# Patient Record
Sex: MALE | Race: WHITE | NOT HISPANIC OR LATINO
[De-identification: names, ages, dates, MRNs, and addresses within clinical notes are randomized per-mention and may not be internally consistent; named-entity substitution may affect disease eponyms.]

---

## 2019-06-17 ENCOUNTER — APPOINTMENT (OUTPATIENT)
Dept: UROLOGY | Facility: CLINIC | Age: 39
End: 2019-06-17
Payer: COMMERCIAL

## 2019-06-17 VITALS — SYSTOLIC BLOOD PRESSURE: 140 MMHG | OXYGEN SATURATION: 97 % | HEART RATE: 108 BPM | DIASTOLIC BLOOD PRESSURE: 100 MMHG

## 2019-06-17 DIAGNOSIS — N32.89 OTHER SPECIFIED DISORDERS OF BLADDER: ICD-10-CM

## 2019-06-17 PROBLEM — Z00.00 ENCOUNTER FOR PREVENTIVE HEALTH EXAMINATION: Status: ACTIVE | Noted: 2019-06-17

## 2019-06-17 PROCEDURE — 99203 OFFICE O/P NEW LOW 30 MIN: CPT

## 2019-06-17 NOTE — HISTORY OF PRESENT ILLNESS
[FreeTextEntry1] : 39 y/o male \par hx of recent trauma in [facility] last week\par reports fell on stairs \par ruptured bladder\par s/p exlap and repair of bladder (as per patient, records requested)\par \par currently ashton in place\par ~ 7 days post op\par feels ok \par no fevers, pain or infection\par tolerating diet \par good bowel movements\par \par Works as medical device rep\par no smoking \par social etoh\par lives alone\par single\par

## 2019-06-17 NOTE — ASSESSMENT
[FreeTextEntry1] : Patient seen and examined\par underwent Ex-lap and bladder repair by patient report \par Staples removed today, steri-strips placed\par d/w patient will obtain medical records from OR report and previous Ct scans\par CT cystogram scheduled for later this week to assess if bladder repaired\par RTC 1 week for likely ashton removal pending \par cbc, bmp ordered\par understands given I did not perform surgery, we need to obtain full history prior to removing ashton catheter\par patient understands and agrees with plan

## 2019-06-17 NOTE — PHYSICAL EXAM
[General Appearance - Well Developed] : well developed [General Appearance - Well Nourished] : well nourished [Well Groomed] : well groomed [Normal Appearance] : normal appearance [Edema] : no peripheral edema [General Appearance - In No Acute Distress] : no acute distress [Respiration, Rhythm And Depth] : normal respiratory rhythm and effort [Exaggerated Use Of Accessory Muscles For Inspiration] : no accessory muscle use [Abdomen Soft] : soft [Abdomen Tenderness] : non-tender [Normal Station and Gait] : the gait and station were normal for the patient's age [Costovertebral Angle Tenderness] : no ~M costovertebral angle tenderness [] : no rash [No Focal Deficits] : no focal deficits [Not Anxious] : not anxious [Oriented To Time, Place, And Person] : oriented to person, place, and time [Mood] : the mood was normal [Affect] : the affect was normal [No Palpable Adenopathy] : no palpable adenopathy [FreeTextEntry1] : midline incision healed, staples in place, circumcised, ashton in place, testes normal

## 2019-06-24 ENCOUNTER — RESULT REVIEW (OUTPATIENT)
Age: 39
End: 2019-06-24

## 2020-01-01 ENCOUNTER — TRANSCRIPTION ENCOUNTER (OUTPATIENT)
Age: 40
End: 2020-01-01

## 2020-01-01 ENCOUNTER — INPATIENT (INPATIENT)
Facility: HOSPITAL | Age: 40
LOS: 11 days | DRG: 871 | End: 2020-11-12
Attending: STUDENT IN AN ORGANIZED HEALTH CARE EDUCATION/TRAINING PROGRAM | Admitting: STUDENT IN AN ORGANIZED HEALTH CARE EDUCATION/TRAINING PROGRAM
Payer: MEDICAID

## 2020-01-01 ENCOUNTER — INPATIENT (INPATIENT)
Facility: HOSPITAL | Age: 40
LOS: 3 days | Discharge: HOME | DRG: 393 | End: 2020-09-04
Attending: HOSPITALIST | Admitting: STUDENT IN AN ORGANIZED HEALTH CARE EDUCATION/TRAINING PROGRAM
Payer: MEDICAID

## 2020-01-01 ENCOUNTER — INPATIENT (INPATIENT)
Facility: HOSPITAL | Age: 40
LOS: 19 days | Discharge: LEFT BEFORE DISCHARGE | DRG: 673 | End: 2020-08-15
Attending: HOSPITALIST | Admitting: STUDENT IN AN ORGANIZED HEALTH CARE EDUCATION/TRAINING PROGRAM
Payer: MEDICAID

## 2020-01-01 ENCOUNTER — RESULT REVIEW (OUTPATIENT)
Age: 40
End: 2020-01-01

## 2020-01-01 ENCOUNTER — INPATIENT (INPATIENT)
Facility: HOSPITAL | Age: 40
LOS: 3 days | Discharge: HOME | DRG: 432 | End: 2020-09-23
Attending: HOSPITALIST | Admitting: HOSPITALIST
Payer: MEDICAID

## 2020-01-01 ENCOUNTER — INPATIENT (INPATIENT)
Facility: HOSPITAL | Age: 40
LOS: 5 days | Discharge: HOME | DRG: 432 | End: 2020-07-17
Attending: HOSPITALIST | Admitting: INTERNAL MEDICINE
Payer: MEDICAID

## 2020-01-01 VITALS
HEART RATE: 95 BPM | OXYGEN SATURATION: 94 % | TEMPERATURE: 99 F | RESPIRATION RATE: 18 BRPM | DIASTOLIC BLOOD PRESSURE: 73 MMHG | SYSTOLIC BLOOD PRESSURE: 110 MMHG

## 2020-01-01 VITALS
RESPIRATION RATE: 22 BRPM | DIASTOLIC BLOOD PRESSURE: 76 MMHG | TEMPERATURE: 98 F | SYSTOLIC BLOOD PRESSURE: 114 MMHG | OXYGEN SATURATION: 98 % | HEART RATE: 78 BPM | WEIGHT: 184.97 LBS | HEIGHT: 73 IN

## 2020-01-01 VITALS
TEMPERATURE: 98 F | DIASTOLIC BLOOD PRESSURE: 57 MMHG | RESPIRATION RATE: 17 BRPM | SYSTOLIC BLOOD PRESSURE: 93 MMHG | HEART RATE: 110 BPM | OXYGEN SATURATION: 94 %

## 2020-01-01 VITALS
HEART RATE: 108 BPM | SYSTOLIC BLOOD PRESSURE: 113 MMHG | TEMPERATURE: 98 F | DIASTOLIC BLOOD PRESSURE: 67 MMHG | RESPIRATION RATE: 20 BRPM | OXYGEN SATURATION: 99 %

## 2020-01-01 VITALS
SYSTOLIC BLOOD PRESSURE: 116 MMHG | RESPIRATION RATE: 16 BRPM | TEMPERATURE: 99 F | OXYGEN SATURATION: 92 % | HEART RATE: 112 BPM | DIASTOLIC BLOOD PRESSURE: 75 MMHG

## 2020-01-01 VITALS
TEMPERATURE: 98 F | HEIGHT: 71 IN | SYSTOLIC BLOOD PRESSURE: 91 MMHG | RESPIRATION RATE: 19 BRPM | DIASTOLIC BLOOD PRESSURE: 60 MMHG | HEART RATE: 136 BPM | WEIGHT: 180.78 LBS | OXYGEN SATURATION: 94 %

## 2020-01-01 VITALS
HEART RATE: 123 BPM | WEIGHT: 184.53 LBS | RESPIRATION RATE: 16 BRPM | OXYGEN SATURATION: 96 % | DIASTOLIC BLOOD PRESSURE: 77 MMHG | SYSTOLIC BLOOD PRESSURE: 112 MMHG | TEMPERATURE: 98 F | HEIGHT: 73 IN

## 2020-01-01 VITALS
SYSTOLIC BLOOD PRESSURE: 131 MMHG | DIASTOLIC BLOOD PRESSURE: 73 MMHG | RESPIRATION RATE: 22 BRPM | HEIGHT: 73 IN | TEMPERATURE: 102 F | WEIGHT: 192.24 LBS | OXYGEN SATURATION: 97 % | HEART RATE: 113 BPM

## 2020-01-01 VITALS
WEIGHT: 180.12 LBS | RESPIRATION RATE: 18 BRPM | SYSTOLIC BLOOD PRESSURE: 110 MMHG | HEART RATE: 102 BPM | HEIGHT: 73 IN | OXYGEN SATURATION: 95 % | DIASTOLIC BLOOD PRESSURE: 73 MMHG

## 2020-01-01 DIAGNOSIS — D75.89 OTHER SPECIFIED DISEASES OF BLOOD AND BLOOD-FORMING ORGANS: ICD-10-CM

## 2020-01-01 DIAGNOSIS — R19.7 DIARRHEA, UNSPECIFIED: ICD-10-CM

## 2020-01-01 DIAGNOSIS — A41.9 SEPSIS, UNSPECIFIED ORGANISM: ICD-10-CM

## 2020-01-01 DIAGNOSIS — D69.6 THROMBOCYTOPENIA, UNSPECIFIED: ICD-10-CM

## 2020-01-01 DIAGNOSIS — R07.9 CHEST PAIN, UNSPECIFIED: ICD-10-CM

## 2020-01-01 DIAGNOSIS — F10.10 ALCOHOL ABUSE, UNCOMPLICATED: ICD-10-CM

## 2020-01-01 DIAGNOSIS — E83.39 OTHER DISORDERS OF PHOSPHORUS METABOLISM: ICD-10-CM

## 2020-01-01 DIAGNOSIS — K70.31 ALCOHOLIC CIRRHOSIS OF LIVER WITH ASCITES: ICD-10-CM

## 2020-01-01 DIAGNOSIS — N17.9 ACUTE KIDNEY FAILURE, UNSPECIFIED: ICD-10-CM

## 2020-01-01 DIAGNOSIS — Z02.9 ENCOUNTER FOR ADMINISTRATIVE EXAMINATIONS, UNSPECIFIED: ICD-10-CM

## 2020-01-01 DIAGNOSIS — R56.9 UNSPECIFIED CONVULSIONS: ICD-10-CM

## 2020-01-01 DIAGNOSIS — D62 ACUTE POSTHEMORRHAGIC ANEMIA: ICD-10-CM

## 2020-01-01 DIAGNOSIS — J90 PLEURAL EFFUSION, NOT ELSEWHERE CLASSIFIED: ICD-10-CM

## 2020-01-01 DIAGNOSIS — R18.8 OTHER ASCITES: ICD-10-CM

## 2020-01-01 DIAGNOSIS — D64.9 ANEMIA, UNSPECIFIED: ICD-10-CM

## 2020-01-01 DIAGNOSIS — N18.9 CHRONIC KIDNEY DISEASE, UNSPECIFIED: ICD-10-CM

## 2020-01-01 DIAGNOSIS — E27.49 OTHER ADRENOCORTICAL INSUFFICIENCY: ICD-10-CM

## 2020-01-01 DIAGNOSIS — K92.0 HEMATEMESIS: ICD-10-CM

## 2020-01-01 DIAGNOSIS — Z29.9 ENCOUNTER FOR PROPHYLACTIC MEASURES, UNSPECIFIED: ICD-10-CM

## 2020-01-01 DIAGNOSIS — Z98.890 OTHER SPECIFIED POSTPROCEDURAL STATES: Chronic | ICD-10-CM

## 2020-01-01 DIAGNOSIS — N18.6 END STAGE RENAL DISEASE: ICD-10-CM

## 2020-01-01 DIAGNOSIS — F10.231 ALCOHOL DEPENDENCE WITH WITHDRAWAL DELIRIUM: ICD-10-CM

## 2020-01-01 DIAGNOSIS — K70.30 ALCOHOLIC CIRRHOSIS OF LIVER WITHOUT ASCITES: ICD-10-CM

## 2020-01-01 DIAGNOSIS — K72.00 ACUTE AND SUBACUTE HEPATIC FAILURE WITHOUT COMA: ICD-10-CM

## 2020-01-01 DIAGNOSIS — K72.90 HEPATIC FAILURE, UNSPECIFIED WITHOUT COMA: ICD-10-CM

## 2020-01-01 DIAGNOSIS — D72.829 ELEVATED WHITE BLOOD CELL COUNT, UNSPECIFIED: ICD-10-CM

## 2020-01-01 DIAGNOSIS — R10.9 UNSPECIFIED ABDOMINAL PAIN: ICD-10-CM

## 2020-01-01 DIAGNOSIS — E87.6 HYPOKALEMIA: ICD-10-CM

## 2020-01-01 DIAGNOSIS — K92.2 GASTROINTESTINAL HEMORRHAGE, UNSPECIFIED: ICD-10-CM

## 2020-01-01 DIAGNOSIS — R00.0 TACHYCARDIA, UNSPECIFIED: ICD-10-CM

## 2020-01-01 DIAGNOSIS — E87.1 HYPO-OSMOLALITY AND HYPONATREMIA: ICD-10-CM

## 2020-01-01 DIAGNOSIS — R23.4 CHANGES IN SKIN TEXTURE: ICD-10-CM

## 2020-01-01 DIAGNOSIS — E83.51 HYPOCALCEMIA: ICD-10-CM

## 2020-01-01 DIAGNOSIS — R65.10 SYSTEMIC INFLAMMATORY RESPONSE SYNDROME (SIRS) OF NON-INFECTIOUS ORIGIN WITHOUT ACUTE ORGAN DYSFUNCTION: ICD-10-CM

## 2020-01-01 DIAGNOSIS — R06.00 DYSPNEA, UNSPECIFIED: ICD-10-CM

## 2020-01-01 DIAGNOSIS — K76.7 HEPATORENAL SYNDROME: ICD-10-CM

## 2020-01-01 DIAGNOSIS — G93.41 METABOLIC ENCEPHALOPATHY: ICD-10-CM

## 2020-01-01 DIAGNOSIS — I95.9 HYPOTENSION, UNSPECIFIED: ICD-10-CM

## 2020-01-01 DIAGNOSIS — R74.0 NONSPECIFIC ELEVATION OF LEVELS OF TRANSAMINASE AND LACTIC ACID DEHYDROGENASE [LDH]: ICD-10-CM

## 2020-01-01 DIAGNOSIS — B17.9 ACUTE VIRAL HEPATITIS, UNSPECIFIED: ICD-10-CM

## 2020-01-01 DIAGNOSIS — K74.60 UNSPECIFIED CIRRHOSIS OF LIVER: ICD-10-CM

## 2020-01-01 LAB
A PHAGOCYTOPH DNA BLD QL NAA+PROBE: NEGATIVE — SIGNIFICANT CHANGE UP
A PHAGOCYTOPH IGG TITR SER IF: SIGNIFICANT CHANGE UP
A PHAGOCYTOPH IGM SER QL: SIGNIFICANT CHANGE UP
A PHAGOCYTOPH IGM SER QL: SIGNIFICANT CHANGE UP
A PHAGOCYTOPH IGM TITR SER IF: SIGNIFICANT CHANGE UP
A1AT PHENOTYP SERPL-IMP: SIGNIFICANT CHANGE UP BANDS
A1AT SERPL-MCNC: 100 MG/DL — SIGNIFICANT CHANGE UP (ref 100–190)
A1AT SERPL-MCNC: 107 MG/DL — SIGNIFICANT CHANGE UP (ref 90–200)
A1C WITH ESTIMATED AVERAGE GLUCOSE RESULT: 4.6 % — SIGNIFICANT CHANGE UP (ref 4–5.6)
AFP-TM SERPL-MCNC: 3.4 NG/ML — SIGNIFICANT CHANGE UP
ALBUMIN FLD-MCNC: 0.4 G/DL — SIGNIFICANT CHANGE UP
ALBUMIN FLD-MCNC: 0.6 G/DL — SIGNIFICANT CHANGE UP
ALBUMIN FLD-MCNC: 0.8 G/DL — SIGNIFICANT CHANGE UP
ALBUMIN FLD-MCNC: 1 G/DL — SIGNIFICANT CHANGE UP
ALBUMIN FLD-MCNC: 1.2 G/DL — SIGNIFICANT CHANGE UP
ALBUMIN FLD-MCNC: 1.6 G/DL — SIGNIFICANT CHANGE UP
ALBUMIN SERPL ELPH-MCNC: 2 G/DL — LOW (ref 3.3–5)
ALBUMIN SERPL ELPH-MCNC: 2.1 G/DL — LOW (ref 3.3–5)
ALBUMIN SERPL ELPH-MCNC: 2.2 G/DL — LOW (ref 3.3–5)
ALBUMIN SERPL ELPH-MCNC: 2.3 G/DL — LOW (ref 3.3–5)
ALBUMIN SERPL ELPH-MCNC: 2.4 G/DL — LOW (ref 3.3–5)
ALBUMIN SERPL ELPH-MCNC: 2.4 G/DL — LOW (ref 3.3–5)
ALBUMIN SERPL ELPH-MCNC: 2.5 G/DL — LOW (ref 3.3–5)
ALBUMIN SERPL ELPH-MCNC: 2.7 G/DL — LOW (ref 3.3–5)
ALBUMIN SERPL ELPH-MCNC: 2.7 G/DL — LOW (ref 3.3–5)
ALBUMIN SERPL ELPH-MCNC: 2.8 G/DL — LOW (ref 3.3–5)
ALBUMIN SERPL ELPH-MCNC: 2.9 G/DL — LOW (ref 3.3–5)
ALBUMIN SERPL ELPH-MCNC: 3 G/DL — LOW (ref 3.3–5)
ALBUMIN SERPL ELPH-MCNC: 3.1 G/DL — LOW (ref 3.3–5)
ALBUMIN SERPL ELPH-MCNC: 3.2 G/DL — LOW (ref 3.3–5)
ALBUMIN SERPL ELPH-MCNC: 3.3 G/DL — SIGNIFICANT CHANGE UP (ref 3.3–5)
ALBUMIN SERPL ELPH-MCNC: 3.4 G/DL — SIGNIFICANT CHANGE UP (ref 3.3–5)
ALBUMIN SERPL ELPH-MCNC: 3.5 G/DL — SIGNIFICANT CHANGE UP (ref 3.3–5)
ALBUMIN SERPL ELPH-MCNC: 3.6 G/DL — SIGNIFICANT CHANGE UP (ref 3.3–5)
ALBUMIN SERPL ELPH-MCNC: 3.7 G/DL — SIGNIFICANT CHANGE UP (ref 3.3–5)
ALBUMIN SERPL ELPH-MCNC: 3.8 G/DL — SIGNIFICANT CHANGE UP (ref 3.3–5)
ALBUMIN SERPL ELPH-MCNC: 3.9 G/DL — SIGNIFICANT CHANGE UP (ref 3.3–5)
ALBUMIN SERPL ELPH-MCNC: 4 G/DL — SIGNIFICANT CHANGE UP (ref 3.3–5)
ALBUMIN SERPL ELPH-MCNC: 4 G/DL — SIGNIFICANT CHANGE UP (ref 3.3–5)
ALBUMIN SERPL ELPH-MCNC: 4.1 G/DL — SIGNIFICANT CHANGE UP (ref 3.3–5)
ALBUMIN SERPL ELPH-MCNC: 4.2 G/DL — SIGNIFICANT CHANGE UP (ref 3.3–5)
ALBUMIN SERPL ELPH-MCNC: 4.3 G/DL — SIGNIFICANT CHANGE UP (ref 3.3–5)
ALBUMIN SERPL ELPH-MCNC: 4.3 G/DL — SIGNIFICANT CHANGE UP (ref 3.3–5)
ALBUMIN SERPL ELPH-MCNC: 4.4 G/DL — SIGNIFICANT CHANGE UP (ref 3.3–5)
ALBUMIN SERPL ELPH-MCNC: 4.4 G/DL — SIGNIFICANT CHANGE UP (ref 3.3–5)
ALBUMIN SERPL ELPH-MCNC: 4.6 G/DL — SIGNIFICANT CHANGE UP (ref 3.3–5)
ALP SERPL-CCNC: 102 U/L — SIGNIFICANT CHANGE UP (ref 40–120)
ALP SERPL-CCNC: 104 U/L — SIGNIFICANT CHANGE UP (ref 40–120)
ALP SERPL-CCNC: 105 U/L — SIGNIFICANT CHANGE UP (ref 40–120)
ALP SERPL-CCNC: 107 U/L — SIGNIFICANT CHANGE UP (ref 40–120)
ALP SERPL-CCNC: 114 U/L — SIGNIFICANT CHANGE UP (ref 40–120)
ALP SERPL-CCNC: 115 U/L — SIGNIFICANT CHANGE UP (ref 40–120)
ALP SERPL-CCNC: 119 U/L — SIGNIFICANT CHANGE UP (ref 40–120)
ALP SERPL-CCNC: 124 U/L — HIGH (ref 40–120)
ALP SERPL-CCNC: 125 U/L — HIGH (ref 40–120)
ALP SERPL-CCNC: 127 U/L — HIGH (ref 40–120)
ALP SERPL-CCNC: 128 U/L — HIGH (ref 40–120)
ALP SERPL-CCNC: 131 U/L — HIGH (ref 40–120)
ALP SERPL-CCNC: 131 U/L — HIGH (ref 40–120)
ALP SERPL-CCNC: 132 U/L — HIGH (ref 40–120)
ALP SERPL-CCNC: 135 U/L — HIGH (ref 40–120)
ALP SERPL-CCNC: 138 U/L — HIGH (ref 40–120)
ALP SERPL-CCNC: 139 U/L — HIGH (ref 40–120)
ALP SERPL-CCNC: 140 U/L — HIGH (ref 40–120)
ALP SERPL-CCNC: 141 U/L — HIGH (ref 40–120)
ALP SERPL-CCNC: 148 U/L — HIGH (ref 40–120)
ALP SERPL-CCNC: 157 U/L — HIGH (ref 40–120)
ALP SERPL-CCNC: 160 U/L — HIGH (ref 40–120)
ALP SERPL-CCNC: 162 U/L — HIGH (ref 40–120)
ALP SERPL-CCNC: 163 U/L — HIGH (ref 40–120)
ALP SERPL-CCNC: 167 U/L — HIGH (ref 40–120)
ALP SERPL-CCNC: 172 U/L — HIGH (ref 40–120)
ALP SERPL-CCNC: 177 U/L — HIGH (ref 40–120)
ALP SERPL-CCNC: 182 U/L — HIGH (ref 40–120)
ALP SERPL-CCNC: 190 U/L — HIGH (ref 40–120)
ALP SERPL-CCNC: 211 U/L — HIGH (ref 40–120)
ALP SERPL-CCNC: 216 U/L — HIGH (ref 40–120)
ALP SERPL-CCNC: 228 U/L — HIGH (ref 40–120)
ALP SERPL-CCNC: 245 U/L — HIGH (ref 40–120)
ALP SERPL-CCNC: 245 U/L — HIGH (ref 40–120)
ALP SERPL-CCNC: 47 U/L — SIGNIFICANT CHANGE UP (ref 40–120)
ALP SERPL-CCNC: 49 U/L — SIGNIFICANT CHANGE UP (ref 40–120)
ALP SERPL-CCNC: 51 U/L — SIGNIFICANT CHANGE UP (ref 40–120)
ALP SERPL-CCNC: 51 U/L — SIGNIFICANT CHANGE UP (ref 40–120)
ALP SERPL-CCNC: 53 U/L — SIGNIFICANT CHANGE UP (ref 40–120)
ALP SERPL-CCNC: 54 U/L — SIGNIFICANT CHANGE UP (ref 40–120)
ALP SERPL-CCNC: 56 U/L — SIGNIFICANT CHANGE UP (ref 40–120)
ALP SERPL-CCNC: 57 U/L — SIGNIFICANT CHANGE UP (ref 40–120)
ALP SERPL-CCNC: 59 U/L — SIGNIFICANT CHANGE UP (ref 40–120)
ALP SERPL-CCNC: 59 U/L — SIGNIFICANT CHANGE UP (ref 40–120)
ALP SERPL-CCNC: 61 U/L — SIGNIFICANT CHANGE UP (ref 40–120)
ALP SERPL-CCNC: 63 U/L — SIGNIFICANT CHANGE UP (ref 40–120)
ALP SERPL-CCNC: 63 U/L — SIGNIFICANT CHANGE UP (ref 40–120)
ALP SERPL-CCNC: 64 U/L — SIGNIFICANT CHANGE UP (ref 40–120)
ALP SERPL-CCNC: 67 U/L — SIGNIFICANT CHANGE UP (ref 40–120)
ALP SERPL-CCNC: 68 U/L — SIGNIFICANT CHANGE UP (ref 40–120)
ALP SERPL-CCNC: 70 U/L — SIGNIFICANT CHANGE UP (ref 40–120)
ALP SERPL-CCNC: 74 U/L — SIGNIFICANT CHANGE UP (ref 40–120)
ALP SERPL-CCNC: 78 U/L — SIGNIFICANT CHANGE UP (ref 40–120)
ALP SERPL-CCNC: 82 U/L — SIGNIFICANT CHANGE UP (ref 40–120)
ALP SERPL-CCNC: 85 U/L — SIGNIFICANT CHANGE UP (ref 40–120)
ALP SERPL-CCNC: 85 U/L — SIGNIFICANT CHANGE UP (ref 40–120)
ALP SERPL-CCNC: 87 U/L — SIGNIFICANT CHANGE UP (ref 40–120)
ALP SERPL-CCNC: 87 U/L — SIGNIFICANT CHANGE UP (ref 40–120)
ALP SERPL-CCNC: 89 U/L — SIGNIFICANT CHANGE UP (ref 40–120)
ALP SERPL-CCNC: 89 U/L — SIGNIFICANT CHANGE UP (ref 40–120)
ALP SERPL-CCNC: 90 U/L — SIGNIFICANT CHANGE UP (ref 40–120)
ALP SERPL-CCNC: 91 U/L — SIGNIFICANT CHANGE UP (ref 40–120)
ALP SERPL-CCNC: 91 U/L — SIGNIFICANT CHANGE UP (ref 40–120)
ALP SERPL-CCNC: 92 U/L — SIGNIFICANT CHANGE UP (ref 40–120)
ALP SERPL-CCNC: 94 U/L — SIGNIFICANT CHANGE UP (ref 40–120)
ALP SERPL-CCNC: 96 U/L — SIGNIFICANT CHANGE UP (ref 40–120)
ALP SERPL-CCNC: 97 U/L — SIGNIFICANT CHANGE UP (ref 40–120)
ALP SERPL-CCNC: 97 U/L — SIGNIFICANT CHANGE UP (ref 40–120)
ALP SERPL-CCNC: 98 U/L — SIGNIFICANT CHANGE UP (ref 40–120)
ALP SERPL-CCNC: 99 U/L — SIGNIFICANT CHANGE UP (ref 40–120)
ALT FLD-CCNC: 12 U/L — SIGNIFICANT CHANGE UP (ref 10–45)
ALT FLD-CCNC: 13 U/L — SIGNIFICANT CHANGE UP (ref 10–45)
ALT FLD-CCNC: 14 U/L — SIGNIFICANT CHANGE UP (ref 10–45)
ALT FLD-CCNC: 14 U/L — SIGNIFICANT CHANGE UP (ref 10–45)
ALT FLD-CCNC: 15 U/L — SIGNIFICANT CHANGE UP (ref 10–45)
ALT FLD-CCNC: 16 U/L — SIGNIFICANT CHANGE UP (ref 10–45)
ALT FLD-CCNC: 16 U/L — SIGNIFICANT CHANGE UP (ref 10–45)
ALT FLD-CCNC: 17 U/L — SIGNIFICANT CHANGE UP (ref 10–45)
ALT FLD-CCNC: 18 U/L — SIGNIFICANT CHANGE UP (ref 10–45)
ALT FLD-CCNC: 19 U/L — SIGNIFICANT CHANGE UP (ref 10–45)
ALT FLD-CCNC: 20 U/L — SIGNIFICANT CHANGE UP (ref 10–45)
ALT FLD-CCNC: 20 U/L — SIGNIFICANT CHANGE UP (ref 10–45)
ALT FLD-CCNC: 21 U/L — SIGNIFICANT CHANGE UP (ref 10–45)
ALT FLD-CCNC: 22 U/L — SIGNIFICANT CHANGE UP (ref 10–45)
ALT FLD-CCNC: 23 U/L — SIGNIFICANT CHANGE UP (ref 10–45)
ALT FLD-CCNC: 24 U/L — SIGNIFICANT CHANGE UP (ref 10–45)
ALT FLD-CCNC: 25 U/L — SIGNIFICANT CHANGE UP (ref 10–45)
ALT FLD-CCNC: 26 U/L — SIGNIFICANT CHANGE UP (ref 10–45)
ALT FLD-CCNC: 27 U/L — SIGNIFICANT CHANGE UP (ref 10–45)
ALT FLD-CCNC: 27 U/L — SIGNIFICANT CHANGE UP (ref 10–45)
ALT FLD-CCNC: 28 U/L — SIGNIFICANT CHANGE UP (ref 10–45)
ALT FLD-CCNC: 29 U/L — SIGNIFICANT CHANGE UP (ref 10–45)
ALT FLD-CCNC: 31 U/L — SIGNIFICANT CHANGE UP (ref 10–45)
ALT FLD-CCNC: 31 U/L — SIGNIFICANT CHANGE UP (ref 10–45)
ALT FLD-CCNC: 33 U/L — SIGNIFICANT CHANGE UP (ref 10–45)
ALT FLD-CCNC: 34 U/L — SIGNIFICANT CHANGE UP (ref 10–45)
ALT FLD-CCNC: 34 U/L — SIGNIFICANT CHANGE UP (ref 10–45)
ALT FLD-CCNC: 35 U/L — SIGNIFICANT CHANGE UP (ref 10–45)
ALT FLD-CCNC: 36 U/L — SIGNIFICANT CHANGE UP (ref 10–45)
ALT FLD-CCNC: 36 U/L — SIGNIFICANT CHANGE UP (ref 10–45)
ALT FLD-CCNC: 37 U/L — SIGNIFICANT CHANGE UP (ref 10–45)
ALT FLD-CCNC: 37 U/L — SIGNIFICANT CHANGE UP (ref 10–45)
ALT FLD-CCNC: 38 U/L — SIGNIFICANT CHANGE UP (ref 10–45)
ALT FLD-CCNC: 39 U/L — SIGNIFICANT CHANGE UP (ref 10–45)
ALT FLD-CCNC: 40 U/L — SIGNIFICANT CHANGE UP (ref 10–45)
ALT FLD-CCNC: 41 U/L — SIGNIFICANT CHANGE UP (ref 10–45)
ALT FLD-CCNC: 43 U/L — SIGNIFICANT CHANGE UP (ref 10–45)
ALT FLD-CCNC: 47 U/L — HIGH (ref 10–45)
ALT FLD-CCNC: 47 U/L — HIGH (ref 10–45)
ALT FLD-CCNC: 48 U/L — HIGH (ref 10–45)
ALT FLD-CCNC: 51 U/L — HIGH (ref 10–45)
ALT FLD-CCNC: 55 U/L — HIGH (ref 10–45)
ALT FLD-CCNC: 57 U/L — HIGH (ref 10–45)
ALT FLD-CCNC: 60 U/L — HIGH (ref 10–45)
ALT FLD-CCNC: 65 U/L — HIGH (ref 10–45)
ALT FLD-CCNC: 68 U/L — HIGH (ref 10–45)
ALT FLD-CCNC: 70 U/L — HIGH (ref 10–45)
ALT FLD-CCNC: 75 U/L — HIGH (ref 10–45)
ALT FLD-CCNC: 77 U/L — HIGH (ref 10–45)
ALT FLD-CCNC: 81 U/L — HIGH (ref 10–45)
ALT FLD-CCNC: 86 U/L — HIGH (ref 10–45)
ALT FLD-CCNC: 89 U/L — HIGH (ref 10–45)
AMMONIA BLD-MCNC: 130 UMOL/L — HIGH (ref 11–55)
AMMONIA BLD-MCNC: 33 UMOL/L — SIGNIFICANT CHANGE UP (ref 11–55)
AMMONIA BLD-MCNC: 34 UMOL/L — SIGNIFICANT CHANGE UP (ref 11–55)
AMMONIA BLD-MCNC: 85 UMOL/L — HIGH (ref 11–55)
AMMONIA BLD-MCNC: 94 UMOL/L — HIGH (ref 11–55)
AMYLASE P1 CFR SERPL: 24 U/L — LOW (ref 25–125)
AMYLASE P1 CFR SERPL: 66 U/L — SIGNIFICANT CHANGE UP (ref 25–125)
AMYLASE P1 CFR SERPL: 68 U/L — SIGNIFICANT CHANGE UP (ref 25–125)
ANA TITR SER: NEGATIVE — SIGNIFICANT CHANGE UP
ANAPLASMA PHAGOCYTO IGM COMENT: SIGNIFICANT CHANGE UP
ANAPLASMA PHAGOCYTO IGM INTERP: SIGNIFICANT CHANGE UP
ANION GAP SERPL CALC-SCNC: 10 MMOL/L — SIGNIFICANT CHANGE UP (ref 5–17)
ANION GAP SERPL CALC-SCNC: 11 MMOL/L — SIGNIFICANT CHANGE UP (ref 5–17)
ANION GAP SERPL CALC-SCNC: 12 MMOL/L — SIGNIFICANT CHANGE UP (ref 5–17)
ANION GAP SERPL CALC-SCNC: 13 MMOL/L — SIGNIFICANT CHANGE UP (ref 5–17)
ANION GAP SERPL CALC-SCNC: 14 MMOL/L — SIGNIFICANT CHANGE UP (ref 5–17)
ANION GAP SERPL CALC-SCNC: 15 MMOL/L — SIGNIFICANT CHANGE UP (ref 5–17)
ANION GAP SERPL CALC-SCNC: 16 MMOL/L — SIGNIFICANT CHANGE UP (ref 5–17)
ANION GAP SERPL CALC-SCNC: 17 MMOL/L — SIGNIFICANT CHANGE UP (ref 5–17)
ANION GAP SERPL CALC-SCNC: 18 MMOL/L — HIGH (ref 5–17)
ANION GAP SERPL CALC-SCNC: 19 MMOL/L — HIGH (ref 5–17)
ANION GAP SERPL CALC-SCNC: 19 MMOL/L — HIGH (ref 5–17)
ANION GAP SERPL CALC-SCNC: 20 MMOL/L — HIGH (ref 5–17)
ANION GAP SERPL CALC-SCNC: 21 MMOL/L — HIGH (ref 5–17)
ANION GAP SERPL CALC-SCNC: 22 MMOL/L — HIGH (ref 5–17)
ANION GAP SERPL CALC-SCNC: 22 MMOL/L — HIGH (ref 5–17)
ANION GAP SERPL CALC-SCNC: 23 MMOL/L — HIGH (ref 5–17)
ANION GAP SERPL CALC-SCNC: 24 MMOL/L — HIGH (ref 5–17)
ANION GAP SERPL CALC-SCNC: 25 MMOL/L — HIGH (ref 5–17)
ANION GAP SERPL CALC-SCNC: 26 MMOL/L — HIGH (ref 5–17)
ANION GAP SERPL CALC-SCNC: 28 MMOL/L — HIGH (ref 5–17)
ANION GAP SERPL CALC-SCNC: 9 MMOL/L — SIGNIFICANT CHANGE UP (ref 5–17)
ANION GAP SERPL CALC-SCNC: 9 MMOL/L — SIGNIFICANT CHANGE UP (ref 5–17)
ANISOCYTOSIS BLD QL: SIGNIFICANT CHANGE UP
ANISOCYTOSIS BLD QL: SIGNIFICANT CHANGE UP
ANISOCYTOSIS BLD QL: SLIGHT — SIGNIFICANT CHANGE UP
APPEARANCE UR: ABNORMAL
APPEARANCE UR: CLEAR — SIGNIFICANT CHANGE UP
APTT BLD: 143.7 SEC — CRITICAL HIGH (ref 27.5–35.5)
APTT BLD: 31.5 SEC — SIGNIFICANT CHANGE UP (ref 27.5–35.5)
APTT BLD: 32.3 SEC — SIGNIFICANT CHANGE UP (ref 27.5–35.5)
APTT BLD: 32.5 SEC — SIGNIFICANT CHANGE UP (ref 27.5–35.5)
APTT BLD: 32.6 SEC — SIGNIFICANT CHANGE UP (ref 27.5–35.5)
APTT BLD: 32.8 SEC — SIGNIFICANT CHANGE UP (ref 27.5–35.5)
APTT BLD: 32.9 SEC — SIGNIFICANT CHANGE UP (ref 27.5–35.5)
APTT BLD: 33.6 SEC — SIGNIFICANT CHANGE UP (ref 27.5–35.5)
APTT BLD: 33.6 SEC — SIGNIFICANT CHANGE UP (ref 27.5–35.5)
APTT BLD: 34.4 SEC — SIGNIFICANT CHANGE UP (ref 27.5–35.5)
APTT BLD: 34.7 SEC — SIGNIFICANT CHANGE UP (ref 27.5–35.5)
APTT BLD: 34.7 SEC — SIGNIFICANT CHANGE UP (ref 27.5–35.5)
APTT BLD: 36.2 SEC — HIGH (ref 27.5–35.5)
APTT BLD: 36.2 SEC — HIGH (ref 27.5–35.5)
APTT BLD: 36.8 SEC — HIGH (ref 27.5–35.5)
APTT BLD: 37.2 SEC — HIGH (ref 27.5–35.5)
APTT BLD: 37.3 SEC — HIGH (ref 27.5–35.5)
APTT BLD: 37.7 SEC — HIGH (ref 27.5–35.5)
APTT BLD: 37.7 SEC — HIGH (ref 27.5–35.5)
APTT BLD: 37.9 SEC — HIGH (ref 27.5–35.5)
APTT BLD: 37.9 SEC — HIGH (ref 27.5–35.5)
APTT BLD: 38.8 SEC — HIGH (ref 27.5–35.5)
APTT BLD: 38.9 SEC — HIGH (ref 27.5–35.5)
APTT BLD: 38.9 SEC — HIGH (ref 27.5–35.5)
APTT BLD: 39.6 SEC — HIGH (ref 27.5–35.5)
APTT BLD: 39.7 SEC — HIGH (ref 27.5–35.5)
APTT BLD: 40 SEC — HIGH (ref 27.5–35.5)
APTT BLD: 40.3 SEC — HIGH (ref 27.5–35.5)
APTT BLD: 40.3 SEC — HIGH (ref 27.5–35.5)
APTT BLD: 40.4 SEC — HIGH (ref 27.5–35.5)
APTT BLD: 40.6 SEC — HIGH (ref 27.5–35.5)
APTT BLD: 40.8 SEC — HIGH (ref 27.5–35.5)
APTT BLD: 40.8 SEC — HIGH (ref 27.5–35.5)
APTT BLD: 40.9 SEC — HIGH (ref 27.5–35.5)
APTT BLD: 41.4 SEC — HIGH (ref 27.5–35.5)
APTT BLD: 41.6 SEC — HIGH (ref 27.5–35.5)
APTT BLD: 41.7 SEC — HIGH (ref 27.5–35.5)
APTT BLD: 41.8 SEC — HIGH (ref 27.5–35.5)
APTT BLD: 41.9 SEC — HIGH (ref 27.5–35.5)
APTT BLD: 41.9 SEC — HIGH (ref 27.5–35.5)
APTT BLD: 42.5 SEC — HIGH (ref 27.5–35.5)
APTT BLD: 42.7 SEC — HIGH (ref 27.5–35.5)
APTT BLD: 42.7 SEC — HIGH (ref 27.5–35.5)
APTT BLD: 43 SEC — HIGH (ref 27.5–35.5)
APTT BLD: 44.3 SEC — HIGH (ref 27.5–35.5)
APTT BLD: 44.6 SEC — HIGH (ref 27.5–35.5)
APTT BLD: 46 SEC — HIGH (ref 27.5–35.5)
APTT BLD: 46.2 SEC — HIGH (ref 27.5–35.5)
APTT BLD: 46.3 SEC — HIGH (ref 27.5–35.5)
APTT BLD: 46.4 SEC — HIGH (ref 27.5–35.5)
APTT BLD: 46.7 SEC — HIGH (ref 27.5–35.5)
APTT BLD: 47.1 SEC — HIGH (ref 27.5–35.5)
APTT BLD: 47.7 SEC — HIGH (ref 27.5–35.5)
APTT BLD: 47.8 SEC — HIGH (ref 27.5–35.5)
APTT BLD: 48.3 SEC — HIGH (ref 27.5–35.5)
APTT BLD: 52.3 SEC — HIGH (ref 27.5–35.5)
APTT BLD: 55.2 SEC — HIGH (ref 27.5–35.5)
APTT BLD: 59 SEC — HIGH (ref 27.5–35.5)
APTT BLD: 67.9 SEC — HIGH (ref 27.5–35.5)
APTT BLD: 69.1 SEC — HIGH (ref 27.5–35.5)
APTT BLD: 73.4 SEC — HIGH (ref 27.5–35.5)
APTT BLD: 74.2 SEC — HIGH (ref 27.5–35.5)
APTT BLD: 81.2 SEC — HIGH (ref 27.5–35.5)
APTT BLD: 96 SEC — HIGH (ref 27.5–35.5)
APTT BLD: 99 SEC — HIGH (ref 27.5–35.5)
APTT BLD: >200 SEC — CRITICAL HIGH (ref 27.5–35.5)
AST SERPL-CCNC: 102 U/L — HIGH (ref 10–40)
AST SERPL-CCNC: 106 U/L — HIGH (ref 10–40)
AST SERPL-CCNC: 108 U/L — HIGH (ref 10–40)
AST SERPL-CCNC: 109 U/L — HIGH (ref 10–40)
AST SERPL-CCNC: 110 U/L — HIGH (ref 10–40)
AST SERPL-CCNC: 114 U/L — HIGH (ref 10–40)
AST SERPL-CCNC: 120 U/L — HIGH (ref 10–40)
AST SERPL-CCNC: 121 U/L — HIGH (ref 10–40)
AST SERPL-CCNC: 127 U/L — HIGH (ref 10–40)
AST SERPL-CCNC: 127 U/L — HIGH (ref 10–40)
AST SERPL-CCNC: 133 U/L — HIGH (ref 10–40)
AST SERPL-CCNC: 136 U/L — HIGH (ref 10–40)
AST SERPL-CCNC: 144 U/L — HIGH (ref 10–40)
AST SERPL-CCNC: 145 U/L — HIGH (ref 10–40)
AST SERPL-CCNC: 151 U/L — HIGH (ref 10–40)
AST SERPL-CCNC: 153 U/L — HIGH (ref 10–40)
AST SERPL-CCNC: 153 U/L — HIGH (ref 10–40)
AST SERPL-CCNC: 156 U/L — HIGH (ref 10–40)
AST SERPL-CCNC: 161 U/L — HIGH (ref 10–40)
AST SERPL-CCNC: 172 U/L — HIGH (ref 10–40)
AST SERPL-CCNC: 181 U/L — HIGH (ref 10–40)
AST SERPL-CCNC: 189 U/L — HIGH (ref 10–40)
AST SERPL-CCNC: 191 U/L — HIGH (ref 10–40)
AST SERPL-CCNC: 200 U/L — HIGH (ref 10–40)
AST SERPL-CCNC: 206 U/L — HIGH (ref 10–40)
AST SERPL-CCNC: 209 U/L — HIGH (ref 10–40)
AST SERPL-CCNC: 209 U/L — HIGH (ref 10–40)
AST SERPL-CCNC: 210 U/L — HIGH (ref 10–40)
AST SERPL-CCNC: 210 U/L — HIGH (ref 10–40)
AST SERPL-CCNC: 212 U/L — HIGH (ref 10–40)
AST SERPL-CCNC: 213 U/L — HIGH (ref 10–40)
AST SERPL-CCNC: 213 U/L — HIGH (ref 10–40)
AST SERPL-CCNC: 219 U/L — HIGH (ref 10–40)
AST SERPL-CCNC: 220 U/L — HIGH (ref 10–40)
AST SERPL-CCNC: 222 U/L — HIGH (ref 10–40)
AST SERPL-CCNC: 222 U/L — HIGH (ref 10–40)
AST SERPL-CCNC: 223 U/L — HIGH (ref 10–40)
AST SERPL-CCNC: 226 U/L — HIGH (ref 10–40)
AST SERPL-CCNC: 227 U/L — HIGH (ref 10–40)
AST SERPL-CCNC: 231 U/L — HIGH (ref 10–40)
AST SERPL-CCNC: 236 U/L — HIGH (ref 10–40)
AST SERPL-CCNC: 237 U/L — HIGH (ref 10–40)
AST SERPL-CCNC: 244 U/L — HIGH (ref 10–40)
AST SERPL-CCNC: 249 U/L — HIGH (ref 10–40)
AST SERPL-CCNC: 253 U/L — HIGH (ref 10–40)
AST SERPL-CCNC: 304 U/L — HIGH (ref 10–40)
AST SERPL-CCNC: 314 U/L — HIGH (ref 10–40)
AST SERPL-CCNC: 340 U/L — HIGH (ref 10–40)
AST SERPL-CCNC: 405 U/L — HIGH (ref 10–40)
AST SERPL-CCNC: 41 U/L — HIGH (ref 10–40)
AST SERPL-CCNC: 43 U/L — HIGH (ref 10–40)
AST SERPL-CCNC: 48 U/L — HIGH (ref 10–40)
AST SERPL-CCNC: 482 U/L — HIGH (ref 10–40)
AST SERPL-CCNC: 50 U/L — HIGH (ref 10–40)
AST SERPL-CCNC: 521 U/L — HIGH (ref 10–40)
AST SERPL-CCNC: 53 U/L — HIGH (ref 10–40)
AST SERPL-CCNC: 53 U/L — HIGH (ref 10–40)
AST SERPL-CCNC: 55 U/L — HIGH (ref 10–40)
AST SERPL-CCNC: 554 U/L — HIGH (ref 10–40)
AST SERPL-CCNC: 561 U/L — HIGH (ref 10–40)
AST SERPL-CCNC: 592 U/L — HIGH (ref 10–40)
AST SERPL-CCNC: 60 U/L — HIGH (ref 10–40)
AST SERPL-CCNC: 60 U/L — HIGH (ref 10–40)
AST SERPL-CCNC: 643 U/L — HIGH (ref 10–40)
AST SERPL-CCNC: 68 U/L — HIGH (ref 10–40)
AST SERPL-CCNC: 71 U/L — HIGH (ref 10–40)
AST SERPL-CCNC: 72 U/L — HIGH (ref 10–40)
AST SERPL-CCNC: 80 U/L — HIGH (ref 10–40)
AST SERPL-CCNC: 82 U/L — HIGH (ref 10–40)
AST SERPL-CCNC: 82 U/L — HIGH (ref 10–40)
AST SERPL-CCNC: 83 U/L — HIGH (ref 10–40)
AST SERPL-CCNC: 87 U/L — HIGH (ref 10–40)
AST SERPL-CCNC: 88 U/L — HIGH (ref 10–40)
AST SERPL-CCNC: 88 U/L — HIGH (ref 10–40)
AST SERPL-CCNC: 91 U/L — HIGH (ref 10–40)
AST SERPL-CCNC: 92 U/L — HIGH (ref 10–40)
AST SERPL-CCNC: 94 U/L — HIGH (ref 10–40)
AST SERPL-CCNC: 95 U/L — HIGH (ref 10–40)
AST SERPL-CCNC: 95 U/L — HIGH (ref 10–40)
AST SERPL-CCNC: 98 U/L — HIGH (ref 10–40)
B MICROTI IGG TITR SER: SIGNIFICANT CHANGE UP
B MICROTI IGM TITR SER: SIGNIFICANT CHANGE UP
B PERT IGG+IGM PNL SER: ABNORMAL
B PERT IGG+IGM PNL SER: CLEAR — SIGNIFICANT CHANGE UP
BABESIA AB SER-ACNC: SIGNIFICANT CHANGE UP
BACTERIA # UR AUTO: NEGATIVE — SIGNIFICANT CHANGE UP
BASE EXCESS BLDA CALC-SCNC: -18.5 MMOL/L — LOW (ref -2–2)
BASE EXCESS BLDV CALC-SCNC: -0.6 MMOL/L — SIGNIFICANT CHANGE UP (ref -2–2)
BASE EXCESS BLDV CALC-SCNC: -3.2 MMOL/L — LOW (ref -2–2)
BASE EXCESS BLDV CALC-SCNC: -5 MMOL/L — LOW (ref -2–2)
BASE EXCESS BLDV CALC-SCNC: -6.8 MMOL/L — LOW (ref -2–2)
BASE EXCESS BLDV CALC-SCNC: -6.8 MMOL/L — LOW (ref -2–2)
BASE EXCESS BLDV CALC-SCNC: -8.3 MMOL/L — LOW (ref -2–2)
BASE EXCESS BLDV CALC-SCNC: 5.9 MMOL/L — HIGH (ref -2–2)
BASE EXCESS BLDV CALC-SCNC: 6.7 MMOL/L — HIGH (ref -2–2)
BASOPHILS # BLD AUTO: 0 K/UL — SIGNIFICANT CHANGE UP (ref 0–0.2)
BASOPHILS # BLD AUTO: 0.01 K/UL — SIGNIFICANT CHANGE UP (ref 0–0.2)
BASOPHILS # BLD AUTO: 0.03 K/UL — SIGNIFICANT CHANGE UP (ref 0–0.2)
BASOPHILS # BLD AUTO: 0.03 K/UL — SIGNIFICANT CHANGE UP (ref 0–0.2)
BASOPHILS # BLD AUTO: 0.04 K/UL — SIGNIFICANT CHANGE UP (ref 0–0.2)
BASOPHILS # BLD AUTO: 0.05 K/UL — SIGNIFICANT CHANGE UP (ref 0–0.2)
BASOPHILS # BLD AUTO: 0.07 K/UL — SIGNIFICANT CHANGE UP (ref 0–0.2)
BASOPHILS # BLD AUTO: 0.09 K/UL — SIGNIFICANT CHANGE UP (ref 0–0.2)
BASOPHILS # BLD AUTO: 0.12 K/UL — SIGNIFICANT CHANGE UP (ref 0–0.2)
BASOPHILS # BLD AUTO: 0.13 K/UL — SIGNIFICANT CHANGE UP (ref 0–0.2)
BASOPHILS # BLD AUTO: 0.13 K/UL — SIGNIFICANT CHANGE UP (ref 0–0.2)
BASOPHILS # BLD AUTO: 0.14 K/UL — SIGNIFICANT CHANGE UP (ref 0–0.2)
BASOPHILS # BLD AUTO: 0.16 K/UL — SIGNIFICANT CHANGE UP (ref 0–0.2)
BASOPHILS # BLD AUTO: 0.17 K/UL — SIGNIFICANT CHANGE UP (ref 0–0.2)
BASOPHILS # BLD AUTO: 0.18 K/UL — SIGNIFICANT CHANGE UP (ref 0–0.2)
BASOPHILS # BLD AUTO: 0.19 K/UL — SIGNIFICANT CHANGE UP (ref 0–0.2)
BASOPHILS # BLD AUTO: 0.2 K/UL — SIGNIFICANT CHANGE UP (ref 0–0.2)
BASOPHILS # BLD AUTO: 0.21 K/UL — HIGH (ref 0–0.2)
BASOPHILS # BLD AUTO: 0.22 K/UL — HIGH (ref 0–0.2)
BASOPHILS # BLD AUTO: 0.23 K/UL — HIGH (ref 0–0.2)
BASOPHILS # BLD AUTO: 0.24 K/UL — HIGH (ref 0–0.2)
BASOPHILS # BLD AUTO: 0.25 K/UL — HIGH (ref 0–0.2)
BASOPHILS NFR BLD AUTO: 0 % — SIGNIFICANT CHANGE UP (ref 0–2)
BASOPHILS NFR BLD AUTO: 0.1 % — SIGNIFICANT CHANGE UP (ref 0–2)
BASOPHILS NFR BLD AUTO: 0.2 % — SIGNIFICANT CHANGE UP (ref 0–2)
BASOPHILS NFR BLD AUTO: 0.2 % — SIGNIFICANT CHANGE UP (ref 0–2)
BASOPHILS NFR BLD AUTO: 0.3 % — SIGNIFICANT CHANGE UP (ref 0–2)
BASOPHILS NFR BLD AUTO: 0.4 % — SIGNIFICANT CHANGE UP (ref 0–2)
BASOPHILS NFR BLD AUTO: 0.5 % — SIGNIFICANT CHANGE UP (ref 0–2)
BASOPHILS NFR BLD AUTO: 0.6 % — SIGNIFICANT CHANGE UP (ref 0–2)
BASOPHILS NFR BLD AUTO: 0.7 % — SIGNIFICANT CHANGE UP (ref 0–2)
BASOPHILS NFR BLD AUTO: 0.8 % — SIGNIFICANT CHANGE UP (ref 0–2)
BASOPHILS NFR BLD AUTO: 0.9 % — SIGNIFICANT CHANGE UP (ref 0–2)
BASOPHILS NFR BLD AUTO: 1 % — SIGNIFICANT CHANGE UP (ref 0–2)
BASOPHILS NFR BLD AUTO: 1.1 % — SIGNIFICANT CHANGE UP (ref 0–2)
BASOPHILS NFR BLD AUTO: 1.2 % — SIGNIFICANT CHANGE UP (ref 0–2)
BASOPHILS NFR BLD AUTO: 1.2 % — SIGNIFICANT CHANGE UP (ref 0–2)
BASOPHILS NFR BLD AUTO: 1.5 % — SIGNIFICANT CHANGE UP (ref 0–2)
BASOPHILS NFR BLD AUTO: 1.6 % — SIGNIFICANT CHANGE UP (ref 0–2)
BASOPHILS NFR BLD AUTO: 1.6 % — SIGNIFICANT CHANGE UP (ref 0–2)
BASOPHILS NFR BLD AUTO: 1.8 % — SIGNIFICANT CHANGE UP (ref 0–2)
BASOPHILS NFR BLD AUTO: 2 % — SIGNIFICANT CHANGE UP (ref 0–2)
BILIRUB DIRECT SERPL-MCNC: 5.5 MG/DL — HIGH (ref 0–0.2)
BILIRUB DIRECT SERPL-MCNC: 7.5 MG/DL — HIGH (ref 0–0.2)
BILIRUB DIRECT SERPL-MCNC: 7.5 MG/DL — HIGH (ref 0–0.2)
BILIRUB DIRECT SERPL-MCNC: >10 MG/DL — HIGH (ref 0–0.2)
BILIRUB FLD-MCNC: <0.1 MG/DL — SIGNIFICANT CHANGE UP
BILIRUB FLD-MCNC: <0.2 MG/DL — SIGNIFICANT CHANGE UP
BILIRUB INDIRECT FLD-MCNC: 4 MG/DL — HIGH (ref 0.2–1)
BILIRUB INDIRECT FLD-MCNC: 4 MG/DL — HIGH (ref 0.2–1)
BILIRUB INDIRECT FLD-MCNC: <5.4 MG/DL — HIGH (ref 0.2–1)
BILIRUB INDIRECT FLD-MCNC: <5.4 MG/DL — HIGH (ref 0.2–1)
BILIRUB SERPL-MCNC: 10.3 MG/DL — HIGH (ref 0.2–1.2)
BILIRUB SERPL-MCNC: 10.3 MG/DL — HIGH (ref 0.2–1.2)
BILIRUB SERPL-MCNC: 10.4 MG/DL — HIGH (ref 0.2–1.2)
BILIRUB SERPL-MCNC: 10.5 MG/DL — HIGH (ref 0.2–1.2)
BILIRUB SERPL-MCNC: 10.9 MG/DL — HIGH (ref 0.2–1.2)
BILIRUB SERPL-MCNC: 11.3 MG/DL — HIGH (ref 0.2–1.2)
BILIRUB SERPL-MCNC: 11.3 MG/DL — HIGH (ref 0.2–1.2)
BILIRUB SERPL-MCNC: 11.4 MG/DL — HIGH (ref 0.2–1.2)
BILIRUB SERPL-MCNC: 11.5 MG/DL — HIGH (ref 0.2–1.2)
BILIRUB SERPL-MCNC: 11.6 MG/DL — HIGH (ref 0.2–1.2)
BILIRUB SERPL-MCNC: 11.8 MG/DL — HIGH (ref 0.2–1.2)
BILIRUB SERPL-MCNC: 11.9 MG/DL — HIGH (ref 0.2–1.2)
BILIRUB SERPL-MCNC: 12 MG/DL — HIGH (ref 0.2–1.2)
BILIRUB SERPL-MCNC: 12.1 MG/DL — HIGH (ref 0.2–1.2)
BILIRUB SERPL-MCNC: 12.1 MG/DL — HIGH (ref 0.2–1.2)
BILIRUB SERPL-MCNC: 12.2 MG/DL — HIGH (ref 0.2–1.2)
BILIRUB SERPL-MCNC: 12.3 MG/DL — HIGH (ref 0.2–1.2)
BILIRUB SERPL-MCNC: 12.3 MG/DL — HIGH (ref 0.2–1.2)
BILIRUB SERPL-MCNC: 12.4 MG/DL — HIGH (ref 0.2–1.2)
BILIRUB SERPL-MCNC: 12.4 MG/DL — HIGH (ref 0.2–1.2)
BILIRUB SERPL-MCNC: 12.8 MG/DL — HIGH (ref 0.2–1.2)
BILIRUB SERPL-MCNC: 12.9 MG/DL — HIGH (ref 0.2–1.2)
BILIRUB SERPL-MCNC: 12.9 MG/DL — HIGH (ref 0.2–1.2)
BILIRUB SERPL-MCNC: 13 MG/DL — HIGH (ref 0.2–1.2)
BILIRUB SERPL-MCNC: 13.1 MG/DL — HIGH (ref 0.2–1.2)
BILIRUB SERPL-MCNC: 14 MG/DL — HIGH (ref 0.2–1.2)
BILIRUB SERPL-MCNC: 14.5 MG/DL — HIGH (ref 0.2–1.2)
BILIRUB SERPL-MCNC: 15.2 MG/DL — HIGH (ref 0.2–1.2)
BILIRUB SERPL-MCNC: 15.4 MG/DL — HIGH (ref 0.2–1.2)
BILIRUB SERPL-MCNC: 15.4 MG/DL — HIGH (ref 0.2–1.2)
BILIRUB SERPL-MCNC: 15.6 MG/DL — HIGH (ref 0.2–1.2)
BILIRUB SERPL-MCNC: 15.7 MG/DL — HIGH (ref 0.2–1.2)
BILIRUB SERPL-MCNC: 15.7 MG/DL — HIGH (ref 0.2–1.2)
BILIRUB SERPL-MCNC: 15.8 MG/DL — HIGH (ref 0.2–1.2)
BILIRUB SERPL-MCNC: 15.9 MG/DL — HIGH (ref 0.2–1.2)
BILIRUB SERPL-MCNC: 16 MG/DL — HIGH (ref 0.2–1.2)
BILIRUB SERPL-MCNC: 16 MG/DL — HIGH (ref 0.2–1.2)
BILIRUB SERPL-MCNC: 16.2 MG/DL — HIGH (ref 0.2–1.2)
BILIRUB SERPL-MCNC: 16.3 MG/DL — HIGH (ref 0.2–1.2)
BILIRUB SERPL-MCNC: 16.5 MG/DL — HIGH (ref 0.2–1.2)
BILIRUB SERPL-MCNC: 16.6 MG/DL — HIGH (ref 0.2–1.2)
BILIRUB SERPL-MCNC: 16.6 MG/DL — HIGH (ref 0.2–1.2)
BILIRUB SERPL-MCNC: 16.7 MG/DL — HIGH (ref 0.2–1.2)
BILIRUB SERPL-MCNC: 16.8 MG/DL — HIGH (ref 0.2–1.2)
BILIRUB SERPL-MCNC: 16.9 MG/DL — HIGH (ref 0.2–1.2)
BILIRUB SERPL-MCNC: 17.3 MG/DL — HIGH (ref 0.2–1.2)
BILIRUB SERPL-MCNC: 17.8 MG/DL — HIGH (ref 0.2–1.2)
BILIRUB SERPL-MCNC: 18.3 MG/DL — HIGH (ref 0.2–1.2)
BILIRUB SERPL-MCNC: 18.3 MG/DL — HIGH (ref 0.2–1.2)
BILIRUB SERPL-MCNC: 19 MG/DL — HIGH (ref 0.2–1.2)
BILIRUB SERPL-MCNC: 19.3 MG/DL — HIGH (ref 0.2–1.2)
BILIRUB SERPL-MCNC: 19.4 MG/DL — HIGH (ref 0.2–1.2)
BILIRUB SERPL-MCNC: 19.6 MG/DL — HIGH (ref 0.2–1.2)
BILIRUB SERPL-MCNC: 19.6 MG/DL — HIGH (ref 0.2–1.2)
BILIRUB SERPL-MCNC: 19.7 MG/DL — HIGH (ref 0.2–1.2)
BILIRUB SERPL-MCNC: 20.2 MG/DL — HIGH (ref 0.2–1.2)
BILIRUB SERPL-MCNC: 20.5 MG/DL — HIGH (ref 0.2–1.2)
BILIRUB SERPL-MCNC: 21 MG/DL — HIGH (ref 0.2–1.2)
BILIRUB SERPL-MCNC: 21.3 MG/DL — HIGH (ref 0.2–1.2)
BILIRUB SERPL-MCNC: 21.7 MG/DL — HIGH (ref 0.2–1.2)
BILIRUB SERPL-MCNC: 26.1 MG/DL — HIGH (ref 0.2–1.2)
BILIRUB SERPL-MCNC: 28.1 MG/DL — HIGH (ref 0.2–1.2)
BILIRUB SERPL-MCNC: 33.4 MG/DL — HIGH (ref 0.2–1.2)
BILIRUB SERPL-MCNC: 36.8 MG/DL — HIGH (ref 0.2–1.2)
BILIRUB SERPL-MCNC: 6.5 MG/DL — HIGH (ref 0.2–1.2)
BILIRUB SERPL-MCNC: 6.7 MG/DL — HIGH (ref 0.2–1.2)
BILIRUB SERPL-MCNC: 6.8 MG/DL — HIGH (ref 0.2–1.2)
BILIRUB SERPL-MCNC: 6.9 MG/DL — HIGH (ref 0.2–1.2)
BILIRUB SERPL-MCNC: 8 MG/DL — HIGH (ref 0.2–1.2)
BILIRUB SERPL-MCNC: 8.3 MG/DL — HIGH (ref 0.2–1.2)
BILIRUB SERPL-MCNC: 8.5 MG/DL — HIGH (ref 0.2–1.2)
BILIRUB SERPL-MCNC: 8.6 MG/DL — HIGH (ref 0.2–1.2)
BILIRUB SERPL-MCNC: 9.2 MG/DL — HIGH (ref 0.2–1.2)
BILIRUB SERPL-MCNC: 9.4 MG/DL — HIGH (ref 0.2–1.2)
BILIRUB SERPL-MCNC: 9.7 MG/DL — HIGH (ref 0.2–1.2)
BILIRUB SERPL-MCNC: 9.9 MG/DL — HIGH (ref 0.2–1.2)
BILIRUB UR-MCNC: ABNORMAL
BLD GP AB SCN SERPL QL: NEGATIVE — SIGNIFICANT CHANGE UP
BUN SERPL-MCNC: 10 MG/DL — SIGNIFICANT CHANGE UP (ref 7–23)
BUN SERPL-MCNC: 10 MG/DL — SIGNIFICANT CHANGE UP (ref 7–23)
BUN SERPL-MCNC: 11 MG/DL — SIGNIFICANT CHANGE UP (ref 7–23)
BUN SERPL-MCNC: 12 MG/DL — SIGNIFICANT CHANGE UP (ref 7–23)
BUN SERPL-MCNC: 13 MG/DL — SIGNIFICANT CHANGE UP (ref 7–23)
BUN SERPL-MCNC: 14 MG/DL — SIGNIFICANT CHANGE UP (ref 7–23)
BUN SERPL-MCNC: 15 MG/DL — SIGNIFICANT CHANGE UP (ref 7–23)
BUN SERPL-MCNC: 15 MG/DL — SIGNIFICANT CHANGE UP (ref 7–23)
BUN SERPL-MCNC: 16 MG/DL — SIGNIFICANT CHANGE UP (ref 7–23)
BUN SERPL-MCNC: 17 MG/DL — SIGNIFICANT CHANGE UP (ref 7–23)
BUN SERPL-MCNC: 18 MG/DL — SIGNIFICANT CHANGE UP (ref 7–23)
BUN SERPL-MCNC: 18 MG/DL — SIGNIFICANT CHANGE UP (ref 7–23)
BUN SERPL-MCNC: 20 MG/DL — SIGNIFICANT CHANGE UP (ref 7–23)
BUN SERPL-MCNC: 21 MG/DL — SIGNIFICANT CHANGE UP (ref 7–23)
BUN SERPL-MCNC: 22 MG/DL — SIGNIFICANT CHANGE UP (ref 7–23)
BUN SERPL-MCNC: 22 MG/DL — SIGNIFICANT CHANGE UP (ref 7–23)
BUN SERPL-MCNC: 23 MG/DL — SIGNIFICANT CHANGE UP (ref 7–23)
BUN SERPL-MCNC: 24 MG/DL — HIGH (ref 7–23)
BUN SERPL-MCNC: 25 MG/DL — HIGH (ref 7–23)
BUN SERPL-MCNC: 26 MG/DL — HIGH (ref 7–23)
BUN SERPL-MCNC: 26 MG/DL — HIGH (ref 7–23)
BUN SERPL-MCNC: 27 MG/DL — HIGH (ref 7–23)
BUN SERPL-MCNC: 27 MG/DL — HIGH (ref 7–23)
BUN SERPL-MCNC: 28 MG/DL — HIGH (ref 7–23)
BUN SERPL-MCNC: 29 MG/DL — HIGH (ref 7–23)
BUN SERPL-MCNC: 30 MG/DL — HIGH (ref 7–23)
BUN SERPL-MCNC: 30 MG/DL — HIGH (ref 7–23)
BUN SERPL-MCNC: 31 MG/DL — HIGH (ref 7–23)
BUN SERPL-MCNC: 32 MG/DL — HIGH (ref 7–23)
BUN SERPL-MCNC: 33 MG/DL — HIGH (ref 7–23)
BUN SERPL-MCNC: 33 MG/DL — HIGH (ref 7–23)
BUN SERPL-MCNC: 34 MG/DL — HIGH (ref 7–23)
BUN SERPL-MCNC: 35 MG/DL — HIGH (ref 7–23)
BUN SERPL-MCNC: 36 MG/DL — HIGH (ref 7–23)
BUN SERPL-MCNC: 36 MG/DL — HIGH (ref 7–23)
BUN SERPL-MCNC: 37 MG/DL — HIGH (ref 7–23)
BUN SERPL-MCNC: 39 MG/DL — HIGH (ref 7–23)
BUN SERPL-MCNC: 4 MG/DL — LOW (ref 7–23)
BUN SERPL-MCNC: 40 MG/DL — HIGH (ref 7–23)
BUN SERPL-MCNC: 41 MG/DL — HIGH (ref 7–23)
BUN SERPL-MCNC: 43 MG/DL — HIGH (ref 7–23)
BUN SERPL-MCNC: 44 MG/DL — HIGH (ref 7–23)
BUN SERPL-MCNC: 44 MG/DL — HIGH (ref 7–23)
BUN SERPL-MCNC: 47 MG/DL — HIGH (ref 7–23)
BUN SERPL-MCNC: 5 MG/DL — LOW (ref 7–23)
BUN SERPL-MCNC: 50 MG/DL — HIGH (ref 7–23)
BUN SERPL-MCNC: 54 MG/DL — HIGH (ref 7–23)
BUN SERPL-MCNC: 57 MG/DL — HIGH (ref 7–23)
BUN SERPL-MCNC: 58 MG/DL — HIGH (ref 7–23)
BUN SERPL-MCNC: 6 MG/DL — LOW (ref 7–23)
BUN SERPL-MCNC: 62 MG/DL — HIGH (ref 7–23)
BUN SERPL-MCNC: 66 MG/DL — HIGH (ref 7–23)
BUN SERPL-MCNC: 7 MG/DL — SIGNIFICANT CHANGE UP (ref 7–23)
BUN SERPL-MCNC: 7 MG/DL — SIGNIFICANT CHANGE UP (ref 7–23)
BUN SERPL-MCNC: 9 MG/DL — SIGNIFICANT CHANGE UP (ref 7–23)
BUN SERPL-MCNC: <4 MG/DL — LOW (ref 7–23)
BURR CELLS BLD QL SMEAR: PRESENT — SIGNIFICANT CHANGE UP
BURR CELLS BLD QL SMEAR: PRESENT — SIGNIFICANT CHANGE UP
C DIFF GDH STL QL: NEGATIVE — SIGNIFICANT CHANGE UP
C DIFF GDH STL QL: SIGNIFICANT CHANGE UP
CA-I BLD-SCNC: 0.84 MMOL/L — LOW (ref 1.12–1.3)
CA-I SERPL-SCNC: 0.91 MMOL/L — LOW (ref 1.12–1.3)
CA-I SERPL-SCNC: 1.09 MMOL/L — LOW (ref 1.12–1.3)
CA-I SERPL-SCNC: 1.11 MMOL/L — LOW (ref 1.12–1.3)
CA-I SERPL-SCNC: 1.13 MMOL/L — SIGNIFICANT CHANGE UP (ref 1.12–1.3)
CA-I SERPL-SCNC: 1.14 MMOL/L — SIGNIFICANT CHANGE UP (ref 1.12–1.3)
CA-I SERPL-SCNC: 1.14 MMOL/L — SIGNIFICANT CHANGE UP (ref 1.12–1.3)
CA-I SERPL-SCNC: 1.16 MMOL/L — SIGNIFICANT CHANGE UP (ref 1.12–1.3)
CA-I SERPL-SCNC: 1.16 MMOL/L — SIGNIFICANT CHANGE UP (ref 1.12–1.3)
CALCIUM SERPL-MCNC: 10.1 MG/DL — SIGNIFICANT CHANGE UP (ref 8.4–10.5)
CALCIUM SERPL-MCNC: 6.2 MG/DL — CRITICAL LOW (ref 8.4–10.5)
CALCIUM SERPL-MCNC: 6.3 MG/DL — CRITICAL LOW (ref 8.4–10.5)
CALCIUM SERPL-MCNC: 6.6 MG/DL — LOW (ref 8.4–10.5)
CALCIUM SERPL-MCNC: 6.8 MG/DL — LOW (ref 8.4–10.5)
CALCIUM SERPL-MCNC: 7.1 MG/DL — LOW (ref 8.4–10.5)
CALCIUM SERPL-MCNC: 7.3 MG/DL — LOW (ref 8.4–10.5)
CALCIUM SERPL-MCNC: 7.6 MG/DL — LOW (ref 8.4–10.5)
CALCIUM SERPL-MCNC: 7.7 MG/DL — LOW (ref 8.4–10.5)
CALCIUM SERPL-MCNC: 7.7 MG/DL — LOW (ref 8.4–10.5)
CALCIUM SERPL-MCNC: 7.8 MG/DL — LOW (ref 8.4–10.5)
CALCIUM SERPL-MCNC: 7.8 MG/DL — LOW (ref 8.4–10.5)
CALCIUM SERPL-MCNC: 7.9 MG/DL — LOW (ref 8.4–10.5)
CALCIUM SERPL-MCNC: 8 MG/DL — LOW (ref 8.4–10.5)
CALCIUM SERPL-MCNC: 8.1 MG/DL — LOW (ref 8.4–10.5)
CALCIUM SERPL-MCNC: 8.2 MG/DL — LOW (ref 8.4–10.5)
CALCIUM SERPL-MCNC: 8.3 MG/DL — LOW (ref 8.4–10.5)
CALCIUM SERPL-MCNC: 8.4 MG/DL — SIGNIFICANT CHANGE UP (ref 8.4–10.5)
CALCIUM SERPL-MCNC: 8.5 MG/DL — SIGNIFICANT CHANGE UP (ref 8.4–10.5)
CALCIUM SERPL-MCNC: 8.6 MG/DL — SIGNIFICANT CHANGE UP (ref 8.4–10.5)
CALCIUM SERPL-MCNC: 8.7 MG/DL — SIGNIFICANT CHANGE UP (ref 8.4–10.5)
CALCIUM SERPL-MCNC: 8.8 MG/DL — SIGNIFICANT CHANGE UP (ref 8.4–10.5)
CALCIUM SERPL-MCNC: 8.9 MG/DL — SIGNIFICANT CHANGE UP (ref 8.4–10.5)
CALCIUM SERPL-MCNC: 9 MG/DL — SIGNIFICANT CHANGE UP (ref 8.4–10.5)
CALCIUM SERPL-MCNC: 9.1 MG/DL — SIGNIFICANT CHANGE UP (ref 8.4–10.5)
CALCIUM SERPL-MCNC: 9.1 MG/DL — SIGNIFICANT CHANGE UP (ref 8.4–10.5)
CALCIUM SERPL-MCNC: 9.2 MG/DL — SIGNIFICANT CHANGE UP (ref 8.4–10.5)
CALCIUM SERPL-MCNC: 9.4 MG/DL — SIGNIFICANT CHANGE UP (ref 8.4–10.5)
CALCIUM SERPL-MCNC: 9.6 MG/DL — SIGNIFICANT CHANGE UP (ref 8.4–10.5)
CALCIUM UR-MCNC: 1 MG/DL — SIGNIFICANT CHANGE UP
CALCOFLUOR WHITE SPEC: SIGNIFICANT CHANGE UP
CHLORIDE BLDV-SCNC: 101 MMOL/L — SIGNIFICANT CHANGE UP (ref 96–108)
CHLORIDE BLDV-SCNC: 111 MMOL/L — HIGH (ref 96–108)
CHLORIDE BLDV-SCNC: 96 MMOL/L — SIGNIFICANT CHANGE UP (ref 96–108)
CHLORIDE BLDV-SCNC: 98 MMOL/L — SIGNIFICANT CHANGE UP (ref 96–108)
CHLORIDE BLDV-SCNC: 99 MMOL/L — SIGNIFICANT CHANGE UP (ref 96–108)
CHLORIDE SERPL-SCNC: 100 MMOL/L — SIGNIFICANT CHANGE UP (ref 96–108)
CHLORIDE SERPL-SCNC: 101 MMOL/L — SIGNIFICANT CHANGE UP (ref 96–108)
CHLORIDE SERPL-SCNC: 102 MMOL/L — SIGNIFICANT CHANGE UP (ref 96–108)
CHLORIDE SERPL-SCNC: 103 MMOL/L — SIGNIFICANT CHANGE UP (ref 96–108)
CHLORIDE SERPL-SCNC: 105 MMOL/L — SIGNIFICANT CHANGE UP (ref 96–108)
CHLORIDE SERPL-SCNC: 106 MMOL/L — SIGNIFICANT CHANGE UP (ref 96–108)
CHLORIDE SERPL-SCNC: 90 MMOL/L — LOW (ref 96–108)
CHLORIDE SERPL-SCNC: 90 MMOL/L — LOW (ref 96–108)
CHLORIDE SERPL-SCNC: 91 MMOL/L — LOW (ref 96–108)
CHLORIDE SERPL-SCNC: 92 MMOL/L — LOW (ref 96–108)
CHLORIDE SERPL-SCNC: 93 MMOL/L — LOW (ref 96–108)
CHLORIDE SERPL-SCNC: 93 MMOL/L — LOW (ref 96–108)
CHLORIDE SERPL-SCNC: 94 MMOL/L — LOW (ref 96–108)
CHLORIDE SERPL-SCNC: 95 MMOL/L — LOW (ref 96–108)
CHLORIDE SERPL-SCNC: 96 MMOL/L — SIGNIFICANT CHANGE UP (ref 96–108)
CHLORIDE SERPL-SCNC: 97 MMOL/L — SIGNIFICANT CHANGE UP (ref 96–108)
CHLORIDE SERPL-SCNC: 98 MMOL/L — SIGNIFICANT CHANGE UP (ref 96–108)
CHLORIDE SERPL-SCNC: 99 MMOL/L — SIGNIFICANT CHANGE UP (ref 96–108)
CK MB CFR SERPL CALC: 11.1 NG/ML — HIGH (ref 0–6.7)
CK MB CFR SERPL CALC: 4.2 NG/ML — SIGNIFICANT CHANGE UP (ref 0–6.7)
CK SERPL-CCNC: 138 U/L — SIGNIFICANT CHANGE UP (ref 30–200)
CK SERPL-CCNC: 35 U/L — SIGNIFICANT CHANGE UP (ref 30–200)
CK SERPL-CCNC: 62 U/L — SIGNIFICANT CHANGE UP (ref 30–200)
CK SERPL-CCNC: 907 U/L — HIGH (ref 30–200)
CMV DNA CSF QL NAA+PROBE: SIGNIFICANT CHANGE UP
CMV DNA SPEC NAA+PROBE-LOG#: SIGNIFICANT CHANGE UP LOG10IU/ML
CMV IGG FLD QL: 0.51 U/ML — SIGNIFICANT CHANGE UP
CMV IGG SERPL-IMP: NEGATIVE — SIGNIFICANT CHANGE UP
CO2 BLDA-SCNC: 12 MMOL/L — LOW (ref 22–30)
CO2 BLDV-SCNC: 19 MMOL/L — LOW (ref 22–30)
CO2 BLDV-SCNC: 19 MMOL/L — LOW (ref 22–30)
CO2 BLDV-SCNC: 21 MMOL/L — LOW (ref 22–30)
CO2 BLDV-SCNC: 22 MMOL/L — SIGNIFICANT CHANGE UP (ref 22–30)
CO2 BLDV-SCNC: 23 MMOL/L — SIGNIFICANT CHANGE UP (ref 22–30)
CO2 BLDV-SCNC: 24 MMOL/L — SIGNIFICANT CHANGE UP (ref 22–30)
CO2 BLDV-SCNC: 32 MMOL/L — HIGH (ref 22–30)
CO2 BLDV-SCNC: 32 MMOL/L — HIGH (ref 22–30)
CO2 SERPL-SCNC: 10 MMOL/L — CRITICAL LOW (ref 22–31)
CO2 SERPL-SCNC: 11 MMOL/L — LOW (ref 22–31)
CO2 SERPL-SCNC: 12 MMOL/L — LOW (ref 22–31)
CO2 SERPL-SCNC: 12 MMOL/L — LOW (ref 22–31)
CO2 SERPL-SCNC: 13 MMOL/L — LOW (ref 22–31)
CO2 SERPL-SCNC: 14 MMOL/L — LOW (ref 22–31)
CO2 SERPL-SCNC: 15 MMOL/L — LOW (ref 22–31)
CO2 SERPL-SCNC: 15 MMOL/L — LOW (ref 22–31)
CO2 SERPL-SCNC: 16 MMOL/L — LOW (ref 22–31)
CO2 SERPL-SCNC: 17 MMOL/L — LOW (ref 22–31)
CO2 SERPL-SCNC: 18 MMOL/L — LOW (ref 22–31)
CO2 SERPL-SCNC: 19 MMOL/L — LOW (ref 22–31)
CO2 SERPL-SCNC: 20 MMOL/L — LOW (ref 22–31)
CO2 SERPL-SCNC: 21 MMOL/L — LOW (ref 22–31)
CO2 SERPL-SCNC: 22 MMOL/L — SIGNIFICANT CHANGE UP (ref 22–31)
CO2 SERPL-SCNC: 23 MMOL/L — SIGNIFICANT CHANGE UP (ref 22–31)
CO2 SERPL-SCNC: 24 MMOL/L — SIGNIFICANT CHANGE UP (ref 22–31)
CO2 SERPL-SCNC: 25 MMOL/L — SIGNIFICANT CHANGE UP (ref 22–31)
CO2 SERPL-SCNC: 26 MMOL/L — SIGNIFICANT CHANGE UP (ref 22–31)
CO2 SERPL-SCNC: 27 MMOL/L — SIGNIFICANT CHANGE UP (ref 22–31)
CO2 SERPL-SCNC: 29 MMOL/L — SIGNIFICANT CHANGE UP (ref 22–31)
CO2 SERPL-SCNC: <10 MMOL/L — CRITICAL LOW (ref 22–31)
COLOR FLD: SIGNIFICANT CHANGE UP
COLOR FLD: SIGNIFICANT CHANGE UP
COLOR FLD: YELLOW — SIGNIFICANT CHANGE UP
COLOR SPEC: ABNORMAL
COLOR SPEC: SIGNIFICANT CHANGE UP
COMMENT - FLUIDS: SIGNIFICANT CHANGE UP
COMMENT - URINE: SIGNIFICANT CHANGE UP
CORTIS AM PEAK SERPL-MCNC: 11.6 UG/DL — SIGNIFICANT CHANGE UP (ref 6–18.4)
CORTIS AM PEAK SERPL-MCNC: 12.6 UG/DL — SIGNIFICANT CHANGE UP (ref 6–18.4)
CREAT ?TM UR-MCNC: 118 MG/DL — SIGNIFICANT CHANGE UP
CREAT ?TM UR-MCNC: 196 MG/DL — SIGNIFICANT CHANGE UP
CREAT ?TM UR-MCNC: 244 MG/DL — SIGNIFICANT CHANGE UP
CREAT SERPL-MCNC: 0.51 MG/DL — SIGNIFICANT CHANGE UP (ref 0.5–1.3)
CREAT SERPL-MCNC: 0.54 MG/DL — SIGNIFICANT CHANGE UP (ref 0.5–1.3)
CREAT SERPL-MCNC: 0.58 MG/DL — SIGNIFICANT CHANGE UP (ref 0.5–1.3)
CREAT SERPL-MCNC: 0.6 MG/DL — SIGNIFICANT CHANGE UP (ref 0.5–1.3)
CREAT SERPL-MCNC: 0.61 MG/DL — SIGNIFICANT CHANGE UP (ref 0.5–1.3)
CREAT SERPL-MCNC: 0.62 MG/DL — SIGNIFICANT CHANGE UP (ref 0.5–1.3)
CREAT SERPL-MCNC: 0.63 MG/DL — SIGNIFICANT CHANGE UP (ref 0.5–1.3)
CREAT SERPL-MCNC: 0.85 MG/DL — SIGNIFICANT CHANGE UP (ref 0.5–1.3)
CREAT SERPL-MCNC: 1.25 MG/DL — SIGNIFICANT CHANGE UP (ref 0.5–1.3)
CREAT SERPL-MCNC: 1.48 MG/DL — HIGH (ref 0.5–1.3)
CREAT SERPL-MCNC: 1.54 MG/DL — HIGH (ref 0.5–1.3)
CREAT SERPL-MCNC: 1.56 MG/DL — HIGH (ref 0.5–1.3)
CREAT SERPL-MCNC: 1.75 MG/DL — HIGH (ref 0.5–1.3)
CREAT SERPL-MCNC: 1.92 MG/DL — HIGH (ref 0.5–1.3)
CREAT SERPL-MCNC: 1.96 MG/DL — HIGH (ref 0.5–1.3)
CREAT SERPL-MCNC: 1.99 MG/DL — HIGH (ref 0.5–1.3)
CREAT SERPL-MCNC: 10.69 MG/DL — HIGH (ref 0.5–1.3)
CREAT SERPL-MCNC: 11.04 MG/DL — HIGH (ref 0.5–1.3)
CREAT SERPL-MCNC: 11.92 MG/DL — HIGH (ref 0.5–1.3)
CREAT SERPL-MCNC: 2.02 MG/DL — HIGH (ref 0.5–1.3)
CREAT SERPL-MCNC: 2.03 MG/DL — HIGH (ref 0.5–1.3)
CREAT SERPL-MCNC: 2.08 MG/DL — HIGH (ref 0.5–1.3)
CREAT SERPL-MCNC: 2.09 MG/DL — HIGH (ref 0.5–1.3)
CREAT SERPL-MCNC: 2.13 MG/DL — HIGH (ref 0.5–1.3)
CREAT SERPL-MCNC: 2.14 MG/DL — HIGH (ref 0.5–1.3)
CREAT SERPL-MCNC: 2.2 MG/DL — HIGH (ref 0.5–1.3)
CREAT SERPL-MCNC: 2.29 MG/DL — HIGH (ref 0.5–1.3)
CREAT SERPL-MCNC: 2.3 MG/DL — HIGH (ref 0.5–1.3)
CREAT SERPL-MCNC: 2.31 MG/DL — HIGH (ref 0.5–1.3)
CREAT SERPL-MCNC: 2.4 MG/DL — HIGH (ref 0.5–1.3)
CREAT SERPL-MCNC: 2.58 MG/DL — HIGH (ref 0.5–1.3)
CREAT SERPL-MCNC: 2.79 MG/DL — HIGH (ref 0.5–1.3)
CREAT SERPL-MCNC: 2.84 MG/DL — HIGH (ref 0.5–1.3)
CREAT SERPL-MCNC: 2.93 MG/DL — HIGH (ref 0.5–1.3)
CREAT SERPL-MCNC: 2.96 MG/DL — HIGH (ref 0.5–1.3)
CREAT SERPL-MCNC: 2.97 MG/DL — HIGH (ref 0.5–1.3)
CREAT SERPL-MCNC: 3.09 MG/DL — HIGH (ref 0.5–1.3)
CREAT SERPL-MCNC: 3.13 MG/DL — HIGH (ref 0.5–1.3)
CREAT SERPL-MCNC: 3.32 MG/DL — HIGH (ref 0.5–1.3)
CREAT SERPL-MCNC: 3.42 MG/DL — HIGH (ref 0.5–1.3)
CREAT SERPL-MCNC: 3.7 MG/DL — HIGH (ref 0.5–1.3)
CREAT SERPL-MCNC: 3.95 MG/DL — HIGH (ref 0.5–1.3)
CREAT SERPL-MCNC: 3.98 MG/DL — HIGH (ref 0.5–1.3)
CREAT SERPL-MCNC: 3.98 MG/DL — HIGH (ref 0.5–1.3)
CREAT SERPL-MCNC: 4 MG/DL — HIGH (ref 0.5–1.3)
CREAT SERPL-MCNC: 4.14 MG/DL — HIGH (ref 0.5–1.3)
CREAT SERPL-MCNC: 4.19 MG/DL — HIGH (ref 0.5–1.3)
CREAT SERPL-MCNC: 4.26 MG/DL — HIGH (ref 0.5–1.3)
CREAT SERPL-MCNC: 4.38 MG/DL — HIGH (ref 0.5–1.3)
CREAT SERPL-MCNC: 4.73 MG/DL — HIGH (ref 0.5–1.3)
CREAT SERPL-MCNC: 4.74 MG/DL — HIGH (ref 0.5–1.3)
CREAT SERPL-MCNC: 4.92 MG/DL — HIGH (ref 0.5–1.3)
CREAT SERPL-MCNC: 4.93 MG/DL — HIGH (ref 0.5–1.3)
CREAT SERPL-MCNC: 5.02 MG/DL — HIGH (ref 0.5–1.3)
CREAT SERPL-MCNC: 5.14 MG/DL — HIGH (ref 0.5–1.3)
CREAT SERPL-MCNC: 5.17 MG/DL — HIGH (ref 0.5–1.3)
CREAT SERPL-MCNC: 5.29 MG/DL — HIGH (ref 0.5–1.3)
CREAT SERPL-MCNC: 5.31 MG/DL — HIGH (ref 0.5–1.3)
CREAT SERPL-MCNC: 5.32 MG/DL — HIGH (ref 0.5–1.3)
CREAT SERPL-MCNC: 5.37 MG/DL — HIGH (ref 0.5–1.3)
CREAT SERPL-MCNC: 5.4 MG/DL — HIGH (ref 0.5–1.3)
CREAT SERPL-MCNC: 5.44 MG/DL — HIGH (ref 0.5–1.3)
CREAT SERPL-MCNC: 5.46 MG/DL — HIGH (ref 0.5–1.3)
CREAT SERPL-MCNC: 5.56 MG/DL — HIGH (ref 0.5–1.3)
CREAT SERPL-MCNC: 5.7 MG/DL — HIGH (ref 0.5–1.3)
CREAT SERPL-MCNC: 5.72 MG/DL — HIGH (ref 0.5–1.3)
CREAT SERPL-MCNC: 5.82 MG/DL — HIGH (ref 0.5–1.3)
CREAT SERPL-MCNC: 5.87 MG/DL — HIGH (ref 0.5–1.3)
CREAT SERPL-MCNC: 5.87 MG/DL — HIGH (ref 0.5–1.3)
CREAT SERPL-MCNC: 5.97 MG/DL — HIGH (ref 0.5–1.3)
CREAT SERPL-MCNC: 5.98 MG/DL — HIGH (ref 0.5–1.3)
CREAT SERPL-MCNC: 6.05 MG/DL — HIGH (ref 0.5–1.3)
CREAT SERPL-MCNC: 6.1 MG/DL — HIGH (ref 0.5–1.3)
CREAT SERPL-MCNC: 6.12 MG/DL — HIGH (ref 0.5–1.3)
CREAT SERPL-MCNC: 6.18 MG/DL — HIGH (ref 0.5–1.3)
CREAT SERPL-MCNC: 6.31 MG/DL — HIGH (ref 0.5–1.3)
CREAT SERPL-MCNC: 6.67 MG/DL — HIGH (ref 0.5–1.3)
CREAT SERPL-MCNC: 6.89 MG/DL — HIGH (ref 0.5–1.3)
CREAT SERPL-MCNC: 6.93 MG/DL — HIGH (ref 0.5–1.3)
CREAT SERPL-MCNC: 7.04 MG/DL — HIGH (ref 0.5–1.3)
CREAT SERPL-MCNC: 7.11 MG/DL — HIGH (ref 0.5–1.3)
CREAT SERPL-MCNC: 7.25 MG/DL — HIGH (ref 0.5–1.3)
CREAT SERPL-MCNC: 7.32 MG/DL — HIGH (ref 0.5–1.3)
CREAT SERPL-MCNC: 7.97 MG/DL — HIGH (ref 0.5–1.3)
CREAT SERPL-MCNC: 8.13 MG/DL — HIGH (ref 0.5–1.3)
CREAT SERPL-MCNC: 8.3 MG/DL — HIGH (ref 0.5–1.3)
CREAT SERPL-MCNC: 8.38 MG/DL — HIGH (ref 0.5–1.3)
CREAT SERPL-MCNC: 9.64 MG/DL — HIGH (ref 0.5–1.3)
CREAT SERPL-MCNC: 9.85 MG/DL — HIGH (ref 0.5–1.3)
CRP SERPL-MCNC: 4.02 MG/DL — HIGH (ref 0–0.4)
CULTURE RESULTS: NO GROWTH — SIGNIFICANT CHANGE UP
CULTURE RESULTS: NO GROWTH — SIGNIFICANT CHANGE UP
CULTURE RESULTS: SIGNIFICANT CHANGE UP
D DIMER BLD IA.RAPID-MCNC: HIGH NG/ML DDU
DACRYOCYTES BLD QL SMEAR: SLIGHT — SIGNIFICANT CHANGE UP
DIFF PNL FLD: ABNORMAL
DIFF PNL FLD: NEGATIVE — SIGNIFICANT CHANGE UP
DIFF PNL FLD: NEGATIVE — SIGNIFICANT CHANGE UP
E CHAFFEENSIS DNA BLD QL NAA+PROBE: NEGATIVE — SIGNIFICANT CHANGE UP
E CHAFFEENSIS IGG TITR SER IF: SIGNIFICANT CHANGE UP
E CHAFFEENSIS IGG TITR SER IF: SIGNIFICANT CHANGE UP
E CHAFFEENSIS IGG TITR SER: SIGNIFICANT CHANGE UP
E CHAFFEENSIS IGM TITR SER IF: SIGNIFICANT CHANGE UP
E EWINGII DNA SPEC QL NAA+PROBE: NEGATIVE — SIGNIFICANT CHANGE UP
EBV EA AB SER IA-ACNC: <5 U/ML — SIGNIFICANT CHANGE UP
EBV EA AB TITR SER IF: POSITIVE
EBV EA IGG SER-ACNC: NEGATIVE — SIGNIFICANT CHANGE UP
EBV NA IGG SER IA-ACNC: 149 U/ML — HIGH
EBV PATRN SPEC IB-IMP: SIGNIFICANT CHANGE UP
EBV VCA IGG AVIDITY SER QL IA: POSITIVE
EBV VCA IGM SER IA-ACNC: 182 U/ML — HIGH
EBV VCA IGM SER IA-ACNC: <10 U/ML — SIGNIFICANT CHANGE UP
EBV VCA IGM TITR FLD: NEGATIVE — SIGNIFICANT CHANGE UP
EHRLICHIA CHAFFEENSIS IGG COMENT: SIGNIFICANT CHANGE UP
EHRLICHIA CHAFFEENSIS IGG INTERP: SIGNIFICANT CHANGE UP
EHRLICHIA DNA SPEC QL NAA+PROBE: NEGATIVE — SIGNIFICANT CHANGE UP
ELLIPTOCYTES BLD QL SMEAR: SLIGHT — SIGNIFICANT CHANGE UP
ELLIPTOCYTES BLD QL SMEAR: SLIGHT — SIGNIFICANT CHANGE UP
EOSINOPHIL # BLD AUTO: 0 K/UL — SIGNIFICANT CHANGE UP (ref 0–0.5)
EOSINOPHIL # BLD AUTO: 0.01 K/UL — SIGNIFICANT CHANGE UP (ref 0–0.5)
EOSINOPHIL # BLD AUTO: 0.04 K/UL — SIGNIFICANT CHANGE UP (ref 0–0.5)
EOSINOPHIL # BLD AUTO: 0.06 K/UL — SIGNIFICANT CHANGE UP (ref 0–0.5)
EOSINOPHIL # BLD AUTO: 0.09 K/UL — SIGNIFICANT CHANGE UP (ref 0–0.5)
EOSINOPHIL # BLD AUTO: 0.11 K/UL — SIGNIFICANT CHANGE UP (ref 0–0.5)
EOSINOPHIL # BLD AUTO: 0.12 K/UL — SIGNIFICANT CHANGE UP (ref 0–0.5)
EOSINOPHIL # BLD AUTO: 0.12 K/UL — SIGNIFICANT CHANGE UP (ref 0–0.5)
EOSINOPHIL # BLD AUTO: 0.19 K/UL — SIGNIFICANT CHANGE UP (ref 0–0.5)
EOSINOPHIL # BLD AUTO: 0.2 K/UL — SIGNIFICANT CHANGE UP (ref 0–0.5)
EOSINOPHIL # BLD AUTO: 0.21 K/UL — SIGNIFICANT CHANGE UP (ref 0–0.5)
EOSINOPHIL # BLD AUTO: 0.23 K/UL — SIGNIFICANT CHANGE UP (ref 0–0.5)
EOSINOPHIL # BLD AUTO: 0.32 K/UL — SIGNIFICANT CHANGE UP (ref 0–0.5)
EOSINOPHIL # BLD AUTO: 0.35 K/UL — SIGNIFICANT CHANGE UP (ref 0–0.5)
EOSINOPHIL # BLD AUTO: 0.35 K/UL — SIGNIFICANT CHANGE UP (ref 0–0.5)
EOSINOPHIL # BLD AUTO: 0.36 K/UL — SIGNIFICANT CHANGE UP (ref 0–0.5)
EOSINOPHIL # BLD AUTO: 0.38 K/UL — SIGNIFICANT CHANGE UP (ref 0–0.5)
EOSINOPHIL # BLD AUTO: 0.39 K/UL — SIGNIFICANT CHANGE UP (ref 0–0.5)
EOSINOPHIL # BLD AUTO: 0.39 K/UL — SIGNIFICANT CHANGE UP (ref 0–0.5)
EOSINOPHIL # BLD AUTO: 0.4 K/UL — SIGNIFICANT CHANGE UP (ref 0–0.5)
EOSINOPHIL # BLD AUTO: 0.4 K/UL — SIGNIFICANT CHANGE UP (ref 0–0.5)
EOSINOPHIL # BLD AUTO: 0.41 K/UL — SIGNIFICANT CHANGE UP (ref 0–0.5)
EOSINOPHIL # BLD AUTO: 0.43 K/UL — SIGNIFICANT CHANGE UP (ref 0–0.5)
EOSINOPHIL # BLD AUTO: 0.43 K/UL — SIGNIFICANT CHANGE UP (ref 0–0.5)
EOSINOPHIL # BLD AUTO: 0.45 K/UL — SIGNIFICANT CHANGE UP (ref 0–0.5)
EOSINOPHIL # BLD AUTO: 0.45 K/UL — SIGNIFICANT CHANGE UP (ref 0–0.5)
EOSINOPHIL # BLD AUTO: 0.47 K/UL — SIGNIFICANT CHANGE UP (ref 0–0.5)
EOSINOPHIL # BLD AUTO: 0.48 K/UL — SIGNIFICANT CHANGE UP (ref 0–0.5)
EOSINOPHIL # BLD AUTO: 0.49 K/UL — SIGNIFICANT CHANGE UP (ref 0–0.5)
EOSINOPHIL # BLD AUTO: 0.5 K/UL — SIGNIFICANT CHANGE UP (ref 0–0.5)
EOSINOPHIL # BLD AUTO: 0.51 K/UL — HIGH (ref 0–0.5)
EOSINOPHIL # BLD AUTO: 0.65 K/UL — HIGH (ref 0–0.5)
EOSINOPHIL # BLD AUTO: 0.68 K/UL — HIGH (ref 0–0.5)
EOSINOPHIL # BLD AUTO: 0.68 K/UL — HIGH (ref 0–0.5)
EOSINOPHIL # BLD AUTO: 0.76 K/UL — HIGH (ref 0–0.5)
EOSINOPHIL # BLD AUTO: 0.84 K/UL — HIGH (ref 0–0.5)
EOSINOPHIL # BLD AUTO: 1.01 K/UL — HIGH (ref 0–0.5)
EOSINOPHIL # FLD: 1 % — SIGNIFICANT CHANGE UP
EOSINOPHIL # FLD: 2 % — SIGNIFICANT CHANGE UP
EOSINOPHIL NFR BLD AUTO: 0 % — SIGNIFICANT CHANGE UP (ref 0–6)
EOSINOPHIL NFR BLD AUTO: 0.1 % — SIGNIFICANT CHANGE UP (ref 0–6)
EOSINOPHIL NFR BLD AUTO: 0.2 % — SIGNIFICANT CHANGE UP (ref 0–6)
EOSINOPHIL NFR BLD AUTO: 0.8 % — SIGNIFICANT CHANGE UP (ref 0–6)
EOSINOPHIL NFR BLD AUTO: 0.9 % — SIGNIFICANT CHANGE UP (ref 0–6)
EOSINOPHIL NFR BLD AUTO: 0.9 % — SIGNIFICANT CHANGE UP (ref 0–6)
EOSINOPHIL NFR BLD AUTO: 1 % — SIGNIFICANT CHANGE UP (ref 0–6)
EOSINOPHIL NFR BLD AUTO: 1.2 % — SIGNIFICANT CHANGE UP (ref 0–6)
EOSINOPHIL NFR BLD AUTO: 1.3 % — SIGNIFICANT CHANGE UP (ref 0–6)
EOSINOPHIL NFR BLD AUTO: 1.3 % — SIGNIFICANT CHANGE UP (ref 0–6)
EOSINOPHIL NFR BLD AUTO: 1.5 % — SIGNIFICANT CHANGE UP (ref 0–6)
EOSINOPHIL NFR BLD AUTO: 1.6 % — SIGNIFICANT CHANGE UP (ref 0–6)
EOSINOPHIL NFR BLD AUTO: 1.6 % — SIGNIFICANT CHANGE UP (ref 0–6)
EOSINOPHIL NFR BLD AUTO: 1.7 % — SIGNIFICANT CHANGE UP (ref 0–6)
EOSINOPHIL NFR BLD AUTO: 1.7 % — SIGNIFICANT CHANGE UP (ref 0–6)
EOSINOPHIL NFR BLD AUTO: 1.8 % — SIGNIFICANT CHANGE UP (ref 0–6)
EOSINOPHIL NFR BLD AUTO: 1.9 % — SIGNIFICANT CHANGE UP (ref 0–6)
EOSINOPHIL NFR BLD AUTO: 1.9 % — SIGNIFICANT CHANGE UP (ref 0–6)
EOSINOPHIL NFR BLD AUTO: 2 % — SIGNIFICANT CHANGE UP (ref 0–6)
EOSINOPHIL NFR BLD AUTO: 2.1 % — SIGNIFICANT CHANGE UP (ref 0–6)
EOSINOPHIL NFR BLD AUTO: 2.2 % — SIGNIFICANT CHANGE UP (ref 0–6)
EOSINOPHIL NFR BLD AUTO: 2.2 % — SIGNIFICANT CHANGE UP (ref 0–6)
EOSINOPHIL NFR BLD AUTO: 2.3 % — SIGNIFICANT CHANGE UP (ref 0–6)
EOSINOPHIL NFR BLD AUTO: 2.6 % — SIGNIFICANT CHANGE UP (ref 0–6)
EOSINOPHIL NFR BLD AUTO: 3.4 % — SIGNIFICANT CHANGE UP (ref 0–6)
EOSINOPHIL NFR BLD AUTO: 3.5 % — SIGNIFICANT CHANGE UP (ref 0–6)
EOSINOPHIL NFR BLD AUTO: 4 % — SIGNIFICANT CHANGE UP (ref 0–6)
EOSINOPHIL NFR BLD AUTO: 4.2 % — SIGNIFICANT CHANGE UP (ref 0–6)
EOSINOPHIL NFR BLD AUTO: 5 % — SIGNIFICANT CHANGE UP (ref 0–6)
EOSINOPHIL NFR BLD AUTO: 5.3 % — SIGNIFICANT CHANGE UP (ref 0–6)
EOSINOPHIL NFR BLD AUTO: 6 % — SIGNIFICANT CHANGE UP (ref 0–6)
EPI CELLS # UR: 0 /HPF — SIGNIFICANT CHANGE UP (ref 0–5)
EPI CELLS # UR: 1 /HPF — SIGNIFICANT CHANGE UP
EPI CELLS # UR: 2 /HPF — SIGNIFICANT CHANGE UP
EPI CELLS # UR: 7 — SIGNIFICANT CHANGE UP
ESTIMATED AVERAGE GLUCOSE: 85 MG/DL — SIGNIFICANT CHANGE UP (ref 68–114)
ETHANOL SERPL-MCNC: SIGNIFICANT CHANGE UP MG/DL (ref 0–10)
FERRITIN SERPL-MCNC: 515 NG/ML — HIGH (ref 30–400)
FERRITIN SERPL-MCNC: 676 NG/ML — HIGH (ref 30–400)
FERRITIN SERPL-MCNC: 812 NG/ML — HIGH (ref 30–400)
FIBRINOGEN PPP-MCNC: 142 MG/DL — LOW (ref 350–510)
FIBRINOGEN PPP-MCNC: 261 MG/DL — LOW (ref 350–510)
FIBRINOGEN PPP-MCNC: <50 MG/DL (ref 242–442)
FIBRINOGEN PPP-MCNC: <60 (ref 290–520)
FLUID INTAKE SUBSTANCE CLASS: SIGNIFICANT CHANGE UP
FLUID SEGMENTED GRANULOCYTES: 1 % — SIGNIFICANT CHANGE UP
FLUID SEGMENTED GRANULOCYTES: 13 % — SIGNIFICANT CHANGE UP
FLUID SEGMENTED GRANULOCYTES: 18 % — SIGNIFICANT CHANGE UP
FLUID SEGMENTED GRANULOCYTES: 33 % — SIGNIFICANT CHANGE UP
FLUID SEGMENTED GRANULOCYTES: 39 % — SIGNIFICANT CHANGE UP
FLUID SEGMENTED GRANULOCYTES: 59 % — SIGNIFICANT CHANGE UP
FLUID SEGMENTED GRANULOCYTES: 6 % — SIGNIFICANT CHANGE UP
FOLATE SERPL-MCNC: 8.5 NG/ML — SIGNIFICANT CHANGE UP
FUNGITELL: 56 PG/ML — SIGNIFICANT CHANGE UP
GAMMA INTERFERON BACKGROUND BLD IA-ACNC: 0.02 IU/ML — SIGNIFICANT CHANGE UP
GAS PNL BLDA: SIGNIFICANT CHANGE UP
GAS PNL BLDV: 128 MMOL/L — LOW (ref 135–145)
GAS PNL BLDV: 129 MMOL/L — LOW (ref 135–145)
GAS PNL BLDV: 130 MMOL/L — LOW (ref 135–145)
GAS PNL BLDV: 134 MMOL/L — LOW (ref 135–145)
GAS PNL BLDV: 134 MMOL/L — LOW (ref 135–145)
GAS PNL BLDV: 135 MMOL/L — SIGNIFICANT CHANGE UP (ref 135–145)
GAS PNL BLDV: 137 MMOL/L — SIGNIFICANT CHANGE UP (ref 135–145)
GAS PNL BLDV: 142 MMOL/L — SIGNIFICANT CHANGE UP (ref 135–145)
GAS PNL BLDV: SIGNIFICANT CHANGE UP
GLUCOSE BLDC GLUCOMTR-MCNC: 100 MG/DL — HIGH (ref 70–99)
GLUCOSE BLDC GLUCOMTR-MCNC: 101 MG/DL — HIGH (ref 70–99)
GLUCOSE BLDC GLUCOMTR-MCNC: 102 MG/DL — HIGH (ref 70–99)
GLUCOSE BLDC GLUCOMTR-MCNC: 105 MG/DL — HIGH (ref 70–99)
GLUCOSE BLDC GLUCOMTR-MCNC: 105 MG/DL — HIGH (ref 70–99)
GLUCOSE BLDC GLUCOMTR-MCNC: 106 MG/DL — HIGH (ref 70–99)
GLUCOSE BLDC GLUCOMTR-MCNC: 107 MG/DL — HIGH (ref 70–99)
GLUCOSE BLDC GLUCOMTR-MCNC: 108 MG/DL — HIGH (ref 70–99)
GLUCOSE BLDC GLUCOMTR-MCNC: 110 MG/DL — HIGH (ref 70–99)
GLUCOSE BLDC GLUCOMTR-MCNC: 110 MG/DL — HIGH (ref 70–99)
GLUCOSE BLDC GLUCOMTR-MCNC: 114 MG/DL — HIGH (ref 70–99)
GLUCOSE BLDC GLUCOMTR-MCNC: 116 MG/DL — HIGH (ref 70–99)
GLUCOSE BLDC GLUCOMTR-MCNC: 117 MG/DL — HIGH (ref 70–99)
GLUCOSE BLDC GLUCOMTR-MCNC: 119 MG/DL — HIGH (ref 70–99)
GLUCOSE BLDC GLUCOMTR-MCNC: 120 MG/DL — HIGH (ref 70–99)
GLUCOSE BLDC GLUCOMTR-MCNC: 120 MG/DL — HIGH (ref 70–99)
GLUCOSE BLDC GLUCOMTR-MCNC: 121 MG/DL — HIGH (ref 70–99)
GLUCOSE BLDC GLUCOMTR-MCNC: 122 MG/DL — HIGH (ref 70–99)
GLUCOSE BLDC GLUCOMTR-MCNC: 125 MG/DL — HIGH (ref 70–99)
GLUCOSE BLDC GLUCOMTR-MCNC: 127 MG/DL — HIGH (ref 70–99)
GLUCOSE BLDC GLUCOMTR-MCNC: 134 MG/DL — HIGH (ref 70–99)
GLUCOSE BLDC GLUCOMTR-MCNC: 147 MG/DL — HIGH (ref 70–99)
GLUCOSE BLDC GLUCOMTR-MCNC: 155 MG/DL — HIGH (ref 70–99)
GLUCOSE BLDC GLUCOMTR-MCNC: 168 MG/DL — HIGH (ref 70–99)
GLUCOSE BLDC GLUCOMTR-MCNC: 39 MG/DL — CRITICAL LOW (ref 70–99)
GLUCOSE BLDC GLUCOMTR-MCNC: 62 MG/DL — LOW (ref 70–99)
GLUCOSE BLDC GLUCOMTR-MCNC: 82 MG/DL — SIGNIFICANT CHANGE UP (ref 70–99)
GLUCOSE BLDC GLUCOMTR-MCNC: 83 MG/DL — SIGNIFICANT CHANGE UP (ref 70–99)
GLUCOSE BLDC GLUCOMTR-MCNC: 88 MG/DL — SIGNIFICANT CHANGE UP (ref 70–99)
GLUCOSE BLDC GLUCOMTR-MCNC: 88 MG/DL — SIGNIFICANT CHANGE UP (ref 70–99)
GLUCOSE BLDC GLUCOMTR-MCNC: 89 MG/DL — SIGNIFICANT CHANGE UP (ref 70–99)
GLUCOSE BLDC GLUCOMTR-MCNC: 97 MG/DL — SIGNIFICANT CHANGE UP (ref 70–99)
GLUCOSE BLDV-MCNC: 100 MG/DL — HIGH (ref 70–99)
GLUCOSE BLDV-MCNC: 102 MG/DL — HIGH (ref 70–99)
GLUCOSE BLDV-MCNC: 115 MG/DL — HIGH (ref 70–99)
GLUCOSE BLDV-MCNC: 124 MG/DL — HIGH (ref 70–99)
GLUCOSE BLDV-MCNC: 75 MG/DL — SIGNIFICANT CHANGE UP (ref 70–99)
GLUCOSE BLDV-MCNC: 88 MG/DL — SIGNIFICANT CHANGE UP (ref 70–99)
GLUCOSE BLDV-MCNC: 91 MG/DL — SIGNIFICANT CHANGE UP (ref 70–99)
GLUCOSE BLDV-MCNC: 91 MG/DL — SIGNIFICANT CHANGE UP (ref 70–99)
GLUCOSE FLD-MCNC: 116 MG/DL — SIGNIFICANT CHANGE UP
GLUCOSE FLD-MCNC: 123 MG/DL — SIGNIFICANT CHANGE UP
GLUCOSE FLD-MCNC: 74 MG/DL — SIGNIFICANT CHANGE UP
GLUCOSE FLD-MCNC: 83 MG/DL — SIGNIFICANT CHANGE UP
GLUCOSE FLD-MCNC: 94 MG/DL — SIGNIFICANT CHANGE UP
GLUCOSE FLD-MCNC: 96 MG/DL — SIGNIFICANT CHANGE UP
GLUCOSE SERPL-MCNC: 101 MG/DL — HIGH (ref 70–99)
GLUCOSE SERPL-MCNC: 101 MG/DL — HIGH (ref 70–99)
GLUCOSE SERPL-MCNC: 102 MG/DL — HIGH (ref 70–99)
GLUCOSE SERPL-MCNC: 103 MG/DL — HIGH (ref 70–99)
GLUCOSE SERPL-MCNC: 103 MG/DL — HIGH (ref 70–99)
GLUCOSE SERPL-MCNC: 104 MG/DL — HIGH (ref 70–99)
GLUCOSE SERPL-MCNC: 104 MG/DL — HIGH (ref 70–99)
GLUCOSE SERPL-MCNC: 106 MG/DL — HIGH (ref 70–99)
GLUCOSE SERPL-MCNC: 107 MG/DL — HIGH (ref 70–99)
GLUCOSE SERPL-MCNC: 108 MG/DL — HIGH (ref 70–99)
GLUCOSE SERPL-MCNC: 109 MG/DL — HIGH (ref 70–99)
GLUCOSE SERPL-MCNC: 109 MG/DL — HIGH (ref 70–99)
GLUCOSE SERPL-MCNC: 110 MG/DL — HIGH (ref 70–99)
GLUCOSE SERPL-MCNC: 111 MG/DL — HIGH (ref 70–99)
GLUCOSE SERPL-MCNC: 114 MG/DL — HIGH (ref 70–99)
GLUCOSE SERPL-MCNC: 114 MG/DL — HIGH (ref 70–99)
GLUCOSE SERPL-MCNC: 115 MG/DL — HIGH (ref 70–99)
GLUCOSE SERPL-MCNC: 115 MG/DL — HIGH (ref 70–99)
GLUCOSE SERPL-MCNC: 116 MG/DL — HIGH (ref 70–99)
GLUCOSE SERPL-MCNC: 117 MG/DL — HIGH (ref 70–99)
GLUCOSE SERPL-MCNC: 118 MG/DL — HIGH (ref 70–99)
GLUCOSE SERPL-MCNC: 119 MG/DL — HIGH (ref 70–99)
GLUCOSE SERPL-MCNC: 120 MG/DL — HIGH (ref 70–99)
GLUCOSE SERPL-MCNC: 120 MG/DL — HIGH (ref 70–99)
GLUCOSE SERPL-MCNC: 121 MG/DL — HIGH (ref 70–99)
GLUCOSE SERPL-MCNC: 126 MG/DL — HIGH (ref 70–99)
GLUCOSE SERPL-MCNC: 127 MG/DL — HIGH (ref 70–99)
GLUCOSE SERPL-MCNC: 128 MG/DL — HIGH (ref 70–99)
GLUCOSE SERPL-MCNC: 129 MG/DL — HIGH (ref 70–99)
GLUCOSE SERPL-MCNC: 132 MG/DL — HIGH (ref 70–99)
GLUCOSE SERPL-MCNC: 132 MG/DL — HIGH (ref 70–99)
GLUCOSE SERPL-MCNC: 134 MG/DL — HIGH (ref 70–99)
GLUCOSE SERPL-MCNC: 137 MG/DL — HIGH (ref 70–99)
GLUCOSE SERPL-MCNC: 142 MG/DL — HIGH (ref 70–99)
GLUCOSE SERPL-MCNC: 149 MG/DL — HIGH (ref 70–99)
GLUCOSE SERPL-MCNC: 154 MG/DL — HIGH (ref 70–99)
GLUCOSE SERPL-MCNC: 163 MG/DL — HIGH (ref 70–99)
GLUCOSE SERPL-MCNC: 164 MG/DL — HIGH (ref 70–99)
GLUCOSE SERPL-MCNC: 178 MG/DL — HIGH (ref 70–99)
GLUCOSE SERPL-MCNC: 180 MG/DL — HIGH (ref 70–99)
GLUCOSE SERPL-MCNC: 182 MG/DL — HIGH (ref 70–99)
GLUCOSE SERPL-MCNC: 194 MG/DL — HIGH (ref 70–99)
GLUCOSE SERPL-MCNC: 196 MG/DL — HIGH (ref 70–99)
GLUCOSE SERPL-MCNC: 261 MG/DL — HIGH (ref 70–99)
GLUCOSE SERPL-MCNC: 283 MG/DL — HIGH (ref 70–99)
GLUCOSE SERPL-MCNC: 35 MG/DL — CRITICAL LOW (ref 70–99)
GLUCOSE SERPL-MCNC: 57 MG/DL — LOW (ref 70–99)
GLUCOSE SERPL-MCNC: 61 MG/DL — LOW (ref 70–99)
GLUCOSE SERPL-MCNC: 70 MG/DL — SIGNIFICANT CHANGE UP (ref 70–99)
GLUCOSE SERPL-MCNC: 71 MG/DL — SIGNIFICANT CHANGE UP (ref 70–99)
GLUCOSE SERPL-MCNC: 73 MG/DL — SIGNIFICANT CHANGE UP (ref 70–99)
GLUCOSE SERPL-MCNC: 73 MG/DL — SIGNIFICANT CHANGE UP (ref 70–99)
GLUCOSE SERPL-MCNC: 74 MG/DL — SIGNIFICANT CHANGE UP (ref 70–99)
GLUCOSE SERPL-MCNC: 76 MG/DL — SIGNIFICANT CHANGE UP (ref 70–99)
GLUCOSE SERPL-MCNC: 77 MG/DL — SIGNIFICANT CHANGE UP (ref 70–99)
GLUCOSE SERPL-MCNC: 78 MG/DL — SIGNIFICANT CHANGE UP (ref 70–99)
GLUCOSE SERPL-MCNC: 78 MG/DL — SIGNIFICANT CHANGE UP (ref 70–99)
GLUCOSE SERPL-MCNC: 79 MG/DL — SIGNIFICANT CHANGE UP (ref 70–99)
GLUCOSE SERPL-MCNC: 80 MG/DL — SIGNIFICANT CHANGE UP (ref 70–99)
GLUCOSE SERPL-MCNC: 81 MG/DL — SIGNIFICANT CHANGE UP (ref 70–99)
GLUCOSE SERPL-MCNC: 82 MG/DL — SIGNIFICANT CHANGE UP (ref 70–99)
GLUCOSE SERPL-MCNC: 83 MG/DL — SIGNIFICANT CHANGE UP (ref 70–99)
GLUCOSE SERPL-MCNC: 83 MG/DL — SIGNIFICANT CHANGE UP (ref 70–99)
GLUCOSE SERPL-MCNC: 84 MG/DL — SIGNIFICANT CHANGE UP (ref 70–99)
GLUCOSE SERPL-MCNC: 84 MG/DL — SIGNIFICANT CHANGE UP (ref 70–99)
GLUCOSE SERPL-MCNC: 85 MG/DL — SIGNIFICANT CHANGE UP (ref 70–99)
GLUCOSE SERPL-MCNC: 86 MG/DL — SIGNIFICANT CHANGE UP (ref 70–99)
GLUCOSE SERPL-MCNC: 89 MG/DL — SIGNIFICANT CHANGE UP (ref 70–99)
GLUCOSE SERPL-MCNC: 90 MG/DL — SIGNIFICANT CHANGE UP (ref 70–99)
GLUCOSE SERPL-MCNC: 90 MG/DL — SIGNIFICANT CHANGE UP (ref 70–99)
GLUCOSE SERPL-MCNC: 91 MG/DL — SIGNIFICANT CHANGE UP (ref 70–99)
GLUCOSE SERPL-MCNC: 92 MG/DL — SIGNIFICANT CHANGE UP (ref 70–99)
GLUCOSE SERPL-MCNC: 93 MG/DL — SIGNIFICANT CHANGE UP (ref 70–99)
GLUCOSE SERPL-MCNC: 93 MG/DL — SIGNIFICANT CHANGE UP (ref 70–99)
GLUCOSE SERPL-MCNC: 94 MG/DL — SIGNIFICANT CHANGE UP (ref 70–99)
GLUCOSE SERPL-MCNC: 95 MG/DL — SIGNIFICANT CHANGE UP (ref 70–99)
GLUCOSE SERPL-MCNC: 96 MG/DL — SIGNIFICANT CHANGE UP (ref 70–99)
GLUCOSE SERPL-MCNC: 97 MG/DL — SIGNIFICANT CHANGE UP (ref 70–99)
GLUCOSE SERPL-MCNC: 98 MG/DL — SIGNIFICANT CHANGE UP (ref 70–99)
GLUCOSE SERPL-MCNC: 99 MG/DL — SIGNIFICANT CHANGE UP (ref 70–99)
GLUCOSE UR QL: NEGATIVE — SIGNIFICANT CHANGE UP
GRAM STN FLD: SIGNIFICANT CHANGE UP
GRAN CASTS # UR COMP ASSIST: 1 /LPF — SIGNIFICANT CHANGE UP
HAV IGG SER QL IA: SIGNIFICANT CHANGE UP
HAV IGM SER-ACNC: SIGNIFICANT CHANGE UP
HBV CORE AB SER-ACNC: SIGNIFICANT CHANGE UP
HBV CORE IGM SER-ACNC: SIGNIFICANT CHANGE UP
HBV SURFACE AB SER-ACNC: 276.9 MIU/ML — SIGNIFICANT CHANGE UP
HBV SURFACE AB SER-ACNC: REACTIVE
HBV SURFACE AB SER-ACNC: REACTIVE
HBV SURFACE AG SER-ACNC: SIGNIFICANT CHANGE UP
HBV SURFACE AG SER-ACNC: SIGNIFICANT CHANGE UP
HCO3 BLDA-SCNC: 10 MMOL/L — LOW (ref 21–29)
HCO3 BLDV-SCNC: 18 MMOL/L — LOW (ref 21–29)
HCO3 BLDV-SCNC: 18 MMOL/L — LOW (ref 21–29)
HCO3 BLDV-SCNC: 20 MMOL/L — LOW (ref 21–29)
HCO3 BLDV-SCNC: 21 MMOL/L — SIGNIFICANT CHANGE UP (ref 21–29)
HCO3 BLDV-SCNC: 21 MMOL/L — SIGNIFICANT CHANGE UP (ref 21–29)
HCO3 BLDV-SCNC: 23 MMOL/L — SIGNIFICANT CHANGE UP (ref 21–29)
HCO3 BLDV-SCNC: 30 MMOL/L — HIGH (ref 21–29)
HCO3 BLDV-SCNC: 31 MMOL/L — HIGH (ref 21–29)
HCT VFR BLD CALC: 19.3 % — CRITICAL LOW (ref 39–50)
HCT VFR BLD CALC: 19.4 % — CRITICAL LOW (ref 39–50)
HCT VFR BLD CALC: 20.6 % — CRITICAL LOW (ref 39–50)
HCT VFR BLD CALC: 20.9 % — CRITICAL LOW (ref 39–50)
HCT VFR BLD CALC: 21.1 % — LOW (ref 39–50)
HCT VFR BLD CALC: 21.4 % — LOW (ref 39–50)
HCT VFR BLD CALC: 21.6 % — LOW (ref 39–50)
HCT VFR BLD CALC: 22 % — LOW (ref 39–50)
HCT VFR BLD CALC: 22.1 % — LOW (ref 39–50)
HCT VFR BLD CALC: 22.1 % — LOW (ref 39–50)
HCT VFR BLD CALC: 22.3 % — LOW (ref 39–50)
HCT VFR BLD CALC: 22.4 % — LOW (ref 39–50)
HCT VFR BLD CALC: 22.4 % — LOW (ref 39–50)
HCT VFR BLD CALC: 22.5 % — LOW (ref 39–50)
HCT VFR BLD CALC: 22.5 % — LOW (ref 39–50)
HCT VFR BLD CALC: 22.6 % — LOW (ref 39–50)
HCT VFR BLD CALC: 22.7 % — LOW (ref 39–50)
HCT VFR BLD CALC: 22.8 % — LOW (ref 39–50)
HCT VFR BLD CALC: 23.3 % — LOW (ref 39–50)
HCT VFR BLD CALC: 23.3 % — LOW (ref 39–50)
HCT VFR BLD CALC: 23.5 % — LOW (ref 39–50)
HCT VFR BLD CALC: 23.6 % — LOW (ref 39–50)
HCT VFR BLD CALC: 23.9 % — LOW (ref 39–50)
HCT VFR BLD CALC: 23.9 % — LOW (ref 39–50)
HCT VFR BLD CALC: 24.1 % — LOW (ref 39–50)
HCT VFR BLD CALC: 24.1 % — LOW (ref 39–50)
HCT VFR BLD CALC: 24.3 % — LOW (ref 39–50)
HCT VFR BLD CALC: 24.4 % — LOW (ref 39–50)
HCT VFR BLD CALC: 24.5 % — LOW (ref 39–50)
HCT VFR BLD CALC: 24.7 % — LOW (ref 39–50)
HCT VFR BLD CALC: 24.8 % — LOW (ref 39–50)
HCT VFR BLD CALC: 24.8 % — LOW (ref 39–50)
HCT VFR BLD CALC: 24.9 % — LOW (ref 39–50)
HCT VFR BLD CALC: 24.9 % — LOW (ref 39–50)
HCT VFR BLD CALC: 25 % — LOW (ref 39–50)
HCT VFR BLD CALC: 25.1 % — LOW (ref 39–50)
HCT VFR BLD CALC: 25.6 % — LOW (ref 39–50)
HCT VFR BLD CALC: 25.6 % — LOW (ref 39–50)
HCT VFR BLD CALC: 26 % — LOW (ref 39–50)
HCT VFR BLD CALC: 26.1 % — LOW (ref 39–50)
HCT VFR BLD CALC: 26.6 % — LOW (ref 39–50)
HCT VFR BLD CALC: 26.6 % — LOW (ref 39–50)
HCT VFR BLD CALC: 26.7 % — LOW (ref 39–50)
HCT VFR BLD CALC: 27 % — LOW (ref 39–50)
HCT VFR BLD CALC: 27.1 % — LOW (ref 39–50)
HCT VFR BLD CALC: 27.2 % — LOW (ref 39–50)
HCT VFR BLD CALC: 27.5 % — LOW (ref 39–50)
HCT VFR BLD CALC: 27.6 % — LOW (ref 39–50)
HCT VFR BLD CALC: 27.7 % — LOW (ref 39–50)
HCT VFR BLD CALC: 27.7 % — LOW (ref 39–50)
HCT VFR BLD CALC: 27.8 % — LOW (ref 39–50)
HCT VFR BLD CALC: 27.9 % — LOW (ref 39–50)
HCT VFR BLD CALC: 28 % — LOW (ref 39–50)
HCT VFR BLD CALC: 28 % — LOW (ref 39–50)
HCT VFR BLD CALC: 28.1 % — LOW (ref 39–50)
HCT VFR BLD CALC: 28.3 % — LOW (ref 39–50)
HCT VFR BLD CALC: 28.3 % — LOW (ref 39–50)
HCT VFR BLD CALC: 28.4 % — LOW (ref 39–50)
HCT VFR BLD CALC: 28.5 % — LOW (ref 39–50)
HCT VFR BLD CALC: 28.6 % — LOW (ref 39–50)
HCT VFR BLD CALC: 28.7 % — LOW (ref 39–50)
HCT VFR BLD CALC: 28.7 % — LOW (ref 39–50)
HCT VFR BLD CALC: 28.8 % — LOW (ref 39–50)
HCT VFR BLD CALC: 28.8 % — LOW (ref 39–50)
HCT VFR BLD CALC: 29 % — LOW (ref 39–50)
HCT VFR BLD CALC: 29.1 % — LOW (ref 39–50)
HCT VFR BLD CALC: 29.3 % — LOW (ref 39–50)
HCT VFR BLD CALC: 29.4 % — LOW (ref 39–50)
HCT VFR BLD CALC: 29.6 % — LOW (ref 39–50)
HCT VFR BLD CALC: 29.7 % — LOW (ref 39–50)
HCT VFR BLD CALC: 29.9 % — LOW (ref 39–50)
HCT VFR BLD CALC: 30.6 % — LOW (ref 39–50)
HCT VFR BLD CALC: 32.2 % — LOW (ref 39–50)
HCT VFR BLD CALC: 32.3 % — LOW (ref 39–50)
HCT VFR BLD CALC: 32.6 % — LOW (ref 39–50)
HCT VFR BLD CALC: 32.8 % — LOW (ref 39–50)
HCT VFR BLD CALC: 32.9 % — LOW (ref 39–50)
HCT VFR BLD CALC: 33.5 % — LOW (ref 39–50)
HCT VFR BLD CALC: 33.6 % — LOW (ref 39–50)
HCT VFR BLD CALC: 33.7 % — LOW (ref 39–50)
HCT VFR BLD CALC: 34.6 % — LOW (ref 39–50)
HCT VFR BLD CALC: 34.8 % — LOW (ref 39–50)
HCT VFR BLD CALC: 35.3 % — LOW (ref 39–50)
HCT VFR BLD CALC: 36.4 % — LOW (ref 39–50)
HCT VFR BLDA CALC: 22 % — CRITICAL LOW (ref 39–50)
HCT VFR BLDA CALC: 24 % — LOW (ref 39–50)
HCT VFR BLDA CALC: 25 % — LOW (ref 39–50)
HCT VFR BLDA CALC: 27 % — LOW (ref 39–50)
HCT VFR BLDA CALC: 30 % — LOW (ref 39–50)
HCT VFR BLDA CALC: 35 % — LOW (ref 39–50)
HCV AB S/CO SERPL IA: 0.14 S/CO — SIGNIFICANT CHANGE UP (ref 0–0.99)
HCV AB S/CO SERPL IA: 0.27 S/CO — SIGNIFICANT CHANGE UP (ref 0–0.99)
HCV AB SERPL-IMP: SIGNIFICANT CHANGE UP
HCV AB SERPL-IMP: SIGNIFICANT CHANGE UP
HEPARINASE TEG R TIME: 4.5 MIN — SIGNIFICANT CHANGE UP (ref 4.3–8.3)
HEPARINASE TEG R TIME: 7.7 MIN — SIGNIFICANT CHANGE UP (ref 4.3–8.3)
HEPARINASE TEG R TIME: 8.2 MIN — SIGNIFICANT CHANGE UP (ref 4.3–8.3)
HERPES-6 (HSV-6) PCR: SIGNIFICANT CHANGE UP COPIES/ML
HEV AB FLD QL: NEGATIVE — SIGNIFICANT CHANGE UP
HEV IGM SER QL: SIGNIFICANT CHANGE UP
HGB BLD CALC-MCNC: 11.4 G/DL — LOW (ref 13–17)
HGB BLD CALC-MCNC: 6.9 G/DL — CRITICAL LOW (ref 13–17)
HGB BLD CALC-MCNC: 7.7 G/DL — LOW (ref 13–17)
HGB BLD CALC-MCNC: 8 G/DL — LOW (ref 13–17)
HGB BLD CALC-MCNC: 8.6 G/DL — LOW (ref 13–17)
HGB BLD CALC-MCNC: 8.6 G/DL — LOW (ref 13–17)
HGB BLD CALC-MCNC: 8.8 G/DL — LOW (ref 13–17)
HGB BLD CALC-MCNC: 9.8 G/DL — LOW (ref 13–17)
HGB BLD-MCNC: 10 G/DL — LOW (ref 13–17)
HGB BLD-MCNC: 10 G/DL — LOW (ref 13–17)
HGB BLD-MCNC: 10.5 G/DL — LOW (ref 13–17)
HGB BLD-MCNC: 10.5 G/DL — LOW (ref 13–17)
HGB BLD-MCNC: 10.7 G/DL — LOW (ref 13–17)
HGB BLD-MCNC: 10.8 G/DL — LOW (ref 13–17)
HGB BLD-MCNC: 10.8 G/DL — LOW (ref 13–17)
HGB BLD-MCNC: 11.2 G/DL — LOW (ref 13–17)
HGB BLD-MCNC: 11.5 G/DL — LOW (ref 13–17)
HGB BLD-MCNC: 11.6 G/DL — LOW (ref 13–17)
HGB BLD-MCNC: 11.6 G/DL — LOW (ref 13–17)
HGB BLD-MCNC: 11.8 G/DL — LOW (ref 13–17)
HGB BLD-MCNC: 11.8 G/DL — LOW (ref 13–17)
HGB BLD-MCNC: 11.9 G/DL — LOW (ref 13–17)
HGB BLD-MCNC: 12.2 G/DL — LOW (ref 13–17)
HGB BLD-MCNC: 6.2 G/DL — CRITICAL LOW (ref 13–17)
HGB BLD-MCNC: 6.6 G/DL — CRITICAL LOW (ref 13–17)
HGB BLD-MCNC: 6.6 G/DL — CRITICAL LOW (ref 13–17)
HGB BLD-MCNC: 6.7 G/DL — CRITICAL LOW (ref 13–17)
HGB BLD-MCNC: 6.7 G/DL — CRITICAL LOW (ref 13–17)
HGB BLD-MCNC: 6.8 G/DL — CRITICAL LOW (ref 13–17)
HGB BLD-MCNC: 6.9 G/DL — CRITICAL LOW (ref 13–17)
HGB BLD-MCNC: 7 G/DL — CRITICAL LOW (ref 13–17)
HGB BLD-MCNC: 7.1 G/DL — LOW (ref 13–17)
HGB BLD-MCNC: 7.3 G/DL — LOW (ref 13–17)
HGB BLD-MCNC: 7.4 G/DL — LOW (ref 13–17)
HGB BLD-MCNC: 7.4 G/DL — LOW (ref 13–17)
HGB BLD-MCNC: 7.5 G/DL — LOW (ref 13–17)
HGB BLD-MCNC: 7.5 G/DL — LOW (ref 13–17)
HGB BLD-MCNC: 7.6 G/DL — LOW (ref 13–17)
HGB BLD-MCNC: 7.7 G/DL — LOW (ref 13–17)
HGB BLD-MCNC: 7.8 G/DL — LOW (ref 13–17)
HGB BLD-MCNC: 7.9 G/DL — LOW (ref 13–17)
HGB BLD-MCNC: 8 G/DL — LOW (ref 13–17)
HGB BLD-MCNC: 8.1 G/DL — LOW (ref 13–17)
HGB BLD-MCNC: 8.2 G/DL — LOW (ref 13–17)
HGB BLD-MCNC: 8.3 G/DL — LOW (ref 13–17)
HGB BLD-MCNC: 8.3 G/DL — LOW (ref 13–17)
HGB BLD-MCNC: 8.4 G/DL — LOW (ref 13–17)
HGB BLD-MCNC: 8.5 G/DL — LOW (ref 13–17)
HGB BLD-MCNC: 8.6 G/DL — LOW (ref 13–17)
HGB BLD-MCNC: 8.7 G/DL — LOW (ref 13–17)
HGB BLD-MCNC: 8.8 G/DL — LOW (ref 13–17)
HGB BLD-MCNC: 8.9 G/DL — LOW (ref 13–17)
HGB BLD-MCNC: 9 G/DL — LOW (ref 13–17)
HGB BLD-MCNC: 9.1 G/DL — LOW (ref 13–17)
HGB BLD-MCNC: 9.1 G/DL — LOW (ref 13–17)
HGB BLD-MCNC: 9.2 G/DL — LOW (ref 13–17)
HGB BLD-MCNC: 9.3 G/DL — LOW (ref 13–17)
HGB BLD-MCNC: 9.3 G/DL — LOW (ref 13–17)
HGB BLD-MCNC: 9.4 G/DL — LOW (ref 13–17)
HGB BLD-MCNC: 9.5 G/DL — LOW (ref 13–17)
HGB BLD-MCNC: 9.6 G/DL — LOW (ref 13–17)
HGB BLD-MCNC: 9.7 G/DL — LOW (ref 13–17)
HGB BLD-MCNC: 9.8 G/DL — LOW (ref 13–17)
HGB BLD-MCNC: 9.9 G/DL — LOW (ref 13–17)
HIV 1+2 AB+HIV1 P24 AG SERPL QL IA: SIGNIFICANT CHANGE UP
HOROWITZ INDEX BLDA+IHG-RTO: 100 — SIGNIFICANT CHANGE UP
HSV1 IGG SER-ACNC: 5.7 INDEX — HIGH
HSV1 IGG SERPL QL IA: POSITIVE
HSV2 IGG FLD-ACNC: 4.08 INDEX — HIGH
HSV2 IGG SERPL QL IA: POSITIVE
HYALINE CASTS # UR AUTO: 0 /LPF — SIGNIFICANT CHANGE UP (ref 0–7)
HYALINE CASTS # UR AUTO: 10 /LPF — HIGH (ref 0–7)
HYALINE CASTS # UR AUTO: 28 /LPF — HIGH (ref 0–2)
HYALINE CASTS # UR AUTO: 7 /LPF — HIGH (ref 0–2)
HYPOCHROMIA BLD QL: SLIGHT — SIGNIFICANT CHANGE UP
IGA FLD-MCNC: 989 MG/DL — HIGH (ref 84–499)
IGG FLD-MCNC: 1269 MG/DL — SIGNIFICANT CHANGE UP (ref 610–1660)
IGG4 SER-MCNC: 51.6 MG/DL — SIGNIFICANT CHANGE UP (ref 2.4–121)
IGM SERPL-MCNC: 115 MG/DL — SIGNIFICANT CHANGE UP (ref 35–242)
IMM GRANULOCYTES NFR BLD AUTO: 0.3 % — SIGNIFICANT CHANGE UP (ref 0–1.5)
IMM GRANULOCYTES NFR BLD AUTO: 0.5 % — SIGNIFICANT CHANGE UP (ref 0–1.5)
IMM GRANULOCYTES NFR BLD AUTO: 0.5 % — SIGNIFICANT CHANGE UP (ref 0–1.5)
IMM GRANULOCYTES NFR BLD AUTO: 0.6 % — SIGNIFICANT CHANGE UP (ref 0–1.5)
IMM GRANULOCYTES NFR BLD AUTO: 0.6 % — SIGNIFICANT CHANGE UP (ref 0–1.5)
IMM GRANULOCYTES NFR BLD AUTO: 0.7 % — SIGNIFICANT CHANGE UP (ref 0–1.5)
IMM GRANULOCYTES NFR BLD AUTO: 0.7 % — SIGNIFICANT CHANGE UP (ref 0–1.5)
IMM GRANULOCYTES NFR BLD AUTO: 0.8 % — SIGNIFICANT CHANGE UP (ref 0–1.5)
IMM GRANULOCYTES NFR BLD AUTO: 0.9 % — SIGNIFICANT CHANGE UP (ref 0–1.5)
IMM GRANULOCYTES NFR BLD AUTO: 1 % — SIGNIFICANT CHANGE UP (ref 0–1.5)
IMM GRANULOCYTES NFR BLD AUTO: 1.1 % — SIGNIFICANT CHANGE UP (ref 0–1.5)
IMM GRANULOCYTES NFR BLD AUTO: 1.2 % — SIGNIFICANT CHANGE UP (ref 0–1.5)
IMM GRANULOCYTES NFR BLD AUTO: 1.3 % — SIGNIFICANT CHANGE UP (ref 0–1.5)
IMM GRANULOCYTES NFR BLD AUTO: 1.4 % — SIGNIFICANT CHANGE UP (ref 0–1.5)
IMM GRANULOCYTES NFR BLD AUTO: 1.4 % — SIGNIFICANT CHANGE UP (ref 0–1.5)
IMM GRANULOCYTES NFR BLD AUTO: 1.5 % — SIGNIFICANT CHANGE UP (ref 0–1.5)
IMM GRANULOCYTES NFR BLD AUTO: 1.5 % — SIGNIFICANT CHANGE UP (ref 0–1.5)
IMM GRANULOCYTES NFR BLD AUTO: 1.6 % — HIGH (ref 0–1.5)
IMM GRANULOCYTES NFR BLD AUTO: 1.8 % — HIGH (ref 0–1.5)
INR BLD: 1.41 RATIO — HIGH (ref 0.88–1.16)
INR BLD: 1.54 RATIO — HIGH (ref 0.88–1.16)
INR BLD: 1.54 RATIO — HIGH (ref 0.88–1.16)
INR BLD: 1.55 RATIO — HIGH (ref 0.88–1.16)
INR BLD: 1.55 RATIO — HIGH (ref 0.88–1.16)
INR BLD: 1.56 RATIO — HIGH (ref 0.88–1.16)
INR BLD: 1.57 RATIO — HIGH (ref 0.88–1.16)
INR BLD: 1.58 RATIO — HIGH (ref 0.88–1.16)
INR BLD: 1.59 RATIO — HIGH (ref 0.88–1.16)
INR BLD: 1.6 RATIO — HIGH (ref 0.88–1.16)
INR BLD: 1.6 RATIO — HIGH (ref 0.88–1.16)
INR BLD: 1.61 RATIO — HIGH (ref 0.88–1.16)
INR BLD: 1.63 RATIO — HIGH (ref 0.88–1.16)
INR BLD: 1.64 RATIO — HIGH (ref 0.88–1.16)
INR BLD: 1.65 RATIO — HIGH (ref 0.88–1.16)
INR BLD: 1.65 RATIO — HIGH (ref 0.88–1.16)
INR BLD: 1.67 RATIO — HIGH (ref 0.88–1.16)
INR BLD: 1.67 RATIO — HIGH (ref 0.88–1.16)
INR BLD: 1.69 RATIO — HIGH (ref 0.88–1.16)
INR BLD: 1.71 RATIO — HIGH (ref 0.88–1.16)
INR BLD: 1.72 RATIO — HIGH (ref 0.88–1.16)
INR BLD: 1.72 RATIO — HIGH (ref 0.88–1.16)
INR BLD: 1.73 RATIO — HIGH (ref 0.88–1.16)
INR BLD: 1.74 RATIO — HIGH (ref 0.88–1.16)
INR BLD: 1.76 RATIO — HIGH (ref 0.88–1.16)
INR BLD: 1.77 RATIO — HIGH (ref 0.88–1.16)
INR BLD: 1.8 RATIO — HIGH (ref 0.88–1.16)
INR BLD: 1.82 RATIO — HIGH (ref 0.88–1.16)
INR BLD: 1.83 RATIO — HIGH (ref 0.88–1.16)
INR BLD: 1.85 RATIO — HIGH (ref 0.88–1.16)
INR BLD: 1.85 RATIO — HIGH (ref 0.88–1.16)
INR BLD: 1.86 RATIO — HIGH (ref 0.88–1.16)
INR BLD: 1.87 RATIO — HIGH (ref 0.88–1.16)
INR BLD: 1.88 RATIO — HIGH (ref 0.88–1.16)
INR BLD: 1.89 RATIO — HIGH (ref 0.88–1.16)
INR BLD: 1.92 RATIO — HIGH (ref 0.88–1.16)
INR BLD: 1.92 RATIO — HIGH (ref 0.88–1.16)
INR BLD: 1.93 RATIO — HIGH (ref 0.88–1.16)
INR BLD: 1.95 RATIO — HIGH (ref 0.88–1.16)
INR BLD: 1.98 RATIO — HIGH (ref 0.88–1.16)
INR BLD: 2 RATIO — HIGH (ref 0.88–1.16)
INR BLD: 2 RATIO — HIGH (ref 0.88–1.16)
INR BLD: 2.09 RATIO — HIGH (ref 0.88–1.16)
INR BLD: 2.12 RATIO — HIGH (ref 0.88–1.16)
INR BLD: 2.13 RATIO — HIGH (ref 0.88–1.16)
INR BLD: 2.24 RATIO — HIGH (ref 0.88–1.16)
INR BLD: 2.25 RATIO — HIGH (ref 0.88–1.16)
INR BLD: 2.29 RATIO — HIGH (ref 0.88–1.16)
INR BLD: 2.6 RATIO — HIGH (ref 0.88–1.16)
INR BLD: 2.62 RATIO — HIGH (ref 0.88–1.16)
INR BLD: 2.64 RATIO — HIGH (ref 0.88–1.16)
INR BLD: 2.78 RATIO — HIGH (ref 0.88–1.16)
INR BLD: 2.78 RATIO — HIGH (ref 0.88–1.16)
INR BLD: 2.93 RATIO — HIGH (ref 0.88–1.16)
INR BLD: 2.96 RATIO — HIGH (ref 0.88–1.16)
INR BLD: 3.03 RATIO — HIGH (ref 0.88–1.16)
INR BLD: 3.04 RATIO — HIGH (ref 0.88–1.16)
INR BLD: 3.15 RATIO — HIGH (ref 0.88–1.16)
INR BLD: 3.19 RATIO — HIGH (ref 0.88–1.16)
INR BLD: 3.2 RATIO — HIGH (ref 0.88–1.16)
INR BLD: 3.41 RATIO — HIGH (ref 0.88–1.16)
INR BLD: 3.65 RATIO — HIGH (ref 0.88–1.16)
INR BLD: 3.79 RATIO — HIGH (ref 0.88–1.16)
INR BLD: 3.92 RATIO — HIGH (ref 0.88–1.16)
INR BLD: 4.61 RATIO — HIGH (ref 0.88–1.16)
INR BLD: 4.83 RATIO — HIGH (ref 0.88–1.16)
IRON SATN MFR SERPL: 104 UG/DL — SIGNIFICANT CHANGE UP (ref 45–165)
IRON SATN MFR SERPL: 27 % — SIGNIFICANT CHANGE UP (ref 16–55)
IRON SATN MFR SERPL: 31 UG/DL — LOW (ref 45–165)
IRON SATN MFR SERPL: 34 UG/DL — LOW (ref 45–165)
IRON SATN MFR SERPL: 62 UG/DL — SIGNIFICANT CHANGE UP (ref 45–165)
IRON SATN MFR SERPL: 78 % — HIGH (ref 16–55)
IRON SATN MFR SERPL: SIGNIFICANT CHANGE UP % (ref 16–55)
IRON SATN MFR SERPL: SIGNIFICANT CHANGE UP % (ref 16–55)
KAPPA LC SER QL IFE: 2.35 MG/DL — HIGH (ref 0.33–1.94)
KAPPA/LAMBDA FREE LIGHT CHAIN RATIO, SERUM: 1.14 RATIO — SIGNIFICANT CHANGE UP (ref 0.26–1.65)
KETONES UR-MCNC: NEGATIVE — SIGNIFICANT CHANGE UP
KETONES UR-MCNC: SIGNIFICANT CHANGE UP
KETONES UR-MCNC: SIGNIFICANT CHANGE UP
LACTATE BLDV-MCNC: 1.5 MMOL/L — SIGNIFICANT CHANGE UP (ref 0.7–2)
LACTATE BLDV-MCNC: 10.8 MMOL/L — CRITICAL HIGH (ref 0.7–2)
LACTATE BLDV-MCNC: 2.4 MMOL/L — HIGH (ref 0.7–2)
LACTATE BLDV-MCNC: 2.7 MMOL/L — HIGH (ref 0.7–2)
LACTATE BLDV-MCNC: 2.7 MMOL/L — HIGH (ref 0.7–2)
LACTATE BLDV-MCNC: 3.1 MMOL/L — HIGH (ref 0.7–2)
LACTATE BLDV-MCNC: 3.4 MMOL/L — HIGH (ref 0.7–2)
LACTATE BLDV-MCNC: 7.9 MMOL/L — CRITICAL HIGH (ref 0.7–2)
LACTATE BLDV-MCNC: 9.7 MMOL/L — CRITICAL HIGH (ref 0.7–2)
LACTATE SERPL-SCNC: 1.4 MMOL/L — SIGNIFICANT CHANGE UP (ref 0.7–2)
LACTATE SERPL-SCNC: 1.6 MMOL/L — SIGNIFICANT CHANGE UP (ref 0.7–2)
LACTATE SERPL-SCNC: 3 MMOL/L — HIGH (ref 0.7–2)
LACTATE SERPL-SCNC: 3.3 MMOL/L — HIGH (ref 0.7–2)
LACTATE SERPL-SCNC: 3.5 MMOL/L — HIGH (ref 0.7–2)
LACTATE SERPL-SCNC: 3.6 MMOL/L — HIGH (ref 0.7–2)
LACTATE SERPL-SCNC: 4 MMOL/L — CRITICAL HIGH (ref 0.7–2)
LACTATE SERPL-SCNC: 4.5 MMOL/L — CRITICAL HIGH (ref 0.7–2)
LACTATE SERPL-SCNC: 4.6 MMOL/L — CRITICAL HIGH (ref 0.7–2)
LACTATE SERPL-SCNC: 8.4 MMOL/L — CRITICAL HIGH (ref 0.7–2)
LACTATE SERPL-SCNC: 9.3 MMOL/L — CRITICAL HIGH (ref 0.7–2)
LAMBDA LC SER QL IFE: 2.06 MG/DL — SIGNIFICANT CHANGE UP (ref 0.57–2.63)
LDH SERPL L TO P-CCNC: 141 U/L — SIGNIFICANT CHANGE UP
LDH SERPL L TO P-CCNC: 254 U/L — SIGNIFICANT CHANGE UP
LDH SERPL L TO P-CCNC: 285 U/L — SIGNIFICANT CHANGE UP
LDH SERPL L TO P-CCNC: 306 U/L — SIGNIFICANT CHANGE UP
LDH SERPL L TO P-CCNC: 34 U/L — SIGNIFICANT CHANGE UP
LDH SERPL L TO P-CCNC: 569 U/L — HIGH (ref 50–242)
LDH SERPL L TO P-CCNC: 70 U/L — SIGNIFICANT CHANGE UP
LEUKOCYTE ESTERASE UR-ACNC: NEGATIVE — SIGNIFICANT CHANGE UP
LIDOCAIN IGE QN: 152 U/L — HIGH (ref 7–60)
LIDOCAIN IGE QN: 156 U/L — HIGH (ref 7–60)
LIDOCAIN IGE QN: 33 U/L — SIGNIFICANT CHANGE UP (ref 7–60)
LIDOCAIN IGE QN: 504 U/L — HIGH (ref 7–60)
LIDOCAIN IGE QN: 83 U/L — HIGH (ref 7–60)
LKM AB SER-ACNC: <20.1 UNITS — SIGNIFICANT CHANGE UP (ref 0–20)
LYMPHOCYTES # BLD AUTO: 0.26 K/UL — LOW (ref 1–3.3)
LYMPHOCYTES # BLD AUTO: 0.32 K/UL — LOW (ref 1–3.3)
LYMPHOCYTES # BLD AUTO: 0.33 K/UL — LOW (ref 1–3.3)
LYMPHOCYTES # BLD AUTO: 0.52 K/UL — LOW (ref 1–3.3)
LYMPHOCYTES # BLD AUTO: 0.54 K/UL — LOW (ref 1–3.3)
LYMPHOCYTES # BLD AUTO: 0.67 K/UL — LOW (ref 1–3.3)
LYMPHOCYTES # BLD AUTO: 0.88 K/UL — LOW (ref 1–3.3)
LYMPHOCYTES # BLD AUTO: 0.98 K/UL — LOW (ref 1–3.3)
LYMPHOCYTES # BLD AUTO: 1 K/UL — SIGNIFICANT CHANGE UP (ref 1–3.3)
LYMPHOCYTES # BLD AUTO: 1.02 K/UL — SIGNIFICANT CHANGE UP (ref 1–3.3)
LYMPHOCYTES # BLD AUTO: 1.03 K/UL — SIGNIFICANT CHANGE UP (ref 1–3.3)
LYMPHOCYTES # BLD AUTO: 1.07 K/UL — SIGNIFICANT CHANGE UP (ref 1–3.3)
LYMPHOCYTES # BLD AUTO: 1.08 K/UL — SIGNIFICANT CHANGE UP (ref 1–3.3)
LYMPHOCYTES # BLD AUTO: 1.11 K/UL — SIGNIFICANT CHANGE UP (ref 1–3.3)
LYMPHOCYTES # BLD AUTO: 1.15 K/UL — SIGNIFICANT CHANGE UP (ref 1–3.3)
LYMPHOCYTES # BLD AUTO: 1.16 K/UL — SIGNIFICANT CHANGE UP (ref 1–3.3)
LYMPHOCYTES # BLD AUTO: 1.17 K/UL — SIGNIFICANT CHANGE UP (ref 1–3.3)
LYMPHOCYTES # BLD AUTO: 1.22 K/UL — SIGNIFICANT CHANGE UP (ref 1–3.3)
LYMPHOCYTES # BLD AUTO: 1.24 K/UL — SIGNIFICANT CHANGE UP (ref 1–3.3)
LYMPHOCYTES # BLD AUTO: 1.25 K/UL — SIGNIFICANT CHANGE UP (ref 1–3.3)
LYMPHOCYTES # BLD AUTO: 1.25 K/UL — SIGNIFICANT CHANGE UP (ref 1–3.3)
LYMPHOCYTES # BLD AUTO: 1.28 K/UL — SIGNIFICANT CHANGE UP (ref 1–3.3)
LYMPHOCYTES # BLD AUTO: 1.28 K/UL — SIGNIFICANT CHANGE UP (ref 1–3.3)
LYMPHOCYTES # BLD AUTO: 1.29 K/UL — SIGNIFICANT CHANGE UP (ref 1–3.3)
LYMPHOCYTES # BLD AUTO: 1.3 K/UL — SIGNIFICANT CHANGE UP (ref 1–3.3)
LYMPHOCYTES # BLD AUTO: 1.35 K/UL — SIGNIFICANT CHANGE UP (ref 1–3.3)
LYMPHOCYTES # BLD AUTO: 1.38 K/UL — SIGNIFICANT CHANGE UP (ref 1–3.3)
LYMPHOCYTES # BLD AUTO: 1.44 K/UL — SIGNIFICANT CHANGE UP (ref 1–3.3)
LYMPHOCYTES # BLD AUTO: 1.46 K/UL — SIGNIFICANT CHANGE UP (ref 1–3.3)
LYMPHOCYTES # BLD AUTO: 1.49 K/UL — SIGNIFICANT CHANGE UP (ref 1–3.3)
LYMPHOCYTES # BLD AUTO: 1.58 K/UL — SIGNIFICANT CHANGE UP (ref 1–3.3)
LYMPHOCYTES # BLD AUTO: 1.6 K/UL — SIGNIFICANT CHANGE UP (ref 1–3.3)
LYMPHOCYTES # BLD AUTO: 1.61 K/UL — SIGNIFICANT CHANGE UP (ref 1–3.3)
LYMPHOCYTES # BLD AUTO: 1.66 K/UL — SIGNIFICANT CHANGE UP (ref 1–3.3)
LYMPHOCYTES # BLD AUTO: 1.7 % — LOW (ref 13–44)
LYMPHOCYTES # BLD AUTO: 1.7 K/UL — SIGNIFICANT CHANGE UP (ref 1–3.3)
LYMPHOCYTES # BLD AUTO: 1.74 K/UL — SIGNIFICANT CHANGE UP (ref 1–3.3)
LYMPHOCYTES # BLD AUTO: 1.76 K/UL — SIGNIFICANT CHANGE UP (ref 1–3.3)
LYMPHOCYTES # BLD AUTO: 1.8 % — LOW (ref 13–44)
LYMPHOCYTES # BLD AUTO: 1.84 K/UL — SIGNIFICANT CHANGE UP (ref 1–3.3)
LYMPHOCYTES # BLD AUTO: 1.98 K/UL — SIGNIFICANT CHANGE UP (ref 1–3.3)
LYMPHOCYTES # BLD AUTO: 10.3 % — LOW (ref 13–44)
LYMPHOCYTES # BLD AUTO: 10.9 % — LOW (ref 13–44)
LYMPHOCYTES # BLD AUTO: 10.9 % — LOW (ref 13–44)
LYMPHOCYTES # BLD AUTO: 11.2 % — LOW (ref 13–44)
LYMPHOCYTES # BLD AUTO: 11.5 % — LOW (ref 13–44)
LYMPHOCYTES # BLD AUTO: 12 % — LOW (ref 13–44)
LYMPHOCYTES # BLD AUTO: 12.4 % — LOW (ref 13–44)
LYMPHOCYTES # BLD AUTO: 12.5 % — LOW (ref 13–44)
LYMPHOCYTES # BLD AUTO: 12.8 % — LOW (ref 13–44)
LYMPHOCYTES # BLD AUTO: 13 % — SIGNIFICANT CHANGE UP (ref 13–44)
LYMPHOCYTES # BLD AUTO: 13.1 % — SIGNIFICANT CHANGE UP (ref 13–44)
LYMPHOCYTES # BLD AUTO: 13.2 % — SIGNIFICANT CHANGE UP (ref 13–44)
LYMPHOCYTES # BLD AUTO: 14.5 % — SIGNIFICANT CHANGE UP (ref 13–44)
LYMPHOCYTES # BLD AUTO: 14.6 % — SIGNIFICANT CHANGE UP (ref 13–44)
LYMPHOCYTES # BLD AUTO: 2.25 K/UL — SIGNIFICANT CHANGE UP (ref 1–3.3)
LYMPHOCYTES # BLD AUTO: 2.27 K/UL — SIGNIFICANT CHANGE UP (ref 1–3.3)
LYMPHOCYTES # BLD AUTO: 2.41 K/UL — SIGNIFICANT CHANGE UP (ref 1–3.3)
LYMPHOCYTES # BLD AUTO: 2.6 % — LOW (ref 13–44)
LYMPHOCYTES # BLD AUTO: 2.6 % — LOW (ref 13–44)
LYMPHOCYTES # BLD AUTO: 20 % — SIGNIFICANT CHANGE UP (ref 13–44)
LYMPHOCYTES # BLD AUTO: 3.7 % — LOW (ref 13–44)
LYMPHOCYTES # BLD AUTO: 4 % — LOW (ref 13–44)
LYMPHOCYTES # BLD AUTO: 4.2 % — LOW (ref 13–44)
LYMPHOCYTES # BLD AUTO: 4.3 % — LOW (ref 13–44)
LYMPHOCYTES # BLD AUTO: 4.8 % — LOW (ref 13–44)
LYMPHOCYTES # BLD AUTO: 4.8 % — LOW (ref 13–44)
LYMPHOCYTES # BLD AUTO: 4.9 % — LOW (ref 13–44)
LYMPHOCYTES # BLD AUTO: 5 % — LOW (ref 13–44)
LYMPHOCYTES # BLD AUTO: 5.2 % — LOW (ref 13–44)
LYMPHOCYTES # BLD AUTO: 5.2 % — LOW (ref 13–44)
LYMPHOCYTES # BLD AUTO: 5.4 % — LOW (ref 13–44)
LYMPHOCYTES # BLD AUTO: 5.5 % — LOW (ref 13–44)
LYMPHOCYTES # BLD AUTO: 5.9 % — LOW (ref 13–44)
LYMPHOCYTES # BLD AUTO: 6.1 % — LOW (ref 13–44)
LYMPHOCYTES # BLD AUTO: 6.7 % — LOW (ref 13–44)
LYMPHOCYTES # BLD AUTO: 6.7 % — LOW (ref 13–44)
LYMPHOCYTES # BLD AUTO: 7.5 % — LOW (ref 13–44)
LYMPHOCYTES # BLD AUTO: 7.8 % — LOW (ref 13–44)
LYMPHOCYTES # BLD AUTO: 7.8 % — LOW (ref 13–44)
LYMPHOCYTES # BLD AUTO: 7.9 % — LOW (ref 13–44)
LYMPHOCYTES # BLD AUTO: 9.7 % — LOW (ref 13–44)
LYMPHOCYTES # FLD: 1 % — SIGNIFICANT CHANGE UP
LYMPHOCYTES # FLD: 11 % — SIGNIFICANT CHANGE UP
LYMPHOCYTES # FLD: 25 % — SIGNIFICANT CHANGE UP
LYMPHOCYTES # FLD: 40 % — SIGNIFICANT CHANGE UP
LYMPHOCYTES # FLD: 41 % — SIGNIFICANT CHANGE UP
LYMPHOCYTES # FLD: 49 % — SIGNIFICANT CHANGE UP
LYMPHOCYTES # FLD: 7 % — SIGNIFICANT CHANGE UP
M TB IFN-G BLD-IMP: ABNORMAL
M TB IFN-G CD4+ BCKGRND COR BLD-ACNC: 0 IU/ML — SIGNIFICANT CHANGE UP
M TB IFN-G CD4+CD8+ BCKGRND COR BLD-ACNC: 0 IU/ML — SIGNIFICANT CHANGE UP
MACROCYTES BLD QL: SIGNIFICANT CHANGE UP
MAGNESIUM SERPL-MCNC: 1.6 MG/DL — SIGNIFICANT CHANGE UP (ref 1.6–2.6)
MAGNESIUM SERPL-MCNC: 1.7 MG/DL — SIGNIFICANT CHANGE UP (ref 1.6–2.6)
MAGNESIUM SERPL-MCNC: 1.8 MG/DL — SIGNIFICANT CHANGE UP (ref 1.6–2.6)
MAGNESIUM SERPL-MCNC: 1.9 MG/DL — SIGNIFICANT CHANGE UP (ref 1.6–2.6)
MAGNESIUM SERPL-MCNC: 2 MG/DL — SIGNIFICANT CHANGE UP (ref 1.6–2.6)
MAGNESIUM SERPL-MCNC: 2.1 MG/DL — SIGNIFICANT CHANGE UP (ref 1.6–2.6)
MAGNESIUM SERPL-MCNC: 2.2 MG/DL — SIGNIFICANT CHANGE UP (ref 1.6–2.6)
MAGNESIUM SERPL-MCNC: 2.3 MG/DL — SIGNIFICANT CHANGE UP (ref 1.6–2.6)
MAGNESIUM SERPL-MCNC: 2.4 MG/DL — SIGNIFICANT CHANGE UP (ref 1.6–2.6)
MAGNESIUM SERPL-MCNC: 2.5 MG/DL — SIGNIFICANT CHANGE UP (ref 1.6–2.6)
MAGNESIUM SERPL-MCNC: 2.6 MG/DL — SIGNIFICANT CHANGE UP (ref 1.6–2.6)
MAGNESIUM SERPL-MCNC: 2.7 MG/DL — HIGH (ref 1.6–2.6)
MAGNESIUM SERPL-MCNC: 2.7 MG/DL — HIGH (ref 1.6–2.6)
MANUAL SMEAR VERIFICATION: SIGNIFICANT CHANGE UP
MCHC RBC-ENTMCNC: 29.5 PG — SIGNIFICANT CHANGE UP (ref 27–34)
MCHC RBC-ENTMCNC: 29.6 PG — SIGNIFICANT CHANGE UP (ref 27–34)
MCHC RBC-ENTMCNC: 29.6 PG — SIGNIFICANT CHANGE UP (ref 27–34)
MCHC RBC-ENTMCNC: 29.7 GM/DL — LOW (ref 32–36)
MCHC RBC-ENTMCNC: 29.7 PG — SIGNIFICANT CHANGE UP (ref 27–34)
MCHC RBC-ENTMCNC: 29.8 PG — SIGNIFICANT CHANGE UP (ref 27–34)
MCHC RBC-ENTMCNC: 29.9 PG — SIGNIFICANT CHANGE UP (ref 27–34)
MCHC RBC-ENTMCNC: 30 GM/DL — LOW (ref 32–36)
MCHC RBC-ENTMCNC: 30 PG — SIGNIFICANT CHANGE UP (ref 27–34)
MCHC RBC-ENTMCNC: 30.1 PG — SIGNIFICANT CHANGE UP (ref 27–34)
MCHC RBC-ENTMCNC: 30.2 PG — SIGNIFICANT CHANGE UP (ref 27–34)
MCHC RBC-ENTMCNC: 30.2 PG — SIGNIFICANT CHANGE UP (ref 27–34)
MCHC RBC-ENTMCNC: 30.3 PG — SIGNIFICANT CHANGE UP (ref 27–34)
MCHC RBC-ENTMCNC: 30.5 PG — SIGNIFICANT CHANGE UP (ref 27–34)
MCHC RBC-ENTMCNC: 30.6 GM/DL — LOW (ref 32–36)
MCHC RBC-ENTMCNC: 30.6 PG — SIGNIFICANT CHANGE UP (ref 27–34)
MCHC RBC-ENTMCNC: 30.6 PG — SIGNIFICANT CHANGE UP (ref 27–34)
MCHC RBC-ENTMCNC: 30.8 PG — SIGNIFICANT CHANGE UP (ref 27–34)
MCHC RBC-ENTMCNC: 30.8 PG — SIGNIFICANT CHANGE UP (ref 27–34)
MCHC RBC-ENTMCNC: 30.9 GM/DL — LOW (ref 32–36)
MCHC RBC-ENTMCNC: 31 GM/DL — LOW (ref 32–36)
MCHC RBC-ENTMCNC: 31 PG — SIGNIFICANT CHANGE UP (ref 27–34)
MCHC RBC-ENTMCNC: 31.1 GM/DL — LOW (ref 32–36)
MCHC RBC-ENTMCNC: 31.1 GM/DL — LOW (ref 32–36)
MCHC RBC-ENTMCNC: 31.1 PG — SIGNIFICANT CHANGE UP (ref 27–34)
MCHC RBC-ENTMCNC: 31.2 PG — SIGNIFICANT CHANGE UP (ref 27–34)
MCHC RBC-ENTMCNC: 31.3 GM/DL — LOW (ref 32–36)
MCHC RBC-ENTMCNC: 31.3 GM/DL — LOW (ref 32–36)
MCHC RBC-ENTMCNC: 31.3 PG — SIGNIFICANT CHANGE UP (ref 27–34)
MCHC RBC-ENTMCNC: 31.3 PG — SIGNIFICANT CHANGE UP (ref 27–34)
MCHC RBC-ENTMCNC: 31.4 GM/DL — LOW (ref 32–36)
MCHC RBC-ENTMCNC: 31.4 GM/DL — LOW (ref 32–36)
MCHC RBC-ENTMCNC: 31.5 GM/DL — LOW (ref 32–36)
MCHC RBC-ENTMCNC: 31.5 GM/DL — LOW (ref 32–36)
MCHC RBC-ENTMCNC: 31.6 GM/DL — LOW (ref 32–36)
MCHC RBC-ENTMCNC: 31.6 PG — SIGNIFICANT CHANGE UP (ref 27–34)
MCHC RBC-ENTMCNC: 31.7 GM/DL — LOW (ref 32–36)
MCHC RBC-ENTMCNC: 31.7 GM/DL — LOW (ref 32–36)
MCHC RBC-ENTMCNC: 31.8 GM/DL — LOW (ref 32–36)
MCHC RBC-ENTMCNC: 31.8 GM/DL — LOW (ref 32–36)
MCHC RBC-ENTMCNC: 31.8 PG — SIGNIFICANT CHANGE UP (ref 27–34)
MCHC RBC-ENTMCNC: 31.9 PG — SIGNIFICANT CHANGE UP (ref 27–34)
MCHC RBC-ENTMCNC: 31.9 PG — SIGNIFICANT CHANGE UP (ref 27–34)
MCHC RBC-ENTMCNC: 32 PG — SIGNIFICANT CHANGE UP (ref 27–34)
MCHC RBC-ENTMCNC: 32 PG — SIGNIFICANT CHANGE UP (ref 27–34)
MCHC RBC-ENTMCNC: 32.1 GM/DL — SIGNIFICANT CHANGE UP (ref 32–36)
MCHC RBC-ENTMCNC: 32.1 PG — SIGNIFICANT CHANGE UP (ref 27–34)
MCHC RBC-ENTMCNC: 32.3 GM/DL — SIGNIFICANT CHANGE UP (ref 32–36)
MCHC RBC-ENTMCNC: 32.4 GM/DL — SIGNIFICANT CHANGE UP (ref 32–36)
MCHC RBC-ENTMCNC: 32.4 PG — SIGNIFICANT CHANGE UP (ref 27–34)
MCHC RBC-ENTMCNC: 32.5 PG — SIGNIFICANT CHANGE UP (ref 27–34)
MCHC RBC-ENTMCNC: 32.6 GM/DL — SIGNIFICANT CHANGE UP (ref 32–36)
MCHC RBC-ENTMCNC: 32.6 GM/DL — SIGNIFICANT CHANGE UP (ref 32–36)
MCHC RBC-ENTMCNC: 32.6 PG — SIGNIFICANT CHANGE UP (ref 27–34)
MCHC RBC-ENTMCNC: 32.6 PG — SIGNIFICANT CHANGE UP (ref 27–34)
MCHC RBC-ENTMCNC: 32.7 GM/DL — SIGNIFICANT CHANGE UP (ref 32–36)
MCHC RBC-ENTMCNC: 32.7 GM/DL — SIGNIFICANT CHANGE UP (ref 32–36)
MCHC RBC-ENTMCNC: 32.7 PG — SIGNIFICANT CHANGE UP (ref 27–34)
MCHC RBC-ENTMCNC: 32.8 GM/DL — SIGNIFICANT CHANGE UP (ref 32–36)
MCHC RBC-ENTMCNC: 32.8 GM/DL — SIGNIFICANT CHANGE UP (ref 32–36)
MCHC RBC-ENTMCNC: 32.8 PG — SIGNIFICANT CHANGE UP (ref 27–34)
MCHC RBC-ENTMCNC: 32.9 GM/DL — SIGNIFICANT CHANGE UP (ref 32–36)
MCHC RBC-ENTMCNC: 32.9 GM/DL — SIGNIFICANT CHANGE UP (ref 32–36)
MCHC RBC-ENTMCNC: 33 GM/DL — SIGNIFICANT CHANGE UP (ref 32–36)
MCHC RBC-ENTMCNC: 33.1 GM/DL — SIGNIFICANT CHANGE UP (ref 32–36)
MCHC RBC-ENTMCNC: 33.1 PG — SIGNIFICANT CHANGE UP (ref 27–34)
MCHC RBC-ENTMCNC: 33.2 GM/DL — SIGNIFICANT CHANGE UP (ref 32–36)
MCHC RBC-ENTMCNC: 33.2 PG — SIGNIFICANT CHANGE UP (ref 27–34)
MCHC RBC-ENTMCNC: 33.2 PG — SIGNIFICANT CHANGE UP (ref 27–34)
MCHC RBC-ENTMCNC: 33.3 GM/DL — SIGNIFICANT CHANGE UP (ref 32–36)
MCHC RBC-ENTMCNC: 33.3 PG — SIGNIFICANT CHANGE UP (ref 27–34)
MCHC RBC-ENTMCNC: 33.4 GM/DL — SIGNIFICANT CHANGE UP (ref 32–36)
MCHC RBC-ENTMCNC: 33.5 GM/DL — SIGNIFICANT CHANGE UP (ref 32–36)
MCHC RBC-ENTMCNC: 33.5 PG — SIGNIFICANT CHANGE UP (ref 27–34)
MCHC RBC-ENTMCNC: 33.5 PG — SIGNIFICANT CHANGE UP (ref 27–34)
MCHC RBC-ENTMCNC: 33.6 GM/DL — SIGNIFICANT CHANGE UP (ref 32–36)
MCHC RBC-ENTMCNC: 33.6 PG — SIGNIFICANT CHANGE UP (ref 27–34)
MCHC RBC-ENTMCNC: 33.7 GM/DL — SIGNIFICANT CHANGE UP (ref 32–36)
MCHC RBC-ENTMCNC: 33.7 PG — SIGNIFICANT CHANGE UP (ref 27–34)
MCHC RBC-ENTMCNC: 33.8 GM/DL — SIGNIFICANT CHANGE UP (ref 32–36)
MCHC RBC-ENTMCNC: 33.8 PG — SIGNIFICANT CHANGE UP (ref 27–34)
MCHC RBC-ENTMCNC: 33.8 PG — SIGNIFICANT CHANGE UP (ref 27–34)
MCHC RBC-ENTMCNC: 33.9 GM/DL — SIGNIFICANT CHANGE UP (ref 32–36)
MCHC RBC-ENTMCNC: 33.9 PG — SIGNIFICANT CHANGE UP (ref 27–34)
MCHC RBC-ENTMCNC: 34 GM/DL — SIGNIFICANT CHANGE UP (ref 32–36)
MCHC RBC-ENTMCNC: 34 PG — SIGNIFICANT CHANGE UP (ref 27–34)
MCHC RBC-ENTMCNC: 34.1 GM/DL — SIGNIFICANT CHANGE UP (ref 32–36)
MCHC RBC-ENTMCNC: 34.2 GM/DL — SIGNIFICANT CHANGE UP (ref 32–36)
MCHC RBC-ENTMCNC: 34.2 PG — HIGH (ref 27–34)
MCHC RBC-ENTMCNC: 34.2 PG — HIGH (ref 27–34)
MCHC RBC-ENTMCNC: 34.3 GM/DL — SIGNIFICANT CHANGE UP (ref 32–36)
MCHC RBC-ENTMCNC: 34.3 GM/DL — SIGNIFICANT CHANGE UP (ref 32–36)
MCHC RBC-ENTMCNC: 34.3 PG — HIGH (ref 27–34)
MCHC RBC-ENTMCNC: 34.4 GM/DL — SIGNIFICANT CHANGE UP (ref 32–36)
MCHC RBC-ENTMCNC: 34.4 PG — HIGH (ref 27–34)
MCHC RBC-ENTMCNC: 34.5 GM/DL — SIGNIFICANT CHANGE UP (ref 32–36)
MCHC RBC-ENTMCNC: 34.5 GM/DL — SIGNIFICANT CHANGE UP (ref 32–36)
MCHC RBC-ENTMCNC: 34.5 PG — HIGH (ref 27–34)
MCHC RBC-ENTMCNC: 34.6 GM/DL — SIGNIFICANT CHANGE UP (ref 32–36)
MCHC RBC-ENTMCNC: 34.6 PG — HIGH (ref 27–34)
MCHC RBC-ENTMCNC: 34.7 GM/DL — SIGNIFICANT CHANGE UP (ref 32–36)
MCHC RBC-ENTMCNC: 34.7 PG — HIGH (ref 27–34)
MCHC RBC-ENTMCNC: 34.8 GM/DL — SIGNIFICANT CHANGE UP (ref 32–36)
MCHC RBC-ENTMCNC: 34.8 PG — HIGH (ref 27–34)
MCHC RBC-ENTMCNC: 35 GM/DL — SIGNIFICANT CHANGE UP (ref 32–36)
MCHC RBC-ENTMCNC: 35 GM/DL — SIGNIFICANT CHANGE UP (ref 32–36)
MCHC RBC-ENTMCNC: 35 PG — HIGH (ref 27–34)
MCHC RBC-ENTMCNC: 35.1 GM/DL — SIGNIFICANT CHANGE UP (ref 32–36)
MCHC RBC-ENTMCNC: 35.1 PG — HIGH (ref 27–34)
MCHC RBC-ENTMCNC: 35.1 PG — HIGH (ref 27–34)
MCHC RBC-ENTMCNC: 35.2 GM/DL — SIGNIFICANT CHANGE UP (ref 32–36)
MCHC RBC-ENTMCNC: 35.2 PG — HIGH (ref 27–34)
MCHC RBC-ENTMCNC: 35.3 GM/DL — SIGNIFICANT CHANGE UP (ref 32–36)
MCHC RBC-ENTMCNC: 35.3 PG — HIGH (ref 27–34)
MCHC RBC-ENTMCNC: 35.4 GM/DL — SIGNIFICANT CHANGE UP (ref 32–36)
MCHC RBC-ENTMCNC: 35.5 GM/DL — SIGNIFICANT CHANGE UP (ref 32–36)
MCHC RBC-ENTMCNC: 35.6 PG — HIGH (ref 27–34)
MCHC RBC-ENTMCNC: 35.7 PG — HIGH (ref 27–34)
MCHC RBC-ENTMCNC: 35.7 PG — HIGH (ref 27–34)
MCHC RBC-ENTMCNC: 35.8 PG — HIGH (ref 27–34)
MCHC RBC-ENTMCNC: 36 PG — HIGH (ref 27–34)
MCHC RBC-ENTMCNC: 36.1 PG — HIGH (ref 27–34)
MCHC RBC-ENTMCNC: 36.2 PG — HIGH (ref 27–34)
MCHC RBC-ENTMCNC: 36.5 PG — HIGH (ref 27–34)
MCHC RBC-ENTMCNC: 36.6 PG — HIGH (ref 27–34)
MCHC RBC-ENTMCNC: 36.7 PG — HIGH (ref 27–34)
MCHC RBC-ENTMCNC: 36.7 PG — HIGH (ref 27–34)
MCV RBC AUTO: 100 FL — SIGNIFICANT CHANGE UP (ref 80–100)
MCV RBC AUTO: 100.3 FL — HIGH (ref 80–100)
MCV RBC AUTO: 100.6 FL — HIGH (ref 80–100)
MCV RBC AUTO: 100.6 FL — HIGH (ref 80–100)
MCV RBC AUTO: 100.9 FL — HIGH (ref 80–100)
MCV RBC AUTO: 101.7 FL — HIGH (ref 80–100)
MCV RBC AUTO: 102.4 FL — HIGH (ref 80–100)
MCV RBC AUTO: 103.4 FL — HIGH (ref 80–100)
MCV RBC AUTO: 104.2 FL — HIGH (ref 80–100)
MCV RBC AUTO: 104.9 FL — HIGH (ref 80–100)
MCV RBC AUTO: 104.9 FL — HIGH (ref 80–100)
MCV RBC AUTO: 105.2 FL — HIGH (ref 80–100)
MCV RBC AUTO: 105.6 FL — HIGH (ref 80–100)
MCV RBC AUTO: 105.9 FL — HIGH (ref 80–100)
MCV RBC AUTO: 106 FL — HIGH (ref 80–100)
MCV RBC AUTO: 106.5 FL — HIGH (ref 80–100)
MCV RBC AUTO: 106.6 FL — HIGH (ref 80–100)
MCV RBC AUTO: 106.7 FL — HIGH (ref 80–100)
MCV RBC AUTO: 106.9 FL — HIGH (ref 80–100)
MCV RBC AUTO: 107.3 FL — HIGH (ref 80–100)
MCV RBC AUTO: 108.1 FL — HIGH (ref 80–100)
MCV RBC AUTO: 108.3 FL — HIGH (ref 80–100)
MCV RBC AUTO: 109 FL — HIGH (ref 80–100)
MCV RBC AUTO: 109.1 FL — HIGH (ref 80–100)
MCV RBC AUTO: 109.4 FL — HIGH (ref 80–100)
MCV RBC AUTO: 109.6 FL — HIGH (ref 80–100)
MCV RBC AUTO: 109.9 FL — HIGH (ref 80–100)
MCV RBC AUTO: 110.1 FL — HIGH (ref 80–100)
MCV RBC AUTO: 110.2 FL — HIGH (ref 80–100)
MCV RBC AUTO: 110.6 FL — HIGH (ref 80–100)
MCV RBC AUTO: 110.9 FL — HIGH (ref 80–100)
MCV RBC AUTO: 111 FL — HIGH (ref 80–100)
MCV RBC AUTO: 111.3 FL — HIGH (ref 80–100)
MCV RBC AUTO: 112.3 FL — HIGH (ref 80–100)
MCV RBC AUTO: 112.8 FL — HIGH (ref 80–100)
MCV RBC AUTO: 113 FL — HIGH (ref 80–100)
MCV RBC AUTO: 113.4 FL — HIGH (ref 80–100)
MCV RBC AUTO: 113.5 FL — HIGH (ref 80–100)
MCV RBC AUTO: 113.8 FL — HIGH (ref 80–100)
MCV RBC AUTO: 114.6 FL — HIGH (ref 80–100)
MCV RBC AUTO: 115.7 FL — HIGH (ref 80–100)
MCV RBC AUTO: 116.6 FL — HIGH (ref 80–100)
MCV RBC AUTO: 116.8 FL — HIGH (ref 80–100)
MCV RBC AUTO: 84.2 FL — SIGNIFICANT CHANGE UP (ref 80–100)
MCV RBC AUTO: 84.8 FL — SIGNIFICANT CHANGE UP (ref 80–100)
MCV RBC AUTO: 85.5 FL — SIGNIFICANT CHANGE UP (ref 80–100)
MCV RBC AUTO: 85.6 FL — SIGNIFICANT CHANGE UP (ref 80–100)
MCV RBC AUTO: 86 FL — SIGNIFICANT CHANGE UP (ref 80–100)
MCV RBC AUTO: 86.7 FL — SIGNIFICANT CHANGE UP (ref 80–100)
MCV RBC AUTO: 86.7 FL — SIGNIFICANT CHANGE UP (ref 80–100)
MCV RBC AUTO: 87.4 FL — SIGNIFICANT CHANGE UP (ref 80–100)
MCV RBC AUTO: 87.7 FL — SIGNIFICANT CHANGE UP (ref 80–100)
MCV RBC AUTO: 87.9 FL — SIGNIFICANT CHANGE UP (ref 80–100)
MCV RBC AUTO: 88.1 FL — SIGNIFICANT CHANGE UP (ref 80–100)
MCV RBC AUTO: 89.3 FL — SIGNIFICANT CHANGE UP (ref 80–100)
MCV RBC AUTO: 89.5 FL — SIGNIFICANT CHANGE UP (ref 80–100)
MCV RBC AUTO: 89.7 FL — SIGNIFICANT CHANGE UP (ref 80–100)
MCV RBC AUTO: 90 FL — SIGNIFICANT CHANGE UP (ref 80–100)
MCV RBC AUTO: 90.4 FL — SIGNIFICANT CHANGE UP (ref 80–100)
MCV RBC AUTO: 90.8 FL — SIGNIFICANT CHANGE UP (ref 80–100)
MCV RBC AUTO: 91.1 FL — SIGNIFICANT CHANGE UP (ref 80–100)
MCV RBC AUTO: 91.3 FL — SIGNIFICANT CHANGE UP (ref 80–100)
MCV RBC AUTO: 91.6 FL — SIGNIFICANT CHANGE UP (ref 80–100)
MCV RBC AUTO: 91.6 FL — SIGNIFICANT CHANGE UP (ref 80–100)
MCV RBC AUTO: 91.8 FL — SIGNIFICANT CHANGE UP (ref 80–100)
MCV RBC AUTO: 91.8 FL — SIGNIFICANT CHANGE UP (ref 80–100)
MCV RBC AUTO: 92.1 FL — SIGNIFICANT CHANGE UP (ref 80–100)
MCV RBC AUTO: 92.2 FL — SIGNIFICANT CHANGE UP (ref 80–100)
MCV RBC AUTO: 92.3 FL — SIGNIFICANT CHANGE UP (ref 80–100)
MCV RBC AUTO: 92.4 FL — SIGNIFICANT CHANGE UP (ref 80–100)
MCV RBC AUTO: 92.4 FL — SIGNIFICANT CHANGE UP (ref 80–100)
MCV RBC AUTO: 92.5 FL — SIGNIFICANT CHANGE UP (ref 80–100)
MCV RBC AUTO: 92.6 FL — SIGNIFICANT CHANGE UP (ref 80–100)
MCV RBC AUTO: 92.6 FL — SIGNIFICANT CHANGE UP (ref 80–100)
MCV RBC AUTO: 93 FL — SIGNIFICANT CHANGE UP (ref 80–100)
MCV RBC AUTO: 93.1 FL — SIGNIFICANT CHANGE UP (ref 80–100)
MCV RBC AUTO: 93.2 FL — SIGNIFICANT CHANGE UP (ref 80–100)
MCV RBC AUTO: 93.6 FL — SIGNIFICANT CHANGE UP (ref 80–100)
MCV RBC AUTO: 93.7 FL — SIGNIFICANT CHANGE UP (ref 80–100)
MCV RBC AUTO: 93.9 FL — SIGNIFICANT CHANGE UP (ref 80–100)
MCV RBC AUTO: 94 FL — SIGNIFICANT CHANGE UP (ref 80–100)
MCV RBC AUTO: 94.2 FL — SIGNIFICANT CHANGE UP (ref 80–100)
MCV RBC AUTO: 94.2 FL — SIGNIFICANT CHANGE UP (ref 80–100)
MCV RBC AUTO: 97.6 FL — SIGNIFICANT CHANGE UP (ref 80–100)
MCV RBC AUTO: 97.8 FL — SIGNIFICANT CHANGE UP (ref 80–100)
MCV RBC AUTO: 98 FL — SIGNIFICANT CHANGE UP (ref 80–100)
MCV RBC AUTO: 98.2 FL — SIGNIFICANT CHANGE UP (ref 80–100)
MCV RBC AUTO: 98.2 FL — SIGNIFICANT CHANGE UP (ref 80–100)
MCV RBC AUTO: 98.3 FL — SIGNIFICANT CHANGE UP (ref 80–100)
MCV RBC AUTO: 98.7 FL — SIGNIFICANT CHANGE UP (ref 80–100)
MCV RBC AUTO: 99.1 FL — SIGNIFICANT CHANGE UP (ref 80–100)
MCV RBC AUTO: 99.6 FL — SIGNIFICANT CHANGE UP (ref 80–100)
MCV RBC AUTO: 99.7 FL — SIGNIFICANT CHANGE UP (ref 80–100)
MESOTHL CELL # FLD: 1 % — SIGNIFICANT CHANGE UP
MESOTHL CELL # FLD: 2 % — SIGNIFICANT CHANGE UP
MESOTHL CELL # FLD: 23 % — SIGNIFICANT CHANGE UP
MESOTHL CELL # FLD: 25 % — SIGNIFICANT CHANGE UP
METAMYELOCYTES # FLD: 0.9 % — HIGH (ref 0–0)
MEV IGG SER-ACNC: 267 AU/ML — SIGNIFICANT CHANGE UP
MEV IGG+IGM SER-IMP: POSITIVE — SIGNIFICANT CHANGE UP
MICROCYTES BLD QL: SLIGHT — SIGNIFICANT CHANGE UP
MITOCHONDRIA AB SER-ACNC: SIGNIFICANT CHANGE UP
MONOCYTES # BLD AUTO: 0.07 K/UL — SIGNIFICANT CHANGE UP (ref 0–0.9)
MONOCYTES # BLD AUTO: 0.17 K/UL — SIGNIFICANT CHANGE UP (ref 0–0.9)
MONOCYTES # BLD AUTO: 0.33 K/UL — SIGNIFICANT CHANGE UP (ref 0–0.9)
MONOCYTES # BLD AUTO: 0.36 K/UL — SIGNIFICANT CHANGE UP (ref 0–0.9)
MONOCYTES # BLD AUTO: 0.54 K/UL — SIGNIFICANT CHANGE UP (ref 0–0.9)
MONOCYTES # BLD AUTO: 0.76 K/UL — SIGNIFICANT CHANGE UP (ref 0–0.9)
MONOCYTES # BLD AUTO: 0.81 K/UL — SIGNIFICANT CHANGE UP (ref 0–0.9)
MONOCYTES # BLD AUTO: 0.99 K/UL — HIGH (ref 0–0.9)
MONOCYTES # BLD AUTO: 1.01 K/UL — HIGH (ref 0–0.9)
MONOCYTES # BLD AUTO: 1.03 K/UL — HIGH (ref 0–0.9)
MONOCYTES # BLD AUTO: 1.06 K/UL — HIGH (ref 0–0.9)
MONOCYTES # BLD AUTO: 1.13 K/UL — HIGH (ref 0–0.9)
MONOCYTES # BLD AUTO: 1.14 K/UL — HIGH (ref 0–0.9)
MONOCYTES # BLD AUTO: 1.16 K/UL — HIGH (ref 0–0.9)
MONOCYTES # BLD AUTO: 1.17 K/UL — HIGH (ref 0–0.9)
MONOCYTES # BLD AUTO: 1.2 K/UL — HIGH (ref 0–0.9)
MONOCYTES # BLD AUTO: 1.21 K/UL — HIGH (ref 0–0.9)
MONOCYTES # BLD AUTO: 1.25 K/UL — HIGH (ref 0–0.9)
MONOCYTES # BLD AUTO: 1.26 K/UL — HIGH (ref 0–0.9)
MONOCYTES # BLD AUTO: 1.37 K/UL — HIGH (ref 0–0.9)
MONOCYTES # BLD AUTO: 1.4 K/UL — HIGH (ref 0–0.9)
MONOCYTES # BLD AUTO: 1.62 K/UL — HIGH (ref 0–0.9)
MONOCYTES # BLD AUTO: 1.64 K/UL — HIGH (ref 0–0.9)
MONOCYTES # BLD AUTO: 1.68 K/UL — HIGH (ref 0–0.9)
MONOCYTES # BLD AUTO: 1.69 K/UL — HIGH (ref 0–0.9)
MONOCYTES # BLD AUTO: 1.76 K/UL — HIGH (ref 0–0.9)
MONOCYTES # BLD AUTO: 1.81 K/UL — HIGH (ref 0–0.9)
MONOCYTES # BLD AUTO: 1.9 K/UL — HIGH (ref 0–0.9)
MONOCYTES # BLD AUTO: 2.03 K/UL — HIGH (ref 0–0.9)
MONOCYTES # BLD AUTO: 2.06 K/UL — HIGH (ref 0–0.9)
MONOCYTES # BLD AUTO: 2.13 K/UL — HIGH (ref 0–0.9)
MONOCYTES # BLD AUTO: 2.22 K/UL — HIGH (ref 0–0.9)
MONOCYTES # BLD AUTO: 2.22 K/UL — HIGH (ref 0–0.9)
MONOCYTES # BLD AUTO: 2.29 K/UL — HIGH (ref 0–0.9)
MONOCYTES # BLD AUTO: 2.38 K/UL — HIGH (ref 0–0.9)
MONOCYTES # BLD AUTO: 2.51 K/UL — HIGH (ref 0–0.9)
MONOCYTES # BLD AUTO: 2.52 K/UL — HIGH (ref 0–0.9)
MONOCYTES # BLD AUTO: 2.58 K/UL — HIGH (ref 0–0.9)
MONOCYTES # BLD AUTO: 2.59 K/UL — HIGH (ref 0–0.9)
MONOCYTES # BLD AUTO: 2.6 K/UL — HIGH (ref 0–0.9)
MONOCYTES # BLD AUTO: 2.63 K/UL — HIGH (ref 0–0.9)
MONOCYTES # BLD AUTO: 2.83 K/UL — HIGH (ref 0–0.9)
MONOCYTES NFR BLD AUTO: 0.9 % — LOW (ref 2–14)
MONOCYTES NFR BLD AUTO: 0.9 % — LOW (ref 2–14)
MONOCYTES NFR BLD AUTO: 10.3 % — SIGNIFICANT CHANGE UP (ref 2–14)
MONOCYTES NFR BLD AUTO: 10.5 % — SIGNIFICANT CHANGE UP (ref 2–14)
MONOCYTES NFR BLD AUTO: 10.6 % — SIGNIFICANT CHANGE UP (ref 2–14)
MONOCYTES NFR BLD AUTO: 10.8 % — SIGNIFICANT CHANGE UP (ref 2–14)
MONOCYTES NFR BLD AUTO: 11.2 % — SIGNIFICANT CHANGE UP (ref 2–14)
MONOCYTES NFR BLD AUTO: 11.5 % — SIGNIFICANT CHANGE UP (ref 2–14)
MONOCYTES NFR BLD AUTO: 11.9 % — SIGNIFICANT CHANGE UP (ref 2–14)
MONOCYTES NFR BLD AUTO: 12 % — SIGNIFICANT CHANGE UP (ref 2–14)
MONOCYTES NFR BLD AUTO: 13 % — SIGNIFICANT CHANGE UP (ref 2–14)
MONOCYTES NFR BLD AUTO: 16.6 % — HIGH (ref 2–14)
MONOCYTES NFR BLD AUTO: 2.6 % — SIGNIFICANT CHANGE UP (ref 2–14)
MONOCYTES NFR BLD AUTO: 2.6 % — SIGNIFICANT CHANGE UP (ref 2–14)
MONOCYTES NFR BLD AUTO: 3 % — SIGNIFICANT CHANGE UP (ref 2–14)
MONOCYTES NFR BLD AUTO: 5.2 % — SIGNIFICANT CHANGE UP (ref 2–14)
MONOCYTES NFR BLD AUTO: 5.3 % — SIGNIFICANT CHANGE UP (ref 2–14)
MONOCYTES NFR BLD AUTO: 6 % — SIGNIFICANT CHANGE UP (ref 2–14)
MONOCYTES NFR BLD AUTO: 6.1 % — SIGNIFICANT CHANGE UP (ref 2–14)
MONOCYTES NFR BLD AUTO: 7.2 % — SIGNIFICANT CHANGE UP (ref 2–14)
MONOCYTES NFR BLD AUTO: 7.6 % — SIGNIFICANT CHANGE UP (ref 2–14)
MONOCYTES NFR BLD AUTO: 7.9 % — SIGNIFICANT CHANGE UP (ref 2–14)
MONOCYTES NFR BLD AUTO: 8 % — SIGNIFICANT CHANGE UP (ref 2–14)
MONOCYTES NFR BLD AUTO: 8.2 % — SIGNIFICANT CHANGE UP (ref 2–14)
MONOCYTES NFR BLD AUTO: 8.6 % — SIGNIFICANT CHANGE UP (ref 2–14)
MONOCYTES NFR BLD AUTO: 8.6 % — SIGNIFICANT CHANGE UP (ref 2–14)
MONOCYTES NFR BLD AUTO: 8.8 % — SIGNIFICANT CHANGE UP (ref 2–14)
MONOCYTES NFR BLD AUTO: 8.9 % — SIGNIFICANT CHANGE UP (ref 2–14)
MONOCYTES NFR BLD AUTO: 9.3 % — SIGNIFICANT CHANGE UP (ref 2–14)
MONOCYTES NFR BLD AUTO: 9.4 % — SIGNIFICANT CHANGE UP (ref 2–14)
MONOCYTES NFR BLD AUTO: 9.5 % — SIGNIFICANT CHANGE UP (ref 2–14)
MONOCYTES NFR BLD AUTO: 9.8 % — SIGNIFICANT CHANGE UP (ref 2–14)
MONOCYTES NFR BLD AUTO: 9.8 % — SIGNIFICANT CHANGE UP (ref 2–14)
MONOS+MACROS # FLD: 14 % — SIGNIFICANT CHANGE UP
MONOS+MACROS # FLD: 16 % — SIGNIFICANT CHANGE UP
MONOS+MACROS # FLD: 18 % — SIGNIFICANT CHANGE UP
MONOS+MACROS # FLD: 29 % — SIGNIFICANT CHANGE UP
MONOS+MACROS # FLD: 52 % — SIGNIFICANT CHANGE UP
MONOS+MACROS # FLD: 78 % — SIGNIFICANT CHANGE UP
MONOS+MACROS # FLD: 90 % — SIGNIFICANT CHANGE UP
MUV AB SER-ACNC: POSITIVE — SIGNIFICANT CHANGE UP
MUV IGG FLD-ACNC: 48.5 AU/ML — SIGNIFICANT CHANGE UP
NEUTROPHILS # BLD AUTO: 10.17 K/UL — HIGH (ref 1.8–7.4)
NEUTROPHILS # BLD AUTO: 10.54 K/UL — HIGH (ref 1.8–7.4)
NEUTROPHILS # BLD AUTO: 11.44 K/UL — HIGH (ref 1.8–7.4)
NEUTROPHILS # BLD AUTO: 11.77 K/UL — HIGH (ref 1.8–7.4)
NEUTROPHILS # BLD AUTO: 11.79 K/UL — HIGH (ref 1.8–7.4)
NEUTROPHILS # BLD AUTO: 12.22 K/UL — HIGH (ref 1.8–7.4)
NEUTROPHILS # BLD AUTO: 13.8 K/UL — HIGH (ref 1.8–7.4)
NEUTROPHILS # BLD AUTO: 13.95 K/UL — HIGH (ref 1.8–7.4)
NEUTROPHILS # BLD AUTO: 14.7 K/UL — HIGH (ref 1.8–7.4)
NEUTROPHILS # BLD AUTO: 14.87 K/UL — HIGH (ref 1.8–7.4)
NEUTROPHILS # BLD AUTO: 15.41 K/UL — HIGH (ref 1.8–7.4)
NEUTROPHILS # BLD AUTO: 15.64 K/UL — HIGH (ref 1.8–7.4)
NEUTROPHILS # BLD AUTO: 16.13 K/UL — HIGH (ref 1.8–7.4)
NEUTROPHILS # BLD AUTO: 16.48 K/UL — HIGH (ref 1.8–7.4)
NEUTROPHILS # BLD AUTO: 16.52 K/UL — HIGH (ref 1.8–7.4)
NEUTROPHILS # BLD AUTO: 16.56 K/UL — HIGH (ref 1.8–7.4)
NEUTROPHILS # BLD AUTO: 17 K/UL — HIGH (ref 1.8–7.4)
NEUTROPHILS # BLD AUTO: 18.17 K/UL — HIGH (ref 1.8–7.4)
NEUTROPHILS # BLD AUTO: 19.18 K/UL — HIGH (ref 1.8–7.4)
NEUTROPHILS # BLD AUTO: 19.34 K/UL — HIGH (ref 1.8–7.4)
NEUTROPHILS # BLD AUTO: 19.66 K/UL — HIGH (ref 1.8–7.4)
NEUTROPHILS # BLD AUTO: 19.93 K/UL — HIGH (ref 1.8–7.4)
NEUTROPHILS # BLD AUTO: 20.26 K/UL — HIGH (ref 1.8–7.4)
NEUTROPHILS # BLD AUTO: 20.43 K/UL — HIGH (ref 1.8–7.4)
NEUTROPHILS # BLD AUTO: 20.8 K/UL — HIGH (ref 1.8–7.4)
NEUTROPHILS # BLD AUTO: 22.55 K/UL — HIGH (ref 1.8–7.4)
NEUTROPHILS # BLD AUTO: 23.2 K/UL — HIGH (ref 1.8–7.4)
NEUTROPHILS # BLD AUTO: 24.15 K/UL — HIGH (ref 1.8–7.4)
NEUTROPHILS # BLD AUTO: 24.39 K/UL — HIGH (ref 1.8–7.4)
NEUTROPHILS # BLD AUTO: 25.3 K/UL — HIGH (ref 1.8–7.4)
NEUTROPHILS # BLD AUTO: 26.15 K/UL — HIGH (ref 1.8–7.4)
NEUTROPHILS # BLD AUTO: 4.8 K/UL — SIGNIFICANT CHANGE UP (ref 1.8–7.4)
NEUTROPHILS # BLD AUTO: 5.4 K/UL — SIGNIFICANT CHANGE UP (ref 1.8–7.4)
NEUTROPHILS # BLD AUTO: 6.87 K/UL — SIGNIFICANT CHANGE UP (ref 1.8–7.4)
NEUTROPHILS # BLD AUTO: 6.97 K/UL — SIGNIFICANT CHANGE UP (ref 1.8–7.4)
NEUTROPHILS # BLD AUTO: 7.26 K/UL — SIGNIFICANT CHANGE UP (ref 1.8–7.4)
NEUTROPHILS # BLD AUTO: 7.29 K/UL — SIGNIFICANT CHANGE UP (ref 1.8–7.4)
NEUTROPHILS # BLD AUTO: 8.28 K/UL — HIGH (ref 1.8–7.4)
NEUTROPHILS # BLD AUTO: 8.56 K/UL — HIGH (ref 1.8–7.4)
NEUTROPHILS # BLD AUTO: 9.07 K/UL — HIGH (ref 1.8–7.4)
NEUTROPHILS # BLD AUTO: 9.28 K/UL — HIGH (ref 1.8–7.4)
NEUTROPHILS # BLD AUTO: 9.35 K/UL — HIGH (ref 1.8–7.4)
NEUTROPHILS NFR BLD AUTO: 68.2 % — SIGNIFICANT CHANGE UP (ref 43–77)
NEUTROPHILS NFR BLD AUTO: 69.8 % — SIGNIFICANT CHANGE UP (ref 43–77)
NEUTROPHILS NFR BLD AUTO: 70.3 % — SIGNIFICANT CHANGE UP (ref 43–77)
NEUTROPHILS NFR BLD AUTO: 70.3 % — SIGNIFICANT CHANGE UP (ref 43–77)
NEUTROPHILS NFR BLD AUTO: 71 % — SIGNIFICANT CHANGE UP (ref 43–77)
NEUTROPHILS NFR BLD AUTO: 71.4 % — SIGNIFICANT CHANGE UP (ref 43–77)
NEUTROPHILS NFR BLD AUTO: 71.6 % — SIGNIFICANT CHANGE UP (ref 43–77)
NEUTROPHILS NFR BLD AUTO: 72.6 % — SIGNIFICANT CHANGE UP (ref 43–77)
NEUTROPHILS NFR BLD AUTO: 72.8 % — SIGNIFICANT CHANGE UP (ref 43–77)
NEUTROPHILS NFR BLD AUTO: 73.4 % — SIGNIFICANT CHANGE UP (ref 43–77)
NEUTROPHILS NFR BLD AUTO: 74.7 % — SIGNIFICANT CHANGE UP (ref 43–77)
NEUTROPHILS NFR BLD AUTO: 74.9 % — SIGNIFICANT CHANGE UP (ref 43–77)
NEUTROPHILS NFR BLD AUTO: 75 % — SIGNIFICANT CHANGE UP (ref 43–77)
NEUTROPHILS NFR BLD AUTO: 75.3 % — SIGNIFICANT CHANGE UP (ref 43–77)
NEUTROPHILS NFR BLD AUTO: 75.9 % — SIGNIFICANT CHANGE UP (ref 43–77)
NEUTROPHILS NFR BLD AUTO: 77.6 % — HIGH (ref 43–77)
NEUTROPHILS NFR BLD AUTO: 79 % — HIGH (ref 43–77)
NEUTROPHILS NFR BLD AUTO: 79.2 % — HIGH (ref 43–77)
NEUTROPHILS NFR BLD AUTO: 79.9 % — HIGH (ref 43–77)
NEUTROPHILS NFR BLD AUTO: 80.5 % — HIGH (ref 43–77)
NEUTROPHILS NFR BLD AUTO: 80.6 % — HIGH (ref 43–77)
NEUTROPHILS NFR BLD AUTO: 80.8 % — HIGH (ref 43–77)
NEUTROPHILS NFR BLD AUTO: 80.8 % — HIGH (ref 43–77)
NEUTROPHILS NFR BLD AUTO: 80.9 % — HIGH (ref 43–77)
NEUTROPHILS NFR BLD AUTO: 81.1 % — HIGH (ref 43–77)
NEUTROPHILS NFR BLD AUTO: 81.9 % — HIGH (ref 43–77)
NEUTROPHILS NFR BLD AUTO: 82.2 % — HIGH (ref 43–77)
NEUTROPHILS NFR BLD AUTO: 82.3 % — HIGH (ref 43–77)
NEUTROPHILS NFR BLD AUTO: 82.6 % — HIGH (ref 43–77)
NEUTROPHILS NFR BLD AUTO: 83 % — HIGH (ref 43–77)
NEUTROPHILS NFR BLD AUTO: 83.3 % — HIGH (ref 43–77)
NEUTROPHILS NFR BLD AUTO: 83.3 % — HIGH (ref 43–77)
NEUTROPHILS NFR BLD AUTO: 83.8 % — HIGH (ref 43–77)
NEUTROPHILS NFR BLD AUTO: 84 % — HIGH (ref 43–77)
NEUTROPHILS NFR BLD AUTO: 85.8 % — HIGH (ref 43–77)
NEUTROPHILS NFR BLD AUTO: 86 % — HIGH (ref 43–77)
NEUTROPHILS NFR BLD AUTO: 86.8 % — HIGH (ref 43–77)
NEUTROPHILS NFR BLD AUTO: 86.9 % — HIGH (ref 43–77)
NEUTROPHILS NFR BLD AUTO: 88.7 % — HIGH (ref 43–77)
NEUTROPHILS NFR BLD AUTO: 89.5 % — HIGH (ref 43–77)
NEUTROPHILS NFR BLD AUTO: 91.2 % — HIGH (ref 43–77)
NEUTROPHILS NFR BLD AUTO: 91.3 % — HIGH (ref 43–77)
NEUTS BAND # BLD: 1.7 % — SIGNIFICANT CHANGE UP (ref 0–8)
NEUTS BAND # BLD: 3 % — SIGNIFICANT CHANGE UP (ref 0–8)
NEUTS BAND # BLD: 4.3 % — SIGNIFICANT CHANGE UP (ref 0–8)
NIGHT BLUE STAIN TISS: SIGNIFICANT CHANGE UP
NITRITE UR-MCNC: NEGATIVE — SIGNIFICANT CHANGE UP
NRBC # BLD: 0 /100 WBCS — SIGNIFICANT CHANGE UP (ref 0–0)
NRBC # BLD: 0 /100 — SIGNIFICANT CHANGE UP (ref 0–0)
NRBC # BLD: 0 /100 — SIGNIFICANT CHANGE UP (ref 0–0)
NRBC # BLD: 1 /100 WBCS — HIGH (ref 0–0)
NRBC # BLD: 1 /100 — HIGH (ref 0–0)
NRBC # BLD: 2 /100 WBCS — HIGH (ref 0–0)
NRBC # BLD: 2 /100 WBCS — HIGH (ref 0–0)
OB PNL STL: POSITIVE
OSMOLALITY SERPL: 288 MOSMOL/KG — SIGNIFICANT CHANGE UP (ref 275–300)
OTHER CELLS CSF MANUAL: 10 ML/DL — LOW (ref 18–22)
OTHER CELLS CSF MANUAL: 10 ML/DL — LOW (ref 18–22)
OTHER CELLS CSF MANUAL: 12 ML/DL — LOW (ref 18–22)
OTHER CELLS CSF MANUAL: 3 ML/DL — LOW (ref 18–22)
OTHER CELLS CSF MANUAL: 3 ML/DL — LOW (ref 18–23)
OTHER CELLS CSF MANUAL: 5 ML/DL — LOW (ref 18–22)
OTHER CELLS CSF MANUAL: 7 ML/DL — LOW (ref 18–22)
OTHER CELLS CSF MANUAL: 8 ML/DL — LOW (ref 18–23)
OTHER CELLS FLD MANUAL: 2 % — SIGNIFICANT CHANGE UP
OVALOCYTES BLD QL SMEAR: SLIGHT — SIGNIFICANT CHANGE UP
PCO2 BLDA: 40 MMHG — SIGNIFICANT CHANGE UP (ref 32–46)
PCO2 BLDV: 35 MMHG — SIGNIFICANT CHANGE UP (ref 35–50)
PCO2 BLDV: 36 MMHG — SIGNIFICANT CHANGE UP (ref 35–50)
PCO2 BLDV: 37 MMHG — SIGNIFICANT CHANGE UP (ref 35–50)
PCO2 BLDV: 45 MMHG — SIGNIFICANT CHANGE UP (ref 35–50)
PCO2 BLDV: 46 MMHG — SIGNIFICANT CHANGE UP (ref 35–50)
PCO2 BLDV: 66 MMHG — HIGH (ref 35–50)
PH BLDA: 7.04 — CRITICAL LOW (ref 7.35–7.45)
PH BLDV: 7.12 — CRITICAL LOW (ref 7.35–7.45)
PH BLDV: 7.31 — LOW (ref 7.35–7.45)
PH BLDV: 7.32 — LOW (ref 7.35–7.45)
PH BLDV: 7.35 — SIGNIFICANT CHANGE UP (ref 7.35–7.45)
PH BLDV: 7.37 — SIGNIFICANT CHANGE UP (ref 7.35–7.45)
PH BLDV: 7.44 — SIGNIFICANT CHANGE UP (ref 7.35–7.45)
PH BLDV: 7.44 — SIGNIFICANT CHANGE UP (ref 7.35–7.45)
PH BLDV: 7.45 — SIGNIFICANT CHANGE UP (ref 7.35–7.45)
PH FLD: 7.34 — SIGNIFICANT CHANGE UP
PH UR: 6 — SIGNIFICANT CHANGE UP (ref 5–8)
PH UR: 6.5 — SIGNIFICANT CHANGE UP (ref 5–8)
PH UR: 6.5 — SIGNIFICANT CHANGE UP (ref 5–8)
PHOSPHATE SERPL-MCNC: 0.8 MG/DL — CRITICAL LOW (ref 2.5–4.5)
PHOSPHATE SERPL-MCNC: 2.1 MG/DL — LOW (ref 2.5–4.5)
PHOSPHATE SERPL-MCNC: 2.3 MG/DL — LOW (ref 2.5–4.5)
PHOSPHATE SERPL-MCNC: 2.4 MG/DL — LOW (ref 2.5–4.5)
PHOSPHATE SERPL-MCNC: 2.5 MG/DL — SIGNIFICANT CHANGE UP (ref 2.5–4.5)
PHOSPHATE SERPL-MCNC: 2.5 MG/DL — SIGNIFICANT CHANGE UP (ref 2.5–4.5)
PHOSPHATE SERPL-MCNC: 2.6 MG/DL — SIGNIFICANT CHANGE UP (ref 2.5–4.5)
PHOSPHATE SERPL-MCNC: 2.6 MG/DL — SIGNIFICANT CHANGE UP (ref 2.5–4.5)
PHOSPHATE SERPL-MCNC: 2.8 MG/DL — SIGNIFICANT CHANGE UP (ref 2.5–4.5)
PHOSPHATE SERPL-MCNC: 3 MG/DL — SIGNIFICANT CHANGE UP (ref 2.5–4.5)
PHOSPHATE SERPL-MCNC: 3.1 MG/DL — SIGNIFICANT CHANGE UP (ref 2.5–4.5)
PHOSPHATE SERPL-MCNC: 3.1 MG/DL — SIGNIFICANT CHANGE UP (ref 2.5–4.5)
PHOSPHATE SERPL-MCNC: 3.3 MG/DL — SIGNIFICANT CHANGE UP (ref 2.5–4.5)
PHOSPHATE SERPL-MCNC: 3.4 MG/DL — SIGNIFICANT CHANGE UP (ref 2.5–4.5)
PHOSPHATE SERPL-MCNC: 3.5 MG/DL — SIGNIFICANT CHANGE UP (ref 2.5–4.5)
PHOSPHATE SERPL-MCNC: 3.5 MG/DL — SIGNIFICANT CHANGE UP (ref 2.5–4.5)
PHOSPHATE SERPL-MCNC: 3.6 MG/DL — SIGNIFICANT CHANGE UP (ref 2.5–4.5)
PHOSPHATE SERPL-MCNC: 3.7 MG/DL — SIGNIFICANT CHANGE UP (ref 2.5–4.5)
PHOSPHATE SERPL-MCNC: 3.8 MG/DL — SIGNIFICANT CHANGE UP (ref 2.5–4.5)
PHOSPHATE SERPL-MCNC: 3.9 MG/DL — SIGNIFICANT CHANGE UP (ref 2.5–4.5)
PHOSPHATE SERPL-MCNC: 4 MG/DL — SIGNIFICANT CHANGE UP (ref 2.5–4.5)
PHOSPHATE SERPL-MCNC: 4.2 MG/DL — SIGNIFICANT CHANGE UP (ref 2.5–4.5)
PHOSPHATE SERPL-MCNC: 4.3 MG/DL — SIGNIFICANT CHANGE UP (ref 2.5–4.5)
PHOSPHATE SERPL-MCNC: 4.4 MG/DL — SIGNIFICANT CHANGE UP (ref 2.5–4.5)
PHOSPHATE SERPL-MCNC: 4.5 MG/DL — SIGNIFICANT CHANGE UP (ref 2.5–4.5)
PHOSPHATE SERPL-MCNC: 4.7 MG/DL — HIGH (ref 2.5–4.5)
PHOSPHATE SERPL-MCNC: 4.7 MG/DL — HIGH (ref 2.5–4.5)
PHOSPHATE SERPL-MCNC: 4.8 MG/DL — HIGH (ref 2.5–4.5)
PHOSPHATE SERPL-MCNC: 4.8 MG/DL — HIGH (ref 2.5–4.5)
PHOSPHATE SERPL-MCNC: 5 MG/DL — HIGH (ref 2.5–4.5)
PHOSPHATE SERPL-MCNC: 5.1 MG/DL — HIGH (ref 2.5–4.5)
PHOSPHATE SERPL-MCNC: 5.1 MG/DL — HIGH (ref 2.5–4.5)
PHOSPHATE SERPL-MCNC: 5.2 MG/DL — HIGH (ref 2.5–4.5)
PHOSPHATE SERPL-MCNC: 5.2 MG/DL — HIGH (ref 2.5–4.5)
PHOSPHATE SERPL-MCNC: 5.5 MG/DL — HIGH (ref 2.5–4.5)
PHOSPHATE SERPL-MCNC: 5.5 MG/DL — HIGH (ref 2.5–4.5)
PHOSPHATE SERPL-MCNC: 5.6 MG/DL — HIGH (ref 2.5–4.5)
PHOSPHATE SERPL-MCNC: 5.6 MG/DL — HIGH (ref 2.5–4.5)
PHOSPHATE SERPL-MCNC: 5.8 MG/DL — HIGH (ref 2.5–4.5)
PHOSPHATE SERPL-MCNC: 5.8 MG/DL — HIGH (ref 2.5–4.5)
PHOSPHATE SERPL-MCNC: 6.3 MG/DL — HIGH (ref 2.5–4.5)
PHOSPHATE SERPL-MCNC: 6.3 MG/DL — HIGH (ref 2.5–4.5)
PHOSPHATE SERPL-MCNC: 6.9 MG/DL — HIGH (ref 2.5–4.5)
PHOSPHATE SERPL-MCNC: 7 MG/DL — HIGH (ref 2.5–4.5)
PHOSPHATE SERPL-MCNC: 8.7 MG/DL — HIGH (ref 2.5–4.5)
PHOSPHATE SERPL-MCNC: 9.5 MG/DL — HIGH (ref 2.5–4.5)
PHOSPHATIDYLETHANOL (PETH) - RESULT: 1214 NG/ML — HIGH
PHOSPHATIDYLETHANOL (PETH) - RESULT: NEGATIVE NG/ML — SIGNIFICANT CHANGE UP
PLAT MORPH BLD: NORMAL — SIGNIFICANT CHANGE UP
PLATELET # BLD AUTO: 101 K/UL — LOW (ref 150–400)
PLATELET # BLD AUTO: 102 K/UL — LOW (ref 150–400)
PLATELET # BLD AUTO: 112 K/UL — LOW (ref 150–400)
PLATELET # BLD AUTO: 118 K/UL — LOW (ref 150–400)
PLATELET # BLD AUTO: 122 K/UL — LOW (ref 150–400)
PLATELET # BLD AUTO: 125 K/UL — LOW (ref 150–400)
PLATELET # BLD AUTO: 129 K/UL — LOW (ref 150–400)
PLATELET # BLD AUTO: 133 K/UL — LOW (ref 150–400)
PLATELET # BLD AUTO: 134 K/UL — LOW (ref 150–400)
PLATELET # BLD AUTO: 134 K/UL — LOW (ref 150–400)
PLATELET # BLD AUTO: 136 K/UL — LOW (ref 150–400)
PLATELET # BLD AUTO: 140 K/UL — LOW (ref 150–400)
PLATELET # BLD AUTO: 146 K/UL — LOW (ref 150–400)
PLATELET # BLD AUTO: 147 K/UL — LOW (ref 150–400)
PLATELET # BLD AUTO: 153 K/UL — SIGNIFICANT CHANGE UP (ref 150–400)
PLATELET # BLD AUTO: 173 K/UL — SIGNIFICANT CHANGE UP (ref 150–400)
PLATELET # BLD AUTO: 19 K/UL — CRITICAL LOW (ref 150–400)
PLATELET # BLD AUTO: 214 K/UL — SIGNIFICANT CHANGE UP (ref 150–400)
PLATELET # BLD AUTO: 23 K/UL — LOW (ref 150–400)
PLATELET # BLD AUTO: 235 K/UL — SIGNIFICANT CHANGE UP (ref 150–400)
PLATELET # BLD AUTO: 24 K/UL — LOW (ref 150–400)
PLATELET # BLD AUTO: 26 K/UL — LOW (ref 150–400)
PLATELET # BLD AUTO: 27 K/UL — LOW (ref 150–400)
PLATELET # BLD AUTO: 29 K/UL — LOW (ref 150–400)
PLATELET # BLD AUTO: 292 K/UL — SIGNIFICANT CHANGE UP (ref 150–400)
PLATELET # BLD AUTO: 35 K/UL — LOW (ref 150–400)
PLATELET # BLD AUTO: 35 K/UL — LOW (ref 150–400)
PLATELET # BLD AUTO: 362 K/UL — SIGNIFICANT CHANGE UP (ref 150–400)
PLATELET # BLD AUTO: 37 K/UL — LOW (ref 150–400)
PLATELET # BLD AUTO: 37 K/UL — LOW (ref 150–400)
PLATELET # BLD AUTO: 40 K/UL — LOW (ref 150–400)
PLATELET # BLD AUTO: 41 K/UL — LOW (ref 150–400)
PLATELET # BLD AUTO: 42 K/UL — LOW (ref 150–400)
PLATELET # BLD AUTO: 43 K/UL — LOW (ref 150–400)
PLATELET # BLD AUTO: 44 K/UL — LOW (ref 150–400)
PLATELET # BLD AUTO: 45 K/UL — LOW (ref 150–400)
PLATELET # BLD AUTO: 46 K/UL — LOW (ref 150–400)
PLATELET # BLD AUTO: 46 K/UL — LOW (ref 150–400)
PLATELET # BLD AUTO: 47 K/UL — LOW (ref 150–400)
PLATELET # BLD AUTO: 48 K/UL — LOW (ref 150–400)
PLATELET # BLD AUTO: 48 K/UL — LOW (ref 150–400)
PLATELET # BLD AUTO: 486 K/UL — HIGH (ref 150–400)
PLATELET # BLD AUTO: 49 K/UL — LOW (ref 150–400)
PLATELET # BLD AUTO: 52 K/UL — LOW (ref 150–400)
PLATELET # BLD AUTO: 52 K/UL — LOW (ref 150–400)
PLATELET # BLD AUTO: 53 K/UL — LOW (ref 150–400)
PLATELET # BLD AUTO: 53 K/UL — LOW (ref 150–400)
PLATELET # BLD AUTO: 57 K/UL — LOW (ref 150–400)
PLATELET # BLD AUTO: 63 K/UL — LOW (ref 150–400)
PLATELET # BLD AUTO: 630 K/UL — HIGH (ref 150–400)
PLATELET # BLD AUTO: 64 K/UL — LOW (ref 150–400)
PLATELET # BLD AUTO: 64 K/UL — LOW (ref 150–400)
PLATELET # BLD AUTO: 66 K/UL — LOW (ref 150–400)
PLATELET # BLD AUTO: 69 K/UL — LOW (ref 150–400)
PLATELET # BLD AUTO: 71 K/UL — LOW (ref 150–400)
PLATELET # BLD AUTO: 71 K/UL — LOW (ref 150–400)
PLATELET # BLD AUTO: 72 K/UL — LOW (ref 150–400)
PLATELET # BLD AUTO: 73 K/UL — LOW (ref 150–400)
PLATELET # BLD AUTO: 74 K/UL — LOW (ref 150–400)
PLATELET # BLD AUTO: 76 K/UL — LOW (ref 150–400)
PLATELET # BLD AUTO: 76 K/UL — LOW (ref 150–400)
PLATELET # BLD AUTO: 81 K/UL — LOW (ref 150–400)
PLATELET # BLD AUTO: 81 K/UL — LOW (ref 150–400)
PLATELET # BLD AUTO: 82 K/UL — LOW (ref 150–400)
PLATELET # BLD AUTO: 82 K/UL — LOW (ref 150–400)
PLATELET # BLD AUTO: 83 K/UL — LOW (ref 150–400)
PLATELET # BLD AUTO: 85 K/UL — LOW (ref 150–400)
PLATELET # BLD AUTO: 86 K/UL — LOW (ref 150–400)
PLATELET # BLD AUTO: 87 K/UL — LOW (ref 150–400)
PLATELET # BLD AUTO: 88 K/UL — LOW (ref 150–400)
PLATELET # BLD AUTO: 92 K/UL — LOW (ref 150–400)
PLATELET # BLD AUTO: 94 K/UL — LOW (ref 150–400)
PLATELET # BLD AUTO: 95 K/UL — LOW (ref 150–400)
PLATELET # BLD AUTO: 96 K/UL — LOW (ref 150–400)
PLATELET # BLD AUTO: 97 K/UL — LOW (ref 150–400)
PLATELET # BLD AUTO: 99 K/UL — LOW (ref 150–400)
PLATELET # BLD AUTO: 99 K/UL — LOW (ref 150–400)
PO2 BLDA: 65 MMHG — LOW (ref 74–108)
PO2 BLDV: 100 MMHG — HIGH (ref 25–45)
PO2 BLDV: 21 MMHG — LOW (ref 25–45)
PO2 BLDV: 26 MMHG — SIGNIFICANT CHANGE UP (ref 25–45)
PO2 BLDV: 26 MMHG — SIGNIFICANT CHANGE UP (ref 25–45)
PO2 BLDV: 38 MMHG — SIGNIFICANT CHANGE UP (ref 25–45)
PO2 BLDV: 38 MMHG — SIGNIFICANT CHANGE UP (ref 25–45)
PO2 BLDV: 48 MMHG — HIGH (ref 25–45)
PO2 BLDV: 59 MMHG — HIGH (ref 25–45)
POIKILOCYTOSIS BLD QL AUTO: SIGNIFICANT CHANGE UP
POIKILOCYTOSIS BLD QL AUTO: SIGNIFICANT CHANGE UP
POIKILOCYTOSIS BLD QL AUTO: SLIGHT — SIGNIFICANT CHANGE UP
POIKILOCYTOSIS BLD QL AUTO: SLIGHT — SIGNIFICANT CHANGE UP
POLYCHROMASIA BLD QL SMEAR: SLIGHT — SIGNIFICANT CHANGE UP
POTASSIUM BLDV-SCNC: 2.6 MMOL/L — CRITICAL LOW (ref 3.5–5.3)
POTASSIUM BLDV-SCNC: 3.2 MMOL/L — LOW (ref 3.5–5.3)
POTASSIUM BLDV-SCNC: 3.6 MMOL/L — SIGNIFICANT CHANGE UP (ref 3.5–5.3)
POTASSIUM BLDV-SCNC: 4.4 MMOL/L — SIGNIFICANT CHANGE UP (ref 3.5–5.3)
POTASSIUM BLDV-SCNC: 4.5 MMOL/L — SIGNIFICANT CHANGE UP (ref 3.5–5.3)
POTASSIUM BLDV-SCNC: 4.5 MMOL/L — SIGNIFICANT CHANGE UP (ref 3.5–5.3)
POTASSIUM BLDV-SCNC: 5 MMOL/L — SIGNIFICANT CHANGE UP (ref 3.5–5.3)
POTASSIUM BLDV-SCNC: 5.7 MMOL/L — HIGH (ref 3.5–5.3)
POTASSIUM SERPL-MCNC: 2.8 MMOL/L — CRITICAL LOW (ref 3.5–5.3)
POTASSIUM SERPL-MCNC: 3 MMOL/L — LOW (ref 3.5–5.3)
POTASSIUM SERPL-MCNC: 3 MMOL/L — LOW (ref 3.5–5.3)
POTASSIUM SERPL-MCNC: 3.3 MMOL/L — LOW (ref 3.5–5.3)
POTASSIUM SERPL-MCNC: 3.4 MMOL/L — LOW (ref 3.5–5.3)
POTASSIUM SERPL-MCNC: 3.4 MMOL/L — LOW (ref 3.5–5.3)
POTASSIUM SERPL-MCNC: 3.5 MMOL/L — SIGNIFICANT CHANGE UP (ref 3.5–5.3)
POTASSIUM SERPL-MCNC: 3.6 MMOL/L — SIGNIFICANT CHANGE UP (ref 3.5–5.3)
POTASSIUM SERPL-MCNC: 3.6 MMOL/L — SIGNIFICANT CHANGE UP (ref 3.5–5.3)
POTASSIUM SERPL-MCNC: 3.7 MMOL/L — SIGNIFICANT CHANGE UP (ref 3.5–5.3)
POTASSIUM SERPL-MCNC: 3.8 MMOL/L — SIGNIFICANT CHANGE UP (ref 3.5–5.3)
POTASSIUM SERPL-MCNC: 3.9 MMOL/L — SIGNIFICANT CHANGE UP (ref 3.5–5.3)
POTASSIUM SERPL-MCNC: 4 MMOL/L — SIGNIFICANT CHANGE UP (ref 3.5–5.3)
POTASSIUM SERPL-MCNC: 4.1 MMOL/L — SIGNIFICANT CHANGE UP (ref 3.5–5.3)
POTASSIUM SERPL-MCNC: 4.2 MMOL/L — SIGNIFICANT CHANGE UP (ref 3.5–5.3)
POTASSIUM SERPL-MCNC: 4.3 MMOL/L — SIGNIFICANT CHANGE UP (ref 3.5–5.3)
POTASSIUM SERPL-MCNC: 4.4 MMOL/L — SIGNIFICANT CHANGE UP (ref 3.5–5.3)
POTASSIUM SERPL-MCNC: 4.5 MMOL/L — SIGNIFICANT CHANGE UP (ref 3.5–5.3)
POTASSIUM SERPL-MCNC: 4.6 MMOL/L — SIGNIFICANT CHANGE UP (ref 3.5–5.3)
POTASSIUM SERPL-MCNC: 4.7 MMOL/L — SIGNIFICANT CHANGE UP (ref 3.5–5.3)
POTASSIUM SERPL-MCNC: 4.8 MMOL/L — SIGNIFICANT CHANGE UP (ref 3.5–5.3)
POTASSIUM SERPL-MCNC: 4.9 MMOL/L — SIGNIFICANT CHANGE UP (ref 3.5–5.3)
POTASSIUM SERPL-MCNC: 5 MMOL/L — SIGNIFICANT CHANGE UP (ref 3.5–5.3)
POTASSIUM SERPL-MCNC: 5.2 MMOL/L — SIGNIFICANT CHANGE UP (ref 3.5–5.3)
POTASSIUM SERPL-MCNC: 5.3 MMOL/L — SIGNIFICANT CHANGE UP (ref 3.5–5.3)
POTASSIUM SERPL-MCNC: 5.7 MMOL/L — HIGH (ref 3.5–5.3)
POTASSIUM SERPL-MCNC: 5.8 MMOL/L — HIGH (ref 3.5–5.3)
POTASSIUM SERPL-MCNC: 7.8 MMOL/L — CRITICAL HIGH (ref 3.5–5.3)
POTASSIUM SERPL-SCNC: 2.8 MMOL/L — CRITICAL LOW (ref 3.5–5.3)
POTASSIUM SERPL-SCNC: 3 MMOL/L — LOW (ref 3.5–5.3)
POTASSIUM SERPL-SCNC: 3 MMOL/L — LOW (ref 3.5–5.3)
POTASSIUM SERPL-SCNC: 3.3 MMOL/L — LOW (ref 3.5–5.3)
POTASSIUM SERPL-SCNC: 3.4 MMOL/L — LOW (ref 3.5–5.3)
POTASSIUM SERPL-SCNC: 3.4 MMOL/L — LOW (ref 3.5–5.3)
POTASSIUM SERPL-SCNC: 3.5 MMOL/L — SIGNIFICANT CHANGE UP (ref 3.5–5.3)
POTASSIUM SERPL-SCNC: 3.6 MMOL/L — SIGNIFICANT CHANGE UP (ref 3.5–5.3)
POTASSIUM SERPL-SCNC: 3.6 MMOL/L — SIGNIFICANT CHANGE UP (ref 3.5–5.3)
POTASSIUM SERPL-SCNC: 3.7 MMOL/L — SIGNIFICANT CHANGE UP (ref 3.5–5.3)
POTASSIUM SERPL-SCNC: 3.8 MMOL/L — SIGNIFICANT CHANGE UP (ref 3.5–5.3)
POTASSIUM SERPL-SCNC: 3.9 MMOL/L — SIGNIFICANT CHANGE UP (ref 3.5–5.3)
POTASSIUM SERPL-SCNC: 4 MMOL/L — SIGNIFICANT CHANGE UP (ref 3.5–5.3)
POTASSIUM SERPL-SCNC: 4.1 MMOL/L — SIGNIFICANT CHANGE UP (ref 3.5–5.3)
POTASSIUM SERPL-SCNC: 4.2 MMOL/L — SIGNIFICANT CHANGE UP (ref 3.5–5.3)
POTASSIUM SERPL-SCNC: 4.3 MMOL/L — SIGNIFICANT CHANGE UP (ref 3.5–5.3)
POTASSIUM SERPL-SCNC: 4.4 MMOL/L — SIGNIFICANT CHANGE UP (ref 3.5–5.3)
POTASSIUM SERPL-SCNC: 4.5 MMOL/L — SIGNIFICANT CHANGE UP (ref 3.5–5.3)
POTASSIUM SERPL-SCNC: 4.6 MMOL/L — SIGNIFICANT CHANGE UP (ref 3.5–5.3)
POTASSIUM SERPL-SCNC: 4.7 MMOL/L — SIGNIFICANT CHANGE UP (ref 3.5–5.3)
POTASSIUM SERPL-SCNC: 4.8 MMOL/L — SIGNIFICANT CHANGE UP (ref 3.5–5.3)
POTASSIUM SERPL-SCNC: 4.9 MMOL/L — SIGNIFICANT CHANGE UP (ref 3.5–5.3)
POTASSIUM SERPL-SCNC: 5 MMOL/L — SIGNIFICANT CHANGE UP (ref 3.5–5.3)
POTASSIUM SERPL-SCNC: 5.2 MMOL/L — SIGNIFICANT CHANGE UP (ref 3.5–5.3)
POTASSIUM SERPL-SCNC: 5.3 MMOL/L — SIGNIFICANT CHANGE UP (ref 3.5–5.3)
POTASSIUM SERPL-SCNC: 5.7 MMOL/L — HIGH (ref 3.5–5.3)
POTASSIUM SERPL-SCNC: 5.8 MMOL/L — HIGH (ref 3.5–5.3)
POTASSIUM SERPL-SCNC: 7.8 MMOL/L — CRITICAL HIGH (ref 3.5–5.3)
POTASSIUM UR-SCNC: 51 MMOL/L — SIGNIFICANT CHANGE UP
PROCALCITONIN SERPL-MCNC: 0.45 NG/ML — HIGH (ref 0.02–0.1)
PROCALCITONIN SERPL-MCNC: 2.21 NG/ML — HIGH (ref 0.02–0.1)
PROT ?TM UR-MCNC: 101 MG/DL — HIGH (ref 0–12)
PROT ?TM UR-MCNC: 144 MG/DL — HIGH (ref 0–12)
PROT FLD-MCNC: 0.7 G/DL — SIGNIFICANT CHANGE UP
PROT FLD-MCNC: 1.2 G/DL — SIGNIFICANT CHANGE UP
PROT FLD-MCNC: 1.2 G/DL — SIGNIFICANT CHANGE UP
PROT FLD-MCNC: 1.7 G/DL — SIGNIFICANT CHANGE UP
PROT FLD-MCNC: 2 G/DL — SIGNIFICANT CHANGE UP
PROT FLD-MCNC: 2 G/DL — SIGNIFICANT CHANGE UP
PROT FLD-MCNC: 2.9 G/DL — SIGNIFICANT CHANGE UP
PROT SERPL-MCNC: 3.8 G/DL — LOW (ref 6–8.3)
PROT SERPL-MCNC: 4.5 G/DL — LOW (ref 6–8.3)
PROT SERPL-MCNC: 4.5 G/DL — LOW (ref 6–8.3)
PROT SERPL-MCNC: 4.6 G/DL — LOW (ref 6–8.3)
PROT SERPL-MCNC: 4.8 G/DL — LOW (ref 6–8.3)
PROT SERPL-MCNC: 4.9 G/DL — LOW (ref 6–8.3)
PROT SERPL-MCNC: 5 G/DL — LOW (ref 6–8.3)
PROT SERPL-MCNC: 5.1 G/DL — LOW (ref 6–8.3)
PROT SERPL-MCNC: 5.2 G/DL — LOW (ref 6–8.3)
PROT SERPL-MCNC: 5.2 G/DL — LOW (ref 6–8.3)
PROT SERPL-MCNC: 5.3 G/DL — LOW (ref 6–8.3)
PROT SERPL-MCNC: 5.4 G/DL — LOW (ref 6–8.3)
PROT SERPL-MCNC: 5.5 G/DL — LOW (ref 6–8.3)
PROT SERPL-MCNC: 5.6 G/DL — LOW (ref 6–8.3)
PROT SERPL-MCNC: 5.7 G/DL — LOW (ref 6–8.3)
PROT SERPL-MCNC: 5.8 G/DL — LOW (ref 6–8.3)
PROT SERPL-MCNC: 5.9 G/DL — LOW (ref 6–8.3)
PROT SERPL-MCNC: 6 G/DL — SIGNIFICANT CHANGE UP (ref 6–8.3)
PROT SERPL-MCNC: 6.1 G/DL — SIGNIFICANT CHANGE UP (ref 6–8.3)
PROT SERPL-MCNC: 6.1 G/DL — SIGNIFICANT CHANGE UP (ref 6–8.3)
PROT SERPL-MCNC: 6.3 G/DL — SIGNIFICANT CHANGE UP (ref 6–8.3)
PROT SERPL-MCNC: 6.4 G/DL — SIGNIFICANT CHANGE UP (ref 6–8.3)
PROT SERPL-MCNC: 6.5 G/DL — SIGNIFICANT CHANGE UP (ref 6–8.3)
PROT SERPL-MCNC: 6.6 G/DL — SIGNIFICANT CHANGE UP (ref 6–8.3)
PROT SERPL-MCNC: 6.6 G/DL — SIGNIFICANT CHANGE UP (ref 6–8.3)
PROT SERPL-MCNC: 6.7 G/DL — SIGNIFICANT CHANGE UP (ref 6–8.3)
PROT SERPL-MCNC: 6.8 G/DL — SIGNIFICANT CHANGE UP (ref 6–8.3)
PROT SERPL-MCNC: 7 G/DL — SIGNIFICANT CHANGE UP (ref 6–8.3)
PROT SERPL-MCNC: 7 G/DL — SIGNIFICANT CHANGE UP (ref 6–8.3)
PROT SERPL-MCNC: 7.3 G/DL — SIGNIFICANT CHANGE UP (ref 6–8.3)
PROT UR-MCNC: 100 — SIGNIFICANT CHANGE UP
PROT UR-MCNC: 100 — SIGNIFICANT CHANGE UP
PROT UR-MCNC: ABNORMAL
PROT UR-MCNC: ABNORMAL
PROT UR-MCNC: NEGATIVE — SIGNIFICANT CHANGE UP
PROT/CREAT UR-RTO: 0.5 RATIO — HIGH (ref 0–0.2)
PROTHROM AB SERPL-ACNC: 16.5 SEC — HIGH (ref 10.6–13.6)
PROTHROM AB SERPL-ACNC: 17.7 SEC — HIGH (ref 10.6–13.6)
PROTHROM AB SERPL-ACNC: 17.9 SEC — HIGH (ref 10.6–13.6)
PROTHROM AB SERPL-ACNC: 18 SEC — HIGH (ref 10.6–13.6)
PROTHROM AB SERPL-ACNC: 18 SEC — HIGH (ref 10.6–13.6)
PROTHROM AB SERPL-ACNC: 18.1 SEC — HIGH (ref 10.6–13.6)
PROTHROM AB SERPL-ACNC: 18.1 SEC — HIGH (ref 10.6–13.6)
PROTHROM AB SERPL-ACNC: 18.2 SEC — HIGH (ref 10.6–13.6)
PROTHROM AB SERPL-ACNC: 18.3 SEC — HIGH (ref 10.6–13.6)
PROTHROM AB SERPL-ACNC: 18.4 SEC — HIGH (ref 10.6–13.6)
PROTHROM AB SERPL-ACNC: 18.4 SEC — HIGH (ref 10.6–13.6)
PROTHROM AB SERPL-ACNC: 18.5 SEC — HIGH (ref 10.6–13.6)
PROTHROM AB SERPL-ACNC: 18.5 SEC — HIGH (ref 10.6–13.6)
PROTHROM AB SERPL-ACNC: 18.6 SEC — HIGH (ref 10.6–13.6)
PROTHROM AB SERPL-ACNC: 18.6 SEC — HIGH (ref 10.6–13.6)
PROTHROM AB SERPL-ACNC: 18.7 SEC — HIGH (ref 10.6–13.6)
PROTHROM AB SERPL-ACNC: 19 SEC — HIGH (ref 10.6–13.6)
PROTHROM AB SERPL-ACNC: 19.1 SEC — HIGH (ref 10.6–13.6)
PROTHROM AB SERPL-ACNC: 19.2 SEC — HIGH (ref 10.6–13.6)
PROTHROM AB SERPL-ACNC: 19.2 SEC — HIGH (ref 10.6–13.6)
PROTHROM AB SERPL-ACNC: 19.4 SEC — HIGH (ref 10.6–13.6)
PROTHROM AB SERPL-ACNC: 19.6 SEC — HIGH (ref 10.6–13.6)
PROTHROM AB SERPL-ACNC: 19.8 SEC — HIGH (ref 10.6–13.6)
PROTHROM AB SERPL-ACNC: 19.9 SEC — HIGH (ref 10.6–13.6)
PROTHROM AB SERPL-ACNC: 20 SEC — HIGH (ref 10.6–13.6)
PROTHROM AB SERPL-ACNC: 20.1 SEC — HIGH (ref 10.6–13.6)
PROTHROM AB SERPL-ACNC: 20.2 SEC — HIGH (ref 10.6–13.6)
PROTHROM AB SERPL-ACNC: 20.2 SEC — HIGH (ref 10.6–13.6)
PROTHROM AB SERPL-ACNC: 20.5 SEC — HIGH (ref 10.6–13.6)
PROTHROM AB SERPL-ACNC: 20.8 SEC — HIGH (ref 10.6–13.6)
PROTHROM AB SERPL-ACNC: 21 SEC — HIGH (ref 10.6–13.6)
PROTHROM AB SERPL-ACNC: 21.2 SEC — HIGH (ref 10.6–13.6)
PROTHROM AB SERPL-ACNC: 21.4 SEC — HIGH (ref 10.6–13.6)
PROTHROM AB SERPL-ACNC: 21.4 SEC — HIGH (ref 10.6–13.6)
PROTHROM AB SERPL-ACNC: 21.5 SEC — HIGH (ref 10.6–13.6)
PROTHROM AB SERPL-ACNC: 21.6 SEC — HIGH (ref 10.6–13.6)
PROTHROM AB SERPL-ACNC: 21.7 SEC — HIGH (ref 10.6–13.6)
PROTHROM AB SERPL-ACNC: 21.8 SEC — HIGH (ref 10.6–13.6)
PROTHROM AB SERPL-ACNC: 22.1 SEC — HIGH (ref 10.6–13.6)
PROTHROM AB SERPL-ACNC: 22.1 SEC — HIGH (ref 10.6–13.6)
PROTHROM AB SERPL-ACNC: 22.3 SEC — HIGH (ref 10.6–13.6)
PROTHROM AB SERPL-ACNC: 22.5 SEC — HIGH (ref 10.6–13.6)
PROTHROM AB SERPL-ACNC: 22.8 SEC — HIGH (ref 10.6–13.6)
PROTHROM AB SERPL-ACNC: 22.8 SEC — HIGH (ref 10.6–13.6)
PROTHROM AB SERPL-ACNC: 23 SEC — HIGH (ref 10.6–13.6)
PROTHROM AB SERPL-ACNC: 24 SEC — HIGH (ref 10.6–13.6)
PROTHROM AB SERPL-ACNC: 24.3 SEC — HIGH (ref 10.6–13.6)
PROTHROM AB SERPL-ACNC: 24.5 SEC — HIGH (ref 10.6–13.6)
PROTHROM AB SERPL-ACNC: 25.7 SEC — HIGH (ref 10.6–13.6)
PROTHROM AB SERPL-ACNC: 25.8 SEC — HIGH (ref 10.6–13.6)
PROTHROM AB SERPL-ACNC: 26.2 SEC — HIGH (ref 10.6–13.6)
PROTHROM AB SERPL-ACNC: 29.5 SEC — HIGH (ref 10.6–13.6)
PROTHROM AB SERPL-ACNC: 29.6 SEC — HIGH (ref 10.6–13.6)
PROTHROM AB SERPL-ACNC: 30.2 SEC — HIGH (ref 10.6–13.6)
PROTHROM AB SERPL-ACNC: 31.8 SEC — HIGH (ref 10.6–13.6)
PROTHROM AB SERPL-ACNC: 31.8 SEC — HIGH (ref 10.6–13.6)
PROTHROM AB SERPL-ACNC: 32.8 SEC — HIGH (ref 10.6–13.6)
PROTHROM AB SERPL-ACNC: 33.2 SEC — HIGH (ref 10.6–13.6)
PROTHROM AB SERPL-ACNC: 33.9 SEC — HIGH (ref 10.6–13.6)
PROTHROM AB SERPL-ACNC: 34.6 SEC — HIGH (ref 10.6–13.6)
PROTHROM AB SERPL-ACNC: 35.5 SEC — HIGH (ref 10.6–13.6)
PROTHROM AB SERPL-ACNC: 35.6 SEC — HIGH (ref 10.6–13.6)
PROTHROM AB SERPL-ACNC: 36.3 SEC — HIGH (ref 10.6–13.6)
PROTHROM AB SERPL-ACNC: 38 SEC — HIGH (ref 10.6–13.6)
PROTHROM AB SERPL-ACNC: 41.2 SEC — HIGH (ref 10.6–13.6)
PROTHROM AB SERPL-ACNC: 42.7 SEC — HIGH (ref 10.6–13.6)
PROTHROM AB SERPL-ACNC: 44.1 SEC — HIGH (ref 10.6–13.6)
PROTHROM AB SERPL-ACNC: 51.4 SEC — HIGH (ref 10.6–13.6)
PROTHROM AB SERPL-ACNC: 53.8 SEC — HIGH (ref 10.6–13.6)
PTH-INTACT FLD-MCNC: 223 PG/ML — HIGH (ref 15–65)
PTH-INTACT FLD-MCNC: 35 PG/ML — SIGNIFICANT CHANGE UP (ref 15–65)
PTH-INTACT FLD-MCNC: 54 PG/ML — SIGNIFICANT CHANGE UP (ref 15–65)
QUANT TB PLUS MITOGEN MINUS NIL: 0.04 IU/ML — SIGNIFICANT CHANGE UP
RAPID RVP RESULT: SIGNIFICANT CHANGE UP
RAPIDTEG MAXIMUM AMPLITUDE: 51.3 MM (ref 52–70)
RAPIDTEG MAXIMUM AMPLITUDE: <40 MM (ref 52–70)
RAPIDTEG MAXIMUM AMPLITUDE: <40 MM (ref 52–70)
RBC # BLD: 1.81 M/UL — LOW (ref 4.2–5.8)
RBC # BLD: 1.83 M/UL — LOW (ref 4.2–5.8)
RBC # BLD: 1.84 M/UL — LOW (ref 4.2–5.8)
RBC # BLD: 1.85 M/UL — LOW (ref 4.2–5.8)
RBC # BLD: 1.88 M/UL — LOW (ref 4.2–5.8)
RBC # BLD: 1.91 M/UL — LOW (ref 4.2–5.8)
RBC # BLD: 1.96 M/UL — LOW (ref 4.2–5.8)
RBC # BLD: 2.03 M/UL — LOW (ref 4.2–5.8)
RBC # BLD: 2.03 M/UL — LOW (ref 4.2–5.8)
RBC # BLD: 2.05 M/UL — LOW (ref 4.2–5.8)
RBC # BLD: 2.08 M/UL — LOW (ref 4.2–5.8)
RBC # BLD: 2.09 M/UL — LOW (ref 4.2–5.8)
RBC # BLD: 2.1 M/UL — LOW (ref 4.2–5.8)
RBC # BLD: 2.1 M/UL — LOW (ref 4.2–5.8)
RBC # BLD: 2.14 M/UL — LOW (ref 4.2–5.8)
RBC # BLD: 2.16 M/UL — LOW (ref 4.2–5.8)
RBC # BLD: 2.18 M/UL — LOW (ref 4.2–5.8)
RBC # BLD: 2.19 M/UL — LOW (ref 4.2–5.8)
RBC # BLD: 2.2 M/UL — LOW (ref 4.2–5.8)
RBC # BLD: 2.21 M/UL — LOW (ref 4.2–5.8)
RBC # BLD: 2.21 M/UL — LOW (ref 4.2–5.8)
RBC # BLD: 2.24 M/UL — LOW (ref 4.2–5.8)
RBC # BLD: 2.25 M/UL — LOW (ref 4.2–5.8)
RBC # BLD: 2.28 M/UL — LOW (ref 4.2–5.8)
RBC # BLD: 2.28 M/UL — LOW (ref 4.2–5.8)
RBC # BLD: 2.29 M/UL — LOW (ref 4.2–5.8)
RBC # BLD: 2.33 M/UL — LOW (ref 4.2–5.8)
RBC # BLD: 2.33 M/UL — LOW (ref 4.2–5.8)
RBC # BLD: 2.35 M/UL — LOW (ref 4.2–5.8)
RBC # BLD: 2.36 M/UL — LOW (ref 4.2–5.8)
RBC # BLD: 2.36 M/UL — LOW (ref 4.2–5.8)
RBC # BLD: 2.37 M/UL — LOW (ref 4.2–5.8)
RBC # BLD: 2.38 M/UL — LOW (ref 4.2–5.8)
RBC # BLD: 2.39 M/UL — LOW (ref 4.2–5.8)
RBC # BLD: 2.4 M/UL — LOW (ref 4.2–5.8)
RBC # BLD: 2.42 M/UL — LOW (ref 4.2–5.8)
RBC # BLD: 2.45 M/UL — LOW (ref 4.2–5.8)
RBC # BLD: 2.46 M/UL — LOW (ref 4.2–5.8)
RBC # BLD: 2.46 M/UL — LOW (ref 4.2–5.8)
RBC # BLD: 2.51 M/UL — LOW (ref 4.2–5.8)
RBC # BLD: 2.53 M/UL — LOW (ref 4.2–5.8)
RBC # BLD: 2.54 M/UL — LOW (ref 4.2–5.8)
RBC # BLD: 2.58 M/UL — LOW (ref 4.2–5.8)
RBC # BLD: 2.59 M/UL — LOW (ref 4.2–5.8)
RBC # BLD: 2.62 M/UL — LOW (ref 4.2–5.8)
RBC # BLD: 2.63 M/UL — LOW (ref 4.2–5.8)
RBC # BLD: 2.63 M/UL — LOW (ref 4.2–5.8)
RBC # BLD: 2.66 M/UL — LOW (ref 4.2–5.8)
RBC # BLD: 2.66 M/UL — LOW (ref 4.2–5.8)
RBC # BLD: 2.69 M/UL — LOW (ref 4.2–5.8)
RBC # BLD: 2.75 M/UL — LOW (ref 4.2–5.8)
RBC # BLD: 2.81 M/UL — LOW (ref 4.2–5.8)
RBC # BLD: 2.83 M/UL — LOW (ref 4.2–5.8)
RBC # BLD: 2.91 M/UL — LOW (ref 4.2–5.8)
RBC # BLD: 2.93 M/UL — LOW (ref 4.2–5.8)
RBC # BLD: 2.94 M/UL — LOW (ref 4.2–5.8)
RBC # BLD: 3 M/UL — LOW (ref 4.2–5.8)
RBC # BLD: 3 M/UL — LOW (ref 4.2–5.8)
RBC # BLD: 3.01 M/UL — LOW (ref 4.2–5.8)
RBC # BLD: 3.01 M/UL — LOW (ref 4.2–5.8)
RBC # BLD: 3.02 M/UL — LOW (ref 4.2–5.8)
RBC # BLD: 3.04 M/UL — LOW (ref 4.2–5.8)
RBC # BLD: 3.07 M/UL — LOW (ref 4.2–5.8)
RBC # BLD: 3.08 M/UL — LOW (ref 4.2–5.8)
RBC # BLD: 3.1 M/UL — LOW (ref 4.2–5.8)
RBC # BLD: 3.12 M/UL — LOW (ref 4.2–5.8)
RBC # BLD: 3.14 M/UL — LOW (ref 4.2–5.8)
RBC # BLD: 3.15 M/UL — LOW (ref 4.2–5.8)
RBC # BLD: 3.15 M/UL — LOW (ref 4.2–5.8)
RBC # BLD: 3.16 M/UL — LOW (ref 4.2–5.8)
RBC # BLD: 3.19 M/UL — LOW (ref 4.2–5.8)
RBC # BLD: 3.22 M/UL — LOW (ref 4.2–5.8)
RBC # BLD: 3.24 M/UL — LOW (ref 4.2–5.8)
RBC # BLD: 3.24 M/UL — LOW (ref 4.2–5.8)
RBC # BLD: 3.26 M/UL — LOW (ref 4.2–5.8)
RBC # BLD: 3.27 M/UL — LOW (ref 4.2–5.8)
RBC # BLD: 3.27 M/UL — LOW (ref 4.2–5.8)
RBC # BLD: 3.29 M/UL — LOW (ref 4.2–5.8)
RBC # BLD: 3.3 M/UL — LOW (ref 4.2–5.8)
RBC # BLD: 3.31 M/UL — LOW (ref 4.2–5.8)
RBC # BLD: 3.33 M/UL — LOW (ref 4.2–5.8)
RBC # BLD: 3.34 M/UL — LOW (ref 4.2–5.8)
RBC # BLD: 3.34 M/UL — LOW (ref 4.2–5.8)
RBC # BLD: 3.35 M/UL — LOW (ref 4.2–5.8)
RBC # BLD: 3.39 M/UL — LOW (ref 4.2–5.8)
RBC # BLD: 3.39 M/UL — LOW (ref 4.2–5.8)
RBC # BLD: 3.43 M/UL — LOW (ref 4.2–5.8)
RBC # BLD: 3.49 M/UL — LOW (ref 4.2–5.8)
RBC # BLD: 3.53 M/UL — LOW (ref 4.2–5.8)
RBC # BLD: 3.55 M/UL — LOW (ref 4.2–5.8)
RBC # BLD: 3.61 M/UL — LOW (ref 4.2–5.8)
RBC # BLD: 3.97 M/UL — LOW (ref 4.2–5.8)
RBC # FLD: 14.2 % — SIGNIFICANT CHANGE UP (ref 10.3–14.5)
RBC # FLD: 14.5 % — SIGNIFICANT CHANGE UP (ref 10.3–14.5)
RBC # FLD: 14.6 % — HIGH (ref 10.3–14.5)
RBC # FLD: 15 % — HIGH (ref 10.3–14.5)
RBC # FLD: 15 % — HIGH (ref 10.3–14.5)
RBC # FLD: 15.4 % — HIGH (ref 10.3–14.5)
RBC # FLD: 15.6 % — HIGH (ref 10.3–14.5)
RBC # FLD: 15.7 % — HIGH (ref 10.3–14.5)
RBC # FLD: 15.8 % — HIGH (ref 10.3–14.5)
RBC # FLD: 16.1 % — HIGH (ref 10.3–14.5)
RBC # FLD: 16.1 % — HIGH (ref 10.3–14.5)
RBC # FLD: 16.2 % — HIGH (ref 10.3–14.5)
RBC # FLD: 16.2 % — HIGH (ref 10.3–14.5)
RBC # FLD: 16.3 % — HIGH (ref 10.3–14.5)
RBC # FLD: 16.3 % — HIGH (ref 10.3–14.5)
RBC # FLD: 16.4 % — HIGH (ref 10.3–14.5)
RBC # FLD: 16.5 % — HIGH (ref 10.3–14.5)
RBC # FLD: 16.6 % — HIGH (ref 10.3–14.5)
RBC # FLD: 16.7 % — HIGH (ref 10.3–14.5)
RBC # FLD: 16.9 % — HIGH (ref 10.3–14.5)
RBC # FLD: 17 % — HIGH (ref 10.3–14.5)
RBC # FLD: 17.1 % — HIGH (ref 10.3–14.5)
RBC # FLD: 17.1 % — HIGH (ref 10.3–14.5)
RBC # FLD: 17.2 % — HIGH (ref 10.3–14.5)
RBC # FLD: 17.4 % — HIGH (ref 10.3–14.5)
RBC # FLD: 17.5 % — HIGH (ref 10.3–14.5)
RBC # FLD: 17.6 % — HIGH (ref 10.3–14.5)
RBC # FLD: 17.6 % — HIGH (ref 10.3–14.5)
RBC # FLD: 17.7 % — HIGH (ref 10.3–14.5)
RBC # FLD: 17.9 % — HIGH (ref 10.3–14.5)
RBC # FLD: 18.1 % — HIGH (ref 10.3–14.5)
RBC # FLD: 18.2 % — HIGH (ref 10.3–14.5)
RBC # FLD: 18.4 % — HIGH (ref 10.3–14.5)
RBC # FLD: 18.5 % — HIGH (ref 10.3–14.5)
RBC # FLD: 18.5 % — HIGH (ref 10.3–14.5)
RBC # FLD: 18.6 % — HIGH (ref 10.3–14.5)
RBC # FLD: 18.7 % — HIGH (ref 10.3–14.5)
RBC # FLD: 18.8 % — HIGH (ref 10.3–14.5)
RBC # FLD: 18.9 % — HIGH (ref 10.3–14.5)
RBC # FLD: 19.1 % — HIGH (ref 10.3–14.5)
RBC # FLD: 19.2 % — HIGH (ref 10.3–14.5)
RBC # FLD: 19.3 % — HIGH (ref 10.3–14.5)
RBC # FLD: 19.6 % — HIGH (ref 10.3–14.5)
RBC # FLD: 19.7 % — HIGH (ref 10.3–14.5)
RBC # FLD: 19.8 % — HIGH (ref 10.3–14.5)
RBC # FLD: 19.9 % — HIGH (ref 10.3–14.5)
RBC # FLD: 20 % — HIGH (ref 10.3–14.5)
RBC # FLD: 20.1 % — HIGH (ref 10.3–14.5)
RBC # FLD: 20.2 % — HIGH (ref 10.3–14.5)
RBC # FLD: 20.3 % — HIGH (ref 10.3–14.5)
RBC # FLD: 20.8 % — HIGH (ref 10.3–14.5)
RBC # FLD: 20.9 % — HIGH (ref 10.3–14.5)
RBC # FLD: 21.2 % — HIGH (ref 10.3–14.5)
RBC # FLD: 21.4 % — HIGH (ref 10.3–14.5)
RBC # FLD: 21.4 % — HIGH (ref 10.3–14.5)
RBC # FLD: 22.5 % — HIGH (ref 10.3–14.5)
RBC # FLD: 22.5 % — HIGH (ref 10.3–14.5)
RBC # FLD: 22.7 % — HIGH (ref 10.3–14.5)
RBC # FLD: 23 % — HIGH (ref 10.3–14.5)
RBC # FLD: 23.2 % — HIGH (ref 10.3–14.5)
RBC # FLD: 23.6 % — HIGH (ref 10.3–14.5)
RBC # FLD: 23.7 % — HIGH (ref 10.3–14.5)
RBC # FLD: 23.8 % — HIGH (ref 10.3–14.5)
RBC # FLD: 24 % — HIGH (ref 10.3–14.5)
RBC # FLD: 24.8 % — HIGH (ref 10.3–14.5)
RBC # FLD: 25.2 % — HIGH (ref 10.3–14.5)
RBC BLD AUTO: ABNORMAL
RBC BLD AUTO: SIGNIFICANT CHANGE UP
RBC CASTS # UR COMP ASSIST: 0 /HPF — SIGNIFICANT CHANGE UP (ref 0–4)
RBC CASTS # UR COMP ASSIST: 12 /HPF — HIGH (ref 0–4)
RBC CASTS # UR COMP ASSIST: 30 /HPF — HIGH (ref 0–4)
RBC CASTS # UR COMP ASSIST: 4 /HPF — SIGNIFICANT CHANGE UP (ref 0–4)
RCV VOL RI: 144 /UL — HIGH (ref 0–0)
RCV VOL RI: 1850 /UL — HIGH (ref 0–0)
RCV VOL RI: 3100 /UL — HIGH (ref 0–0)
RCV VOL RI: 3700 /UL — HIGH (ref 0–0)
RCV VOL RI: 4000 /UL — HIGH (ref 0–0)
RCV VOL RI: HIGH /UL (ref 0–0)
RCV VOL RI: HIGH /UL (ref 0–0)
RETICS #: 67.2 K/UL — SIGNIFICANT CHANGE UP (ref 25–125)
RETICS/RBC NFR: 2 % — SIGNIFICANT CHANGE UP (ref 0.5–2.5)
RH IG SCN BLD-IMP: POSITIVE — SIGNIFICANT CHANGE UP
RUBV IGG SER-ACNC: 2.7 INDEX — SIGNIFICANT CHANGE UP
RUBV IGG SER-IMP: POSITIVE — SIGNIFICANT CHANGE UP
SAO2 % BLDA: 85 % — LOW (ref 92–96)
SAO2 % BLDV: 22 % — LOW (ref 67–88)
SAO2 % BLDV: 31 % — LOW (ref 67–88)
SAO2 % BLDV: 33 % — LOW (ref 67–88)
SAO2 % BLDV: 63 % — LOW (ref 67–88)
SAO2 % BLDV: 78 % — SIGNIFICANT CHANGE UP (ref 67–88)
SAO2 % BLDV: 82 % — SIGNIFICANT CHANGE UP (ref 67–88)
SAO2 % BLDV: 89 % — HIGH (ref 67–88)
SAO2 % BLDV: 98 % — HIGH (ref 67–88)
SARS-COV-2 IGG SERPL QL IA: NEGATIVE — SIGNIFICANT CHANGE UP
SARS-COV-2 IGM SERPL IA-ACNC: 0.1 INDEX — SIGNIFICANT CHANGE UP
SARS-COV-2 IGM SERPL IA-ACNC: 0.15 INDEX — SIGNIFICANT CHANGE UP
SARS-COV-2 IGM SERPL IA-ACNC: 0.34 INDEX — SIGNIFICANT CHANGE UP
SARS-COV-2 IGM SERPL IA-ACNC: <3.8 AU/ML — SIGNIFICANT CHANGE UP
SARS-COV-2 RNA SPEC QL NAA+PROBE: SIGNIFICANT CHANGE UP
SCHISTOCYTES BLD QL AUTO: SLIGHT — SIGNIFICANT CHANGE UP
SMOOTH MUSCLE AB SER-ACNC: SIGNIFICANT CHANGE UP
SODIUM SERPL-SCNC: 129 MMOL/L — LOW (ref 135–145)
SODIUM SERPL-SCNC: 130 MMOL/L — LOW (ref 135–145)
SODIUM SERPL-SCNC: 131 MMOL/L — LOW (ref 135–145)
SODIUM SERPL-SCNC: 132 MMOL/L — LOW (ref 135–145)
SODIUM SERPL-SCNC: 133 MMOL/L — LOW (ref 135–145)
SODIUM SERPL-SCNC: 134 MMOL/L — LOW (ref 135–145)
SODIUM SERPL-SCNC: 135 MMOL/L — SIGNIFICANT CHANGE UP (ref 135–145)
SODIUM SERPL-SCNC: 136 MMOL/L — SIGNIFICANT CHANGE UP (ref 135–145)
SODIUM SERPL-SCNC: 137 MMOL/L — SIGNIFICANT CHANGE UP (ref 135–145)
SODIUM SERPL-SCNC: 138 MMOL/L — SIGNIFICANT CHANGE UP (ref 135–145)
SODIUM SERPL-SCNC: 139 MMOL/L — SIGNIFICANT CHANGE UP (ref 135–145)
SODIUM SERPL-SCNC: 140 MMOL/L — SIGNIFICANT CHANGE UP (ref 135–145)
SODIUM SERPL-SCNC: 141 MMOL/L — SIGNIFICANT CHANGE UP (ref 135–145)
SODIUM SERPL-SCNC: 141 MMOL/L — SIGNIFICANT CHANGE UP (ref 135–145)
SODIUM SERPL-SCNC: 142 MMOL/L — SIGNIFICANT CHANGE UP (ref 135–145)
SODIUM SERPL-SCNC: 143 MMOL/L — SIGNIFICANT CHANGE UP (ref 135–145)
SODIUM UR-SCNC: 53 MMOL/L — SIGNIFICANT CHANGE UP
SODIUM UR-SCNC: <35 MMOL/L — SIGNIFICANT CHANGE UP
SP GR SPEC: 1.01 — SIGNIFICANT CHANGE UP (ref 1.01–1.02)
SP GR SPEC: 1.02 — SIGNIFICANT CHANGE UP (ref 1.01–1.02)
SP GR SPEC: 1.03 — HIGH (ref 1.01–1.02)
SP GR SPEC: 1.04 — HIGH (ref 1.01–1.02)
SP GR SPEC: >1.05 (ref 1.01–1.02)
SPECIMEN SOURCE FLD: SIGNIFICANT CHANGE UP
SPECIMEN SOURCE: SIGNIFICANT CHANGE UP
T GONDII IGG SER QL: <3 IU/ML — SIGNIFICANT CHANGE UP
T GONDII IGG SER QL: NEGATIVE — SIGNIFICANT CHANGE UP
T PALLIDUM AB TITR SER: NEGATIVE — SIGNIFICANT CHANGE UP
TARGETS BLD QL SMEAR: SLIGHT — SIGNIFICANT CHANGE UP
TEG FUNCTIONAL FIBRINOGEN: 18.3 MM — SIGNIFICANT CHANGE UP (ref 15–32)
TEG FUNCTIONAL FIBRINOGEN: <4 MM (ref 15–32)
TEG FUNCTIONAL FIBRINOGEN: <4 MM (ref 15–32)
TEG MAXIMUM AMPLITUDE: 53.3 MM — SIGNIFICANT CHANGE UP (ref 52–69)
TEG MAXIMUM AMPLITUDE: <40 MM (ref 52–69)
TEG MAXIMUM AMPLITUDE: <40 MM (ref 52–69)
TEG REACTION TIME: 4.5 MIN (ref 4.6–9.1)
TEG REACTION TIME: 7.7 MIN — SIGNIFICANT CHANGE UP (ref 4.6–9.1)
TEG REACTION TIME: 8.8 MIN — SIGNIFICANT CHANGE UP (ref 4.6–9.1)
TIBC SERPL-MCNC: 124 UG/DL — LOW (ref 220–430)
TIBC SERPL-MCNC: 133 UG/DL — LOW (ref 220–430)
TIBC SERPL-MCNC: SIGNIFICANT CHANGE UP UG/DL (ref 220–430)
TIBC SERPL-MCNC: SIGNIFICANT CHANGE UP UG/DL (ref 220–430)
TOTAL NUCLEATED CELL COUNT, BODY FLUID: 162 /UL — SIGNIFICANT CHANGE UP
TOTAL NUCLEATED CELL COUNT, BODY FLUID: 27 /UL — SIGNIFICANT CHANGE UP
TOTAL NUCLEATED CELL COUNT, BODY FLUID: 28 /UL — SIGNIFICANT CHANGE UP
TOTAL NUCLEATED CELL COUNT, BODY FLUID: 280 /UL — SIGNIFICANT CHANGE UP
TOTAL NUCLEATED CELL COUNT, BODY FLUID: 37 /UL — SIGNIFICANT CHANGE UP
TOTAL NUCLEATED CELL COUNT, BODY FLUID: 390 /UL — SIGNIFICANT CHANGE UP
TOTAL NUCLEATED CELL COUNT, BODY FLUID: 410 /UL — SIGNIFICANT CHANGE UP
TRANSFERRIN SERPL-MCNC: 49 MG/DL — LOW (ref 200–360)
TRIGL SERPL-MCNC: 163 MG/DL — HIGH (ref 10–149)
TROPONIN T, HIGH SENSITIVITY RESULT: 198 NG/L — HIGH (ref 0–51)
TROPONIN T, HIGH SENSITIVITY RESULT: 250 NG/L — HIGH (ref 0–51)
TROPONIN T, HIGH SENSITIVITY RESULT: 38 NG/L — SIGNIFICANT CHANGE UP (ref 0–51)
TROPONIN T, HIGH SENSITIVITY RESULT: 79 NG/L — HIGH (ref 0–51)
TUBE TYPE: SIGNIFICANT CHANGE UP
UIBC SERPL-MCNC: 29 UG/DL — LOW (ref 110–370)
UIBC SERPL-MCNC: 91 UG/DL — LOW (ref 110–370)
UIBC SERPL-MCNC: <20 UG/DL — LOW (ref 110–370)
UIBC SERPL-MCNC: <20 UG/DL — LOW (ref 110–370)
UROBILINOGEN FLD QL: ABNORMAL
UROBILINOGEN FLD QL: SIGNIFICANT CHANGE UP
UUN UR-MCNC: 302 MG/DL — SIGNIFICANT CHANGE UP
VARIANT LYMPHS # BLD: 0.9 % — SIGNIFICANT CHANGE UP (ref 0–6)
VARIANT LYMPHS # BLD: 0.9 % — SIGNIFICANT CHANGE UP (ref 0–6)
VARIANT LYMPHS # BLD: 2 % — SIGNIFICANT CHANGE UP (ref 0–6)
VIT B12 SERPL-MCNC: 555 PG/ML — SIGNIFICANT CHANGE UP (ref 232–1245)
VZV IGG FLD QL IA: 1874 INDEX — SIGNIFICANT CHANGE UP
VZV IGG FLD QL IA: POSITIVE — SIGNIFICANT CHANGE UP
WBC # BLD: 10.04 K/UL — SIGNIFICANT CHANGE UP (ref 3.8–10.5)
WBC # BLD: 11.68 K/UL — HIGH (ref 3.8–10.5)
WBC # BLD: 11.88 K/UL — HIGH (ref 3.8–10.5)
WBC # BLD: 12.06 K/UL — HIGH (ref 3.8–10.5)
WBC # BLD: 12.29 K/UL — HIGH (ref 3.8–10.5)
WBC # BLD: 12.34 K/UL — HIGH (ref 3.8–10.5)
WBC # BLD: 12.37 K/UL — HIGH (ref 3.8–10.5)
WBC # BLD: 12.47 K/UL — HIGH (ref 3.8–10.5)
WBC # BLD: 12.66 K/UL — HIGH (ref 3.8–10.5)
WBC # BLD: 12.75 K/UL — HIGH (ref 3.8–10.5)
WBC # BLD: 12.89 K/UL — HIGH (ref 3.8–10.5)
WBC # BLD: 14.02 K/UL — HIGH (ref 3.8–10.5)
WBC # BLD: 14.03 K/UL — HIGH (ref 3.8–10.5)
WBC # BLD: 14.04 K/UL — HIGH (ref 3.8–10.5)
WBC # BLD: 14.05 K/UL — HIGH (ref 3.8–10.5)
WBC # BLD: 14.17 K/UL — HIGH (ref 3.8–10.5)
WBC # BLD: 14.24 K/UL — HIGH (ref 3.8–10.5)
WBC # BLD: 14.34 K/UL — HIGH (ref 3.8–10.5)
WBC # BLD: 14.46 K/UL — HIGH (ref 3.8–10.5)
WBC # BLD: 15.3 K/UL — HIGH (ref 3.8–10.5)
WBC # BLD: 15.35 K/UL — HIGH (ref 3.8–10.5)
WBC # BLD: 15.97 K/UL — HIGH (ref 3.8–10.5)
WBC # BLD: 16.02 K/UL — HIGH (ref 3.8–10.5)
WBC # BLD: 16.28 K/UL — HIGH (ref 3.8–10.5)
WBC # BLD: 16.48 K/UL — HIGH (ref 3.8–10.5)
WBC # BLD: 16.83 K/UL — HIGH (ref 3.8–10.5)
WBC # BLD: 17 K/UL — HIGH (ref 3.8–10.5)
WBC # BLD: 17.36 K/UL — HIGH (ref 3.8–10.5)
WBC # BLD: 18.04 K/UL — HIGH (ref 3.8–10.5)
WBC # BLD: 18.35 K/UL — HIGH (ref 3.8–10.5)
WBC # BLD: 18.47 K/UL — HIGH (ref 3.8–10.5)
WBC # BLD: 18.53 K/UL — HIGH (ref 3.8–10.5)
WBC # BLD: 18.54 K/UL — HIGH (ref 3.8–10.5)
WBC # BLD: 18.74 K/UL — HIGH (ref 3.8–10.5)
WBC # BLD: 18.81 K/UL — HIGH (ref 3.8–10.5)
WBC # BLD: 18.97 K/UL — HIGH (ref 3.8–10.5)
WBC # BLD: 19.05 K/UL — HIGH (ref 3.8–10.5)
WBC # BLD: 19.07 K/UL — HIGH (ref 3.8–10.5)
WBC # BLD: 19.22 K/UL — HIGH (ref 3.8–10.5)
WBC # BLD: 19.26 K/UL — HIGH (ref 3.8–10.5)
WBC # BLD: 19.26 K/UL — HIGH (ref 3.8–10.5)
WBC # BLD: 19.73 K/UL — HIGH (ref 3.8–10.5)
WBC # BLD: 19.75 K/UL — HIGH (ref 3.8–10.5)
WBC # BLD: 19.76 K/UL — HIGH (ref 3.8–10.5)
WBC # BLD: 20.11 K/UL — HIGH (ref 3.8–10.5)
WBC # BLD: 20.13 K/UL — HIGH (ref 3.8–10.5)
WBC # BLD: 20.39 K/UL — HIGH (ref 3.8–10.5)
WBC # BLD: 20.55 K/UL — HIGH (ref 3.8–10.5)
WBC # BLD: 20.6 K/UL — HIGH (ref 3.8–10.5)
WBC # BLD: 20.63 K/UL — HIGH (ref 3.8–10.5)
WBC # BLD: 20.8 K/UL — HIGH (ref 3.8–10.5)
WBC # BLD: 20.98 K/UL — HIGH (ref 3.8–10.5)
WBC # BLD: 22.09 K/UL — HIGH (ref 3.8–10.5)
WBC # BLD: 22.13 K/UL — HIGH (ref 3.8–10.5)
WBC # BLD: 22.15 K/UL — HIGH (ref 3.8–10.5)
WBC # BLD: 22.4 K/UL — HIGH (ref 3.8–10.5)
WBC # BLD: 22.52 K/UL — HIGH (ref 3.8–10.5)
WBC # BLD: 22.83 K/UL — HIGH (ref 3.8–10.5)
WBC # BLD: 23.43 K/UL — HIGH (ref 3.8–10.5)
WBC # BLD: 23.59 K/UL — HIGH (ref 3.8–10.5)
WBC # BLD: 23.74 K/UL — HIGH (ref 3.8–10.5)
WBC # BLD: 23.93 K/UL — HIGH (ref 3.8–10.5)
WBC # BLD: 24.29 K/UL — HIGH (ref 3.8–10.5)
WBC # BLD: 24.55 K/UL — HIGH (ref 3.8–10.5)
WBC # BLD: 24.64 K/UL — HIGH (ref 3.8–10.5)
WBC # BLD: 24.72 K/UL — HIGH (ref 3.8–10.5)
WBC # BLD: 24.78 K/UL — HIGH (ref 3.8–10.5)
WBC # BLD: 24.81 K/UL — HIGH (ref 3.8–10.5)
WBC # BLD: 24.95 K/UL — HIGH (ref 3.8–10.5)
WBC # BLD: 25.02 K/UL — HIGH (ref 3.8–10.5)
WBC # BLD: 25.26 K/UL — HIGH (ref 3.8–10.5)
WBC # BLD: 25.77 K/UL — HIGH (ref 3.8–10.5)
WBC # BLD: 26.61 K/UL — HIGH (ref 3.8–10.5)
WBC # BLD: 26.64 K/UL — HIGH (ref 3.8–10.5)
WBC # BLD: 26.73 K/UL — HIGH (ref 3.8–10.5)
WBC # BLD: 26.95 K/UL — HIGH (ref 3.8–10.5)
WBC # BLD: 27.85 K/UL — HIGH (ref 3.8–10.5)
WBC # BLD: 28.88 K/UL — HIGH (ref 3.8–10.5)
WBC # BLD: 28.96 K/UL — HIGH (ref 3.8–10.5)
WBC # BLD: 29.09 K/UL — HIGH (ref 3.8–10.5)
WBC # BLD: 29.33 K/UL — HIGH (ref 3.8–10.5)
WBC # BLD: 29.38 K/UL — HIGH (ref 3.8–10.5)
WBC # BLD: 29.62 K/UL — HIGH (ref 3.8–10.5)
WBC # BLD: 30.59 K/UL — HIGH (ref 3.8–10.5)
WBC # BLD: 31.5 K/UL — HIGH (ref 3.8–10.5)
WBC # BLD: 32.18 K/UL — HIGH (ref 3.8–10.5)
WBC # BLD: 6.67 K/UL — SIGNIFICANT CHANGE UP (ref 3.8–10.5)
WBC # BLD: 6.77 K/UL — SIGNIFICANT CHANGE UP (ref 3.8–10.5)
WBC # BLD: 7.04 K/UL — SIGNIFICANT CHANGE UP (ref 3.8–10.5)
WBC # BLD: 7.14 K/UL — SIGNIFICANT CHANGE UP (ref 3.8–10.5)
WBC # BLD: 7.36 K/UL — SIGNIFICANT CHANGE UP (ref 3.8–10.5)
WBC # BLD: 7.39 K/UL — SIGNIFICANT CHANGE UP (ref 3.8–10.5)
WBC # BLD: 7.85 K/UL — SIGNIFICANT CHANGE UP (ref 3.8–10.5)
WBC # BLD: 8.83 K/UL — SIGNIFICANT CHANGE UP (ref 3.8–10.5)
WBC # BLD: 9.18 K/UL — SIGNIFICANT CHANGE UP (ref 3.8–10.5)
WBC # BLD: 9.59 K/UL — SIGNIFICANT CHANGE UP (ref 3.8–10.5)
WBC # BLD: 9.65 K/UL — SIGNIFICANT CHANGE UP (ref 3.8–10.5)
WBC # FLD AUTO: 10.04 K/UL — SIGNIFICANT CHANGE UP (ref 3.8–10.5)
WBC # FLD AUTO: 11.68 K/UL — HIGH (ref 3.8–10.5)
WBC # FLD AUTO: 11.88 K/UL — HIGH (ref 3.8–10.5)
WBC # FLD AUTO: 12.06 K/UL — HIGH (ref 3.8–10.5)
WBC # FLD AUTO: 12.29 K/UL — HIGH (ref 3.8–10.5)
WBC # FLD AUTO: 12.34 K/UL — HIGH (ref 3.8–10.5)
WBC # FLD AUTO: 12.37 K/UL — HIGH (ref 3.8–10.5)
WBC # FLD AUTO: 12.47 K/UL — HIGH (ref 3.8–10.5)
WBC # FLD AUTO: 12.66 K/UL — HIGH (ref 3.8–10.5)
WBC # FLD AUTO: 12.75 K/UL — HIGH (ref 3.8–10.5)
WBC # FLD AUTO: 12.89 K/UL — HIGH (ref 3.8–10.5)
WBC # FLD AUTO: 14.02 K/UL — HIGH (ref 3.8–10.5)
WBC # FLD AUTO: 14.03 K/UL — HIGH (ref 3.8–10.5)
WBC # FLD AUTO: 14.04 K/UL — HIGH (ref 3.8–10.5)
WBC # FLD AUTO: 14.05 K/UL — HIGH (ref 3.8–10.5)
WBC # FLD AUTO: 14.17 K/UL — HIGH (ref 3.8–10.5)
WBC # FLD AUTO: 14.24 K/UL — HIGH (ref 3.8–10.5)
WBC # FLD AUTO: 14.34 K/UL — HIGH (ref 3.8–10.5)
WBC # FLD AUTO: 14.46 K/UL — HIGH (ref 3.8–10.5)
WBC # FLD AUTO: 15.3 K/UL — HIGH (ref 3.8–10.5)
WBC # FLD AUTO: 15.35 K/UL — HIGH (ref 3.8–10.5)
WBC # FLD AUTO: 15.97 K/UL — HIGH (ref 3.8–10.5)
WBC # FLD AUTO: 16.02 K/UL — HIGH (ref 3.8–10.5)
WBC # FLD AUTO: 16.28 K/UL — HIGH (ref 3.8–10.5)
WBC # FLD AUTO: 16.48 K/UL — HIGH (ref 3.8–10.5)
WBC # FLD AUTO: 16.83 K/UL — HIGH (ref 3.8–10.5)
WBC # FLD AUTO: 17 K/UL — HIGH (ref 3.8–10.5)
WBC # FLD AUTO: 17.36 K/UL — HIGH (ref 3.8–10.5)
WBC # FLD AUTO: 18.04 K/UL — HIGH (ref 3.8–10.5)
WBC # FLD AUTO: 18.35 K/UL — HIGH (ref 3.8–10.5)
WBC # FLD AUTO: 18.47 K/UL — HIGH (ref 3.8–10.5)
WBC # FLD AUTO: 18.53 K/UL — HIGH (ref 3.8–10.5)
WBC # FLD AUTO: 18.54 K/UL — HIGH (ref 3.8–10.5)
WBC # FLD AUTO: 18.74 K/UL — HIGH (ref 3.8–10.5)
WBC # FLD AUTO: 18.81 K/UL — HIGH (ref 3.8–10.5)
WBC # FLD AUTO: 18.97 K/UL — HIGH (ref 3.8–10.5)
WBC # FLD AUTO: 19.05 K/UL — HIGH (ref 3.8–10.5)
WBC # FLD AUTO: 19.07 K/UL — HIGH (ref 3.8–10.5)
WBC # FLD AUTO: 19.22 K/UL — HIGH (ref 3.8–10.5)
WBC # FLD AUTO: 19.26 K/UL — HIGH (ref 3.8–10.5)
WBC # FLD AUTO: 19.26 K/UL — HIGH (ref 3.8–10.5)
WBC # FLD AUTO: 19.73 K/UL — HIGH (ref 3.8–10.5)
WBC # FLD AUTO: 19.75 K/UL — HIGH (ref 3.8–10.5)
WBC # FLD AUTO: 19.76 K/UL — HIGH (ref 3.8–10.5)
WBC # FLD AUTO: 20.11 K/UL — HIGH (ref 3.8–10.5)
WBC # FLD AUTO: 20.13 K/UL — HIGH (ref 3.8–10.5)
WBC # FLD AUTO: 20.39 K/UL — HIGH (ref 3.8–10.5)
WBC # FLD AUTO: 20.55 K/UL — HIGH (ref 3.8–10.5)
WBC # FLD AUTO: 20.6 K/UL — HIGH (ref 3.8–10.5)
WBC # FLD AUTO: 20.63 K/UL — HIGH (ref 3.8–10.5)
WBC # FLD AUTO: 20.8 K/UL — HIGH (ref 3.8–10.5)
WBC # FLD AUTO: 20.98 K/UL — HIGH (ref 3.8–10.5)
WBC # FLD AUTO: 22.09 K/UL — HIGH (ref 3.8–10.5)
WBC # FLD AUTO: 22.13 K/UL — HIGH (ref 3.8–10.5)
WBC # FLD AUTO: 22.15 K/UL — HIGH (ref 3.8–10.5)
WBC # FLD AUTO: 22.4 K/UL — HIGH (ref 3.8–10.5)
WBC # FLD AUTO: 22.52 K/UL — HIGH (ref 3.8–10.5)
WBC # FLD AUTO: 22.83 K/UL — HIGH (ref 3.8–10.5)
WBC # FLD AUTO: 23.43 K/UL — HIGH (ref 3.8–10.5)
WBC # FLD AUTO: 23.59 K/UL — HIGH (ref 3.8–10.5)
WBC # FLD AUTO: 23.74 K/UL — HIGH (ref 3.8–10.5)
WBC # FLD AUTO: 23.93 K/UL — HIGH (ref 3.8–10.5)
WBC # FLD AUTO: 24.29 K/UL — HIGH (ref 3.8–10.5)
WBC # FLD AUTO: 24.55 K/UL — HIGH (ref 3.8–10.5)
WBC # FLD AUTO: 24.64 K/UL — HIGH (ref 3.8–10.5)
WBC # FLD AUTO: 24.72 K/UL — HIGH (ref 3.8–10.5)
WBC # FLD AUTO: 24.78 K/UL — HIGH (ref 3.8–10.5)
WBC # FLD AUTO: 24.81 K/UL — HIGH (ref 3.8–10.5)
WBC # FLD AUTO: 24.95 K/UL — HIGH (ref 3.8–10.5)
WBC # FLD AUTO: 25.02 K/UL — HIGH (ref 3.8–10.5)
WBC # FLD AUTO: 25.26 K/UL — HIGH (ref 3.8–10.5)
WBC # FLD AUTO: 25.77 K/UL — HIGH (ref 3.8–10.5)
WBC # FLD AUTO: 26.61 K/UL — HIGH (ref 3.8–10.5)
WBC # FLD AUTO: 26.64 K/UL — HIGH (ref 3.8–10.5)
WBC # FLD AUTO: 26.73 K/UL — HIGH (ref 3.8–10.5)
WBC # FLD AUTO: 26.95 K/UL — HIGH (ref 3.8–10.5)
WBC # FLD AUTO: 27.85 K/UL — HIGH (ref 3.8–10.5)
WBC # FLD AUTO: 28.88 K/UL — HIGH (ref 3.8–10.5)
WBC # FLD AUTO: 28.96 K/UL — HIGH (ref 3.8–10.5)
WBC # FLD AUTO: 29.09 K/UL — HIGH (ref 3.8–10.5)
WBC # FLD AUTO: 29.33 K/UL — HIGH (ref 3.8–10.5)
WBC # FLD AUTO: 29.38 K/UL — HIGH (ref 3.8–10.5)
WBC # FLD AUTO: 29.62 K/UL — HIGH (ref 3.8–10.5)
WBC # FLD AUTO: 30.59 K/UL — HIGH (ref 3.8–10.5)
WBC # FLD AUTO: 31.5 K/UL — HIGH (ref 3.8–10.5)
WBC # FLD AUTO: 32.18 K/UL — HIGH (ref 3.8–10.5)
WBC # FLD AUTO: 6.67 K/UL — SIGNIFICANT CHANGE UP (ref 3.8–10.5)
WBC # FLD AUTO: 6.77 K/UL — SIGNIFICANT CHANGE UP (ref 3.8–10.5)
WBC # FLD AUTO: 7.04 K/UL — SIGNIFICANT CHANGE UP (ref 3.8–10.5)
WBC # FLD AUTO: 7.14 K/UL — SIGNIFICANT CHANGE UP (ref 3.8–10.5)
WBC # FLD AUTO: 7.36 K/UL — SIGNIFICANT CHANGE UP (ref 3.8–10.5)
WBC # FLD AUTO: 7.39 K/UL — SIGNIFICANT CHANGE UP (ref 3.8–10.5)
WBC # FLD AUTO: 7.85 K/UL — SIGNIFICANT CHANGE UP (ref 3.8–10.5)
WBC # FLD AUTO: 8.83 K/UL — SIGNIFICANT CHANGE UP (ref 3.8–10.5)
WBC # FLD AUTO: 9.18 K/UL — SIGNIFICANT CHANGE UP (ref 3.8–10.5)
WBC # FLD AUTO: 9.59 K/UL — SIGNIFICANT CHANGE UP (ref 3.8–10.5)
WBC # FLD AUTO: 9.65 K/UL — SIGNIFICANT CHANGE UP (ref 3.8–10.5)
WBC UR QL: 1 /HPF — SIGNIFICANT CHANGE UP (ref 0–5)
WBC UR QL: 1 /HPF — SIGNIFICANT CHANGE UP (ref 0–5)
WBC UR QL: 17 /HPF — HIGH (ref 0–5)
WBC UR QL: 18 /HPF — HIGH (ref 0–5)

## 2020-01-01 PROCEDURE — 82150 ASSAY OF AMYLASE: CPT

## 2020-01-01 PROCEDURE — 82248 BILIRUBIN DIRECT: CPT

## 2020-01-01 PROCEDURE — 71045 X-RAY EXAM CHEST 1 VIEW: CPT | Mod: 26

## 2020-01-01 PROCEDURE — 87075 CULTR BACTERIA EXCEPT BLOOD: CPT

## 2020-01-01 PROCEDURE — 82803 BLOOD GASES ANY COMBINATION: CPT

## 2020-01-01 PROCEDURE — U0003: CPT

## 2020-01-01 PROCEDURE — 99232 SBSQ HOSP IP/OBS MODERATE 35: CPT | Mod: GC

## 2020-01-01 PROCEDURE — 99285 EMERGENCY DEPT VISIT HI MDM: CPT | Mod: 25

## 2020-01-01 PROCEDURE — 76604 US EXAM CHEST: CPT | Mod: 26,GC

## 2020-01-01 PROCEDURE — 99233 SBSQ HOSP IP/OBS HIGH 50: CPT | Mod: GC

## 2020-01-01 PROCEDURE — 82728 ASSAY OF FERRITIN: CPT

## 2020-01-01 PROCEDURE — 86901 BLOOD TYPING SEROLOGIC RH(D): CPT

## 2020-01-01 PROCEDURE — 93308 TTE F-UP OR LMTD: CPT | Mod: 26,GC

## 2020-01-01 PROCEDURE — 84478 ASSAY OF TRIGLYCERIDES: CPT

## 2020-01-01 PROCEDURE — 86790 VIRUS ANTIBODY NOS: CPT

## 2020-01-01 PROCEDURE — 90945 DIALYSIS ONE EVALUATION: CPT | Mod: GC

## 2020-01-01 PROCEDURE — 81001 URINALYSIS AUTO W/SCOPE: CPT

## 2020-01-01 PROCEDURE — 99291 CRITICAL CARE FIRST HOUR: CPT

## 2020-01-01 PROCEDURE — 99232 SBSQ HOSP IP/OBS MODERATE 35: CPT

## 2020-01-01 PROCEDURE — 99291 CRITICAL CARE FIRST HOUR: CPT | Mod: 25

## 2020-01-01 PROCEDURE — 82042 OTHER SOURCE ALBUMIN QUAN EA: CPT

## 2020-01-01 PROCEDURE — 84100 ASSAY OF PHOSPHORUS: CPT

## 2020-01-01 PROCEDURE — 84157 ASSAY OF PROTEIN OTHER: CPT

## 2020-01-01 PROCEDURE — P9047: CPT

## 2020-01-01 PROCEDURE — 83615 LACTATE (LD) (LDH) ENZYME: CPT

## 2020-01-01 PROCEDURE — 83036 HEMOGLOBIN GLYCOSYLATED A1C: CPT

## 2020-01-01 PROCEDURE — 86780 TREPONEMA PALLIDUM: CPT

## 2020-01-01 PROCEDURE — 87389 HIV-1 AG W/HIV-1&-2 AB AG IA: CPT

## 2020-01-01 PROCEDURE — 86900 BLOOD TYPING SEROLOGIC ABO: CPT

## 2020-01-01 PROCEDURE — 86923 COMPATIBILITY TEST ELECTRIC: CPT

## 2020-01-01 PROCEDURE — 86696 HERPES SIMPLEX TYPE 2 TEST: CPT

## 2020-01-01 PROCEDURE — 85027 COMPLETE CBC AUTOMATED: CPT

## 2020-01-01 PROCEDURE — 43255 EGD CONTROL BLEEDING ANY: CPT

## 2020-01-01 PROCEDURE — 82330 ASSAY OF CALCIUM: CPT

## 2020-01-01 PROCEDURE — 97116 GAIT TRAINING THERAPY: CPT

## 2020-01-01 PROCEDURE — 84132 ASSAY OF SERUM POTASSIUM: CPT

## 2020-01-01 PROCEDURE — 77001 FLUOROGUIDE FOR VEIN DEVICE: CPT | Mod: 26

## 2020-01-01 PROCEDURE — 45378 DIAGNOSTIC COLONOSCOPY: CPT | Mod: GC

## 2020-01-01 PROCEDURE — 99233 SBSQ HOSP IP/OBS HIGH 50: CPT

## 2020-01-01 PROCEDURE — 99223 1ST HOSP IP/OBS HIGH 75: CPT | Mod: AI

## 2020-01-01 PROCEDURE — 82340 ASSAY OF CALCIUM IN URINE: CPT

## 2020-01-01 PROCEDURE — 76705 ECHO EXAM OF ABDOMEN: CPT | Mod: 26,GC

## 2020-01-01 PROCEDURE — 85384 FIBRINOGEN ACTIVITY: CPT

## 2020-01-01 PROCEDURE — C1769: CPT

## 2020-01-01 PROCEDURE — 89051 BODY FLUID CELL COUNT: CPT

## 2020-01-01 PROCEDURE — 93005 ELECTROCARDIOGRAM TRACING: CPT

## 2020-01-01 PROCEDURE — 97162 PT EVAL MOD COMPLEX 30 MIN: CPT

## 2020-01-01 PROCEDURE — 84145 PROCALCITONIN (PCT): CPT

## 2020-01-01 PROCEDURE — 97161 PT EVAL LOW COMPLEX 20 MIN: CPT

## 2020-01-01 PROCEDURE — 88305 TISSUE EXAM BY PATHOLOGIST: CPT

## 2020-01-01 PROCEDURE — 80074 ACUTE HEPATITIS PANEL: CPT

## 2020-01-01 PROCEDURE — 85396 CLOTTING ASSAY WHOLE BLOOD: CPT

## 2020-01-01 PROCEDURE — 84540 ASSAY OF URINE/UREA-N: CPT

## 2020-01-01 PROCEDURE — 94640 AIRWAY INHALATION TREATMENT: CPT

## 2020-01-01 PROCEDURE — 87340 HEPATITIS B SURFACE AG IA: CPT

## 2020-01-01 PROCEDURE — 70450 CT HEAD/BRAIN W/O DYE: CPT | Mod: 26

## 2020-01-01 PROCEDURE — P9059: CPT

## 2020-01-01 PROCEDURE — 82550 ASSAY OF CK (CPK): CPT

## 2020-01-01 PROCEDURE — 76705 ECHO EXAM OF ABDOMEN: CPT

## 2020-01-01 PROCEDURE — 99223 1ST HOSP IP/OBS HIGH 75: CPT

## 2020-01-01 PROCEDURE — 80307 DRUG TEST PRSMV CHEM ANLYZR: CPT

## 2020-01-01 PROCEDURE — 82103 ALPHA-1-ANTITRYPSIN TOTAL: CPT

## 2020-01-01 PROCEDURE — 71045 X-RAY EXAM CHEST 1 VIEW: CPT

## 2020-01-01 PROCEDURE — 87040 BLOOD CULTURE FOR BACTERIA: CPT

## 2020-01-01 PROCEDURE — 99233 SBSQ HOSP IP/OBS HIGH 50: CPT | Mod: GC,25

## 2020-01-01 PROCEDURE — 93306 TTE W/DOPPLER COMPLETE: CPT | Mod: 26

## 2020-01-01 PROCEDURE — 82435 ASSAY OF BLOOD CHLORIDE: CPT

## 2020-01-01 PROCEDURE — 82565 ASSAY OF CREATININE: CPT

## 2020-01-01 PROCEDURE — C1889: CPT

## 2020-01-01 PROCEDURE — 80048 BASIC METABOLIC PNL TOTAL CA: CPT

## 2020-01-01 PROCEDURE — 86803 HEPATITIS C AB TEST: CPT

## 2020-01-01 PROCEDURE — 84466 ASSAY OF TRANSFERRIN: CPT

## 2020-01-01 PROCEDURE — 99223 1ST HOSP IP/OBS HIGH 75: CPT | Mod: GC

## 2020-01-01 PROCEDURE — 86762 RUBELLA ANTIBODY: CPT

## 2020-01-01 PROCEDURE — 74177 CT ABD & PELVIS W/CONTRAST: CPT | Mod: 26

## 2020-01-01 PROCEDURE — 86381 MITOCHONDRIAL ANTIBODY EACH: CPT

## 2020-01-01 PROCEDURE — 82105 ALPHA-FETOPROTEIN SERUM: CPT

## 2020-01-01 PROCEDURE — 94660 CPAP INITIATION&MGMT: CPT

## 2020-01-01 PROCEDURE — 87086 URINE CULTURE/COLONY COUNT: CPT

## 2020-01-01 PROCEDURE — 82104 ALPHA-1-ANTITRYPSIN PHENO: CPT

## 2020-01-01 PROCEDURE — 84295 ASSAY OF SERUM SODIUM: CPT

## 2020-01-01 PROCEDURE — 87070 CULTURE OTHR SPECIMN AEROBIC: CPT

## 2020-01-01 PROCEDURE — 86769 SARS-COV-2 COVID-19 ANTIBODY: CPT

## 2020-01-01 PROCEDURE — 80053 COMPREHEN METABOLIC PANEL: CPT

## 2020-01-01 PROCEDURE — 99261: CPT

## 2020-01-01 PROCEDURE — 82310 ASSAY OF CALCIUM: CPT

## 2020-01-01 PROCEDURE — 70450 CT HEAD/BRAIN W/O DYE: CPT

## 2020-01-01 PROCEDURE — 83540 ASSAY OF IRON: CPT

## 2020-01-01 PROCEDURE — 82945 GLUCOSE OTHER FLUID: CPT

## 2020-01-01 PROCEDURE — 36430 TRANSFUSION BLD/BLD COMPNT: CPT

## 2020-01-01 PROCEDURE — 99255 IP/OBS CONSLTJ NEW/EST HI 80: CPT

## 2020-01-01 PROCEDURE — 87205 SMEAR GRAM STAIN: CPT

## 2020-01-01 PROCEDURE — 36558 INSERT TUNNELED CV CATH: CPT

## 2020-01-01 PROCEDURE — 86777 TOXOPLASMA ANTIBODY: CPT

## 2020-01-01 PROCEDURE — A9585: CPT

## 2020-01-01 PROCEDURE — C1729: CPT

## 2020-01-01 PROCEDURE — 82947 ASSAY GLUCOSE BLOOD QUANT: CPT

## 2020-01-01 PROCEDURE — 93306 TTE W/DOPPLER COMPLETE: CPT

## 2020-01-01 PROCEDURE — 83986 ASSAY PH BODY FLUID NOS: CPT

## 2020-01-01 PROCEDURE — 86695 HERPES SIMPLEX TYPE 1 TEST: CPT

## 2020-01-01 PROCEDURE — 95714 VEEG EA 12-26 HR UNMNTR: CPT

## 2020-01-01 PROCEDURE — 96374 THER/PROPH/DIAG INJ IV PUSH: CPT | Mod: XU

## 2020-01-01 PROCEDURE — 86787 VARICELLA-ZOSTER ANTIBODY: CPT

## 2020-01-01 PROCEDURE — 86666 EHRLICHIA ANTIBODY: CPT

## 2020-01-01 PROCEDURE — 83605 ASSAY OF LACTIC ACID: CPT

## 2020-01-01 PROCEDURE — 86706 HEP B SURFACE ANTIBODY: CPT

## 2020-01-01 PROCEDURE — 86708 HEPATITIS A ANTIBODY: CPT

## 2020-01-01 PROCEDURE — 84484 ASSAY OF TROPONIN QUANT: CPT

## 2020-01-01 PROCEDURE — 87102 FUNGUS ISOLATION CULTURE: CPT

## 2020-01-01 PROCEDURE — 85018 HEMOGLOBIN: CPT

## 2020-01-01 PROCEDURE — 86965 POOLING BLOOD PLATELETS: CPT

## 2020-01-01 PROCEDURE — 88112 CYTOPATH CELL ENHANCE TECH: CPT | Mod: 26

## 2020-01-01 PROCEDURE — 82570 ASSAY OF URINE CREATININE: CPT

## 2020-01-01 PROCEDURE — 95700 EEG CONT REC W/VID EEG TECH: CPT

## 2020-01-01 PROCEDURE — 76700 US EXAM ABDOM COMPLETE: CPT

## 2020-01-01 PROCEDURE — 94003 VENT MGMT INPAT SUBQ DAY: CPT

## 2020-01-01 PROCEDURE — 85379 FIBRIN DEGRADATION QUANT: CPT

## 2020-01-01 PROCEDURE — C1887: CPT

## 2020-01-01 PROCEDURE — 93975 VASCULAR STUDY: CPT | Mod: 26

## 2020-01-01 PROCEDURE — 49083 ABD PARACENTESIS W/IMAGING: CPT

## 2020-01-01 PROCEDURE — 12345: CPT | Mod: NC,GC

## 2020-01-01 PROCEDURE — 86664 EPSTEIN-BARR NUCLEAR ANTIGEN: CPT

## 2020-01-01 PROCEDURE — 74181 MRI ABDOMEN W/O CONTRAST: CPT | Mod: 26

## 2020-01-01 PROCEDURE — 99024 POSTOP FOLLOW-UP VISIT: CPT

## 2020-01-01 PROCEDURE — 94002 VENT MGMT INPAT INIT DAY: CPT

## 2020-01-01 PROCEDURE — 85610 PROTHROMBIN TIME: CPT

## 2020-01-01 PROCEDURE — G0480: CPT

## 2020-01-01 PROCEDURE — 83550 IRON BINDING TEST: CPT

## 2020-01-01 PROCEDURE — P9011: CPT

## 2020-01-01 PROCEDURE — 93010 ELECTROCARDIOGRAM REPORT: CPT

## 2020-01-01 PROCEDURE — 99239 HOSP IP/OBS DSCHRG MGMT >30: CPT | Mod: GC

## 2020-01-01 PROCEDURE — 86140 C-REACTIVE PROTEIN: CPT

## 2020-01-01 PROCEDURE — 36556 INSERT NON-TUNNEL CV CATH: CPT | Mod: GC

## 2020-01-01 PROCEDURE — 83735 ASSAY OF MAGNESIUM: CPT

## 2020-01-01 PROCEDURE — 82533 TOTAL CORTISOL: CPT

## 2020-01-01 PROCEDURE — 82787 IGG 1 2 3 OR 4 EACH: CPT

## 2020-01-01 PROCEDURE — 88112 CYTOPATH CELL ENHANCE TECH: CPT

## 2020-01-01 PROCEDURE — 92610 EVALUATE SWALLOWING FUNCTION: CPT

## 2020-01-01 PROCEDURE — 80076 HEPATIC FUNCTION PANEL: CPT

## 2020-01-01 PROCEDURE — 86850 RBC ANTIBODY SCREEN: CPT

## 2020-01-01 PROCEDURE — 99231 SBSQ HOSP IP/OBS SF/LOW 25: CPT

## 2020-01-01 PROCEDURE — 86644 CMV ANTIBODY: CPT

## 2020-01-01 PROCEDURE — P9016: CPT

## 2020-01-01 PROCEDURE — 71250 CT THORAX DX C-: CPT | Mod: 26

## 2020-01-01 PROCEDURE — 74181 MRI ABDOMEN W/O CONTRAST: CPT

## 2020-01-01 PROCEDURE — 49083 ABD PARACENTESIS W/IMAGING: CPT | Mod: GC,59

## 2020-01-01 PROCEDURE — 74018 RADEX ABDOMEN 1 VIEW: CPT

## 2020-01-01 PROCEDURE — 82962 GLUCOSE BLOOD TEST: CPT

## 2020-01-01 PROCEDURE — 74177 CT ABD & PELVIS W/CONTRAST: CPT

## 2020-01-01 PROCEDURE — 71045 X-RAY EXAM CHEST 1 VIEW: CPT | Mod: 26,77

## 2020-01-01 PROCEDURE — C1894: CPT

## 2020-01-01 PROCEDURE — P9012: CPT

## 2020-01-01 PROCEDURE — 87798 DETECT AGENT NOS DNA AMP: CPT

## 2020-01-01 PROCEDURE — 82140 ASSAY OF AMMONIA: CPT

## 2020-01-01 PROCEDURE — 81332 SERPINA1 GENE: CPT

## 2020-01-01 PROCEDURE — 99232 SBSQ HOSP IP/OBS MODERATE 35: CPT | Mod: GC,25

## 2020-01-01 PROCEDURE — 70553 MRI BRAIN STEM W/O & W/DYE: CPT

## 2020-01-01 PROCEDURE — P9037: CPT

## 2020-01-01 PROCEDURE — 84133 ASSAY OF URINE POTASSIUM: CPT

## 2020-01-01 PROCEDURE — 85730 THROMBOPLASTIN TIME PARTIAL: CPT

## 2020-01-01 PROCEDURE — 93975 VASCULAR STUDY: CPT

## 2020-01-01 PROCEDURE — 49083 ABD PARACENTESIS W/IMAGING: CPT | Mod: GC

## 2020-01-01 PROCEDURE — 87533 HHV-6 DNA QUANT: CPT

## 2020-01-01 PROCEDURE — 93970 EXTREMITY STUDY: CPT | Mod: 26

## 2020-01-01 PROCEDURE — 82784 ASSAY IGA/IGD/IGG/IGM EACH: CPT

## 2020-01-01 PROCEDURE — 76705 ECHO EXAM OF ABDOMEN: CPT | Mod: 26,59

## 2020-01-01 PROCEDURE — 86765 RUBEOLA ANTIBODY: CPT

## 2020-01-01 PROCEDURE — 99254 IP/OBS CNSLTJ NEW/EST MOD 60: CPT | Mod: GC

## 2020-01-01 PROCEDURE — P9017: CPT

## 2020-01-01 PROCEDURE — 85025 COMPLETE CBC W/AUTO DIFF WBC: CPT

## 2020-01-01 PROCEDURE — 83880 ASSAY OF NATRIURETIC PEPTIDE: CPT

## 2020-01-01 PROCEDURE — 99222 1ST HOSP IP/OBS MODERATE 55: CPT | Mod: GC

## 2020-01-01 PROCEDURE — 70553 MRI BRAIN STEM W/O & W/DYE: CPT | Mod: 26

## 2020-01-01 PROCEDURE — 85014 HEMATOCRIT: CPT

## 2020-01-01 PROCEDURE — 82553 CREATINE MB FRACTION: CPT

## 2020-01-01 PROCEDURE — 49082 ABD PARACENTESIS: CPT

## 2020-01-01 PROCEDURE — 76700 US EXAM ABDOM COMPLETE: CPT | Mod: 26,59

## 2020-01-01 PROCEDURE — 90935 HEMODIALYSIS ONE EVALUATION: CPT | Mod: GC

## 2020-01-01 PROCEDURE — 99255 IP/OBS CONSLTJ NEW/EST HI 80: CPT | Mod: GC

## 2020-01-01 PROCEDURE — 0225U NFCT DS DNA&RNA 21 SARSCOV2: CPT

## 2020-01-01 PROCEDURE — 74019 RADEX ABDOMEN 2 VIEWS: CPT

## 2020-01-01 PROCEDURE — 86735 MUMPS ANTIBODY: CPT

## 2020-01-01 PROCEDURE — 74019 RADEX ABDOMEN 2 VIEWS: CPT | Mod: 26

## 2020-01-01 PROCEDURE — 86665 EPSTEIN-BARR CAPSID VCA: CPT

## 2020-01-01 PROCEDURE — 86256 FLUORESCENT ANTIBODY TITER: CPT

## 2020-01-01 PROCEDURE — 76937 US GUIDE VASCULAR ACCESS: CPT

## 2020-01-01 PROCEDURE — 83690 ASSAY OF LIPASE: CPT

## 2020-01-01 PROCEDURE — 87220 TISSUE EXAM FOR FUNGI: CPT

## 2020-01-01 PROCEDURE — 36556 INSERT NON-TUNNEL CV CATH: CPT

## 2020-01-01 PROCEDURE — 87449 NOS EACH ORGANISM AG IA: CPT

## 2020-01-01 PROCEDURE — 93970 EXTREMITY STUDY: CPT

## 2020-01-01 PROCEDURE — 87324 CLOSTRIDIUM AG IA: CPT

## 2020-01-01 PROCEDURE — 36012 PLACE CATHETER IN VEIN: CPT

## 2020-01-01 PROCEDURE — 86480 TB TEST CELL IMMUN MEASURE: CPT

## 2020-01-01 PROCEDURE — 87116 MYCOBACTERIA CULTURE: CPT

## 2020-01-01 PROCEDURE — 95720 EEG PHY/QHP EA INCR W/VEEG: CPT

## 2020-01-01 PROCEDURE — 92526 ORAL FUNCTION THERAPY: CPT

## 2020-01-01 PROCEDURE — 82247 BILIRUBIN TOTAL: CPT

## 2020-01-01 PROCEDURE — 71250 CT THORAX DX C-: CPT

## 2020-01-01 PROCEDURE — 84300 ASSAY OF URINE SODIUM: CPT

## 2020-01-01 PROCEDURE — 86038 ANTINUCLEAR ANTIBODIES: CPT

## 2020-01-01 PROCEDURE — 31624 DX BRONCHOSCOPE/LAVAGE: CPT | Mod: GC

## 2020-01-01 PROCEDURE — 44360 SMALL BOWEL ENDOSCOPY: CPT

## 2020-01-01 PROCEDURE — 86376 MICROSOMAL ANTIBODY EACH: CPT

## 2020-01-01 PROCEDURE — 32555 ASPIRATE PLEURA W/ IMAGING: CPT | Mod: GC

## 2020-01-01 PROCEDURE — 83970 ASSAY OF PARATHORMONE: CPT

## 2020-01-01 PROCEDURE — 93010 ELECTROCARDIOGRAM REPORT: CPT | Mod: 59

## 2020-01-01 PROCEDURE — 86255 FLUORESCENT ANTIBODY SCREEN: CPT

## 2020-01-01 PROCEDURE — 31500 INSERT EMERGENCY AIRWAY: CPT | Mod: GC

## 2020-01-01 PROCEDURE — 83930 ASSAY OF BLOOD OSMOLALITY: CPT

## 2020-01-01 PROCEDURE — 86753 PROTOZOA ANTIBODY NOS: CPT

## 2020-01-01 PROCEDURE — 97530 THERAPEUTIC ACTIVITIES: CPT

## 2020-01-01 PROCEDURE — 87206 SMEAR FLUORESCENT/ACID STAI: CPT

## 2020-01-01 PROCEDURE — 74018 RADEX ABDOMEN 1 VIEW: CPT | Mod: 26

## 2020-01-01 PROCEDURE — 82607 VITAMIN B-12: CPT

## 2020-01-01 PROCEDURE — 99239 HOSP IP/OBS DSCHRG MGMT >30: CPT

## 2020-01-01 PROCEDURE — C1750: CPT

## 2020-01-01 PROCEDURE — 37224: CPT | Mod: RT

## 2020-01-01 PROCEDURE — 82272 OCCULT BLD FECES 1-3 TESTS: CPT

## 2020-01-01 PROCEDURE — 82746 ASSAY OF FOLIC ACID SERUM: CPT

## 2020-01-01 PROCEDURE — 93010 ELECTROCARDIOGRAM REPORT: CPT | Mod: 77

## 2020-01-01 PROCEDURE — 76705 ECHO EXAM OF ABDOMEN: CPT | Mod: 26

## 2020-01-01 PROCEDURE — 85045 AUTOMATED RETICULOCYTE COUNT: CPT

## 2020-01-01 PROCEDURE — 37224: CPT

## 2020-01-01 PROCEDURE — 84156 ASSAY OF PROTEIN URINE: CPT

## 2020-01-01 PROCEDURE — 76937 US GUIDE VASCULAR ACCESS: CPT | Mod: 26

## 2020-01-01 PROCEDURE — 87015 SPECIMEN INFECT AGNT CONCNTJ: CPT

## 2020-01-01 PROCEDURE — 88305 TISSUE EXAM BY PATHOLOGIST: CPT | Mod: 26

## 2020-01-01 PROCEDURE — 99285 EMERGENCY DEPT VISIT HI MDM: CPT

## 2020-01-01 PROCEDURE — 36620 INSERTION CATHETER ARTERY: CPT | Mod: GC

## 2020-01-01 PROCEDURE — 86663 EPSTEIN-BARR ANTIBODY: CPT

## 2020-01-01 PROCEDURE — C1752: CPT

## 2020-01-01 PROCEDURE — 93976 VASCULAR STUDY: CPT | Mod: 26

## 2020-01-01 PROCEDURE — 86704 HEP B CORE ANTIBODY TOTAL: CPT

## 2020-01-01 PROCEDURE — 77001 FLUOROGUIDE FOR VEIN DEVICE: CPT

## 2020-01-01 RX ORDER — ALBUMIN HUMAN 25 %
50 VIAL (ML) INTRAVENOUS ONCE
Refills: 0 | Status: COMPLETED | OUTPATIENT
Start: 2020-01-01 | End: 2020-01-01

## 2020-01-01 RX ORDER — ACETAMINOPHEN 500 MG
650 TABLET ORAL ONCE
Refills: 0 | Status: COMPLETED | OUTPATIENT
Start: 2020-01-01 | End: 2020-01-01

## 2020-01-01 RX ORDER — ACETYLCYSTEINE 200 MG/ML
9 VIAL (ML) MISCELLANEOUS ONCE
Refills: 0 | Status: COMPLETED | OUTPATIENT
Start: 2020-01-01 | End: 2020-01-01

## 2020-01-01 RX ORDER — MEROPENEM 1 G/30ML
1000 INJECTION INTRAVENOUS EVERY 8 HOURS
Refills: 0 | Status: DISCONTINUED | OUTPATIENT
Start: 2020-01-01 | End: 2020-01-01

## 2020-01-01 RX ORDER — ONDANSETRON 8 MG/1
4 TABLET, FILM COATED ORAL ONCE
Refills: 0 | Status: COMPLETED | OUTPATIENT
Start: 2020-01-01 | End: 2020-01-01

## 2020-01-01 RX ORDER — VANCOMYCIN HCL 1 G
1000 VIAL (EA) INTRAVENOUS ONCE
Refills: 0 | Status: COMPLETED | OUTPATIENT
Start: 2020-01-01 | End: 2020-01-01

## 2020-01-01 RX ORDER — SODIUM CHLORIDE 9 MG/ML
500 INJECTION INTRAMUSCULAR; INTRAVENOUS; SUBCUTANEOUS ONCE
Refills: 0 | Status: COMPLETED | OUTPATIENT
Start: 2020-01-01 | End: 2020-01-01

## 2020-01-01 RX ORDER — SODIUM BICARBONATE 1 MEQ/ML
50 SYRINGE (ML) INTRAVENOUS ONCE
Refills: 0 | Status: COMPLETED | OUTPATIENT
Start: 2020-01-01 | End: 2020-01-01

## 2020-01-01 RX ORDER — OCTREOTIDE ACETATE 200 UG/ML
50 INJECTION, SOLUTION INTRAVENOUS; SUBCUTANEOUS
Qty: 500 | Refills: 0 | Status: DISCONTINUED | OUTPATIENT
Start: 2020-01-01 | End: 2020-01-01

## 2020-01-01 RX ORDER — PROPOFOL 10 MG/ML
5 INJECTION, EMULSION INTRAVENOUS
Qty: 1000 | Refills: 0 | Status: DISCONTINUED | OUTPATIENT
Start: 2020-01-01 | End: 2020-01-01

## 2020-01-01 RX ORDER — OXYCODONE HYDROCHLORIDE 5 MG/1
5 TABLET ORAL EVERY 4 HOURS
Refills: 0 | Status: DISCONTINUED | OUTPATIENT
Start: 2020-01-01 | End: 2020-01-01

## 2020-01-01 RX ORDER — CALCIUM GLUCONATE 100 MG/ML
1 VIAL (ML) INTRAVENOUS ONCE
Refills: 0 | Status: DISCONTINUED | OUTPATIENT
Start: 2020-01-01 | End: 2020-01-01

## 2020-01-01 RX ORDER — PANTOPRAZOLE SODIUM 20 MG/1
40 TABLET, DELAYED RELEASE ORAL
Refills: 0 | Status: DISCONTINUED | OUTPATIENT
Start: 2020-01-01 | End: 2020-01-01

## 2020-01-01 RX ORDER — FOLIC ACID 0.8 MG
1 TABLET ORAL DAILY
Refills: 0 | Status: DISCONTINUED | OUTPATIENT
Start: 2020-01-01 | End: 2020-01-01

## 2020-01-01 RX ORDER — KETAMINE HYDROCHLORIDE 100 MG/ML
80 INJECTION INTRAMUSCULAR; INTRAVENOUS ONCE
Refills: 0 | Status: DISCONTINUED | OUTPATIENT
Start: 2020-01-01 | End: 2020-01-01

## 2020-01-01 RX ORDER — FENTANYL CITRATE 50 UG/ML
100 INJECTION INTRAVENOUS ONCE
Refills: 0 | Status: DISCONTINUED | OUTPATIENT
Start: 2020-01-01 | End: 2020-01-01

## 2020-01-01 RX ORDER — PANTOPRAZOLE SODIUM 20 MG/1
80 TABLET, DELAYED RELEASE ORAL ONCE
Refills: 0 | Status: COMPLETED | OUTPATIENT
Start: 2020-01-01 | End: 2020-01-01

## 2020-01-01 RX ORDER — PIPERACILLIN AND TAZOBACTAM 4; .5 G/20ML; G/20ML
3.38 INJECTION, POWDER, LYOPHILIZED, FOR SOLUTION INTRAVENOUS EVERY 12 HOURS
Refills: 0 | Status: DISCONTINUED | OUTPATIENT
Start: 2020-01-01 | End: 2020-01-01

## 2020-01-01 RX ORDER — MIDAZOLAM HYDROCHLORIDE 1 MG/ML
4 INJECTION, SOLUTION INTRAMUSCULAR; INTRAVENOUS ONCE
Refills: 0 | Status: DISCONTINUED | OUTPATIENT
Start: 2020-01-01 | End: 2020-01-01

## 2020-01-01 RX ORDER — MIDODRINE HYDROCHLORIDE 2.5 MG/1
10 TABLET ORAL EVERY 8 HOURS
Refills: 0 | Status: DISCONTINUED | OUTPATIENT
Start: 2020-01-01 | End: 2020-01-01

## 2020-01-01 RX ORDER — DEXTROSE 50 % IN WATER 50 %
50 SYRINGE (ML) INTRAVENOUS ONCE
Refills: 0 | Status: COMPLETED | OUTPATIENT
Start: 2020-01-01 | End: 2020-01-01

## 2020-01-01 RX ORDER — HYDROMORPHONE HYDROCHLORIDE 2 MG/ML
0.2 INJECTION INTRAMUSCULAR; INTRAVENOUS; SUBCUTANEOUS ONCE
Refills: 0 | Status: DISCONTINUED | OUTPATIENT
Start: 2020-01-01 | End: 2020-01-01

## 2020-01-01 RX ORDER — MIDODRINE HYDROCHLORIDE 2.5 MG/1
30 TABLET ORAL ONCE
Refills: 0 | Status: COMPLETED | OUTPATIENT
Start: 2020-01-01 | End: 2020-01-01

## 2020-01-01 RX ORDER — ONDANSETRON 8 MG/1
4 TABLET, FILM COATED ORAL DAILY
Refills: 0 | Status: DISCONTINUED | OUTPATIENT
Start: 2020-01-01 | End: 2020-01-01

## 2020-01-01 RX ORDER — PIPERACILLIN AND TAZOBACTAM 4; .5 G/20ML; G/20ML
3.38 INJECTION, POWDER, LYOPHILIZED, FOR SOLUTION INTRAVENOUS ONCE
Refills: 0 | Status: COMPLETED | OUTPATIENT
Start: 2020-01-01 | End: 2020-01-01

## 2020-01-01 RX ORDER — ERYTHROPOIETIN 10000 [IU]/ML
4000 INJECTION, SOLUTION INTRAVENOUS; SUBCUTANEOUS
Refills: 0 | Status: DISCONTINUED | OUTPATIENT
Start: 2020-01-01 | End: 2020-01-01

## 2020-01-01 RX ORDER — ERYTHROPOIETIN 10000 [IU]/ML
10000 INJECTION, SOLUTION INTRAVENOUS; SUBCUTANEOUS
Qty: 0 | Refills: 0 | DISCHARGE
Start: 2020-01-01

## 2020-01-01 RX ORDER — ALBUMIN HUMAN 25 %
100 VIAL (ML) INTRAVENOUS EVERY 6 HOURS
Refills: 0 | Status: DISCONTINUED | OUTPATIENT
Start: 2020-01-01 | End: 2020-01-01

## 2020-01-01 RX ORDER — SEVELAMER CARBONATE 2400 MG/1
1 POWDER, FOR SUSPENSION ORAL
Qty: 0 | Refills: 0 | DISCHARGE
Start: 2020-01-01

## 2020-01-01 RX ORDER — HYDROMORPHONE HYDROCHLORIDE 2 MG/ML
0.5 INJECTION INTRAMUSCULAR; INTRAVENOUS; SUBCUTANEOUS ONCE
Refills: 0 | Status: DISCONTINUED | OUTPATIENT
Start: 2020-01-01 | End: 2020-01-01

## 2020-01-01 RX ORDER — PANTOPRAZOLE SODIUM 20 MG/1
1 TABLET, DELAYED RELEASE ORAL
Qty: 30 | Refills: 0
Start: 2020-01-01 | End: 2020-01-01

## 2020-01-01 RX ORDER — PIPERACILLIN AND TAZOBACTAM 4; .5 G/20ML; G/20ML
3.38 INJECTION, POWDER, LYOPHILIZED, FOR SOLUTION INTRAVENOUS ONCE
Refills: 0 | Status: DISCONTINUED | OUTPATIENT
Start: 2020-01-01 | End: 2020-01-01

## 2020-01-01 RX ORDER — AMIODARONE HYDROCHLORIDE 400 MG/1
300 TABLET ORAL ONCE
Refills: 0 | Status: DISCONTINUED | OUTPATIENT
Start: 2020-01-01 | End: 2020-01-01

## 2020-01-01 RX ORDER — SODIUM CHLORIDE 9 MG/ML
10 INJECTION INTRAMUSCULAR; INTRAVENOUS; SUBCUTANEOUS
Refills: 0 | Status: DISCONTINUED | OUTPATIENT
Start: 2020-01-01 | End: 2020-01-01

## 2020-01-01 RX ORDER — HEPATITIS A VIRUS VACCINE 50 UNIT/ML
1 SYRINGE (ML) INTRAMUSCULAR ONCE
Refills: 0 | Status: DISCONTINUED | OUTPATIENT
Start: 2020-01-01 | End: 2020-01-01

## 2020-01-01 RX ORDER — LEVETIRACETAM 250 MG/1
500 TABLET, FILM COATED ORAL
Refills: 0 | Status: DISCONTINUED | OUTPATIENT
Start: 2020-01-01 | End: 2020-01-01

## 2020-01-01 RX ORDER — LACTULOSE 10 G/15ML
30 SOLUTION ORAL EVERY 6 HOURS
Refills: 0 | Status: DISCONTINUED | OUTPATIENT
Start: 2020-01-01 | End: 2020-01-01

## 2020-01-01 RX ORDER — SEVELAMER CARBONATE 2400 MG/1
800 POWDER, FOR SUSPENSION ORAL THREE TIMES A DAY
Refills: 0 | Status: DISCONTINUED | OUTPATIENT
Start: 2020-01-01 | End: 2020-01-01

## 2020-01-01 RX ORDER — SOD SULF/SODIUM/NAHCO3/KCL/PEG
4000 SOLUTION, RECONSTITUTED, ORAL ORAL ONCE
Refills: 0 | Status: COMPLETED | OUTPATIENT
Start: 2020-01-01 | End: 2020-01-01

## 2020-01-01 RX ORDER — HYDROMORPHONE HYDROCHLORIDE 2 MG/ML
2 INJECTION INTRAMUSCULAR; INTRAVENOUS; SUBCUTANEOUS ONCE
Refills: 0 | Status: DISCONTINUED | OUTPATIENT
Start: 2020-01-01 | End: 2020-01-01

## 2020-01-01 RX ORDER — PIPERACILLIN AND TAZOBACTAM 4; .5 G/20ML; G/20ML
2.25 INJECTION, POWDER, LYOPHILIZED, FOR SOLUTION INTRAVENOUS EVERY 6 HOURS
Refills: 0 | Status: DISCONTINUED | OUTPATIENT
Start: 2020-01-01 | End: 2020-01-01

## 2020-01-01 RX ORDER — SODIUM BICARBONATE 1 MEQ/ML
250 SYRINGE (ML) INTRAVENOUS ONCE
Refills: 0 | Status: DISCONTINUED | OUTPATIENT
Start: 2020-01-01 | End: 2020-01-01

## 2020-01-01 RX ORDER — INSULIN HUMAN 100 [IU]/ML
5 INJECTION, SOLUTION SUBCUTANEOUS ONCE
Refills: 0 | Status: DISCONTINUED | OUTPATIENT
Start: 2020-01-01 | End: 2020-01-01

## 2020-01-01 RX ORDER — MEROPENEM 1 G/30ML
500 INJECTION INTRAVENOUS ONCE
Refills: 0 | Status: COMPLETED | OUTPATIENT
Start: 2020-01-01 | End: 2020-01-01

## 2020-01-01 RX ORDER — PHYTONADIONE (VIT K1) 5 MG
10 TABLET ORAL ONCE
Refills: 0 | Status: COMPLETED | OUTPATIENT
Start: 2020-01-01 | End: 2020-01-01

## 2020-01-01 RX ORDER — CEFTRIAXONE 500 MG/1
1000 INJECTION, POWDER, FOR SOLUTION INTRAMUSCULAR; INTRAVENOUS ONCE
Refills: 0 | Status: COMPLETED | OUTPATIENT
Start: 2020-01-01 | End: 2020-01-01

## 2020-01-01 RX ORDER — HYDROMORPHONE HYDROCHLORIDE 2 MG/ML
0.25 INJECTION INTRAMUSCULAR; INTRAVENOUS; SUBCUTANEOUS ONCE
Refills: 0 | Status: DISCONTINUED | OUTPATIENT
Start: 2020-01-01 | End: 2020-01-01

## 2020-01-01 RX ORDER — SODIUM CHLORIDE 9 MG/ML
500 INJECTION INTRAMUSCULAR; INTRAVENOUS; SUBCUTANEOUS
Refills: 0 | Status: DISCONTINUED | OUTPATIENT
Start: 2020-01-01 | End: 2020-01-01

## 2020-01-01 RX ORDER — SODIUM BICARBONATE 1 MEQ/ML
0.2 SYRINGE (ML) INTRAVENOUS
Qty: 150 | Refills: 0 | Status: DISCONTINUED | OUTPATIENT
Start: 2020-01-01 | End: 2020-01-01

## 2020-01-01 RX ORDER — DEXTROSE 50 % IN WATER 50 %
25 SYRINGE (ML) INTRAVENOUS ONCE
Refills: 0 | Status: COMPLETED | OUTPATIENT
Start: 2020-01-01 | End: 2020-01-01

## 2020-01-01 RX ORDER — MIDAZOLAM HYDROCHLORIDE 1 MG/ML
2 INJECTION, SOLUTION INTRAMUSCULAR; INTRAVENOUS ONCE
Refills: 0 | Status: DISCONTINUED | OUTPATIENT
Start: 2020-01-01 | End: 2020-01-01

## 2020-01-01 RX ORDER — FENTANYL CITRATE 50 UG/ML
50 INJECTION INTRAVENOUS ONCE
Refills: 0 | Status: DISCONTINUED | OUTPATIENT
Start: 2020-01-01 | End: 2020-01-01

## 2020-01-01 RX ORDER — DEXTROSE 50 % IN WATER 50 %
75 SYRINGE (ML) INTRAVENOUS ONCE
Refills: 0 | Status: COMPLETED | OUTPATIENT
Start: 2020-01-01 | End: 2020-01-01

## 2020-01-01 RX ORDER — FOLIC ACID 0.8 MG
1 TABLET ORAL
Qty: 30 | Refills: 0
Start: 2020-01-01 | End: 2020-01-01

## 2020-01-01 RX ORDER — VASOPRESSIN 20 [USP'U]/ML
0.04 INJECTION INTRAVENOUS
Qty: 50 | Refills: 0 | Status: DISCONTINUED | OUTPATIENT
Start: 2020-01-01 | End: 2020-01-01

## 2020-01-01 RX ORDER — OXYCODONE HYDROCHLORIDE 5 MG/1
5 TABLET ORAL ONCE
Refills: 0 | Status: DISCONTINUED | OUTPATIENT
Start: 2020-01-01 | End: 2020-01-01

## 2020-01-01 RX ORDER — POLYETHYLENE GLYCOL 3350 17 G/17G
17 POWDER, FOR SOLUTION ORAL ONCE
Refills: 0 | Status: COMPLETED | OUTPATIENT
Start: 2020-01-01 | End: 2020-01-01

## 2020-01-01 RX ORDER — PANTOPRAZOLE SODIUM 20 MG/1
40 TABLET, DELAYED RELEASE ORAL ONCE
Refills: 0 | Status: COMPLETED | OUTPATIENT
Start: 2020-01-01 | End: 2020-01-01

## 2020-01-01 RX ORDER — LIDOCAINE 4 G/100G
2 CREAM TOPICAL ONCE
Refills: 0 | Status: DISCONTINUED | OUTPATIENT
Start: 2020-01-01 | End: 2020-01-01

## 2020-01-01 RX ORDER — MEROPENEM 1 G/30ML
1000 INJECTION INTRAVENOUS EVERY 12 HOURS
Refills: 0 | Status: DISCONTINUED | OUTPATIENT
Start: 2020-01-01 | End: 2020-01-01

## 2020-01-01 RX ORDER — OXYCODONE HYDROCHLORIDE 5 MG/1
1 TABLET ORAL
Qty: 28 | Refills: 0
Start: 2020-01-01 | End: 2020-01-01

## 2020-01-01 RX ORDER — CHLORHEXIDINE GLUCONATE 213 G/1000ML
15 SOLUTION TOPICAL EVERY 12 HOURS
Refills: 0 | Status: DISCONTINUED | OUTPATIENT
Start: 2020-01-01 | End: 2020-01-01

## 2020-01-01 RX ORDER — HEPARIN SODIUM 5000 [USP'U]/ML
5000 INJECTION INTRAVENOUS; SUBCUTANEOUS EVERY 8 HOURS
Refills: 0 | Status: DISCONTINUED | OUTPATIENT
Start: 2020-01-01 | End: 2020-01-01

## 2020-01-01 RX ORDER — MIDODRINE HYDROCHLORIDE 2.5 MG/1
30 TABLET ORAL EVERY 8 HOURS
Refills: 0 | Status: DISCONTINUED | OUTPATIENT
Start: 2020-01-01 | End: 2020-01-01

## 2020-01-01 RX ORDER — IPRATROPIUM/ALBUTEROL SULFATE 18-103MCG
3 AEROSOL WITH ADAPTER (GRAM) INHALATION
Qty: 0 | Refills: 0 | DISCHARGE
Start: 2020-01-01

## 2020-01-01 RX ORDER — NADOLOL 80 MG/1
20 TABLET ORAL DAILY
Refills: 0 | Status: DISCONTINUED | OUTPATIENT
Start: 2020-01-01 | End: 2020-01-01

## 2020-01-01 RX ORDER — THIAMINE MONONITRATE (VIT B1) 100 MG
500 TABLET ORAL
Qty: 0 | Refills: 0 | DISCHARGE
Start: 2020-01-01

## 2020-01-01 RX ORDER — ACETAMINOPHEN 500 MG
1000 TABLET ORAL ONCE
Refills: 0 | Status: COMPLETED | OUTPATIENT
Start: 2020-01-01 | End: 2020-01-01

## 2020-01-01 RX ORDER — CHLORHEXIDINE GLUCONATE 213 G/1000ML
1 SOLUTION TOPICAL
Refills: 0 | Status: DISCONTINUED | OUTPATIENT
Start: 2020-01-01 | End: 2020-01-01

## 2020-01-01 RX ORDER — ERYTHROPOIETIN 10000 [IU]/ML
10000 INJECTION, SOLUTION INTRAVENOUS; SUBCUTANEOUS
Refills: 0 | Status: DISCONTINUED | OUTPATIENT
Start: 2020-01-01 | End: 2020-01-01

## 2020-01-01 RX ORDER — FENTANYL CITRATE 50 UG/ML
0.5 INJECTION INTRAVENOUS
Qty: 2500 | Refills: 0 | Status: DISCONTINUED | OUTPATIENT
Start: 2020-01-01 | End: 2020-01-01

## 2020-01-01 RX ORDER — CEFTRIAXONE 500 MG/1
1000 INJECTION, POWDER, FOR SOLUTION INTRAMUSCULAR; INTRAVENOUS EVERY 24 HOURS
Refills: 0 | Status: DISCONTINUED | OUTPATIENT
Start: 2020-01-01 | End: 2020-01-01

## 2020-01-01 RX ORDER — PHYTONADIONE (VIT K1) 5 MG
5 TABLET ORAL ONCE
Refills: 0 | Status: COMPLETED | OUTPATIENT
Start: 2020-01-01 | End: 2020-01-01

## 2020-01-01 RX ORDER — SPIRONOLACTONE 25 MG/1
50 TABLET, FILM COATED ORAL DAILY
Refills: 0 | Status: DISCONTINUED | OUTPATIENT
Start: 2020-01-01 | End: 2020-01-01

## 2020-01-01 RX ORDER — POTASSIUM PHOSPHATE, MONOBASIC POTASSIUM PHOSPHATE, DIBASIC 236; 224 MG/ML; MG/ML
30 INJECTION, SOLUTION INTRAVENOUS ONCE
Refills: 0 | Status: COMPLETED | OUTPATIENT
Start: 2020-01-01 | End: 2020-01-01

## 2020-01-01 RX ORDER — LIDOCAINE 4 G/100G
1 CREAM TOPICAL
Refills: 0 | Status: DISCONTINUED | OUTPATIENT
Start: 2020-01-01 | End: 2020-01-01

## 2020-01-01 RX ORDER — FUROSEMIDE 40 MG
20 TABLET ORAL DAILY
Refills: 0 | Status: DISCONTINUED | OUTPATIENT
Start: 2020-01-01 | End: 2020-01-01

## 2020-01-01 RX ORDER — HYDROMORPHONE HYDROCHLORIDE 2 MG/ML
1 INJECTION INTRAMUSCULAR; INTRAVENOUS; SUBCUTANEOUS
Refills: 0 | Status: DISCONTINUED | OUTPATIENT
Start: 2020-01-01 | End: 2020-01-01

## 2020-01-01 RX ORDER — PHENYLEPHRINE HYDROCHLORIDE 10 MG/ML
0.1 INJECTION INTRAVENOUS
Qty: 160 | Refills: 0 | Status: DISCONTINUED | OUTPATIENT
Start: 2020-01-01 | End: 2020-01-01

## 2020-01-01 RX ORDER — ROBINUL 0.2 MG/ML
2 INJECTION INTRAMUSCULAR; INTRAVENOUS DAILY
Refills: 0 | Status: DISCONTINUED | OUTPATIENT
Start: 2020-01-01 | End: 2020-01-01

## 2020-01-01 RX ORDER — POLYETHYLENE GLYCOL 3350 17 G/17G
17 POWDER, FOR SOLUTION ORAL ONCE
Refills: 0 | Status: DISCONTINUED | OUTPATIENT
Start: 2020-01-01 | End: 2020-01-01

## 2020-01-01 RX ORDER — CALCIUM GLUCONATE 100 MG/ML
1 VIAL (ML) INTRAVENOUS ONCE
Refills: 0 | Status: COMPLETED | OUTPATIENT
Start: 2020-01-01 | End: 2020-01-01

## 2020-01-01 RX ORDER — PANTOPRAZOLE SODIUM 20 MG/1
8 TABLET, DELAYED RELEASE ORAL
Qty: 80 | Refills: 0 | Status: DISCONTINUED | OUTPATIENT
Start: 2020-01-01 | End: 2020-01-01

## 2020-01-01 RX ORDER — MEROPENEM 1 G/30ML
1000 INJECTION INTRAVENOUS ONCE
Refills: 0 | Status: COMPLETED | OUTPATIENT
Start: 2020-01-01 | End: 2020-01-01

## 2020-01-01 RX ORDER — PIPERACILLIN AND TAZOBACTAM 4; .5 G/20ML; G/20ML
3.38 INJECTION, POWDER, LYOPHILIZED, FOR SOLUTION INTRAVENOUS EVERY 8 HOURS
Refills: 0 | Status: DISCONTINUED | OUTPATIENT
Start: 2020-01-01 | End: 2020-01-01

## 2020-01-01 RX ORDER — SODIUM BICARBONATE 1 MEQ/ML
0.3 SYRINGE (ML) INTRAVENOUS
Qty: 150 | Refills: 0 | Status: DISCONTINUED | OUTPATIENT
Start: 2020-01-01 | End: 2020-01-01

## 2020-01-01 RX ORDER — SODIUM CHLORIDE 9 MG/ML
1000 INJECTION INTRAMUSCULAR; INTRAVENOUS; SUBCUTANEOUS
Refills: 0 | Status: DISCONTINUED | OUTPATIENT
Start: 2020-01-01 | End: 2020-01-01

## 2020-01-01 RX ORDER — POLYETHYLENE GLYCOL 3350 17 G/17G
17 POWDER, FOR SOLUTION ORAL
Refills: 0 | Status: DISCONTINUED | OUTPATIENT
Start: 2020-01-01 | End: 2020-01-01

## 2020-01-01 RX ORDER — MIDODRINE HYDROCHLORIDE 2.5 MG/1
3 TABLET ORAL
Qty: 0 | Refills: 0 | DISCHARGE
Start: 2020-01-01

## 2020-01-01 RX ORDER — CEFTRIAXONE 500 MG/1
INJECTION, POWDER, FOR SOLUTION INTRAMUSCULAR; INTRAVENOUS
Refills: 0 | Status: DISCONTINUED | OUTPATIENT
Start: 2020-01-01 | End: 2020-01-01

## 2020-01-01 RX ORDER — NOREPINEPHRINE BITARTRATE/D5W 8 MG/250ML
5 PLASTIC BAG, INJECTION (ML) INTRAVENOUS
Qty: 32 | Refills: 0 | Status: DISCONTINUED | OUTPATIENT
Start: 2020-01-01 | End: 2020-01-01

## 2020-01-01 RX ORDER — LACTULOSE 10 G/15ML
20 SOLUTION ORAL EVERY 6 HOURS
Refills: 0 | Status: DISCONTINUED | OUTPATIENT
Start: 2020-01-01 | End: 2020-01-01

## 2020-01-01 RX ORDER — PHENYLEPHRINE HYDROCHLORIDE 10 MG/ML
1 INJECTION INTRAVENOUS
Qty: 40 | Refills: 0 | Status: DISCONTINUED | OUTPATIENT
Start: 2020-01-01 | End: 2020-01-01

## 2020-01-01 RX ORDER — MIDODRINE HYDROCHLORIDE 2.5 MG/1
10 TABLET ORAL ONCE
Refills: 0 | Status: COMPLETED | OUTPATIENT
Start: 2020-01-01 | End: 2020-01-01

## 2020-01-01 RX ORDER — SPIRONOLACTONE 25 MG/1
2 TABLET, FILM COATED ORAL
Qty: 60 | Refills: 0
Start: 2020-01-01 | End: 2020-01-01

## 2020-01-01 RX ORDER — PNEUMOCOCCAL 13-VALENT CONJUGATE VACCINE 2.2; 2.2; 2.2; 2.2; 2.2; 4.4; 2.2; 2.2; 2.2; 2.2; 2.2; 2.2; 2.2 UG/.5ML; UG/.5ML; UG/.5ML; UG/.5ML; UG/.5ML; UG/.5ML; UG/.5ML; UG/.5ML; UG/.5ML; UG/.5ML; UG/.5ML; UG/.5ML; UG/.5ML
0.5 INJECTION, SUSPENSION INTRAMUSCULAR ONCE
Refills: 0 | Status: DISCONTINUED | OUTPATIENT
Start: 2020-01-01 | End: 2020-01-01

## 2020-01-01 RX ORDER — DEXTROSE 10 % IN WATER 10 %
1000 INTRAVENOUS SOLUTION INTRAVENOUS
Refills: 0 | Status: DISCONTINUED | OUTPATIENT
Start: 2020-01-01 | End: 2020-01-01

## 2020-01-01 RX ORDER — ONDANSETRON 8 MG/1
4 TABLET, FILM COATED ORAL ONCE
Refills: 0 | Status: DISCONTINUED | OUTPATIENT
Start: 2020-01-01 | End: 2020-01-01

## 2020-01-01 RX ORDER — NADOLOL 80 MG/1
1 TABLET ORAL
Qty: 30 | Refills: 0
Start: 2020-01-01 | End: 2020-01-01

## 2020-01-01 RX ORDER — PIPERACILLIN AND TAZOBACTAM 4; .5 G/20ML; G/20ML
2.25 INJECTION, POWDER, LYOPHILIZED, FOR SOLUTION INTRAVENOUS ONCE
Refills: 0 | Status: DISCONTINUED | OUTPATIENT
Start: 2020-01-01 | End: 2020-01-01

## 2020-01-01 RX ORDER — CASPOFUNGIN ACETATE 7 MG/ML
35 INJECTION, POWDER, LYOPHILIZED, FOR SOLUTION INTRAVENOUS EVERY 24 HOURS
Refills: 0 | Status: DISCONTINUED | OUTPATIENT
Start: 2020-01-01 | End: 2020-01-01

## 2020-01-01 RX ORDER — CEFTRIAXONE 500 MG/1
2 INJECTION, POWDER, FOR SOLUTION INTRAMUSCULAR; INTRAVENOUS
Qty: 0 | Refills: 0 | DISCHARGE
Start: 2020-01-01

## 2020-01-01 RX ORDER — THIAMINE MONONITRATE (VIT B1) 100 MG
100 TABLET ORAL DAILY
Refills: 0 | Status: DISCONTINUED | OUTPATIENT
Start: 2020-01-01 | End: 2020-01-01

## 2020-01-01 RX ORDER — MIDODRINE HYDROCHLORIDE 2.5 MG/1
10 TABLET ORAL THREE TIMES A DAY
Refills: 0 | Status: DISCONTINUED | OUTPATIENT
Start: 2020-01-01 | End: 2020-01-01

## 2020-01-01 RX ORDER — CEFTRIAXONE 500 MG/1
2000 INJECTION, POWDER, FOR SOLUTION INTRAMUSCULAR; INTRAVENOUS EVERY 24 HOURS
Refills: 0 | Status: DISCONTINUED | OUTPATIENT
Start: 2020-01-01 | End: 2020-01-01

## 2020-01-01 RX ORDER — AMIODARONE HYDROCHLORIDE 400 MG/1
300 TABLET ORAL ONCE
Refills: 0 | Status: COMPLETED | OUTPATIENT
Start: 2020-01-01 | End: 2020-01-01

## 2020-01-01 RX ORDER — HALOPERIDOL DECANOATE 100 MG/ML
5 INJECTION INTRAMUSCULAR ONCE
Refills: 0 | Status: COMPLETED | OUTPATIENT
Start: 2020-01-01 | End: 2020-01-01

## 2020-01-01 RX ORDER — ALBUMIN HUMAN 25 %
50 VIAL (ML) INTRAVENOUS EVERY 6 HOURS
Refills: 0 | Status: COMPLETED | OUTPATIENT
Start: 2020-01-01 | End: 2020-01-01

## 2020-01-01 RX ORDER — ALTEPLASE 100 MG
2 KIT INTRAVENOUS ONCE
Refills: 0 | Status: COMPLETED | OUTPATIENT
Start: 2020-01-01 | End: 2020-01-01

## 2020-01-01 RX ORDER — FLUCONAZOLE 150 MG/1
200 TABLET ORAL
Qty: 0 | Refills: 0 | DISCHARGE
Start: 2020-01-01

## 2020-01-01 RX ORDER — NOREPINEPHRINE BITARTRATE/D5W 8 MG/250ML
0.05 PLASTIC BAG, INJECTION (ML) INTRAVENOUS
Qty: 8 | Refills: 0 | Status: DISCONTINUED | OUTPATIENT
Start: 2020-01-01 | End: 2020-01-01

## 2020-01-01 RX ORDER — MIDODRINE HYDROCHLORIDE 2.5 MG/1
20 TABLET ORAL EVERY 8 HOURS
Refills: 0 | Status: DISCONTINUED | OUTPATIENT
Start: 2020-01-01 | End: 2020-01-01

## 2020-01-01 RX ORDER — HYDROMORPHONE HYDROCHLORIDE 2 MG/ML
0.2 INJECTION INTRAMUSCULAR; INTRAVENOUS; SUBCUTANEOUS EVERY 6 HOURS
Refills: 0 | Status: DISCONTINUED | OUTPATIENT
Start: 2020-01-01 | End: 2020-01-01

## 2020-01-01 RX ORDER — FLUCONAZOLE 150 MG/1
200 TABLET ORAL EVERY 24 HOURS
Refills: 0 | Status: DISCONTINUED | OUTPATIENT
Start: 2020-01-01 | End: 2020-01-01

## 2020-01-01 RX ORDER — DIPHENHYDRAMINE HCL 50 MG
25 CAPSULE ORAL ONCE
Refills: 0 | Status: COMPLETED | OUTPATIENT
Start: 2020-01-01 | End: 2020-01-01

## 2020-01-01 RX ORDER — ALBUMIN HUMAN 25 %
50 VIAL (ML) INTRAVENOUS
Refills: 0 | Status: COMPLETED | OUTPATIENT
Start: 2020-01-01 | End: 2020-01-01

## 2020-01-01 RX ORDER — THIAMINE MONONITRATE (VIT B1) 100 MG
1 TABLET ORAL
Qty: 30 | Refills: 0
Start: 2020-01-01 | End: 2020-01-01

## 2020-01-01 RX ORDER — MAGNESIUM SULFATE 500 MG/ML
1 VIAL (ML) INJECTION DAILY
Refills: 0 | Status: DISCONTINUED | OUTPATIENT
Start: 2020-01-01 | End: 2020-01-01

## 2020-01-01 RX ORDER — SODIUM,POTASSIUM PHOSPHATES 278-250MG
1 POWDER IN PACKET (EA) ORAL
Qty: 56 | Refills: 0
Start: 2020-01-01 | End: 2020-01-01

## 2020-01-01 RX ORDER — FUROSEMIDE 40 MG
60 TABLET ORAL DAILY
Refills: 0 | Status: DISCONTINUED | OUTPATIENT
Start: 2020-01-01 | End: 2020-01-01

## 2020-01-01 RX ORDER — MIDAZOLAM HYDROCHLORIDE 1 MG/ML
6 INJECTION, SOLUTION INTRAMUSCULAR; INTRAVENOUS ONCE
Refills: 0 | Status: DISCONTINUED | OUTPATIENT
Start: 2020-01-01 | End: 2020-01-01

## 2020-01-01 RX ORDER — THIAMINE MONONITRATE (VIT B1) 100 MG
500 TABLET ORAL DAILY
Refills: 0 | Status: DISCONTINUED | OUTPATIENT
Start: 2020-01-01 | End: 2020-01-01

## 2020-01-01 RX ORDER — CHLORHEXIDINE GLUCONATE 213 G/1000ML
1 SOLUTION TOPICAL DAILY
Refills: 0 | Status: DISCONTINUED | OUTPATIENT
Start: 2020-01-01 | End: 2020-01-01

## 2020-01-01 RX ORDER — ALBUMIN HUMAN 25 %
100 VIAL (ML) INTRAVENOUS ONCE
Refills: 0 | Status: COMPLETED | OUTPATIENT
Start: 2020-01-01 | End: 2020-01-01

## 2020-01-01 RX ORDER — OCTREOTIDE ACETATE 200 UG/ML
100 INJECTION, SOLUTION INTRAVENOUS; SUBCUTANEOUS THREE TIMES A DAY
Refills: 0 | Status: DISCONTINUED | OUTPATIENT
Start: 2020-01-01 | End: 2020-01-01

## 2020-01-01 RX ORDER — PHENYLEPHRINE HYDROCHLORIDE 10 MG/ML
3.3 INJECTION INTRAVENOUS
Qty: 160 | Refills: 0 | Status: DISCONTINUED | OUTPATIENT
Start: 2020-01-01 | End: 2020-01-01

## 2020-01-01 RX ORDER — PHENYLEPHRINE-SHARK LIVER OIL-MINERAL OIL-PETROLATUM RECTAL OINTMENT
1 OINTMENT (GRAM) RECTAL THREE TIMES A DAY
Refills: 0 | Status: DISCONTINUED | OUTPATIENT
Start: 2020-01-01 | End: 2020-01-01

## 2020-01-01 RX ORDER — VASOPRESSIN 20 [USP'U]/ML
0.02 INJECTION INTRAVENOUS
Qty: 50 | Refills: 0 | Status: DISCONTINUED | OUTPATIENT
Start: 2020-01-01 | End: 2020-01-01

## 2020-01-01 RX ORDER — CASPOFUNGIN ACETATE 7 MG/ML
70 INJECTION, POWDER, LYOPHILIZED, FOR SOLUTION INTRAVENOUS ONCE
Refills: 0 | Status: COMPLETED | OUTPATIENT
Start: 2020-01-01 | End: 2020-01-01

## 2020-01-01 RX ORDER — DEXMEDETOMIDINE HYDROCHLORIDE IN 0.9% SODIUM CHLORIDE 4 UG/ML
0.2 INJECTION INTRAVENOUS
Qty: 400 | Refills: 0 | Status: DISCONTINUED | OUTPATIENT
Start: 2020-01-01 | End: 2020-01-01

## 2020-01-01 RX ORDER — LACTULOSE 10 G/15ML
30 SOLUTION ORAL
Qty: 3600 | Refills: 0
Start: 2020-01-01 | End: 2020-01-01

## 2020-01-01 RX ORDER — MEROPENEM 1 G/30ML
500 INJECTION INTRAVENOUS EVERY 24 HOURS
Refills: 0 | Status: DISCONTINUED | OUTPATIENT
Start: 2020-01-01 | End: 2020-01-01

## 2020-01-01 RX ORDER — MORPHINE SULFATE 50 MG/1
4 CAPSULE, EXTENDED RELEASE ORAL ONCE
Refills: 0 | Status: DISCONTINUED | OUTPATIENT
Start: 2020-01-01 | End: 2020-01-01

## 2020-01-01 RX ORDER — MEROPENEM 1 G/30ML
500 INJECTION INTRAVENOUS EVERY 8 HOURS
Refills: 0 | Status: DISCONTINUED | OUTPATIENT
Start: 2020-01-01 | End: 2020-01-01

## 2020-01-01 RX ORDER — TRAMADOL HYDROCHLORIDE 50 MG/1
0.5 TABLET ORAL
Qty: 0 | Refills: 0 | DISCHARGE
Start: 2020-01-01

## 2020-01-01 RX ORDER — LIDOCAINE 4 G/100G
1 CREAM TOPICAL DAILY
Refills: 0 | Status: DISCONTINUED | OUTPATIENT
Start: 2020-01-01 | End: 2020-01-01

## 2020-01-01 RX ORDER — DEXTROSE 10 % IN WATER 10 %
500 INTRAVENOUS SOLUTION INTRAVENOUS
Refills: 0 | Status: DISCONTINUED | OUTPATIENT
Start: 2020-01-01 | End: 2020-01-01

## 2020-01-01 RX ORDER — DEXTROSE MONOHYDRATE, SODIUM CHLORIDE, AND POTASSIUM CHLORIDE 50; .745; 4.5 G/1000ML; G/1000ML; G/1000ML
500 INJECTION, SOLUTION INTRAVENOUS
Refills: 0 | Status: DISCONTINUED | OUTPATIENT
Start: 2020-01-01 | End: 2020-01-01

## 2020-01-01 RX ORDER — FLUCONAZOLE 150 MG/1
TABLET ORAL
Refills: 0 | Status: DISCONTINUED | OUTPATIENT
Start: 2020-01-01 | End: 2020-01-01

## 2020-01-01 RX ORDER — OXYCODONE HYDROCHLORIDE 5 MG/1
1 TABLET ORAL
Qty: 42 | Refills: 0
Start: 2020-01-01 | End: 2020-01-01

## 2020-01-01 RX ORDER — SEVELAMER CARBONATE 2400 MG/1
800 POWDER, FOR SUSPENSION ORAL
Refills: 0 | Status: DISCONTINUED | OUTPATIENT
Start: 2020-01-01 | End: 2020-01-01

## 2020-01-01 RX ORDER — PHENYLEPHRINE-SHARK LIVER OIL-MINERAL OIL-PETROLATUM RECTAL OINTMENT
1 OINTMENT (GRAM) RECTAL
Qty: 120 | Refills: 0
Start: 2020-01-01 | End: 2020-01-01

## 2020-01-01 RX ORDER — THIAMINE MONONITRATE (VIT B1) 100 MG
100 TABLET ORAL AT BEDTIME
Refills: 0 | Status: DISCONTINUED | OUTPATIENT
Start: 2020-01-01 | End: 2020-01-01

## 2020-01-01 RX ORDER — POLYETHYLENE GLYCOL 3350 17 G/17G
17 POWDER, FOR SOLUTION ORAL
Qty: 1020 | Refills: 0
Start: 2020-01-01 | End: 2020-01-01

## 2020-01-01 RX ORDER — ROBINUL 0.2 MG/ML
0.1 INJECTION INTRAMUSCULAR; INTRAVENOUS DAILY
Refills: 0 | Status: DISCONTINUED | OUTPATIENT
Start: 2020-01-01 | End: 2020-01-01

## 2020-01-01 RX ORDER — CISATRACURIUM BESYLATE 2 MG/ML
20 INJECTION INTRAVENOUS ONCE
Refills: 0 | Status: COMPLETED | OUTPATIENT
Start: 2020-01-01 | End: 2020-01-01

## 2020-01-01 RX ORDER — ACETAMINOPHEN 500 MG
325 TABLET ORAL ONCE
Refills: 0 | Status: COMPLETED | OUTPATIENT
Start: 2020-01-01 | End: 2020-01-01

## 2020-01-01 RX ORDER — FENTANYL CITRATE 50 UG/ML
0.5 INJECTION INTRAVENOUS
Qty: 5000 | Refills: 0 | Status: DISCONTINUED | OUTPATIENT
Start: 2020-01-01 | End: 2020-01-01

## 2020-01-01 RX ORDER — INFLUENZA VIRUS VACCINE 15; 15; 15; 15 UG/.5ML; UG/.5ML; UG/.5ML; UG/.5ML
0.5 SUSPENSION INTRAMUSCULAR ONCE
Refills: 0 | Status: DISCONTINUED | OUTPATIENT
Start: 2020-01-01 | End: 2020-01-01

## 2020-01-01 RX ORDER — HYDROMORPHONE HYDROCHLORIDE 2 MG/ML
0.2 INJECTION INTRAMUSCULAR; INTRAVENOUS; SUBCUTANEOUS EVERY 4 HOURS
Refills: 0 | Status: DISCONTINUED | OUTPATIENT
Start: 2020-01-01 | End: 2020-01-01

## 2020-01-01 RX ORDER — CISATRACURIUM BESYLATE 2 MG/ML
3 INJECTION INTRAVENOUS
Qty: 200 | Refills: 0 | Status: DISCONTINUED | OUTPATIENT
Start: 2020-01-01 | End: 2020-01-01

## 2020-01-01 RX ORDER — SODIUM ZIRCONIUM CYCLOSILICATE 10 G/10G
10 POWDER, FOR SUSPENSION ORAL ONCE
Refills: 0 | Status: COMPLETED | OUTPATIENT
Start: 2020-01-01 | End: 2020-01-01

## 2020-01-01 RX ORDER — POLYETHYLENE GLYCOL 3350 17 G/17G
17 POWDER, FOR SOLUTION ORAL
Qty: 0 | Refills: 0 | DISCHARGE
Start: 2020-01-01

## 2020-01-01 RX ORDER — HYDROCORTISONE 20 MG
100 TABLET ORAL EVERY 8 HOURS
Refills: 0 | Status: DISCONTINUED | OUTPATIENT
Start: 2020-01-01 | End: 2020-01-01

## 2020-01-01 RX ORDER — ONDANSETRON 8 MG/1
4 TABLET, FILM COATED ORAL
Refills: 0 | Status: DISCONTINUED | OUTPATIENT
Start: 2020-01-01 | End: 2020-01-01

## 2020-01-01 RX ORDER — EPINEPHRINE 0.3 MG/.3ML
0.05 INJECTION INTRAMUSCULAR; SUBCUTANEOUS
Qty: 8 | Refills: 0 | Status: DISCONTINUED | OUTPATIENT
Start: 2020-01-01 | End: 2020-01-01

## 2020-01-01 RX ORDER — OXYCODONE AND ACETAMINOPHEN 5; 325 MG/1; MG/1
1 TABLET ORAL EVERY 4 HOURS
Refills: 0 | Status: DISCONTINUED | OUTPATIENT
Start: 2020-01-01 | End: 2020-01-01

## 2020-01-01 RX ORDER — POTASSIUM CHLORIDE 20 MEQ
40 PACKET (EA) ORAL ONCE
Refills: 0 | Status: COMPLETED | OUTPATIENT
Start: 2020-01-01 | End: 2020-01-01

## 2020-01-01 RX ORDER — POTASSIUM CHLORIDE 20 MEQ
40 PACKET (EA) ORAL EVERY 4 HOURS
Refills: 0 | Status: COMPLETED | OUTPATIENT
Start: 2020-01-01 | End: 2020-01-01

## 2020-01-01 RX ORDER — INSULIN HUMAN 100 [IU]/ML
5 INJECTION, SOLUTION SUBCUTANEOUS ONCE
Refills: 0 | Status: COMPLETED | OUTPATIENT
Start: 2020-01-01 | End: 2020-01-01

## 2020-01-01 RX ORDER — PROPOFOL 10 MG/ML
10 INJECTION, EMULSION INTRAVENOUS
Qty: 1000 | Refills: 0 | Status: DISCONTINUED | OUTPATIENT
Start: 2020-01-01 | End: 2020-01-01

## 2020-01-01 RX ORDER — HYDROMORPHONE HYDROCHLORIDE 2 MG/ML
0.5 INJECTION INTRAMUSCULAR; INTRAVENOUS; SUBCUTANEOUS EVERY 6 HOURS
Refills: 0 | Status: DISCONTINUED | OUTPATIENT
Start: 2020-01-01 | End: 2020-01-01

## 2020-01-01 RX ORDER — HYDROMORPHONE HYDROCHLORIDE 2 MG/ML
2 INJECTION INTRAMUSCULAR; INTRAVENOUS; SUBCUTANEOUS
Refills: 0 | Status: DISCONTINUED | OUTPATIENT
Start: 2020-01-01 | End: 2020-01-01

## 2020-01-01 RX ORDER — SIMETHICONE 80 MG/1
80 TABLET, CHEWABLE ORAL ONCE
Refills: 0 | Status: COMPLETED | OUTPATIENT
Start: 2020-01-01 | End: 2020-01-01

## 2020-01-01 RX ORDER — FLUCONAZOLE 150 MG/1
200 TABLET ORAL ONCE
Refills: 0 | Status: COMPLETED | OUTPATIENT
Start: 2020-01-01 | End: 2020-01-01

## 2020-01-01 RX ORDER — MEROPENEM 1 G/30ML
INJECTION INTRAVENOUS
Refills: 0 | Status: DISCONTINUED | OUTPATIENT
Start: 2020-01-01 | End: 2020-01-01

## 2020-01-01 RX ORDER — ALBUMIN HUMAN 25 %
100 VIAL (ML) INTRAVENOUS EVERY 6 HOURS
Refills: 0 | Status: COMPLETED | OUTPATIENT
Start: 2020-01-01 | End: 2020-01-01

## 2020-01-01 RX ORDER — OCTREOTIDE ACETATE 200 UG/ML
50 INJECTION, SOLUTION INTRAVENOUS; SUBCUTANEOUS ONCE
Refills: 0 | Status: COMPLETED | OUTPATIENT
Start: 2020-01-01 | End: 2020-01-01

## 2020-01-01 RX ORDER — SODIUM,POTASSIUM PHOSPHATES 278-250MG
1 POWDER IN PACKET (EA) ORAL
Refills: 0 | Status: DISCONTINUED | OUTPATIENT
Start: 2020-01-01 | End: 2020-01-01

## 2020-01-01 RX ORDER — HYDROMORPHONE HYDROCHLORIDE 2 MG/ML
1 INJECTION INTRAMUSCULAR; INTRAVENOUS; SUBCUTANEOUS ONCE
Refills: 0 | Status: DISCONTINUED | OUTPATIENT
Start: 2020-01-01 | End: 2020-01-01

## 2020-01-01 RX ORDER — FUROSEMIDE 40 MG
1 TABLET ORAL
Qty: 30 | Refills: 0
Start: 2020-01-01 | End: 2020-01-01

## 2020-01-01 RX ORDER — MIDAZOLAM HYDROCHLORIDE 1 MG/ML
0.05 INJECTION, SOLUTION INTRAMUSCULAR; INTRAVENOUS
Qty: 100 | Refills: 0 | Status: DISCONTINUED | OUTPATIENT
Start: 2020-01-01 | End: 2020-01-01

## 2020-01-01 RX ORDER — PANTOPRAZOLE SODIUM 20 MG/1
40 TABLET, DELAYED RELEASE ORAL
Qty: 0 | Refills: 0 | DISCHARGE
Start: 2020-01-01

## 2020-01-01 RX ORDER — TUBERCULIN PURIFIED PROTEIN DERIVATIVE 5 [IU]/.1ML
5 INJECTION, SOLUTION INTRADERMAL ONCE
Refills: 0 | Status: COMPLETED | OUTPATIENT
Start: 2020-01-01 | End: 2020-01-01

## 2020-01-01 RX ORDER — SODIUM CHLORIDE 9 MG/ML
100 INJECTION INTRAMUSCULAR; INTRAVENOUS; SUBCUTANEOUS
Refills: 0 | Status: DISCONTINUED | OUTPATIENT
Start: 2020-01-01 | End: 2020-01-01

## 2020-01-01 RX ORDER — NOREPINEPHRINE BITARTRATE/D5W 8 MG/250ML
0.05 PLASTIC BAG, INJECTION (ML) INTRAVENOUS
Qty: 16 | Refills: 0 | Status: DISCONTINUED | OUTPATIENT
Start: 2020-01-01 | End: 2020-01-01

## 2020-01-01 RX ORDER — ROBINUL 0.2 MG/ML
0.2 INJECTION INTRAMUSCULAR; INTRAVENOUS DAILY
Refills: 0 | Status: DISCONTINUED | OUTPATIENT
Start: 2020-01-01 | End: 2020-01-01

## 2020-01-01 RX ORDER — KETAMINE HYDROCHLORIDE 100 MG/ML
0.25 INJECTION INTRAMUSCULAR; INTRAVENOUS
Qty: 1000 | Refills: 0 | Status: DISCONTINUED | OUTPATIENT
Start: 2020-01-01 | End: 2020-01-01

## 2020-01-01 RX ORDER — IPRATROPIUM/ALBUTEROL SULFATE 18-103MCG
3 AEROSOL WITH ADAPTER (GRAM) INHALATION ONCE
Refills: 0 | Status: COMPLETED | OUTPATIENT
Start: 2020-01-01 | End: 2020-01-01

## 2020-01-01 RX ORDER — PHENOBARBITAL 60 MG
130 TABLET ORAL
Refills: 0 | Status: DISCONTINUED | OUTPATIENT
Start: 2020-01-01 | End: 2020-01-01

## 2020-01-01 RX ORDER — SEVELAMER CARBONATE 2400 MG/1
1 POWDER, FOR SUSPENSION ORAL
Qty: 42 | Refills: 0
Start: 2020-01-01 | End: 2020-01-01

## 2020-01-01 RX ORDER — LANOLIN ALCOHOL/MO/W.PET/CERES
3 CREAM (GRAM) TOPICAL AT BEDTIME
Refills: 0 | Status: DISCONTINUED | OUTPATIENT
Start: 2020-01-01 | End: 2020-01-01

## 2020-01-01 RX ORDER — NYSTATIN CREAM 100000 [USP'U]/G
1 CREAM TOPICAL
Refills: 0 | Status: DISCONTINUED | OUTPATIENT
Start: 2020-01-01 | End: 2020-01-01

## 2020-01-01 RX ORDER — TRAMADOL HYDROCHLORIDE 50 MG/1
25 TABLET ORAL EVERY 8 HOURS
Refills: 0 | Status: DISCONTINUED | OUTPATIENT
Start: 2020-01-01 | End: 2020-01-01

## 2020-01-01 RX ORDER — LANOLIN ALCOHOL/MO/W.PET/CERES
1 CREAM (GRAM) TOPICAL AT BEDTIME
Refills: 0 | Status: DISCONTINUED | OUTPATIENT
Start: 2020-01-01 | End: 2020-01-01

## 2020-01-01 RX ORDER — MIDODRINE HYDROCHLORIDE 2.5 MG/1
1 TABLET ORAL
Qty: 90 | Refills: 0
Start: 2020-01-01 | End: 2020-01-01

## 2020-01-01 RX ADMIN — OCTREOTIDE ACETATE 100 MICROGRAM(S): 200 INJECTION, SOLUTION INTRAVENOUS; SUBCUTANEOUS at 12:48

## 2020-01-01 RX ADMIN — PANTOPRAZOLE SODIUM 40 MILLIGRAM(S): 20 TABLET, DELAYED RELEASE ORAL at 12:36

## 2020-01-01 RX ADMIN — LACTULOSE 20 GRAM(S): 10 SOLUTION ORAL at 00:12

## 2020-01-01 RX ADMIN — FENTANYL CITRATE 2.07 MICROGRAM(S)/KG/HR: 50 INJECTION INTRAVENOUS at 16:17

## 2020-01-01 RX ADMIN — VASOPRESSIN 2.4 UNIT(S)/MIN: 20 INJECTION INTRAVENOUS at 18:00

## 2020-01-01 RX ADMIN — PANTOPRAZOLE SODIUM 10 MG/HR: 20 TABLET, DELAYED RELEASE ORAL at 09:33

## 2020-01-01 RX ADMIN — MIDODRINE HYDROCHLORIDE 10 MILLIGRAM(S): 2.5 TABLET ORAL at 13:14

## 2020-01-01 RX ADMIN — Medication 3.5 MICROGRAM(S)/KG/MIN: at 10:20

## 2020-01-01 RX ADMIN — Medication 40 MILLILITER(S): at 05:08

## 2020-01-01 RX ADMIN — LACTULOSE 20 GRAM(S): 10 SOLUTION ORAL at 17:38

## 2020-01-01 RX ADMIN — MIDODRINE HYDROCHLORIDE 10 MILLIGRAM(S): 2.5 TABLET ORAL at 21:23

## 2020-01-01 RX ADMIN — MEROPENEM 100 MILLIGRAM(S): 1 INJECTION INTRAVENOUS at 21:18

## 2020-01-01 RX ADMIN — MEROPENEM 100 MILLIGRAM(S): 1 INJECTION INTRAVENOUS at 13:09

## 2020-01-01 RX ADMIN — HEPARIN SODIUM 5000 UNIT(S): 5000 INJECTION INTRAVENOUS; SUBCUTANEOUS at 05:52

## 2020-01-01 RX ADMIN — Medication 1 MILLIGRAM(S): at 11:28

## 2020-01-01 RX ADMIN — Medication 50 MILLILITER(S): at 00:43

## 2020-01-01 RX ADMIN — PANTOPRAZOLE SODIUM 40 MILLIGRAM(S): 20 TABLET, DELAYED RELEASE ORAL at 17:07

## 2020-01-01 RX ADMIN — OCTREOTIDE ACETATE 10 MICROGRAM(S)/HR: 200 INJECTION, SOLUTION INTRAVENOUS; SUBCUTANEOUS at 18:26

## 2020-01-01 RX ADMIN — Medication 50 MILLILITER(S): at 06:33

## 2020-01-01 RX ADMIN — Medication 1 TABLET(S): at 12:07

## 2020-01-01 RX ADMIN — Medication 50 MILLILITER(S): at 13:08

## 2020-01-01 RX ADMIN — Medication 102 MILLIGRAM(S): at 12:10

## 2020-01-01 RX ADMIN — PROPOFOL 4.48 MICROGRAM(S)/KG/MIN: 10 INJECTION, EMULSION INTRAVENOUS at 10:36

## 2020-01-01 RX ADMIN — MIDODRINE HYDROCHLORIDE 10 MILLIGRAM(S): 2.5 TABLET ORAL at 05:12

## 2020-01-01 RX ADMIN — MIDODRINE HYDROCHLORIDE 10 MILLIGRAM(S): 2.5 TABLET ORAL at 21:39

## 2020-01-01 RX ADMIN — PHENYLEPHRINE-SHARK LIVER OIL-MINERAL OIL-PETROLATUM RECTAL OINTMENT 1 APPLICATION(S): at 22:14

## 2020-01-01 RX ADMIN — Medication 3.5 MICROGRAM(S)/KG/MIN: at 08:49

## 2020-01-01 RX ADMIN — CEFTRIAXONE 100 MILLIGRAM(S): 500 INJECTION, POWDER, FOR SOLUTION INTRAMUSCULAR; INTRAVENOUS at 17:55

## 2020-01-01 RX ADMIN — HYDROMORPHONE HYDROCHLORIDE 0.2 MILLIGRAM(S): 2 INJECTION INTRAMUSCULAR; INTRAVENOUS; SUBCUTANEOUS at 19:15

## 2020-01-01 RX ADMIN — MIDODRINE HYDROCHLORIDE 10 MILLIGRAM(S): 2.5 TABLET ORAL at 05:08

## 2020-01-01 RX ADMIN — ONDANSETRON 4 MILLIGRAM(S): 8 TABLET, FILM COATED ORAL at 18:17

## 2020-01-01 RX ADMIN — PANTOPRAZOLE SODIUM 40 MILLIGRAM(S): 20 TABLET, DELAYED RELEASE ORAL at 08:42

## 2020-01-01 RX ADMIN — MIDODRINE HYDROCHLORIDE 30 MILLIGRAM(S): 2.5 TABLET ORAL at 14:43

## 2020-01-01 RX ADMIN — Medication 1 MILLIGRAM(S): at 14:40

## 2020-01-01 RX ADMIN — SIMETHICONE 80 MILLIGRAM(S): 80 TABLET, CHEWABLE ORAL at 19:45

## 2020-01-01 RX ADMIN — Medication 40 MILLIEQUIVALENT(S): at 10:21

## 2020-01-01 RX ADMIN — LACTULOSE 30 GRAM(S): 10 SOLUTION ORAL at 13:22

## 2020-01-01 RX ADMIN — Medication 650 MILLIGRAM(S): at 23:49

## 2020-01-01 RX ADMIN — LACTULOSE 20 GRAM(S): 10 SOLUTION ORAL at 06:41

## 2020-01-01 RX ADMIN — Medication 1 MILLIGRAM(S): at 09:38

## 2020-01-01 RX ADMIN — Medication 105 MILLIGRAM(S): at 12:38

## 2020-01-01 RX ADMIN — Medication 650 MILLIGRAM(S): at 06:15

## 2020-01-01 RX ADMIN — Medication 50 MILLILITER(S): at 05:50

## 2020-01-01 RX ADMIN — Medication 100 MILLIGRAM(S): at 11:09

## 2020-01-01 RX ADMIN — Medication 50 MILLILITER(S): at 06:19

## 2020-01-01 RX ADMIN — Medication 1 MILLIGRAM(S): at 11:55

## 2020-01-01 RX ADMIN — CHLORHEXIDINE GLUCONATE 1 APPLICATION(S): 213 SOLUTION TOPICAL at 05:07

## 2020-01-01 RX ADMIN — MEROPENEM 100 MILLIGRAM(S): 1 INJECTION INTRAVENOUS at 13:11

## 2020-01-01 RX ADMIN — NYSTATIN CREAM 1 APPLICATION(S): 100000 CREAM TOPICAL at 17:22

## 2020-01-01 RX ADMIN — Medication 1 MILLIGRAM(S): at 21:39

## 2020-01-01 RX ADMIN — Medication 3.5 MICROGRAM(S)/KG/MIN: at 13:51

## 2020-01-01 RX ADMIN — MIDODRINE HYDROCHLORIDE 20 MILLIGRAM(S): 2.5 TABLET ORAL at 15:02

## 2020-01-01 RX ADMIN — OXYCODONE HYDROCHLORIDE 5 MILLIGRAM(S): 5 TABLET ORAL at 01:18

## 2020-01-01 RX ADMIN — LACTULOSE 20 GRAM(S): 10 SOLUTION ORAL at 22:25

## 2020-01-01 RX ADMIN — Medication 1 MILLIGRAM(S): at 02:57

## 2020-01-01 RX ADMIN — HEPARIN SODIUM 5000 UNIT(S): 5000 INJECTION INTRAVENOUS; SUBCUTANEOUS at 23:19

## 2020-01-01 RX ADMIN — Medication 1 TABLET(S): at 16:36

## 2020-01-01 RX ADMIN — PANTOPRAZOLE SODIUM 40 MILLIGRAM(S): 20 TABLET, DELAYED RELEASE ORAL at 17:02

## 2020-01-01 RX ADMIN — OCTREOTIDE ACETATE 100 MICROGRAM(S): 200 INJECTION, SOLUTION INTRAVENOUS; SUBCUTANEOUS at 05:52

## 2020-01-01 RX ADMIN — SEVELAMER CARBONATE 800 MILLIGRAM(S): 2400 POWDER, FOR SUSPENSION ORAL at 05:41

## 2020-01-01 RX ADMIN — CASPOFUNGIN ACETATE 255 MILLIGRAM(S): 7 INJECTION, POWDER, LYOPHILIZED, FOR SOLUTION INTRAVENOUS at 18:03

## 2020-01-01 RX ADMIN — CEFTRIAXONE 100 MILLIGRAM(S): 500 INJECTION, POWDER, FOR SOLUTION INTRAMUSCULAR; INTRAVENOUS at 03:42

## 2020-01-01 RX ADMIN — Medication 40 MILLILITER(S): at 17:00

## 2020-01-01 RX ADMIN — Medication 40 MILLILITER(S): at 15:00

## 2020-01-01 RX ADMIN — Medication 100 MILLIGRAM(S): at 23:19

## 2020-01-01 RX ADMIN — TUBERCULIN PURIFIED PROTEIN DERIVATIVE 5 UNIT(S): 5 INJECTION, SOLUTION INTRADERMAL at 17:24

## 2020-01-01 RX ADMIN — HYDROMORPHONE HYDROCHLORIDE 0.5 MILLIGRAM(S): 2 INJECTION INTRAMUSCULAR; INTRAVENOUS; SUBCUTANEOUS at 21:37

## 2020-01-01 RX ADMIN — HYDROMORPHONE HYDROCHLORIDE 0.5 MILLIGRAM(S): 2 INJECTION INTRAMUSCULAR; INTRAVENOUS; SUBCUTANEOUS at 16:57

## 2020-01-01 RX ADMIN — LACTULOSE 30 GRAM(S): 10 SOLUTION ORAL at 17:18

## 2020-01-01 RX ADMIN — OCTREOTIDE ACETATE 100 MICROGRAM(S): 200 INJECTION, SOLUTION INTRAVENOUS; SUBCUTANEOUS at 22:13

## 2020-01-01 RX ADMIN — Medication 1 APPLICATION(S): at 05:13

## 2020-01-01 RX ADMIN — Medication 50 MILLILITER(S): at 17:01

## 2020-01-01 RX ADMIN — Medication 3.88 MICROGRAM(S)/KG/MIN: at 16:16

## 2020-01-01 RX ADMIN — Medication 50 MILLILITER(S): at 00:00

## 2020-01-01 RX ADMIN — Medication 100 MILLIGRAM(S): at 12:29

## 2020-01-01 RX ADMIN — MIDODRINE HYDROCHLORIDE 30 MILLIGRAM(S): 2.5 TABLET ORAL at 21:33

## 2020-01-01 RX ADMIN — HYDROMORPHONE HYDROCHLORIDE 0.5 MILLIGRAM(S): 2 INJECTION INTRAMUSCULAR; INTRAVENOUS; SUBCUTANEOUS at 20:12

## 2020-01-01 RX ADMIN — FENTANYL CITRATE 2.07 MICROGRAM(S)/KG/HR: 50 INJECTION INTRAVENOUS at 00:51

## 2020-01-01 RX ADMIN — Medication 0.5 MILLIGRAM(S): at 20:45

## 2020-01-01 RX ADMIN — MIDODRINE HYDROCHLORIDE 20 MILLIGRAM(S): 2.5 TABLET ORAL at 15:27

## 2020-01-01 RX ADMIN — PANTOPRAZOLE SODIUM 40 MILLIGRAM(S): 20 TABLET, DELAYED RELEASE ORAL at 05:43

## 2020-01-01 RX ADMIN — Medication 40 MILLILITER(S): at 05:01

## 2020-01-01 RX ADMIN — Medication 105 MILLIGRAM(S): at 11:50

## 2020-01-01 RX ADMIN — OCTREOTIDE ACETATE 100 MICROGRAM(S): 200 INJECTION, SOLUTION INTRAVENOUS; SUBCUTANEOUS at 13:26

## 2020-01-01 RX ADMIN — PIPERACILLIN AND TAZOBACTAM 25 GRAM(S): 4; .5 INJECTION, POWDER, LYOPHILIZED, FOR SOLUTION INTRAVENOUS at 09:45

## 2020-01-01 RX ADMIN — CHLORHEXIDINE GLUCONATE 1 APPLICATION(S): 213 SOLUTION TOPICAL at 20:32

## 2020-01-01 RX ADMIN — POLYETHYLENE GLYCOL 3350 17 GRAM(S): 17 POWDER, FOR SOLUTION ORAL at 14:05

## 2020-01-01 RX ADMIN — Medication 50 MILLILITER(S): at 23:03

## 2020-01-01 RX ADMIN — PANTOPRAZOLE SODIUM 40 MILLIGRAM(S): 20 TABLET, DELAYED RELEASE ORAL at 05:41

## 2020-01-01 RX ADMIN — HYDROMORPHONE HYDROCHLORIDE 0.5 MILLIGRAM(S): 2 INJECTION INTRAMUSCULAR; INTRAVENOUS; SUBCUTANEOUS at 22:15

## 2020-01-01 RX ADMIN — MEROPENEM 100 MILLIGRAM(S): 1 INJECTION INTRAVENOUS at 22:00

## 2020-01-01 RX ADMIN — MIDODRINE HYDROCHLORIDE 10 MILLIGRAM(S): 2.5 TABLET ORAL at 06:00

## 2020-01-01 RX ADMIN — Medication 40 MILLILITER(S): at 02:00

## 2020-01-01 RX ADMIN — MIDODRINE HYDROCHLORIDE 10 MILLIGRAM(S): 2.5 TABLET ORAL at 13:16

## 2020-01-01 RX ADMIN — MIDODRINE HYDROCHLORIDE 20 MILLIGRAM(S): 2.5 TABLET ORAL at 14:06

## 2020-01-01 RX ADMIN — Medication 1000 MILLIGRAM(S): at 03:05

## 2020-01-01 RX ADMIN — MIDAZOLAM HYDROCHLORIDE 2 MILLIGRAM(S): 1 INJECTION, SOLUTION INTRAMUSCULAR; INTRAVENOUS at 01:30

## 2020-01-01 RX ADMIN — PANTOPRAZOLE SODIUM 40 MILLIGRAM(S): 20 TABLET, DELAYED RELEASE ORAL at 07:23

## 2020-01-01 RX ADMIN — Medication 100 MILLIGRAM(S): at 13:12

## 2020-01-01 RX ADMIN — ALTEPLASE 2 MILLIGRAM(S): KIT at 02:15

## 2020-01-01 RX ADMIN — Medication 1 DROP(S): at 04:01

## 2020-01-01 RX ADMIN — PANTOPRAZOLE SODIUM 40 MILLIGRAM(S): 20 TABLET, DELAYED RELEASE ORAL at 05:22

## 2020-01-01 RX ADMIN — ONDANSETRON 4 MILLIGRAM(S): 8 TABLET, FILM COATED ORAL at 18:49

## 2020-01-01 RX ADMIN — HYDROMORPHONE HYDROCHLORIDE 0.5 MILLIGRAM(S): 2 INJECTION INTRAMUSCULAR; INTRAVENOUS; SUBCUTANEOUS at 14:38

## 2020-01-01 RX ADMIN — Medication 1 MILLIGRAM(S): at 21:03

## 2020-01-01 RX ADMIN — Medication 50 MILLILITER(S): at 06:58

## 2020-01-01 RX ADMIN — SODIUM CHLORIDE 70 MILLILITER(S): 9 INJECTION INTRAMUSCULAR; INTRAVENOUS; SUBCUTANEOUS at 16:09

## 2020-01-01 RX ADMIN — TRAMADOL HYDROCHLORIDE 25 MILLIGRAM(S): 50 TABLET ORAL at 22:40

## 2020-01-01 RX ADMIN — Medication 50 MILLILITER(S): at 22:15

## 2020-01-01 RX ADMIN — MIDAZOLAM HYDROCHLORIDE 4 MILLIGRAM(S): 1 INJECTION, SOLUTION INTRAMUSCULAR; INTRAVENOUS at 06:06

## 2020-01-01 RX ADMIN — CEFTRIAXONE 100 MILLIGRAM(S): 500 INJECTION, POWDER, FOR SOLUTION INTRAMUSCULAR; INTRAVENOUS at 06:29

## 2020-01-01 RX ADMIN — MIDODRINE HYDROCHLORIDE 10 MILLIGRAM(S): 2.5 TABLET ORAL at 05:43

## 2020-01-01 RX ADMIN — Medication 40 MILLILITER(S): at 22:00

## 2020-01-01 RX ADMIN — SEVELAMER CARBONATE 800 MILLIGRAM(S): 2400 POWDER, FOR SUSPENSION ORAL at 21:33

## 2020-01-01 RX ADMIN — OCTREOTIDE ACETATE 100 MICROGRAM(S): 200 INJECTION, SOLUTION INTRAVENOUS; SUBCUTANEOUS at 13:55

## 2020-01-01 RX ADMIN — Medication 50 MILLILITER(S): at 11:49

## 2020-01-01 RX ADMIN — Medication 0.5 MILLIGRAM(S): at 21:22

## 2020-01-01 RX ADMIN — ONDANSETRON 4 MILLIGRAM(S): 8 TABLET, FILM COATED ORAL at 05:40

## 2020-01-01 RX ADMIN — Medication 1 MILLIGRAM(S): at 17:08

## 2020-01-01 RX ADMIN — Medication 130 MILLIGRAM(S): at 09:30

## 2020-01-01 RX ADMIN — Medication 100 MILLIGRAM(S): at 11:56

## 2020-01-01 RX ADMIN — MIDODRINE HYDROCHLORIDE 30 MILLIGRAM(S): 2.5 TABLET ORAL at 12:14

## 2020-01-01 RX ADMIN — CHLORHEXIDINE GLUCONATE 1 APPLICATION(S): 213 SOLUTION TOPICAL at 05:58

## 2020-01-01 RX ADMIN — PANTOPRAZOLE SODIUM 40 MILLIGRAM(S): 20 TABLET, DELAYED RELEASE ORAL at 17:20

## 2020-01-01 RX ADMIN — SEVELAMER CARBONATE 800 MILLIGRAM(S): 2400 POWDER, FOR SUSPENSION ORAL at 22:51

## 2020-01-01 RX ADMIN — Medication 60 MILLIGRAM(S): at 12:02

## 2020-01-01 RX ADMIN — HYDROMORPHONE HYDROCHLORIDE 2 MILLIGRAM(S): 2 INJECTION INTRAMUSCULAR; INTRAVENOUS; SUBCUTANEOUS at 13:27

## 2020-01-01 RX ADMIN — MEROPENEM 100 MILLIGRAM(S): 1 INJECTION INTRAVENOUS at 17:31

## 2020-01-01 RX ADMIN — Medication 650 MILLIGRAM(S): at 01:59

## 2020-01-01 RX ADMIN — CEFTRIAXONE 100 MILLIGRAM(S): 500 INJECTION, POWDER, FOR SOLUTION INTRAMUSCULAR; INTRAVENOUS at 03:25

## 2020-01-01 RX ADMIN — Medication 1 MILLIGRAM(S): at 12:14

## 2020-01-01 RX ADMIN — Medication 400 MILLIGRAM(S): at 02:35

## 2020-01-01 RX ADMIN — MIDODRINE HYDROCHLORIDE 10 MILLIGRAM(S): 2.5 TABLET ORAL at 15:14

## 2020-01-01 RX ADMIN — FENTANYL CITRATE 50 MICROGRAM(S): 50 INJECTION INTRAVENOUS at 22:53

## 2020-01-01 RX ADMIN — Medication 1 MILLIGRAM(S): at 23:40

## 2020-01-01 RX ADMIN — LACTULOSE 30 GRAM(S): 10 SOLUTION ORAL at 14:00

## 2020-01-01 RX ADMIN — LACTULOSE 20 GRAM(S): 10 SOLUTION ORAL at 11:07

## 2020-01-01 RX ADMIN — Medication 50 MILLILITER(S): at 18:33

## 2020-01-01 RX ADMIN — PANTOPRAZOLE SODIUM 80 MILLIGRAM(S): 20 TABLET, DELAYED RELEASE ORAL at 04:06

## 2020-01-01 RX ADMIN — MIDODRINE HYDROCHLORIDE 20 MILLIGRAM(S): 2.5 TABLET ORAL at 06:56

## 2020-01-01 RX ADMIN — INSULIN HUMAN 5 UNIT(S): 100 INJECTION, SOLUTION SUBCUTANEOUS at 20:50

## 2020-01-01 RX ADMIN — Medication 3.5 MICROGRAM(S)/KG/MIN: at 13:18

## 2020-01-01 RX ADMIN — OCTREOTIDE ACETATE 10 MICROGRAM(S)/HR: 200 INJECTION, SOLUTION INTRAVENOUS; SUBCUTANEOUS at 11:09

## 2020-01-01 RX ADMIN — CHLORHEXIDINE GLUCONATE 15 MILLILITER(S): 213 SOLUTION TOPICAL at 05:13

## 2020-01-01 RX ADMIN — MEROPENEM 100 MILLIGRAM(S): 1 INJECTION INTRAVENOUS at 05:30

## 2020-01-01 RX ADMIN — Medication 0.5 MILLIGRAM(S): at 10:26

## 2020-01-01 RX ADMIN — MIDODRINE HYDROCHLORIDE 10 MILLIGRAM(S): 2.5 TABLET ORAL at 22:22

## 2020-01-01 RX ADMIN — PROPOFOL 2.48 MICROGRAM(S)/KG/MIN: 10 INJECTION, EMULSION INTRAVENOUS at 00:29

## 2020-01-01 RX ADMIN — PANTOPRAZOLE SODIUM 40 MILLIGRAM(S): 20 TABLET, DELAYED RELEASE ORAL at 17:44

## 2020-01-01 RX ADMIN — SEVELAMER CARBONATE 800 MILLIGRAM(S): 2400 POWDER, FOR SUSPENSION ORAL at 21:47

## 2020-01-01 RX ADMIN — PHENYLEPHRINE HYDROCHLORIDE 1.4 MICROGRAM(S)/KG/MIN: 10 INJECTION INTRAVENOUS at 12:50

## 2020-01-01 RX ADMIN — PANTOPRAZOLE SODIUM 10 MG/HR: 20 TABLET, DELAYED RELEASE ORAL at 02:20

## 2020-01-01 RX ADMIN — CHLORHEXIDINE GLUCONATE 15 MILLILITER(S): 213 SOLUTION TOPICAL at 05:06

## 2020-01-01 RX ADMIN — HYDROMORPHONE HYDROCHLORIDE 0.5 MILLIGRAM(S): 2 INJECTION INTRAMUSCULAR; INTRAVENOUS; SUBCUTANEOUS at 06:55

## 2020-01-01 RX ADMIN — Medication 3.88 MICROGRAM(S)/KG/MIN: at 18:25

## 2020-01-01 RX ADMIN — MEROPENEM 100 MILLIGRAM(S): 1 INJECTION INTRAVENOUS at 13:04

## 2020-01-01 RX ADMIN — MIDODRINE HYDROCHLORIDE 10 MILLIGRAM(S): 2.5 TABLET ORAL at 13:20

## 2020-01-01 RX ADMIN — MIDODRINE HYDROCHLORIDE 10 MILLIGRAM(S): 2.5 TABLET ORAL at 22:25

## 2020-01-01 RX ADMIN — MIDODRINE HYDROCHLORIDE 10 MILLIGRAM(S): 2.5 TABLET ORAL at 14:40

## 2020-01-01 RX ADMIN — SEVELAMER CARBONATE 800 MILLIGRAM(S): 2400 POWDER, FOR SUSPENSION ORAL at 05:48

## 2020-01-01 RX ADMIN — NYSTATIN CREAM 1 APPLICATION(S): 100000 CREAM TOPICAL at 05:07

## 2020-01-01 RX ADMIN — Medication 50 MILLILITER(S): at 17:07

## 2020-01-01 RX ADMIN — MEROPENEM 100 MILLIGRAM(S): 1 INJECTION INTRAVENOUS at 14:01

## 2020-01-01 RX ADMIN — Medication 1 TABLET(S): at 18:01

## 2020-01-01 RX ADMIN — HYDROMORPHONE HYDROCHLORIDE 0.2 MILLIGRAM(S): 2 INJECTION INTRAMUSCULAR; INTRAVENOUS; SUBCUTANEOUS at 08:40

## 2020-01-01 RX ADMIN — PIPERACILLIN AND TAZOBACTAM 25 GRAM(S): 4; .5 INJECTION, POWDER, LYOPHILIZED, FOR SOLUTION INTRAVENOUS at 14:46

## 2020-01-01 RX ADMIN — SEVELAMER CARBONATE 800 MILLIGRAM(S): 2400 POWDER, FOR SUSPENSION ORAL at 22:23

## 2020-01-01 RX ADMIN — TRAMADOL HYDROCHLORIDE 25 MILLIGRAM(S): 50 TABLET ORAL at 23:25

## 2020-01-01 RX ADMIN — Medication 100 MILLIGRAM(S): at 12:35

## 2020-01-01 RX ADMIN — CASPOFUNGIN ACETATE 260 MILLIGRAM(S): 7 INJECTION, POWDER, LYOPHILIZED, FOR SOLUTION INTRAVENOUS at 18:28

## 2020-01-01 RX ADMIN — Medication 50 MILLILITER(S): at 11:43

## 2020-01-01 RX ADMIN — OCTREOTIDE ACETATE 10 MICROGRAM(S)/HR: 200 INJECTION, SOLUTION INTRAVENOUS; SUBCUTANEOUS at 00:57

## 2020-01-01 RX ADMIN — Medication 3 MILLILITER(S): at 20:58

## 2020-01-01 RX ADMIN — MEROPENEM 100 MILLIGRAM(S): 1 INJECTION INTRAVENOUS at 06:21

## 2020-01-01 RX ADMIN — Medication 50 MILLILITER(S): at 03:29

## 2020-01-01 RX ADMIN — Medication 1 MILLIGRAM(S): at 22:48

## 2020-01-01 RX ADMIN — Medication 650 MILLIGRAM(S): at 15:00

## 2020-01-01 RX ADMIN — Medication 250 MILLIGRAM(S): at 10:36

## 2020-01-01 RX ADMIN — CHLORHEXIDINE GLUCONATE 15 MILLILITER(S): 213 SOLUTION TOPICAL at 18:14

## 2020-01-01 RX ADMIN — MIDODRINE HYDROCHLORIDE 30 MILLIGRAM(S): 2.5 TABLET ORAL at 17:29

## 2020-01-01 RX ADMIN — CHLORHEXIDINE GLUCONATE 1 APPLICATION(S): 213 SOLUTION TOPICAL at 11:49

## 2020-01-01 RX ADMIN — LACTULOSE 30 GRAM(S): 10 SOLUTION ORAL at 17:21

## 2020-01-01 RX ADMIN — PANTOPRAZOLE SODIUM 40 MILLIGRAM(S): 20 TABLET, DELAYED RELEASE ORAL at 05:49

## 2020-01-01 RX ADMIN — ERYTHROPOIETIN 4000 UNIT(S): 10000 INJECTION, SOLUTION INTRAVENOUS; SUBCUTANEOUS at 19:23

## 2020-01-01 RX ADMIN — MIDODRINE HYDROCHLORIDE 10 MILLIGRAM(S): 2.5 TABLET ORAL at 18:01

## 2020-01-01 RX ADMIN — Medication 130 MILLIGRAM(S): at 00:15

## 2020-01-01 RX ADMIN — SEVELAMER CARBONATE 800 MILLIGRAM(S): 2400 POWDER, FOR SUSPENSION ORAL at 12:04

## 2020-01-01 RX ADMIN — Medication 40 MILLILITER(S): at 01:00

## 2020-01-01 RX ADMIN — PIPERACILLIN AND TAZOBACTAM 25 GRAM(S): 4; .5 INJECTION, POWDER, LYOPHILIZED, FOR SOLUTION INTRAVENOUS at 05:51

## 2020-01-01 RX ADMIN — MIDODRINE HYDROCHLORIDE 20 MILLIGRAM(S): 2.5 TABLET ORAL at 06:45

## 2020-01-01 RX ADMIN — POLYETHYLENE GLYCOL 3350 17 GRAM(S): 17 POWDER, FOR SOLUTION ORAL at 06:57

## 2020-01-01 RX ADMIN — Medication 1 TABLET(S): at 12:24

## 2020-01-01 RX ADMIN — MIDODRINE HYDROCHLORIDE 10 MILLIGRAM(S): 2.5 TABLET ORAL at 18:35

## 2020-01-01 RX ADMIN — TRAMADOL HYDROCHLORIDE 25 MILLIGRAM(S): 50 TABLET ORAL at 15:37

## 2020-01-01 RX ADMIN — FENTANYL CITRATE 100 MICROGRAM(S): 50 INJECTION INTRAVENOUS at 00:32

## 2020-01-01 RX ADMIN — Medication 3.88 MICROGRAM(S)/KG/MIN: at 00:58

## 2020-01-01 RX ADMIN — MIDODRINE HYDROCHLORIDE 10 MILLIGRAM(S): 2.5 TABLET ORAL at 18:28

## 2020-01-01 RX ADMIN — Medication 1 MILLIGRAM(S): at 21:09

## 2020-01-01 RX ADMIN — Medication 0.5 MILLIGRAM(S): at 05:12

## 2020-01-01 RX ADMIN — Medication 50 MILLILITER(S): at 06:24

## 2020-01-01 RX ADMIN — HEPARIN SODIUM 5000 UNIT(S): 5000 INJECTION INTRAVENOUS; SUBCUTANEOUS at 22:26

## 2020-01-01 RX ADMIN — CEFTRIAXONE 100 MILLIGRAM(S): 500 INJECTION, POWDER, FOR SOLUTION INTRAMUSCULAR; INTRAVENOUS at 05:19

## 2020-01-01 RX ADMIN — PIPERACILLIN AND TAZOBACTAM 25 GRAM(S): 4; .5 INJECTION, POWDER, LYOPHILIZED, FOR SOLUTION INTRAVENOUS at 05:28

## 2020-01-01 RX ADMIN — Medication 105 MILLIGRAM(S): at 13:14

## 2020-01-01 RX ADMIN — Medication 650 MILLIGRAM(S): at 02:00

## 2020-01-01 RX ADMIN — Medication 50 MILLILITER(S): at 11:15

## 2020-01-01 RX ADMIN — CHLORHEXIDINE GLUCONATE 1 APPLICATION(S): 213 SOLUTION TOPICAL at 09:55

## 2020-01-01 RX ADMIN — CHLORHEXIDINE GLUCONATE 1 APPLICATION(S): 213 SOLUTION TOPICAL at 11:20

## 2020-01-01 RX ADMIN — SEVELAMER CARBONATE 800 MILLIGRAM(S): 2400 POWDER, FOR SUSPENSION ORAL at 06:57

## 2020-01-01 RX ADMIN — Medication 50 MILLIEQUIVALENT(S): at 05:56

## 2020-01-01 RX ADMIN — HYDROMORPHONE HYDROCHLORIDE 0.5 MILLIGRAM(S): 2 INJECTION INTRAMUSCULAR; INTRAVENOUS; SUBCUTANEOUS at 23:04

## 2020-01-01 RX ADMIN — Medication 50 MILLILITER(S): at 21:38

## 2020-01-01 RX ADMIN — Medication 105 MILLIGRAM(S): at 16:00

## 2020-01-01 RX ADMIN — PIPERACILLIN AND TAZOBACTAM 25 GRAM(S): 4; .5 INJECTION, POWDER, LYOPHILIZED, FOR SOLUTION INTRAVENOUS at 21:22

## 2020-01-01 RX ADMIN — NYSTATIN CREAM 1 APPLICATION(S): 100000 CREAM TOPICAL at 17:18

## 2020-01-01 RX ADMIN — TRAMADOL HYDROCHLORIDE 25 MILLIGRAM(S): 50 TABLET ORAL at 18:07

## 2020-01-01 RX ADMIN — Medication 1 MILLIGRAM(S): at 12:59

## 2020-01-01 RX ADMIN — PHENYLEPHRINE-SHARK LIVER OIL-MINERAL OIL-PETROLATUM RECTAL OINTMENT 1 APPLICATION(S): at 05:13

## 2020-01-01 RX ADMIN — HYDROMORPHONE HYDROCHLORIDE 0.5 MILLIGRAM(S): 2 INJECTION INTRAMUSCULAR; INTRAVENOUS; SUBCUTANEOUS at 14:08

## 2020-01-01 RX ADMIN — LACTULOSE 30 GRAM(S): 10 SOLUTION ORAL at 00:11

## 2020-01-01 RX ADMIN — LACTULOSE 30 GRAM(S): 10 SOLUTION ORAL at 18:47

## 2020-01-01 RX ADMIN — Medication 3.5 MICROGRAM(S)/KG/MIN: at 12:51

## 2020-01-01 RX ADMIN — ALTEPLASE 2 MILLIGRAM(S): KIT at 23:26

## 2020-01-01 RX ADMIN — Medication 50 MILLILITER(S): at 05:28

## 2020-01-01 RX ADMIN — HYDROMORPHONE HYDROCHLORIDE 0.2 MILLIGRAM(S): 2 INJECTION INTRAMUSCULAR; INTRAVENOUS; SUBCUTANEOUS at 23:52

## 2020-01-01 RX ADMIN — PANTOPRAZOLE SODIUM 40 MILLIGRAM(S): 20 TABLET, DELAYED RELEASE ORAL at 06:00

## 2020-01-01 RX ADMIN — OCTREOTIDE ACETATE 100 MICROGRAM(S): 200 INJECTION, SOLUTION INTRAVENOUS; SUBCUTANEOUS at 05:14

## 2020-01-01 RX ADMIN — MIDODRINE HYDROCHLORIDE 20 MILLIGRAM(S): 2.5 TABLET ORAL at 13:26

## 2020-01-01 RX ADMIN — MIDODRINE HYDROCHLORIDE 10 MILLIGRAM(S): 2.5 TABLET ORAL at 14:04

## 2020-01-01 RX ADMIN — PANTOPRAZOLE SODIUM 40 MILLIGRAM(S): 20 TABLET, DELAYED RELEASE ORAL at 05:10

## 2020-01-01 RX ADMIN — Medication 50 MILLILITER(S): at 12:35

## 2020-01-01 RX ADMIN — PIPERACILLIN AND TAZOBACTAM 25 GRAM(S): 4; .5 INJECTION, POWDER, LYOPHILIZED, FOR SOLUTION INTRAVENOUS at 22:25

## 2020-01-01 RX ADMIN — Medication 50 MILLILITER(S): at 05:30

## 2020-01-01 RX ADMIN — OCTREOTIDE ACETATE 100 MICROGRAM(S): 200 INJECTION, SOLUTION INTRAVENOUS; SUBCUTANEOUS at 22:26

## 2020-01-01 RX ADMIN — Medication 100 MILLIGRAM(S): at 21:11

## 2020-01-01 RX ADMIN — PANTOPRAZOLE SODIUM 40 MILLIGRAM(S): 20 TABLET, DELAYED RELEASE ORAL at 18:43

## 2020-01-01 RX ADMIN — NYSTATIN CREAM 1 APPLICATION(S): 100000 CREAM TOPICAL at 05:00

## 2020-01-01 RX ADMIN — Medication 40 MILLILITER(S): at 23:00

## 2020-01-01 RX ADMIN — MEROPENEM 100 MILLIGRAM(S): 1 INJECTION INTRAVENOUS at 21:11

## 2020-01-01 RX ADMIN — Medication 50 MILLILITER(S): at 00:07

## 2020-01-01 RX ADMIN — Medication 3.88 MICROGRAM(S)/KG/MIN: at 00:29

## 2020-01-01 RX ADMIN — FENTANYL CITRATE 2.07 MICROGRAM(S)/KG/HR: 50 INJECTION INTRAVENOUS at 18:25

## 2020-01-01 RX ADMIN — Medication 3.5 MICROGRAM(S)/KG/MIN: at 17:02

## 2020-01-01 RX ADMIN — Medication 50 MILLIEQUIVALENT(S): at 07:46

## 2020-01-01 RX ADMIN — MIDAZOLAM HYDROCHLORIDE 6 MILLIGRAM(S): 1 INJECTION, SOLUTION INTRAMUSCULAR; INTRAVENOUS at 08:40

## 2020-01-01 RX ADMIN — CEFTRIAXONE 100 MILLIGRAM(S): 500 INJECTION, POWDER, FOR SOLUTION INTRAMUSCULAR; INTRAVENOUS at 17:38

## 2020-01-01 RX ADMIN — MIDODRINE HYDROCHLORIDE 30 MILLIGRAM(S): 2.5 TABLET ORAL at 00:33

## 2020-01-01 RX ADMIN — PANTOPRAZOLE SODIUM 40 MILLIGRAM(S): 20 TABLET, DELAYED RELEASE ORAL at 06:36

## 2020-01-01 RX ADMIN — NYSTATIN CREAM 1 APPLICATION(S): 100000 CREAM TOPICAL at 17:39

## 2020-01-01 RX ADMIN — MIDODRINE HYDROCHLORIDE 10 MILLIGRAM(S): 2.5 TABLET ORAL at 05:41

## 2020-01-01 RX ADMIN — Medication 105 MILLIGRAM(S): at 11:58

## 2020-01-01 RX ADMIN — MIDODRINE HYDROCHLORIDE 20 MILLIGRAM(S): 2.5 TABLET ORAL at 06:19

## 2020-01-01 RX ADMIN — CHLORHEXIDINE GLUCONATE 1 APPLICATION(S): 213 SOLUTION TOPICAL at 05:32

## 2020-01-01 RX ADMIN — CHLORHEXIDINE GLUCONATE 15 MILLILITER(S): 213 SOLUTION TOPICAL at 05:27

## 2020-01-01 RX ADMIN — FENTANYL CITRATE 100 MICROGRAM(S): 50 INJECTION INTRAVENOUS at 01:05

## 2020-01-01 RX ADMIN — ERYTHROPOIETIN 4000 UNIT(S): 10000 INJECTION, SOLUTION INTRAVENOUS; SUBCUTANEOUS at 15:12

## 2020-01-01 RX ADMIN — CEFTRIAXONE 100 MILLIGRAM(S): 500 INJECTION, POWDER, FOR SOLUTION INTRAMUSCULAR; INTRAVENOUS at 16:58

## 2020-01-01 RX ADMIN — Medication 1 MILLIGRAM(S): at 12:10

## 2020-01-01 RX ADMIN — MEROPENEM 100 MILLIGRAM(S): 1 INJECTION INTRAVENOUS at 13:12

## 2020-01-01 RX ADMIN — Medication 1 TABLET(S): at 12:14

## 2020-01-01 RX ADMIN — LACTULOSE 30 GRAM(S): 10 SOLUTION ORAL at 00:14

## 2020-01-01 RX ADMIN — PHENYLEPHRINE HYDROCHLORIDE 31.1 MICROGRAM(S)/KG/MIN: 10 INJECTION INTRAVENOUS at 05:35

## 2020-01-01 RX ADMIN — Medication 40 MILLILITER(S): at 00:29

## 2020-01-01 RX ADMIN — PROPOFOL 2.48 MICROGRAM(S)/KG/MIN: 10 INJECTION, EMULSION INTRAVENOUS at 00:58

## 2020-01-01 RX ADMIN — CHLORHEXIDINE GLUCONATE 1 APPLICATION(S): 213 SOLUTION TOPICAL at 22:34

## 2020-01-01 RX ADMIN — HYDROMORPHONE HYDROCHLORIDE 0.25 MILLIGRAM(S): 2 INJECTION INTRAMUSCULAR; INTRAVENOUS; SUBCUTANEOUS at 02:44

## 2020-01-01 RX ADMIN — Medication 105 MILLIGRAM(S): at 13:11

## 2020-01-01 RX ADMIN — Medication 0.5 MILLIGRAM(S): at 11:55

## 2020-01-01 RX ADMIN — LACTULOSE 30 GRAM(S): 10 SOLUTION ORAL at 11:23

## 2020-01-01 RX ADMIN — Medication 50 MILLILITER(S): at 23:20

## 2020-01-01 RX ADMIN — MIDODRINE HYDROCHLORIDE 20 MILLIGRAM(S): 2.5 TABLET ORAL at 22:23

## 2020-01-01 RX ADMIN — PANTOPRAZOLE SODIUM 40 MILLIGRAM(S): 20 TABLET, DELAYED RELEASE ORAL at 06:22

## 2020-01-01 RX ADMIN — MIDODRINE HYDROCHLORIDE 10 MILLIGRAM(S): 2.5 TABLET ORAL at 05:37

## 2020-01-01 RX ADMIN — CHLORHEXIDINE GLUCONATE 15 MILLILITER(S): 213 SOLUTION TOPICAL at 17:07

## 2020-01-01 RX ADMIN — CHLORHEXIDINE GLUCONATE 1 APPLICATION(S): 213 SOLUTION TOPICAL at 05:09

## 2020-01-01 RX ADMIN — SEVELAMER CARBONATE 800 MILLIGRAM(S): 2400 POWDER, FOR SUSPENSION ORAL at 06:25

## 2020-01-01 RX ADMIN — Medication 102 MILLIGRAM(S): at 09:38

## 2020-01-01 RX ADMIN — PANTOPRAZOLE SODIUM 40 MILLIGRAM(S): 20 TABLET, DELAYED RELEASE ORAL at 17:29

## 2020-01-01 RX ADMIN — SEVELAMER CARBONATE 800 MILLIGRAM(S): 2400 POWDER, FOR SUSPENSION ORAL at 17:27

## 2020-01-01 RX ADMIN — HYDROMORPHONE HYDROCHLORIDE 0.5 MILLIGRAM(S): 2 INJECTION INTRAMUSCULAR; INTRAVENOUS; SUBCUTANEOUS at 06:25

## 2020-01-01 RX ADMIN — MIDODRINE HYDROCHLORIDE 10 MILLIGRAM(S): 2.5 TABLET ORAL at 06:27

## 2020-01-01 RX ADMIN — MIDODRINE HYDROCHLORIDE 10 MILLIGRAM(S): 2.5 TABLET ORAL at 13:00

## 2020-01-01 RX ADMIN — Medication 130 MILLIGRAM(S): at 11:20

## 2020-01-01 RX ADMIN — Medication 100 GRAM(S): at 14:22

## 2020-01-01 RX ADMIN — MIDODRINE HYDROCHLORIDE 10 MILLIGRAM(S): 2.5 TABLET ORAL at 17:02

## 2020-01-01 RX ADMIN — PIPERACILLIN AND TAZOBACTAM 25 GRAM(S): 4; .5 INJECTION, POWDER, LYOPHILIZED, FOR SOLUTION INTRAVENOUS at 02:07

## 2020-01-01 RX ADMIN — ERYTHROPOIETIN 10000 UNIT(S): 10000 INJECTION, SOLUTION INTRAVENOUS; SUBCUTANEOUS at 09:43

## 2020-01-01 RX ADMIN — Medication 3 MILLIGRAM(S): at 21:47

## 2020-01-01 RX ADMIN — LACTULOSE 30 GRAM(S): 10 SOLUTION ORAL at 05:31

## 2020-01-01 RX ADMIN — CHLORHEXIDINE GLUCONATE 15 MILLILITER(S): 213 SOLUTION TOPICAL at 17:38

## 2020-01-01 RX ADMIN — Medication 25 MILLILITER(S): at 00:58

## 2020-01-01 RX ADMIN — Medication 50 MILLILITER(S): at 18:40

## 2020-01-01 RX ADMIN — HYDROMORPHONE HYDROCHLORIDE 0.5 MILLIGRAM(S): 2 INJECTION INTRAMUSCULAR; INTRAVENOUS; SUBCUTANEOUS at 10:21

## 2020-01-01 RX ADMIN — HYDROMORPHONE HYDROCHLORIDE 0.5 MILLIGRAM(S): 2 INJECTION INTRAMUSCULAR; INTRAVENOUS; SUBCUTANEOUS at 10:51

## 2020-01-01 RX ADMIN — Medication 1 MILLIGRAM(S): at 12:38

## 2020-01-01 RX ADMIN — POLYETHYLENE GLYCOL 3350 17 GRAM(S): 17 POWDER, FOR SOLUTION ORAL at 22:51

## 2020-01-01 RX ADMIN — MEROPENEM 100 MILLIGRAM(S): 1 INJECTION INTRAVENOUS at 06:00

## 2020-01-01 RX ADMIN — CEFTRIAXONE 100 MILLIGRAM(S): 500 INJECTION, POWDER, FOR SOLUTION INTRAMUSCULAR; INTRAVENOUS at 05:47

## 2020-01-01 RX ADMIN — Medication 40 MILLILITER(S): at 08:00

## 2020-01-01 RX ADMIN — Medication 1 TABLET(S): at 11:40

## 2020-01-01 RX ADMIN — Medication 100 GRAM(S): at 19:52

## 2020-01-01 RX ADMIN — Medication 3 MILLIGRAM(S): at 21:21

## 2020-01-01 RX ADMIN — Medication 650 MILLIGRAM(S): at 01:22

## 2020-01-01 RX ADMIN — Medication 20 MILLIGRAM(S): at 14:08

## 2020-01-01 RX ADMIN — PANTOPRAZOLE SODIUM 40 MILLIGRAM(S): 20 TABLET, DELAYED RELEASE ORAL at 06:30

## 2020-01-01 RX ADMIN — OXYCODONE HYDROCHLORIDE 5 MILLIGRAM(S): 5 TABLET ORAL at 07:45

## 2020-01-01 RX ADMIN — MIDODRINE HYDROCHLORIDE 10 MILLIGRAM(S): 2.5 TABLET ORAL at 11:12

## 2020-01-01 RX ADMIN — CHLORHEXIDINE GLUCONATE 1 APPLICATION(S): 213 SOLUTION TOPICAL at 11:07

## 2020-01-01 RX ADMIN — LACTULOSE 30 GRAM(S): 10 SOLUTION ORAL at 17:35

## 2020-01-01 RX ADMIN — OXYCODONE HYDROCHLORIDE 5 MILLIGRAM(S): 5 TABLET ORAL at 16:16

## 2020-01-01 RX ADMIN — PANTOPRAZOLE SODIUM 10 MG/HR: 20 TABLET, DELAYED RELEASE ORAL at 01:37

## 2020-01-01 RX ADMIN — PANTOPRAZOLE SODIUM 40 MILLIGRAM(S): 20 TABLET, DELAYED RELEASE ORAL at 06:48

## 2020-01-01 RX ADMIN — Medication 50 MILLILITER(S): at 22:23

## 2020-01-01 RX ADMIN — LEVETIRACETAM 500 MILLIGRAM(S): 250 TABLET, FILM COATED ORAL at 22:25

## 2020-01-01 RX ADMIN — PROPOFOL 2.48 MICROGRAM(S)/KG/MIN: 10 INJECTION, EMULSION INTRAVENOUS at 23:15

## 2020-01-01 RX ADMIN — OXYCODONE HYDROCHLORIDE 5 MILLIGRAM(S): 5 TABLET ORAL at 15:35

## 2020-01-01 RX ADMIN — FENTANYL CITRATE 1.87 MICROGRAM(S)/KG/HR: 50 INJECTION INTRAVENOUS at 12:52

## 2020-01-01 RX ADMIN — Medication 40 MILLILITER(S): at 19:00

## 2020-01-01 RX ADMIN — ONDANSETRON 4 MILLIGRAM(S): 8 TABLET, FILM COATED ORAL at 03:30

## 2020-01-01 RX ADMIN — MIDODRINE HYDROCHLORIDE 10 MILLIGRAM(S): 2.5 TABLET ORAL at 21:13

## 2020-01-01 RX ADMIN — FENTANYL CITRATE 2.07 MICROGRAM(S)/KG/HR: 50 INJECTION INTRAVENOUS at 00:58

## 2020-01-01 RX ADMIN — PANTOPRAZOLE SODIUM 40 MILLIGRAM(S): 20 TABLET, DELAYED RELEASE ORAL at 06:20

## 2020-01-01 RX ADMIN — Medication 1 DROP(S): at 01:50

## 2020-01-01 RX ADMIN — MEROPENEM 100 MILLIGRAM(S): 1 INJECTION INTRAVENOUS at 13:03

## 2020-01-01 RX ADMIN — Medication 50 MILLILITER(S): at 17:08

## 2020-01-01 RX ADMIN — PROPOFOL 2.48 MICROGRAM(S)/KG/MIN: 10 INJECTION, EMULSION INTRAVENOUS at 18:25

## 2020-01-01 RX ADMIN — Medication 40 MILLIEQUIVALENT(S): at 06:00

## 2020-01-01 RX ADMIN — LACTULOSE 30 GRAM(S): 10 SOLUTION ORAL at 06:21

## 2020-01-01 RX ADMIN — MIDODRINE HYDROCHLORIDE 20 MILLIGRAM(S): 2.5 TABLET ORAL at 22:52

## 2020-01-01 RX ADMIN — Medication 50 MILLILITER(S): at 17:56

## 2020-01-01 RX ADMIN — SPIRONOLACTONE 50 MILLIGRAM(S): 25 TABLET, FILM COATED ORAL at 17:04

## 2020-01-01 RX ADMIN — PHENYLEPHRINE HYDROCHLORIDE 1.4 MICROGRAM(S)/KG/MIN: 10 INJECTION INTRAVENOUS at 12:43

## 2020-01-01 RX ADMIN — SPIRONOLACTONE 50 MILLIGRAM(S): 25 TABLET, FILM COATED ORAL at 05:43

## 2020-01-01 RX ADMIN — Medication 20 MILLIGRAM(S): at 05:43

## 2020-01-01 RX ADMIN — Medication 105 MILLIGRAM(S): at 12:30

## 2020-01-01 RX ADMIN — Medication 1 MILLIGRAM(S): at 22:13

## 2020-01-01 RX ADMIN — Medication 3.88 MICROGRAM(S)/KG/MIN: at 00:51

## 2020-01-01 RX ADMIN — Medication 325 MILLIGRAM(S): at 02:02

## 2020-01-01 RX ADMIN — MIDODRINE HYDROCHLORIDE 10 MILLIGRAM(S): 2.5 TABLET ORAL at 23:58

## 2020-01-01 RX ADMIN — CHLORHEXIDINE GLUCONATE 1 APPLICATION(S): 213 SOLUTION TOPICAL at 05:00

## 2020-01-01 RX ADMIN — PANTOPRAZOLE SODIUM 10 MG/HR: 20 TABLET, DELAYED RELEASE ORAL at 16:17

## 2020-01-01 RX ADMIN — Medication 50 MILLILITER(S): at 13:22

## 2020-01-01 RX ADMIN — ERYTHROPOIETIN 10000 UNIT(S): 10000 INJECTION, SOLUTION INTRAVENOUS; SUBCUTANEOUS at 15:02

## 2020-01-01 RX ADMIN — SODIUM CHLORIDE 2000 MILLILITER(S): 9 INJECTION INTRAMUSCULAR; INTRAVENOUS; SUBCUTANEOUS at 17:11

## 2020-01-01 RX ADMIN — Medication 105 MILLIGRAM(S): at 11:41

## 2020-01-01 RX ADMIN — PANTOPRAZOLE SODIUM 40 MILLIGRAM(S): 20 TABLET, DELAYED RELEASE ORAL at 00:31

## 2020-01-01 RX ADMIN — SEVELAMER CARBONATE 800 MILLIGRAM(S): 2400 POWDER, FOR SUSPENSION ORAL at 13:00

## 2020-01-01 RX ADMIN — MIDODRINE HYDROCHLORIDE 20 MILLIGRAM(S): 2.5 TABLET ORAL at 13:32

## 2020-01-01 RX ADMIN — FENTANYL CITRATE 50 MICROGRAM(S): 50 INJECTION INTRAVENOUS at 22:28

## 2020-01-01 RX ADMIN — NYSTATIN CREAM 1 APPLICATION(S): 100000 CREAM TOPICAL at 17:37

## 2020-01-01 RX ADMIN — MIDODRINE HYDROCHLORIDE 20 MILLIGRAM(S): 2.5 TABLET ORAL at 21:51

## 2020-01-01 RX ADMIN — Medication 40 MILLILITER(S): at 09:00

## 2020-01-01 RX ADMIN — HYDROMORPHONE HYDROCHLORIDE 0.5 MILLIGRAM(S): 2 INJECTION INTRAMUSCULAR; INTRAVENOUS; SUBCUTANEOUS at 14:20

## 2020-01-01 RX ADMIN — HYDROMORPHONE HYDROCHLORIDE 0.5 MILLIGRAM(S): 2 INJECTION INTRAMUSCULAR; INTRAVENOUS; SUBCUTANEOUS at 13:00

## 2020-01-01 RX ADMIN — Medication 40 MILLIEQUIVALENT(S): at 12:34

## 2020-01-01 RX ADMIN — HYDROMORPHONE HYDROCHLORIDE 0.2 MILLIGRAM(S): 2 INJECTION INTRAMUSCULAR; INTRAVENOUS; SUBCUTANEOUS at 08:19

## 2020-01-01 RX ADMIN — FENTANYL CITRATE 100 MICROGRAM(S): 50 INJECTION INTRAVENOUS at 00:49

## 2020-01-01 RX ADMIN — CHLORHEXIDINE GLUCONATE 1 APPLICATION(S): 213 SOLUTION TOPICAL at 05:27

## 2020-01-01 RX ADMIN — PANTOPRAZOLE SODIUM 40 MILLIGRAM(S): 20 TABLET, DELAYED RELEASE ORAL at 17:57

## 2020-01-01 RX ADMIN — Medication 50 MILLILITER(S): at 13:55

## 2020-01-01 RX ADMIN — SEVELAMER CARBONATE 800 MILLIGRAM(S): 2400 POWDER, FOR SUSPENSION ORAL at 12:02

## 2020-01-01 RX ADMIN — Medication 25 MILLILITER(S): at 08:21

## 2020-01-01 RX ADMIN — PANTOPRAZOLE SODIUM 40 MILLIGRAM(S): 20 TABLET, DELAYED RELEASE ORAL at 23:50

## 2020-01-01 RX ADMIN — CHLORHEXIDINE GLUCONATE 1 APPLICATION(S): 213 SOLUTION TOPICAL at 05:25

## 2020-01-01 RX ADMIN — FLUCONAZOLE 100 MILLIGRAM(S): 150 TABLET ORAL at 17:44

## 2020-01-01 RX ADMIN — Medication 650 MILLIGRAM(S): at 22:05

## 2020-01-01 RX ADMIN — Medication 0.5 MILLIGRAM(S): at 18:18

## 2020-01-01 RX ADMIN — PIPERACILLIN AND TAZOBACTAM 200 GRAM(S): 4; .5 INJECTION, POWDER, LYOPHILIZED, FOR SOLUTION INTRAVENOUS at 10:36

## 2020-01-01 RX ADMIN — LACTULOSE 30 GRAM(S): 10 SOLUTION ORAL at 00:29

## 2020-01-01 RX ADMIN — HYDROMORPHONE HYDROCHLORIDE 2 MILLIGRAM(S): 2 INJECTION INTRAMUSCULAR; INTRAVENOUS; SUBCUTANEOUS at 14:32

## 2020-01-01 RX ADMIN — Medication 40 MILLILITER(S): at 20:00

## 2020-01-01 RX ADMIN — CHLORHEXIDINE GLUCONATE 1 APPLICATION(S): 213 SOLUTION TOPICAL at 05:03

## 2020-01-01 RX ADMIN — PIPERACILLIN AND TAZOBACTAM 25 GRAM(S): 4; .5 INJECTION, POWDER, LYOPHILIZED, FOR SOLUTION INTRAVENOUS at 05:05

## 2020-01-01 RX ADMIN — Medication 100 MILLIGRAM(S): at 13:22

## 2020-01-01 RX ADMIN — MIDODRINE HYDROCHLORIDE 10 MILLIGRAM(S): 2.5 TABLET ORAL at 15:29

## 2020-01-01 RX ADMIN — SEVELAMER CARBONATE 800 MILLIGRAM(S): 2400 POWDER, FOR SUSPENSION ORAL at 12:14

## 2020-01-01 RX ADMIN — HYDROMORPHONE HYDROCHLORIDE 0.5 MILLIGRAM(S): 2 INJECTION INTRAMUSCULAR; INTRAVENOUS; SUBCUTANEOUS at 17:15

## 2020-01-01 RX ADMIN — CHLORHEXIDINE GLUCONATE 15 MILLILITER(S): 213 SOLUTION TOPICAL at 18:32

## 2020-01-01 RX ADMIN — Medication 1 MILLIGRAM(S): at 21:24

## 2020-01-01 RX ADMIN — Medication 50 MILLILITER(S): at 05:03

## 2020-01-01 RX ADMIN — CHLORHEXIDINE GLUCONATE 1 APPLICATION(S): 213 SOLUTION TOPICAL at 06:17

## 2020-01-01 RX ADMIN — SEVELAMER CARBONATE 800 MILLIGRAM(S): 2400 POWDER, FOR SUSPENSION ORAL at 23:05

## 2020-01-01 RX ADMIN — Medication 40 MILLILITER(S): at 11:00

## 2020-01-01 RX ADMIN — OXYCODONE HYDROCHLORIDE 5 MILLIGRAM(S): 5 TABLET ORAL at 13:00

## 2020-01-01 RX ADMIN — HYDROMORPHONE HYDROCHLORIDE 0.5 MILLIGRAM(S): 2 INJECTION INTRAMUSCULAR; INTRAVENOUS; SUBCUTANEOUS at 05:41

## 2020-01-01 RX ADMIN — Medication 650 MILLIGRAM(S): at 22:25

## 2020-01-01 RX ADMIN — OCTREOTIDE ACETATE 10 MICROGRAM(S)/HR: 200 INJECTION, SOLUTION INTRAVENOUS; SUBCUTANEOUS at 09:35

## 2020-01-01 RX ADMIN — SEVELAMER CARBONATE 800 MILLIGRAM(S): 2400 POWDER, FOR SUSPENSION ORAL at 08:50

## 2020-01-01 RX ADMIN — ONDANSETRON 4 MILLIGRAM(S): 8 TABLET, FILM COATED ORAL at 23:18

## 2020-01-01 RX ADMIN — MEROPENEM 100 MILLIGRAM(S): 1 INJECTION INTRAVENOUS at 05:08

## 2020-01-01 RX ADMIN — Medication 650 MILLIGRAM(S): at 03:00

## 2020-01-01 RX ADMIN — POLYETHYLENE GLYCOL 3350 17 GRAM(S): 17 POWDER, FOR SOLUTION ORAL at 16:38

## 2020-01-01 RX ADMIN — CHLORHEXIDINE GLUCONATE 1 APPLICATION(S): 213 SOLUTION TOPICAL at 06:20

## 2020-01-01 RX ADMIN — FENTANYL CITRATE 2.07 MICROGRAM(S)/KG/HR: 50 INJECTION INTRAVENOUS at 00:00

## 2020-01-01 RX ADMIN — PANTOPRAZOLE SODIUM 10 MG/HR: 20 TABLET, DELAYED RELEASE ORAL at 04:27

## 2020-01-01 RX ADMIN — NYSTATIN CREAM 1 APPLICATION(S): 100000 CREAM TOPICAL at 06:21

## 2020-01-01 RX ADMIN — TRAMADOL HYDROCHLORIDE 25 MILLIGRAM(S): 50 TABLET ORAL at 18:10

## 2020-01-01 RX ADMIN — MIDODRINE HYDROCHLORIDE 10 MILLIGRAM(S): 2.5 TABLET ORAL at 14:49

## 2020-01-01 RX ADMIN — Medication 1 MILLIGRAM(S): at 10:21

## 2020-01-01 RX ADMIN — PANTOPRAZOLE SODIUM 10 MG/HR: 20 TABLET, DELAYED RELEASE ORAL at 21:00

## 2020-01-01 RX ADMIN — TRAMADOL HYDROCHLORIDE 25 MILLIGRAM(S): 50 TABLET ORAL at 16:53

## 2020-01-01 RX ADMIN — CHLORHEXIDINE GLUCONATE 1 APPLICATION(S): 213 SOLUTION TOPICAL at 07:54

## 2020-01-01 RX ADMIN — CHLORHEXIDINE GLUCONATE 15 MILLILITER(S): 213 SOLUTION TOPICAL at 05:09

## 2020-01-01 RX ADMIN — Medication 40 MILLILITER(S): at 14:00

## 2020-01-01 RX ADMIN — PANTOPRAZOLE SODIUM 40 MILLIGRAM(S): 20 TABLET, DELAYED RELEASE ORAL at 18:01

## 2020-01-01 RX ADMIN — HYDROMORPHONE HYDROCHLORIDE 0.5 MILLIGRAM(S): 2 INJECTION INTRAMUSCULAR; INTRAVENOUS; SUBCUTANEOUS at 00:50

## 2020-01-01 RX ADMIN — MIDODRINE HYDROCHLORIDE 10 MILLIGRAM(S): 2.5 TABLET ORAL at 23:11

## 2020-01-01 RX ADMIN — SEVELAMER CARBONATE 800 MILLIGRAM(S): 2400 POWDER, FOR SUSPENSION ORAL at 06:28

## 2020-01-01 RX ADMIN — Medication 105 MILLIGRAM(S): at 11:23

## 2020-01-01 RX ADMIN — Medication 100 MILLIGRAM(S): at 22:26

## 2020-01-01 RX ADMIN — MIDODRINE HYDROCHLORIDE 10 MILLIGRAM(S): 2.5 TABLET ORAL at 05:32

## 2020-01-01 RX ADMIN — LACTULOSE 30 GRAM(S): 10 SOLUTION ORAL at 12:28

## 2020-01-01 RX ADMIN — Medication 100 MILLIGRAM(S): at 12:12

## 2020-01-01 RX ADMIN — Medication 1 DROP(S): at 08:42

## 2020-01-01 RX ADMIN — Medication 100 MILLIGRAM(S): at 05:07

## 2020-01-01 RX ADMIN — LACTULOSE 30 GRAM(S): 10 SOLUTION ORAL at 17:37

## 2020-01-01 RX ADMIN — SEVELAMER CARBONATE 800 MILLIGRAM(S): 2400 POWDER, FOR SUSPENSION ORAL at 13:06

## 2020-01-01 RX ADMIN — ONDANSETRON 4 MILLIGRAM(S): 8 TABLET, FILM COATED ORAL at 00:00

## 2020-01-01 RX ADMIN — CASPOFUNGIN ACETATE 260 MILLIGRAM(S): 7 INJECTION, POWDER, LYOPHILIZED, FOR SOLUTION INTRAVENOUS at 20:09

## 2020-01-01 RX ADMIN — MIDAZOLAM HYDROCHLORIDE 4 MILLIGRAM(S): 1 INJECTION, SOLUTION INTRAMUSCULAR; INTRAVENOUS at 08:10

## 2020-01-01 RX ADMIN — Medication 1 MILLIGRAM(S): at 18:01

## 2020-01-01 RX ADMIN — SODIUM CHLORIDE 70 MILLILITER(S): 9 INJECTION INTRAMUSCULAR; INTRAVENOUS; SUBCUTANEOUS at 06:45

## 2020-01-01 RX ADMIN — HYDROMORPHONE HYDROCHLORIDE 0.5 MILLIGRAM(S): 2 INJECTION INTRAMUSCULAR; INTRAVENOUS; SUBCUTANEOUS at 02:29

## 2020-01-01 RX ADMIN — Medication 25 MILLIGRAM(S): at 05:13

## 2020-01-01 RX ADMIN — HYDROMORPHONE HYDROCHLORIDE 0.5 MILLIGRAM(S): 2 INJECTION INTRAMUSCULAR; INTRAVENOUS; SUBCUTANEOUS at 03:09

## 2020-01-01 RX ADMIN — CEFTRIAXONE 100 MILLIGRAM(S): 500 INJECTION, POWDER, FOR SOLUTION INTRAMUSCULAR; INTRAVENOUS at 04:04

## 2020-01-01 RX ADMIN — MIDODRINE HYDROCHLORIDE 20 MILLIGRAM(S): 2.5 TABLET ORAL at 13:06

## 2020-01-01 RX ADMIN — Medication 100 MILLIGRAM(S): at 12:01

## 2020-01-01 RX ADMIN — OCTREOTIDE ACETATE 100 MICROGRAM(S): 200 INJECTION, SOLUTION INTRAVENOUS; SUBCUTANEOUS at 23:19

## 2020-01-01 RX ADMIN — SEVELAMER CARBONATE 800 MILLIGRAM(S): 2400 POWDER, FOR SUSPENSION ORAL at 13:32

## 2020-01-01 RX ADMIN — Medication 1 MILLIGRAM(S): at 12:34

## 2020-01-01 RX ADMIN — SEVELAMER CARBONATE 800 MILLIGRAM(S): 2400 POWDER, FOR SUSPENSION ORAL at 21:51

## 2020-01-01 RX ADMIN — Medication 1 MILLIGRAM(S): at 11:34

## 2020-01-01 RX ADMIN — PIPERACILLIN AND TAZOBACTAM 25 GRAM(S): 4; .5 INJECTION, POWDER, LYOPHILIZED, FOR SOLUTION INTRAVENOUS at 19:57

## 2020-01-01 RX ADMIN — Medication 40 MILLILITER(S): at 16:00

## 2020-01-01 RX ADMIN — MIDODRINE HYDROCHLORIDE 20 MILLIGRAM(S): 2.5 TABLET ORAL at 06:25

## 2020-01-01 RX ADMIN — Medication 1 MILLIGRAM(S): at 12:24

## 2020-01-01 RX ADMIN — MIDODRINE HYDROCHLORIDE 30 MILLIGRAM(S): 2.5 TABLET ORAL at 07:53

## 2020-01-01 RX ADMIN — Medication 1 TABLET(S): at 11:34

## 2020-01-01 RX ADMIN — Medication 50 MILLILITER(S): at 19:45

## 2020-01-01 RX ADMIN — PIPERACILLIN AND TAZOBACTAM 25 GRAM(S): 4; .5 INJECTION, POWDER, LYOPHILIZED, FOR SOLUTION INTRAVENOUS at 10:17

## 2020-01-01 RX ADMIN — MEROPENEM 100 MILLIGRAM(S): 1 INJECTION INTRAVENOUS at 00:50

## 2020-01-01 RX ADMIN — PHENYLEPHRINE-SHARK LIVER OIL-MINERAL OIL-PETROLATUM RECTAL OINTMENT 1 APPLICATION(S): at 06:28

## 2020-01-01 RX ADMIN — Medication 102 MILLIGRAM(S): at 17:07

## 2020-01-01 RX ADMIN — Medication 1 TABLET(S): at 12:59

## 2020-01-01 RX ADMIN — PANTOPRAZOLE SODIUM 10 MG/HR: 20 TABLET, DELAYED RELEASE ORAL at 00:29

## 2020-01-01 RX ADMIN — Medication 1 TABLET(S): at 11:07

## 2020-01-01 RX ADMIN — MIDODRINE HYDROCHLORIDE 30 MILLIGRAM(S): 2.5 TABLET ORAL at 02:05

## 2020-01-01 RX ADMIN — MIDAZOLAM HYDROCHLORIDE 4 MILLIGRAM(S): 1 INJECTION, SOLUTION INTRAMUSCULAR; INTRAVENOUS at 00:49

## 2020-01-01 RX ADMIN — CHLORHEXIDINE GLUCONATE 15 MILLILITER(S): 213 SOLUTION TOPICAL at 05:07

## 2020-01-01 RX ADMIN — MEROPENEM 100 MILLIGRAM(S): 1 INJECTION INTRAVENOUS at 14:05

## 2020-01-01 RX ADMIN — Medication 50 MILLILITER(S): at 19:29

## 2020-01-01 RX ADMIN — Medication 40 MILLIEQUIVALENT(S): at 00:34

## 2020-01-01 RX ADMIN — Medication 50 MILLILITER(S): at 20:45

## 2020-01-01 RX ADMIN — OXYCODONE HYDROCHLORIDE 5 MILLIGRAM(S): 5 TABLET ORAL at 00:48

## 2020-01-01 RX ADMIN — Medication 1 MILLIGRAM(S): at 12:07

## 2020-01-01 RX ADMIN — Medication 50 MILLILITER(S): at 05:06

## 2020-01-01 RX ADMIN — Medication 650 MILLIGRAM(S): at 19:51

## 2020-01-01 RX ADMIN — PIPERACILLIN AND TAZOBACTAM 200 GRAM(S): 4; .5 INJECTION, POWDER, LYOPHILIZED, FOR SOLUTION INTRAVENOUS at 23:01

## 2020-01-01 RX ADMIN — Medication 50 MILLILITER(S): at 18:43

## 2020-01-01 RX ADMIN — PANTOPRAZOLE SODIUM 10 MG/HR: 20 TABLET, DELAYED RELEASE ORAL at 18:26

## 2020-01-01 RX ADMIN — PANTOPRAZOLE SODIUM 40 MILLIGRAM(S): 20 TABLET, DELAYED RELEASE ORAL at 05:14

## 2020-01-01 RX ADMIN — CHLORHEXIDINE GLUCONATE 15 MILLILITER(S): 213 SOLUTION TOPICAL at 05:03

## 2020-01-01 RX ADMIN — Medication 130 MILLIGRAM(S): at 09:15

## 2020-01-01 RX ADMIN — HYDROMORPHONE HYDROCHLORIDE 0.5 MILLIGRAM(S): 2 INJECTION INTRAMUSCULAR; INTRAVENOUS; SUBCUTANEOUS at 03:30

## 2020-01-01 RX ADMIN — PROPOFOL 2.48 MICROGRAM(S)/KG/MIN: 10 INJECTION, EMULSION INTRAVENOUS at 00:00

## 2020-01-01 RX ADMIN — MIDODRINE HYDROCHLORIDE 10 MILLIGRAM(S): 2.5 TABLET ORAL at 14:52

## 2020-01-01 RX ADMIN — MIDODRINE HYDROCHLORIDE 10 MILLIGRAM(S): 2.5 TABLET ORAL at 14:37

## 2020-01-01 RX ADMIN — Medication 650 MILLIGRAM(S): at 15:39

## 2020-01-01 RX ADMIN — CHLORHEXIDINE GLUCONATE 15 MILLILITER(S): 213 SOLUTION TOPICAL at 17:02

## 2020-01-01 RX ADMIN — PHENYLEPHRINE HYDROCHLORIDE 1.4 MICROGRAM(S)/KG/MIN: 10 INJECTION INTRAVENOUS at 13:18

## 2020-01-01 RX ADMIN — Medication 50 MILLILITER(S): at 05:12

## 2020-01-01 RX ADMIN — OXYCODONE HYDROCHLORIDE 5 MILLIGRAM(S): 5 TABLET ORAL at 13:30

## 2020-01-01 RX ADMIN — SEVELAMER CARBONATE 800 MILLIGRAM(S): 2400 POWDER, FOR SUSPENSION ORAL at 17:37

## 2020-01-01 RX ADMIN — NYSTATIN CREAM 1 APPLICATION(S): 100000 CREAM TOPICAL at 18:29

## 2020-01-01 RX ADMIN — Medication 1 TABLET(S): at 12:38

## 2020-01-01 RX ADMIN — HYDROMORPHONE HYDROCHLORIDE 0.2 MILLIGRAM(S): 2 INJECTION INTRAMUSCULAR; INTRAVENOUS; SUBCUTANEOUS at 18:40

## 2020-01-01 RX ADMIN — TUBERCULIN PURIFIED PROTEIN DERIVATIVE 5 UNIT(S): 5 INJECTION, SOLUTION INTRADERMAL at 17:28

## 2020-01-01 RX ADMIN — OCTREOTIDE ACETATE 50 MICROGRAM(S): 200 INJECTION, SOLUTION INTRAVENOUS; SUBCUTANEOUS at 23:55

## 2020-01-01 RX ADMIN — Medication 50 MILLILITER(S): at 04:53

## 2020-01-01 RX ADMIN — Medication 100 MILLIGRAM(S): at 11:28

## 2020-01-01 RX ADMIN — Medication 50 MILLILITER(S): at 21:11

## 2020-01-01 RX ADMIN — Medication 102 MILLIGRAM(S): at 13:51

## 2020-01-01 RX ADMIN — HYDROMORPHONE HYDROCHLORIDE 0.2 MILLIGRAM(S): 2 INJECTION INTRAMUSCULAR; INTRAVENOUS; SUBCUTANEOUS at 01:28

## 2020-01-01 RX ADMIN — Medication 50 MILLILITER(S): at 11:40

## 2020-01-01 RX ADMIN — HYDROMORPHONE HYDROCHLORIDE 0.5 MILLIGRAM(S): 2 INJECTION INTRAMUSCULAR; INTRAVENOUS; SUBCUTANEOUS at 05:56

## 2020-01-01 RX ADMIN — Medication 50 MILLILITER(S): at 12:44

## 2020-01-01 RX ADMIN — Medication 1 MILLIGRAM(S): at 12:29

## 2020-01-01 RX ADMIN — Medication 25 MILLILITER(S): at 00:57

## 2020-01-01 RX ADMIN — HYDROMORPHONE HYDROCHLORIDE 1 MILLIGRAM(S): 2 INJECTION INTRAMUSCULAR; INTRAVENOUS; SUBCUTANEOUS at 11:30

## 2020-01-01 RX ADMIN — HYDROMORPHONE HYDROCHLORIDE 0.5 MILLIGRAM(S): 2 INJECTION INTRAMUSCULAR; INTRAVENOUS; SUBCUTANEOUS at 12:23

## 2020-01-01 RX ADMIN — FLUCONAZOLE 100 MILLIGRAM(S): 150 TABLET ORAL at 17:02

## 2020-01-01 RX ADMIN — PANTOPRAZOLE SODIUM 40 MILLIGRAM(S): 20 TABLET, DELAYED RELEASE ORAL at 06:21

## 2020-01-01 RX ADMIN — HYDROMORPHONE HYDROCHLORIDE 0.5 MILLIGRAM(S): 2 INJECTION INTRAMUSCULAR; INTRAVENOUS; SUBCUTANEOUS at 02:14

## 2020-01-01 RX ADMIN — LACTULOSE 30 GRAM(S): 10 SOLUTION ORAL at 05:08

## 2020-01-01 RX ADMIN — MEROPENEM 100 MILLIGRAM(S): 1 INJECTION INTRAVENOUS at 05:13

## 2020-01-01 RX ADMIN — HYDROMORPHONE HYDROCHLORIDE 0.2 MILLIGRAM(S): 2 INJECTION INTRAMUSCULAR; INTRAVENOUS; SUBCUTANEOUS at 01:50

## 2020-01-01 RX ADMIN — Medication 50 MILLILITER(S): at 00:16

## 2020-01-01 RX ADMIN — MIDODRINE HYDROCHLORIDE 20 MILLIGRAM(S): 2.5 TABLET ORAL at 00:10

## 2020-01-01 RX ADMIN — SODIUM ZIRCONIUM CYCLOSILICATE 10 GRAM(S): 10 POWDER, FOR SUSPENSION ORAL at 16:22

## 2020-01-01 RX ADMIN — Medication 350 MICROGRAM(S)/KG/MIN: at 12:44

## 2020-01-01 RX ADMIN — Medication 3.88 MICROGRAM(S)/KG/MIN: at 21:00

## 2020-01-01 RX ADMIN — HYDROMORPHONE HYDROCHLORIDE 0.5 MILLIGRAM(S): 2 INJECTION INTRAMUSCULAR; INTRAVENOUS; SUBCUTANEOUS at 22:45

## 2020-01-01 RX ADMIN — CHLORHEXIDINE GLUCONATE 1 APPLICATION(S): 213 SOLUTION TOPICAL at 20:00

## 2020-01-01 RX ADMIN — PIPERACILLIN AND TAZOBACTAM 3.38 GRAM(S): 4; .5 INJECTION, POWDER, LYOPHILIZED, FOR SOLUTION INTRAVENOUS at 16:39

## 2020-01-01 RX ADMIN — VASOPRESSIN 2.4 UNIT(S)/MIN: 20 INJECTION INTRAVENOUS at 12:52

## 2020-01-01 RX ADMIN — HYDROMORPHONE HYDROCHLORIDE 0.5 MILLIGRAM(S): 2 INJECTION INTRAMUSCULAR; INTRAVENOUS; SUBCUTANEOUS at 22:49

## 2020-01-01 RX ADMIN — CHLORHEXIDINE GLUCONATE 1 APPLICATION(S): 213 SOLUTION TOPICAL at 05:49

## 2020-01-01 RX ADMIN — Medication 1 TABLET(S): at 14:41

## 2020-01-01 RX ADMIN — PROPOFOL 2.48 MICROGRAM(S)/KG/MIN: 10 INJECTION, EMULSION INTRAVENOUS at 16:17

## 2020-01-01 RX ADMIN — POTASSIUM PHOSPHATE, MONOBASIC POTASSIUM PHOSPHATE, DIBASIC 83.33 MILLIMOLE(S): 236; 224 INJECTION, SOLUTION INTRAVENOUS at 23:52

## 2020-01-01 RX ADMIN — NYSTATIN CREAM 1 APPLICATION(S): 100000 CREAM TOPICAL at 05:02

## 2020-01-01 RX ADMIN — OCTREOTIDE ACETATE 10 MICROGRAM(S)/HR: 200 INJECTION, SOLUTION INTRAVENOUS; SUBCUTANEOUS at 14:30

## 2020-01-01 RX ADMIN — OCTREOTIDE ACETATE 100 MICROGRAM(S): 200 INJECTION, SOLUTION INTRAVENOUS; SUBCUTANEOUS at 05:44

## 2020-01-01 RX ADMIN — MIDODRINE HYDROCHLORIDE 10 MILLIGRAM(S): 2.5 TABLET ORAL at 05:40

## 2020-01-01 RX ADMIN — PIPERACILLIN AND TAZOBACTAM 25 GRAM(S): 4; .5 INJECTION, POWDER, LYOPHILIZED, FOR SOLUTION INTRAVENOUS at 23:59

## 2020-01-01 RX ADMIN — PANTOPRAZOLE SODIUM 40 MILLIGRAM(S): 20 TABLET, DELAYED RELEASE ORAL at 06:25

## 2020-01-01 RX ADMIN — CEFTRIAXONE 100 MILLIGRAM(S): 500 INJECTION, POWDER, FOR SOLUTION INTRAMUSCULAR; INTRAVENOUS at 06:15

## 2020-01-01 RX ADMIN — Medication 0.5 MILLIGRAM(S): at 10:45

## 2020-01-01 RX ADMIN — LACTULOSE 20 GRAM(S): 10 SOLUTION ORAL at 06:35

## 2020-01-01 RX ADMIN — SEVELAMER CARBONATE 800 MILLIGRAM(S): 2400 POWDER, FOR SUSPENSION ORAL at 14:04

## 2020-01-01 RX ADMIN — MIDODRINE HYDROCHLORIDE 10 MILLIGRAM(S): 2.5 TABLET ORAL at 05:02

## 2020-01-01 RX ADMIN — Medication 105 MILLIGRAM(S): at 12:14

## 2020-01-01 RX ADMIN — AMIODARONE HYDROCHLORIDE 300 MILLIGRAM(S): 400 TABLET ORAL at 06:13

## 2020-01-01 RX ADMIN — OXYCODONE HYDROCHLORIDE 5 MILLIGRAM(S): 5 TABLET ORAL at 06:40

## 2020-01-01 RX ADMIN — PANTOPRAZOLE SODIUM 40 MILLIGRAM(S): 20 TABLET, DELAYED RELEASE ORAL at 05:19

## 2020-01-01 RX ADMIN — HALOPERIDOL DECANOATE 5 MILLIGRAM(S): 100 INJECTION INTRAMUSCULAR at 23:55

## 2020-01-01 RX ADMIN — Medication 1 DROP(S): at 05:13

## 2020-01-01 RX ADMIN — Medication 3 MILLILITER(S): at 18:07

## 2020-01-01 RX ADMIN — Medication 50 MILLILITER(S): at 19:27

## 2020-01-01 RX ADMIN — Medication 105 MILLIGRAM(S): at 22:00

## 2020-01-01 RX ADMIN — Medication 1 TABLET(S): at 12:10

## 2020-01-01 RX ADMIN — CEFTRIAXONE 100 MILLIGRAM(S): 500 INJECTION, POWDER, FOR SOLUTION INTRAMUSCULAR; INTRAVENOUS at 06:00

## 2020-01-01 RX ADMIN — Medication 50 MILLILITER(S): at 00:26

## 2020-01-01 RX ADMIN — FENTANYL CITRATE 100 MICROGRAM(S): 50 INJECTION INTRAVENOUS at 00:52

## 2020-01-01 RX ADMIN — MEROPENEM 100 MILLIGRAM(S): 1 INJECTION INTRAVENOUS at 00:29

## 2020-01-01 RX ADMIN — Medication 50 MILLILITER(S): at 00:49

## 2020-01-01 RX ADMIN — Medication 1000 MILLIGRAM(S): at 00:05

## 2020-01-01 RX ADMIN — PANTOPRAZOLE SODIUM 40 MILLIGRAM(S): 20 TABLET, DELAYED RELEASE ORAL at 06:27

## 2020-01-01 RX ADMIN — HYDROMORPHONE HYDROCHLORIDE 0.2 MILLIGRAM(S): 2 INJECTION INTRAMUSCULAR; INTRAVENOUS; SUBCUTANEOUS at 22:49

## 2020-01-01 RX ADMIN — Medication 100 MILLIGRAM(S): at 15:27

## 2020-01-01 RX ADMIN — Medication 1 MILLIGRAM(S): at 17:56

## 2020-01-01 RX ADMIN — MIDODRINE HYDROCHLORIDE 10 MILLIGRAM(S): 2.5 TABLET ORAL at 21:24

## 2020-01-01 RX ADMIN — Medication 4000 MILLILITER(S): at 23:50

## 2020-01-01 RX ADMIN — Medication 40 MILLILITER(S): at 10:00

## 2020-01-01 RX ADMIN — NADOLOL 20 MILLIGRAM(S): 80 TABLET ORAL at 09:27

## 2020-01-01 RX ADMIN — MEROPENEM 100 MILLIGRAM(S): 1 INJECTION INTRAVENOUS at 21:38

## 2020-01-01 RX ADMIN — HYDROMORPHONE HYDROCHLORIDE 0.5 MILLIGRAM(S): 2 INJECTION INTRAMUSCULAR; INTRAVENOUS; SUBCUTANEOUS at 12:06

## 2020-01-01 RX ADMIN — PIPERACILLIN AND TAZOBACTAM 25 GRAM(S): 4; .5 INJECTION, POWDER, LYOPHILIZED, FOR SOLUTION INTRAVENOUS at 05:40

## 2020-01-01 RX ADMIN — CHLORHEXIDINE GLUCONATE 1 APPLICATION(S): 213 SOLUTION TOPICAL at 11:34

## 2020-01-01 RX ADMIN — SODIUM CHLORIDE 1000 MILLILITER(S): 9 INJECTION INTRAMUSCULAR; INTRAVENOUS; SUBCUTANEOUS at 00:15

## 2020-01-01 RX ADMIN — SEVELAMER CARBONATE 800 MILLIGRAM(S): 2400 POWDER, FOR SUSPENSION ORAL at 21:23

## 2020-01-01 RX ADMIN — DEXMEDETOMIDINE HYDROCHLORIDE IN 0.9% SODIUM CHLORIDE 4.14 MICROGRAM(S)/KG/HR: 4 INJECTION INTRAVENOUS at 21:05

## 2020-01-01 RX ADMIN — Medication 1 MILLIGRAM(S): at 11:40

## 2020-01-01 RX ADMIN — CHLORHEXIDINE GLUCONATE 1 APPLICATION(S): 213 SOLUTION TOPICAL at 05:22

## 2020-01-01 RX ADMIN — CHLORHEXIDINE GLUCONATE 15 MILLILITER(S): 213 SOLUTION TOPICAL at 17:18

## 2020-01-01 RX ADMIN — ONDANSETRON 4 MILLIGRAM(S): 8 TABLET, FILM COATED ORAL at 07:54

## 2020-01-01 RX ADMIN — NADOLOL 20 MILLIGRAM(S): 80 TABLET ORAL at 05:43

## 2020-01-01 RX ADMIN — CHLORHEXIDINE GLUCONATE 15 MILLILITER(S): 213 SOLUTION TOPICAL at 17:09

## 2020-01-01 RX ADMIN — HYDROMORPHONE HYDROCHLORIDE 0.5 MILLIGRAM(S): 2 INJECTION INTRAMUSCULAR; INTRAVENOUS; SUBCUTANEOUS at 00:20

## 2020-01-01 RX ADMIN — PANTOPRAZOLE SODIUM 40 MILLIGRAM(S): 20 TABLET, DELAYED RELEASE ORAL at 17:39

## 2020-01-01 RX ADMIN — HYDROMORPHONE HYDROCHLORIDE 1 MILLIGRAM(S): 2 INJECTION INTRAMUSCULAR; INTRAVENOUS; SUBCUTANEOUS at 03:25

## 2020-01-01 RX ADMIN — MIDODRINE HYDROCHLORIDE 10 MILLIGRAM(S): 2.5 TABLET ORAL at 13:17

## 2020-01-01 RX ADMIN — HYDROMORPHONE HYDROCHLORIDE 1 MILLIGRAM(S): 2 INJECTION INTRAMUSCULAR; INTRAVENOUS; SUBCUTANEOUS at 03:40

## 2020-01-01 RX ADMIN — PANTOPRAZOLE SODIUM 40 MILLIGRAM(S): 20 TABLET, DELAYED RELEASE ORAL at 06:19

## 2020-01-01 RX ADMIN — ONDANSETRON 4 MILLIGRAM(S): 8 TABLET, FILM COATED ORAL at 21:21

## 2020-01-01 RX ADMIN — Medication 50 MILLIEQUIVALENT(S): at 05:57

## 2020-01-01 RX ADMIN — CEFTRIAXONE 100 MILLIGRAM(S): 500 INJECTION, POWDER, FOR SOLUTION INTRAMUSCULAR; INTRAVENOUS at 04:05

## 2020-01-01 RX ADMIN — MIDODRINE HYDROCHLORIDE 10 MILLIGRAM(S): 2.5 TABLET ORAL at 10:20

## 2020-01-01 RX ADMIN — HYDROMORPHONE HYDROCHLORIDE 0.5 MILLIGRAM(S): 2 INJECTION INTRAMUSCULAR; INTRAVENOUS; SUBCUTANEOUS at 12:20

## 2020-01-01 RX ADMIN — ONDANSETRON 4 MILLIGRAM(S): 8 TABLET, FILM COATED ORAL at 18:14

## 2020-01-01 RX ADMIN — Medication 650 MILLIGRAM(S): at 23:19

## 2020-01-01 RX ADMIN — Medication 0.5 MILLIGRAM(S): at 07:54

## 2020-01-01 RX ADMIN — Medication 3 MILLIGRAM(S): at 21:23

## 2020-01-01 RX ADMIN — PANTOPRAZOLE SODIUM 40 MILLIGRAM(S): 20 TABLET, DELAYED RELEASE ORAL at 06:57

## 2020-01-01 RX ADMIN — Medication 50 MILLILITER(S): at 02:27

## 2020-01-01 RX ADMIN — Medication 101 MILLIGRAM(S): at 18:36

## 2020-01-01 RX ADMIN — PANTOPRAZOLE SODIUM 40 MILLIGRAM(S): 20 TABLET, DELAYED RELEASE ORAL at 05:48

## 2020-01-01 RX ADMIN — MIDODRINE HYDROCHLORIDE 20 MILLIGRAM(S): 2.5 TABLET ORAL at 06:41

## 2020-01-01 RX ADMIN — Medication 40 MILLILITER(S): at 04:30

## 2020-01-01 RX ADMIN — PHENYLEPHRINE HYDROCHLORIDE 1.4 MICROGRAM(S)/KG/MIN: 10 INJECTION INTRAVENOUS at 07:30

## 2020-01-01 RX ADMIN — OCTREOTIDE ACETATE 10 MICROGRAM(S)/HR: 200 INJECTION, SOLUTION INTRAVENOUS; SUBCUTANEOUS at 04:39

## 2020-01-01 RX ADMIN — SEVELAMER CARBONATE 800 MILLIGRAM(S): 2400 POWDER, FOR SUSPENSION ORAL at 13:31

## 2020-01-01 RX ADMIN — PANTOPRAZOLE SODIUM 40 MILLIGRAM(S): 20 TABLET, DELAYED RELEASE ORAL at 06:17

## 2020-01-01 RX ADMIN — Medication 650 MILLIGRAM(S): at 20:30

## 2020-01-01 RX ADMIN — HYDROMORPHONE HYDROCHLORIDE 0.2 MILLIGRAM(S): 2 INJECTION INTRAMUSCULAR; INTRAVENOUS; SUBCUTANEOUS at 00:11

## 2020-01-01 RX ADMIN — SODIUM CHLORIDE 1000 MILLILITER(S): 9 INJECTION INTRAMUSCULAR; INTRAVENOUS; SUBCUTANEOUS at 22:31

## 2020-01-01 RX ADMIN — HYDROMORPHONE HYDROCHLORIDE 0.25 MILLIGRAM(S): 2 INJECTION INTRAMUSCULAR; INTRAVENOUS; SUBCUTANEOUS at 02:59

## 2020-01-01 RX ADMIN — SODIUM CHLORIDE 1000 MILLILITER(S): 9 INJECTION INTRAMUSCULAR; INTRAVENOUS; SUBCUTANEOUS at 22:18

## 2020-01-01 RX ADMIN — KETAMINE HYDROCHLORIDE 80 MILLIGRAM(S): 100 INJECTION INTRAMUSCULAR; INTRAVENOUS at 08:10

## 2020-01-01 RX ADMIN — Medication 105 MILLIGRAM(S): at 11:39

## 2020-01-01 RX ADMIN — MIDODRINE HYDROCHLORIDE 10 MILLIGRAM(S): 2.5 TABLET ORAL at 13:58

## 2020-01-01 RX ADMIN — OCTREOTIDE ACETATE 10 MICROGRAM(S)/HR: 200 INJECTION, SOLUTION INTRAVENOUS; SUBCUTANEOUS at 20:41

## 2020-01-01 RX ADMIN — MEROPENEM 100 MILLIGRAM(S): 1 INJECTION INTRAVENOUS at 00:16

## 2020-01-01 RX ADMIN — MIDODRINE HYDROCHLORIDE 10 MILLIGRAM(S): 2.5 TABLET ORAL at 21:03

## 2020-01-01 RX ADMIN — Medication 100 MILLIGRAM(S): at 12:02

## 2020-01-01 RX ADMIN — CHLORHEXIDINE GLUCONATE 1 APPLICATION(S): 213 SOLUTION TOPICAL at 05:30

## 2020-01-01 RX ADMIN — Medication 50 MILLILITER(S): at 05:05

## 2020-01-01 RX ADMIN — PANTOPRAZOLE SODIUM 40 MILLIGRAM(S): 20 TABLET, DELAYED RELEASE ORAL at 23:55

## 2020-01-01 RX ADMIN — LACTULOSE 20 GRAM(S): 10 SOLUTION ORAL at 23:59

## 2020-01-01 RX ADMIN — Medication 100 MILLIGRAM(S): at 21:18

## 2020-01-01 RX ADMIN — Medication 100 MILLIGRAM(S): at 11:19

## 2020-01-01 RX ADMIN — Medication 1 MILLIGRAM(S): at 22:22

## 2020-01-01 RX ADMIN — CHLORHEXIDINE GLUCONATE 1 APPLICATION(S): 213 SOLUTION TOPICAL at 12:39

## 2020-01-01 RX ADMIN — Medication 50 MILLILITER(S): at 12:09

## 2020-01-01 RX ADMIN — DEXMEDETOMIDINE HYDROCHLORIDE IN 0.9% SODIUM CHLORIDE 4.14 MICROGRAM(S)/KG/HR: 4 INJECTION INTRAVENOUS at 00:10

## 2020-01-01 RX ADMIN — LACTULOSE 30 GRAM(S): 10 SOLUTION ORAL at 11:59

## 2020-01-01 RX ADMIN — Medication 50 MILLILITER(S): at 18:28

## 2020-01-01 RX ADMIN — Medication 50 MILLILITER(S): at 05:13

## 2020-01-01 RX ADMIN — LACTULOSE 30 GRAM(S): 10 SOLUTION ORAL at 23:37

## 2020-01-01 RX ADMIN — Medication 50 MILLILITER(S): at 23:56

## 2020-01-01 RX ADMIN — PANTOPRAZOLE SODIUM 80 MILLIGRAM(S): 20 TABLET, DELAYED RELEASE ORAL at 22:21

## 2020-01-01 RX ADMIN — Medication 40 MILLILITER(S): at 00:51

## 2020-01-01 RX ADMIN — PIPERACILLIN AND TAZOBACTAM 25 GRAM(S): 4; .5 INJECTION, POWDER, LYOPHILIZED, FOR SOLUTION INTRAVENOUS at 10:25

## 2020-01-01 RX ADMIN — DEXMEDETOMIDINE HYDROCHLORIDE IN 0.9% SODIUM CHLORIDE 4.14 MICROGRAM(S)/KG/HR: 4 INJECTION INTRAVENOUS at 05:00

## 2020-01-01 RX ADMIN — SEVELAMER CARBONATE 800 MILLIGRAM(S): 2400 POWDER, FOR SUSPENSION ORAL at 12:05

## 2020-01-01 RX ADMIN — Medication 325 MILLIGRAM(S): at 22:18

## 2020-01-01 RX ADMIN — Medication 650 MILLIGRAM(S): at 23:00

## 2020-01-01 RX ADMIN — Medication 25 GRAM(S): at 01:58

## 2020-01-01 RX ADMIN — SEVELAMER CARBONATE 800 MILLIGRAM(S): 2400 POWDER, FOR SUSPENSION ORAL at 08:27

## 2020-01-01 RX ADMIN — ONDANSETRON 4 MILLIGRAM(S): 8 TABLET, FILM COATED ORAL at 17:27

## 2020-01-01 RX ADMIN — Medication 105 MILLIGRAM(S): at 12:28

## 2020-01-01 RX ADMIN — CHLORHEXIDINE GLUCONATE 1 APPLICATION(S): 213 SOLUTION TOPICAL at 05:52

## 2020-01-01 RX ADMIN — Medication 40 MILLILITER(S): at 06:00

## 2020-01-01 RX ADMIN — Medication 100 MILLIGRAM(S): at 13:19

## 2020-01-01 RX ADMIN — Medication 3.5 MICROGRAM(S)/KG/MIN: at 15:54

## 2020-01-01 RX ADMIN — Medication 50 MILLILITER(S): at 06:55

## 2020-01-01 RX ADMIN — Medication 1 MILLIGRAM(S): at 21:13

## 2020-01-01 RX ADMIN — MIDODRINE HYDROCHLORIDE 10 MILLIGRAM(S): 2.5 TABLET ORAL at 23:40

## 2020-01-01 RX ADMIN — Medication 10 MILLIGRAM(S): at 01:00

## 2020-01-01 RX ADMIN — LACTULOSE 30 GRAM(S): 10 SOLUTION ORAL at 11:50

## 2020-01-01 RX ADMIN — Medication 1 MILLIGRAM(S): at 13:19

## 2020-01-01 RX ADMIN — Medication 1 TABLET(S): at 21:08

## 2020-01-01 RX ADMIN — Medication 100 MILLIGRAM(S): at 22:13

## 2020-01-01 RX ADMIN — MEROPENEM 100 MILLIGRAM(S): 1 INJECTION INTRAVENOUS at 21:02

## 2020-01-01 RX ADMIN — FLUCONAZOLE 100 MILLIGRAM(S): 150 TABLET ORAL at 18:17

## 2020-01-01 RX ADMIN — PIPERACILLIN AND TAZOBACTAM 200 GRAM(S): 4; .5 INJECTION, POWDER, LYOPHILIZED, FOR SOLUTION INTRAVENOUS at 14:35

## 2020-01-01 RX ADMIN — Medication 50 MILLILITER(S): at 11:07

## 2020-01-01 RX ADMIN — MIDODRINE HYDROCHLORIDE 10 MILLIGRAM(S): 2.5 TABLET ORAL at 06:22

## 2020-01-01 RX ADMIN — Medication 1 MILLIGRAM(S): at 21:43

## 2020-01-01 RX ADMIN — Medication 100 GRAM(S): at 11:59

## 2020-01-01 RX ADMIN — Medication 350 MICROGRAM(S)/KG/MIN: at 14:27

## 2020-01-01 RX ADMIN — Medication 1 DROP(S): at 10:10

## 2020-01-01 RX ADMIN — OCTREOTIDE ACETATE 10 MICROGRAM(S)/HR: 200 INJECTION, SOLUTION INTRAVENOUS; SUBCUTANEOUS at 08:47

## 2020-01-01 RX ADMIN — MIDODRINE HYDROCHLORIDE 10 MILLIGRAM(S): 2.5 TABLET ORAL at 05:44

## 2020-01-01 RX ADMIN — PANTOPRAZOLE SODIUM 40 MILLIGRAM(S): 20 TABLET, DELAYED RELEASE ORAL at 17:35

## 2020-01-01 RX ADMIN — Medication 0.5 MILLIGRAM(S): at 17:07

## 2020-01-01 RX ADMIN — Medication 50 MILLILITER(S): at 13:35

## 2020-01-01 RX ADMIN — DEXMEDETOMIDINE HYDROCHLORIDE IN 0.9% SODIUM CHLORIDE 4.14 MICROGRAM(S)/KG/HR: 4 INJECTION INTRAVENOUS at 19:30

## 2020-01-01 RX ADMIN — CEFTRIAXONE 100 MILLIGRAM(S): 500 INJECTION, POWDER, FOR SOLUTION INTRAMUSCULAR; INTRAVENOUS at 21:54

## 2020-01-01 RX ADMIN — Medication 3.5 MICROGRAM(S)/KG/MIN: at 07:30

## 2020-01-01 RX ADMIN — NYSTATIN CREAM 1 APPLICATION(S): 100000 CREAM TOPICAL at 05:48

## 2020-01-01 RX ADMIN — ONDANSETRON 4 MILLIGRAM(S): 8 TABLET, FILM COATED ORAL at 20:02

## 2020-01-01 RX ADMIN — LACTULOSE 20 GRAM(S): 10 SOLUTION ORAL at 12:10

## 2020-01-01 RX ADMIN — PANTOPRAZOLE SODIUM 40 MILLIGRAM(S): 20 TABLET, DELAYED RELEASE ORAL at 06:02

## 2020-01-01 RX ADMIN — Medication 100 MILLIGRAM(S): at 12:20

## 2020-01-01 RX ADMIN — Medication 105 MILLIGRAM(S): at 12:01

## 2020-01-01 RX ADMIN — Medication 1 MILLIGRAM(S): at 16:37

## 2020-01-01 RX ADMIN — Medication 60 MILLIGRAM(S): at 21:09

## 2020-01-01 RX ADMIN — MORPHINE SULFATE 4 MILLIGRAM(S): 50 CAPSULE, EXTENDED RELEASE ORAL at 23:43

## 2020-01-01 RX ADMIN — Medication 100 MILLIGRAM(S): at 10:17

## 2020-01-01 RX ADMIN — Medication 40 MILLILITER(S): at 18:00

## 2020-01-01 RX ADMIN — MIDODRINE HYDROCHLORIDE 20 MILLIGRAM(S): 2.5 TABLET ORAL at 08:27

## 2020-01-01 RX ADMIN — MIDODRINE HYDROCHLORIDE 30 MILLIGRAM(S): 2.5 TABLET ORAL at 23:05

## 2020-01-01 RX ADMIN — MIDODRINE HYDROCHLORIDE 30 MILLIGRAM(S): 2.5 TABLET ORAL at 06:28

## 2020-01-01 RX ADMIN — LACTULOSE 30 GRAM(S): 10 SOLUTION ORAL at 05:43

## 2020-01-01 RX ADMIN — PANTOPRAZOLE SODIUM 40 MILLIGRAM(S): 20 TABLET, DELAYED RELEASE ORAL at 05:07

## 2020-01-01 RX ADMIN — Medication 40 MILLILITER(S): at 07:36

## 2020-01-01 RX ADMIN — Medication 50 MILLILITER(S): at 06:34

## 2020-01-01 RX ADMIN — HYDROMORPHONE HYDROCHLORIDE 0.2 MILLIGRAM(S): 2 INJECTION INTRAMUSCULAR; INTRAVENOUS; SUBCUTANEOUS at 00:30

## 2020-01-01 RX ADMIN — PANTOPRAZOLE SODIUM 40 MILLIGRAM(S): 20 TABLET, DELAYED RELEASE ORAL at 05:12

## 2020-01-01 RX ADMIN — PIPERACILLIN AND TAZOBACTAM 25 GRAM(S): 4; .5 INJECTION, POWDER, LYOPHILIZED, FOR SOLUTION INTRAVENOUS at 21:39

## 2020-01-01 RX ADMIN — Medication 1 MILLIGRAM(S): at 23:11

## 2020-01-01 RX ADMIN — Medication 62.5 MILLIMOLE(S): at 09:39

## 2020-01-01 RX ADMIN — MIDODRINE HYDROCHLORIDE 10 MILLIGRAM(S): 2.5 TABLET ORAL at 05:09

## 2020-01-01 RX ADMIN — CISATRACURIUM BESYLATE 20 MILLIGRAM(S): 2 INJECTION INTRAVENOUS at 17:34

## 2020-01-01 RX ADMIN — NYSTATIN CREAM 1 APPLICATION(S): 100000 CREAM TOPICAL at 17:38

## 2020-01-01 RX ADMIN — Medication 0.5 MILLIGRAM(S): at 20:59

## 2020-01-01 RX ADMIN — PANTOPRAZOLE SODIUM 10 MG/HR: 20 TABLET, DELAYED RELEASE ORAL at 00:51

## 2020-01-01 RX ADMIN — ONDANSETRON 4 MILLIGRAM(S): 8 TABLET, FILM COATED ORAL at 22:00

## 2020-01-01 RX ADMIN — MIDODRINE HYDROCHLORIDE 10 MILLIGRAM(S): 2.5 TABLET ORAL at 14:58

## 2020-01-01 RX ADMIN — PROPOFOL 2.48 MICROGRAM(S)/KG/MIN: 10 INJECTION, EMULSION INTRAVENOUS at 00:50

## 2020-01-01 RX ADMIN — SEVELAMER CARBONATE 800 MILLIGRAM(S): 2400 POWDER, FOR SUSPENSION ORAL at 12:01

## 2020-01-01 RX ADMIN — Medication 50 MILLILITER(S): at 03:24

## 2020-01-01 RX ADMIN — SODIUM ZIRCONIUM CYCLOSILICATE 10 GRAM(S): 10 POWDER, FOR SUSPENSION ORAL at 02:19

## 2020-01-01 RX ADMIN — Medication 50 MILLILITER(S): at 17:38

## 2020-01-01 RX ADMIN — HYDROMORPHONE HYDROCHLORIDE 0.5 MILLIGRAM(S): 2 INJECTION INTRAMUSCULAR; INTRAVENOUS; SUBCUTANEOUS at 22:30

## 2020-01-01 RX ADMIN — Medication 50 MILLILITER(S): at 12:06

## 2020-01-01 RX ADMIN — SEVELAMER CARBONATE 800 MILLIGRAM(S): 2400 POWDER, FOR SUSPENSION ORAL at 06:22

## 2020-01-01 RX ADMIN — SEVELAMER CARBONATE 800 MILLIGRAM(S): 2400 POWDER, FOR SUSPENSION ORAL at 06:19

## 2020-01-01 RX ADMIN — Medication 0.5 MILLIGRAM(S): at 02:28

## 2020-01-01 RX ADMIN — Medication 250 MILLIGRAM(S): at 00:00

## 2020-01-01 RX ADMIN — HYDROMORPHONE HYDROCHLORIDE 0.5 MILLIGRAM(S): 2 INJECTION INTRAMUSCULAR; INTRAVENOUS; SUBCUTANEOUS at 13:50

## 2020-01-01 RX ADMIN — MIDAZOLAM HYDROCHLORIDE 2 MILLIGRAM(S): 1 INJECTION, SOLUTION INTRAMUSCULAR; INTRAVENOUS at 23:15

## 2020-01-01 RX ADMIN — Medication 25 MILLILITER(S): at 06:26

## 2020-01-01 RX ADMIN — CHLORHEXIDINE GLUCONATE 1 APPLICATION(S): 213 SOLUTION TOPICAL at 06:00

## 2020-01-01 RX ADMIN — Medication 100 MILLIGRAM(S): at 05:13

## 2020-01-01 RX ADMIN — Medication 1 TABLET(S): at 12:02

## 2020-01-01 RX ADMIN — Medication 101 MILLIGRAM(S): at 03:24

## 2020-01-01 RX ADMIN — MIDODRINE HYDROCHLORIDE 20 MILLIGRAM(S): 2.5 TABLET ORAL at 21:54

## 2020-01-01 RX ADMIN — POLYETHYLENE GLYCOL 3350 17 GRAM(S): 17 POWDER, FOR SOLUTION ORAL at 18:38

## 2020-01-01 RX ADMIN — HEPARIN SODIUM 5000 UNIT(S): 5000 INJECTION INTRAVENOUS; SUBCUTANEOUS at 22:13

## 2020-01-01 RX ADMIN — Medication 400 MILLIGRAM(S): at 23:17

## 2020-01-01 RX ADMIN — SEVELAMER CARBONATE 800 MILLIGRAM(S): 2400 POWDER, FOR SUSPENSION ORAL at 06:42

## 2020-01-01 RX ADMIN — MIDODRINE HYDROCHLORIDE 10 MILLIGRAM(S): 2.5 TABLET ORAL at 21:21

## 2020-01-01 RX ADMIN — OCTREOTIDE ACETATE 10 MICROGRAM(S)/HR: 200 INJECTION, SOLUTION INTRAVENOUS; SUBCUTANEOUS at 01:00

## 2020-01-01 RX ADMIN — SEVELAMER CARBONATE 800 MILLIGRAM(S): 2400 POWDER, FOR SUSPENSION ORAL at 14:43

## 2020-01-01 RX ADMIN — PHENYLEPHRINE HYDROCHLORIDE 1.4 MICROGRAM(S)/KG/MIN: 10 INJECTION INTRAVENOUS at 10:21

## 2020-01-01 RX ADMIN — Medication 50 MILLILITER(S): at 23:54

## 2020-01-01 RX ADMIN — HEPARIN SODIUM 5000 UNIT(S): 5000 INJECTION INTRAVENOUS; SUBCUTANEOUS at 05:15

## 2020-01-01 RX ADMIN — CHLORHEXIDINE GLUCONATE 15 MILLILITER(S): 213 SOLUTION TOPICAL at 05:25

## 2020-01-01 RX ADMIN — CHLORHEXIDINE GLUCONATE 1 APPLICATION(S): 213 SOLUTION TOPICAL at 11:29

## 2020-01-01 RX ADMIN — OCTREOTIDE ACETATE 10 MICROGRAM(S)/HR: 200 INJECTION, SOLUTION INTRAVENOUS; SUBCUTANEOUS at 22:01

## 2020-01-01 RX ADMIN — HYDROMORPHONE HYDROCHLORIDE 0.5 MILLIGRAM(S): 2 INJECTION INTRAMUSCULAR; INTRAVENOUS; SUBCUTANEOUS at 23:15

## 2020-01-01 RX ADMIN — MIDODRINE HYDROCHLORIDE 20 MILLIGRAM(S): 2.5 TABLET ORAL at 22:54

## 2020-01-01 RX ADMIN — Medication 650 MILLIGRAM(S): at 05:27

## 2020-01-01 RX ADMIN — Medication 1 MILLIGRAM(S): at 11:07

## 2020-01-01 RX ADMIN — MIDODRINE HYDROCHLORIDE 10 MILLIGRAM(S): 2.5 TABLET ORAL at 06:17

## 2020-01-01 RX ADMIN — CHLORHEXIDINE GLUCONATE 1 APPLICATION(S): 213 SOLUTION TOPICAL at 05:56

## 2020-01-01 RX ADMIN — SEVELAMER CARBONATE 800 MILLIGRAM(S): 2400 POWDER, FOR SUSPENSION ORAL at 05:45

## 2020-01-01 RX ADMIN — SEVELAMER CARBONATE 800 MILLIGRAM(S): 2400 POWDER, FOR SUSPENSION ORAL at 21:55

## 2020-01-01 RX ADMIN — Medication 1 TABLET(S): at 11:42

## 2020-01-01 RX ADMIN — NYSTATIN CREAM 1 APPLICATION(S): 100000 CREAM TOPICAL at 05:25

## 2020-01-01 RX ADMIN — Medication 1 MILLIGRAM(S): at 11:42

## 2020-01-01 RX ADMIN — PIPERACILLIN AND TAZOBACTAM 25 GRAM(S): 4; .5 INJECTION, POWDER, LYOPHILIZED, FOR SOLUTION INTRAVENOUS at 18:29

## 2020-01-01 RX ADMIN — PANTOPRAZOLE SODIUM 40 MILLIGRAM(S): 20 TABLET, DELAYED RELEASE ORAL at 18:17

## 2020-01-01 RX ADMIN — PIPERACILLIN AND TAZOBACTAM 25 GRAM(S): 4; .5 INJECTION, POWDER, LYOPHILIZED, FOR SOLUTION INTRAVENOUS at 21:56

## 2020-01-01 RX ADMIN — LACTULOSE 30 GRAM(S): 10 SOLUTION ORAL at 05:24

## 2020-01-01 RX ADMIN — Medication 100 MILLIGRAM(S): at 12:05

## 2020-01-01 RX ADMIN — PANTOPRAZOLE SODIUM 10 MG/HR: 20 TABLET, DELAYED RELEASE ORAL at 00:58

## 2020-01-01 RX ADMIN — Medication 75 MILLILITER(S): at 20:35

## 2020-07-11 NOTE — H&P ADULT - NSHPPHYSICALEXAM_GEN_ALL_CORE
CONSTITUTIONAL: Fluctuating mental status. No acute distress. Awake and alert.  HEAD: No evidence of trauma. Structures WNL.  EYES: +PERRL. +EOMI. + scleral icterus. No conjunctival injection.  NECK: Supple. Appropriate ROM. No stiffness.   RESPIRATORY: CTAB. No wheezes, rales, or rhonchi. No accessory muscle use. No apparent respiratory distress.  CARDIOVASCULAR: +S1/S2. No audible S3/S4. Regular rate and rhythm. No murmurs, rubs, or gallops. 2+ radial pulses x b/l UE; 2+ DP pulses x b/l LE.   GASTROINTESTINAL: +Distended. Soft, nontender. +BS. No rebound or guarding.   BACK: No spinal or paraspinal tenderness. No CVA tenderness.  EXTREMITY: No LE swelling or edema. EXTs warm to touch.  MUSCULOSKELETAL: Spontaneous movement in all extremities.  DERMATOLOGICAL: +jaundice. No abnormal rashes or lesions.  NEUROLOGICAL: CN 2-12 grossly intact. No focal deficits. Sensation intact x 4EXT. A&Ox3 (oriented to person, place, and time).  PSYCHIATRIC: Appropriate affect. CONSTITUTIONAL: Fluctuating mental status. No acute distress. Awake and alert.  HEAD: No evidence of trauma. Structures WNL.  EYES: +PERRL. +EOMI. + scleral icterus. No conjunctival injection.  NECK: Supple. Appropriate ROM. No stiffness.   RESPIRATORY: CTAB. No wheezes, rales, or rhonchi. No accessory muscle use. No apparent respiratory distress.  CARDIOVASCULAR: +S1/S2. No audible S3/S4. Regular rate and rhythm. No murmurs, rubs, or gallops. 2+ radial pulses x b/l UE; 2+ DP pulses x b/l LE.   GASTROINTESTINAL: +Distended, Tense. nontender. +BS. No rebound or guarding. +Hepatosplenomegaly  BACK: No spinal or paraspinal tenderness. No CVA tenderness.  EXTREMITY: No LE swelling or edema. EXTs warm to touch.  MUSCULOSKELETAL: Spontaneous movement in all extremities.  DERMATOLOGICAL: +jaundice. +spider angioma. No abnormal rashes or lesions.  NEUROLOGICAL: No asterixis, tremor. CN 2-12 grossly intact. No focal deficits. Sensation intact x 4EXT. A&Ox3 (oriented to person, place, and time).  PSYCHIATRIC: Appropriate affect.

## 2020-07-11 NOTE — H&P ADULT - ASSESSMENT
Assessment:   39 year male PMHx of EtOH abuse and fall c/b bladder rupture s/p exlap repair (2019) presenting from [place] [facility] with seizure-like activity and acute liver failure.    Plan:   #Neuro:   - Witnessed seizure-like activity 7/10. Unclear etiology. Likely due to hepatic encephalopathy (ammonia 107). Possible component of EtOH withdrawal but last drink 7/4 so less likely.  Unlikely complications from (CT head negative for intracranial, facial bone abnormalities)   - Continue lactulose, rifaximin for hepatic encephalopathy  - Continue keppra 500mg BID   - Ativan 6mg IVP for sz activity  - EEG ordered  - MRI brain with and without contrast order  - Continue 1:1 sitter for agitation     #Respiratory:  - No acute issues- stable  - Continue to monitor for nose/mouth bleeds    #CV:   - No BP issues- stable at this time   - CXR negative for acute cardiopulmonary disease    #GI:   - Acute on chronic liver failure- likely from prolonged EtOH abuse (, ALT 46). Possible component of biliary obstruction (alk phos 271) but RUQ US and MRCP negative for obstruction. Maddrey's 28.2. MELD 20. Less likely viral hepatitis (viral hepatitis panel negative, no EBV, CMV test at [place]). Brother's daughter has alpha-1 AT deficiency   - Continue lactulose- titrate to 3 BM daily  - Downtrending lactate- continue zosyn for enteric GI prophylaxis in the setting of fever, biliary sludge  - pend blood cx  - F/u CMV, EBV  - F/u alpha-1 AT     #:  - History of epididymitis- likely STD. Cr .5  - F/u STD panel    #Heme:   - Assessment:   39 year male PMHx of EtOH abuse and fall c/b bladder rupture s/p exlap repair (2019) presenting from [place] [facility] with seizure-like activity and acute liver failure.    Plan:   #Neuro:   - Witnessed seizure-like activity 7/10. Unclear etiology. Likely due to hepatic encephalopathy (ammonia 107). Possible component of EtOH withdrawal but last drink 7/4 so less likely.  Unlikely complications from (CT head negative for intracranial, facial bone abnormalities)   - Continue lactulose, rifaximin for hepatic encephalopathy  - Continue keppra 500mg BID   - Ativan 6mg IVP for sz activity  - EEG ordered  - MRI brain with and without contrast ordered  - Consider neuro consult in morning  - Continue 1:1 sitter for agitation     #Respiratory:  - No acute issues- stable  - Continue to monitor for nose/mouth bleeds    #CV:   - No BP issues- stable at this time   - CXR negative for acute cardiopulmonary disease    #GI:   - Acute on chronic liver failure- likely from prolonged EtOH abuse (, ALT 46). Possible component of biliary obstruction (alk phos 271) but RUQ US and MRCP negative for obstruction. CTAP showed hepatosplenomegaly, portal HTN, varices, small volume ascites with possibility of HCC (difficult to exclude). Kelechi's 28.2. MELD 20. NAC protocol held after 3rd tx. Less likely viral hepatitis (viral hepatitis panel negative, no EBV, CMV test at [place]). Brother's daughter has alpha-1 AT deficiency   - Continue lactulose- titrate to 3 BM daily  - pend blood cx  - Pend autoimmune/infiltrative processes  - F/u CMV, EBV, HSV  - F/u alpha-1 AT     #:  - No active issues- Cr .5    #Heme:   - Thrombocytopenia (Plt 25) at [place]. Likely from liver disease. Otherwise hemodynamically stable.     #ID:   - Febrile, blood cx negative at [place]  - Pend blood cx, UA  - Continue zosyn for enteric GI prophylaxis in the setting of fever, biliary sludge    - Preliminary blood smear showed no parasites but recommended 3 specimens over 2 days. Pt denies travel/tick bites.   - Pend babesia, ehrlichia PCR Ab    Diet: regular  Code: FULL- healthy care proxy- brother Florian Adames (phone number in chart)  VTE prophylaxis: SCD Assessment:   39 year male PMHx of EtOH abuse and fall c/b bladder rupture s/p exlap repair (2019) presenting from [place] [facility] with seizure-like activity and acute liver failure.    Plan:   #Neuro:   - Witnessed seizure-like activity 7/10. Unclear etiology. Likely due to hepatic encephalopathy (ammonia 107). Possible component of EtOH withdrawal but last drink 7/4 so less likely.  Unlikely complications from (CT head negative for intracranial, facial bone abnormalities)   - Continue lactulose, rifaximin for hepatic encephalopathy  - Continue keppra 500mg BID   - Ativan 6mg IVP for sz activity  - EEG ordered  - MRI brain with and without contrast ordered  - Consider neuro consult in morning  - Continue 1:1 sitter for agitation     #Respiratory:  - No acute issues- stable  - Continue to monitor for nose/mouth bleeds    #CV:   - No BP issues- stable at this time   - CXR negative for acute cardiopulmonary disease    #GI:   - Alcoholic cirrhosis 2/2 prolonged EtOH abuse (, ALT 40 -downtrending). Possible component of biliary obstruction (alk phos 182- downtrending) but RUQ US and MRCP negative for obstruction. CTAP showed hepatosplenomegaly, portal HTN, varices, small volume ascites with possibility of HCC (difficult to exclude). Maddrey's 28.2. MELD 20. INR 1.59, PT 17.7 (stable trend). NAC protocol held after 3rd tx. Less likely viral hepatitis (viral hepatitis panel negative, no EBV, CMV test at [place]). HIV negative. Brother's daughter has alpha-1 AT deficiency.   - Continue lactulose- titrate to 3 BM daily  - pend blood cx  - Pend autoimmune/infiltrative processes  - F/u CMV, EBV, HSV  - F/u alpha-1 AT     #:  - No active issues- Cr .5    #Heme:   - Thrombocytopenia (Plt 25- stable trend) at [place]. Likely from liver disease. Otherwise hemodynamically stable.     #ID:   - Febrile, blood cx negative at [place]. No obvious source of infection. Lactate trending down (9.3   - Pend blood cx, UA  - Continue zosyn for enteric GI prophylaxis in the setting of fever, biliary sludge    - Preliminary blood smear showed no parasites but recommended 3 specimens over 2 days. Pt denies travel/tick bites.   - Pend babesia, ehrlichia PCR Ab    Diet: regular  Code: FULL- healthy care proxy- brother Florian Adames (phone number in chart)  VTE prophylaxis: SCD Assessment:   39 year male PMHx of EtOH abuse and fall c/b bladder rupture s/p exlap repair (2019) presenting from [place] [facility] with seizure-like activity and acute liver failure.    Plan:   #Neuro:   - Witnessed seizure-like activity 7/10. Unclear etiology. Likely due to hepatic encephalopathy (ammonia 107). Possible component of EtOH withdrawal but last drink 7/4 so less likely.  Unlikely complications from (CT head negative for intracranial, facial bone abnormalities)   - Continue lactulose, rifaximin for hepatic encephalopathy  - Continue keppra 500mg BID   - Ativan 6mg IVP for sz activity  - EEG ordered  - MRI brain with and without contrast ordered  - Consider neuro consult in morning  - Continue 1:1 sitter for agitation     #Respiratory:  - No acute issues- stable  - Continue to monitor for nose/mouth bleeds    - COVID-19 PCR negative at [place]    #CV:   - No BP issues- stable at this time   - CXR negative for acute cardiopulmonary disease    #GI:   - Alcoholic cirrhosis 2/2 prolonged EtOH abuse (, ALT 40 -downtrending). Possible component of biliary obstruction (alk phos 182- downtrending) but RUQ US and MRCP negative for obstruction. CTAP showed hepatosplenomegaly, portal HTN, varices, small volume ascites with possibility of HCC (difficult to exclude). Maddrey's 28.2. MELD 20. INR 1.59, PT 17.7 (stable trend). NAC protocol held after 3rd tx. Less likely viral hepatitis (viral hepatitis panel negative, no EBV, CMV test at [place]). HIV negative. Brother's daughter has alpha-1 AT deficiency.   - Continue lactulose- titrate to 3 BM daily  - pend blood cx  - Pend autoimmune/infiltrative processes  - F/u CMV, EBV, HSV  - F/u alpha-1 AT     #:  - No active issues- Cr .5    #Heme:   - Thrombocytopenia (Plt 25- stable trend) at [place]. Likely from liver disease. Otherwise hemodynamically stable.     #ID:   - Febrile, blood cx negative at [place]. No obvious source of infection. Lactate trending down (9.3   - Pend blood cx, UA  - Continue zosyn for enteric GI prophylaxis in the setting of fever, biliary sludge    - Preliminary blood smear showed no parasites but recommended 3 specimens over 2 days. Pt denies travel/tick bites.   - Pend babesia, ehrlichia PCR Ab    Diet: regular  Code: FULL- healthy care proxy- brother Florian Adames (phone number in chart)  VTE prophylaxis: SCD Assessment:   39 year male PMHx of EtOH abuse and fall c/b bladder rupture s/p exlap repair (2019) presenting from [place] [facility] with seizure-like activity and alcoholic cirrhosis.    Plan:   #Neuro:   - Witnessed seizure-like activity 7/10. Unclear etiology. Likely due to hepatic encephalopathy (ammonia 107). Possible component of EtOH withdrawal but last drink 7/4 so less likely.  Unlikely complications from (CT head negative for intracranial, facial bone abnormalities)   - Continue lactulose, rifaximin for hepatic encephalopathy  - Continue keppra 500mg BID   - Ativan 6mg IVP for sz activity  - EEG ordered  - MRI brain with and without contrast ordered  - Consider neuro consult in morning  - Continue 1:1 sitter for agitation     #Respiratory:  - No acute issues- stable  - Continue to monitor for nose/mouth bleeds    - COVID-19 PCR negative at [place]    #CV:   - No BP issues- stable at this time   - CXR negative for acute cardiopulmonary disease    #GI:   - Alcoholic cirrhosis 2/2 prolonged EtOH abuse (, ALT 40 -downtrending). Possible component of biliary obstruction (alk phos 182- downtrending) but RUQ US and MRCP negative for obstruction. CTAP showed hepatosplenomegaly, portal HTN, varices, small volume ascites with possibility of HCC (difficult to exclude). Maddrey's 28.2. MELD 20. INR 1.59, PT 17.7 (stable trend). NAC protocol held after 3rd tx. Less likely viral hepatitis (viral hepatitis panel negative, no EBV, CMV test at [place]). HIV negative. Brother's daughter has alpha-1 AT deficiency.   - Continue lactulose- titrate to 3 BM daily  - pend blood cx  - Pend autoimmune/infiltrative processes  - F/u CMV, EBV, HSV  - F/u alpha-1 AT     #:  - No active issues- Cr .5    #Heme:   - Thrombocytopenia (Plt 25- stable trend) at [place]. Likely from liver disease. Otherwise hemodynamically stable.     #ID:   - Febrile, blood cx negative at [place]. No obvious source of infection. Lactate trending down (9.3   - Pend blood cx, UA  - Continue zosyn for enteric GI prophylaxis in the setting of fever, biliary sludge    - Preliminary blood smear showed no parasites but recommended 3 specimens over 2 days. Pt denies travel/tick bites.   - Pend babesia, ehrlichia PCR Ab    Diet: regular  Code: FULL- healthy care proxy- brother Florian Adames (phone number in chart)  VTE prophylaxis: SCD Assessment:   39 year male PMHx of EtOH abuse and fall c/b bladder rupture s/p exlap repair (2019) presenting from [place] [facility] with seizure-like activity and alcoholic cirrhosis.    Plan:   #Neuro:   - Witnessed seizure-like activity 7/10. Unclear etiology. Likely due to hepatic encephalopathy (ammonia 107). Possible component of EtOH withdrawal but last drink 7/4 so less likely.  Unlikely complications from (CT head negative for intracranial, facial bone abnormalities)   - Continue lactulose, rifaximin for hepatic encephalopathy  - Continue keppra 500mg BID   - Ativan 6mg IVP for sz activity  - EEG ordered  - MRI brain with and without contrast ordered  - Consider neuro consult in morning  - Continue 1:1 sitter for agitation     #Respiratory:  - No acute issues- stable  - Continue to monitor for nose/mouth bleeds    - COVID-19 PCR negative at [place]    #CV:   - No BP issues- stable at this time   - CXR negative for acute cardiopulmonary disease    #GI:   - Alcoholic cirrhosis 2/2 prolonged EtOH abuse (, ALT 40 -downtrending). Possible component of biliary obstruction (alk phos 182- downtrending) but RUQ US and MRCP negative for obstruction. CTAP showed hepatosplenomegaly, portal HTN, varices, small volume ascites with possibility of HCC (difficult to exclude). Maddrey's 28.2. MELD 20. INR 1.59, PT 17.7 (stable trend). NAC protocol held after 3rd tx. Less likely viral hepatitis (viral hepatitis panel negative, no EBV, CMV test at [place]). HIV negative. Brother's daughter has alpha-1 AT deficiency.   - Continue lactulose- titrate to 3 BM daily  - pend blood cx  - Pend autoimmune/infiltrative processes  - F/u CMV, EBV, HSV  - F/u alpha-1 AT     #:  - No active issues- Cr .5    #Heme:   - Thrombocytopenia (Plt 25- stable trend) at [place]. Likely from liver disease. Otherwise hemodynamically stable.     #ID:   - Febrile, blood cx negative at [place]. No obvious source of infection. Lactate wnl (1.8 down from high of 9.3)  - Pend blood cx, UA  - Continue zosyn for enteric GI prophylaxis in the setting of fever, biliary sludge    - Preliminary blood smear showed no parasites but recommended 3 specimens over 2 days. Pt denies travel/tick bites.   - Pend babesia, ehrlichia PCR Ab    Diet: regular  Code: FULL- healthy care proxy- brother Florian Adames (phone number in chart)  VTE prophylaxis: SCD

## 2020-07-11 NOTE — H&P ADULT - HISTORY OF PRESENT ILLNESS
39 year male PMHx of EtOH abuse and fall c/b bladder rupture s/p exlap repair (2019) presenting from [facility] with seizure-like activity and acute liver failure. At 1630 on 7/10, pt's roommate witnessed a seizure of 30-90 seconds duration with nose and mouth bleeding while the pt was on the couch. Found to be tachycardic, tachypneic, and post-ictal by EMS. Pt last felt well 1 week ago then noted HA (tx with daily tylenol 1g), fatigue, nausea with NBNB emesis. Endorses drinking 6 beers/week for 10 years. Last drink 7/4. Denies smoking, drug use, travel/tick bites, weight loss, SOB, CP, changes in vision.     At [place] a tox screen was negative (salicylates, APAP, alcohol, opiates). HIV negative, Hep B negative, Hep C negative. A RUQ US with doppler r/o thrombosis and showed GB wall thickening (.5 cm), sludge, no stones. Diffuse hepatosplenomagaly was noted on CTAP with possible HCC component. He was given lactulose, NAC protocol, and zosyn for possible cholecystitis before transfer to [facility] 39 year male PMHx of EtOH abuse and fall c/b bladder rupture s/p exlap repair (2019) presenting from [facility] with seizure-like activity and alcoholic cirrhosis. At 1630 on 7/10, pt's roommate witnessed a seizure of 30-90 seconds duration with nose and mouth bleeding while the pt was on the couch. Found to be tachycardic, tachypneic, and post-ictal by EMS. Pt last felt well 1 week ago then noted HA (tx with daily tylenol 1g), fatigue, nausea with NBNB emesis. Endorses drinking 6 beers/week for 10 years. Last drink 7/4. Denies smoking, drug use, travel/tick bites, weight loss, SOB, CP, changes in vision.     At [place] a tox screen was negative (salicylates, APAP, alcohol, opiates). HIV negative, Hep B negative, Hep C negative. A RUQ US with doppler r/o thrombosis and showed GB wall thickening (.5 cm), sludge, no stones. Diffuse hepatosplenomagaly was noted on CTAP with possible HCC component. He was given lactulose, NAC protocol, and zosyn for possible cholecystitis before transfer to [facility] 39 year male PMHx of EtOH abuse and fall c/b bladder rupture s/p exlap repair (2019) presenting from [facility] with seizure-like activity and alcoholic cirrhosis. At 1630 on 7/10, pt's roommate witnessed a seizure of 30-90 seconds duration with nose and mouth bleeding while the pt was on the couch. Found to be tachycardic, tachypneic, and post-ictal by EMS. Pt last felt well 1 week ago then noted HA (tx with daily tylenol 1g), fatigue, nausea with NBNB emesis. Endorses drinking 6 beers/week for 10 years. Last drink 7/4. Denies smoking, drug use, travel/tick bites, weight loss, SOB, CP, changes in vision.     At [place] a tox screen was negative (salicylates, APAP, alcohol, opiates). HIV negative, Hep B negative, Hep C negative. A RUQ US with doppler r/o thrombosis and showed GB wall thickening (.5 cm), sludge, no stones. Diffuse hepatosplenomagaly was noted on CTAP with possible HCC component. He was given lactulose, NAC protocol, and zosyn for possible cholecystitis. Before transfer to [facility] he had another seizure in [place] and was given Ativan. 39 year male PMHx of EtOH abuse and fall c/b bladder rupture s/p exlap repair (2019) presenting from [facility] with seizure-like activity and alcoholic cirrhosis. At 1630 on 7/10, pt's roommate witnessed a seizure of 30-90 seconds duration with nose and mouth bleeding while the pt was on the couch. Found to be tachycardic, tachypneic, and post-ictal by EMS. Pt last felt well 1 week ago then noted HA (tx with daily tylenol 1g), fatigue, nausea with NBNB emesis. Endorses drinking 6 beers/week for 10 years. Last drink 7/4. Denies smoking, drug use, travel/tick bites, weight loss, SOB, CP, changes in vision.     At [place] a tox screen was negative (salicylates, APAP, alcohol, opiates). HIV negative, Hep B negative, Hep C negative. A RUQ US with doppler r/o thrombosis and showed GB wall thickening (.5 cm), sludge, no stones. Diffuse hepatosplenomagaly was noted on CTAP with possible HCC component. He was given lactulose, NAC protocol, and zosyn for possible cholecystitis. Before transfer to [facility] he had another seizure in [place] and was given Ativan.

## 2020-07-11 NOTE — H&P ADULT - NSHPLABSRESULTS_GEN_ALL_CORE
The Labs were reviewed by me   The Radiology was reviewed by me    EKG tracing reviewed by me                PT/INR - ( 11 Jul 2020 21:20 )   PT: 17.7 sec;   INR: 1.59 ratio         PTT - ( 11 Jul 2020 21:20 )  PTT:32.3 sec                                        12.2   7.39  )-----------( 27       ( 11 Jul 2020 21:20 )             35.3     CAPILLARY BLOOD GLUCOSE    CT head and facial bone (7/10:    IMPRESSION:     No acute intracranial pathology.     No facial bone fracture.      Chest Xray (7/10):   IMPRESSION: No evidence of acute cardiopulmonary disease.      US abdomen (7/10):    IMPRESSION:     Gallbladder is filled with sludge and there is gallbladder wall thickening. Correlate for acute   cholecystitis.      Abdominal Doppler US (7/11):  IMPRESSION:      Hepatic veins and portal vein are patent. No thrombus appreciated.       CT OF THE CHEST, ABDOMEN AND PELVIS WITH  IV CONTRAST  (7/11):    IMPRESSION:      Chest:     -No acute findings.     -Trace pericardial fluid.         Abdomen/pelvis:     -Massive hepatomegaly and morphologic features of hepatic cirrhosis. Heterogeneous background   parenchymal hepatic steatosis, possibility of superimposed hepatocellular carcinoma difficult to   exclude.     -Evidence of portal hypertension including splenomegaly, upper abdominal/esophageal varices, and   splenorenal shunt. Small volume ascites.       MRI MRCP OF THE ABDOMEN WITHOUT CONTRAST  (7/11):   IMPRESSION:     No evidence of biliary obstruction or choledocholithiasis.     Marked hepatomegaly with heterogeneous background fat deposition and morphologic features of   hepatic cirrhosis.     Secondary signs of portal hypertension including splenomegaly and upper abdominal varices.     Small volume ascites. The Labs were reviewed by me   The Radiology was reviewed by me    EKG tracing reviewed by me    07-11    134<L>  |  97  |  <4<L>  ----------------------------<  137<H>  3.0<L>   |  20<L>  |  0.54    Ca    8.1<L>      11 Jul 2020 21:20  Phos  0.8     07-11  Mg     1.9     07-11    TPro  7.0  /  Alb  3.5  /  TBili  10.9<H>  /  DBili  x   /  AST  219<H>  /  ALT  40  /  AlkPhos  182<H>  07-11    Magnesium, Serum: 1.9 mg/dL (07-11-20 @ 21:20)    Phosphorus Level, Serum: 0.8 mg/dL (07-11-20 @ 21:20)      PT/INR - ( 11 Jul 2020 21:20 )   PT: 17.7 sec;   INR: 1.59 ratio         PTT - ( 11 Jul 2020 21:20 )  PTT:32.3 sec                                        12.2   7.39  )-----------( 27       ( 11 Jul 2020 21:20 )             35.3     CAPILLARY BLOOD GLUCOSE    CT head and facial bone (7/10:    IMPRESSION:     No acute intracranial pathology.     No facial bone fracture.      Chest Xray (7/10):   IMPRESSION: No evidence of acute cardiopulmonary disease.      US abdomen (7/10):    IMPRESSION:     Gallbladder is filled with sludge and there is gallbladder wall thickening. Correlate for acute   cholecystitis.      Abdominal Doppler US (7/11):  IMPRESSION:      Hepatic veins and portal vein are patent. No thrombus appreciated.       CT OF THE CHEST, ABDOMEN AND PELVIS WITH  IV CONTRAST  (7/11):    IMPRESSION:      Chest:     -No acute findings.     -Trace pericardial fluid.         Abdomen/pelvis:     -Massive hepatomegaly and morphologic features of hepatic cirrhosis. Heterogeneous background   parenchymal hepatic steatosis, possibility of superimposed hepatocellular carcinoma difficult to   exclude.     -Evidence of portal hypertension including splenomegaly, upper abdominal/esophageal varices, and   splenorenal shunt. Small volume ascites.       MRI MRCP OF THE ABDOMEN WITHOUT CONTRAST  (7/11):   IMPRESSION:     No evidence of biliary obstruction or choledocholithiasis.     Marked hepatomegaly with heterogeneous background fat deposition and morphologic features of   hepatic cirrhosis.     Secondary signs of portal hypertension including splenomegaly and upper abdominal varices.     Small volume ascites.

## 2020-07-11 NOTE — H&P ADULT - NSHPREVIEWOFSYSTEMS_GEN_ALL_CORE
REVIEW OF SYSTEMS:    CONSTITUTIONAL: +Fatigue. +fevers   EYES/ENT: No visual changes;  No vertigo or throat pain   NECK: No pain or stiffness  RESPIRATORY: No cough, wheezing, hemoptysis; No shortness of breath  CARDIOVASCULAR: No chest pain or palpitations  GASTROINTESTINAL: No abdominal or epigastric pain. No nausea, vomiting, or hematemesis; No diarrhea or constipation. No melena or hematochezia.  GENITOURINARY: No dysuria, frequency or hematuria  NEUROLOGICAL: +HA. No numbness or weakness  SKIN: +jaundice. No itching, burning, rashes, or lesions   All other review of systems is negative unless indicated above.

## 2020-07-11 NOTE — H&P ADULT - ATTENDING COMMENTS
39 year male PMHx of EtOH abuse and fall c/b bladder rupture s/p exlap repair (2019) presenting from [facility] with seizure-like activity and new cirrhosis.  - Will need extensive Work up for new onset cirrhosis though some LFT abnormality going back to 2019, hepatology evaluation.   - Neuro eval for Seizures, EEG, MRI, treat for withdrawal though clinical symptoms not consistant, and history mixed.   - Keppra 500 BID, Phenobarb 130 q15 min PRN, lactulose and rifaxamin  critical care time spent 30 min

## 2020-07-12 NOTE — PROGRESS NOTE ADULT - SUBJECTIVE AND OBJECTIVE BOX
Josh Dimitri Winter, PGY1  Pager [phone]    INTERVAL HPI/OVERNIGHT EVENTS:    SUBJECTIVE: Patient seen and examined at bedside.       OBJECTIVE:    VITAL SIGNS:  ICU Vital Signs Last 24 Hrs  T(C): 36.9 (12 Jul 2020 08:00), Max: 38.8 (11 Jul 2020 20:23)  T(F): 98.4 (12 Jul 2020 08:00), Max: 101.8 (11 Jul 2020 20:23)  HR: 102 (12 Jul 2020 10:00) (94 - 123)  BP: 112/68 (12 Jul 2020 10:00) (89/52 - 131/73)  BP(mean): 84 (12 Jul 2020 10:00) (67 - 98)  ABP: --  ABP(mean): --  RR: 15 (12 Jul 2020 10:00) (12 - 23)  SpO2: 100% (12 Jul 2020 10:00) (92% - 100%)        07-11 @ 07:01  -  07-12 @ 07:00  --------------------------------------------------------  IN: 712.3 mL / OUT: 300 mL / NET: 412.3 mL    07-12 @ 07:01  -  07-12 @ 11:04  --------------------------------------------------------  IN: 170 mL / OUT: 0 mL / NET: 170 mL      CAPILLARY BLOOD GLUCOSE          PHYSICAL EXAM:  General: NAD  HEENT: NCAT, EOMI, scleral icterus  Neck: supple  Respiratory: CTAB  Cardiovascular: RRR, S1S2  Abdomen: Distended, nontender  Extremities: no edema  Skin: warm, perfused  Neurological: no tremors, AOx3    MEDICATIONS:  MEDICATIONS  (STANDING):  chlorhexidine 4% Liquid 1 Application(s) Topical <User Schedule>  folic acid 1 milliGRAM(s) Oral daily  lactulose Syrup 20 Gram(s) Oral every 6 hours  LORazepam     Tablet 1 milliGRAM(s) Oral every 8 hours  piperacillin/tazobactam IVPB.. 3.375 Gram(s) IV Intermittent every 8 hours  potassium phosphate / sodium phosphate Tablet (K-PHOS No. 2) 1 Tablet(s) Oral four times a day with meals  rifAXIMin 200 milliGRAM(s) Oral three times a day  thiamine 100 milliGRAM(s) Oral daily    MEDICATIONS  (PRN):  PHENobarbital Injectable 130 milliGRAM(s) IV Push every 15 minutes PRN for agitation      ALLERGIES:  Allergies    No Known Allergies    Intolerances        LABS:                        11.6   6.77  )-----------( 24       ( 12 Jul 2020 06:00 )             32.8     07-12    136  |  98  |  4<L>  ----------------------------<  79  3.0<L>   |  23  |  0.62    Ca    7.8<L>      12 Jul 2020 05:53  Phos  3.5     07-12  Mg     1.8     07-12    TPro  6.3  /  Alb  3.4  /  TBili  10.5<H>  /  DBili  x   /  AST  191<H>  /  ALT  36  /  AlkPhos  167<H>  07-12    PT/INR - ( 12 Jul 2020 05:47 )   PT: 18.6 sec;   INR: 1.67 ratio         PTT - ( 12 Jul 2020 05:47 )  PTT:32.5 sec  Urinalysis Basic - ( 12 Jul 2020 05:26 )    Color: Dark Yellow / Appearance: Clear / SG: >1.050 / pH: x  Gluc: x / Ketone: Trace  / Bili: Large / Urobili: 3 mg/dL   Blood: x / Protein: 100 / Nitrite: Negative   Leuk Esterase: Negative / RBC: 4 /hpf / WBC 17 /HPF   Sq Epi: x / Non Sq Epi: 2 /hpf / Bacteria: Negative        RADIOLOGY & ADDITIONAL TESTS: Reviewed. Josh Dimitri Winter, PGY1  Pager [phone]    INTERVAL HPI/OVERNIGHT EVENTS: AGAPITO since MICU transfer.     SUBJECTIVE: Patient seen and examined at bedside.       OBJECTIVE:    VITAL SIGNS:  ICU Vital Signs Last 24 Hrs  T(C): 36.9 (12 Jul 2020 08:00), Max: 38.8 (11 Jul 2020 20:23)  T(F): 98.4 (12 Jul 2020 08:00), Max: 101.8 (11 Jul 2020 20:23)  HR: 102 (12 Jul 2020 10:00) (94 - 123)  BP: 112/68 (12 Jul 2020 10:00) (89/52 - 131/73)  BP(mean): 84 (12 Jul 2020 10:00) (67 - 98)  ABP: --  ABP(mean): --  RR: 15 (12 Jul 2020 10:00) (12 - 23)  SpO2: 100% (12 Jul 2020 10:00) (92% - 100%)        07-11 @ 07:01  -  07-12 @ 07:00  --------------------------------------------------------  IN: 712.3 mL / OUT: 300 mL / NET: 412.3 mL    07-12 @ 07:01  -  07-12 @ 11:04  --------------------------------------------------------  IN: 170 mL / OUT: 0 mL / NET: 170 mL      CAPILLARY BLOOD GLUCOSE          PHYSICAL EXAM:  General: NAD  HEENT: NCAT, EOMI, scleral icterus  Neck: supple  Respiratory: CTAB  Cardiovascular: RRR, S1S2  Abdomen: Distended, nontender  Extremities: no edema  Skin: warm, perfused  Neurological: no tremors, AOx3    MEDICATIONS:  MEDICATIONS  (STANDING):  chlorhexidine 4% Liquid 1 Application(s) Topical <User Schedule>  folic acid 1 milliGRAM(s) Oral daily  lactulose Syrup 20 Gram(s) Oral every 6 hours  LORazepam     Tablet 1 milliGRAM(s) Oral every 8 hours  piperacillin/tazobactam IVPB.. 3.375 Gram(s) IV Intermittent every 8 hours  potassium phosphate / sodium phosphate Tablet (K-PHOS No. 2) 1 Tablet(s) Oral four times a day with meals  rifAXIMin 200 milliGRAM(s) Oral three times a day  thiamine 100 milliGRAM(s) Oral daily    MEDICATIONS  (PRN):  PHENobarbital Injectable 130 milliGRAM(s) IV Push every 15 minutes PRN for agitation      ALLERGIES:  Allergies    No Known Allergies    Intolerances        LABS:                        11.6   6.77  )-----------( 24       ( 12 Jul 2020 06:00 )             32.8     07-12    136  |  98  |  4<L>  ----------------------------<  79  3.0<L>   |  23  |  0.62    Ca    7.8<L>      12 Jul 2020 05:53  Phos  3.5     07-12  Mg     1.8     07-12    TPro  6.3  /  Alb  3.4  /  TBili  10.5<H>  /  DBili  x   /  AST  191<H>  /  ALT  36  /  AlkPhos  167<H>  07-12    PT/INR - ( 12 Jul 2020 05:47 )   PT: 18.6 sec;   INR: 1.67 ratio         PTT - ( 12 Jul 2020 05:47 )  PTT:32.5 sec  Urinalysis Basic - ( 12 Jul 2020 05:26 )    Color: Dark Yellow / Appearance: Clear / SG: >1.050 / pH: x  Gluc: x / Ketone: Trace  / Bili: Large / Urobili: 3 mg/dL   Blood: x / Protein: 100 / Nitrite: Negative   Leuk Esterase: Negative / RBC: 4 /hpf / WBC 17 /HPF   Sq Epi: x / Non Sq Epi: 2 /hpf / Bacteria: Negative        RADIOLOGY & ADDITIONAL TESTS: Reviewed.

## 2020-07-12 NOTE — CHART NOTE - NSCHARTNOTEFT_GEN_A_CORE
: Nathaniel Qureshi    INDICATION: Fever    PROCEDURE:  [x ] LIMITED ECHO  [x ] LIMITED CHEST  [ ] LIMITED RETROPERITONEAL  [x ] LIMITED ABDOMINAL  [ ] LIMITED DVT  [ ] NEEDLE GUIDANCE VASCULAR  [ ] NEEDLE GUIDANCE THORACENTESIS  [ ] NEEDLE GUIDANCE PARACENTESIS  [ ] NEEDLE GUIDANCE PERICARDIOCENTESIS  [ ] OTHER    FINDINGS: A line predominant pattern. No pleural effusions or gross consolidation seen. Normal LV systolic function. RV not enlarged. IVC indeterminate in size. No pericardial effusion. Hepatomegaly. No ascites seen.     INTERPRETATION: Normal aeration pattern. No ascites seen for diagnostic paracentesis.       --------------------------------------------------------  Nathaniel West, PGY-5  Pulmonary/Critical Care Fellow  Pager: [phone] (YAEL), [phone] (NS)

## 2020-07-12 NOTE — CONSULT NOTE ADULT - SUBJECTIVE AND OBJECTIVE BOX
Chief Complaint: Seizure    HPI:    38 y/o M w/ hx of EtOH dependence and bladder rupture after fall requiring ex lap for surgical repair in 2019, who initially was brought to [facility] after found to have witnessed seizure by roommate, confused when evaluated by EMS, transferred to [facility] for further management.    The patient was started on phenobarbital infusion and given oral Ativan for agitation. He answered some questions appropriately. He denied prior history of liver disease and family history of liver disease. He endorses occasional drinking, stating he only drinks only a few beers a week. He endorses noticing his eyes turning yellow a few weeks ago. He otherwise denies abdominal swelling, lower extremity swelling, bloody emesis, bloody stools, and black tarry stools.    No prior upper endoscopy.    Allergies:  No Known Allergies    Hospital Medications:  chlorhexidine 4% Liquid 1 Application(s) Topical <User Schedule>  folic acid 1 milliGRAM(s) Oral daily  lactulose Syrup 20 Gram(s) Oral every 6 hours  LORazepam     Tablet 1 milliGRAM(s) Oral every 8 hours  PHENobarbital Injectable 130 milliGRAM(s) IV Push every 15 minutes PRN  piperacillin/tazobactam IVPB.. 3.375 Gram(s) IV Intermittent every 8 hours  potassium phosphate / sodium phosphate Tablet (K-PHOS No. 2) 1 Tablet(s) Oral four times a day with meals  rifAXIMin 200 milliGRAM(s) Oral three times a day  thiamine 100 milliGRAM(s) Oral daily    PMHX/PSHX:  No pertinent past medical history  S/P exploratory laparotomy  No significant past surgical history    Family history:  FH: alpha 1 antitrypsin deficiency    Denies family history of colon cancer/polyps, stomach cancer/polyps, pancreatic cancer/masses, liver cancer/disease, ovarian cancer and endometrial cancer.    Social History:     Tob: Denies  EtOH: Endorses drinking a few beers once or twice a week  Illicit Drugs: Denies    ROS (Limited due to AMS)    General:  No wt loss, + fevers, +chills, night sweats, fatigue  GI:  No pain, No nausea, No vomiting, No diarrhea, No constipation, No weight loss, No fever, No pruritis, No rectal bleeding, No tarry stools, No dysphagia,  :  No pain, bleeding, incontinence, nocturia    PHYSICAL EXAM:     GENERAL:  No acute distress  HEENT:  Normocephalic/atraumatic, + scleral icterus  CHEST:  Clear to auscultation bilaterally, no wheezes/rales/ronchi, no accessory muscle use  HEART:  Regular rate and rhythm, no murmurs/rubs/gallops  ABDOMEN:  Soft, non-tender, mildly distended, normoactive bowel sounds  EXTREMITIES: No cyanosis, clubbing, or edema  SKIN:  No rash/erythema  NEURO:  Alert and oriented x 2 (person, place)    Vital Signs:  Vital Signs Last 24 Hrs  T(C): 36.9 (12 Jul 2020 08:00), Max: 38.8 (11 Jul 2020 20:23)  T(F): 98.4 (12 Jul 2020 08:00), Max: 101.8 (11 Jul 2020 20:23)  HR: 116 (12 Jul 2020 14:00) (94 - 123)  BP: 91/53 (12 Jul 2020 14:00) (88/55 - 131/73)  BP(mean): 67 (12 Jul 2020 14:00) (67 - 98)  RR: 17 (12 Jul 2020 14:00) (12 - 23)  SpO2: 100% (12 Jul 2020 14:00) (92% - 100%)  Daily Height in cm: 185.42 (11 Jul 2020 20:23)      LABS:                        11.6   6.77  )-----------( 24       ( 12 Jul 2020 06:00 )             32.8     Mean Cell Volume: 98.2 fl (07-12-20 @ 06:00)    07-12    136  |  98  |  4<L>  ----------------------------<  79  3.0<L>   |  23  |  0.62    Ca    7.8<L>      12 Jul 2020 05:53  Phos  3.5     07-12  Mg     1.8     07-12    TPro  6.3  /  Alb  3.4  /  TBili  10.5<H>  /  DBili  x   /  AST  191<H>  /  ALT  36  /  AlkPhos  167<H>  07-12    LIVER FUNCTIONS - ( 12 Jul 2020 05:53 )  Alb: 3.4 g/dL / Pro: 6.3 g/dL / ALK PHOS: 167 U/L / ALT: 36 U/L / AST: 191 U/L / GGT: x           PT/INR - ( 12 Jul 2020 05:47 )   PT: 18.6 sec;   INR: 1.67 ratio         PTT - ( 12 Jul 2020 05:47 )  PTT:32.5 sec  Urinalysis Basic - ( 12 Jul 2020 05:26 )    Color: Dark Yellow / Appearance: Clear / SG: >1.050 / pH: x  Gluc: x / Ketone: Trace  / Bili: Large / Urobili: 3 mg/dL   Blood: x / Protein: 100 / Nitrite: Negative   Leuk Esterase: Negative / RBC: 4 /hpf / WBC 17 /HPF   Sq Epi: x / Non Sq Epi: 2 /hpf / Bacteria: Negative      Amylase Serum--      Lipase serum--       [id number]                          11.6   6.77  )-----------( 24       ( 12 Jul 2020 06:00 )             32.8                         12.2   7.39  )-----------( 27       ( 11 Jul 2020 21:20 )             35.3     Imaging:    No new imaging

## 2020-07-12 NOTE — PROGRESS NOTE ADULT - ASSESSMENT
Assessment:   39 year male PMHx of EtOH abuse and fall c/b bladder rupture s/p exlap repair (2019) presenting from [facility] with seizure-like activity and alcoholic cirrhosis.    Plan:   #Neuro:   Witnessed seizure-like activity 7/10, ? d/t hepatic encephalopathy (ammonia 107) and EtOH withdrawal (last drink 7/4).  Unlikely complications from (CT head negative for intracranial, facial bone abnormalities).  - Continue lactulose, rifaximin for hepatic encephalopathy  - Start thiamine, folate  - C/w K Phos QID  - D/C Keppra, start Ativan 1mg TID  - F/u EEG  - F/u MRI brain with and without contrast  - Continue 1:1 sitter, restraints for agitation  - Phenobarb PRN 130mg IV q15min for agitation    #Respiratory:  - No acute issues- stable  - Continue to monitor for nose/mouth bleeds  - COVID-19 PCR negative at [place]  - F/u COVID Ab's    #CV:   - No active issues    #GI:   //Alcoholic cirrhosis 2/2 prolonged EtOH abuse (, ALT 40 -downtrending). CTAP showed hepatosplenomegaly, portal HTN, varices, small volume ascites with possibility of HCC (difficult to exclude). MELD 20. Maddrey 28.2 (<32). NAC protocol held after 3rd tx.  - F/u CMV, EBV, HSV  - F/u autoimmune/infiltrative workup  - Continue lactulose- titrate to 3 BM daily  - F/u alpha-1 AT  //Biliary sludge (alk phos 182- downtrending)  - RUQ US and MRCP negative for obstruction.  - Continue Zosyn for enteric GI prophylaxis in the setting of fever, biliary sludge x 1 day    #:  - No active issues    #Heme:   - Thrombocytopenia (Plt 25- stable trend) at [place]. Likely from liver disease. Otherwise hemodynamically stable.   //? suspicion for babesiosis/ehrlichiosis, per Heme  - Preliminary blood smear showed no parasites but recommended 3 specimens over 2 days. Pt denies travel/tick bites.   - F/u babesia, ehrlichia PCR Ab    #ID:   - Febrile, blood cx negative at [place]. No obvious source of infection. Lactate wnl (1.8 down from high of 9.3)  - Pend blood cx, UA  - Continue Zosyn for enteric GI prophylaxis in the setting of fever, biliary sludge x 1 day    Diet: regular  Code: FULL- healthy care proxy- brother Florian Adames (phone number in chart)  VTE prophylaxis: SCD

## 2020-07-12 NOTE — CONSULT NOTE ADULT - ATTENDING COMMENTS
40 yo M with history of ex-lap due to bladder rupture after fall (2019), currently admitted with alcohol withdrawal syndrome complicated by delirium and witnessed seizure at home prior to hospitalization, also with sepsis and probable alcoholic hepatitis with ascites.    # Sepsis: Febrile and tachycardic. No leukocytosis or leukopenia. Infectious work-up in progress, and agree with continuation of Zosyn meanwhile. Recommend diagnostic paracentesis if sufficient pocket of fluid identified on bedside US.    # Alcohol withdrawal syndrome with delirium and witnessed seizure at home: Continue MICU management, currently receiving phenobarbital and lorazepam. EEG per Neurology. Avoid long-acting benzodiazepines such as Librium due to poor hepatic clearance.    # Hepatic encephalopathy: Difficult to ascertain given concomitant alcohol withdrawal syndrome, but agree with continuation of rifaximin and lactulose given his risk.    # Probable alcoholic hepatitis: Patient appears to be minimizing his alcohol history based on his clinical presentation. Follow-up phosphatidylethanol (PEth) for confirmation of recent heavy alcohol consumption. Defer corticosteroids for now while pursuing infectious work-up, as above. If steroid non-responder (or if not eligible for steroids due to contraindications such as infection) then will consider liver transplant evaluation, though patient's minimization of his alcohol history is a significant concern in terms of his risk of relapse.    Please don't hesitate to call with any questions/concerns.    Davi Sherman M.D., Ph.D.  Transplant Hepatology  Cell: [phone]

## 2020-07-12 NOTE — CONSULT NOTE ADULT - ASSESSMENT
Impression:    38 y/o M w/ hx of EtOH dependence and bladder rupture after fall requiring ex lap for surgical repair in 2019, who initially was brought to [facility] after found to have witnessed seizure by roommate, confused when evaluated by EMS, transferred to [facility] for further management.    # Cirrhosis: Likely due to EtOH dependence  MELD-Na:  22  Varices: No prior EGD  Ascites: None on bedside ultrasound  HE: AAO x 2, + asterixis  HCC: No prior imaging  # Abnormal liver enzymes: Likely represents alcoholic hepatitis vs acute on chronic liver failure. DF 36.3  # Fever: Concern for underlying infection. BCx pending. No ascites to tap per bedside U/S.  # AMS: Likely represents alcohol withdrawal c/b seizure. Differential includes underlying infection given fever.  # Seizure: Likely alcohol withdrawal related seizure    Recommendations:  - Monitor daily CBC, CMP, and INR  - Would not start on steroids for alcoholic hepatitis given concern for underlying infection  - Continue antibiotics  - Continue lactulose 20g q6h and rifaximin  - Check abdominal U/S with dopplers  - F/U AMA, ASMA, alpha 1 antitrypsin, ceruloplasmin, soluble liver antigen, anti LKM-1 Ab, immunoglobulin panel, iron studies, and ferritin  - F/U Hep B Surface Ab, Hep B Core Ab, Hepatitis E IgM, Cytomegalovirus PCR, Aje Barr Virus PCR, Herpes Simplex Virus PCR  - Infectious work-up per MICU team  - Treatment of agitation / alcohol withdrawal per primary team  - Rest of care per primary team    Jeovanny Moses MD  Gastroenterology Fellow  Please contact via [employer] Teams    Please call [phone] to speak to answering service for on-call GI fellow. Can also e-mail [email or web address].

## 2020-07-13 NOTE — EEG REPORT - NS EEG TEXT BOX
[facility]   COMPREHENSIVE EPILEPSY CENTER   REPORT OF LONG-TERM VIDEO EEG     [facility]: [address], [place] [address], Ph#: [phone]  [facility]: [address] [place] [address], Ph#: [phone]  [facility]: [address] [place] [address], Ph#: [phone]    Patient Name: LEATHA MACIAS  Age and : 39y ([birth year])  MRN #: [id number]  Location: [facility] [address]1  Referring Physician: Ayo Teixeira    Start Time/Date: 14:33 on 20  End Time/Date: 08:00 on 20  Duration: 17hr 26m    _____________________________________________________________  STUDY INFORMATION    EEG Recording Technique:  The patient underwent continuous Video-EEG monitoring, using Telemetry System hardware on the XLTek Digital System. EEG and video data were stored on a computer hard drive with important events saved in digital archive files. The material was reviewed by a physician (electroencephalographer / epileptologist) on a daily basis. Medardo and seizure detection algorithms were utilized and reviewed. An EEG Technician attended to the patient, and was available throughout daytime work hours.  The epilepsy center neurologist was available in person or on call 24-hours per day.    EEG Placement and Labeling of Electrodes:  The EEG was performed utilizing 20 channel referential EEG connections (coronal over temporal over parasagittal montage) using all standard 10-20 electrode placements with EKG, with additional electrodes placed in the inferior temporal region using the modified 10-10 montage electrode placements for elective admissions, or if deemed necessary. Recording was at a sampling rate of 256 samples per second per channel. Time synchronized digital video recording was done simultaneously with EEG recording. A low light infrared camera was used for low light recording.     _____________________________________________________________  HISTORY    Patient is a 39y old  Male who presents with a chief complaint of Seizures (2020 08:25)      PERTINENT MEDICATION:  LORazepam     Tablet 0.5 milliGRAM(s) Oral every 8 hours    _____________________________________________________________  STUDY INTERPRETATION    Findings: The background was continuous, spontaneously variable and reactive. During wakefulness, the posterior dominant rhythm consisted of symmetric, well-modulated 9 Hz activity, with amplitude to 30 uV, that attenuated to eye opening.  Low amplitude frontal beta was noted in wakefulness.    Background Slowing:  No generalized background slowing was present.    Focal Slowing:   None were present.    Sleep Background:  Drowsiness was characterized by fragmentation, attenuation, and slowing of the background activity.    Sleep was characterized by the presence of possible symmetric sleep spindles.    Other Non-Epileptiform Findings:  Diffuse excess beta activity.    Interictal Epileptiform Activity:   None were present.    Events:  Clinical events: None recorded.  Seizures: None recorded.    Activation Procedures:   Hyperventilation was not performed.    Photic stimulation was not performed.     Artifacts:  Intermittent myogenic and movement artifacts were noted.    ECG:  The heart rate on single channel ECG was predominantly between 100-120 BPM.    _____________________________________________________________  EEG SUMMARY/CLASSIFICATION    Normal EEG in the awake, drowsy and asleep states.  - Diffuse excess beta activity.    _____________________________________________________________  EEG IMPRESSION/CLINICAL CORRELATE    Otherwise normal EEG, except for diffuse excess beta activity, which may be seen with medication use such as benzodiazepines or barbiturates.    _____________________________________________________________    Bg Son MD  Epilepsy Fellow    Nadege Saunders MD  Attending Physician, [facility]

## 2020-07-13 NOTE — PROGRESS NOTE ADULT - PROBLEM SELECTOR PLAN 2
Seizure occurred on 7/10 and initially treated at [facility] prior to transfer. No previous neurologic disease reported. charted to have had fall prior to seizure but patient denies on interview following ICU transfer to floor.  - EEG completed and did not detect seizure activity  - MR brain reported to be abnormal with ventricular atrophy and "focus of signal hyperintensity on the FLAIR in the periventricular region on the left nonspecific in a patient of this age may represent focus of infection inflammation demyelination or ischemia."  - will need neurology consult in am  - neurologic exam at time of medicine evaluation is grossly normal  - continue with current standing ativan as patient is being tapered for suspected EtOH withdrawal.

## 2020-07-13 NOTE — PROGRESS NOTE ADULT - ASSESSMENT
Assessment:   39 year male PMHx of EtOH abuse and fall c/b bladder rupture s/p exlap repair (2019) presenting from [facility] with seizure-like activity and alcoholic cirrhosis.    Plan:   #Neuro:   Witnessed seizure-like activity 7/10, ? d/t hepatic encephalopathy (ammonia 107) and EtOH withdrawal (last drink 7/4).  Unlikely complications from (CT head negative for intracranial, facial bone abnormalities).  - Continue lactulose, rifaximin for hepatic encephalopathy  - Start thiamine, folate  - C/w K Phos QID  - D/C Keppra, start Ativan 1mg TID  - F/u EEG  - F/u MRI brain with and without contrast  - Continue 1:1 sitter, restraints for agitation  - Phenobarb PRN 130mg IV q15min for agitation    #Respiratory:  - No acute issues- stable  - Continue to monitor for nose/mouth bleeds  - COVID-19 PCR negative at [facility]  - F/u COVID Ab's    #CV:   - No active issues    #GI:   //Alcoholic cirrhosis 2/2 prolonged EtOH abuse (, ALT 40 -downtrending). CTAP showed hepatosplenomegaly, portal HTN, varices, small volume ascites with possibility of HCC (difficult to exclude). MELD 20. Maddrey 28.2 (<32). NAC protocol held after 3rd tx.  - F/u CMV, EBV, HSV  - F/u autoimmune/infiltrative workup  - Continue lactulose- titrate to 3 BM daily  - F/u alpha-1 AT  //Biliary sludge (alk phos 182- downtrending)  - RUQ US and MRCP negative for obstruction.  - Continue Zosyn for enteric GI prophylaxis in the setting of fever, biliary sludge x 1 day    #:  - No active issues    #Heme:   - Thrombocytopenia (Plt 25- stable trend) at [facility]. Likely from liver disease. Otherwise hemodynamically stable.   //? suspicion for babesiosis/ehrlichiosis, per Heme  - Preliminary blood smear showed no parasites but recommended 3 specimens over 2 days. Pt denies travel/tick bites.   - F/u babesia, ehrlichia PCR Ab    #ID:   - Febrile, blood cx negative at [facility]. No obvious source of infection. Lactate wnl (1.8 down from high of 9.3)  - Pend blood cx, UA  - Continue Zosyn for enteric GI prophylaxis in the setting of fever, biliary sludge x 1 day    Diet: regular  Code: FULL- healthy care proxy- brother Florian Adames (phone number in chart)  VTE prophylaxis: SCD Assessment:   39 year male PMHx of EtOH abuse and fall c/b bladder rupture s/p exlap repair (2019) presenting from [facility] with seizure-like activity and alcoholic cirrhosis.    Plan:   #Neuro:   Witnessed seizure-like activity 7/10, d/t hepatic encephalopathy (ammonia 107) and EtOH withdrawal (last drink 7/4).  Unlikely complications from (CT head negative for intracranial, facial bone abnormalities).  - Continue lactulose, rifaximin for hepatic encephalopathy  - Start thiamine, folate  - C/w K Phos QID  - D/C Keppra, start Ativan 1mg TID  - F/u EEG  - F/u MRI brain with and without contrast  - Continue 1:1 sitter, restraints for agitation  - Phenobarb PRN 130mg IV q15min for agitation    #Respiratory:  - No acute issues- stable  - Continue to monitor for nose/mouth bleeds  - COVID-19 PCR negative at [facility]  - F/u COVID Ab's    #CV:   - No active issues    #GI:   //Alcoholic cirrhosis 2/2 prolonged EtOH abuse (, ALT 40 -downtrending). CTAP showed hepatosplenomegaly, portal HTN, varices, small volume ascites with possibility of HCC (difficult to exclude). MELD 20. Maddrey 28.2 (<32). NAC protocol held after 3rd tx.  - F/u CMV, EBV, HSV  - F/u autoimmune/infiltrative workup  - Continue lactulose- titrate to 3 BM daily  - F/u alpha-1 AT  //Biliary sludge (alk phos 182- downtrending)  - RUQ US and MRCP negative for obstruction.  - Continue Zosyn for enteric GI prophylaxis in the setting of fever, biliary sludge x 1 day    #:  - No active issues    #Heme:   - Thrombocytopenia (Plt 25- stable trend) at [facility]. Likely from liver disease. Otherwise hemodynamically stable.   //? suspicion for babesiosis/ehrlichiosis, per Heme  - Preliminary blood smear showed no parasites but recommended 3 specimens over 2 days. Pt denies travel/tick bites.   - F/u babesia, ehrlichia PCR Ab    #ID:   - Febrile, blood cx negative at [facility]. No obvious source of infection. Lactate wnl (1.8 down from high of 9.3)  - Pend blood cx, UA  - Continue Zosyn for enteric GI prophylaxis in the setting of fever, biliary sludge x 1 day    Diet: regular  Code: FULL- healthy care proxy- brother Florian Adames (phone number in chart)  VTE prophylaxis: SCD

## 2020-07-13 NOTE — CHART NOTE - NSCHARTNOTEFT_GEN_A_CORE
MAR accept note    Transfer from: MICU  Transfer to:  ( x ) Medicine    (  ) Telemetry    (  ) RCU    (  ) Palliative    (  ) Stroke Unit    (  ) _______________  Accepting physician: Dr. Hunter      MICU COURSE:  39 year male PMHx of EtOH abuse and fall c/b bladder rupture s/p exlap repair (2019) presenting from [facility] with seizure-like activity and alcoholic cirrhosis. At 1630 on 7/10, pt's roommate witnessed a seizure of 30-90 seconds duration with nose and mouth bleeding while the pt was on the couch. Found to be tachycardic, tachypneic, and post-ictal by EMS. Pt last felt well 1 week ago then noted HA (tx with daily tylenol 1g), fatigue, nausea with NBNB emesis. Endorses drinking 6 beers/week for 10 years. Last drink 7/4. Denies smoking, drug use, travel/tick bites, weight loss, SOB, CP, changes in vision.     At [place] a tox screen was negative (salicylates, APAP, alcohol, opiates). HIV negative, Hep B negative, Hep C negative. A RUQ US with doppler r/o thrombosis and showed GB wall thickening (.5 cm), sludge, no stones. Diffuse hepatosplenomagaly was noted on CTAP with possible HCC component. He was given lactulose, NAC protocol, and zosyn for possible cholecystitis. Before transfer to [facility] he had another seizure in [place] and was given Ativan.    During admission, LFTs have been elevated with AST:ALT ratio of greater than 2:1. Patient was febrile and tachycardic, and was started on zosyn while conducting infectious work-up. A bedside U/S was done that did not show any ascites. Hepatology was consulted who recommended to defer corticosteroids while undergoing infectious work-up and not pursuing transplant at this time.  Patient's MELD score is 20.  Neurology was consulted, and an EEG showed no evidence of seizure history. Patient required 1:1 for agitation and was receiving phenobarbital as needed.             Vital Signs Last 24 Hrs  T(C): 36.8 (13 Jul 2020 11:00), Max: 37.3 (12 Jul 2020 16:00)  T(F): 98.2 (13 Jul 2020 11:00), Max: 99.2 (12 Jul 2020 16:00)  HR: 115 (13 Jul 2020 11:00) (101 - 130)  BP: 118/73 (13 Jul 2020 11:00) (88/51 - 133/78)  BP(mean): 89 (13 Jul 2020 11:00) (62 - 99)  RR: 17 (13 Jul 2020 11:00) (13 - 27)  SpO2: 100% (13 Jul 2020 11:00) (91% - 100%)  I&O's Summary    12 Jul 2020 07:01  -  13 Jul 2020 07:00  --------------------------------------------------------  IN: 1710 mL / OUT: 150 mL / NET: 1560 mL    13 Jul 2020 07:01  -  13 Jul 2020 11:35  --------------------------------------------------------  IN: 0 mL / OUT: 200 mL / NET: -200 mL          MEDICATIONS  (STANDING):  chlorhexidine 4% Liquid 1 Application(s) Topical <User Schedule>  folic acid 1 milliGRAM(s) Oral daily  lactulose Syrup 20 Gram(s) Oral every 6 hours  LORazepam     Tablet 0.5 milliGRAM(s) Oral every 8 hours  piperacillin/tazobactam IVPB.. 3.375 Gram(s) IV Intermittent every 8 hours  potassium phosphate / sodium phosphate Tablet (K-PHOS No. 2) 1 Tablet(s) Oral four times a day with meals  rifAXIMin 200 milliGRAM(s) Oral three times a day  thiamine 100 milliGRAM(s) Oral daily    MEDICATIONS  (PRN):  PHENobarbital Injectable 130 milliGRAM(s) IV Push every 15 minutes PRN for agitation        LABS                                            11.5                  Neurophils% (auto):   73.4   (07-12 @ 23:22):    7.36 )-----------(35           Lymphocytes% (auto):  14.5                                          33.6                   Eosinphils% (auto):   0.8      Manual%: Neutrophils x    ; Lymphocytes x    ; Eosinophils x    ; Bands%: x    ; Blasts x                                    133    |  98     |  5                   Calcium: 7.6   / iCa: x      (07-12 @ 23:23)    ----------------------------<  104       Magnesium: 1.6                              2.8     |  19     |  0.60             Phosphorous: 3.0      TPro  6.5    /  Alb  3.3    /  TBili  12.3   /  DBili  x      /  AST  156    /  ALT  34     /  AlkPhos  163    12 Jul 2020 23:23    ( 07-12 @ 23:22 )   PT: 18.0 sec;   INR: 1.60 ratio  aPTT: 31.5 sec        ASSESSMENT & PLAN:   # Alcohol withdrawal syndrome  - patient initially with delirium and witnessed seizure at home:   - Currently on Ativan taper  -EEG per Neurology with out evidence of seizure activity.    -Avoid long-acting benzodiazepines such as Librium due to poor hepatic clearance.  - autoimmune workup  - patient with 1:1 sitter and restraints for agitation as needed.     # Hepatic encephalopathy  - C/w rifaximin  -c/w lactulose (titrate to 3 BMs)  - c/w thiamine    # Probable alcoholic hepatitis  - Trend LFTS  - f/u PeTH (phosphatidylethanol) to monitor history of heavy alcohol consumption.     #Infectious work-up  - possible aspiration.  - No ascites on bedside U/S.  - Patient now AX0x3,   -patient now afebrile, continued tachycardia.   - continue zosyn for 5 days total  -no leukocytosis      For Follow-up:  - c/w zosyn for possible aspiration PNA, 5 day course (7/11- 7/16)  - patient currently on ativan 0.5 q8, consider tapering to 0.5 q12 tomorrow if continues to be clinically stable  - on standing kphos for hypokalemia, dc upon stabilization  - monitor liver chemistry  - f/u hepatitis workup  - f/u hepatology recs

## 2020-07-13 NOTE — PROGRESS NOTE ADULT - PROBLEM SELECTOR PLAN 5
- fever and tachycardia without clear source of infection  - Since transfer from ICU, the patient has nontoxic appearance, without encephalopathy, no tremor/tongue fasciculation, no asterixis appreciated, reports feeling generally improved. Initial blood culture 7/12 NGTD and patient noted to be on zosyn with empiric treatment given liver disease.  - Work up thus far: babesia PCR not detected,   -  Given acute hepatitis, in agreement with continuation of zosyn recommended by MICU. - fever and tachycardia without clear source of infection  - Since transfer from ICU, the patient has nontoxic appearance, without encephalopathy, no tremor/tongue fasciculation, no asterixis appreciated, reports feeling generally improved. Initial blood culture 7/12 NGTD and patient noted to be on zosyn for empiric treatment given liver disease.  - see above for work up completed thus far  - CXR without lobar pneumonia identified, UA not c/w acute cystitis.  - source is suspected from acute hepatitis which of which work up is ongoing with Hepatology consulted. Alcoholic hepatitis is suspected but steroids currently deferred per hepatology.  -  In agreement with continuation of empiric zosyn recommended by MICU and given nontoxic appearance favor not escalating regimen at this time.  - Will provide 500cc fluid bolus and acetaminophen (not to exceed 2g daily). If continues overnight will obtain repeat blood culture. May require ID consult if not improving. Additionally will need monitoring of eschar on right foot (see below).

## 2020-07-13 NOTE — DIETITIAN INITIAL EVALUATION ADULT. - OTHER INFO
Pt seen for: MICU Length Of Stay    Adm dx: seizures, ETOH cirrhosis   GI issues: none  Last BM: 7/12 (lactulose)    Food allergies/Intolerances: NKFA   Vit/supplement PTA: none    Diet PTA: no diet restrictions     Subjective/Objective information: pt reports fair appetite PTA, usual wt 185-190 lb.    Education: Not indicated at this time

## 2020-07-13 NOTE — CHART NOTE - NSCHARTNOTEFT_GEN_A_CORE
MICU Transfer Note    Transfer from: MICU  Transfer to:  ( * ) Medicine    (  ) Telemetry    (  ) RCU    (  ) Palliative    (  ) Stroke Unit    (  ) _______________  Accepting physician:      MICU COURSE:                        Vital Signs Last 24 Hrs  T(C): 36.8 (13 Jul 2020 11:00), Max: 37.3 (12 Jul 2020 16:00)  T(F): 98.2 (13 Jul 2020 11:00), Max: 99.2 (12 Jul 2020 16:00)  HR: 115 (13 Jul 2020 11:00) (101 - 130)  BP: 118/73 (13 Jul 2020 11:00) (88/51 - 133/78)  BP(mean): 89 (13 Jul 2020 11:00) (62 - 99)  RR: 17 (13 Jul 2020 11:00) (13 - 27)  SpO2: 100% (13 Jul 2020 11:00) (91% - 100%)  I&O's Summary    12 Jul 2020 07:01  -  13 Jul 2020 07:00  --------------------------------------------------------  IN: 1710 mL / OUT: 150 mL / NET: 1560 mL    13 Jul 2020 07:01  -  13 Jul 2020 11:35  --------------------------------------------------------  IN: 0 mL / OUT: 200 mL / NET: -200 mL          MEDICATIONS  (STANDING):  chlorhexidine 4% Liquid 1 Application(s) Topical <User Schedule>  folic acid 1 milliGRAM(s) Oral daily  lactulose Syrup 20 Gram(s) Oral every 6 hours  LORazepam     Tablet 0.5 milliGRAM(s) Oral every 8 hours  piperacillin/tazobactam IVPB.. 3.375 Gram(s) IV Intermittent every 8 hours  potassium phosphate / sodium phosphate Tablet (K-PHOS No. 2) 1 Tablet(s) Oral four times a day with meals  rifAXIMin 200 milliGRAM(s) Oral three times a day  thiamine 100 milliGRAM(s) Oral daily    MEDICATIONS  (PRN):  PHENobarbital Injectable 130 milliGRAM(s) IV Push every 15 minutes PRN for agitation        LABS                                            11.5                  Neurophils% (auto):   73.4   (07-12 @ 23:22):    7.36 )-----------(35           Lymphocytes% (auto):  14.5                                          33.6                   Eosinphils% (auto):   0.8      Manual%: Neutrophils x    ; Lymphocytes x    ; Eosinophils x    ; Bands%: x    ; Blasts x                                    133    |  98     |  5                   Calcium: 7.6   / iCa: x      (07-12 @ 23:23)    ----------------------------<  104       Magnesium: 1.6                              2.8     |  19     |  0.60             Phosphorous: 3.0      TPro  6.5    /  Alb  3.3    /  TBili  12.3   /  DBili  x      /  AST  156    /  ALT  34     /  AlkPhos  163    12 Jul 2020 23:23    ( 07-12 @ 23:22 )   PT: 18.0 sec;   INR: 1.60 ratio  aPTT: 31.5 sec        ASSESSMENT & PLAN:     For Follow-up: MICU Transfer Note    Transfer from: MICU  Transfer to:  ( * ) Medicine    (  ) Telemetry    (  ) RCU    (  ) Palliative    (  ) Stroke Unit    (  ) _______________  Accepting physician:      MICU COURSE:      Patient is a 38 y/o M with history of ETOH-use disorder, fall c/b bladder rupture s/p ex-lap repair (2019), who presented on 7/11 from [facility] for concern for seizure-like activity, and work-up of alcoholic cirrhosis for possible liver transplant evaluation. Patient initally presented to [facility] after his roommate witnessed a 30-90 second seizure  with nose and mouth bleeding. Patient states he has a history of drinking about 6 beers a week for the past 10 years.                   Vital Signs Last 24 Hrs  T(C): 36.8 (13 Jul 2020 11:00), Max: 37.3 (12 Jul 2020 16:00)  T(F): 98.2 (13 Jul 2020 11:00), Max: 99.2 (12 Jul 2020 16:00)  HR: 115 (13 Jul 2020 11:00) (101 - 130)  BP: 118/73 (13 Jul 2020 11:00) (88/51 - 133/78)  BP(mean): 89 (13 Jul 2020 11:00) (62 - 99)  RR: 17 (13 Jul 2020 11:00) (13 - 27)  SpO2: 100% (13 Jul 2020 11:00) (91% - 100%)  I&O's Summary    12 Jul 2020 07:01  -  13 Jul 2020 07:00  --------------------------------------------------------  IN: 1710 mL / OUT: 150 mL / NET: 1560 mL    13 Jul 2020 07:01  -  13 Jul 2020 11:35  --------------------------------------------------------  IN: 0 mL / OUT: 200 mL / NET: -200 mL          MEDICATIONS  (STANDING):  chlorhexidine 4% Liquid 1 Application(s) Topical <User Schedule>  folic acid 1 milliGRAM(s) Oral daily  lactulose Syrup 20 Gram(s) Oral every 6 hours  LORazepam     Tablet 0.5 milliGRAM(s) Oral every 8 hours  piperacillin/tazobactam IVPB.. 3.375 Gram(s) IV Intermittent every 8 hours  potassium phosphate / sodium phosphate Tablet (K-PHOS No. 2) 1 Tablet(s) Oral four times a day with meals  rifAXIMin 200 milliGRAM(s) Oral three times a day  thiamine 100 milliGRAM(s) Oral daily    MEDICATIONS  (PRN):  PHENobarbital Injectable 130 milliGRAM(s) IV Push every 15 minutes PRN for agitation        LABS                                            11.5                  Neurophils% (auto):   73.4   (07-12 @ 23:22):    7.36 )-----------(35           Lymphocytes% (auto):  14.5                                          33.6                   Eosinphils% (auto):   0.8      Manual%: Neutrophils x    ; Lymphocytes x    ; Eosinophils x    ; Bands%: x    ; Blasts x                                    133    |  98     |  5                   Calcium: 7.6   / iCa: x      (07-12 @ 23:23)    ----------------------------<  104       Magnesium: 1.6                              2.8     |  19     |  0.60             Phosphorous: 3.0      TPro  6.5    /  Alb  3.3    /  TBili  12.3   /  DBili  x      /  AST  156    /  ALT  34     /  AlkPhos  163    12 Jul 2020 23:23    ( 07-12 @ 23:22 )   PT: 18.0 sec;   INR: 1.60 ratio  aPTT: 31.5 sec        ASSESSMENT & PLAN:     For Follow-up:  - continue zosyn for total of 5 days.   - patient on ativan taper, MICU Transfer Note    Transfer from: MICU  Transfer to:  ( * ) Medicine    (  ) Telemetry    (  ) RCU    (  ) Palliative    (  ) Stroke Unit    (  ) _______________  Accepting physician:      MICU COURSE:  39 year male PMHx of EtOH abuse and fall c/b bladder rupture s/p exlap repair (2019) presenting from [facility] with seizure-like activity and alcoholic cirrhosis. At 1630 on 7/10, pt's roommate witnessed a seizure of 30-90 seconds duration with nose and mouth bleeding while the pt was on the couch. Found to be tachycardic, tachypneic, and post-ictal by EMS. Pt last felt well 1 week ago then noted HA (tx with daily tylenol 1g), fatigue, nausea with NBNB emesis. Endorses drinking 6 beers/week for 10 years. Last drink 7/4. Denies smoking, drug use, travel/tick bites, weight loss, SOB, CP, changes in vision.     At [place] a tox screen was negative (salicylates, APAP, alcohol, opiates). HIV negative, Hep B negative, Hep C negative. A RUQ US with doppler r/o thrombosis and showed GB wall thickening (.5 cm), sludge, no stones. Diffuse hepatosplenomagaly was noted on CTAP with possible HCC component. He was given lactulose, NAC protocol, and zosyn for possible cholecystitis. Before transfer to [facility] he had another seizure in [place] and was given Ativan.    During admission, LFTs have been elevated with AST:ALT ratio of greater than 2:1. Hepatology was consulted                  Vital Signs Last 24 Hrs  T(C): 36.8 (13 Jul 2020 11:00), Max: 37.3 (12 Jul 2020 16:00)  T(F): 98.2 (13 Jul 2020 11:00), Max: 99.2 (12 Jul 2020 16:00)  HR: 115 (13 Jul 2020 11:00) (101 - 130)  BP: 118/73 (13 Jul 2020 11:00) (88/51 - 133/78)  BP(mean): 89 (13 Jul 2020 11:00) (62 - 99)  RR: 17 (13 Jul 2020 11:00) (13 - 27)  SpO2: 100% (13 Jul 2020 11:00) (91% - 100%)  I&O's Summary    12 Jul 2020 07:01  -  13 Jul 2020 07:00  --------------------------------------------------------  IN: 1710 mL / OUT: 150 mL / NET: 1560 mL    13 Jul 2020 07:01  -  13 Jul 2020 11:35  --------------------------------------------------------  IN: 0 mL / OUT: 200 mL / NET: -200 mL          MEDICATIONS  (STANDING):  chlorhexidine 4% Liquid 1 Application(s) Topical <User Schedule>  folic acid 1 milliGRAM(s) Oral daily  lactulose Syrup 20 Gram(s) Oral every 6 hours  LORazepam     Tablet 0.5 milliGRAM(s) Oral every 8 hours  piperacillin/tazobactam IVPB.. 3.375 Gram(s) IV Intermittent every 8 hours  potassium phosphate / sodium phosphate Tablet (K-PHOS No. 2) 1 Tablet(s) Oral four times a day with meals  rifAXIMin 200 milliGRAM(s) Oral three times a day  thiamine 100 milliGRAM(s) Oral daily    MEDICATIONS  (PRN):  PHENobarbital Injectable 130 milliGRAM(s) IV Push every 15 minutes PRN for agitation        LABS                                            11.5                  Neurophils% (auto):   73.4   (07-12 @ 23:22):    7.36 )-----------(35           Lymphocytes% (auto):  14.5                                          33.6                   Eosinphils% (auto):   0.8      Manual%: Neutrophils x    ; Lymphocytes x    ; Eosinophils x    ; Bands%: x    ; Blasts x                                    133    |  98     |  5                   Calcium: 7.6   / iCa: x      (07-12 @ 23:23)    ----------------------------<  104       Magnesium: 1.6                              2.8     |  19     |  0.60             Phosphorous: 3.0      TPro  6.5    /  Alb  3.3    /  TBili  12.3   /  DBili  x      /  AST  156    /  ALT  34     /  AlkPhos  163    12 Jul 2020 23:23    ( 07-12 @ 23:22 )   PT: 18.0 sec;   INR: 1.60 ratio  aPTT: 31.5 sec        ASSESSMENT & PLAN:     For Follow-up:  - continue zosyn for total of 5 days.   - patient on ativan taper currently 0.5 q8h -> would extend to 0.5 q12h tomorrow of he does well on the current does today.  - Trend bmp. Potassium low at 2.8 today. Patietn getting Kphos 1 tab PO QID with meals.   - hepatology following. MICU Transfer Note    Transfer from: MICU  Transfer to:  ( * ) Medicine    (  ) Telemetry    (  ) RCU    (  ) Palliative    (  ) Stroke Unit    (  ) _______________  Accepting physician:      MICU COURSE:  39 year male PMHx of EtOH abuse and fall c/b bladder rupture s/p exlap repair (2019) presenting from [facility] with seizure-like activity and alcoholic cirrhosis. At 1630 on 7/10, pt's roommate witnessed a seizure of 30-90 seconds duration with nose and mouth bleeding while the pt was on the couch. Found to be tachycardic, tachypneic, and post-ictal by EMS. Pt last felt well 1 week ago then noted HA (tx with daily tylenol 1g), fatigue, nausea with NBNB emesis. Endorses drinking 6 beers/week for 10 years. Last drink 7/4. Denies smoking, drug use, travel/tick bites, weight loss, SOB, CP, changes in vision.     At [place] a tox screen was negative (salicylates, APAP, alcohol, opiates). HIV negative, Hep B negative, Hep C negative. A RUQ US with doppler r/o thrombosis and showed GB wall thickening (.5 cm), sludge, no stones. Diffuse hepatosplenomagaly was noted on CTAP with possible HCC component. He was given lactulose, NAC protocol, and zosyn for possible cholecystitis. Before transfer to [facility] he had another seizure in [place] and was given Ativan.    During admission, LFTs have been elevated with AST:ALT ratio of greater than 2:1. Patient was febrile and tachycardic, and was started on zosyn while conducting infectious work-up. A bedside U/S was done that did not show any ascites. Hepatology was ocnsulted                     Vital Signs Last 24 Hrs  T(C): 36.8 (13 Jul 2020 11:00), Max: 37.3 (12 Jul 2020 16:00)  T(F): 98.2 (13 Jul 2020 11:00), Max: 99.2 (12 Jul 2020 16:00)  HR: 115 (13 Jul 2020 11:00) (101 - 130)  BP: 118/73 (13 Jul 2020 11:00) (88/51 - 133/78)  BP(mean): 89 (13 Jul 2020 11:00) (62 - 99)  RR: 17 (13 Jul 2020 11:00) (13 - 27)  SpO2: 100% (13 Jul 2020 11:00) (91% - 100%)  I&O's Summary    12 Jul 2020 07:01  -  13 Jul 2020 07:00  --------------------------------------------------------  IN: 1710 mL / OUT: 150 mL / NET: 1560 mL    13 Jul 2020 07:01  -  13 Jul 2020 11:35  --------------------------------------------------------  IN: 0 mL / OUT: 200 mL / NET: -200 mL          MEDICATIONS  (STANDING):  chlorhexidine 4% Liquid 1 Application(s) Topical <User Schedule>  folic acid 1 milliGRAM(s) Oral daily  lactulose Syrup 20 Gram(s) Oral every 6 hours  LORazepam     Tablet 0.5 milliGRAM(s) Oral every 8 hours  piperacillin/tazobactam IVPB.. 3.375 Gram(s) IV Intermittent every 8 hours  potassium phosphate / sodium phosphate Tablet (K-PHOS No. 2) 1 Tablet(s) Oral four times a day with meals  rifAXIMin 200 milliGRAM(s) Oral three times a day  thiamine 100 milliGRAM(s) Oral daily    MEDICATIONS  (PRN):  PHENobarbital Injectable 130 milliGRAM(s) IV Push every 15 minutes PRN for agitation        LABS                                            11.5                  Neurophils% (auto):   73.4   (07-12 @ 23:22):    7.36 )-----------(35           Lymphocytes% (auto):  14.5                                          33.6                   Eosinphils% (auto):   0.8      Manual%: Neutrophils x    ; Lymphocytes x    ; Eosinophils x    ; Bands%: x    ; Blasts x                                    133    |  98     |  5                   Calcium: 7.6   / iCa: x      (07-12 @ 23:23)    ----------------------------<  104       Magnesium: 1.6                              2.8     |  19     |  0.60             Phosphorous: 3.0      TPro  6.5    /  Alb  3.3    /  TBili  12.3   /  DBili  x      /  AST  156    /  ALT  34     /  AlkPhos  163    12 Jul 2020 23:23    ( 07-12 @ 23:22 )   PT: 18.0 sec;   INR: 1.60 ratio  aPTT: 31.5 sec        ASSESSMENT & PLAN:     For Follow-up:  - continue zosyn for total of 5 days.   - patient on ativan taper currently 0.5 q8h -> would extend to 0.5 q12h tomorrow of he does well on the current does today.  - Trend bmp. Potassium low at 2.8 today. Patietn getting Kphos 1 tab PO QID with meals.   -Trend LFTs.   - hepatology following. MICU Transfer Note    Transfer from: MICU  Transfer to:  ( * ) Medicine    (  ) Telemetry    (  ) RCU    (  ) Palliative    (  ) Stroke Unit    (  ) _______________  Accepting physician:      MICU COURSE:  39 year male PMHx of EtOH abuse and fall c/b bladder rupture s/p exlap repair (2019) presenting from [facility] with seizure-like activity and alcoholic cirrhosis. At 1630 on 7/10, pt's roommate witnessed a seizure of 30-90 seconds duration with nose and mouth bleeding while the pt was on the couch. Found to be tachycardic, tachypneic, and post-ictal by EMS. Pt last felt well 1 week ago then noted HA (tx with daily tylenol 1g), fatigue, nausea with NBNB emesis. Endorses drinking 6 beers/week for 10 years. Last drink 7/4. Denies smoking, drug use, travel/tick bites, weight loss, SOB, CP, changes in vision.     At [place] a tox screen was negative (salicylates, APAP, alcohol, opiates). HIV negative, Hep B negative, Hep C negative. A RUQ US with doppler r/o thrombosis and showed GB wall thickening (.5 cm), sludge, no stones. Diffuse hepatosplenomagaly was noted on CTAP with possible HCC component. He was given lactulose, NAC protocol, and zosyn for possible cholecystitis. Before transfer to [facility] he had another seizure in [place] and was given Ativan.    During admission, LFTs have been elevated with AST:ALT ratio of greater than 2:1. Patient was febrile and tachycardic, and was started on zosyn while conducting infectious work-up. A bedside U/S was done that did not show any ascites. Hepatology was consulted who recommended to defer corticosteroids while undergoing infectious work-up and not pursuing transplant at this time.  Patient's MELD score is 20.  Neurology was consulted, and an EEG showed no evidence of seizure history. Patient required 1:1 for agitation and was receiving phenobarbital as needed.                 Vital Signs Last 24 Hrs  T(C): 36.8 (13 Jul 2020 11:00), Max: 37.3 (12 Jul 2020 16:00)  T(F): 98.2 (13 Jul 2020 11:00), Max: 99.2 (12 Jul 2020 16:00)  HR: 115 (13 Jul 2020 11:00) (101 - 130)  BP: 118/73 (13 Jul 2020 11:00) (88/51 - 133/78)  BP(mean): 89 (13 Jul 2020 11:00) (62 - 99)  RR: 17 (13 Jul 2020 11:00) (13 - 27)  SpO2: 100% (13 Jul 2020 11:00) (91% - 100%)  I&O's Summary    12 Jul 2020 07:01  -  13 Jul 2020 07:00  --------------------------------------------------------  IN: 1710 mL / OUT: 150 mL / NET: 1560 mL    13 Jul 2020 07:01  -  13 Jul 2020 11:35  --------------------------------------------------------  IN: 0 mL / OUT: 200 mL / NET: -200 mL          MEDICATIONS  (STANDING):  chlorhexidine 4% Liquid 1 Application(s) Topical <User Schedule>  folic acid 1 milliGRAM(s) Oral daily  lactulose Syrup 20 Gram(s) Oral every 6 hours  LORazepam     Tablet 0.5 milliGRAM(s) Oral every 8 hours  piperacillin/tazobactam IVPB.. 3.375 Gram(s) IV Intermittent every 8 hours  potassium phosphate / sodium phosphate Tablet (K-PHOS No. 2) 1 Tablet(s) Oral four times a day with meals  rifAXIMin 200 milliGRAM(s) Oral three times a day  thiamine 100 milliGRAM(s) Oral daily    MEDICATIONS  (PRN):  PHENobarbital Injectable 130 milliGRAM(s) IV Push every 15 minutes PRN for agitation        LABS                                            11.5                  Neurophils% (auto):   73.4   (07-12 @ 23:22):    7.36 )-----------(35           Lymphocytes% (auto):  14.5                                          33.6                   Eosinphils% (auto):   0.8      Manual%: Neutrophils x    ; Lymphocytes x    ; Eosinophils x    ; Bands%: x    ; Blasts x                                    133    |  98     |  5                   Calcium: 7.6   / iCa: x      (07-12 @ 23:23)    ----------------------------<  104       Magnesium: 1.6                              2.8     |  19     |  0.60             Phosphorous: 3.0      TPro  6.5    /  Alb  3.3    /  TBili  12.3   /  DBili  x      /  AST  156    /  ALT  34     /  AlkPhos  163    12 Jul 2020 23:23    ( 07-12 @ 23:22 )   PT: 18.0 sec;   INR: 1.60 ratio  aPTT: 31.5 sec        ASSESSMENT & PLAN:   # Alcohol withdrawal syndrome with delirium and witnessed seizure at home:   - Currently on Ativan taper  -EEG per Neurology with out evidence of seizure activity.    -Avoid long-acting benzodiazepines such as Librium due to poor hepatic clearance.    # Hepatic encephalopathy  - C/w rifaximin  -c/w lactulose (titrate to 3 BMs)    # Probable alcoholic hepatitis  - Trend LFTS  - f/u PeTH (phosphatidylethanol) to monitor history of heavy alcohol consumption.     For Follow-up:  - continue zosyn for total of 5 days.   - patient on ativan taper currently 0.5 q8h -> would extend to 0.5 q12h tomorrow of he does well on the current does today.  - Trend bmp. Potassium low at 2.8 today. Patietn getting Kphos 1 tab PO QID with meals.   -Trend LFTs.   - Follow -up phosphatidylethanol  - hepatology following.  -MRI brain per [facility] MICU Transfer Note    Transfer from: MICU  Transfer to:  ( * ) Medicine    (  ) Telemetry    (  ) RCU    (  ) Palliative    (  ) Stroke Unit    (  ) _______________  Accepting physician:      MICU COURSE:  39 year male PMHx of EtOH abuse and fall c/b bladder rupture s/p exlap repair (2019) presenting from [facility] with seizure-like activity and alcoholic cirrhosis. At 1630 on 7/10, pt's roommate witnessed a seizure of 30-90 seconds duration with nose and mouth bleeding while the pt was on the couch. Found to be tachycardic, tachypneic, and post-ictal by EMS. Pt last felt well 1 week ago then noted HA (tx with daily tylenol 1g), fatigue, nausea with NBNB emesis. Endorses drinking 6 beers/week for 10 years. Last drink 7/4. Denies smoking, drug use, travel/tick bites, weight loss, SOB, CP, changes in vision.     At [place] a tox screen was negative (salicylates, APAP, alcohol, opiates). HIV negative, Hep B negative, Hep C negative. A RUQ US with doppler r/o thrombosis and showed GB wall thickening (.5 cm), sludge, no stones. Diffuse hepatosplenomagaly was noted on CTAP with possible HCC component. He was given lactulose, NAC protocol, and zosyn for possible cholecystitis. Before transfer to [facility] he had another seizure in [place] and was given Ativan.    During admission, LFTs have been elevated with AST:ALT ratio of greater than 2:1. Patient was febrile and tachycardic, and was started on zosyn while conducting infectious work-up. A bedside U/S was done that did not show any ascites. Hepatology was consulted who recommended to defer corticosteroids while undergoing infectious work-up and not pursuing transplant at this time.  Patient's MELD score is 20.  Neurology was consulted, and an EEG showed no evidence of seizure history. Patient required 1:1 for agitation and was receiving phenobarbital as needed.                 Vital Signs Last 24 Hrs  T(C): 36.8 (13 Jul 2020 11:00), Max: 37.3 (12 Jul 2020 16:00)  T(F): 98.2 (13 Jul 2020 11:00), Max: 99.2 (12 Jul 2020 16:00)  HR: 115 (13 Jul 2020 11:00) (101 - 130)  BP: 118/73 (13 Jul 2020 11:00) (88/51 - 133/78)  BP(mean): 89 (13 Jul 2020 11:00) (62 - 99)  RR: 17 (13 Jul 2020 11:00) (13 - 27)  SpO2: 100% (13 Jul 2020 11:00) (91% - 100%)  I&O's Summary    12 Jul 2020 07:01  -  13 Jul 2020 07:00  --------------------------------------------------------  IN: 1710 mL / OUT: 150 mL / NET: 1560 mL    13 Jul 2020 07:01  -  13 Jul 2020 11:35  --------------------------------------------------------  IN: 0 mL / OUT: 200 mL / NET: -200 mL          MEDICATIONS  (STANDING):  chlorhexidine 4% Liquid 1 Application(s) Topical <User Schedule>  folic acid 1 milliGRAM(s) Oral daily  lactulose Syrup 20 Gram(s) Oral every 6 hours  LORazepam     Tablet 0.5 milliGRAM(s) Oral every 8 hours  piperacillin/tazobactam IVPB.. 3.375 Gram(s) IV Intermittent every 8 hours  potassium phosphate / sodium phosphate Tablet (K-PHOS No. 2) 1 Tablet(s) Oral four times a day with meals  rifAXIMin 200 milliGRAM(s) Oral three times a day  thiamine 100 milliGRAM(s) Oral daily    MEDICATIONS  (PRN):  PHENobarbital Injectable 130 milliGRAM(s) IV Push every 15 minutes PRN for agitation        LABS                                            11.5                  Neurophils% (auto):   73.4   (07-12 @ 23:22):    7.36 )-----------(35           Lymphocytes% (auto):  14.5                                          33.6                   Eosinphils% (auto):   0.8      Manual%: Neutrophils x    ; Lymphocytes x    ; Eosinophils x    ; Bands%: x    ; Blasts x                                    133    |  98     |  5                   Calcium: 7.6   / iCa: x      (07-12 @ 23:23)    ----------------------------<  104       Magnesium: 1.6                              2.8     |  19     |  0.60             Phosphorous: 3.0      TPro  6.5    /  Alb  3.3    /  TBili  12.3   /  DBili  x      /  AST  156    /  ALT  34     /  AlkPhos  163    12 Jul 2020 23:23    ( 07-12 @ 23:22 )   PT: 18.0 sec;   INR: 1.60 ratio  aPTT: 31.5 sec        ASSESSMENT & PLAN:   # Alcohol withdrawal syndrome  - patient initially with delirium and witnessed seizure at home:   - Currently on Ativan taper  -EEG per Neurology with out evidence of seizure activity.    -Avoid long-acting benzodiazepines such as Librium due to poor hepatic clearance.  - autoimmune workup  - patient with 1:1 sitter and restraints for agitation as needed.     # Hepatic encephalopathy  - C/w rifaximin  -c/w lactulose (titrate to 3 BMs)  - c/w thiamine    # Probable alcoholic hepatitis  - Trend LFTS  - f/u PeTH (phosphatidylethanol) to monitor history of heavy alcohol consumption.     #Infectious work-up  - possible aspiration.  - No ascites on bedside U/S.  - Patient now AX0x3,   -patient now afebrile, continued tachycardia.   - continue zosyn for 5 days total  -no leukocytosis      For Follow-up:  - continue zosyn for total of 5 days (7/11- 7/16)  - patient on ativan taper currently 0.5 q8h -> would extend to 0.5 q12h tomorrow of he does well on the current does today.  - Trend bmp. Potassium low at 2.8 today. Patient getting Kphos 1 tab PO QID with meals.   -Trend LFTs.   - Follow -up phosphatidylethanol  - follow-up autoimmune work-up   - hepatology following.  -MRI brain per [facility] MICU Transfer Note    Transfer from: MICU  Transfer to:  ( * ) Medicine    (  ) Telemetry    (  ) RCU    (  ) Palliative    (  ) Stroke Unit    (  ) _______________  Accepting physician: Dr. Hunter      MICU COURSE:  39 year male PMHx of EtOH abuse and fall c/b bladder rupture s/p exlap repair (2019) presenting from [facility] with seizure-like activity and alcoholic cirrhosis. At 1630 on 7/10, pt's roommate witnessed a seizure of 30-90 seconds duration with nose and mouth bleeding while the pt was on the couch. Found to be tachycardic, tachypneic, and post-ictal by EMS. Pt last felt well 1 week ago then noted HA (tx with daily tylenol 1g), fatigue, nausea with NBNB emesis. Endorses drinking 6 beers/week for 10 years. Last drink 7/4. Denies smoking, drug use, travel/tick bites, weight loss, SOB, CP, changes in vision.     At [place] a tox screen was negative (salicylates, APAP, alcohol, opiates). HIV negative, Hep B negative, Hep C negative. A RUQ US with doppler r/o thrombosis and showed GB wall thickening (.5 cm), sludge, no stones. Diffuse hepatosplenomagaly was noted on CTAP with possible HCC component. He was given lactulose, NAC protocol, and zosyn for possible cholecystitis. Before transfer to [facility] he had another seizure in [place] and was given Ativan.    During admission, LFTs have been elevated with AST:ALT ratio of greater than 2:1. Patient was febrile and tachycardic, and was started on zosyn while conducting infectious work-up. A bedside U/S was done that did not show any ascites. Hepatology was consulted who recommended to defer corticosteroids while undergoing infectious work-up and not pursuing transplant at this time.  Patient's MELD score is 20.  Neurology was consulted, and an EEG showed no evidence of seizure history. Patient required 1:1 for agitation and was receiving phenobarbital as needed.                 Vital Signs Last 24 Hrs  T(C): 36.8 (13 Jul 2020 11:00), Max: 37.3 (12 Jul 2020 16:00)  T(F): 98.2 (13 Jul 2020 11:00), Max: 99.2 (12 Jul 2020 16:00)  HR: 115 (13 Jul 2020 11:00) (101 - 130)  BP: 118/73 (13 Jul 2020 11:00) (88/51 - 133/78)  BP(mean): 89 (13 Jul 2020 11:00) (62 - 99)  RR: 17 (13 Jul 2020 11:00) (13 - 27)  SpO2: 100% (13 Jul 2020 11:00) (91% - 100%)  I&O's Summary    12 Jul 2020 07:01  -  13 Jul 2020 07:00  --------------------------------------------------------  IN: 1710 mL / OUT: 150 mL / NET: 1560 mL    13 Jul 2020 07:01  -  13 Jul 2020 11:35  --------------------------------------------------------  IN: 0 mL / OUT: 200 mL / NET: -200 mL          MEDICATIONS  (STANDING):  chlorhexidine 4% Liquid 1 Application(s) Topical <User Schedule>  folic acid 1 milliGRAM(s) Oral daily  lactulose Syrup 20 Gram(s) Oral every 6 hours  LORazepam     Tablet 0.5 milliGRAM(s) Oral every 8 hours  piperacillin/tazobactam IVPB.. 3.375 Gram(s) IV Intermittent every 8 hours  potassium phosphate / sodium phosphate Tablet (K-PHOS No. 2) 1 Tablet(s) Oral four times a day with meals  rifAXIMin 200 milliGRAM(s) Oral three times a day  thiamine 100 milliGRAM(s) Oral daily    MEDICATIONS  (PRN):  PHENobarbital Injectable 130 milliGRAM(s) IV Push every 15 minutes PRN for agitation        LABS                                            11.5                  Neurophils% (auto):   73.4   (07-12 @ 23:22):    7.36 )-----------(35           Lymphocytes% (auto):  14.5                                          33.6                   Eosinphils% (auto):   0.8      Manual%: Neutrophils x    ; Lymphocytes x    ; Eosinophils x    ; Bands%: x    ; Blasts x                                    133    |  98     |  5                   Calcium: 7.6   / iCa: x      (07-12 @ 23:23)    ----------------------------<  104       Magnesium: 1.6                              2.8     |  19     |  0.60             Phosphorous: 3.0      TPro  6.5    /  Alb  3.3    /  TBili  12.3   /  DBili  x      /  AST  156    /  ALT  34     /  AlkPhos  163    12 Jul 2020 23:23    ( 07-12 @ 23:22 )   PT: 18.0 sec;   INR: 1.60 ratio  aPTT: 31.5 sec        ASSESSMENT & PLAN:   # Alcohol withdrawal syndrome  - patient initially with delirium and witnessed seizure at home:   - Currently on Ativan taper  -EEG per Neurology with out evidence of seizure activity.    -Avoid long-acting benzodiazepines such as Librium due to poor hepatic clearance.  - autoimmune workup  - patient with 1:1 sitter and restraints for agitation as needed.     # Hepatic encephalopathy  - C/w rifaximin  -c/w lactulose (titrate to 3 BMs)  - c/w thiamine    # Probable alcoholic hepatitis  - Trend LFTS  - f/u PeTH (phosphatidylethanol) to monitor history of heavy alcohol consumption.     #Infectious work-up  - possible aspiration.  - No ascites on bedside U/S.  - Patient now AX0x3,   -patient now afebrile, continued tachycardia.   - continue zosyn for 5 days total  -no leukocytosis      For Follow-up:  - continue zosyn for total of 5 days (7/11- 7/16)  - patient on ativan taper currently 0.5 q8h -> would extend to 0.5 q12h tomorrow of he does well on the current does today.  - Trend bmp. Potassium low at 2.8 today. Patient getting Kphos 1 tab PO QID with meals.   -Trend LFTs.   - Follow -up phosphatidylethanol  - follow-up autoimmune work-up   - hepatology following.  -MRI brain per [facility]

## 2020-07-13 NOTE — DIETITIAN INITIAL EVALUATION ADULT. - PERTINENT MEDS FT
MEDICATIONS  (STANDING):  chlorhexidine 4% Liquid 1 Application(s) Topical <User Schedule>  folic acid 1 milliGRAM(s) Oral daily  lactulose Syrup 20 Gram(s) Oral every 6 hours  LORazepam     Tablet 1 milliGRAM(s) Oral every 8 hours  piperacillin/tazobactam IVPB.. 3.375 Gram(s) IV Intermittent every 8 hours  potassium chloride    Tablet ER 40 milliEquivalent(s) Oral every 4 hours  potassium phosphate / sodium phosphate Tablet (K-PHOS No. 2) 1 Tablet(s) Oral four times a day with meals  rifAXIMin 200 milliGRAM(s) Oral three times a day  thiamine 100 milliGRAM(s) Oral daily

## 2020-07-13 NOTE — PROGRESS NOTE ADULT - SUBJECTIVE AND OBJECTIVE BOX
INTERVAL HPI/OVERNIGHT EVENTS:    SUBJECTIVE: Patient seen and examined at bedside.       VITAL SIGNS:  ICU Vital Signs Last 24 Hrs  T(C): 36.8 (13 Jul 2020 04:00), Max: 37.3 (12 Jul 2020 16:00)  T(F): 98.2 (13 Jul 2020 04:00), Max: 99.2 (12 Jul 2020 16:00)  HR: 113 (13 Jul 2020 07:00) (98 - 130)  BP: 96/52 (13 Jul 2020 07:00) (88/51 - 118/68)  BP(mean): 66 (13 Jul 2020 07:00) (62 - 86)  ABP: --  ABP(mean): --  RR: 18 (13 Jul 2020 07:00) (12 - 27)  SpO2: 99% (13 Jul 2020 07:00) (91% - 100%)      Plateau pressure:   P/F ratio:     07-12 @ 07:01  -  07-13 @ 07:00  --------------------------------------------------------  IN: 1710 mL / OUT: 150 mL / NET: 1560 mL      CAPILLARY BLOOD GLUCOSE        ECG:    PHYSICAL EXAM:    General:   HEENT:   Neck:   Respiratory:   Cardiovascular:   Abdomen:   Extremities:  Neurological:    MEDICATIONS:  MEDICATIONS  (STANDING):  chlorhexidine 4% Liquid 1 Application(s) Topical <User Schedule>  folic acid 1 milliGRAM(s) Oral daily  lactulose Syrup 20 Gram(s) Oral every 6 hours  LORazepam     Tablet 1 milliGRAM(s) Oral every 8 hours  piperacillin/tazobactam IVPB.. 3.375 Gram(s) IV Intermittent every 8 hours  potassium chloride    Tablet ER 40 milliEquivalent(s) Oral every 4 hours  potassium phosphate / sodium phosphate Tablet (K-PHOS No. 2) 1 Tablet(s) Oral four times a day with meals  rifAXIMin 200 milliGRAM(s) Oral three times a day  thiamine 100 milliGRAM(s) Oral daily    MEDICATIONS  (PRN):  PHENobarbital Injectable 130 milliGRAM(s) IV Push every 15 minutes PRN for agitation      ALLERGIES:  Allergies    No Known Allergies    Intolerances        LABS:                        11.5   7.36  )-----------( 35       ( 12 Jul 2020 23:22 )             33.6     07-12    133<L>  |  98  |  5<L>  ----------------------------<  104<H>  2.8<LL>   |  19<L>  |  0.60    Ca    7.6<L>      12 Jul 2020 23:23  Phos  3.0     07-12  Mg     1.6     07-12    TPro  6.5  /  Alb  3.3  /  TBili  12.3<H>  /  DBili  x   /  AST  156<H>  /  ALT  34  /  AlkPhos  163<H>  07-12    PT/INR - ( 12 Jul 2020 23:22 )   PT: 18.0 sec;   INR: 1.60 ratio         PTT - ( 12 Jul 2020 23:22 )  PTT:31.5 sec  Urinalysis Basic - ( 12 Jul 2020 05:26 )    Color: Dark Yellow / Appearance: Clear / SG: >1.050 / pH: x  Gluc: x / Ketone: Trace  / Bili: Large / Urobili: 3 mg/dL   Blood: x / Protein: 100 / Nitrite: Negative   Leuk Esterase: Negative / RBC: 4 /hpf / WBC 17 /HPF   Sq Epi: x / Non Sq Epi: 2 /hpf / Bacteria: Negative        RADIOLOGY & ADDITIONAL TESTS: Reviewed. INTERVAL HPI/OVERNIGHT EVENTS:    SUBJECTIVE: Patient seen and examined at bedside. Patient Axo x3 this morning and eating breakfast.       VITAL SIGNS:  ICU Vital Signs Last 24 Hrs  T(C): 36.8 (13 Jul 2020 04:00), Max: 37.3 (12 Jul 2020 16:00)  T(F): 98.2 (13 Jul 2020 04:00), Max: 99.2 (12 Jul 2020 16:00)  HR: 113 (13 Jul 2020 07:00) (98 - 130)  BP: 96/52 (13 Jul 2020 07:00) (88/51 - 118/68)  BP(mean): 66 (13 Jul 2020 07:00) (62 - 86)  ABP: --  ABP(mean): --  RR: 18 (13 Jul 2020 07:00) (12 - 27)  SpO2: 99% (13 Jul 2020 07:00) (91% - 100%)      Plateau pressure:   P/F ratio:     07-12 @ 07:01  -  07-13 @ 07:00  --------------------------------------------------------  IN: 1710 mL / OUT: 150 mL / NET: 1560 mL      CAPILLARY BLOOD GLUCOSE            PHYSICAL EXAM:  GENERAL: NAD, Resting in bed  HEENT:  Head atraumatic, slight scleral icterus  NECK: Supple  CHEST/LUNG: Clear to auscultation bilaterally  HEART: Regular rate and rhythm  ABDOMEN: Distended abdomen. Hepatosplenomegaly.   EXTREMITIES:  2+ Peripheral Pulse  NERVOUS SYSTEM:  Alert & Oriented X3, no asterixis       MEDICATIONS:  MEDICATIONS  (STANDING):  chlorhexidine 4% Liquid 1 Application(s) Topical <User Schedule>  folic acid 1 milliGRAM(s) Oral daily  lactulose Syrup 20 Gram(s) Oral every 6 hours  LORazepam     Tablet 1 milliGRAM(s) Oral every 8 hours  piperacillin/tazobactam IVPB.. 3.375 Gram(s) IV Intermittent every 8 hours  potassium chloride    Tablet ER 40 milliEquivalent(s) Oral every 4 hours  potassium phosphate / sodium phosphate Tablet (K-PHOS No. 2) 1 Tablet(s) Oral four times a day with meals  rifAXIMin 200 milliGRAM(s) Oral three times a day  thiamine 100 milliGRAM(s) Oral daily    MEDICATIONS  (PRN):  PHENobarbital Injectable 130 milliGRAM(s) IV Push every 15 minutes PRN for agitation      ALLERGIES:  Allergies    No Known Allergies    Intolerances        LABS:                        11.5   7.36  )-----------( 35       ( 12 Jul 2020 23:22 )             33.6     07-12    133<L>  |  98  |  5<L>  ----------------------------<  104<H>  2.8<LL>   |  19<L>  |  0.60    Ca    7.6<L>      12 Jul 2020 23:23  Phos  3.0     07-12  Mg     1.6     07-12    TPro  6.5  /  Alb  3.3  /  TBili  12.3<H>  /  DBili  x   /  AST  156<H>  /  ALT  34  /  AlkPhos  163<H>  07-12    PT/INR - ( 12 Jul 2020 23:22 )   PT: 18.0 sec;   INR: 1.60 ratio         PTT - ( 12 Jul 2020 23:22 )  PTT:31.5 sec  Urinalysis Basic - ( 12 Jul 2020 05:26 )    Color: Dark Yellow / Appearance: Clear / SG: >1.050 / pH: x  Gluc: x / Ketone: Trace  / Bili: Large / Urobili: 3 mg/dL   Blood: x / Protein: 100 / Nitrite: Negative   Leuk Esterase: Negative / RBC: 4 /hpf / WBC 17 /HPF   Sq Epi: x / Non Sq Epi: 2 /hpf / Bacteria: Negative        RADIOLOGY & ADDITIONAL TESTS: Reviewed.

## 2020-07-13 NOTE — PROGRESS NOTE ADULT - SUBJECTIVE AND OBJECTIVE BOX
Chief Complaint: Seizures    Interval Events:  - Patient remains altered    Allergies:  No Known Allergies    Hospital Medications:  chlorhexidine 4% Liquid 1 Application(s) Topical <User Schedule>  folic acid 1 milliGRAM(s) Oral daily  lactulose Syrup 20 Gram(s) Oral every 6 hours  LORazepam     Tablet 1 milliGRAM(s) Oral every 8 hours  PHENobarbital Injectable 130 milliGRAM(s) IV Push every 15 minutes PRN  piperacillin/tazobactam IVPB.. 3.375 Gram(s) IV Intermittent every 8 hours  potassium chloride    Tablet ER 40 milliEquivalent(s) Oral every 4 hours  potassium phosphate / sodium phosphate Tablet (K-PHOS No. 2) 1 Tablet(s) Oral four times a day with meals  rifAXIMin 200 milliGRAM(s) Oral three times a day  thiamine 100 milliGRAM(s) Oral daily    PMHX/PSHX:  No pertinent past medical history  S/P exploratory laparotomy    Family history:  FH: alpha 1 antitrypsin deficiency    ROS:     General:  No wt loss, + fevers, +chills, night sweats, fatigue  GI:  No pain, No nausea, No vomiting, No diarrhea, No constipation, No weight loss, No fever, No pruritis, No rectal bleeding, No tarry stools, No dysphagia,  :  No pain, bleeding, incontinence, nocturia    PHYSICAL EXAM:   Vital Signs:  Vital Signs Last 24 Hrs  T(C): 36.7 (13 Jul 2020 07:00), Max: 37.3 (12 Jul 2020 16:00)  T(F): 98.1 (13 Jul 2020 07:00), Max: 99.2 (12 Jul 2020 16:00)  HR: 116 (13 Jul 2020 08:00) (98 - 130)  BP: 118/66 (13 Jul 2020 08:00) (88/51 - 118/68)  BP(mean): 83 (13 Jul 2020 08:00) (62 - 86)  RR: 25 (13 Jul 2020 08:00) (13 - 27)  SpO2: 96% (13 Jul 2020 08:00) (91% - 100%)    GENERAL:  No acute distress  HEENT:  Normocephalic/atraumatic, + scleral icterus  CHEST:  Clear to auscultation bilaterally, no wheezes/rales/ronchi, no accessory muscle use  HEART:  Regular rate and rhythm, no murmurs/rubs/gallops  ABDOMEN:  Soft, non-tender, mildly distended, normoactive bowel sounds  EXTREMITIES: No cyanosis, clubbing, or edema  SKIN:  No rash/erythema  NEURO:  Alert and oriented x 2 (person, place)    LABS:                        11.5   7.36  )-----------( 35       ( 12 Jul 2020 23:22 )             33.6     Mean Cell Volume: 99.1 fl (07-12-20 @ 23:22)    07-12    133<L>  |  98  |  5<L>  ----------------------------<  104<H>  2.8<LL>   |  19<L>  |  0.60    Ca    7.6<L>      12 Jul 2020 23:23  Phos  3.0     07-12  Mg     1.6     07-12    TPro  6.5  /  Alb  3.3  /  TBili  12.3<H>  /  DBili  x   /  AST  156<H>  /  ALT  34  /  AlkPhos  163<H>  07-12    LIVER FUNCTIONS - ( 12 Jul 2020 23:23 )  Alb: 3.3 g/dL / Pro: 6.5 g/dL / ALK PHOS: 163 U/L / ALT: 34 U/L / AST: 156 U/L / GGT: x           PT/INR - ( 12 Jul 2020 23:22 )   PT: 18.0 sec;   INR: 1.60 ratio         PTT - ( 12 Jul 2020 23:22 )  PTT:31.5 sec  Urinalysis Basic - ( 12 Jul 2020 05:26 )    Color: Dark Yellow / Appearance: Clear / SG: >1.050 / pH: x  Gluc: x / Ketone: Trace  / Bili: Large / Urobili: 3 mg/dL   Blood: x / Protein: 100 / Nitrite: Negative   Leuk Esterase: Negative / RBC: 4 /hpf / WBC 17 /HPF   Sq Epi: x / Non Sq Epi: 2 /hpf / Bacteria: Negative    Amylase Serum66      Lipase [id number]       Ammonia--               11.5   7.36  )-----------( 35       ( 12 Jul 2020 23:22 )             33.6                         11.6   6.77  )-----------( 24       ( 12 Jul 2020 06:00 )             32.8                         12.2   7.39  )-----------( 27 ( 11 Jul 2020 21:20 )             35.3     Imaging:    Reviewed

## 2020-07-13 NOTE — PROGRESS NOTE ADULT - SUBJECTIVE AND OBJECTIVE BOX
Medicine Attending Transfer Note  Transfer from MICU    CC: 39M p/w seizure transferred to [facility] for further evaluation of complicated EtOH withdrawal, acute hepatitis and cirrhosis  HPI: 39M hx of traumatic rupture of bladder s/p ExLap and repair(2019) initially presented to [facility] (7/10) for seizure/delirium. The patient was found to have acute hepatitis with cirrhosis and suspected to have complicated alcohol withdrawal syndrome with seizure/delirium. MICU charts last drink 7/4 and initial work up for acute hepatitis identifying diffuse hepatosplenomegaly with possible HCC component. See MICU H&P regarding initial lab w/u. The patient was transferred to [facility] MICU on 7/11 and hepatology consulted with concern for acute hepatitis. Course noted to be complicated by SIRS however blood culture 7/12 NGTD. Patient empirically treated with zosyn. EEG completed 7/12 did not detect seizure. In MICU, patient had resolution of delirium and generally with nontoxic appearance resolution of fever and therefore transferred to floor for further evaluation.    Patient reports at time of medicine transfer not remember the seizure event with only memory of the day (7/10) of working long hours and sitting and having a drink of beer and next thing waking up in the hospital. The week preceding he reports feeling like he "had the flu" with persistent nausea and NBNB vomiting but denied fever, chills lethargy, muscle aches, cough, sob or diarrhea. He denies sick contacts or significant travel outside of where he and his parents live. No reported bug bites or trauma. He did notice color change of eye over 1 week but thought it was from allergies. Additionally (after physical exam) reports new wound on right foot which was small, black in the center, and red around the edge, without itchiness or pain, NOT enlarging.  PMH: bladder rupture  PSH: ExLap(2019)  Family Hx: charted antitrypsin-1 deficiency in niece? 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  Allergy: NKDA  Med: does not take meds  PMD: does not have one.  ROS:  CONSTITUTIONAL: No subjective fever, no chills  EYES: No eye pain, no acute blindness  Mouth: no pain in mouth, no cuts  RESPIRATORY: No cough, No sob  CARDIOVASCULAR: No CP, no palpitations  GASTROINTESTINAL: no abdominal pain, currently some nausea but improving  GENITOURINARY: No dysuria, no hematuria  Heme: No easy bruising, no swelling of neck  NEUROLOGICAL: No seizure, No acute paralysis  SKIN: No itching, small wound on right found  MUSCULOSKELETAL: No acute joint pain, no joint swelling    =====================================================  PHYSICAL EXAM  Vital Signs Last 24 Hrs  T(C): 38.8 (13 Jul 2020 21:01), Max: 38.8 (13 Jul 2020 21:01)  T(F): 101.9 (13 Jul 2020 21:01), Max: 101.9 (13 Jul 2020 21:01)  HR: 125 (13 Jul 2020 21:25) (105 - 136)  BP: 110/76 (13 Jul 2020 21:01) (88/51 - 133/79)  BP(mean): 95 (13 Jul 2020 18:00) (62 - 99)  RR: 24 (13 Jul 2020 21:01) (15 - 26)  SpO2: 95% (13 Jul 2020 21:01) (91% - 100%) on RA    GENERAL: NAD, well-developed  HEAD:  Atraumatic, Normocephalic  EYES: EOMI, PERRL, scleral icterus  Mouth: MMM, no lesions  NECK: Supple, no appreciable masses  Lung: normal work of breathing, cta b/l  Chest: S1&S2+, rrr, no m/r/g appreciated  ABDOMEN: bs+, soft, nt, hepatomegaly+  : No asthon catheter, no CVA tenderness  EXTREMITIES:  radial pulse present b/l, PT present b/l, no pitting edema present  Neuro: A&Ox3, CN II-XII intact, finger-to-nose normal, no asterixis present, no hand tremor or tongue fasciculation, no flaccid paralysis in extremities appreciated  SKIN: warm and dry, no visible purulence in exposed areas, proximal to right 5th toe there is small 1cm eschar with surrounding erythema  ==============================================  Personally reviewed available labs, imaging and ekg  CBC Full  -  ( 12 Jul 2020 23:22 )  WBC Count : 7.36 K/uL  RBC Count : 3.39 M/uL  Hemoglobin : 11.5 g/dL  Hematocrit : 33.6 %  Platelet Count - Automated : 35 K/uL  Mean Cell Volume : 99.1 fl  Mean Cell Hemoglobin : 33.9 pg  Mean Cell Hemoglobin Concentration : 34.2 gm/dL  Auto Neutrophil # : 5.40 K/uL  Auto Lymphocyte # : 1.07 K/uL  Auto Monocyte # : 0.76 K/uL  Auto Eosinophil # : 0.06 K/uL  Auto Basophil # : 0.05 K/uL  Auto Neutrophil % : 73.4 %  Auto Lymphocyte % : 14.5 %  Auto Monocyte % : 10.3 %  Auto Eosinophil % : 0.8 %  Auto Basophil % : 0.7 %  07-13  132<L>  |  98  |  5<L>  ----------------------------<  111<H>  3.4<L>   |  20<L>  |  0.51  Ca    7.7<L>      13 Jul 2020 13:16  Phos  3.0     07-12  Mg     1.6     07-12  TPro  6.5  /  Alb  3.3  /  TBili  12.3<H>  /  DBili  x   /  AST  156<H>  /  ALT  34  /  AlkPhos  163<H>  07-12  PT/INR - ( 12 Jul 2020 23:22 )   PT: 18.0 sec;   INR: 1.60 ratio    PTT - ( 12 Jul 2020 23:22 )  PTT:31.5 sec  Work up thus far: babesia PCR not detected, CMV neg, acute hepatitis panel neg, HepE pending, EBV-IgM(neg)IgG(pos)EarlyAg(neg)NuclearAg(pos) E, COVID19 IgG neg, HIV reported neg at previous hospital, ehrlichia/anaplasma pending, HSV-6 pending.  - Studies in lab and pending result: Phosphatidylethanol (Peth; assessing chronic EtOH use), Alpha-1-antitrypsin, SHAUN, Babesia, Ehrlichia ab, Ehrlichia/Anaplasma PCR, Hep E IgM/IgG, HSV-6, IgG4  Imaging: CXR on 7/10- no lobar opacification appreciated  < from: MR Head w/wo IV Cont (07.13.20 @ 15:48) >  IMPRESSION: Atrophy out of proportion for age. Nonspecific focus of T2 hyperintensity in the left periventricular region at the level of the atria of the left lateral ventricle. This may represent focus of infection inflammation demyelination or ischemia. No other significant pathology. No abnormal enhancement    < end of copied text >  MR abdomen at outside hospital concerning for cirrhosis  EKG:sinus tachycardia 120, NO stemi present

## 2020-07-13 NOTE — PROGRESS NOTE ADULT - ASSESSMENT
39M w/ hx of traumatic rupture of bladder s/p ExLap and repair(2019) initially presented to [facility] (7/10) for seizure/delirium suspected from complicated EtOH withdrawal additionally found to have acute hepatitis with cirrhosis/hepatomegaly requiring transfer to [facility] MICU for further management and Liver transplant evaluation. [facility] MICU course complicated by SIRS with patient treated empirically with zosyn (blood 7/12 culture NGTD) however patient noted to have resolution of delirium. Now transferred to medicine given clinical improvement for further investigation of acute hepatitis (likely related to EtOH), suspected EtOh withdrawal syndrome, SIRS and newly identified cirrhosis.

## 2020-07-13 NOTE — DIETITIAN INITIAL EVALUATION ADULT. - ENERGY NEEDS
Ht: 73"  Wt: 192  BMI: 25.4 kg/m2   IBW: 184 (+/-10%)     within IBW  Edema: none       Skin: no pressure injuries documented

## 2020-07-13 NOTE — PROGRESS NOTE ADULT - ASSESSMENT
Impression:    40 y/o M w/ hx of EtOH dependence and bladder rupture after fall requiring ex lap for surgical repair in 2019, who initially was brought to [facility] after found to have witnessed seizure by roommate, confused when evaluated by EMS, transferred to [facility] for further management.    # Cirrhosis: Likely due to EtOH dependence  MELD-Na: 22  Varices: No prior EGD  Ascites: None on bedside ultrasound  HE: AAO x 2, + asterixis  HCC: No prior imaging  # Abnormal liver enzymes: Likely represents alcoholic hepatitis vs acute on chronic liver failure. DF 36.3  # Fever: Concern for underlying infection. BCx pending. No ascites to tap per bedside U/S.  # AMS: Likely represents alcohol withdrawal c/b seizure. Differential includes underlying infection given fever.  # Seizure: Likely alcohol withdrawal related seizure    Recommendations:  - Monitor daily CBC, CMP, and INR  - Would not start on steroids for alcoholic hepatitis given concern for underlying infection  - Continue antibiotics  - Continue lactulose 20g q6h and rifaximin  - Check abdominal U/S with dopplers  - F/U AMA, ASMA, alpha 1 antitrypsin, ceruloplasmin, soluble liver antigen, anti LKM-1 Ab, immunoglobulin panel, iron studies, and ferritin  - F/U Hep B Surface Ab, Hep B Core Ab, Hepatitis E IgM, Cytomegalovirus PCR, Jae Barr Virus PCR, Herpes Simplex Virus PCR  - Infectious work-up per MICU team  - Treatment of agitation / alcohol withdrawal per primary team  - Rest of care per primary team    Please call Hepatology ([phone]) if there are any additional questions or concerns.    Please call answering service for on-call GI fellow ([phone]) after 5pm and before 8am, and on weekends.

## 2020-07-13 NOTE — PROGRESS NOTE ADULT - PROBLEM SELECTOR PLAN 4
- identified on MR abdomen 7/11/20  - hepatology consulted and work up is ongoing  - patient reports use of alcoholic is likely contributory  - MELD-Na: 25 at time of transfer  - DF: 32.5; reported last drink 7/4  - Encephalopathy has resolved and it is unclear if related to withdrawal syndrome. Note EEG does not reported detected seizure. Brain MR on 7/13 is abnormal with report of ventricular atrophy not appropriate for age and hyperintensity of Left periventricular region. Will need Neurology consult in am.  - Has not had EGD to evaluate for varices  - MR abdomen does not report portal vein thrombosis

## 2020-07-13 NOTE — PROGRESS NOTE ADULT - PROBLEM SELECTOR PLAN 7
-Has normocytic anemia: ferritin elevated. will send remaining iron studies, b12, folate, reticulocyte as am lab. No sign of hemorrhage on exam. monitor w/ cbc daily  - thrombocytopenia: suspect from cirrhosis which is identified on MR abdomen. monitor w/ cbc daily

## 2020-07-14 NOTE — PHYSICAL THERAPY INITIAL EVALUATION ADULT - ADDITIONAL COMMENTS
Pt states he was 100% independent PTA. Pt lives in a 5 floor walk up, however, he states that he will d/c to his parent home with 5 steps to enter and an elevator in the home. Pt yohana, KENDRICK GREENE.

## 2020-07-14 NOTE — PROGRESS NOTE ADULT - ASSESSMENT
40 y/o M w/ hx of EtOH dependence and bladder rupture after fall requiring ex lap for surgical repair in 2019, who initially was brought to [facility] after found to have witnessed seizure by roommate, confused when evaluated by EMS, transferred to [facility] for further management. ICU stay for seizure s/p phenobarb gtt.      Impression:   # Cirrhosis: Likely due to EtOH dependence  Varices: No prior EGD  Ascites: None on bedside ultrasound  HE: AAO x 3, + asterixis  HCC: No prior imaging  # Abnormal liver enzymes: Likely represents alcoholic hepatitis vs acute on chronic liver failure. DF 36.3  # Fever: Concern for underlying infection. BCx pending. No ascites to tap per bedside U/S.  # AMS: Likely represents alcohol withdrawal c/b seizure. Differential includes underlying infection given fever.  # Seizure: Likely alcohol withdrawal related seizure    Recommendations:  - Continue to discuss ETOH rehab options with patient   - Monitor daily CBC, CMP, and INR  - Would not start on steroids for alcoholic hepatitis given concern for underlying infection  - Continue antibiotics  - Continue lactulose 20g q6h and rifaximin  - Check abdominal U/S with dopplers  - F/U AMA, ASMA, alpha 1 antitrypsin, ceruloplasmin, soluble liver antigen, anti LKM-1 Ab, immunoglobulin panel, iron studies, and ferritin  - F/U Hep B Surface Ab, Hep B Core Ab, Hepatitis E IgM, Cytomegalovirus PCR, Jae Barr Virus PCR, Herpes Simplex Virus PCR  - Infectious work-up per MICU team  - Treatment of agitation / alcohol withdrawal per primary team  - Rest of care per primary team    Bassam Forde  Gastroenterology -- PGY4 40 y/o M w/ hx of EtOH dependence and bladder rupture after fall requiring ex lap for surgical repair in 2019, who initially was brought to [facility] after found to have witnessed seizure by roommate, confused when evaluated by EMS, transferred to [facility] for further management. ICU stay for seizure s/p phenobarb gtt.      Impression:   # Cirrhosis: Likely due to EtOH dependence  Varices: No prior EGD  Ascites: None on bedside ultrasound  HE: AAO x 3, + asterixis  HCC: No prior imaging  # Abnormal liver enzymes: Likely represents alcoholic hepatitis vs acute on chronic liver failure. DF 36.3  # Fever: Concern for underlying infection. BCx pending. No ascites to tap per bedside U/S.  # AMS: Likely represents alcohol withdrawal c/b seizure. Differential includes underlying infection given fever.  # Seizure: Likely alcohol withdrawal related seizure    Recommendations:  - Continue to discuss ETOH rehab options with patient   - Monitor daily CBC, CMP, and INR  - Would not start on steroids for alcoholic hepatitis given concern for underlying infection  - Continue antibiotics  - Continue lactulose 20g q6h and rifaximin  - Check abdominal U/S with dopplers  - F/U AMA, ASMA, alpha 1 antitrypsin, ceruloplasmin, soluble liver antigen, anti LKM-1 Ab, immunoglobulin panel, iron studies, and ferritin  - F/U Cytomegalovirus PCR, Jae Barr Virus PCR, Herpes Simplex Virus PCR  - Infectious work-up per MICU team  - Treatment of agitation / alcohol withdrawal per primary team  - Rest of care per primary team    Bassam Forde  Gastroenterology -- PGY4

## 2020-07-14 NOTE — PROGRESS NOTE ADULT - PROBLEM SELECTOR PLAN 4
- identified on MR abdomen 7/11/20  - hepatology consulted and work up is ongoing  - patient reports use of alcoholic is likely contributory  - MELD-Na: 25 at time of transfer  - DF: 32.5; reported last drink 7/4  - Encephalopathy has resolved and it is unclear if related to withdrawal syndrome. Note EEG does not reported detected seizure. Brain MR on 7/13 is abnormal with report of ventricular atrophy not appropriate for age and hyperintensity of Left periventricular region. Will need Neurology consult in am.  - Has not had EGD to evaluate for varices  - MR abdomen does not report portal vein thrombosis - identified on MR abdomen 7/11/20  - hepatology consulted and work up is ongoing  - patient reports use of alcoholic is likely contributory  - MELD-Na: 25 at time of transfer  - DF: 32.5; reported last drink 7/4  - Has not had EGD to evaluate for varices  - MR abdomen does not report portal vein thrombosis - identified on MR abdomen 7/11/20  - hepatology consulted and work up is ongoing  - patient reports use of alcoholic is likely contributory  - MELD-Na: 25 at time of transfer  - DF: 32.5; reported last drink 7/4  - Has not had EGD to evaluate for varices

## 2020-07-14 NOTE — PROGRESS NOTE ADULT - PROBLEM SELECTOR PLAN 5
- fever and tachycardia without clear source of infection  - Since transfer from ICU, the patient has nontoxic appearance, without encephalopathy, no tremor/tongue fasciculation, no asterixis appreciated, reports feeling generally improved. Initial blood culture 7/12 NGTD and patient noted to be on zosyn for empiric treatment given liver disease.  - see above for work up completed thus far  - CXR without lobar pneumonia identified, UA not c/w acute cystitis.  - source is suspected from acute hepatitis which of which work up is ongoing with Hepatology consulted. Alcoholic hepatitis is suspected but steroids currently deferred per hepatology.  - [place] of 5th toe eshcar as below  - C/w IV zosyn 7/11-  - Febrile last night, follow up repeat blood cx, ua, urine cx, cxr

## 2020-07-14 NOTE — PHYSICAL THERAPY INITIAL EVALUATION ADULT - DIAGNOSIS, PT EVAL
Pt does not require skilled PT services at this time, however, pt would benefit from daily ambulation w/staff to maintain current level of strength and functional mobility.

## 2020-07-14 NOTE — PROGRESS NOTE ADULT - PROBLEM SELECTOR PLAN 7
- in the setting of etoh w/d vs cirrhosis  - resolved - Encephalopathy has resolved and it is unclear if related to withdrawal syndrome vs cirrhosis   - Note EEG does not reported detected seizure. Brain MR on 7/13 is abnormal with report of ventricular atrophy not appropriate for age and hyperintensity of Left periventricular region.   - resolved

## 2020-07-14 NOTE — PHYSICAL THERAPY INITIAL EVALUATION ADULT - CRITERIA FOR SKILLED THERAPEUTIC INTERVENTIONS
functional limitations in following categories/anticipated discharge recommendation/therapy frequency/rehab potential/predicted duration of therapy intervention/risk reduction/prevention

## 2020-07-14 NOTE — PROGRESS NOTE ADULT - ASSESSMENT
39M w/ hx of traumatic rupture of bladder s/p ExLap and repair(2019) initially presented to [facility] (7/10) for seizure/delirium suspected from complicated EtOH withdrawal additionally found to have acute hepatitis with cirrhosis/hepatomegaly requiring transfer to [facility] MICU for further management and Liver transplant evaluation. [facility] MICU course complicated by SIRS with patient treated empirically with zosyn (blood 7/12 culture NGTD) however patient noted to have resolution of delirium. Now transferred to medicine given clinical improvement for further investigation of acute hepatitis (likely related to EtOH), suspected EtOh withdrawal syndrome, SIRS and newly identified cirrhosis.

## 2020-07-14 NOTE — CONSULT NOTE ADULT - ASSESSMENT
39M alcoholic with history of traumatic urinary bladder rupture from a fall s/p repair 2019, transferred to [facility] 7/11/20 with seizure-like activity and alcoholic hepatitis?   Fevers improved then recurred last night   Check cultures from prior hospital   On empiric Zosyn 7/11-  repeat blood cultures from this afternoon in lab     Justin Mena MD   Infectious Disease   Pager [phone]   After 5PM and on weekends please page fellow on call or call [phone] 39M alcoholic, transferred from [facility] to [facility] 7/11/20 for further management of new onset seizures.   Intermittent fevers but no clinical infection and he looks well.   Cultures no growth to date (including urine from [facility], no blood cultures done there).   No meningeal signs and he's not encephalopathic to suggest a CNS infection related to seizures.   On empiric Zosyn since 7/11.   Suspect fevers are related to an inflammatory process and not infection. They are common in alcoholic hepatitis.     Suggest  -I ordered an HIV screen and procalcitonin for tomorrow   -continue empiric Zosyn pending repeat blood cultures from this afternoon (in lab)   -consider repeat abdominal imaging given rising bilirubin     Spoke with primary team   I will be away tomorrow, returning Thursday. ID service will follow.     Justin Mena MD   Infectious Disease   Pager [phone]   After 5PM and on weekends please page fellow on call or call [phone]

## 2020-07-14 NOTE — PROGRESS NOTE ADULT - SUBJECTIVE AND OBJECTIVE BOX
Patient is a 39y old  Male who presents with a chief complaint of Transferred to [facility] for seizures, alcoholic cirrhosis (13 Jul 2020 22:04)      SUBJECTIVE / OVERNIGHT EVENTS: feels better, denies cp, sob, abdominal pain, pain in right foot is better, no chills     MEDICATIONS  (STANDING):  folic acid 1 milliGRAM(s) Oral daily  lactulose Syrup 20 Gram(s) Oral every 6 hours  LORazepam     Tablet 0.5 milliGRAM(s) Oral every 8 hours  magnesium sulfate  IVPB 1 Gram(s) IV Intermittent daily  piperacillin/tazobactam IVPB.. 3.375 Gram(s) IV Intermittent every 8 hours  potassium chloride    Tablet ER 40 milliEquivalent(s) Oral once  potassium phosphate / sodium phosphate Tablet (K-PHOS No. 2) 1 Tablet(s) Oral four times a day with meals  rifAXIMin 200 milliGRAM(s) Oral three times a day  thiamine 100 milliGRAM(s) Oral daily    MEDICATIONS  (PRN):        CAPILLARY BLOOD GLUCOSE        I&O's Summary    13 Jul 2020 07:01  -  14 Jul 2020 07:00  --------------------------------------------------------  IN: 120 mL / OUT: 200 mL / NET: -80 mL        PHYSICAL EXAM:  GENERAL: NAD, well-developed  HEAD:  Atraumatic, Normocephalic  EYES: conjunctiva and sclera clear  NECK: No JVD  CHEST/LUNG: Clear to auscultation bilaterally; No wheeze  HEART: Regular rate and rhythm; S1S2  ABDOMEN: Soft, Nontender, Nondistended; Bowel sounds present  EXTREMITIES:  2+ Peripheral Pulses, right 5th toe eschar/ erythema  PSYCH: AAOx3      LABS:                        11.8   7.04  )-----------( 74       ( 14 Jul 2020 09:18 )             34.6     07-14    135  |  100  |  7   ----------------------------<  96  3.4<L>   |  22  |  0.58    Ca    7.9<L>      14 Jul 2020 09:18  Phos  2.6     07-14  Mg     1.6     07-14    TPro  6.3  /  Alb  3.2<L>  /  TBili  14.5<H>  /  DBili  x   /  AST  110<H>  /  ALT  29  /  AlkPhos  163<H>  07-14    PT/INR - ( 14 Jul 2020 10:13 )   PT: 20.2 sec;   INR: 1.76 ratio         PTT - ( 14 Jul 2020 10:13 )  PTT:32.8 sec          RADIOLOGY & ADDITIONAL TESTS:    Imaging Personally Reviewed:    Consultant(s) Notes Reviewed:      Care Discussed with Consultants/Other Providers:

## 2020-07-14 NOTE — PROGRESS NOTE ADULT - SUBJECTIVE AND OBJECTIVE BOX
Chief Complaint: Seizures    Interval Events:  - Pt doing well, improving mental status w/o seizures  - Does not want to go to ETOH rehab    Allergies:  No Known Allergies    Hospital Medications:  chlorhexidine 4% Liquid 1 Application(s) Topical <User Schedule>  folic acid 1 milliGRAM(s) Oral daily  lactulose Syrup 20 Gram(s) Oral every 6 hours  LORazepam     Tablet 1 milliGRAM(s) Oral every 8 hours  PHENobarbital Injectable 130 milliGRAM(s) IV Push every 15 minutes PRN  piperacillin/tazobactam IVPB.. 3.375 Gram(s) IV Intermittent every 8 hours  potassium chloride    Tablet ER 40 milliEquivalent(s) Oral every 4 hours  potassium phosphate / sodium phosphate Tablet (K-PHOS No. 2) 1 Tablet(s) Oral four times a day with meals  rifAXIMin 200 milliGRAM(s) Oral three times a day  thiamine 100 milliGRAM(s) Oral daily    PMHX/PSHX:  No pertinent past medical history  S/P exploratory laparotomy    Family history:  FH: alpha 1 antitrypsin deficiency    ROS:     General:  No wt loss, + fevers, +chills, night sweats, fatigue  GI:  No pain, No nausea, No vomiting, No diarrhea, No constipation, No weight loss, No fever, No pruritis, No rectal bleeding, No tarry stools, No dysphagia,  :  No pain, bleeding, incontinence, nocturia    PHYSICAL EXAM:   Vital Signs:  Vital Signs Last 24 Hrs  T(C): 36.7 (13 Jul 2020 07:00), Max: 37.3 (12 Jul 2020 16:00)  T(F): 98.1 (13 Jul 2020 07:00), Max: 99.2 (12 Jul 2020 16:00)  HR: 116 (13 Jul 2020 08:00) (98 - 130)  BP: 118/66 (13 Jul 2020 08:00) (88/51 - 118/68)  BP(mean): 83 (13 Jul 2020 08:00) (62 - 86)  RR: 25 (13 Jul 2020 08:00) (13 - 27)  SpO2: 96% (13 Jul 2020 08:00) (91% - 100%)    GENERAL:  No acute distress  HEENT:  Normocephalic/atraumatic, + scleral icterus  CHEST:  Clear to auscultation bilaterally, no wheezes/rales/ronchi, no accessory muscle use  HEART:  Regular rate and rhythm, no murmurs/rubs/gallops  ABDOMEN:  Soft, non-tender, mildly distended, normoactive bowel sounds  EXTREMITIES: No cyanosis, clubbing, or edema  SKIN:  No rash/erythema  NEURO:  Alert and oriented x 3, + mild asterixis     LABS:                        11.5   7.36  )-----------( 35       ( 12 Jul 2020 23:22 )             33.6     Mean Cell Volume: 99.1 fl (07-12-20 @ 23:22)    07-12    133<L>  |  98  |  5<L>  ----------------------------<  104<H>  2.8<LL>   |  19<L>  |  0.60    Ca    7.6<L>      12 Jul 2020 23:23  Phos  3.0     07-12  Mg     1.6     07-12    TPro  6.5  /  Alb  3.3  /  TBili  12.3<H>  /  DBili  x   /  AST  156<H>  /  ALT  34  /  AlkPhos  163<H>  07-12    LIVER FUNCTIONS - ( 12 Jul 2020 23:23 )  Alb: 3.3 g/dL / Pro: 6.5 g/dL / ALK PHOS: 163 U/L / ALT: 34 U/L / AST: 156 U/L / GGT: x           PT/INR - ( 12 Jul 2020 23:22 )   PT: 18.0 sec;   INR: 1.60 ratio         PTT - ( 12 Jul 2020 23:22 )  PTT:31.5 sec  Urinalysis Basic - ( 12 Jul 2020 05:26 )    Color: Dark Yellow / Appearance: Clear / SG: >1.050 / pH: x  Gluc: x / Ketone: Trace  / Bili: Large / Urobili: 3 mg/dL   Blood: x / Protein: 100 / Nitrite: Negative   Leuk Esterase: Negative / RBC: 4 /hpf / WBC 17 /HPF   Sq Epi: x / Non Sq Epi: 2 /hpf / Bacteria: Negative    Amylase Serum66      Lipase [id number]       Ammonia--               11.5   7.36  )-----------( 35       ( 12 Jul 2020 23:22 )             33.6                         11.6   6.77  )-----------( 24       ( 12 Jul 2020 06:00 )             32.8                         12.2   7.39  )-----------( 27 ( 11 Jul 2020 21:20 )             35.3     Imaging:    Reviewed

## 2020-07-14 NOTE — PHYSICAL THERAPY INITIAL EVALUATION ADULT - PRECAUTIONS/LIMITATIONS, REHAB EVAL
[facility] [[email or web address] MICU course c/b SIRS w/ pt treated empirically w/ zosyn (blood 7/12 culture NGTD) however patient noted to have resolution of delirium. Now transferred to medicine given clinical improvement for further investigation of acute hepatitis (likely related to EtOH), suspected EtOh withdrawal syndrome, SIRS and newly identified cirrhosis. DX: Acute hepatitis, Seizure, Alcohol withdrawal syndrome, with delirium, Cirrhosis of liver with ascites, SIRS (systemic inflammatory response syndrome),  Eschar of foot (approximately 1.2cm eschar with surround ring of erythema just proximal to right 5th toe), Metabolic encephalopathy, Hypokalemia, Thrombocytopenia. MRI (+) Atrophy out of proportion for age. Nonspecific focus of T2 hyperintensity in the left periventricular region at the level of the atria of the left lateral ventricle. This may represent focus of infection inflammation demyelination or ischemia. No other significant pathology. No abnormal enhancement/isolation precautions isolation precautions/NUSH MICU course c/b SIRS w/ pt treated empirically w/ zosyn (blood 7/12 culture NGTD) however patient noted to have resolution of delirium. Now transferred to medicine given clinical improvement for further investigation of acute hepatitis (likely related to EtOH), suspected EtOh withdrawal syndrome, SIRS and newly identified cirrhosis. DX: Acute hepatitis, Seizure, Alcohol withdrawal syndrome, with delirium, Cirrhosis of liver with ascites, SIRS (systemic inflammatory response syndrome),  Eschar of foot (approximately 1.2cm eschar with surround ring of erythema just proximal to right 5th toe), Metabolic encephalopathy, Hypokalemia, Thrombocytopenia. MRI (+) Atrophy out of proportion for age. Nonspecific focus of T2 hyperintensity in the left periventricular region at the level of the atria of the left lateral ventricle. This may represent focus of infection inflammation demyelination or ischemia. No other significant pathology. No abnormal enhancement/no known precautions/limitations

## 2020-07-14 NOTE — CONSULT NOTE ADULT - SUBJECTIVE AND OBJECTIVE BOX
HPI:  39M alcoholic, history of traumatic urinary bladder rupture from a fall s/p repair 2019, transferred to [facility] 7/11/20 with seizure-like activity and alcoholic hepatitis?   Reportedly negative urine toxicology, HIV, Hep B and C   A RUQ US with doppler r/o thrombosis and showed GB wall thickening (.5 cm), sludge, no stones.   possible HCC       PAST MEDICAL & SURGICAL HISTORY:  No pertinent past medical history  S/P exploratory laparotomy      Allergies    No Known Allergies    Intolerances        ANTIMICROBIALS:  piperacillin/tazobactam IVPB.. 3.375 every 8 hours  rifAXIMin 200 three times a day      OTHER MEDS:  folic acid 1 milliGRAM(s) Oral daily  lactulose Syrup 20 Gram(s) Oral every 6 hours  LORazepam     Tablet 0.5 milliGRAM(s) Oral two times a day  magnesium sulfate  IVPB 1 Gram(s) IV Intermittent daily  potassium phosphate / sodium phosphate Tablet (K-PHOS No. 2) 1 Tablet(s) Oral four times a day with meals  thiamine 100 milliGRAM(s) Oral daily      SOCIAL HISTORY:    FAMILY HISTORY:  FH: alpha 1 antitrypsin deficiency      ROS:  Unobtainable because:   All other systems negative   Constitutional: no fever, no chills, no weight loss, no night sweats  Eye: no vision changes  ENT: no sore throat, no rhinorrhea  Cardiovascular: no chest pain, no palpitation  Respiratory: no SOB, no cough  GI:  no abd pain, no vomiting, no diarrhea  urinary: no dysuria, no hematuria, no flank pain  musculoskeletal: no joint pain, no joint swelling  skin: no rash  neurology: no headache, no change in mental status  psych: no anxiety    Physical Exam:  General: awake, alert, non toxic  Head: atraumatic, normocephalic  Eyes: normal sclera and conjunctiva  ENT: no oropharyngeal lesions, no LAD, neck supple  Cardio: regular rate and rhythm   Respiratory: nonlabored on room air, clear bilaterally, no wheezing  abd: soft, bowel sounds present, not tender  : no suprapubic tenderness, no ashton  Musculoskeletal: no joint swelling, no edema  Skin: no rash  vascular: no phlebitis  Neurologic: no focal deficits  psych: normal affect       Drug Dosing Weight  Height (cm): 185.4 (11 Jul 2020 20:23)  Weight (kg): 87.2 (11 Jul 2020 20:23)  BMI (kg/m2): 25.4 (11 Jul 2020 20:23)  BSA (m2): 2.12 (11 Jul 2020 20:23)    Vital Signs Last 24 Hrs  T(F): 97.5 (07-14-20 @ 12:05), Max: 101.9 (07-13-20 @ 21:01)    Vital Signs Last 24 Hrs  HR: 128 (07-14-20 @ 15:00) (108 - 136)  BP: 126/84 (07-14-20 @ 15:00) (110/63 - 130/83)  RR: 18 (07-14-20 @ 12:05)  SpO2: 93% (07-14-20 @ 15:00) (93% - 97%)  Wt(kg): --                          11.8   7.04  )-----------( 74       ( 14 Jul 2020 09:18 )             34.6       07-14    135  |  100  |  7   ----------------------------<  96  3.4<L>   |  22  |  0.58    Ca    7.9<L>      14 Jul 2020 09:18  Phos  2.6     07-14  Mg     1.6     07-14    TPro  6.3  /  Alb  3.2<L>  /  TBili  14.5<H>  /  DBili  x   /  AST  110<H>  /  ALT  29  /  AlkPhos  163<H>  07-14          MICROBIOLOGY:  Babesia microti PCR, Blood. (collected 07-12-20 @ 09:07)  Source: .Blood  Final Report (07-12-20 @ 15:37):    Babesia microti PCR    Results: NOT detected    ***************Result Note*************    The detection of Babesia microti by PCR has only been    validated for whole blood; this test has not been approved    by the US Food and Drug Administration (FDA). Performance    characteristics of this assay have been determined by    [facility]. The clinical significance    of results should be considered in conjunction with the    overall clinical presentation of the patient. Result is not    intended to be used as the sole means for clinical diagnosis    or patient management decisions.    One negative sample does not necessarily rule    out the presence of a parasitic infection.    Culture - Blood (collected 07-12-20 @ 00:52)  Source: .Blood Blood-Peripheral  Preliminary Report (07-13-20 @ 01:02):    No growth to date.    Culture - Blood (collected 07-12-20 @ 00:52)  Source: .Blood Blood-Peripheral  Preliminary Report (07-13-20 @ 01:02):    No growth to date.    RADIOLOGY:  Images below reviewed personally  MR Head w/wo IV Cont (07.13.20 @ 15:48)   Atrophy out of proportion for age. Nonspecific focus of T2 hyperintensity in the left periventricular region at the level of the atria of the left lateral ventricle. This may represent focus of infection inflammation demyelination or ischemia. No other significant pathology. No abnormal enhancement HPI:   39M alcoholic transferred to [facility] 7/11/20 for further management of new-onset seizures.   He's been drinking up until his seizure but reports that he drinks far less than he did when he was in college playing lacrosse. Denies a prior history of seizures. His roommate called 911 but all he remembers is being picked up by EMS.   He's been having fevers but doesn't notice them - no chills, sweating, myalgias, malaise.   Denies headaches, earaches, runny nose, sore throat. No cough or dyspnea.   No abdominal pain or diarrhea.   He required surgical repair of his urinary bladder last year after falling down a slight of stairs while on vacation. He's been urinating normally now without requiring catheterization. No dysuria, hematuria, hesitancy.     PAST MEDICAL & SURGICAL HISTORY:  No pertinent past medical history  S/P exploratory laparotomy      Allergies    No Known Allergies    Intolerances        ANTIMICROBIALS:  piperacillin/tazobactam IVPB.. 3.375 every 8 hours  rifAXIMin 200 three times a day      OTHER MEDS:  folic acid 1 milliGRAM(s) Oral daily  lactulose Syrup 20 Gram(s) Oral every 6 hours  LORazepam     Tablet 0.5 milliGRAM(s) Oral two times a day  magnesium sulfate  IVPB 1 Gram(s) IV Intermittent daily  potassium phosphate / sodium phosphate Tablet (K-PHOS No. 2) 1 Tablet(s) Oral four times a day with meals  thiamine 100 milliGRAM(s) Oral daily    SOCIAL HISTORY: Nonsmoker. Works as a representative for a surgical orthopedic company. Lives with a friend.     FAMILY HISTORY:  FH: alpha 1 antitrypsin deficiency  HTN     ROS:  All other systems negative   Constitutional: no subjective fever or chills  Eye: no vision changes  ENT: no sore throat, no rhinorrhea  Cardiovascular: no chest pain, no palpitation  Respiratory: no SOB, no cough  GI:  no abd pain, no vomiting, no diarrhea  urinary: no dysuria, no hematuria, no flank pain  musculoskeletal: no joint pain, no joint swelling  skin: no rash  neurology: no headache, no change in mental status  psych: no anxiety    Physical Exam:  General: awake, alert, non toxic, oriented  Head: atraumatic, normocephalic  Eyes: normal sclera and conjunctiva  ENT: missing a lot of teeth but no clear gingivitis. no oropharyngeal lesions, no LAD, neck supple  Cardio: regular rate and rhythm   Respiratory: nonlabored on room air, clear bilaterally, no wheezing  abd: large old midline surgical scar. soft, bowel sounds present, not tender  : no suprapubic tenderness, no ashton  Musculoskeletal: no joint swelling, no edema  Skin: no rash  vascular: no phlebitis, peripheral IV in each arm   Neurologic: no focal deficits  psych: normal affect       Drug Dosing Weight  Height (cm): 185.4 (11 Jul 2020 20:23)  Weight (kg): 87.2 (11 Jul 2020 20:23)  BMI (kg/m2): 25.4 (11 Jul 2020 20:23)  BSA (m2): 2.12 (11 Jul 2020 20:23)    Vital Signs Last 24 Hrs  T(F): 97.5 (07-14-20 @ 12:05), Max: 101.9 (07-13-20 @ 21:01)    Vital Signs Last 24 Hrs  HR: 128 (07-14-20 @ 15:00) (108 - 136)  BP: 126/84 (07-14-20 @ 15:00) (110/63 - 130/83)  RR: 18 (07-14-20 @ 12:05)  SpO2: 93% (07-14-20 @ 15:00) (93% - 97%)  Wt(kg): --                          11.8   7.04  )-----------( 74       ( 14 Jul 2020 09:18 )             34.6       07-14    135  |  100  |  7   ----------------------------<  96  3.4<L>   |  22  |  0.58    Ca    7.9<L>      14 Jul 2020 09:18  Phos  2.6     07-14  Mg     1.6     07-14    TPro  6.3  /  Alb  3.2<L>  /  TBili  14.5<H>  /  DBili  x   /  AST  110<H>  /  ALT  29  /  AlkPhos  163<H>  07-14          MICROBIOLOGY:  Babesia microti PCR, Blood. (collected 07-12-20 @ 09:07)  Source: .Blood  Final Report (07-12-20 @ 15:37):    Babesia microti PCR    Results: NOT detected    ***************Result Note*************    The detection of Babesia microti by PCR has only been    validated for whole blood; this test has not been approved    by the US Food and Drug Administration (FDA). Performance    characteristics of this assay have been determined by    [facility]. The clinical significance    of results should be considered in conjunction with the    overall clinical presentation of the patient. Result is not    intended to be used as the sole means for clinical diagnosis    or patient management decisions.    One negative sample does not necessarily rule    out the presence of a parasitic infection.    Culture - Blood (collected 07-12-20 @ 00:52)  Source: .Blood Blood-Peripheral  Preliminary Report (07-13-20 @ 01:02):    No growth to date.    Culture - Blood (collected 07-12-20 @ 00:52)  Source: .Blood Blood-Peripheral  Preliminary Report (07-13-20 @ 01:02):    No growth to date.    RADIOLOGY:  Images below reviewed personally  Exam Date:      07/11/20                                                                EXAM: Abdominal Doppler ultrasound  [facility]   CLINICAL HISTORY: Acute liver failure.     TECHNIQUE: Abdominal Doppler ultrasound was obtained of the hepatic and splenic vasculature.    COMPARISON STUDY: None.    FINDINGS:     Hepatic veins and portal vein are patent and demonstrate normal directionality of flow. Spleen is   enlarged. Visualized portion of the splenic vein is patent.    IMPRESSION:     Hepatic veins and portal vein are patent. No thrombus appreciated.      CT Chest Abdomen Pelvis 7/11/20 [facility]  IMPRESSION:     Chest:    -No acute findings.    -Trace pericardial fluid.    Abdomen/pelvis:    -Massive hepatomegaly and morphologic features of hepatic cirrhosis. Heterogeneous background   parenchymal hepatic steatosis, possibility of superimposed hepatocellular carcinoma difficult to   exclude.    -Evidence of portal hypertension including splenomegaly, upper abdominal/esophageal varices, and   splenorenal shunt. Small volume ascites.    IMPRESSION:    No evidence of biliary obstruction or choledocholithiasis.    Marked hepatomegaly with heterogeneous background fat deposition and morphologic features of   hepatic cirrhosis.       Secondary signs of portal hypertension including splenomegaly and upper abdominal varices.    Small volume ascites.      MR Head w/wo IV Cont (07.13.20 @ 15:48)   Atrophy out of proportion for age. Nonspecific focus of T2 hyperintensity in the left periventricular region at the level of the atria of the left lateral ventricle. This may represent focus of infection inflammation demyelination or ischemia. No other significant pathology. No abnormal enhancement    MRCP7/11/20 [facility]   No evidence of biliary obstruction or choledocholithiasis.   Marked hepatomegaly with heterogeneous background fat deposition and morphologic features of hepatic cirrhosis.   Secondary signs of portal hypertension including splenomegaly and upper abdominal varices.   Small volume ascites.

## 2020-07-14 NOTE — PHYSICAL THERAPY INITIAL EVALUATION ADULT - PERTINENT HX OF CURRENT PROBLEM, REHAB EVAL
39M w/ hx of traumatic rupture of bladder s/p ExLap and repair(2019) initially presented to [facility] (7/10) for seizure/delirium suspected from complicated EtOH withdrawal additionally found to have acute hepatitis with cirrhosis/hepatomegaly requiring transfer to [facility] MICU for further management and Liver transplant evaluation.

## 2020-07-14 NOTE — PROGRESS NOTE ADULT - PROBLEM SELECTOR PLAN 2
Seizure occurred on 7/10 and initially treated at [facility] prior to transfer. No previous neurologic disease reported. charted to have had fall prior to seizure but patient denies on interview following ICU transfer to floor.  - EEG completed and did not detect seizure activity  - MR brain reported to be abnormal with ventricular atrophy and "focus of signal hyperintensity on the FLAIR in the periventricular region on the left nonspecific in a patient of this age may represent focus of infection inflammation demyelination or ischemia."  - neuro eval   - continue with current standing ativan as patient is being tapered for EtOH withdrawal. Seizure occurred on 7/10 and initially treated at [facility] prior to transfer. No previous neurologic disease reported. charted to have had fall prior to seizure but patient denies on interview following ICU transfer to floor.  - EEG completed and did not detect seizure activity  - MR brain reported to be abnormal with ventricular atrophy and "focus of signal hyperintensity on the FLAIR in the periventricular region on the left nonspecific in a patient of this age may represent focus of infection inflammation demyelination or ischemia."  - continue with current standing ativan as patient is being tapered for EtOH withdrawal.

## 2020-07-15 NOTE — CONSULT NOTE ADULT - SUBJECTIVE AND OBJECTIVE BOX
Patient is a 39y old  Male who presents with a chief complaint of seizures, alcoholic cirrhosis (15 Jul 2020 15:49)      HPI:  39 year male PMHx of EtOH abuse and fall c/b bladder rupture s/p exlap repair (2019) presenting from [facility] with seizure-like activity and alcoholic cirrhosis. At 1630 on 7/10, pt's roommate witnessed a seizure of 30-90 seconds duration with nose and mouth bleeding while the pt was on the couch. Found to be tachycardic, tachypneic, and post-ictal by EMS. Pt last felt well 1 week ago then noted HA (tx with daily tylenol 1g), fatigue, nausea with NBNB emesis. Endorses drinking 6 beers/week for 10 years. Last drink 7/4. Denies smoking, drug use, travel/tick bites, weight loss, SOB, CP, changes in vision.     At [place] a tox screen was negative (salicylates, APAP, alcohol, opiates). HIV negative, Hep B negative, Hep C negative. A RUQ US with doppler r/o thrombosis and showed GB wall thickening (.5 cm), sludge, no stones. Diffuse hepatosplenomagaly was noted on CTAP with possible HCC component. He was given lactulose, NAC protocol, and zosyn for possible cholecystitis. Before transfer to [facility] he had another seizure in [place] and was given Ativan. (11 Jul 2020 20:45)    Podiatry consulted for R foot dorsal wound. Pt states that it has been present for a few days after hitting his foot against a hard object and it has turned black in the last few days. States that it was minimal impact and denies any pain to the area.       PAST MEDICAL & SURGICAL HISTORY:  No pertinent past medical history  S/P exploratory laparotomy      MEDICATIONS  (STANDING):  folic acid 1 milliGRAM(s) Oral daily  lactulose Syrup 20 Gram(s) Oral every 6 hours  LORazepam     Tablet 0.5 milliGRAM(s) Oral two times a day  piperacillin/tazobactam IVPB.. 3.375 Gram(s) IV Intermittent every 8 hours  potassium phosphate / sodium phosphate Tablet (K-PHOS No. 2) 1 Tablet(s) Oral four times a day with meals  rifAXIMin 200 milliGRAM(s) Oral three times a day  sodium chloride 0.9%. 1000 milliLiter(s) (70 mL/Hr) IV Continuous <Continuous>  thiamine 100 milliGRAM(s) Oral daily    MEDICATIONS  (PRN):      Allergies    No Known Allergies    Intolerances        VITALS:    Vital Signs Last 24 Hrs  T(C): 37.1 (15 Jul 2020 15:37), Max: 37.7 (14 Jul 2020 19:40)  T(F): 98.8 (15 Jul 2020 15:37), Max: 99.9 (14 Jul 2020 19:40)  HR: 110 (15 Jul 2020 15:37) (105 - 126)  BP: 128/75 (15 Jul 2020 15:37) (115/69 - 138/81)  BP(mean): --  RR: 19 (15 Jul 2020 15:37) (17 - 19)  SpO2: 96% (15 Jul 2020 15:37) (93% - 96%)    LABS:                          11.2   6.67  )-----------( 112      ( 15 Jul 2020 09:17 )             32.9       07-15    136  |  101  |  6<L>  ----------------------------<  83  3.9   |  21<L>  |  0.61    Ca    8.0<L>      15 Jul 2020 09:17  Phos  2.4     07-15  Mg     1.8     07-15    TPro  5.8<L>  /  Alb  3.0<L>  /  TBili  14.0<H>  /  DBili  x   /  AST  87<H>  /  ALT  23  /  AlkPhos  148<H>  07-15      CAPILLARY BLOOD GLUCOSE          PT/INR - ( 14 Jul 2020 10:13 )   PT: 20.2 sec;   INR: 1.76 ratio         PTT - ( 14 Jul 2020 10:13 )  PTT:32.8 sec    LOWER EXTREMITY PHYSICAL EXAM:    Vasular: DP/PT 2/4, B/L, CFT <3 seconds B/L, Temperature gradient WNL, B/L.   Neuro: Epicritic sensation intact to the level of toes, B/L.  Musculoskeletal/Ortho: no pain on palpation of right foot bones, no edema noted  Skin: right foot wound to subQ with no signs of infection, no purulence, no malodor, partial scab formation noted

## 2020-07-15 NOTE — PROGRESS NOTE ADULT - ASSESSMENT
39M w/ hx of traumatic rupture of bladder s/p ExLap and repair(2019) initially presented to [facility] (7/10) for seizure/delirium suspected from complicated EtOH withdrawal additionally found to have acute hepatitis with cirrhosis/hepatomegaly requiring transfer to [facility] MICU for further management and Liver transplant evaluation. [facility] MICU course complicated by SIRS with patient treated empirically with zosyn (blood 7/12 culture NGTD) however patient noted to have resolution of delirium. Now transferred to medicine given clinical improvement for further investigation of acute hepatitis (likely related to EtOH), suspected EtOh withdrawal syndrome, SIRS and newly identified cirrhosis.

## 2020-07-15 NOTE — PROGRESS NOTE ADULT - PROBLEM SELECTOR PLAN 5
- identified on MR abdomen 7/11/20  - hepatology consulted and work up is ongoing  - patient reports use of alcoholic is likely contributory  - MELD-Na: 25 at time of transfer  - DF: 32.5; reported last drink 7/4  - Has not had EGD to evaluate for varices

## 2020-07-15 NOTE — PROGRESS NOTE ADULT - SUBJECTIVE AND OBJECTIVE BOX
Chief Complaint:  Patient is a 39y old  Male who presents with a chief complaint of seizures, alcoholic cirrhosis (15 Jul 2020 14:00)      Interval Events:   - No seizure witnessed  - says he's "doing well"    Allergies:  No Known Allergies    Hospital Medications:  folic acid 1 milliGRAM(s) Oral daily  lactulose Syrup 20 Gram(s) Oral every 6 hours  LORazepam     Tablet 0.5 milliGRAM(s) Oral two times a day  piperacillin/tazobactam IVPB.. 3.375 Gram(s) IV Intermittent every 8 hours  potassium phosphate / sodium phosphate Tablet (K-PHOS No. 2) 1 Tablet(s) Oral four times a day with meals  rifAXIMin 200 milliGRAM(s) Oral three times a day  thiamine 100 milliGRAM(s) Oral daily      PMHX/PSHX:  No pertinent past medical history  S/P exploratory laparotomy  No significant past surgical history      Family history:  FH: alpha 1 antitrypsin deficiency  No pertinent family history in first degree relatives      ROS:     General:  No wt loss, fevers, chills, night sweats, fatigue,   Eyes:  Good vision, no reported pain  ENT:  No sore throat, pain, runny nose, dysphagia  CV:  No pain, palpitations, hypo/hypertension  Pulm:  No dyspnea, cough, tachypnea, wheezing  GI:  No pain, No nausea, No vomiting, No diarrhea, No constipation, No weight loss, No fever, No pruritis, No rectal bleeding, No tarry stools, No dysphagia  :  No pain, bleeding, incontinence, nocturia  Muscle:  No pain, weakness  Neuro:  No weakness, tingling, memory problems  Psych:  No fatigue, insomnia, mood problems, depression  Endocrine:  No polyuria, polydipsia, cold/heat intolerance  Heme:  No petechiae, ecchymosis, easy bruisability  Skin:  No rash, tattoos, scars, edema      PHYSICAL EXAM:   Vital Signs:  Vital Signs Last 24 Hrs  T(C): 37.1 (15 Jul 2020 15:37), Max: 37.7 (2020 19:40)  T(F): 98.8 (15 Jul 2020 15:37), Max: 99.9 (2020 19:40)  HR: 110 (15 Jul 2020 15:37) (105 - 126)  BP: 128/75 (15 Jul 2020 15:37) (115/69 - 138/81)  BP(mean): --  RR: 19 (15 Jul 2020 15:37) (17 - 19)  SpO2: 96% (15 Jul 2020 15:37) (93% - 96%)  Daily     Daily Weight in k.7 (15 Jul 2020 08:19)    GENERAL:  No acute distress  HEENT:  Normocephalic/atraumatic, + scleral icterus  CHEST:  Clear to auscultation bilaterally, no wheezes/rales/ronchi, no accessory muscle use  HEART:  Regular rate and rhythm, no murmurs/rubs/gallops  ABDOMEN:  Soft, non-tender, mildly distended, normoactive bowel sounds  EXTREMITIES: No cyanosis, clubbing, or edema  SKIN:  No rash/erythema  NEURO:  Alert and oriented x 3, + mild asterixis     LABS:                        11.2   6.67  )-----------( 112      ( 15 Jul 2020 09:17 )             32.9     Mean Cell Volume: 100.6 fl (07-15-20 @ 09:17)    07-15    136  |  101  |  6<L>  ----------------------------<  83  3.9   |  21<L>  |  0.61    Ca    8.0<L>      15 Jul 2020 09:17  Phos  2.4     -15  Mg     1.8     15    TPro  5.8<L>  /  Alb  3.0<L>  /  TBili  14.0<H>  /  DBili  x   /  AST  87<H>  /  ALT  23  /  AlkPhos  148<H>  -15    LIVER FUNCTIONS - ( 15 Jul 2020 09:17 )  Alb: 3.0 g/dL / Pro: 5.8 g/dL / ALK PHOS: 148 U/L / ALT: 23 U/L / AST: 87 U/L / GGT: x           PT/INR - ( 2020 10:13 )   PT: 20.2 sec;   INR: 1.76 ratio         PTT - ( 2020 10:13 )  PTT:32.8 sec  Urinalysis Basic - ( 2020 14:35 )    Color: Teresa / Appearance: Slightly Turbid / S.035 / pH: x  Gluc: x / Ketone: Trace  / Bili: Large / Urobili: 3 mg/dL   Blood: x / Protein: 30 mg/dL / Nitrite: Negative   Leuk Esterase: Negative / RBC: 2 /HPF / WBC 7 /HPF   Sq Epi: x / Non Sq Epi: 15 /HPF / Bacteria: Negative                              11.2   6.67  )-----------( 112      ( 15 Jul 2020 09:17 )             32.9                         11.8   7.04  )-----------( 74       ( 2020 09:18 )             34.6                         11.5   7.36  )-----------( 35       ( 2020 23:22 )             33.6

## 2020-07-15 NOTE — CHART NOTE - NSCHARTNOTEFT_GEN_A_CORE
PA Medicine Event Note    Notified by RN HR noted to be 110, 121 today.  Review of chart shows HR has been elevated throughout admission.  EKG done showing sinus tachycardia. Patient asymptomatic, w/o any  CP, palpitations, SOB, dizziness, abd pain, HA.  Discussed with Dr. Kilgore. IVF NS 70 cc/h x24 hours ordered.   Will continue to monitor patient and endorse to night team.    Elo Clarke PA-C  Dept of Medicine  #[id number]

## 2020-07-15 NOTE — PROGRESS NOTE ADULT - PROBLEM SELECTOR PLAN 4
Concern for alcohol withdrawal syndrome with delirium at time of transfer from [facility] to [facility] ICU and treated with lorazepam and phenobarbital initially in MICU.  - Currently A&Ox3, no tremor/tongue fasciculation, or hallucination  - standing ativan 0.5mg PO bid for taper with need for daily assessment for dosage titration. Per hepatology should not have long-acting benzodiazepine given acute liver injury.  - last drink reported 7/4. pending Phosphatidylethanol (Peth) assessing chronic EtOH use per hepatology. Denies EtOH dependence and reports alcohol use limited to 1-2d weekly 4-6 drink on occasion.

## 2020-07-15 NOTE — SBIRT NOTE ADULT - NSSBIRTBRIEFINTDET_GEN_A_CORE
Patient continues to be followed by [facility]. Education provided and resources for ETOH offered, patient declined. Reports plan to return to his parents home and continue "cold turkey" patient states that he plans to attend AA Meetings with his brother

## 2020-07-15 NOTE — PROGRESS NOTE ADULT - ASSESSMENT
39M alcoholic, transferred from [facility] to [facility] 7/11/20 for further management of new onset seizures.   Intermittent fevers but no clinical infection and he looks well.   Cultures no growth to date (including urine from [facility], no blood cultures done there).   No meningeal signs and he's not encephalopathic to suggest a CNS infection related to seizures.   On empiric Zosyn since 7/11.   Suspect fevers are related to an inflammatory process and not infection. They are common in alcoholic hepatitis.     US abdomen today only with trace ascites so SBP somewhat less likely  Procalcitonin minimally elevated at 0.45    Suggest    --Stop Zosyn  --Continue to follow CBC with diff  --Continue to follow renal function (Cr/BUN)  --Continue to follow transaminases  --Continue to follow temperature curve  --Follow up on preliminary blood cultures    Dick Cardenas M.D.  [facility] Division of Infectious Disease  8AM-5PM: Pager Number [phone]  After Hours (or if no response): Please contact the Infectious Diseases Office at [phone]     The above assessment and plan were discussed with medicine NP

## 2020-07-15 NOTE — PROGRESS NOTE ADULT - PROBLEM SELECTOR PLAN 7
- approximately 1.2cm eschar with surround ring of erythema just proximal to right 5th toe reported by patient being present the week prior to initial admit to [facility]. Patient denies pain at site, purulence, trauma and cannot recall when it occurred but denies it has enlarged.  - wound care eval

## 2020-07-15 NOTE — PROGRESS NOTE ADULT - SUBJECTIVE AND OBJECTIVE BOX
Follow Up:  Fever    Interval History: afebrile overnight. denies N/V/D or abdominal pain. Denies cough or SOB     REVIEW OF SYSTEMS  [  ] ROS unobtainable because:    [  x] All other systems negative except as noted below    Constitutional:  [ ] fever [ ] chills  [ ] weight loss  [ ] weakness  Skin:  [ ] rash [ ] phlebitis	  Eyes: [ ] icterus [ ] pain  [ ] discharge	  ENMT: [ ] sore throat  [ ] thrush [ ] ulcers [ ] exudates  Respiratory: [ ] dyspnea [ ] hemoptysis [ ] cough [ ] sputum	  Cardiovascular:  [ ] chest pain [ ] palpitations [ ] edema	  Gastrointestinal:  [ ] nausea [ ] vomiting [ ] diarrhea [ ] constipation [ ] pain	  Genitourinary:  [ ] dysuria [ ] frequency [ ] hematuria [ ] discharge [ ] flank pain  [ ] incontinence  Musculoskeletal:  [ ] myalgias [ ] arthralgias [ ] arthritis  [ ] back pain  Neurological:  [ ] headache [ ] seizures  [ ] confusion/altered mental status    Allergies  No Known Allergies        ANTIMICROBIALS:  rifAXIMin 200 three times a day      OTHER MEDS:  MEDICATIONS  (STANDING):  lactulose Syrup 20 every 6 hours  LORazepam     Tablet 0.5 two times a day      Vital Signs Last 24 Hrs  T(C): 37.1 (15 Jul 2020 15:37), Max: 37.7 (2020 19:40)  T(F): 98.8 (15 Jul 2020 15:37), Max: 99.9 (2020 19:40)  HR: 110 (15 Jul 2020 15:37) (105 - 126)  BP: 128/75 (15 Jul 2020 15:37) (115/69 - 138/81)  BP(mean): --  RR: 19 (15 Jul 2020 15:37) (17 - 19)  SpO2: 96% (15 Jul 2020 15:37) (93% - 96%)    PHYSICAL EXAMINATION:  General: Alert and Awake, NAD, Jaundice  HEENT: PERRL, EOMI, Scleral Icterus   Neck: Supple  Cardiac: RRR, No M/R/G  Resp: CTAB, No Wh/Rh/Ra  Abdomen: NBS, NT/ND, No HSM, No rigidity or guarding  MSK: trace - 1+ LE edema. No Calf tenderness  : No ashton  Skin: No rashes or lesions. Skin is warm and dry to the touch.   Neuro: Alert and Awake. CN 2-12 Grossly intact. Moves all four extremities spontaneously.  Psych: Calm, Pleasant, Cooperative                          11.2   6.67  )-----------( 112      ( 15 Jul 2020 09:17 )             32.9       07-15    136  |  101  |  6<L>  ----------------------------<  83  3.9   |  21<L>  |  0.61    Ca    8.0<L>      15 Jul 2020 09:17  Phos  2.4     07-15  Mg     1.8     07-15    TPro  5.8<L>  /  Alb  3.0<L>  /  TBili  14.0<H>  /  DBili  x   /  AST  87<H>  /  ALT  23  /  AlkPhos  148<H>  07-15      Urinalysis Basic - ( 2020 14:35 )    Color: Teresa / Appearance: Slightly Turbid / S.035 / pH: x  Gluc: x / Ketone: Trace  / Bili: Large / Urobili: 3 mg/dL   Blood: x / Protein: 30 mg/dL / Nitrite: Negative   Leuk Esterase: Negative / RBC: 2 /HPF / WBC 7 /HPF   Sq Epi: x / Non Sq Epi: 15 /HPF / Bacteria: Negative        MICROBIOLOGY:  v  .Urine Clean Catch (Midstream)  20   No growth  --  --      .Blood Blood-Peripheral  20   No growth to date.  --  --      .Blood  20   Babesia microti PCR  Results: NOT detected  ***************Result Note*************  The detection of Babesia microti by PCR has only been  validated for whole blood; this test has not been approved  by the US Food and Drug Administration (FDA). Performance  characteristics of this assay have been determined by  [facility]. The clinical significance  of results should be considered in conjunction with the  overall clinical presentation of the patient. Result is not  intended to be used as the sole means for clinical diagnosis  or patient management decisions.  One negative sample does not necessarily rule  out the presence of a parasitic infection.  --  --      .Blood Blood-Peripheral  20   No growth to date.  --  --          CMVPCR Log: NotDetec [id number]/mL ( @ 08:55)        RADIOLOGY:    <The imaging below has been reviewed and visualized by me independently. Findings as detailed in report below>    EXAM:  US DPLX ABDOMEN                        EXAM:  US ABDOMEN LIMITED                        PROCEDURE DATE:  07/15/2020    There is small right upper quadrant and small left upper quadrant free fluid. Trace free fluid within the right lower quadrant.  The main portal vein and the right and left branches of the portal vein demonstrate hepatofugal flow. The main portal vein is 1.4 cm with a velocity of 30.2 cm/s. The right hepatic vein appears patent, however there is poor visualization of the middle and left hepatic vein. The main splenic vein is not well visualized. The hepatic artery appears patent with a peak systolic velocity of 219 cm/s indicative of compensatory flow.

## 2020-07-15 NOTE — CONSULT NOTE ADULT - ASSESSMENT
40 y/o male pt with right foot wound to subQ and right foot contusion   - pt seen and evaluated  - wound healing normally, cleanse daily and leave open to air  - very low concern for any fractures, no xrays recommended at this time  - no activity limitations from podiatry standpoint   - follow up as outpatient if wound persists (call [phone] for appointment) 40 y/o male pt with right foot wound to subQ and right foot contusion   - pt seen and evaluated  - wound healing normally, cleanse daily and leave open to air  - very low concern for any fractures, no xrays recommended at this time  - no activity limitations from podiatry standpoint   - podiatry signing off at this time, reconsult as necessary  - follow up as outpatient if wound persists (call [phone] for appointment)

## 2020-07-15 NOTE — PROGRESS NOTE ADULT - ASSESSMENT
38 y/o M w/ hx of EtOH dependence and bladder rupture after fall requiring ex lap for surgical repair in 2019, who initially was brought to [facility] after found to have witnessed seizure by roommate, confused when evaluated by EMS, transferred to [facility] for further management. ICU stay for seizure s/p phenobarb gtt.      Impression:   # Cirrhosis: Likely due to EtOH dependence  Varices: No prior EGD  Ascites: None on bedside ultrasound  HE: AAO x 3, + asterixis  HCC: No prior imaging  # Abnormal liver enzymes: Likely represents alcoholic hepatitis vs acute on chronic liver failure. DF 36.3  # Fever: Concern for underlying infection. BCx negative 7/12. No ascites to tap per bedside U/S.  # AMS: Likely represents alcohol withdrawal c/b seizure. Differential includes underlying infection given fever.  # Seizure: Likely alcohol withdrawal related seizure    Recommendations:  - Continue to discuss ETOH rehab options with patient   - Monitor daily CBC, CMP, and INR  - Would not start on steroids for alcoholic hepatitis given concern for underlying infection  - Continue antibiotics per primary team  - Continue lactulose 20g q6h and rifaximin  - abdominal U/S with dopplers results pending  - Please obtain ceruloplasmin  - F/U Hepatitis E, Herpes Simplex Virus PCR  - Infectious work-up per primary team  - Treatment of agitation / alcohol withdrawal per primary team  - Rest of care per primary team    Erik Perea, PGY-4  Hepatology Fellow    Available on [employer] Teams  [phone]  After 5PM, please contact the on-call GI fellow: [phone]

## 2020-07-16 NOTE — PROGRESS NOTE ADULT - PROBLEM SELECTOR PLAN 7
- approximately 1.2cm eschar with surround ring of erythema just proximal to right 5th toe reported by patient being present the week prior to initial admit to [facility]. Patient denies pain at site, purulence, trauma and cannot recall when it occurred but denies it has enlarged.  - podiatry rocco noted

## 2020-07-16 NOTE — PROGRESS NOTE ADULT - PROBLEM SELECTOR PLAN 5
- identified on MR abdomen 7/11/20  - likely in the setting of etoh use   - MELD-Na: 25 at time of transfer  - DF: 32.5; reported last drink 7/4  - Has not had EGD to evaluate for varices  - lasix 20mg qd  - aldactone 50mg qd  - nadalol 20mg for prevention of esophageal varicies   - rifaximin 200mg tid  - lactulose q6 having bms

## 2020-07-16 NOTE — PROGRESS NOTE ADULT - SUBJECTIVE AND OBJECTIVE BOX
Patient is a 39y old  Male who presents with a chief complaint of seizures, alcoholic cirrhosis (2020 11:55)      SUBJECTIVE / OVERNIGHT EVENTS: feels better, had bm, no cp, sob or abdominal pain     MEDICATIONS  (STANDING):  folic acid 1 milliGRAM(s) Oral daily  furosemide    Tablet 20 milliGRAM(s) Oral daily  lactulose Syrup 20 Gram(s) Oral every 6 hours  LORazepam     Tablet 0.5 milliGRAM(s) Oral daily  nadolol 20 milliGRAM(s) Oral daily  potassium phosphate / sodium phosphate Tablet (K-PHOS No. 2) 1 Tablet(s) Oral four times a day with meals  rifAXIMin 200 milliGRAM(s) Oral three times a day  spironolactone 50 milliGRAM(s) Oral daily  thiamine 100 milliGRAM(s) Oral daily    MEDICATIONS  (PRN):        CAPILLARY BLOOD GLUCOSE        I&O's Summary    15 Jul 2020 07:  -  2020 07:00  --------------------------------------------------------  IN:  mL / OUT: 0 mL / NET:  mL    2020 07:01  -  2020 13:00  --------------------------------------------------------  IN: 480 mL / OUT: 0 mL / NET: 480 mL        PHYSICAL EXAM:  GENERAL: NAD, well-developed  HEAD:  Atraumatic, Normocephalic  EYES: conjunctiva and sclera clear  NECK: No JVD  CHEST/LUNG: Clear to auscultation bilaterally; No wheeze  HEART: Regular rate and rhythm;S1S2  ABDOMEN: somewhat firm, Nontender, mild distension ; Bowel sounds present  EXTREMITIES:  2+ Peripheral Pulses, No clubbing, cyanosis, or edema  PSYCH: AAOx3  NEUROLOGY: non-focal  SKIN: No rashes or lesions    LABS:                        11.2   6.67  )-----------( 112      ( 15 Jul 2020 09:17 )             32.9     07-16    133<L>  |  98  |  5<L>  ----------------------------<  121<H>  4.2   |  23  |  0.63    Ca    8.2<L>      2020 09:29  Phos  2.3     -  Mg     2.0         TPro  5.8<L>  /  Alb  3.0<L>  /  TBili  15.8<H>  /  DBili  x   /  AST  83<H>  /  ALT  22  /  AlkPhos  157<H>            Urinalysis Basic - ( 2020 14:35 )    Color: Teresa / Appearance: Slightly Turbid / S.035 / pH: x  Gluc: x / Ketone: Trace  / Bili: Large / Urobili: 3 mg/dL   Blood: x / Protein: 30 mg/dL / Nitrite: Negative   Leuk Esterase: Negative / RBC: 2 /HPF / WBC 7 /HPF   Sq Epi: x / Non Sq Epi: 15 /HPF / Bacteria: Negative        RADIOLOGY & ADDITIONAL TESTS:    Imaging Personally Reviewed:    Consultant(s) Notes Reviewed:      Care Discussed with Consultants/Other Providers:

## 2020-07-16 NOTE — DISCHARGE NOTE PROVIDER - NSDCCPCAREPLAN_GEN_ALL_CORE_FT
PRINCIPAL DISCHARGE DIAGNOSIS  Diagnosis: Acute hepatitis  Assessment and Plan of Treatment: alcoholic hepatitis.   you were on Iv antibiotics ( zosyn )  FOLLOW UP WITH HEPATOLOGY AS OUTPATIENT  Also follow up with a primary care provider.      SECONDARY DISCHARGE DIAGNOSES  Diagnosis: Alcohol withdrawal syndrome  Assessment and Plan of Treatment: -You were treated in the ICU  with lorazepam and phenobarbital initially in MICU. Ativan was discontinued  -You were followed by social work at the hospital and you dicussed plans for treatment of alcohol use with social work.   -Treatment for alcohol dependence and withdrawal includes medicines, detoxification, and therapy. Diagnosing and treating alcohol dependence and withdrawal as soon as possible may relieve or prevent symptoms. With treatment and care, your alcohol dependence and withdrawal may be controlled, and your quality of life improved.  Keep all follow up appointments. Write down any questions you may have. This way you will remember to ask these questions during your next visit.      Diagnosis: Thrombocytopenia  Assessment and Plan of Treatment: in the setting of cirrhosis   continue to monitor platelets as outpatient with PCP    Diagnosis: Seizure  Assessment and Plan of Treatment: Seizure occurred on 7/10 and initially treated at [facility] prior to transfer.   - EEG completed and did not detect seizure activity  - MRI brain reported to be abnormal with ventricular atrophy and "focus of signal hyperintensity on the FLAIR in the periventricular region on the left nonspecific in a patient of this age may represent focus of infection inflammation demyelination or ischemia."      Diagnosis: Ascites  Assessment and Plan of Treatment: abdominal sono with small right upper quadrant and small left upper quadrant free fluid. Trace free fluid within the right lower quadrant. Continue lasix, aldactone  FOLLOW UP WITH HEPATOLOGY    Diagnosis: Liver cirrhosis  Assessment and Plan of Treatment: Follow up with hepatology  identified on MR abdomen 7/11/20. Likely in the setting of etoh use   Continue nadalol  for prevention of esophageal varicies ,rifaximin ,  and lactulose    Diagnosis: Systemic inflammatory response syndrome (SIRS)  Assessment and Plan of Treatment: fever and tachycardia without clear source of infection  CXR without lobar pneumonia identified, Urinaylysis not consistent with UTI.  Source is suspected from acute hepatitis .  You were given IV zosyn in the hospital  Follow up with hepatology and PCP.    Diagnosis: Metabolic encephalopathy  Assessment and Plan of Treatment: Encephalopathy has resolved    likely due to alcohol withdrawal syndrome .  EEG does not reported detected seizure. Brain MR on 7/13 is abnormal with report of ventricular atrophy not appropriate for age and hyperintensity of Left periventricular region.   Follow up with PCP    Diagnosis: Eschar of foot  Assessment and Plan of Treatment: wound healing normally   cleanse daily and leave open to air.   Very low concern for any fractures, no xrays recommended at this time  Follow up with podiatry as outpatient if wound persists (call [phone] for appointment)   Follow up [facility] PCP

## 2020-07-16 NOTE — PROGRESS NOTE ADULT - ASSESSMENT
40 y/o M w/ hx of EtOH dependence and bladder rupture after fall requiring ex lap for surgical repair in 2019, who initially was brought to [facility] after found to have witnessed seizure by roommate, confused when evaluated by EMS, transferred to [facility] for further management. ICU stay for seizure s/p phenobarb gtt.      Impression:   # Cirrhosis: Likely due to EtOH dependence  Varices: No prior EGD  Ascites: None on bedside ultrasound  HE: AAO x 3, + asterixis  HCC: No prior imaging  # Abnormal liver enzymes: Likely represents alcoholic hepatitis vs acute on chronic liver failure. DF 36.3  # Fever: Concern for underlying infection. BCx negative 7/12. No ascites to tap per bedside U/S.  # AMS: Likely represents alcohol withdrawal c/b seizure. Differential includes underlying infection given fever.  # Seizure: Likely alcohol withdrawal related seizure    Recommendations:  - Continue to discuss ETOH rehab options with patient   - Monitor daily CBC, CMP, and INR  - Would not start on steroids for alcoholic hepatitis given concern for underlying infection  - Continue antibiotics per primary team  - Continue lactulose 20g q6h and rifaximin  - Please obtain ceruloplasmin  - F/U Hepatitis E, Herpes Simplex Virus PCR  - Infectious work-up per primary team  - Treatment of agitation / alcohol withdrawal per primary team  - Rest of care per primary team    Erik Perea, PGY-4  Hepatology Fellow    Available on [employer] Teams  [phone]  After 5PM, please contact the on-call GI fellow: [phone]

## 2020-07-16 NOTE — DISCHARGE NOTE PROVIDER - NSDCFUADDINST_GEN_ALL_CORE_FT
-Follow up with podiatry if foot wound persists   (call [phone] for appointment)   - Follow up as outpatient for routine blood work  -Bring discharge document to all appointments

## 2020-07-16 NOTE — PROGRESS NOTE ADULT - ASSESSMENT
39M w/ hx of traumatic rupture of bladder s/p ExLap and repair(2019) initially presented to [facility] (7/10) for seizure/delirium suspected from complicated EtOH withdrawal additionally found to have acute hepatitis with cirrhosis/hepatomegaly requiring transfer to [facility] MICU for further management and Liver transplant evaluation. [facility] MICU course complicated by SIRS with patient treated empirically with zosyn (blood 7/12 culture NGTD) however patient noted to have resolution of delirium. Now transferred to medicine given clinical improvement for further investigation of acute hepatitis (likely related to EtOH), suspected EtOh withdrawal syndrome, SIRS and newly identified cirrhosis.

## 2020-07-16 NOTE — DISCHARGE NOTE PROVIDER - CARE PROVIDER_API CALL
Pedro Pablo Barnett  GASTROENTEROLOGY  [address] [place] [address]  Phone: [phone]  Fax: [phone]  Follow Up Time: 1-3 days

## 2020-07-16 NOTE — PROGRESS NOTE ADULT - SUBJECTIVE AND OBJECTIVE BOX
Chief Complaint:  Patient is a 39y old  Male who presents with a chief complaint of seizures, alcoholic cirrhosis (16 Jul 2020 12:59)    Interval Events:   - No seizures witnessed    Allergies:  No Known Allergies    Hospital Medications:  folic acid 1 milliGRAM(s) Oral daily  furosemide    Tablet 20 milliGRAM(s) Oral daily  lactulose Syrup 20 Gram(s) Oral every 6 hours  LORazepam     Tablet 0.5 milliGRAM(s) Oral daily  nadolol 20 milliGRAM(s) Oral daily  potassium phosphate / sodium phosphate Tablet (K-PHOS No. 2) 1 Tablet(s) Oral four times a day with meals  rifAXIMin 200 milliGRAM(s) Oral three times a day  spironolactone 50 milliGRAM(s) Oral daily  thiamine 100 milliGRAM(s) Oral daily      PMHX/PSHX:  No pertinent past medical history  S/P exploratory laparotomy  No significant past surgical history      Family history:  FH: alpha 1 antitrypsin deficiency  No pertinent family history in first degree relatives      ROS:     General:  No wt loss, fevers, chills, night sweats, fatigue,   Eyes:  Good vision, no reported pain  ENT:  No sore throat, pain, runny nose, dysphagia  CV:  No pain, palpitations, hypo/hypertension  Pulm:  No dyspnea, cough, tachypnea, wheezing  GI:  No pain, No nausea, No vomiting, No diarrhea, No constipation, No weight loss, No fever, No pruritis, No rectal bleeding, No tarry stools, No dysphagia  :  No pain, bleeding, incontinence, nocturia  Muscle:  No pain, weakness  Neuro:  No weakness, tingling, memory problems  Psych:  No fatigue, insomnia, mood problems, depression  Endocrine:  No polyuria, polydipsia, cold/heat intolerance  Heme:  No petechiae, ecchymosis, easy bruisability  Skin:  No rash, tattoos, scars, edema      PHYSICAL EXAM:   Vital Signs:  Vital Signs Last 24 Hrs  T(C): 37.3 (16 Jul 2020 12:45), Max: 37.4 (15 Jul 2020 19:18)  T(F): 99.2 (16 Jul 2020 12:45), Max: 99.3 (15 Jul 2020 19:18)  HR: 106 (16 Jul 2020 12:45) (106 - 115)  BP: 120/75 (16 Jul 2020 12:45) (110/64 - 129/77)  BP(mean): --  RR: 18 (16 Jul 2020 12:45) (18 - 19)  SpO2: 95% (16 Jul 2020 12:45) (94% - 96%)  Daily     Daily     GENERAL:  No acute distress  HEENT:  Normocephalic/atraumatic, + scleral icterus  CHEST:  Clear to auscultation bilaterally, no wheezes/rales/ronchi, no accessory muscle use  HEART:  Regular rate and rhythm, no murmurs/rubs/gallops  ABDOMEN:  Soft, non-tender, mildly distended, normoactive bowel sounds  EXTREMITIES: No cyanosis, clubbing, or edema  SKIN:  No rash/erythema  NEURO:  Alert and oriented x 3, + mild asterixis     LABS:                        11.2   6.67  )-----------( 112      ( 15 Jul 2020 09:17 )             32.9       07-16    133<L>  |  98  |  5<L>  ----------------------------<  121<H>  4.2   |  23  |  0.63    Ca    8.2<L>      16 Jul 2020 09:29  Phos  2.3     07-16  Mg     2.0     07-16    TPro  5.8<L>  /  Alb  3.0<L>  /  TBili  15.8<H>  /  DBili  x   /  AST  83<H>  /  ALT  22  /  AlkPhos  157<H>  07-16    LIVER FUNCTIONS - ( 16 Jul 2020 09:29 )  Alb: 3.0 g/dL / Pro: 5.8 g/dL / ALK PHOS: 157 U/L / ALT: 22 U/L / AST: 83 U/L / GGT: x                                       11.2   6.67  )-----------( 112      ( 15 Jul 2020 09:17 )             32.9                         11.8   7.04  )-----------( 74       ( 14 Jul 2020 09:18 )             34.6

## 2020-07-16 NOTE — PROGRESS NOTE ADULT - PROBLEM SELECTOR PLAN 2
- abdominal sono with small right upper quadrant and small left upper quadrant free fluid. Trace free fluid within the right lower quadrant.  - lasix 20mg qd  - aldactone 50mg qd  - trace fluid not ammenable for paracentesis

## 2020-07-16 NOTE — DISCHARGE NOTE PROVIDER - HOSPITAL COURSE
39M w/ hx of traumatic rupture of bladder s/p ExLap and repair(2019) initially presented to [facility] (7/10) for seizure/delirium suspected from complicated EtOH withdrawal     additionally found to have acute hepatitis with cirrhosis/hepatomegaly requiring transfer to [facility] MICU for further management and Liver transplant evaluation.     [facility] MICU course complicated by SIRS with patient treated empirically with zosyn (blood 7/12 culture NGTD) however patient noted to have resolution of delirium. Now transferred to medicine given clinical improvement for further investigation of acute hepatitis (likely related to EtOH), suspected EtOh withdrawal syndrome, SIRS and newly identified cirrhosis. 39M w/ hx of traumatic rupture of bladder s/p ExLap and repair(2019) initially presented to [facility] (7/10) for seizure/delirium suspected from complicated EtOH withdrawal additionally found to have acute hepatitis with cirrhosis/hepatomegaly requiring transfer to [facility] MICU for further management and Liver transplant evaluation. [facility] MICU course complicated by SIRS with patient treated empirically with zosyn (blood 7/12 culture NGTD) however patient noted to have resolution of delirium. Now transferred to medicine given clinical improvement for further investigation of acute hepatitis (likely related to EtOH), suspected EtOh withdrawal syndrome, SIRS and newly identified cirrhosis.    1. Acute hepatitis-alcoholic hepatitis. Per documentation at [place] CT A/P with Diffuse hepatosplenomagaly .MELD-Na: 25 at time of transfer. DF: 32.5 at time of transfer; Steroids deferred by hepatology given SIRS and need to r/o infection; reported last drink 7/4. Patient currently not delirious (resolved since initial transfer to [facility]), no asterixis. Work up thus far: babesia PCR not detected, CMV neg, acute hepatitis panel neg, HepE pending, EBV-IgM(neg)IgG(pos)EarlyAg(neg)NuclearAg(pos) E, COVID19 IgG neg, HIV reported neg at previous hospital, babesia, ehrlichia, anaplasma negative . continue rifaximin, lactulose, folic acid, thiamine. Was on empiric zosyn since 7/11-7/16 due to underlying liver inflammatory process.     2. Ascites- abdominal sono with small right upper quadrant and small left upper quadrant free fluid. Trace free fluid within the right lower quadrant. Continue lasix 20mg qd, aldactone 50mg qd; trace fluid not amenable for paracentesis.     3. Seizure.  Plan: Seizure occurred on 7/10 and initially treated at [facility] prior to transfer. No previous neurologic disease reported. charted to have had fall prior to seizure but patient denies on interview following ICU transfer to floor.    - EEG completed and did not detect seizure activity    - MR brain reported to be abnormal with ventricular atrophy and "focus of signal hyperintensity on the FLAIR in the periventricular region on the left nonspecific in a patient of this age may represent focus of infection inflammation demyelination or ischemia."    - continue with current standing ativan as patient is being tapered for EtOH withdrawal.    - ativan 0.5mg qd, dc tomorrow.     4. Alcohol withdrawal syndrome, with delirium.  Plan: Concern for alcohol withdrawal syndrome with delirium at time of transfer from [facility] to [facility] ICU and treated with lorazepam and phenobarbital initially in MICU. Currently A&Ox3, no tremor/tongue fasciculation, or hallucination. Was on standing ativan 0.5mg PO qd for taper with need for daily assessment for dosage titration. Per hepatology should not have long-acting benzodiazepine given acute liver injury. Ativan discontinued day of discharge. Patient denies EtOH dependence and reports alcohol use limited to 1-2d weekly 4-6 drink on occasion.     5. For cirrhosis of liver with ascites, unspecified hepatic cirrhosis type- identified on MR abdomen 7/11/20. Likely in the setting of etoh use     Continue nadalol 20mg for prevention of esophageal varicies ,rifaximin 200mg tid, lactulose q6 having bms.     6. SIRS (systemic inflammatory response syndrome)-fever and tachycardia without clear source of infection    - Since transfer from ICU, the patient has nontoxic appearance, without encephalopathy, no tremor/tongue fasciculation, no asterixis appreciated, reports feeling generally improved.    CXR without lobar pneumonia identified, UA not c/w acute cystitis; source is suspected from acute hepatitis .s/p IV zosyn 7/11-7/16. blood cx ngtd.    7. Eschar of foot- approximately 1.2cm eschar with surround ring of erythema just proximal to right 5th toe reported by patient being present the week prior to initial admit to [place]. Patient denies pain at site, purulence, trauma and cannot recall when it occurred but denies it has enlarged.    Podiatry consulted; wound healing normally, cleanse daily and leave open to air. Very low concern for any fractures, no xrays recommended at this time    follow up as outpatient if wound persists (call [phone] for appointment)     8.  Metabolic encephalopathy-Encephalopathy has resolved ; likely due to etoh withdrawal syndrome .EEG does not reported detected seizure. Brain MR on 7/13 is abnormal with report of ventricular atrophy not appropriate for age and hyperintensity of Left periventricular region.     - resolved.     9.Thrombocytopenia. Plan; - in the setting of cirrhosis    - continue to monitor platelets.        Patient cleared for discharge home on 7/17/20

## 2020-07-16 NOTE — DISCHARGE NOTE PROVIDER - NSFOLLOWUPCLINICS_GEN_ALL_ED_FT
[facility] General Internal Medicine  General Internal Medicine  [address] [id number]  Phone: [phone]  Fax:   Follow Up Time: 1-3 days

## 2020-07-16 NOTE — PROGRESS NOTE ADULT - PROBLEM SELECTOR PLAN 4
Concern for alcohol withdrawal syndrome with delirium at time of transfer from [facility] to [facility] ICU and treated with lorazepam and phenobarbital initially in MICU.  - Currently A&Ox3, no tremor/tongue fasciculation, or hallucination  - standing ativan 0.5mg PO qdfor taper with need for daily assessment for dosage titration. Per hepatology should not have long-acting benzodiazepine given acute liver injury.  - dc ativan tomorrow  - last drink reported 7/4. pending Phosphatidylethanol (Peth) assessing chronic EtOH use per hepatology. Denies EtOH dependence and reports alcohol use limited to 1-2d weekly 4-6 drink on occasion.

## 2020-07-16 NOTE — DISCHARGE NOTE PROVIDER - NSDCMRMEDTOKEN_GEN_ALL_CORE_FT
folic acid 1 mg oral tablet: 1 tab(s) orally once a day  furosemide 20 mg oral tablet: 1 tab(s) orally once a day  lactulose 10 g/15 mL oral syrup: 30 milliliter(s) orally every 6 hours  hold for greater than 3 bowel movements   nadolol 20 mg oral tablet: 1 tab(s) orally once a day  outpatient physical therapy:   potassium phosphate-sodium phosphate 305 mg-700 mg oral tablet: 1 tab(s) orally 4 times a day (with meals and at bedtime)  rifAXIMin 200 mg oral tablet: 1 tab(s) orally 3 times a day  spironolactone 25 mg oral tablet: 2 tab(s) orally once a day  thiamine 100 mg oral tablet: 1 tab(s) orally once a day

## 2020-07-16 NOTE — PROGRESS NOTE ADULT - ASSESSMENT
39M alcoholic, transferred from [facility] to [facility] 7/11/20 for further management of new onset seizures.   Intermittent fevers resolved without clinical infection and cultures negative.   Suspect fevers were related to an inflammatory process and not infection. They are common in alcoholic hepatitis.   He looks well, antibiotics stopped yesterday.     Suggest  -monitor off antibiotics   -no objection to discharge if remains well     Spoke with primary team   Will sign off, pleases call back if needed     Justin Mena MD   Infectious Disease   Pager [phone]   After 5PM and on weekends please page fellow on call or call [phone]

## 2020-07-17 NOTE — PROGRESS NOTE ADULT - PROBLEM SELECTOR PLAN 2
- abdominal sono with small right upper quadrant and small left upper quadrant free fluid. Trace free fluid within the right lower quadrant.  - lasix 20mg qd  - aldactone 50mg qd  - trace fluid not amenable for paracentesis

## 2020-07-17 NOTE — PROGRESS NOTE ADULT - PROBLEM SELECTOR PLAN 9
resolved
Transitions of Care Status:  1.  Name of PCP: Does not have a PCP  2.  PCP Contacted on Admission: [ ] Y    [ X] N    3.  PCP contacted at Discharge: [ ] Y    [ ] N    [ ] N/A  4.  Post-Discharge Appointment Date and Location:  5.  Summary of Handoff given to PCP:
- in the setting of cirrhosis  - continue to monitor platelets
continue to monitor daily on CMP
resolved

## 2020-07-17 NOTE — PROGRESS NOTE ADULT - PROBLEM SELECTOR PLAN 10
- in the setting of cirrhosis  - continue to monitor platelets
- in the setting of cirrhosis  - continue to monitor platelets
Transitions of Care Status:  1.  Name of PCP: Does not have a PCP  2.  PCP Contacted on Admission: [ ] Y    [ X] N    3.  PCP contacted at Discharge: [ ] Y    [ ] N    [ ] N/A  4.  Post-Discharge Appointment Date and Location:  5.  Summary of Handoff given to PCP:
- in the setting of cirrhosis  - continue to monitor platelets

## 2020-07-17 NOTE — PROGRESS NOTE ADULT - PROBLEM SELECTOR PLAN 8
- Encephalopathy has resolved   - likely due to eth withdrawal syndrome   - EEG does not reported detected seizure. Brain MR on 7/13 is abnormal with report of ventricular atrophy not appropriate for age and hyperintensity of Left periventricular region.   - resolved
continue to monitor daily on CMP  - continue with standing oral supplementation for now and daily titration as needed
- Encephalopathy has resolved and it is unclear if related to withdrawal syndrome vs cirrhosis   - Note EEG does not reported detected seizure. Brain MR on 7/13 is abnormal with report of ventricular atrophy not appropriate for age and hyperintensity of Left periventricular region.   - resolved
continue to monitor daily on CMP  -replete today
- Encephalopathy has resolved   - likely due to Etoh withdrawal syndrome   - EEG does not reported detected seizure. Brain MR on 7/13 is abnormal with report of ventricular atrophy not appropriate for age and hyperintensity of Left periventricular region.   - resolved

## 2020-07-17 NOTE — PROGRESS NOTE ADULT - ATTENDING COMMENTS
Patient seen and examined with liver team. I agree with the plan as above
Patient seen and examined with the liver team. I agree with plan as above
1. ETOH cirrhosis without ascites. Continue lactulose and Rifaximin for hepatic encephalopathy.  Work up for autoimmune and other cause of cirrhosis pending. Placed on Zosyn for ? biliary obstruction. MRCP and ultrasound negative. If cx negative will d/c abx.  2. ETOH withdrawal likely cause for seizures.  Start ativan 1 mg po tid. D/C Keppra.  plus prn. Video EEG.  3. Thrombocytopenia. Likely from ETOH and splenomegaly. Infectious etiologies including babesiosis , ehrlichiosis labs pending. Seem unlikely at this point.
The patient was seen and examined with the liver team.  I agree with the plan as above
Patient seen and examined with liver team. I agree with the plan as above
Patient seen and examined with the liver team. I agree with the plan as above.  I recommended ETOH rehab and he is resistant. I explained the importance of this for his health. He wants to go "cold turkey" and then if he relapses, consider rehab
Eventual dispo is home
Patient assigned to me by night hospitalist in charge for management and care for patient for this evening only. Care to be resumed by day hospitalist at 08:00 in the morning and thereafter.     Jd Guerra MD  Medicine Attending  Department of Hospital Medicine  pager: [phone] (available from 20:00 to 08:00)
Pt is clinically stable for discharge home. He is to follow up with Hepatology in 1- 2 weeks.

## 2020-07-17 NOTE — PROGRESS NOTE ADULT - PROBLEM SELECTOR PLAN 7
- approximately 1.2cm eschar with surround ring of erythema just proximal to right 5th toe reported by patient being present the week prior to initial admit to [place]. Patient denies pain at site, purulence, trauma and cannot recall when it occurred but denies it has enlarged.  - Podiatry reccs noted

## 2020-07-17 NOTE — PROGRESS NOTE ADULT - PROBLEM SELECTOR PLAN 1
- alcoholic hepatitis  - per documentation at [place] CT A/P with Diffuse hepatosplenomagaly with possible HCC component.  - MELD-Na: 25 at time of transfer  - DF: 32.5 at time of transfer; Steroids deferred by hepatology given SIRS and need to r/o infection; reported last drink 7/4  - currently not delirious (resolved since initial transfer to [facility]), no asterixis  - Work up thus far: babesia PCR not detected, CMV neg, acute hepatitis panel neg, HepE pending, EBV-IgM(neg)IgG(pos)EarlyAg(neg)NuclearAg(pos) E, COVID19 IgG neg, HIV reported neg at previous hospital, ehrlichia/anaplasma pending, HSV-6 pending.  - Studies in lab and pending result: Phosphatidylethanol (Peth; assessing chronic EtOH use), Alpha-1-antitrypsin, SHAUN, Babesia, Ehrlichia ab, Ehrlichia/Anaplasma PCR, Hep E IgM/IgG, HSV-6, IgG4  - continue rifaximin, lactulose, folic acid, thiamine  - empiric zosyn since 7/11 due to underlying liver inflammatory process
- currently unspecified however suspicion for alcoholic hepatitis per Hepatology  - per documentation at [place] CT A/P with Diffuse hepatosplenomagaly with possible HCC component.  - MELD-Na: 25 at time of transfer  - DF: 32.5 at time of transfer; Steroids deferred by hepatology given SIRS and need to r/o infection; reported last drink 7/4  - currently not delirious (resolved since initial transfer to [facility]), no asterixis  - Work up thus far: babesia PCR not detected, CMV neg, acute hepatitis panel neg, HepE pending, EBV-IgM(neg)IgG(pos)EarlyAg(neg)NuclearAg(pos) E, COVID19 IgG neg, HIV reported neg at previous hospital, ehrlichia/anaplasma pending, HSV-6 pending.  - Studies in lab and pending result: Phosphatidylethanol (Peth; assessing chronic EtOH use), Alpha-1-antitrypsin, SHAUN, Babesia, Ehrlichia ab, Ehrlichia/Anaplasma PCR, Hep E IgM/IgG, HSV-6, IgG4  - continue rifaximin, lactulose, folic acid, thiamine
- alcoholic hepatitis  - per documentation at [place] CT A/P with Diffuse hepatosplenomagaly   - MELD-Na: 25 at time of transfer  - DF: 32.5 at time of transfer; Steroids deferred by hepatology given SIRS and need to r/o infection; reported last drink 7/4  - currently not delirious (resolved since initial transfer to [facility]), no asterixis  - Work up thus far: babesia PCR not detected, CMV neg, acute hepatitis panel neg, HepE pending, EBV-IgM(neg)IgG(pos)EarlyAg(neg)NuclearAg(pos) E, COVID19 IgG neg, HIV reported neg at previous hospital, babesia, ehrlichia, anaplasma negative   - Studies in lab and pending result: Phosphatidylethanol (Peth; assessing chronic EtOH use)  - continue rifaximin, lactulose, folic acid, thiamine  - empiric zosyn since 7/11-7/16 due to underlying liver inflammatory process
- alcoholic hepatitis  - per documentation at [place] CT A/P with Diffuse hepatosplenomagaly   - MELD-Na: 25 at time of transfer  - DF: 32.5 at time of transfer; Steroids deferred by hepatology given SIRS and need to r/o infection; reported last drink 7/4  - currently not delirious (resolved since initial transfer to [facility]), no asterixis  - Work up thus far: babesia PCR not detected, CMV neg, acute hepatitis panel neg, HepE pending, EBV-IgM(neg)IgG(pos)EarlyAg(neg)NuclearAg(pos) E, COVID19 IgG neg, HIV reported neg at previous hospital, babesia, ehrlichia, anaplasma negative   - Studies in lab and pending result: Phosphatidylethanol (Peth; assessing chronic EtOH use)  - continue rifaximin, lactulose, folic acid, thiamine  - empiric zosyn since 7/11-7/16 due to underlying liver inflammatory process
- currently unspecified however suspicion for alcoholic hepatitis per Hepatology  - MR abdomen demonstrates likely cirrhosis with small volume ascites  - MELD-Na: 25 at time of transfer  - DF: 32.5 at time of transfer; Steroids deferred by hepatology given SIRS and need to r/o infection; reported last drink 7/4  - currently not delirious (resolved since initial transfer to [facility]), no asterixis  - Work up thus far: babesia PCR not detected, CMV neg, acute hepatitis panel neg, HepE pending, EBV-IgM(neg)IgG(pos)EarlyAg(neg)NuclearAg(pos) E, COVID19 IgG neg, HIV reported neg at previous hospital, ehrlichia/anaplasma pending, HSV-6 pending.  - Studies in lab and pending result: Phosphatidylethanol (Peth; assessing chronic EtOH use), Alpha-1-antitrypsin, SHAUN, Babesia, Ehrlichia ab, Ehrlichia/Anaplasma PCR, Hep E IgM/IgG, HSV-6, IgG4  - continue zosyn, rifaximin, lactulose, folic acid, thiamine

## 2020-07-17 NOTE — PROGRESS NOTE ADULT - SUBJECTIVE AND OBJECTIVE BOX
Chief Complaint:  Patient is a 39y old  Male who presents with a chief complaint of seizures, alcoholic cirrhosis (16 Jul 2020 17:00)      Interval Events:   - AGAPITO    Allergies:  No Known Allergies      Hospital Medications:  folic acid 1 milliGRAM(s) Oral daily  furosemide    Tablet 20 milliGRAM(s) Oral daily  lactulose Syrup 20 Gram(s) Oral every 6 hours  LORazepam     Tablet 0.5 milliGRAM(s) Oral daily  nadolol 20 milliGRAM(s) Oral daily  potassium phosphate / sodium phosphate Tablet (K-PHOS No. 2) 1 Tablet(s) Oral four times a day with meals  rifAXIMin 200 milliGRAM(s) Oral three times a day  spironolactone 50 milliGRAM(s) Oral daily  thiamine 100 milliGRAM(s) Oral daily      PMHX/PSHX:  No pertinent past medical history  S/P exploratory laparotomy  No significant past surgical history      Family history:  FH: alpha 1 antitrypsin deficiency  No pertinent family history in first degree relatives      ROS:     General:  No wt loss, fevers, chills, night sweats, fatigue,   Eyes:  Good vision, no reported pain  ENT:  No sore throat, pain, runny nose, dysphagia  CV:  No pain, palpitations, hypo/hypertension  Pulm:  No dyspnea, cough, tachypnea, wheezing  GI:  No pain, No nausea, No vomiting, No diarrhea, No constipation, No weight loss, No fever, No pruritis, No rectal bleeding, No tarry stools, No dysphagia  :  No pain, bleeding, incontinence, nocturia  Muscle:  No pain, weakness  Neuro:  No weakness, tingling, memory problems  Psych:  No fatigue, insomnia, mood problems, depression  Endocrine:  No polyuria, polydipsia, cold/heat intolerance  Heme:  No petechiae, ecchymosis, easy bruisability  Skin:  No rash, tattoos, scars, edema      PHYSICAL EXAM:   Vital Signs:  Vital Signs Last 24 Hrs  T(C): 37.2 (17 Jul 2020 05:42), Max: 37.3 (16 Jul 2020 12:45)  T(F): 98.9 (17 Jul 2020 05:42), Max: 99.2 (16 Jul 2020 12:45)  HR: 101 (17 Jul 2020 05:42) (101 - 106)  BP: 114/71 (17 Jul 2020 05:42) (105/58 - 120/75)  BP(mean): --  RR: 18 (17 Jul 2020 05:42) (18 - 18)  SpO2: 96% (17 Jul 2020 05:42) (95% - 96%)  Daily     Daily     GENERAL:  No acute distress  HEENT:  Normocephalic/atraumatic, + scleral icterus  CHEST:  Clear to auscultation bilaterally, no wheezes/rales/ronchi, no accessory muscle use  HEART:  Regular rate and rhythm, no murmurs/rubs/gallops  ABDOMEN:  Soft, non-tender, mildly distended, normoactive bowel sounds  EXTREMITIES: No cyanosis, clubbing, or edema  SKIN:  No rash/erythema  NEURO:  Alert and oriented x 3, + mild asterixis     LABS:                        11.2   7.85  )-----------( 235      ( 17 Jul 2020 07:08 )             32.6     Mean Cell Volume: 100.6 fl (07-17-20 @ 07:08)    07-17    133<L>  |  99  |  7   ----------------------------<  89  3.7   |  23  |  0.85    Ca    8.3<L>      17 Jul 2020 07:08  Phos  2.3     07-16  Mg     2.0     07-16    TPro  5.6<L>  /  Alb  2.8<L>  /  TBili  16.0<H>  /  DBili  x   /  AST  72<H>  /  ALT  20  /  AlkPhos  148<H>  07-17    LIVER FUNCTIONS - ( 17 Jul 2020 07:08 )  Alb: 2.8 g/dL / Pro: 5.6 g/dL / ALK PHOS: 148 U/L / ALT: 20 U/L / AST: 72 U/L / GGT: x                                   11.2   7.85  )-----------( 235      ( 17 Jul 2020 07:08 )             32.6                         11.6   7.14  )-----------( 173      ( 16 Jul 2020 09:29 )             33.5                         11.2   6.67  )-----------( 112      ( 15 Jul 2020 09:17 )             32.9                         11.8   7.04  )-----------( 74       ( 14 Jul 2020 09:18 )             34.6

## 2020-07-17 NOTE — PROGRESS NOTE ADULT - PROBLEM SELECTOR PLAN 6
- fever and tachycardia without clear source of infection  - Since transfer from ICU, the patient has nontoxic appearance, without encephalopathy, no tremor/tongue fasciculation, no asterixis appreciated, reports feeling generally improved.  - see above for work up completed thus far  - CXR without lobar pneumonia identified, UA not c/w acute cystitis.  - source is suspected from acute hepatitis   - s/p IV zosyn 7/11-7/16  - blood cx ngtd
- approximately 1.2cm eschar with surround ring of erythema just proximal to right 5th toe reported by patient being present the week prior to initial admit to [place]. Patient denies pain at site, purulence, trauma and cannot recall when it occurred but denies it has enlarged.  - notified RN team to continue monitoring. given SIRS may require ID consultation if clinically not improving or progression of lesion. Currently non-toxic with resolution of delirium(A&Ox3 on my examination, previously charted A&Ox2) on examination while on zosyn and therefore will continue current regimen
- approximately 1.2cm eschar with surround ring of erythema just proximal to right 5th toe reported by patient being present the week prior to initial admit to [place]. Patient denies pain at site, purulence, trauma and cannot recall when it occurred but denies it has enlarged.  - wound care eval
- fever and tachycardia without clear source of infection  - Since transfer from ICU, the patient has nontoxic appearance, without encephalopathy, no tremor/tongue fasciculation, no asterixis appreciated, reports feeling generally improved. Initial blood culture 7/12 NGTD and patient noted to be on zosyn for empiric treatment given liver disease.  - see above for work up completed thus far  - CXR without lobar pneumonia identified, UA not c/w acute cystitis.  - source is suspected from acute hepatitis which of which work up is ongoing with Hepatology consulted. Alcoholic hepatitis is suspected but steroids currently deferred per hepatology.  - [place] of 5th toe eshcar as below  - C/w IV zosyn 7/11-  - follow up repeat blood cx,
- fever and tachycardia without clear source of infection  - Since transfer from ICU, the patient has nontoxic appearance, without encephalopathy, no tremor/tongue fasciculation, no asterixis appreciated, reports feeling generally improved.  - see above for work up completed thus far  - CXR without lobar pneumonia identified, UA not c/w acute cystitis.  - source is suspected from acute hepatitis   - s/p IV zosyn 7/11-7/16  - blood cx ngtd

## 2020-07-17 NOTE — PROGRESS NOTE ADULT - PROBLEM SELECTOR PROBLEM 4
Cirrhosis of liver with ascites, unspecified hepatic cirrhosis type
Alcohol withdrawal syndrome, with delirium
Cirrhosis of liver with ascites, unspecified hepatic cirrhosis type
Alcohol withdrawal syndrome, with delirium
Alcohol withdrawal syndrome, with delirium

## 2020-07-17 NOTE — PROGRESS NOTE ADULT - PROBLEM SELECTOR PLAN 4
Concern for alcohol withdrawal syndrome with delirium at time of transfer from [facility] to [facility] ICU and treated with lorazepam and phenobarbital initially in MICU.  - Currently A&Ox3, no tremor/tongue fasciculation, or hallucination  - on Ativan taper for EtOH withdrawal; CIWA score 0; will d/c Ativan today   - Per hepatology should not have long-acting benzodiazepine given acute liver injury.  - last drink reported 7/4  -  pending Phosphatidylethanol (Peth) assessing chronic EtOH use per Hepatology. - Denies EtOH dependence and reports alcohol use limited to 1-2d weekly 4-6 drink on occasion.

## 2020-07-17 NOTE — PROGRESS NOTE ADULT - PROBLEM SELECTOR PROBLEM 5
SIRS (systemic inflammatory response syndrome)
Cirrhosis of liver with ascites, unspecified hepatic cirrhosis type
SIRS (systemic inflammatory response syndrome)
Cirrhosis of liver with ascites, unspecified hepatic cirrhosis type
Cirrhosis of liver with ascites, unspecified hepatic cirrhosis type

## 2020-07-17 NOTE — PROVIDER CONTACT NOTE (OTHER) - ACTION/TREATMENT ORDERED:
SANYA Clarke aware, EKG to be complete
provider notified. attempted to page provider earlier but no call back. will continue to monitor.
provider notified. orders to follow.
 is at bedside speaking to pt and assessing. possible orders to follow.
Tylenol 650mg ordered and given. Safety precautions maintained. Call bell and personal belongings within reach. Will continue to monitor.

## 2020-07-17 NOTE — CHART NOTE - NSCHARTNOTEFT_GEN_A_CORE
PA Medicine Discharge Note    Patient medically cleared for discharge today by Dr. Sam.   Medications for discharge discussed and confirmed with Dr. Sam.  Discharge plan discussed with patient.    Elo Clarke PA-C  Dept of Medicine  #[id number]

## 2020-07-17 NOTE — PROGRESS NOTE ADULT - REASON FOR ADMISSION
seizures, alcoholic cirrhosis
seizures, alcoholic cirrhosis
Seizures
seizures, alcoholic cirrhosis
Transferred to [facility] for seizures, alcoholic cirrhosis
seizures, alcoholic cirrhosis
seizures, alcoholic cirrhosis

## 2020-07-17 NOTE — PROGRESS NOTE ADULT - PROBLEM SELECTOR PROBLEM 3
Alcohol withdrawal syndrome, with delirium
Alcohol withdrawal syndrome, with delirium
Seizure

## 2020-07-17 NOTE — PROGRESS NOTE ADULT - PROBLEM SELECTOR PLAN 5
- identified on MR abdomen 7/11/20  - likely in the setting of etoh use   - MELD-Na: 25 at time of transfer  - DF: 32.5; reported last drink 7/4  - Has not had EGD to evaluate for varices  - lasix 20mg qd  - aldactone 50mg qd  - nadalol 20mg for prevention of esophageal varicies   - rifaximin 200mg tid  - lactulose q6 ; having bms

## 2020-07-17 NOTE — PROGRESS NOTE ADULT - SUBJECTIVE AND OBJECTIVE BOX
Patient is a 39y old  Male who presents with a chief complaint of seizures, alcoholic cirrhosis (17 Jul 2020 09:12)      SUBJECTIVE / OVERNIGHT EVENTS:    MEDICATIONS  (STANDING):  folic acid 1 milliGRAM(s) Oral daily  furosemide    Tablet 20 milliGRAM(s) Oral daily  lactulose Syrup 20 Gram(s) Oral every 6 hours  nadolol 20 milliGRAM(s) Oral daily  potassium phosphate / sodium phosphate Tablet (K-PHOS No. 2) 1 Tablet(s) Oral four times a day with meals  rifAXIMin 200 milliGRAM(s) Oral three times a day  spironolactone 50 milliGRAM(s) Oral daily  thiamine 100 milliGRAM(s) Oral daily    MEDICATIONS  (PRN):      Vital Signs Last 24 Hrs  T(C): 37.2 (17 Jul 2020 05:42), Max: 37.3 (16 Jul 2020 12:45)  T(F): 98.9 (17 Jul 2020 05:42), Max: 99.2 (16 Jul 2020 12:45)  HR: 101 (17 Jul 2020 05:42) (101 - 106)  BP: 114/71 (17 Jul 2020 05:42) (105/58 - 120/75)  BP(mean): --  RR: 18 (17 Jul 2020 05:42) (18 - 18)  SpO2: 96% (17 Jul 2020 05:42) (95% - 96%)  CAPILLARY BLOOD GLUCOSE        I&O's Summary    16 Jul 2020 07:01  -  17 Jul 2020 07:00  --------------------------------------------------------  IN: 1340 mL / OUT: 0 mL / NET: 1340 mL    17 Jul 2020 07:01  -  17 Jul 2020 11:16  --------------------------------------------------------  IN: 300 mL / OUT: 0 mL / NET: 300 mL        PHYSICAL EXAM:  GENERAL: NAD, well-developed  HEAD:  Atraumatic, Normocephalic  EYES: EOMI, PERRLA, conjunctiva and sclera clear  NECK: Supple, No JVD  CHEST/LUNG: Clear to auscultation bilaterally; No wheeze  HEART: Regular rate and rhythm; No murmurs, rubs, or gallops  ABDOMEN: Soft, Nontender, Nondistended; Bowel sounds present  EXTREMITIES:  2+ Peripheral Pulses, No clubbing, cyanosis, or edema  PSYCH: AAOx3  NEUROLOGY: non-focal  SKIN: No rashes or lesions    LABS:                        11.2   7.85  )-----------( 235      ( 17 Jul 2020 07:08 )             32.6     07-17    133<L>  |  99  |  7   ----------------------------<  89  3.7   |  23  |  0.85    Ca    8.3<L>      17 Jul 2020 07:08  Phos  2.3     07-16  Mg     2.0     07-16    TPro  5.6<L>  /  Alb  2.8<L>  /  TBili  16.0<H>  /  DBili  x   /  AST  72<H>  /  ALT  20  /  AlkPhos  148<H>  07-17              RADIOLOGY & ADDITIONAL TESTS:    Imaging Personally Reviewed:    Consultant(s) Notes Reviewed:      Care Discussed with Consultants/Other Providers: Patient is a 39y old  Male who presents with a chief complaint of seizures, alcoholic cirrhosis (17 Jul 2020 09:12)      SUBJECTIVE / OVERNIGHT EVENTS:  Pt seen and examined. No acute events overnight. He denies fever/chills, tremors, abd pain. Seen by Hepatology ; reccs appreciated.    MEDICATIONS  (STANDING):  folic acid 1 milliGRAM(s) Oral daily  furosemide    Tablet 20 milliGRAM(s) Oral daily  lactulose Syrup 20 Gram(s) Oral every 6 hours  nadolol 20 milliGRAM(s) Oral daily  potassium phosphate / sodium phosphate Tablet (K-PHOS No. 2) 1 Tablet(s) Oral four times a day with meals  rifAXIMin 200 milliGRAM(s) Oral three times a day  spironolactone 50 milliGRAM(s) Oral daily  thiamine 100 milliGRAM(s) Oral daily    MEDICATIONS  (PRN):      Vital Signs Last 24 Hrs  T(C): 37.2 (17 Jul 2020 05:42), Max: 37.3 (16 Jul 2020 12:45)  T(F): 98.9 (17 Jul 2020 05:42), Max: 99.2 (16 Jul 2020 12:45)  HR: 101 (17 Jul 2020 05:42) (101 - 106)  BP: 114/71 (17 Jul 2020 05:42) (105/58 - 120/75)  BP(mean): --  RR: 18 (17 Jul 2020 05:42) (18 - 18)  SpO2: 96% (17 Jul 2020 05:42) (95% - 96%)  CAPILLARY BLOOD GLUCOSE        I&O's Summary    16 Jul 2020 07:01  -  17 Jul 2020 07:00  --------------------------------------------------------  IN: 1340 mL / OUT: 0 mL / NET: 1340 mL    17 Jul 2020 07:01  -  17 Jul 2020 11:16  --------------------------------------------------------  IN: 300 mL / OUT: 0 mL / NET: 300 mL        PHYSICAL EXAM:  GENERAL: NAD, icteric++, afebrile to touch  HEAD:  Atraumatic, Normocephalic  EYES: EOMI, PERRLA, conjunctiva and sclera clear  NECK: Supple, No JVD  CHEST/LUNG: Clear to auscultation bilaterally; No wheeze  HEART: Regular rate and rhythm; No murmurs, rubs, or gallops  ABDOMEN: distended; non tender, no fluid thrill. Bowel sounds present  EXTREMITIES:  2+ Peripheral Pulses, No clubbing, cyanosis, or edema  PSYCH: AAOx3  NEUROLOGY: non-focal  SKIN: No rashes or lesions    LABS:                        11.2   7.85  )-----------( 235      ( 17 Jul 2020 07:08 )             32.6     07-17    133<L>  |  99  |  7   ----------------------------<  89  3.7   |  23  |  0.85    Ca    8.3<L>      17 Jul 2020 07:08  Phos  2.3     07-16  Mg     2.0     07-16    TPro  5.6<L>  /  Alb  2.8<L>  /  TBili  16.0<H>  /  DBili  x   /  AST  72<H>  /  ALT  20  /  AlkPhos  148<H>  07-17                Consultant(s) Notes Reviewed:  Hepatology

## 2020-07-17 NOTE — DISCHARGE NOTE NURSING/CASE MANAGEMENT/SOCIAL WORK - PATIENT PORTAL LINK FT
You can access the FollowMyHealth Patient Portal offered by [facility] by registering at the following website: [email or web address]. By joining [employer]’s FollowMyHealth portal, you will also be able to view your health information using other applications (apps) compatible with our system.

## 2020-07-17 NOTE — PROGRESS NOTE ADULT - ASSESSMENT
40 y/o M w/ hx of EtOH dependence and bladder rupture after fall requiring ex lap for surgical repair in 2019, who initially was brought to [facility] after found to have witnessed seizure by roommate, confused when evaluated by EMS, transferred to [facility] for further management. ICU stay for seizure s/p phenobarb gtt.      Impression:   # Cirrhosis: Likely due to EtOH dependence  Varices: No prior EGD  Ascites: None on bedside ultrasound  HE: AAO x 3, + asterixis  HCC: No prior imaging  # Abnormal liver enzymes: Likely represents alcoholic hepatitis vs acute on chronic liver failure. DF 36.3  # Fever: Concern for underlying infection. BCx negative 7/12. No ascites to tap per bedside U/S.  # AMS: Likely represents alcohol withdrawal c/b seizure. Differential includes underlying infection given fever.  # Seizure: Likely alcohol withdrawal related seizure    Recommendations:  - ###Lasix, spironolactone, nadolol?###  - Monitor daily CBC, CMP, and INR  - Continue lactulose 20g q6h and rifaximin  - Please obtain ceruloplasmin  - F/U Hepatitis E panel  - Infectious work-up per primary team  - Treatment of agitation / alcohol withdrawal per primary team  - Rest of care per primary team    Erik Perea, PGY-4  Hepatology Fellow    Available on [employer] Teams  [phone]  After 5PM, please contact the on-call GI fellow: [phone] 40 y/o M w/ hx of EtOH dependence and bladder rupture after fall requiring ex lap for surgical repair in 2019, who initially was brought to [facility] after found to have witnessed seizure by roommate, confused when evaluated by EMS, transferred to [facility] for further management. ICU stay for seizure s/p phenobarb gtt.      Impression:   # Cirrhosis: Likely due to EtOH dependence  Varices: No prior EGD  Ascites: None on bedside ultrasound  HE: AAO x 3, + asterixis  HCC: No prior imaging  # Abnormal liver enzymes: Likely represents alcoholic hepatitis vs acute on chronic liver failure. DF 36.3  # Fever: Concern for underlying infection. BCx negative 7/12. No ascites to tap per bedside U/S.  # AMS: Likely represents alcohol withdrawal c/b seizure. Differential includes underlying infection given fever.  # Seizure: Likely alcohol withdrawal related seizure    Recommendations:  - c/w nadolol, lasix, spironolactone  - Patient will set up outpatient follow up with a hepatologist as he is living far from [facility]  - Monitor daily CBC, CMP, and INR  - Continue lactulose 20g q6h and rifaximin  - Infectious work-up per primary team  - Treatment of agitation / alcohol withdrawal per primary team  - Rest of care per primary team    Erik Perea, PGY-4  Hepatology Fellow    Available on [employer] Teams  [phone]  After 5PM, please contact the on-call GI fellow: [phone]

## 2020-07-17 NOTE — PROGRESS NOTE ADULT - PROBLEM SELECTOR PLAN 3
Concern for alcohol withdrawal syndrome with delirium at time of transfer from [facility] to [facility] ICU and treated with lorazepam and phenobarbital initially in MICU.  - Currently A&Ox3, no tremor/tongue fasciculation, or hallucination  - standing ativan 0.5mg PO bid for taper with need for daily assessment for dosage titration. Per hepatology should not have long-acting benzodiazepine given acute liver injury.  - last drink reported 7/4. pending Phosphatidylethanol (Peth) assessing chronic EtOH use per hepatology. Denies EtOH dependence and reports alcohol use limited to 1-2d weekly 4-6 drink on occasion.
Seizure occurred on 7/10 and initially treated at [facility] prior to transfer. No previous neurologic disease reported. charted to have had fall prior to seizure but patient denies on interview following ICU transfer to floor.  - EEG completed and did not detect seizure activity  - MR brain reported to be abnormal with ventricular atrophy and "focus of signal hyperintensity on the FLAIR in the periventricular region on the left nonspecific in a patient of this age may represent focus of infection inflammation demyelination or ischemia."  - continue with current standing ativan as patient is being tapered for EtOH withdrawal.
Seizure occurred on 7/10 and initially treated at [facility] prior to transfer. No previous neurologic disease reported. charted to have had fall prior to seizure but patient denies on interview following ICU transfer to floor.  - EEG completed and did not detect seizure activity  - MR brain reported to be abnormal with ventricular atrophy and "focus of signal hyperintensity on the FLAIR in the periventricular region on the left nonspecific in a patient of this age may represent focus of infection inflammation demyelination or ischemia."  - on Ativan taper for EtOH withdrawal; CIWA score 0; will d/c Ativan today
Seizure occurred on 7/10 and initially treated at [facility] prior to transfer. No previous neurologic disease reported. charted to have had fall prior to seizure but patient denies on interview following ICU transfer to floor.  - EEG completed and did not detect seizure activity  - MR brain reported to be abnormal with ventricular atrophy and "focus of signal hyperintensity on the FLAIR in the periventricular region on the left nonspecific in a patient of this age may represent focus of infection inflammation demyelination or ischemia."  - continue with current standing ativan as patient is being tapered for EtOH withdrawal.  - ativan 0.5mg qd, dc tomorrow
Concern for alcohol withdrawal syndrome with delirium at time of transfer from [facility] to [facility] ICU and treated with lorazepam and phenobarbital initially in MICU.  - Currently A&Ox3, no tremor/tongue fasciculation, or hallucination. agree with continuing standing ativan 0.5mg PO every 12hr for taper with need for daily assessment for dosage titration. Per hepatology should not have long-acting benzodiazepine given acute liver injury.  - last drink reported 7/4. pending Phosphatidylethanol (Peth) assessing chronic EtOH use per hepatology. Denies EtOH dependence and reports alcohol use limited to 1-2d weekly 4-6 drink on occasion.

## 2020-07-17 NOTE — PROGRESS NOTE ADULT - PROBLEM SELECTOR PROBLEM 6
Eschar of foot
Eschar of foot
SIRS (systemic inflammatory response syndrome)

## 2020-07-17 NOTE — PROGRESS NOTE ADULT - PROVIDER SPECIALTY LIST ADULT
Hepatology
Hospitalist
Infectious Disease
Internal Medicine
Internal Medicine
MICU
Infectious Disease

## 2020-07-17 NOTE — PROVIDER CONTACT NOTE (OTHER) - ASSESSMENT
pt A&Ox4 appears to be in no distress, is talking to  at this time.
pt A&Ox4 in no distress & denies pain
pt in no distress, denies chest pain, nausea, vomiting, sob, and is resting comfortably with safety measures in place.
Patient alert and oriented x4. Complaint of generalized discomfort 3/10 soreness, aching. Denies chest pain, N/V, HA, dizziness.

## 2020-07-17 NOTE — PROGRESS NOTE ADULT - ASSESSMENT
39M w/ hx of traumatic rupture of bladder s/p ExLap and repair(2019) initially presented to [facility] (7/10) for seizure/delirium suspected from complicated EtOH withdrawal additionally found to have acute hepatitis with cirrhosis/hepatomegaly requiring transfer to [facility] MICU for further management and Liver transplant evaluation. [facility] MICU course complicated by SIRS with patient treated empirically with zosyn (blood 7/12 culture NGTD) however patient noted to have resolution of delirium. Now transferred to medicine given clinical improvement for further investigation of acute hepatitis (likely related to EtOH), suspected EtOh withdrawal syndrome, SIRS and newly identified cirrhosis.

## 2020-07-26 NOTE — H&P ADULT - PROBLEM SELECTOR PLAN 1
patient p/w diffuse abdominal crampy in nature with leukocytosis to 28, MARQUISE with crt 9, hyperbilirubinemia  differential is wide at this time  need to r/o C diff, pending if + would start po vancomycin, will order abd xray and obtain lactate to ensure no free air or microperforation  maintain contact isolation  s/p diagnostic paracentesis in ED 7/26 with cell count that is not consistent with SBP, gram stain showing numerous WBCs but no organisms  given hyperbilirubenima abdominal pain and leukocytosis also cannot rule out cholangitis at this time  MRCP noncon to r/o obstruction and RUQ sono to r/o acute jil  lactate pending  IV zosyn renally dosed for now  IR c/s for therapeutic paracentesis  it is highly possible that mild alk phos elevation and transaminitis w hyperbilirubinemia could be from severe hydration  continue to monitor  abd sono w dopplers to r/o pvt

## 2020-07-26 NOTE — ED ADULT NURSE NOTE - NSIMPLEMENTINTERV_GEN_ALL_ED
Implemented All Universal Safety Interventions:  [place] to call system. Call bell, personal items and telephone within reach. Instruct patient to call for assistance. Room bathroom lighting operational. Non-slip footwear when patient is off stretcher. Physically safe environment: no spills, clutter or unnecessary equipment. Stretcher in lowest position, wheels locked, appropriate side rails in place.

## 2020-07-26 NOTE — H&P ADULT - PROBLEM SELECTOR PLAN 5
Cirrhosis: Likely due to EtOH dependence  Varices: No prior EGD  Ascites: present  HE: AAO x 3,   HCC: No prior imaging  hepatology following

## 2020-07-26 NOTE — ED PROVIDER NOTE - PHYSICAL EXAMINATION
Gen: AAOx3, non-toxic  HEENT: EOMI, oral mucosa moist jaundiced, scleral icterus  Lung: CTAB, no respiratory distress, no wheezes/rhonchi/rales B/L, speaking in full sentences  CV: RRR, no murmurs, rubs or gallops  Abd: distended, non-tender  MSK: no visible deformities  Neuro: No focal sensory or motor deficits  Skin: Warm, well perfused, diffuse jaundice  Psych: normal affect.   ~Jm Campos PGY3

## 2020-07-26 NOTE — CONSULT NOTE ADULT - PROBLEM SELECTOR RECOMMENDATION 9
Pt with MARQUISE in the setting of liver cirrhosis with severe ascites. MARQUISE 2/2 prerenal (severe diarrhea + diuretics +decrease effective arterial blood volume from Cirrhosis) to r/o hepatorenal syndrome.  On recent admission baseline sCr 0.6-0.8. Today on Admission 9.64 -- s/p diagnostic paracentesis.     no absolute indication for RRT at this time   Consider IV albumin infusion   hold diuretics at this time   keep MAP> 70, consider midodrine if hypotension   Please send UA, urine electrolytes, spot urine TP/CR.  Recommend Hernandez catheter placement if no contraindications, might need Urology consult due to hx of bladder rupture  Will need to consider HD if renal failure continues to worsen.   Monitor electrolytes and urine output.   Avoid NSAIDs, ACEI/ARBS, RCA and nephrotoxins.   Dose medications as per eGFR.

## 2020-07-26 NOTE — CONSULT NOTE ADULT - ATTENDING COMMENTS
I have seen this patient with the fellow and agree with their assessment and plan. 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 Would still check urine lytes and hydrate with albumin for 48 hours before planning any changes in plan. No acute indication for dialysis yet. Rule out obstruction as well with a ashton and sonogram.     Earle Olmos MD  Cell   Pager   Office

## 2020-07-26 NOTE — H&P ADULT - PROBLEM SELECTOR PLAN 2
pre renal v obstructive v intrarenal  will start on IV albumin infusion, octreotide 100mcg tid must r/o hepatorenal syndrome  urine lytes  likely in the setting of profuse diarrhea  renally dose all meds  avoid nsaids/morphine/hold lasix and aldactone   renal sono  ashton catheter  appreciate renal recs, repeat BMP ordered

## 2020-07-26 NOTE — ED PROVIDER NOTE - CRITICAL CARE PROVIDED
consult w/ pt's family directly relating to pts condition/direct patient care (not related to procedure)/conducted a detailed discussion of DNR status/interpretation of diagnostic studies/consultation with other physicians/additional history taking/documentation

## 2020-07-26 NOTE — CONSULT NOTE ADULT - SUBJECTIVE AND OBJECTIVE BOX
39M PMHx alcoholic cirrhosis, traumatic bladder rupture due to fall s./l ex lap and repair (2019) presents for worsening jaundice, diarrhea, ABD pain/distension, and generalized weakness. Of note, patient was recently hospitalized at [facility] (7/11-7/17) after transfer from [place] for management of seizures/delirium 2/2 alcohol withdrawal requiring phenobarb gtt (last alcoholic intake 7/4) and evaluation for liver transplantation. Post-discharge, patient states that he was feeling well for a period of time but noticed that he was becoming more yellow and more lethargic. He specifically notes that he was having an increased number of watery stools (more than 4x daily) to such an extent that he was not taking his lactulose. He also notes that he was unable to obtain rifaximin from his pharmacy because he is uninsured. CHIEF COMPLAINT: ABD pain/diarrhea/renal failure    HPI: 39M PMHx alcoholic cirrhosis, traumatic bladder rupture due to fall s/p ex lap and repair (2019) presents for worsening jaundice, diarrhea, ABD pain/distension, and generalized weakness. Of note, patient was recently hospitalized at [facility] (-) after transfer from [place] for management of seizures/delirium 2/2 alcohol withdrawal requiring phenobarb gtt (last alcoholic intake ) and evaluation for liver transplantation. Post-discharge, patient states that he was feeling well for a period of time but noticed that he was becoming more yellow and more lethargic with persistence of abdominal distension and pain despite medical therapies. He specifically notes that he was having an increased number of watery stools (more than 4x daily) to such an extent that he was not taking his lactulose; he also notes nausea, intermittent SOB, easy bruising, and decreased urinary output despite hydration with water and Gatorade. He denies fever, lightheadedness, dizziness,  He also notes that he was unable to obtain rifaximin from his pharmacy because he is uninsured. He does note that he has been compliant with his cirrhosis medications, including lasix and spironolactone. Patient came to ED for evaluation. MICU consulted for renal failure.    In the ED:  VS: -114/60-76, HR 77-78, RR 18-22, O2 Sat 98-99, Tmax 97.7  Labs: CBC - WBC 28.96, Hgb 11.9, plts 630. Coags - INR 1.54. CMP - Na 133, bicarb 18/AG 22, BUN/Cr 54/9.64 (Cr 0.85 on ), AST//20, Alk phos 228, Tbili 33.4/Direct bili > 10. Ammonia 34. Blood alcohol negative. COVID negative.  Imaging: CXR - clear lungs.    Patient given: Zosyn x 1.    PAST MEDICAL & SURGICAL HISTORY:  No pertinent past medical history  S/P exploratory laparotomy      FAMILY HISTORY:  FH: alpha 1 antitrypsin deficiency      Allergies    No Known Allergies    Intolerances        HOME MEDICATIONS:  Home Medications:      REVIEW OF SYSTEMS:  Resp: [ ] negative [ x ] cough [ x ] shortness of breath [ ] dyspnea [ ] wheezing [ ] sputum [ ] hemoptysis  GI: [ ] negative [ x ] nausea [ ] vomiting [ x ] diarrhea [ ] constipation [ x ] abd pain [ ] dysphagia       OBJECTIVE:  ICU Vital Signs Last 24 Hrs  T(C): 36.7 (2020 16:37), Max: 36.7 (2020 16:37)  T(F): 98 (2020 16:37), Max: 98 (2020 16:37)  HR: 80 (2020 16:37) (77 - 80)  BP: 115/68 (2020 16:37) (102/60 - 115/68)  BP(mean): --  ABP: --  ABP(mean): --  RR: 17 (2020 16:37) (17 - 22)  SpO2: 98% (2020 16:37) (98% - 99%)        CAPILLARY BLOOD GLUCOSE          PHYSICAL EXAM:  General: No acute distress. Visibly jaundiced.  HEENT: Apparent scleral icterus.  Respiratory: Clear to auscultation bilaterally. No adventitious lung sounds. Comfortable on RA.  Cardiovascular: +S1/S2. No S3/S4. No audible murmurs, rubs, or gallops.  Abdomen: Soft, markedly distended ABD with minimal tenderness to palpation. Diminished bowel sounds.  Extremities: Moves all extremities.  Skin: Apparent jaundice.  Neurological: No focal deficits. No evidence of asterixis.    LINES:     HOSPITAL MEDICATIONS:  Standing Meds:  albumin human 25% IVPB 100 milliLiter(s) IV Intermittent every 6 hours  heparin   Injectable 5000 Unit(s) SubCutaneous every 8 hours  octreotide  Injectable 100 MICROGram(s) IV Push three times a day  thiamine 100 milliGRAM(s) Oral at bedtime      PRN Meds:      LABS:                        11.9   28.96 )-----------( 630      ( 2020 12:10 )             33.7     Hgb Trend: 11.9<--      133<L>  |  93<L>  |  54<H>  ----------------------------<  115<H>  3.7   |  18<L>  |  9.64<H>    Ca    7.6<L>      2020 12:10  Phos  8.7       Mg     2.0         TPro  6.3  /  Alb  3.1<L>  /  TBili  33.4<H>  /  DBili  >10.0<H>  /  AST  127<H>  /  ALT  20  /  AlkPhos  228<H>      Creatinine Trend: 9.64<--, 0.85<--, 0.63<--, 0.61<--, 0.58<--, 0.51<--  PT/INR - ( 2020 12:10 )   PT: 17.9 sec;   INR: 1.54 ratio         PTT - ( 2020 12:10 )  PTT:36.2 sec  Urinalysis Basic - ( 2020 15:54 )    Color: Dark Yellow / Appearance: Slightly Turbid / S.018 / pH: x  Gluc: x / Ketone: Negative  / Bili: Large >10 / Urobili: 2 mg/dL   Blood: x / Protein: 100 / Nitrite: Negative   Leuk Esterase: Negative / RBC: 0 /hpf / WBC 1 /HPF   Sq Epi: x / Non Sq Epi: 1 /hpf / Bacteria: Negative            MICROBIOLOGY:       RADIOLOGY:  [ ] Reviewed and interpreted by me    EKG:

## 2020-07-26 NOTE — ED PROVIDER NOTE - PROGRESS NOTE DETAILS
Labs suggest hepatorenal syndrome with acute renal failure.  MICU, GI (Hepatology), and nephrology consulted.

## 2020-07-26 NOTE — ED PROVIDER NOTE - OBJECTIVE STATEMENT
39y male with PMH alcoholic liver cirrhosis, traumatic bladder rupture presenting with worsening jaundice, diarrhea, abdominal pain and distention and generalized weakness. Was discharged from [facility] on 7/17, states that since d/c symptoms have become worse. +nausea, SOB, easy bruising. No vomiting, fevers, chills. Has not been taking his lactulose and has not been able to get to his rifaximin. Last drink was 7/4, no drinking since.

## 2020-07-26 NOTE — CONSULT NOTE ADULT - SUBJECTIVE AND OBJECTIVE BOX
Chief Complaint:  Patient is a 39y old  Male who presents with a chief complaint of abdominal pain (26 Jul 2020 18:31)    HPI:LEATHA MACIAS is a 39y Male w/ hx of EtOH cirrhosis decompensated by ascites and recent admission for EtOH hepatitis vs acute on chronic liver failure due to EtOH, now p/w ascites and worsening liver tests along w/ renal failure. Pt was discharged on 7/17 to home w/ his parents. Denies EtOH – last drink 7/4. Was feeling okay at home, then began developing loose stools, abd discomfort and distension, jaundice, and fatigue. Denies f/c, cough, SOB, nausea, vomiting, dysuria. Denies melena or hematochezia. He says his urination has been normal. No sick contacts.     Pt denies new medications other than listed, specifically denies herbal medications.     Last admission was for seizure, thought to be EtOH w/d related. He was treated for w/d. Course notable for fever – infectious work-up negative. Steroids deferred due to MDF 32.    PMHX/PSHX:  No pertinent past medical history  S/P exploratory laparotomy  No significant past surgical history    Allergies:  No Known Allergies    Home Medications: reviewed    Hospital Medications:  albumin human 25% IVPB 100 milliLiter(s) IV Intermittent every 6 hours  folic acid 1 milliGRAM(s) Oral daily  heparin   Injectable 5000 Unit(s) SubCutaneous every 8 hours  octreotide  Injectable 100 MICROGram(s) IV Push three times a day  piperacillin/tazobactam IVPB. 3.375 Gram(s) IV Intermittent once  piperacillin/tazobactam IVPB.. 3.375 Gram(s) IV Intermittent every 12 hours  rifAXIMin 200 milliGRAM(s) Oral three times a day  thiamine 100 milliGRAM(s) Oral at bedtime    Social History:   Tob: Denies  EtOH: Prior  Illicit Drugs: Denies    Family history:  FH: alpha 1 antitrypsin deficiency    Denies family history of colon cancer/polyps, stomach cancer/polyps, pancreatic cancer/masses, liver cancer/disease, ovarian cancer and endometrial cancer.    ROS:   General:  No  fevers, chills, night sweats, fatigue  Eyes:  Good vision, no reported pain  ENT:  No sore throat, pain, runny nose  CV:  No pain, palpitations  Pulm:  No dyspnea, cough  GI:  See HPI, otherwise negative  :  No  incontinence, nocturia  Muscle:  No pain, weakness  Neuro:  No memory problems  Psych:  No insomnia, mood problems, depression  Endocrine:  No polyuria, polydipsia, cold/heat intolerance  Heme:  No petechiae, ecchymosis, easy bruisability  Skin:  No rash    PHYSICAL EXAM:   Vital Signs:  Vital Signs Last 24 Hrs  T(C): 36.5 (26 Jul 2020 18:40), Max: 36.7 (26 Jul 2020 16:37)  T(F): 97.7 (26 Jul 2020 18:40), Max: 98 (26 Jul 2020 16:37)  HR: 76 (26 Jul 2020 18:40) (76 - 80)  BP: 99/63 (26 Jul 2020 18:40) (99/63 - 115/68)  BP(mean): --  RR: 17 (26 Jul 2020 18:40) (17 - 22)  SpO2: 98% (26 Jul 2020 18:40) (98% - 99%)  Daily Height in cm: 185.42 (26 Jul 2020 11:23)    Daily     GENERAL: no acute distress  NEURO: alert, no asterixis, able to complex concentration tasks  HEENT: icteric sclera, no conjunctival pallor appreciated  CHEST: no respiratory distress, no accessory muscle use  CARDIAC: regular rate, rhythm  ABDOMEN: soft, ++ tender, +++ distended, no rebound or guarding  EXTREMITIES: warm, well perfused, no edema  SKIN: ++ jaundice    LABS: reviewed                        11.9   28.96 )-----------( 630      ( 26 Jul 2020 12:10 )             33.7     07-26    133<L>  |  93<L>  |  54<H>  ----------------------------<  115<H>  3.7   |  18<L>  |  9.64<H>    Ca    7.6<L>      26 Jul 2020 12:10  Phos  8.7     07-26  Mg     2.0     07-26    TPro  6.3  /  Alb  3.1<L>  /  TBili  33.4<H>  /  DBili  >10.0<H>  /  AST  127<H>  /  ALT  20  /  AlkPhos  228<H>  07-26    LIVER FUNCTIONS - ( 26 Jul 2020 12:10 )  Alb: 3.1 g/dL / Pro: 6.3 g/dL / ALK PHOS: 228 U/L / ALT: 20 U/L / AST: 127 U/L / GGT: x               Diagnostic Studies: see sunrise for full report

## 2020-07-26 NOTE — CONSULT NOTE ADULT - PROBLEM SELECTOR RECOMMENDATION 2
Pt. with hyponatremia in setting of liver cirrhosis and severe ascitis. Serum sodium 133 on admission (7/26/)   previous episodes of hyponatremia. Last admission discharge with Na 133-      check urine electrolytes, urine Osm  Monitor serum sodium Q6 hours.   Do not correct serum sodium >6-8 meq/day.          Discussed with Nephrology attending

## 2020-07-26 NOTE — ED PROVIDER NOTE - CLINICAL SUMMARY MEDICAL DECISION MAKING FREE TEXT BOX
Impression/Plan:  worsening cirrhosis as judged by worsening jaundice.  although not taking his lactulose, he is not overtly encephalopathic, rather, seems to have mental slowing.    Not clinically infected, and no abdominal pain to suggest SBP.  Given degree of jaundice, and seeming suboptimal outpatient noncompliance, we are anticipating admit. Will send covid.    Given degree of bruising, he may have significant synthetic coagulopathy.

## 2020-07-26 NOTE — ED ADULT TRIAGE NOTE - CHIEF COMPLAINT QUOTE
chronic abdominal pain; diarrhea started 9 days ago;  abdominal distention not improving; lower extremities weakness started last night

## 2020-07-26 NOTE — H&P ADULT - HISTORY OF PRESENT ILLNESS
39 y.o. Male with PMhx of Alcohol liver cirrhosis, alcohol use disorder, traumatic bladder rupture s/p ex lap in 2019 p/w diffuse crampy abdominal pain. Patient reports that since last discharge on 7/17 has had multiple episode of watery diarrhea (about 9-10 per day), worsening abdominal pain and distension, associated weakness and nausea no vomiting. Stopped taking lactulose few days ago. Also reports SOB that he attributes to abdominal distension. Denies NSAIDs use. Denies fever, vomiting, chills, sick contacts, recent travel. He has been compliant with Lasix and Aldactone, but has not taken lactulose due to diarrhea. Last drink on 7/4. Also complaining of increased itchiness all over body and yellowing of skin.

## 2020-07-26 NOTE — H&P ADULT - NSHPPHYSICALEXAM_GEN_ALL_CORE
Vital Signs Last 24 Hrs  T(C): 36.7 (26 Jul 2020 16:37), Max: 36.7 (26 Jul 2020 16:37)  T(F): 98 (26 Jul 2020 16:37), Max: 98 (26 Jul 2020 16:37)  HR: 80 (26 Jul 2020 16:37) (77 - 80)  BP: 115/68 (26 Jul 2020 16:37) (102/60 - 115/68)  BP(mean): --  RR: 17 (26 Jul 2020 16:37) (17 - 22)  SpO2: 98% (26 Jul 2020 16:37) (98% - 99%)  CAPILLARY BLOOD GLUCOSE        I&O's Summary      PHYSICAL EXAM:  GENERAL: NAD, jaundiced  HEAD:  Atraumatic, Normocephalic  EYES: EOMI, PERRL + scleral icterus  MOUTH: no oral thrush  NECK: Supple, No JVD  CHEST/LUNG: Clear to auscultation bilaterally; No wheezes rales or rhonchi  HEART: s1 s2 Regular rate and rhythm; No murmurs, rubs, or gallops  ABDOMEN: distended TTP in all 4 quadrants + no rebound gaurding or rigidity distant BS+ + fluid wave  EXTREMITIES:  2+ Peripheral Pulses, No clubbing, cyanosis, or edema  NEUROLOGY: AAOx3, non-focal  SKIN: b/l purpura on legs

## 2020-07-26 NOTE — ED PROVIDER NOTE - ATTENDING CONTRIBUTION TO CARE
MD Blanco:  patient seen and evaluated with the resident.  I was present for key portions of the History & Physical, and I agree with the Impression & Plan.  MD Blanco:  38 yo M, cirrhosis, c/o worsening jaundice, generalized weakness, and pruritis.  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  Admitted mid-July with ETOH-WD seizure, bilirubin upon dc on 7/17 was 17.    Physical Exam:  adult M, jaundiced-appearing, scleral icterus, cirrhotic-appearing (jaundiced with protuberant abdomen) bitemporal wasting, PERRL, EOMI, neck supple, CTA B, no edema, AAOx3 ambulates w/o difficulty.  Abdomen:  soft, distended, nontender to palpation.    Of note, the patient has not been taking his lactulose as prescribed because he's already having loose stools, and he is not taking his rifaximin because of an insurance authorization issue.    Impression/Plan:  worsening cirrhosis as judged by worsening jaundice.  although not taking his lactulose, he is not overtly encephalopathic, rather, seems to have mental slowing.    Not clinically infected, and no abdominal pain to suggest SBP.  Given degree of jaundice, and seeming suboptimal outpatient noncompliance, we are anticipating admit. Will send covid.    Given degree of bruising, he may have significant synthetic coagulopathy.

## 2020-07-26 NOTE — CONSULT NOTE ADULT - SUBJECTIVE AND OBJECTIVE BOX
[facility] DIVISION OF KIDNEY DISEASES AND HYPERTENSION -- [phone]  -- INITIAL CONSULT NOTE  --------------------------------------------------------------------------------  HPI:  39 y.o. Male with PMhx of Alcohol liver cirrhosis, alcohol use disorder, traumatic bladder rupture s/p ex lap in 2019, recently admitted to [facility] from 7/11 to 7/17 for seizure and alcoholic cirrhosis. Presented to ED c/o worsening abdominal pain. Patient reports that since last discharge on 7/17 has had multiple episode of watery diarrhea (about 9-10 per day), worsening abdominal pain and distension, associated weakness and nausea. Also reports SOB that he attributes to abdominal distension. Denies NSAIDs use. Denies fever, vomiting, chills, sick contacts, recent travel. He has been compliant with Lasix and Aldactone, but has not taken lactulose due to diarrhea. Last drink on 7/4    Nephrology consulted for MARQUISE       PAST HISTORY  --------------------------------------------------------------------------------  PAST MEDICAL & SURGICAL HISTORY:  No pertinent past medical history  S/P exploratory laparotomy    FAMILY HISTORY:  FH: alpha 1 antitrypsin deficiency    PAST SOCIAL HISTORY:  +ve alcohol use disorder   denies smoking and drugs     ALLERGIES & MEDICATIONS  --------------------------------------------------------------------------------  Allergies    No Known Allergies    Intolerances      Standing Inpatient Medications    PRN Inpatient Medications      REVIEW OF SYSTEMS  --------------------------------------------------------------------------------  Gen: No fevers/chills  Skin: yellowish pigmentation, No rashes  Head/Eyes/Ears: Normal hearing,   Respiratory: mild dyspnea, no cough  CV: No chest pain  GI: +ve abdominal pain and diarrhea  : No dysuria, hematuria  MSK: No edema  Heme: +ve easy bruising  Psych: No significant depression    All other systems were reviewed and are negative, except as noted.    VITALS/PHYSICAL EXAM  --------------------------------------------------------------------------------  T(C): 36.5 (07-26-20 @ 12:01), Max: 36.5 (07-26-20 @ 12:01)  HR: 77 (07-26-20 @ 12:01) (77 - 78)  BP: 102/60 (07-26-20 @ 12:01) (102/60 - 114/76)  RR: 18 (07-26-20 @ 12:01) (18 - 22)  SpO2: 99% (07-26-20 @ 12:01) (98% - 99%)  Wt(kg): --  Height (cm): 185.42 (07-26-20 @ 11:23)  Weight (kg): 83.9 (07-26-20 @ 11:23)  BMI (kg/m2): 24.4 (07-26-20 @ 11:23)  BSA (m2): 2.08 (07-26-20 @ 11:23)      Physical Exam:  	Gen: icteric, mild discomfort due to abd pain  	HEENT: jaundice.  	Pulm: CTA B/L, no wheezing, no rales  	CV: S1S2, regular, no murmur   	Abd: severely Distended, bruising on right upper quadrant, NT, surgical scar midline noted.   	Ext: No LE edema B/L  	Neuro: Awake, A&O x3, no asterixis   	Skin: icteric.       LABS/STUDIES  --------------------------------------------------------------------------------              11.9   28.96 >-----------<  630      [07-26-20 @ 12:10]              33.7     133  |  93  |  54  ----------------------------<  115      [07-26-20 @ 12:10]  3.7   |  18  |  9.64        Ca     7.6     [07-26-20 @ 12:10]      Mg     2.0     [07-26-20 @ 12:10]      Phos  8.7     [07-26-20 @ 12:10]    TPro  6.3  /  Alb  3.1  /  TBili  33.4  /  DBili  >10.0  /  AST  127  /  ALT  20  /  AlkPhos  228  [07-26-20 @ 12:10]    PT/INR: PT 17.9 , INR 1.54       [07-26-20 @ 12:10]  PTT: 36.2       [07-26-20 @ 12:10]      Creatinine Trend:  SCr 9.64 [07-26 @ 12:10]  SCr 0.85 [07-17 @ 07:08]  SCr 0.63 [07-16 @ 09:29]  SCr 0.61 [07-15 @ 09:17]  SCr 0.58 [07-14 @ 09:18]    Urinalysis - [07-14-20 @ 14:35]      Color Teresa / Appearance Slightly Turbid / SG 1.035 / pH 6.5      Gluc Negative / Ketone Trace  / Bili Large / Urobili 3 mg/dL       Blood Negative / Protein 30 mg/dL / Leuk Est Negative / Nitrite Negative      RBC 2 / WBC 7 / Hyaline 0 / Gran  / Sq Epi  / Non Sq Epi 15 / Bacteria Negative      Iron 34, TIBC 124, %sat 27      [07-14-20 @ 10:34]  Ferritin 812      [07-12-20 @ 08:58]    HBsAg Nonreact      [07-12-20 @ 01:16]  HCV 0.14, Nonreact      [07-12-20 @ 01:16]  HIV Nonreact      [07-15-20 @ 10:47]    SHAUN: titer Negative, pattern --      [07-12-20 @ 09:37]  Free Light Chains: kappa 2.35, lambda 2.06, ratio = 1.14      [07-12 @ 08:58]

## 2020-07-26 NOTE — CONSULT NOTE ADULT - ATTENDING COMMENTS
Hepatology Staff: Yesenia Funez MD    I saw and examined the patient along with  Dr. Shook  07-27-20 @ 09:53.    Patient Medical Record, hosptial course was reviewed and summarized as below:    Vitals: Vital Signs Last 24 Hrs  T(C): 37 (27 Jul 2020 05:26), Max: 37 (26 Jul 2020 21:20)  T(F): 98.6 (27 Jul 2020 05:26), Max: 98.6 (26 Jul 2020 21:20)  HR: 78 (27 Jul 2020 05:26) (72 - 80)  BP: 108/70 (27 Jul 2020 05:26) (97/62 - 115/68)    RR: 18 (27 Jul 2020 05:26) (17 - 22)  SpO2: 96% (27 Jul 2020 05:26) (95% - 99%)    IV Fluids: albumin human 25% IVPB 100 milliLiter(s) IV Intermittent every 6 hours    Antibiotics:piperacillin/tazobactam IVPB.. 3.375 Gram(s) IV Intermittent every 12 hours  rifAXIMin 200 milliGRAM(s) Oral three times a day    Diuretics:  Labs:Creatinine, Serum: 9.85 mg/dL (07-26-20 @ 21:36)  Creatinine, Serum: 9.64 mg/dL (07-26-20 @ 12:10)  Bilirubin Total, Serum: 33.4 mg/dL (07-26-20 @ 12:10)  INR: 1.54 ratio (07-26-20 @ 12:10)      I/O: I&O's Summary    26 Jul 2020 07:01  -  27 Jul 2020 07:00  --------------------------------------------------------  IN: 300 mL / OUT: 700 mL / NET: -400 mL      Nutritional Status: Weight (kg): 82.8 (07-26-20 @ 21:20)  BMI (kg/m2): 24.1 (07-26-20 @ 21:20)  Albumin, Serum: 3.1 g/dL (07-26-20 @ 12:10)    Recommendations: Patient with hx of alcoholic hepatitis with suspected underlying cirrhosis. Recent hospital discharge ( was not on steroid). Admitted back with worsening jaundice, fatigue, and abd distension. Concern for infection vs worsening liver failure ( from alcoholic hepatitis). Agree with abx ( Iv Zosyn). Pan culture. Also has MARQUISE ( Cr > 9). Recommend renal consult (would prefer transplant nephrologist). Check Urine Electrolytes.  Patient likely has ATN ( although HRS in D/D) Would check I/O. Add IV Albumin 25% 25 g q8hrs. Would recommend IV Lasix 60 mg bid.  We will review transplant candidacy on follow up.    Plan discussed with Primary team.

## 2020-07-26 NOTE — CONSULT NOTE ADULT - ASSESSMENT
39 y.o. Male with PMhx of Alcohol liver cirrhosis, alcohol use disorder, traumatic bladder rupture s/p ex lap in 2019, recently admitted to [facility] from 7/11 to 7/17 for seizure and alcoholic cirrhosis. Presented to ED c/o worsening abdominal pain and diarrhea.     Nephrology consulted for MARQUISE

## 2020-07-26 NOTE — CONSULT NOTE ADULT - ASSESSMENT
39M PMHx alcoholic cirrhosis, traumatic bladder rupture due to fall s/p ex lap and repair (2019) presents for worsening jaundice, diarrhea, ABD pain/distension, and generalized weakness, found to have soft pressures and profound renal failure in the setting of persistent watery diarrhea, diuretics, and overall worsening intravascular depletion due to cirrhosis.    #Renal failure  - On discharge 07/17, Cr 0.85; on admission, Cr 9.64  - MARQUISE is likely due to profuse diarrhea (watery and present since discharge without lactulose), diuretics (lasix and spironolactone) with contributions from intravascular volume depletion due to cirrhosis; patient also noted to have diarrhea with elevated WBC count, so infectious process is also a possibility  - Recommend holding all diuretics (Lasix and spironolactone) to prevent further diuresis/kidney injury  - Recommend holding nadolol given soft pressures  - Recommend continuing to hold lactulose to minimize iatrogenic exacerbation of diarrhea  - Administer albumin 25% (or IVF) for fluid resuscitation  - Renal consulted, appreciate recs; given patient's lab values and appropriate mental status, no need for urgent dialysis  - Avoid all nephrotoxins  - Trend BMP    #Diarrhea  - Multiple episodes of diarrhea without lactulose since discharge 7/17  - Given recent hospitalization, recommend stool sample to be sent for C. diff analysis  - Contact precautions during workup    #Decompensated Cirrhosis  - May have been precipitated by infection (i.e. C. diff, SBP, etc)  - S/p paracentesis in ED; WBCs 162, no evidence of SBP  - F/u C. diff stool sample  - Hepatology consult for further cirrhosis management  - Hold diuretics, nadolol, lactulose for now given MARQUISE/soft pressures as above    Given patient's clinical picture, patient is determined to not require ICU-level care. Please reconsult as necessary if the patient's clinical status changes. Thank you.    MICU

## 2020-07-26 NOTE — ED PROVIDER NOTE - CARE PLAN
Principal Discharge DX:	Acute renal failure, unspecified acute renal failure type  Secondary Diagnosis:	Hepatorenal syndrome  Secondary Diagnosis:	Hyperbilirubinemia

## 2020-07-26 NOTE — H&P ADULT - ASSESSMENT
39 y.o. Male with PMhx of Alcohol liver cirrhosis, alcohol use disorder, traumatic bladder rupture s/p ex lap in 2019 p/w diffuse crampy abdominal pain. Patient reports that since last discharge on 7/17 has had multiple episode of watery diarrhea (about 9-10 per day), worsening abdominal pain and distension,

## 2020-07-26 NOTE — CONSULT NOTE ADULT - ASSESSMENT
39M w/ decompensated EtOH cirrhosis, presenting w/ acute on chronic liver failure of unclear etiology.     Impression:  #Acute on chronic liver failure – d/dx includes underlying infection (UA neg, BCx pending, CXR neg, dx para neg for SBP, biliary imaging pending, C diff pending), worsening EtOH hepatitis (MDF = 60 but no interval drinking since discharge and this is an acute change), vs vascular injury  #Cirrhosis due to EtOH, decompensated by ascites  - varices: no prior EGD, on nadolol at home  - ascites: present on exam, neg for SBP 7/26, on Lasix 20mg/spironolactone 25mg daily  - HE: none on exam  - HCC: no imaging, awaiting MRI  - MELD-Na = 38 on 7/26  #Pruritis – likely due to elevated bilirubin    Recommendations:  - f/u full infectious work-up (BCx, biliary imaging, C diff pending)  - empiric abx w/ zosyn for now  - will consider NAC based on clinical course  - f/u C. diff, tx w/ PO vanc if positive  - agree w/ RUQ U/S and MRI to r/o stone disease (less likely given pattern of liver disease) and acute cholecystitis or vascular thrombus  - hold nadolol  - hold diuretics  - renal U/S and renal consult  - start 25% albumin 100mL q6h  - start midodrine 10mg TID  - start octreotide 100mcg subQ q8h  - send EtOH metabolites in urine and blood (ethyl glucuronide and ethyl sulfate)  - no indication for steroids at this time for possible EtOH hepatitis given c/f underlying infection  - trend CMP, CBC, INR daily  - hepatology will continue to follow    D/w Dr. Minh Shook  Gastroenterology Fellow  AT NIGHT AND ON WEEKENDS:  Contact on-call GI fellow via answering service ([phone]) from 5pm-8am and on weekends/holidays  MONDAY-FRIDAY 8AM-5PM:  Available via [employer] Teams  Call [phone] ([facility]) or Page [phone] ([facility]) from 8am-5pm M-F

## 2020-07-26 NOTE — ED PROVIDER NOTE - NS ED ROS FT
Gen: No fever, No chills  Eyes: No vision changes, +scleral icterus   ENT: No congestion, sore throat  Resp: No cough, +SOB  Cardiovascular: No chest pain or palpitations  GI: +abdominal pain, nausea, diarrhea. No vomiting  :  No change in urine output or frequency; no dysuria  MS: No joint or muscle pain  Skin: +jaundice, easy brusing  Neuro: No headache; +generalized weakness  Remainder negative, except as per the HPI

## 2020-07-26 NOTE — H&P ADULT - NSHPLABSRESULTS_GEN_ALL_CORE
11.9   28.96 )-----------( 630      ( 2020 12:10 )             33.7         133<L>  |  93<L>  |  54<H>  ----------------------------<  115<H>  3.7   |  18<L>  |  9.64<H>    Ca    7.6<L>      2020 12:10  Phos  8.7       Mg     2.0         TPro  6.3  /  Alb  3.1<L>  /  TBili  33.4<H>  /  DBili  >10.0<H>  /  AST  127<H>  /  ALT  20  /  AlkPhos  228<H>      Prothrombin Time and INR, Plasma in AM (20 @ 12:10)    Prothrombin Time, Plasma: 17.9: Effective 2020 the reference range for PT has changed. sec    INR: 1.54: Recommended ranges for therapeutic INR:    2.0-3.0 for most medical and surgical thromboembolic states    2.0-3.0 for atrial fibrillation    2.0-3.0 for bileaflet mechanical valve in aortic position    2.5-3.5 for mechanical heart valves    Chest 2004;126:[id number]  The presence of direct thrombin inhibitors (argatroban, refludan)  may falsely increase results. ratio    Urinalysis Basic - ( 2020 15:54 )    Color: Dark Yellow / Appearance: Slightly Turbid / S.018 / pH: x  Gluc: x / Ketone: Negative  / Bili: Large >10 / Urobili: 2 mg/dL   Blood: x / Protein: 100 / Nitrite: Negative   Leuk Esterase: Negative / RBC: 0 /hpf / WBC 1 /HPF   Sq Epi: x / Non Sq Epi: 1 /hpf / Bacteria: Negative    Cell Count, Body Fluid (20 @ 14:13)    Monocyte/Macrophage Count - Body Fluid: 90 %    Fluid Segmented Granulocytes: 1 %    Fluid Type: Peritoneal fl    Body Fluid Appearance: Clear    BF Lymphocytes: 7 %    Mesothelial Cells - Body Fluid: 2 %    Tube Type: Lavender    Color - Body Fluid: Yellow    Total Nucleated Cell Count, Body Fluid: 162: Peritoneal/Pleural/ Pericardial  Body Fluid Types            Total Nucleated cells: <500/uL            Neutrophils: <25%          Synovial Body Fluid Type           Total Nucleated cells: <150/uL           Neutrophils: <25%           Lymphocytes: <75%           Moncytes/Macrophages: </=70%  Please note reference range updated effective 2019 /uL    Total RBC Count: 144 /uL    Gram Stain - RRL (20 @ 14:12)    Gram Stain - RRL:    Source:        PERITONEAL FLUID   ---------- STAINS /PREPS REPORT ----------  NO ORGANISMS SEEN  NUMEROUS WBC'S SEEN  FEW RBC'S SEEN Reference Range: No Organisms seen      CXR reviewed by me no obvious infiltrate

## 2020-07-26 NOTE — ED ADULT NURSE NOTE - OBJECTIVE STATEMENT
38y/o male history of alcoholic liver cirrhosis, traumatic bladder rupture presents to the ED  from home c/o worsening jaundice, leg weakness, and diarrhea/ abdominal distention for the last 9 days. Pt discharged on 7/17, since discharge symptoms have worsened. Pt states having exertional dyspnea and some nausea. Pt is AOx4, clear lung sounds, abdominal distention and pain, bowel sounds are present in all four quadrants. Patient denies fever, chills, vomiting,  numbness/tingling, urinary s/s, in no respiratory distress, no chest pain, Patient safety provided with call bell within reach and bed in the lowest position. 20G Iv in left AC labs drawn and sent as ordered, awaiting disposition.

## 2020-07-26 NOTE — ED ADULT NURSE NOTE - SUICIDE SCREENING DEPRESSION
HISTORY OF PRESENT ILLNESS  Kelley Baum is a 46 y.o. female. HPI  Patient complains of sore throat, dry cough x 2-3 weeks. Daughter diagnosed with croup. Patient has felt fatigued, denies fevers, chills, shortness of breath or sputum. Has nasal congestion, without sinus pain,   She also complains of intermittent laryngitis. Past Medical History:   Diagnosis Date    Arrhythmia     HEART MURMUR AGE 9    Calculus of kidney 2010    Cancer ([address] 2018    R BREAST CA    Chronic kidney disease 2008    STONES     Hypertension     NVD (normal vaginal delivery) 08/18/2017    Overweight(278.02) 8/28/2013      Current Outpatient Medications on File Prior to Visit   Medication Sig Dispense Refill    ibuprofen (MOTRIN IB) 200 mg tablet Take 200 mg by mouth as needed for Pain.  amLODIPine (NORVASC) 5 mg tablet Take 5 mg by mouth daily. No current facility-administered medications on file prior to visit. All: Levaquin  Oxycodone    ROS  Per HPI  Physical Exam  Visit Vitals  /82   Pulse 96   Temp 98.3 °F (36.8 °C) (Oral)   Resp 16   Ht 5' 6\" (1.676 m)   Wt 175 lb (79.4 kg)   SpO2 98%   BMI 28.25 kg/m²      Patient is in no apparent distress   HEENT: normocephalic,  conjunctiva clear  Oropharynx: erythema posterior pharynx without exudate, some clear post nasal drainage present  Nose: no drainage  Sinuses: nontender  Ears: tympanic membrane's and canals normal.  No erythema, fluid, drainage  Neck: supple, no adenopathy  Heart[de-identified] normal rate, regular rhythm, normal S1, S2, no murmurs, rubs, clicks or gallops. Chest: clear to auscultation, no wheezes, rales or rhonchi,   Extremities: no edema  Rapid Strep: negative  ASSESSMENT and PLAN  Pharyngitis, Rhinitis   Likely viral, (exposure to viral illnesses) ?  Allergic  Recommend:   Mucinex 1 tablet twice daily for nose/chest congestion  Flonase nasal spray 2 sprays each nostril daily (for nasal congestion, allergies)  Gargle with warm salt water  Increase fluids  Call or return to clinic if these symptoms worsen or fail to improve as anticipated. Negative

## 2020-07-27 NOTE — PROGRESS NOTE ADULT - PROBLEM SELECTOR PROBLEM 5
Cirrhosis of liver with ascites, unspecified hepatic cirrhosis type Alcoholic cirrhosis of liver with ascites

## 2020-07-27 NOTE — PROGRESS NOTE ADULT - PROBLEM SELECTOR PLAN 7
- approximately 1.2cm eschar with surround ring of erythema just proximal to right 5th toe reported by patient being present the week prior to initial admit to [facility]. Patient denies pain at site, purulence, trauma and cannot recall when it occurred but denies it has enlarged.  - Podiatry reccs noted

## 2020-07-27 NOTE — PROGRESS NOTE ADULT - PROBLEM SELECTOR PLAN 2
- abdominal sono with small right upper quadrant and small left upper quadrant free fluid. Trace free fluid within the right lower quadrant.  - lasix 20mg qd  - aldactone 50mg qd  - trace fluid not amenable for paracentesis pre renal v obstructive v intrarenal vs. heparorenal  - will start on IV albumin infusion, octreotide 100mcg tid must r/o hepatorenal syndrome  - midodrine 10mg po tid  urine lytes  renally dose all meds  avoid nsaids/morphine/hold lasix and aldactone   renal sono  ashton catheter for strict i/o  appreciate renal recs

## 2020-07-27 NOTE — PROGRESS NOTE ADULT - PROBLEM SELECTOR PLAN 5
- identified on MR abdomen 7/11/20  - likely in the setting of etoh use   - MELD-Na: 25 at time of transfer  - DF: 32.5; reported last drink 7/4  - Has not had EGD to evaluate for varices  - lasix 20mg qd  - aldactone 50mg qd  - nadalol 20mg for prevention of esophageal varicies   - rifaximin 200mg tid  - lactulose q6 ; having bms Cirrhosis: Likely due to EtOH dependence  Varices: No prior EGD  Ascites: present  HE: AAO x 3,   HCC: No prior imaging  hepatology following

## 2020-07-27 NOTE — CHART NOTE - NSCHARTNOTEFT_GEN_A_CORE
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 Would still check urine lytes and hydrate with albumin for 48 hours before planning any changes in plan. No acute indication for dialysis yet. Rule out obstruction as well with a ashton and sonogram.   can continue albumin for now and midodrine for bp  dose zosyn per GFR changes    Earle Olmos MD  Cell   Pager   Office

## 2020-07-27 NOTE — PROGRESS NOTE ADULT - ASSESSMENT
39 y.o. Male with PMhx of Alcohol liver cirrhosis, alcohol use disorder, traumatic bladder rupture s/p ex lap in 2019 p/w diffuse crampy abdominal pain. Patient reports that since last discharge on 7/17 has had multiple episode of watery diarrhea (about 9-10 per day), worsening abdominal pain and distension, here with findings consistent with alcoholic hepatitis vs. decompensated cirrhosis and renal failure concerning for hepatorenal syndrome.

## 2020-07-27 NOTE — PROGRESS NOTE ADULT - PROBLEM SELECTOR PLAN 3
Seizure occurred on 7/10 and initially treated at [facility] prior to transfer. No previous neurologic disease reported. charted to have had fall prior to seizure but patient denies on interview following ICU transfer to floor.  - EEG completed and did not detect seizure activity  - MR brain reported to be abnormal with ventricular atrophy and "focus of signal hyperintensity on the FLAIR in the periventricular region on the left nonspecific in a patient of this age may represent focus of infection inflammation demyelination or ischemia."  - on Ativan taper for EtOH withdrawal; CIWA score 0; will d/c Ativan today plan as above  c diff pending  hold lactulose, ammonia level low

## 2020-07-27 NOTE — PROGRESS NOTE ADULT - PROBLEM SELECTOR PLAN 8
- Encephalopathy has resolved   - likely due to Etoh withdrawal syndrome   - EEG does not reported detected seizure. Brain MR on 7/13 is abnormal with report of ventricular atrophy not appropriate for age and hyperintensity of Left periventricular region.   - resolved

## 2020-07-27 NOTE — PROGRESS NOTE ADULT - PROBLEM SELECTOR PLAN 1
- alcoholic hepatitis  - per documentation at [place] CT A/P with Diffuse hepatosplenomagaly   - MELD-Na: 25 at time of transfer  - DF: 32.5 at time of transfer; Steroids deferred by hepatology given SIRS and need to r/o infection; reported last drink 7/4  - currently not delirious (resolved since initial transfer to [facility]), no asterixis  - Work up thus far: babesia PCR not detected, CMV neg, acute hepatitis panel neg, HepE pending, EBV-IgM(neg)IgG(pos)EarlyAg(neg)NuclearAg(pos) E, COVID19 IgG neg, HIV reported neg at previous hospital, babesia, ehrlichia, anaplasma negative   - Studies in lab and pending result: Phosphatidylethanol ([facility]; assessing chronic EtOH use)  - continue rifaximin, lactulose, folic acid, thiamine  - empiric zosyn since 7/11-7/16 due to underlying liver inflammatory process patient p/w diffuse abdominal crampy in nature with leukocytosis to 28, MARQUISE with crt 9, hyperbilirubinemia. Likely alcoholic hepatitis vs. decompensated cirrhosis. Workup for other acute etiologies in progress.  - differential is wide at this time  - cdif negative  - s/p diagnostic paracentesis in ED 7/26 with cell count that is not consistent with SBP, gram stain showing numerous WBCs but no organisms  given hyperbilirubenima abdominal pain and leukocytosis also cannot rule out cholangitis at this time- f/u MRCP and RUQ sono with doppler  - IV zosyn renally dosed for now  - IR c/s for therapeutic paracentesis

## 2020-07-27 NOTE — PROGRESS NOTE ADULT - PROBLEM SELECTOR PLAN 6
- fever and tachycardia without clear source of infection  - Since transfer from ICU, the patient has nontoxic appearance, without encephalopathy, no tremor/tongue fasciculation, no asterixis appreciated, reports feeling generally improved.  - see above for work up completed thus far  - CXR without lobar pneumonia identified, UA not c/w acute cystitis.  - source is suspected from acute hepatitis   - s/p IV zosyn 7/11-7/16  - blood cx ngtd dvt ppx w hsq

## 2020-07-27 NOTE — PROGRESS NOTE ADULT - SUBJECTIVE AND OBJECTIVE BOX
Elver Abrams MD  Division of Hospital Medicine  Pager: [phone]  If no response or off-hours, page [phone]  -------------------------------------    Patient is a 39y old  Male who presents with a chief complaint of abdominal pain (2020 19:24)      SUBJECTIVE / OVERNIGHT EVENTS: none acute  ADDITIONAL REVIEW OF SYSTEMS: pt reports b/l LE pain after ambulating to the restroom, R>L, rated 8-9/10, sharp, constant, then subsided to about 6/10 and localized to primarily B/L feet/heels, constant. No prior episodes. No numbness or tingling. No fevers/chills, no urinary symptoms, no cough/sob. States his nausea and malaise have improved somewhat. 14 point ros otherwise negative.     MEDICATIONS  (STANDING):  albumin human 25% IVPB 100 milliLiter(s) IV Intermittent every 6 hours  folic acid 1 milliGRAM(s) Oral daily  heparin   Injectable 5000 Unit(s) SubCutaneous every 8 hours  midodrine. 10 milliGRAM(s) Oral three times a day  octreotide  Injectable 100 MICROGram(s) IV Push three times a day  piperacillin/tazobactam IVPB.. 3.375 Gram(s) IV Intermittent every 12 hours  rifAXIMin 200 milliGRAM(s) Oral three times a day  thiamine 100 milliGRAM(s) Oral at bedtime    MEDICATIONS  (PRN):      CAPILLARY BLOOD GLUCOSE        I&O's Summary    2020 07:01  -  2020 07:00  --------------------------------------------------------  IN: 300 mL / OUT: 700 mL / NET: -400 mL        PHYSICAL EXAM:  Vital Signs Last 24 Hrs  T(C): 36.4 (2020 12:26), Max: 37 (2020 21:20)  T(F): 97.5 (2020 12:26), Max: 98.6 (2020 21:20)  HR: 71 (2020 12:26) (71 - 80)  BP: 106/68 (2020 12:26) (97/62 - 115/68)  BP(mean): --  RR: 18 (2020 12:26) (17 - 18)  SpO2: 97% (2020 12:26) (95% - 99%)  CONSTITUTIONAL: NAD, well-developed, well-groomed  EYES: PERRLA; conjunctiva and sclera icteric  ENMT: Moist oral mucosa, no pharyngeal injection or exudates; normal dentition  NECK: Supple, no palpable masses; no thyromegaly  RESPIRATORY: Normal respiratory effort; lungs are clear to auscultation bilaterally  CARDIOVASCULAR: Regular rate and rhythm, normal S1 and S2, no murmur/rub/gallop; No lower extremity edema; Peripheral pulses are 2+ bilaterally  ABDOMEN: softly distended, Nontender to palpation, normoactive bowel sounds, no rebound/guarding; No hepatosplenomegaly  MUSCLOSKELETAL:  Normal gait; no clubbing or cyanosis of digits; no joint swelling or tenderness to palpation, No B/L LE tenderness to palpation, no palpable induration redness or warmth  PSYCH: A+O to person, place, and time; affect appropriate  NEUROLOGY: CN 2-12 are intact and symmetric; no gross sensory deficits;   SKIN: No rashes; no palpable lesions, +jaundice    LABS:                        11.9   28.96 )-----------( 630      ( 2020 12:10 )             33.7     07-26    132<L>  |  91<L>  |  57<H>  ----------------------------<  101<H>  3.6   |  16<L>  |  9.85<H>    Ca    7.1<L>      2020 21:36  Phos  8.7       Mg     2.0         TPro  6.3  /  Alb  3.1<L>  /  TBili  33.4<H>  /  DBili  >10.0<H>  /  AST  127<H>  /  ALT  20  /  AlkPhos  228<H>  -26    PT/INR - ( 2020 12:10 )   PT: 17.9 sec;   INR: 1.54 ratio         PTT - ( 2020 12:10 )  PTT:36.2 sec      Urinalysis Basic - ( 2020 15:54 )    Color: Dark Yellow / Appearance: Slightly Turbid / S.018 / pH: x  Gluc: x / Ketone: Negative  / Bili: Large >10 / Urobili: 2 mg/dL   Blood: x / Protein: 100 / Nitrite: Negative   Leuk Esterase: Negative / RBC: 0 /hpf / WBC 1 /HPF   Sq Epi: x / Non Sq Epi: 1 /hpf / Bacteria: Negative        Culture - Body Fluid with Gram Stain (collected 2020 17:25)  Source: Peritoneal Peritoneal Fluid  Gram Stain (2020 22:38):    No polymorphonuclear cells seen    No organisms seen    by cytocentrifuge        RADIOLOGY & ADDITIONAL TESTS:  Results Reviewed:   Imaging Personally Reviewed:  Electrocardiogram Personally Reviewed:    COORDINATION OF CARE:  Care Discussed with Consultants/Other Providers [Y/N]:  Prior or Outpatient Records Reviewed [Y/N]:

## 2020-07-28 NOTE — PROGRESS NOTE ADULT - PROBLEM SELECTOR PLAN 1
patient p/w diffuse abdominal crampy in nature with leukocytosis to 28, MARQUISE with crt 9, hyperbilirubinemia. Likely alcoholic hepatitis vs. decompensated cirrhosis. Now resolved. Workup for other acute etiologies in progress.  - differential is wide at this time  - cdif negative  - s/p diagnostic paracentesis in ED 7/26 with cell count that is not consistent with SBP, gram stain showing numerous WBCs but no organisms  given hyperbilirubenima abdominal pain and leukocytosis also cannot rule out cholangitis at this time- f/u MRCP and RUQ sono with doppler  - IV zosyn renally dosed for now  - IR c/s for therapeutic paracentesis

## 2020-07-28 NOTE — PROGRESS NOTE ADULT - SUBJECTIVE AND OBJECTIVE BOX
Elver Abrams MD  Division of Hospital Medicine  Pager: [phone]  If no response or off-hours, page [phone]  -------------------------------------    Patient is a 39y old  Male who presents with a chief complaint of abdominal pain (2020 09:39)      SUBJECTIVE / OVERNIGHT EVENTS: none acute  ADDITIONAL REVIEW OF SYSTEMS: pt reports no new aches/pains in legs. States he overall feels better than before. No fevers/chills, no sob/cough. No diarrhea- stool more formed.     MEDICATIONS  (STANDING):  albumin human 25% IVPB 100 milliLiter(s) IV Intermittent every 6 hours  calcium gluconate IVPB 1 Gram(s) IV Intermittent once  folic acid 1 milliGRAM(s) Oral daily  heparin   Injectable 5000 Unit(s) SubCutaneous every 8 hours  midodrine. 10 milliGRAM(s) Oral three times a day  octreotide  Injectable 100 MICROGram(s) IV Push three times a day  piperacillin/tazobactam IVPB.. 3.375 Gram(s) IV Intermittent every 12 hours  rifAXIMin 200 milliGRAM(s) Oral three times a day  thiamine 100 milliGRAM(s) Oral at bedtime    MEDICATIONS  (PRN):      CAPILLARY BLOOD GLUCOSE        I&O's Summary    2020 07:  -  2020 07:00  --------------------------------------------------------  IN: 240 mL / OUT: 70 mL / NET: 170 mL    2020 07:  -  2020 13:45  --------------------------------------------------------  IN: 200 mL / OUT: 0 mL / NET: 200 mL        PHYSICAL EXAM:  Vital Signs Last 24 Hrs  T(C): 36.4 (2020 21:00), Max: 36.4 (2020 21:00)  T(F): 97.6 (2020 21:00), Max: 97.6 (2020 21:00)  HR: 82 (2020 21:00) (82 - 82)  BP: 105/68 (2020 21:00) (105/68 - 105/68)  BP(mean): --  RR: 18 (2020 21:00) (18 - 18)  SpO2: 95% (2020 21:00) (95% - 95%)  CONSTITUTIONAL: NAD, well-developed, well-groomed  EYES: PERRLA; conjunctiva and sclera icteric  ENMT: Moist oral mucosa, no pharyngeal injection or exudates; normal dentition  NECK: Supple, no palpable masses; no thyromegaly  RESPIRATORY: Normal respiratory effort; lungs are clear to auscultation bilaterally  CARDIOVASCULAR: Regular rate and rhythm, normal S1 and S2, no murmur/rub/gallop; No lower extremity edema; Peripheral pulses are 2+ bilaterally  ABDOMEN: Nontender to palpation, normoactive bowel sounds, no rebound/guarding; No hepatosplenomegaly  MUSCLOSKELETAL:  Normal gait; no clubbing or cyanosis of digits; no joint swelling or tenderness to palpation  PSYCH: A+O to person, place, and time; affect appropriate  NEUROLOGY: CN 2-12 are intact and symmetric; no gross sensory deficits;   SKIN: +jaundiced    LABS:                        9.7    26.64 )-----------( 362      ( 2020 10:11 )             27.6     07-28    133<L>  |  91<L>  |  66<H>  ----------------------------<  142<H>  3.9   |  14<L>  |  11.92<H>    Ca    6.3<LL>      2020 10:11    TPro  5.6<L>  /  Alb  3.6  /  TBili  28.1<H>  /  DBili  x   /  AST  109<H>  /  ALT  18  /  AlkPhos  127<H>  07-28    PT/INR - ( 2020 15:49 )   PT: 19.1 sec;   INR: 1.64 ratio         PTT - ( 2020 15:49 )  PTT:39.7 sec  CARDIAC MARKERS ( 2020 10:11 )  x     / x     / 35 U/L / x     / x          Urinalysis Basic - ( 2020 15:54 )    Color: Dark Yellow / Appearance: Slightly Turbid / S.018 / pH: x  Gluc: x / Ketone: Negative  / Bili: Large >10 / Urobili: 2 mg/dL   Blood: x / Protein: 100 / Nitrite: Negative   Leuk Esterase: Negative / RBC: 0 /hpf / WBC 1 /HPF   Sq Epi: x / Non Sq Epi: 1 /hpf / Bacteria: Negative        Culture - Blood (collected 2020 17:54)  Source: .Blood Blood-Peripheral  Preliminary Report (2020 18:01):    No growth to date.    Culture - Blood (collected 2020 17:54)  Source: .Blood Blood-Venous  Preliminary Report (2020 18:01):    No growth to date.    Culture - Body Fluid with Gram Stain (collected 2020 17:25)  Source: Peritoneal Peritoneal Fluid  Gram Stain (2020 22:38):    No polymorphonuclear cells seen    No organisms seen    by cytocentrifuge  Preliminary Report (2020 19:22):    No growth        RADIOLOGY & ADDITIONAL TESTS:  Results Reviewed:   Imaging Personally Reviewed:  Electrocardiogram Personally Reviewed:    COORDINATION OF CARE:  Care Discussed with Consultants/Other Providers [Y/N]: Dr. Nickolas Low  Prior or Outpatient Records Reviewed [Y/N]:

## 2020-07-28 NOTE — PROGRESS NOTE ADULT - PROBLEM SELECTOR PLAN 2
Pt. with hyponatremia in setting of liver cirrhosis and severe ascitis. Serum sodium 133 on admission (7/26/)   previous episodes of hyponatremia. Last admission discharge with Na 133-      check urine electrolytes, urine Osm  Monitor serum sodium Q6 hours.   Do not correct serum sodium >6-8 meq/day.

## 2020-07-28 NOTE — PROVIDER CONTACT NOTE (CRITICAL VALUE NOTIFICATION) - ASSESSMENT
pt A&Ox4, no acute distress noted, v/s stable, afebrile, denies any pain, IVL assess q2hrs and remain WNL, skin integrity maintained, safety checks qhr,safety maintained, pt is able to make needs known, will continue to monitor

## 2020-07-28 NOTE — PROGRESS NOTE ADULT - ASSESSMENT
39 y.o. Male with PMhx of Alcohol liver cirrhosis, alcohol use disorder, traumatic bladder rupture s/p ex lap in 2019 p/w diffuse crampy abdominal pain. Patient reports that since last discharge on 7/17 has had multiple episode of watery diarrhea (about 9-10 per day), worsening abdominal pain and distension, here with findings consistent with alcoholic hepatitis vs. decompensated cirrhosis and renal failure concerning for hepatorenal syndrome vs. bile cast nephropathy

## 2020-07-28 NOTE — CONSULT NOTE ADULT - SUBJECTIVE AND OBJECTIVE BOX
----------------------------------------------------------  Interventional Radiology Brief Consult Note  -----------------------------------------------------------    Reason for Referral:     Clinical Summary: 39y Male MARQUISE in the setting of liver cirrhosis with severe ascites. MARQUISE 2/2 prerenal (severe diarrhea + diuretics +decrease effective arterial blood volume from Cirrhosis) to r/o hepatorenal syndrome, now requiring dialysis.       Vitals:  T(C): 36.4 (07-27-20 @ 21:00), Max: 36.4 (07-27-20 @ 21:00)  HR: 82 (07-27-20 @ 21:00) (82 - 82)  BP: 105/68 (07-27-20 @ 21:00) (105/68 - 105/68)  RR: 18 (07-27-20 @ 21:00) (18 - 18)  SpO2: 95% (07-27-20 @ 21:00) (95% - 95%)    Labs:           9.7  26.64)-----(362     (07-28-20 @ 10:11)         27.6     133 | 91 | 66  ----------------------< 142     (07-28-20 @ 10:11)  3.9 | 14 | 11.92       PT: 19.1<H> 07-27-20 @ 15:49  aPTT: 39.7<H> 07-27-20 @ 15:49   INR: 1.64<H> 07-27-20 @ 15:49      Assessment: 39y Male MARQUISE in the setting of liver cirrhosis with severe ascites. MARQUISE 2/2 prerenal (severe diarrhea + diuretics +decrease effective arterial blood volume from Cirrhosis) to r/o hepatorenal syndrome, now requiring dialysis.     Recommendations:  plan for  above procedure . can put order for IR procedure under [facility]

## 2020-07-28 NOTE — PROGRESS NOTE ADULT - SUBJECTIVE AND OBJECTIVE BOX
[facility] DIVISION OF KIDNEY DISEASES AND HYPERTENSION -- FOLLOW UP NOTE  --------------------------------------------------------------------------------  If any questions, please feel free to contact me  NS pager: [phone], [facility]: [phone]  Adan Monae M.D.  Nephrology Fellow    (After 5 pm or on weekends please page the on-call fellow)  --------------------------------------------------------------------------------    Chief Complaint:  Patient is a 39y old  Male who presents with a chief complaint of abdominal pain (28 Jul 2020 07:35)    24 hour events/subjective:  Patient seen and examined at bedside, in NAD  no acute events - sCr worsening, pending today labs   on Albumin IV/octreotide/midodrine       PAST HISTORY  --------------------------------------------------------------------------------  No significant changes to PMH, PSH, FHx, SHx, unless otherwise noted    ALLERGIES & MEDICATIONS  --------------------------------------------------------------------------------  Allergies    No Known Allergies    Intolerances      Standing Inpatient Medications  albumin human 25% IVPB 100 milliLiter(s) IV Intermittent every 6 hours  folic acid 1 milliGRAM(s) Oral daily  heparin   Injectable 5000 Unit(s) SubCutaneous every 8 hours  midodrine. 10 milliGRAM(s) Oral three times a day  octreotide  Injectable 100 MICROGram(s) IV Push three times a day  piperacillin/tazobactam IVPB.. 3.375 Gram(s) IV Intermittent every 12 hours  rifAXIMin 200 milliGRAM(s) Oral three times a day  thiamine 100 milliGRAM(s) Oral at bedtime    PRN Inpatient Medications      REVIEW OF SYSTEMS  --------------------------------------------------------------------------------  Gen: No fevers/chills  Skin: No rashes  Head/Eyes/Ears: Normal hearing,   Respiratory: No dyspnea, cough  CV: No chest pain  GI: No abdominal pain, diarrhea  : No dysuria, hematuria  MSK: No  edema  Heme: No easy bruising or bleeding  Psych: No significant depression      All other systems were reviewed and are negative, except as noted.    VITALS/PHYSICAL EXAM  --------------------------------------------------------------------------------  T(C): 36.4 (07-27-20 @ 21:00), Max: 36.4 (07-27-20 @ 12:26)  HR: 82 (07-27-20 @ 21:00) (71 - 82)  BP: 105/68 (07-27-20 @ 21:00) (105/68 - 106/68)  RR: 18 (07-27-20 @ 21:00) (18 - 18)  SpO2: 95% (07-27-20 @ 21:00) (95% - 97%)  Wt(kg): --  Height (cm): 185.4 (07-26-20 @ 21:20)  Weight (kg): 82.8 (07-26-20 @ 21:20)  BMI (kg/m2): 24.1 (07-26-20 @ 21:20)  BSA (m2): 2.07 (07-26-20 @ 21:20)      07-27-20 @ 07:01  -  07-28-20 @ 07:00  --------------------------------------------------------  IN: 240 mL / OUT: 70 mL / NET: 170 mL        Physical Exam:  	Gen: icteric, mild discomfort due to abd pain  	HEENT: jaundice.  	Pulm: CTA B/L, no wheezing, no rales  	CV: S1S2, regular, no murmur   	Abd: severely Distended, bruising on right upper quadrant, NT, surgical scar midline noted.   	Ext: No LE edema B/L  	Neuro: Awake, A&O x3, no asterixis   	Skin: icteric.         LABS/STUDIES  --------------------------------------------------------------------------------              11.2   26.61 >-----------<  486      [07-27-20 @ 15:49]              32.3     x   |  x   |  x   ----------------------------<  x       [07-27-20 @ 18:42]  4.5   |  x   |  x         Ca     6.8     [07-27-20 @ 15:49]      Mg     2.0     [07-26-20 @ 12:10]      Phos  8.7     [07-26-20 @ 12:10]    TPro  6.4  /  Alb  3.5  /  TBili  36.8  /  DBili  x   /  AST  181  /  ALT  26  /  AlkPhos  177  [07-27-20 @ 15:49]    PT/INR: PT 19.1 , INR 1.64       [07-27-20 @ 15:49]  PTT: 39.7       [07-27-20 @ 15:49]      Creatinine Trend:  SCr 10.69 [07-27 @ 15:49]  SCr 9.85 [07-26 @ 21:36]  SCr 9.64 [07-26 @ 12:10]  SCr 0.85 [07-17 @ 07:08]  SCr 0.63 [07-16 @ 09:29]    Urinalysis - [07-26-20 @ 15:54]      Color Dark Yellow / Appearance Slightly Turbid / SG 1.018 / pH 6.0      Gluc Negative / Ketone Negative  / Bili Large >10 / Urobili 2 mg/dL       Blood Negative / Protein 100 / Leuk Est Negative / Nitrite Negative      RBC 0 / WBC 1 / Hyaline 7 / Gran 1 / Sq Epi  / Non Sq Epi 1 / Bacteria Negative    Urine Creatinine 196      [07-27-20 @ 18:42]  Urine Protein 101      [07-27-20 @ 18:42]  Urine Sodium 53      [07-27-20 @ 18:42]  Urine Potassium 51      [07-27-20 @ 18:42]    Iron 34, TIBC 124, %sat 27      [07-14-20 @ 10:34]  Ferritin 812      [07-12-20 @ 08:58]    HBsAg Nonreact      [07-12-20 @ 01:16]  HCV 0.14, Nonreact      [07-12-20 @ 01:16]  HIV Nonreact      [07-15-20 @ 10:47]    SHAUN: titer Negative, pattern --      [07-12-20 @ 09:37]  Free Light Chains: kappa 2.35, lambda 2.06, ratio = 1.14      [07-12 @ 08:58]

## 2020-07-28 NOTE — PROGRESS NOTE ADULT - PROBLEM SELECTOR PLAN 1
Pt with MARQUISE in the setting of liver cirrhosis with severe ascites. MARQUISE 2/2 prerenal (severe diarrhea + diuretics +decrease effective arterial blood volume from Cirrhosis) to r/o hepatorenal syndrome.  On recent admission baseline sCr 0.6-0.8. Today on Admission 9.64 -- s/p diagnostic paracentesis. -- sCR worsening - pending today labs     no absolute indication for RRT at this time   c/w IV albumin infusion   hold diuretics at this time   c/w midodrine keep MAP> 70,   continue octreotide   Hernandez catheter for strict I/O  Will need to consider HD if renal failure continues to worsen.   Monitor electrolytes and urine output.   Avoid NSAIDs, ACEI/ARBS, RCA and nephrotoxins.   Dose medications as per eGFR. Pt with MARQUISE in the setting of liver cirrhosis with severe ascites. MARQUISE 2/2 prerenal (severe diarrhea + diuretics +decrease effective arterial blood volume from Cirrhosis) to r/o hepatorenal syndrome.  On recent admission baseline sCr 0.6-0.8. Today on Admission 9.64 -- s/p diagnostic paracentesis. -- sCR worsening - pending today labs     patient with worsening Kidney function, will discuss today for possible RRT     IR for HD cath  placement  if patient agrees   c/w IV albumin infusion   hold diuretics at this time   c/w midodrine keep MAP> 70,   continue octreotide   Hernandez catheter for strict I/O  Monitor electrolytes and urine output.   Avoid NSAIDs, ACEI/ARBS, RCA and nephrotoxins.   Dose medications as per eGFR.

## 2020-07-28 NOTE — PROGRESS NOTE ADULT - ASSESSMENT
39 y.o. Male with PMhx of Alcohol liver cirrhosis, alcohol use disorder, traumatic bladder rupture s/p ex lap in 2019, recently admitted to [facility] from 7/11 to 7/17 for seizure and alcoholic cirrhosis. Presented to ED c/o worsening abdominal pain and diarrhea.     Nephrology consulted for MARQUISE

## 2020-07-28 NOTE — PROGRESS NOTE ADULT - SUBJECTIVE AND OBJECTIVE BOX
Interventional Radiology Brief- Operative Note    Procedure: Right IJ double lumen non tunnelled dialysis catheter insertion using imaging guidance    Operators: Haja Ruiz MD; Clemente Culp MD    Anesthesia (type): Per department of anasthesia    Contrast: None    EBL: Minimal    Findings/Follow up Plan of Care: Right IJ non-tunnelled dialysis catheter insertion.    Specimens Removed: None    Implants: Right IJ double lumen Dialysis catheter    Complications: None    Condition/Disposition: Stable to Dialysis then floor.

## 2020-07-28 NOTE — PROGRESS NOTE ADULT - SUBJECTIVE AND OBJECTIVE BOX
Interventional Radiology Pre-Procedure Note    This is a 39y Male with decompensated liver cirrhosis and ascites now with worsening renal function.    Procedure: Non tunnelled dialysis catheter placement using imaging guidance.    Diagnosis/Indication: Patient is a 39y old  Male who presents with a chief complaint of abdominal pain (28 Jul 2020 14:02)      PAST MEDICAL & SURGICAL HISTORY:  No pertinent past medical history  S/P exploratory laparotomy       CBC Full  -  ( 28 Jul 2020 10:11 )  WBC Count : 26.64 K/uL  RBC Count : 2.81 M/uL  Hemoglobin : 9.7 g/dL  Hematocrit : 27.6 %  Platelet Count - Automated : 362 K/uL  Mean Cell Volume : 98.2 fl  Mean Cell Hemoglobin : 34.5 pg  Mean Cell Hemoglobin Concentration : 35.1 gm/dL  Auto Neutrophil # : x  Auto Lymphocyte # : x  Auto Monocyte # : x  Auto Eosinophil # : x  Auto Basophil # : x  Auto Neutrophil % : x  Auto Lymphocyte % : x  Auto Monocyte % : x  Auto Eosinophil % : x  Auto Basophil % : x    07-28    133<L>  |  91<L>  |  66<H>  ----------------------------<  142<H>  3.9   |  14<L>  |  11.92<H>    Ca    6.3<LL>      28 Jul 2020 10:11    TPro  5.6<L>  /  Alb  3.6  /  TBili  28.1<H>  /  DBili  x   /  AST  109<H>  /  ALT  18  /  AlkPhos  127<H>  07-28    PT/INR - ( 27 Jul 2020 15:49 )   PT: 19.1 sec;   INR: 1.64 ratio         PTT - ( 27 Jul 2020 15:49 )  PTT:39.7 sec    Procedure/ risks/ benefits were explained, informed consent obtained from patient, verbalizes understanding.

## 2020-07-28 NOTE — PROGRESS NOTE ADULT - SUBJECTIVE AND OBJECTIVE BOX
Chief Complaint:  Patient is a 39y old  Male who presents with a chief complaint of abdominal pain (2020 07:35)      Interval Events: no new events. no n/v, abdominal pain.    Allergies:  No Known Allergies      Hospital Medications:  albumin human 25% IVPB 100 milliLiter(s) IV Intermittent every 6 hours  folic acid 1 milliGRAM(s) Oral daily  heparin   Injectable 5000 Unit(s) SubCutaneous every 8 hours  midodrine. 10 milliGRAM(s) Oral three times a day  octreotide  Injectable 100 MICROGram(s) IV Push three times a day  piperacillin/tazobactam IVPB.. 3.375 Gram(s) IV Intermittent every 12 hours  rifAXIMin 200 milliGRAM(s) Oral three times a day  thiamine 100 milliGRAM(s) Oral at bedtime      PMHX/PSHX:  No pertinent past medical history  S/P exploratory laparotomy  No significant past surgical history      Family history:  FH: alpha 1 antitrypsin deficiency  No pertinent family history in first degree relatives      ROS: As per HPI, 14-point ROS negative otherwise.          PHYSICAL EXAM:     Vital Signs:  Vital Signs Last 24 Hrs  T(C): 36.4 (2020 21:00), Max: 36.4 (2020 12:26)  T(F): 97.6 (2020 21:00), Max: 97.6 (2020 21:00)  HR: 82 (2020 21:00) (71 - 82)  BP: 105/68 (2020 21:00) (105/68 - 106/68)  BP(mean): --  RR: 18 (2020 21:00) (18 - 18)  SpO2: 95% (2020 21:00) (95% - 97%)  Daily     Daily Weight in k.2 (2020 08:10)    GENERAL: no acute distress  NEURO: alert, no asterixis  HEENT: icteric sclera, no conjunctival pallor appreciated  CHEST: no respiratory distress, no accessory muscle use  CARDIAC: regular rate, rhythm  ABDOMEN: soft, nontender, mild distended, no rebound or guarding  EXTREMITIES: warm, well perfused, no edema  SKIN: ++ jaundice    LABS:                        11.2   26.61 )-----------( 486      ( 2020 15:49 )             32.3     07-27    x   |  x   |  x   ----------------------------<  x   4.5   |  x   |  x     Ca    6.8<L>      2020 15:49  Phos  8.7       Mg     2.0         TPro  6.4  /  Alb  3.5  /  TBili  36.8<H>  /  DBili  x   /  AST  181<H>  /  ALT  26  /  AlkPhos  177<H>      LIVER FUNCTIONS - ( 2020 15:49 )  Alb: 3.5 g/dL / Pro: 6.4 g/dL / ALK PHOS: 177 U/L / ALT: 26 U/L / AST: 181 U/L / GGT: x           PT/INR - ( 2020 15:49 )   PT: 19.1 sec;   INR: 1.64 ratio         PTT - ( 2020 15:49 )  PTT:39.7 sec  Urinalysis Basic - ( 2020 15:54 )    Color: Dark Yellow / Appearance: Slightly Turbid / S.018 / pH: x  Gluc: x / Ketone: Negative  / Bili: Large >10 / Urobili: 2 mg/dL   Blood: x / Protein: 100 / Nitrite: Negative   Leuk Esterase: Negative / RBC: 0 /hpf / WBC 1 /HPF   Sq Epi: x / Non Sq Epi: 1 /hpf / Bacteria: Negative          Imaging:  < from: MR MRCP No Cont (20 @ 14:24) >  IMPRESSION:  Limited noncontrast study.    Liver cirrhosis with portal hypertension. Large caliber perisplenic and perigastric varices. Moderate volume ascites, increased from trace on 2020. Steatosis. Evaluation for HCC is precluded by lack of intravenous contrast.    Normal caliber biliary tree without choledocholithiasis.    < end of copied text >

## 2020-07-28 NOTE — PROGRESS NOTE ADULT - ASSESSMENT
39M w/ decompensated EtOH cirrhosis, presenting w/ acute on chronic liver failure of unclear etiology.     Impression:  #Acute on chronic liver failure: d/dx includes underlying infection (UA neg, BCx pending, CXR neg, dx para neg for SBP, biliary imaging negative, C diff negative), worsening EtOH hepatitis (MDF = 60 but no interval drinking since discharge and this is an acute change), vs vascular injury  #Cirrhosis due to EtOH, decompensated by ascites  - varices: no prior EGD, on nadolol at home  - ascites: present on exam, neg for SBP 7/26, on Lasix 20mg/spironolactone 25mg daily as outpatient  - HE: none on exam  - HCC: no imaging, MRI without contrast is limited  - MELD-Na = 38 on 7/26  #Pruritis – likely due to elevated bilirubin, improved  # MARQUISE: unclear etiology, c/f bilirubin pigment nephropathy vs acute tubular necrosis vs obstructive uropathy. Reny is 53, making HRS less likely. Cr 10, however nonoliguric    Recommendations:  - empiric abx w/ zosyn for now  - check abdomina doppler u/s  - hold nadolol and diuretics  - renal U/S and renal followup  - monitor strict i/os, consider ashton placement  - continue with 25% albumin 100mL q6h  - continue with midodrine 10mg TID and octreotide 100mcg subQ q8h  - send EtOH metabolites in urine and blood (ethyl glucuronide and ethyl sulfate)  - no indication for steroids at this time for possible EtOH hepatitis given c/f underlying infection  - trend CMP, CBC, INR daily  - hepatology will continue to follow 39M w/ decompensated EtOH cirrhosis, presenting w/ acute on chronic liver failure of unclear etiology.     Impression:  #Acute on chronic liver failure: d/dx includes underlying infection (UA neg, BCx pending, CXR neg, dx para neg for SBP, biliary imaging negative, C diff negative), worsening EtOH hepatitis (MDF = 60 but no interval drinking since discharge and this is an acute change), vs vascular injury  #Cirrhosis due to EtOH, decompensated by ascites  - varices: no prior EGD, on nadolol at home  - ascites: present on exam, neg for SBP 7/26, on Lasix 20mg/spironolactone 25mg daily as outpatient  - HE: none on exam  - HCC: no imaging, MRI without contrast is limited  - MELD-Na = 38 on 7/26  #Pruritis – likely due to elevated bilirubin, improved  # MARQUISE: unclear etiology, c/f bilirubin pigment nephropathy vs acute tubular necrosis vs obstructive uropathy. Reny is 53, making HRS less likely. Cr 10, however nonoliguric    Recommendations:  - empiric abx w/ zosyn for now  - check abdomina doppler u/s  - hold nadolol and diuretics  - continue with xifaxan  - place on MVI, thiamine and folate  - renal U/S and renal followup  - monitor strict i/os, consider ashton placement  - continue with 25% albumin 100mL q6h  - continue with midodrine 10mg TID and octreotide 100mcg subQ q8h  - send EtOH metabolites in urine and blood (ethyl glucuronide and ethyl sulfate)  - no indication for steroids at this time for possible EtOH hepatitis given c/f underlying infection  - trend CMP, CBC, INR daily  - hepatology will continue to follow

## 2020-07-28 NOTE — PROGRESS NOTE ADULT - PROBLEM SELECTOR PLAN 2
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 less likely hepatorenal  -IV albumin infusion, octreotide 100mcg tid  - midodrine 10mg po tid  urine lytes  renally dose all meds  avoid nsaids/morphine/hold lasix and aldactone   renal sono  ashton catheter for strict i/o  appreciate renal recs- pt to begin HD, has been consented

## 2020-07-29 NOTE — PROGRESS NOTE ADULT - SUBJECTIVE AND OBJECTIVE BOX
Chief Complaint:  Patient is a 39y old  Male who presents with a chief complaint of abdominal pain (2020 09:05)    Reason for consult: jaundice    Interval Events: reports large volume hematmesis this AM, VSS, Hb > 7 but downtrending. Awaiting HD.     Hospital Medications:  albumin human 25% IVPB 100 milliLiter(s) IV Intermittent every 6 hours  chlorhexidine 4% Liquid 1 Application(s) Topical <User Schedule>  folic acid 1 milliGRAM(s) Oral daily  midodrine. 10 milliGRAM(s) Oral three times a day  octreotide  Injectable 100 MICROGram(s) IV Push three times a day  piperacillin/tazobactam IVPB.. 3.375 Gram(s) IV Intermittent every 12 hours  rifAXIMin 200 milliGRAM(s) Oral three times a day  sodium chloride 0.9% lock flush 10 milliLiter(s) IV Push every 1 hour PRN  thiamine 100 milliGRAM(s) Oral at bedtime      ROS:   General:  No  fevers, chills, night sweats, fatigue  Eyes:  Good vision, no reported pain  ENT:  No sore throat, pain, runny nose  CV:  No pain, palpitations  Pulm:  No dyspnea, cough  GI:  See HPI, otherwise negative  :  No  incontinence, nocturia  Muscle:  No pain, weakness  Neuro:  No memory problems  Psych:  No insomnia, mood problems, depression  Endocrine:  No polyuria, polydipsia, cold/heat intolerance  Heme:  No petechiae, ecchymosis, easy bruisability  Skin:  No rash    PHYSICAL EXAM:   Vital Signs:  Vital Signs Last 24 Hrs  T(C): 36.4 (2020 09:30), Max: 37 (2020 03:15)  T(F): 97.5 (2020 09:30), Max: 98.6 (2020 03:15)  HR: 72 (2020 09:30) (68 - 76)  BP: 112/63 (2020 09:30) (102/64 - 131/85)  BP(mean): --  RR: 18 (2020 09:30) (18 - 18)  SpO2: 99% (2020 09:30) (96% - 99%)  Daily     Daily Weight in k.2 (2020 09:30)    GENERAL: no acute distress  NEURO: alert, no asterixis  HEENT: icteric sclera, no conjunctival pallor appreciated  CHEST: no respiratory distress, no accessory muscle use  CARDIAC: regular rate, rhythm  ABDOMEN: soft, non-tender, distended, no rebound or guarding  EXTREMITIES: warm, well perfused, no edema  SKIN: +++ jaundice    LABS: reviewed                        7.9    24.64 )-----------( 292      ( 2020 07:20 )             22.5     -    136  |  95<L>  |  58<H>  ----------------------------<  91  3.9   |  16<L>  |  11.04<H>    Ca    6.6<L>      2020 07:20  Phos  7.0     -    TPro  5.2<L>  /  Alb  3.6  /  TBili  26.1<H>  /  DBili  x   /  AST  106<H>  /  ALT  17  /  AlkPhos  98  -    LIVER FUNCTIONS - ( 2020 07:20 )  Alb: 3.6 g/dL / Pro: 5.2 g/dL / ALK PHOS: 98 U/L / ALT: 17 U/L / AST: 106 U/L / GGT: x             Interval Diagnostic Studies: see sunrise for full report

## 2020-07-29 NOTE — CHART NOTE - NSCHARTNOTEFT_GEN_A_CORE
call placed to patient's mother to inform her of MICU transfer. Message left on answering machine for her to call the unit for up date.

## 2020-07-29 NOTE — PROGRESS NOTE ADULT - PROBLEM SELECTOR PLAN 3
ionized calcium is 0.84    please repeat as needed   keep Ionized calcium around 1.1-1.2  continue to monitor electrolytes ionized calcium is 0.84    keep Ionized calcium around 1.1-1.2  continue to monitor electrolytes

## 2020-07-29 NOTE — PROGRESS NOTE ADULT - PROBLEM SELECTOR PLAN 1
patient p/w diffuse abdominal crampy in nature with leukocytosis to 28, MARQUISE with crt 9, hyperbilirubinemia. Likely alcoholic hepatitis vs. decompensated cirrhosis. Now resolved. Workup for other acute etiologies in progress.  - differential is wide at this time  - cdif negative  - s/p diagnostic paracentesis in ED 7/26 with cell count that is not consistent with SBP,  - MRCP and RUQ sono with doppler negative  - IV zosyn renally dosed for now

## 2020-07-29 NOTE — PROGRESS NOTE ADULT - SUBJECTIVE AND OBJECTIVE BOX
[facility] DIVISION OF KIDNEY DISEASES AND HYPERTENSION -- FOLLOW UP NOTE  --------------------------------------------------------------------------------  If any questions, please feel free to contact me  NS pager: [phone], [facility]: [phone]  Adan Monae M.D.  Nephrology Fellow    (After 5 pm or on weekends please page the on-call fellow)  --------------------------------------------------------------------------------    Chief Complaint:  Patient is a 39y old  Male who presents with a chief complaint of abdominal pain (28 Jul 2020 16:40)    24 hour events/subjective:  Patient seen and examined at bedside, patient reports that had hematemesis episode x2 this morning.   had HD yesterday with no complication, scheduled for HD today.     PAST HISTORY  --------------------------------------------------------------------------------  No significant changes to PMH, PSH, FHx, SHx, unless otherwise noted    ALLERGIES & MEDICATIONS  --------------------------------------------------------------------------------  Allergies    No Known Allergies    Intolerances      Standing Inpatient Medications  albumin human 25% IVPB 100 milliLiter(s) IV Intermittent every 6 hours  chlorhexidine 4% Liquid 1 Application(s) Topical <User Schedule>  folic acid 1 milliGRAM(s) Oral daily  midodrine. 10 milliGRAM(s) Oral three times a day  octreotide  Injectable 100 MICROGram(s) IV Push three times a day  piperacillin/tazobactam IVPB.. 3.375 Gram(s) IV Intermittent every 12 hours  rifAXIMin 200 milliGRAM(s) Oral three times a day  thiamine 100 milliGRAM(s) Oral at bedtime    PRN Inpatient Medications  sodium chloride 0.9% lock flush 10 milliLiter(s) IV Push every 1 hour PRN      REVIEW OF SYSTEMS  --------------------------------------------------------------------------------  Gen: No fevers/chills  Skin: No rashes  Head/Eyes/Ears: Normal hearing,   Respiratory: No dyspnea, cough  CV: No chest pain  GI: No abdominal pain, diarrhea  : No dysuria, hematuria  MSK: No  edema  Heme: No easy bruising or bleeding  Psych: No significant depression      All other systems were reviewed and are negative, except as noted.    VITALS/PHYSICAL EXAM  --------------------------------------------------------------------------------  T(C): 36.3 (07-29-20 @ 05:54), Max: 37 (07-29-20 @ 03:15)  HR: 76 (07-29-20 @ 05:54) (68 - 76)  BP: 111/69 (07-29-20 @ 05:54) (102/64 - 131/85)  RR: 18 (07-29-20 @ 05:54) (18 - 18)  SpO2: 97% (07-29-20 @ 05:54) (96% - 98%)  Wt(kg): --        07-28-20 @ 07:01  -  07-29-20 @ 07:00  --------------------------------------------------------  IN: 970 mL / OUT: 1300 mL / NET: -330 mL        Physical Exam:  	Gen: icteric, mild discomfort due to abd pain  	HEENT: jaundice.  	Pulm: CTA B/L, no wheezing, no rales  	CV: S1S2, regular, no murmur   	Abd: severely Distended, bruising on right upper quadrant, NT, surgical scar midline noted.   	Ext: No LE edema B/L  	Neuro: Awake, A&O x3, no asterixis   	Skin: icteric.       LABS/STUDIES  --------------------------------------------------------------------------------              7.9    24.64 >-----------<  292      [07-29-20 @ 07:20]              22.5     136  |  95  |  58  ----------------------------<  91      [07-29-20 @ 07:20]  3.9   |  16  |  11.04        Ca     6.6     [07-29-20 @ 07:20]      iCa    0.84     [07-29 @ 07:23]      Phos  7.0     [07-29-20 @ 07:20]    TPro  5.2  /  Alb  3.6  /  TBili  26.1  /  DBili  x   /  AST  106  /  ALT  17  /  AlkPhos  98  [07-29-20 @ 07:20]    PT/INR: PT 18.4 , INR 1.59       [07-28-20 @ 15:57]  PTT: 39.7       [07-27-20 @ 15:49]    CK 35      [07-28-20 @ 10:11]    Creatinine Trend:  SCr 11.04 [07-29 @ 07:20]  SCr 11.92 [07-28 @ 10:11]  SCr 10.69 [07-27 @ 15:49]  SCr 9.85 [07-26 @ 21:36]  SCr 9.64 [07-26 @ 12:10]    Urinalysis - [07-26-20 @ 15:54]      Color Dark Yellow / Appearance Slightly Turbid / SG 1.018 / pH 6.0      Gluc Negative / Ketone Negative  / Bili Large >10 / Urobili 2 mg/dL       Blood Negative / Protein 100 / Leuk Est Negative / Nitrite Negative      RBC 0 / WBC 1 / Hyaline 7 / Gran 1 / Sq Epi  / Non Sq Epi 1 / Bacteria Negative    Urine Creatinine 196      [07-27-20 @ 18:42]  Urine Protein 101      [07-27-20 @ 18:42]  Urine Sodium 53      [07-27-20 @ 18:42]  Urine Potassium 51      [07-27-20 @ 18:42]    Iron 34, TIBC 124, %sat 27      [07-14-20 @ 10:34]  Ferritin 812      [07-12-20 @ 08:58]    HBsAg Nonreact      [07-12-20 @ 01:16]  HCV 0.14, Nonreact      [07-12-20 @ 01:16]  HIV Nonreact      [07-15-20 @ 10:47]    SHAUN: titer Negative, pattern --      [07-12-20 @ 09:37]  Free Light Chains: kappa 2.35, lambda 2.06, ratio = 1.14      [07-12 @ 08:58]

## 2020-07-29 NOTE — DISCHARGE NOTE NURSING/CASE MANAGEMENT/SOCIAL WORK - NSDCPEEMAIL_GEN_ALL_CORE
[facility] email [email or web address]

## 2020-07-29 NOTE — CHART NOTE - NSCHARTNOTEFT_GEN_A_CORE
CHIEF COMPLAINT: Gastric Variceal Bleed    HPI:    40 yo M PMH ethanol abuser with cirrhosis, portal hypertension, traumatic bladder rupture due to fall s/p ex-lap and repair 2019 with recent admission for acute alcoholic hepatitis and alcohol withdrawal complicated by seizures presented 7/26 with acute renal failure, jaundice, and increasing ascites in the setting of profuse diarrhea and likely dehydration. Last drink reportedly 7/4.  Admitted to medicine service for management of acute on chronic liver failure, MARQUISE. Evaluated by  transplant hepatology: concern for infection vs. worsening liver failure from alcoholic hepatitis. Diagnostic paracentesis 7/26 negative for SBP. Renal failure likely ATN, though could rule out HRS. Course complicated by continued abdominal pain.  Empirically treated with Zosyn.     PAST MEDICAL & SURGICAL HISTORY:  No pertinent past medical history  S/P exploratory laparotomy      FAMILY HISTORY:  FH: alpha 1 antitrypsin deficiency      SOCIAL HISTORY:  Smoking: [ ] Never Smoked [ ] Former Smoker (__ packs x ___ years) [ ] Current Smoker  (__ packs x ___ years)  Substance Use: [ ] Never Used [ ] Used ____  EtOH Use:  Marital Status: [ ] Single [ ]  [ ]  [ ]   Sexual History:   Occupation:  Recent Travel:  Country of Birth:  Advance Directives:    Allergies    No Known Allergies    Intolerances        HOME MEDICATIONS:    REVIEW OF SYSTEMS:  Constitutional: [ ] fevers [ ] chills [ ] weight loss [ ] weight gain  HEENT: [ ] dry eyes [ ] eye irritation [ ] postnasal drip [ ] nasal congestion  CV: [ ] chest pain [ ] orthopnea [ ] palpitations [ ] murmur  Resp: [ ] cough [ ] shortness of breath [ ] dyspnea [ ] wheezing [ ] sputum [ ] hemoptysis  GI: [ ] nausea [ ] vomiting [ ] diarrhea [ ] constipation [ ] abd pain [ ] dysphagia   : [ ] dysuria [ ] nocturia [ ] hematuria [ ] increased urinary frequency  Musculoskeletal: [ ] back pain [ ] myalgias [ ] arthralgias [ ] fracture  Skin: [ ] rash [ ] itch  Neurological: [ ] headache [ ] dizziness [ ] syncope [ ] weakness [ ] numbness  Psychiatric: [ ] anxiety [ ] depression  Endocrine: [ ] diabetes [ ] thyroid problem  Hematologic/Lymphatic: [ ] anemia [ ] bleeding problem  Allergic/Immunologic: [ ] itchy eyes [ ] nasal discharge [ ] hives [ ] angioedema  [ ] All other systems negative  [ ] Unable to assess ROS because ________    OBJECTIVE:  ICU Vital Signs Last 24 Hrs  T(C): 36.8 (29 Jul 2020 13:04), Max: 37 (29 Jul 2020 03:15)  T(F): 98.3 (29 Jul 2020 13:04), Max: 98.6 (29 Jul 2020 03:15)  HR: 84 (29 Jul 2020 17:00) (67 - 84)  BP: 98/61 (29 Jul 2020 17:00) (98/61 - 130/80)  BP(mean): --  ABP: --  ABP(mean): --  RR: 17 (29 Jul 2020 13:04) (17 - 18)  SpO2: 98% (29 Jul 2020 13:04) (97% - 99%)        07-28 @ 07:01 - 07-29 @ 07:00  --------------------------------------------------------  IN: 970 mL / OUT: 1300 mL / NET: -330 mL    07-29 @ 07:01 - 07-29 @ 19:06  --------------------------------------------------------  IN: 1100 mL / OUT: 1100 mL / NET: 0 mL      CAPILLARY BLOOD GLUCOSE      POCT Blood Glucose.: 117 mg/dL (29 Jul 2020 16:49)      PHYSICAL EXAM:  General:   HEENT:   Lymph Nodes:  Neck:   Respiratory:   Cardiovascular:   Abdomen:   Extremities:   Skin:   Neurological:  Psychiatry:    LINES:     HOSPITAL MEDICATIONS:  MEDICATIONS  (STANDING):  albumin human 25% IVPB 100 milliLiter(s) IV Intermittent every 6 hours  chlorhexidine 4% Liquid 1 Application(s) Topical <User Schedule>  folic acid 1 milliGRAM(s) Oral daily  midodrine. 10 milliGRAM(s) Oral three times a day  octreotide  Injectable 100 MICROGram(s) IV Push three times a day  piperacillin/tazobactam IVPB.. 3.375 Gram(s) IV Intermittent every 12 hours  rifAXIMin 200 milliGRAM(s) Oral three times a day  thiamine 100 milliGRAM(s) Oral at bedtime    MEDICATIONS  (PRN):  sodium chloride 0.9% lock flush 10 milliLiter(s) IV Push every 1 hour PRN Pre/post blood products, medications, blood draw, and to maintain line patency      LABS:                        8.8    26.73 )-----------( 214      ( 29 Jul 2020 18:40 )             26.6     Hgb Trend: 8.8<--, 7.9<--, 9.7<--, 11.2<--, 11.9<--  07-29    139  |  100  |  41<H>  ----------------------------<  178<H>  4.9   |  18<L>  |  6.93<H>    Ca    7.3<L>      29 Jul 2020 18:40  Phos  6.9     07-29  Mg     1.6     07-29    TPro  3.8<L>  /  Alb  2.5<L>  /  TBili  15.4<H>  /  DBili  >10.0<H>  /  AST  71<H>  /  ALT  25  /  AlkPhos  57  07-29    Creatinine Trend: 6.93<--, 11.04<--, 11.92<--, 10.69<--, 9.85<--, 9.64<--  PT/INR - ( 29 Jul 2020 18:40 )   PT: 21.6 sec;   INR: 1.87 ratio         PTT - ( 29 Jul 2020 18:40 )  PTT:37.7 sec    Arterial Blood Gas:  07-29 @ 18:44  7.19/52/331/19/99/-8.3  ABG lactate: --  Arterial Blood Gas:  07-29 @ 18:24  7.22/56/348/22/100/-4.7  ABG lactate: --        MICROBIOLOGY:     RADIOLOGY:  [ ] Reviewed and interpreted by me    EKG: CHIEF COMPLAINT: Gastric Variceal Bleed    HPI:    38 yo M PMH ethanol abuser with cirrhosis, portal hypertension, traumatic bladder rupture due to fall s/p ex-lap and repair 2019 with recent admission for acute alcoholic hepatitis and alcohol withdrawal complicated by seizures presented 7/26 with acute renal failure, jaundice, and increasing ascites in the setting of profuse diarrhea and likely dehydration. Last drink reportedly 7/4.  Admitted to medicine service for management of acute on chronic liver failure, MARQUISE. Evaluated by  transplant hepatology: concern for infection vs. worsening liver failure from alcoholic hepatitis. Diagnostic paracentesis 7/26 negative for SBP. Renal failure likely ATN, though could rule out HRS. Albumin, octreotide initiated at that time. 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 Progressive renal failure requiring initiation of HD. Empirically treated with Zosyn.     PAST MEDICAL & SURGICAL HISTORY:  No pertinent past medical history  S/P exploratory laparotomy      FAMILY HISTORY:  FH: alpha 1 antitrypsin deficiency      SOCIAL HISTORY:  Smoking: [ ] Never Smoked [ ] Former Smoker (__ packs x ___ years) [ ] Current Smoker  (__ packs x ___ years)  Substance Use: [ ] Never Used [ ] Used ____  EtOH Use:  Marital Status: [ ] Single [ ]  [ ]  [ ]   Sexual History:   Occupation:  Recent Travel:  Country of Birth:  Advance Directives:    Allergies    No Known Allergies    Intolerances        HOME MEDICATIONS:    REVIEW OF SYSTEMS:  Constitutional: [ ] fevers [ ] chills [ ] weight loss [ ] weight gain  HEENT: [ ] dry eyes [ ] eye irritation [ ] postnasal drip [ ] nasal congestion  CV: [ ] chest pain [ ] orthopnea [ ] palpitations [ ] murmur  Resp: [ ] cough [ ] shortness of breath [ ] dyspnea [ ] wheezing [ ] sputum [ ] hemoptysis  GI: [ ] nausea [ ] vomiting [ ] diarrhea [ ] constipation [ ] abd pain [ ] dysphagia   : [ ] dysuria [ ] nocturia [ ] hematuria [ ] increased urinary frequency  Musculoskeletal: [ ] back pain [ ] myalgias [ ] arthralgias [ ] fracture  Skin: [ ] rash [ ] itch  Neurological: [ ] headache [ ] dizziness [ ] syncope [ ] weakness [ ] numbness  Psychiatric: [ ] anxiety [ ] depression  Endocrine: [ ] diabetes [ ] thyroid problem  Hematologic/Lymphatic: [ ] anemia [ ] bleeding problem  Allergic/Immunologic: [ ] itchy eyes [ ] nasal discharge [ ] hives [ ] angioedema  [ ] All other systems negative  [ ] Unable to assess ROS because ________    OBJECTIVE:  ICU Vital Signs Last 24 Hrs  T(C): 36.8 (29 Jul 2020 13:04), Max: 37 (29 Jul 2020 03:15)  T(F): 98.3 (29 Jul 2020 13:04), Max: 98.6 (29 Jul 2020 03:15)  HR: 84 (29 Jul 2020 17:00) (67 - 84)  BP: 98/61 (29 Jul 2020 17:00) (98/61 - 130/80)  BP(mean): --  ABP: --  ABP(mean): --  RR: 17 (29 Jul 2020 13:04) (17 - 18)  SpO2: 98% (29 Jul 2020 13:04) (97% - 99%)        07-28 @ 07:01 - 07-29 @ 07:00  --------------------------------------------------------  IN: 970 mL / OUT: 1300 mL / NET: -330 mL    07-29 @ 07:01 - 07-29 @ 19:06  --------------------------------------------------------  IN: 1100 mL / OUT: 1100 mL / NET: 0 mL      CAPILLARY BLOOD GLUCOSE      POCT Blood Glucose.: 117 mg/dL (29 Jul 2020 16:49)      PHYSICAL EXAM:  General:   HEENT:   Lymph Nodes:  Neck:   Respiratory:   Cardiovascular:   Abdomen:   Extremities:   Skin:   Neurological:  Psychiatry:    LINES:     HOSPITAL MEDICATIONS:  MEDICATIONS  (STANDING):  albumin human 25% IVPB 100 milliLiter(s) IV Intermittent every 6 hours  chlorhexidine 4% Liquid 1 Application(s) Topical <User Schedule>  folic acid 1 milliGRAM(s) Oral daily  midodrine. 10 milliGRAM(s) Oral three times a day  octreotide  Injectable 100 MICROGram(s) IV Push three times a day  piperacillin/tazobactam IVPB.. 3.375 Gram(s) IV Intermittent every 12 hours  rifAXIMin 200 milliGRAM(s) Oral three times a day  thiamine 100 milliGRAM(s) Oral at bedtime    MEDICATIONS  (PRN):  sodium chloride 0.9% lock flush 10 milliLiter(s) IV Push every 1 hour PRN Pre/post blood products, medications, blood draw, and to maintain line patency      LABS:                        8.8    26.73 )-----------( 214      ( 29 Jul 2020 18:40 )             26.6     Hgb Trend: 8.8<--, 7.9<--, 9.7<--, 11.2<--, 11.9<--  07-29    139  |  100  |  41<H>  ----------------------------<  178<H>  4.9   |  18<L>  |  6.93<H>    Ca    7.3<L>      29 Jul 2020 18:40  Phos  6.9     07-29  Mg     1.6     07-29    TPro  3.8<L>  /  Alb  2.5<L>  /  TBili  15.4<H>  /  DBili  >10.0<H>  /  AST  71<H>  /  ALT  25  /  AlkPhos  57  07-29    Creatinine Trend: 6.93<--, 11.04<--, 11.92<--, 10.69<--, 9.85<--, 9.64<--  PT/INR - ( 29 Jul 2020 18:40 )   PT: 21.6 sec;   INR: 1.87 ratio         PTT - ( 29 Jul 2020 18:40 )  PTT:37.7 sec    Arterial Blood Gas:  07-29 @ 18:44  7.19/52/331/19/99/-8.3  ABG lactate: --  Arterial Blood Gas:  07-29 @ 18:24  7.22/56/348/22/100/-4.7  ABG lactate: --        MICROBIOLOGY:     RADIOLOGY:  [ ] Reviewed and interpreted by me    EKG: CHIEF COMPLAINT: Gastric Variceal Bleed    HPI:    40 yo M PMH ethanol abuser with cirrhosis, portal hypertension, traumatic bladder rupture due to fall s/p ex-lap and repair 2019 with recent admission for acute alcoholic hepatitis and alcohol withdrawal complicated by seizures presented 7/26 with acute renal failure, jaundice, and increasing ascites in the setting of profuse diarrhea and likely dehydration. Last drink reportedly 7/4.  Admitted to medicine service for management of acute on chronic liver failure, MARQUISE. Evaluated by  transplant hepatology: concern for infection vs. worsening liver failure from alcoholic hepatitis. Diagnostic paracentesis 7/26 negative for SBP. Empirically treated with Zosyn. Per Hepatology, renal failure likely ATN, though could rule out HRS. Albumin, octreotide initiated at that time. 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 Progressive renal failure requiring initiation of HD. Shiley placed by IR. Renal suggesting renal biopsy for confirmation of diagnosis.  7/29--Patient had episode of hematemesis. Decision made for EGD.  Gastritis vs. Variceal bleed. EGD showing:     PAST MEDICAL & SURGICAL HISTORY:  No pertinent past medical history  S/P exploratory laparotomy      FAMILY HISTORY:  FH: alpha 1 antitrypsin deficiency      Allergies: No Known Allergies      REVIEW OF SYSTEMS:  Constitutional: [ ] fevers [ ] chills [ ] weight loss [ ] weight gain  HEENT: [ ] dry eyes [ ] eye irritation [ ] postnasal drip [ ] nasal congestion  CV: [ ] chest pain [ ] orthopnea [ ] palpitations [ ] murmur  Resp: [ ] cough [ ] shortness of breath [ ] dyspnea [ ] wheezing [ ] sputum [ ] hemoptysis  GI: [ ] nausea [ ] vomiting [ ] diarrhea [ ] constipation [ ] abd pain [ ] dysphagia   : [ ] dysuria [ ] nocturia [ ] hematuria [ ] increased urinary frequency  Musculoskeletal: [ ] back pain [ ] myalgias [ ] arthralgias [ ] fracture  Skin: [ ] rash [ ] itch  Neurological: [ ] headache [ ] dizziness [ ] syncope [ ] weakness [ ] numbness  Psychiatric: [ ] anxiety [ ] depression  Endocrine: [ ] diabetes [ ] thyroid problem  Hematologic/Lymphatic: [ ] anemia [ ] bleeding problem  Allergic/Immunologic: [ ] itchy eyes [ ] nasal discharge [ ] hives [ ] angioedema  [ ] All other systems negative  [ ] Unable to assess ROS because ________    OBJECTIVE:  ICU Vital Signs Last 24 Hrs  T(C): 36.8 (29 Jul 2020 13:04), Max: 37 (29 Jul 2020 03:15)  T(F): 98.3 (29 Jul 2020 13:04), Max: 98.6 (29 Jul 2020 03:15)  HR: 84 (29 Jul 2020 17:00) (67 - 84)  BP: 98/61 (29 Jul 2020 17:00) (98/61 - 130/80)  BP(mean): --  ABP: --  ABP(mean): --  RR: 17 (29 Jul 2020 13:04) (17 - 18)  SpO2: 98% (29 Jul 2020 13:04) (97% - 99%)        07-28 @ 07:01 - 07-29 @ 07:00  --------------------------------------------------------  IN: 970 mL / OUT: 1300 mL / NET: -330 mL    07-29 @ 07:01 - 07-29 @ 19:06  --------------------------------------------------------  IN: 1100 mL / OUT: 1100 mL / NET: 0 mL      CAPILLARY BLOOD GLUCOSE      POCT Blood Glucose.: 117 mg/dL (29 Jul 2020 16:49)      PHYSICAL EXAM:  General:   HEENT:   Lymph Nodes:  Neck:   Respiratory:   Cardiovascular:   Abdomen:   Extremities:   Skin:   Neurological:  Psychiatry:    LINES:     HOSPITAL MEDICATIONS:  MEDICATIONS  (STANDING):  albumin human 25% IVPB 100 milliLiter(s) IV Intermittent every 6 hours  chlorhexidine 4% Liquid 1 Application(s) Topical <User Schedule>  folic acid 1 milliGRAM(s) Oral daily  midodrine. 10 milliGRAM(s) Oral three times a day  octreotide  Injectable 100 MICROGram(s) IV Push three times a day  piperacillin/tazobactam IVPB.. 3.375 Gram(s) IV Intermittent every 12 hours  rifAXIMin 200 milliGRAM(s) Oral three times a day  thiamine 100 milliGRAM(s) Oral at bedtime    MEDICATIONS  (PRN):  sodium chloride 0.9% lock flush 10 milliLiter(s) IV Push every 1 hour PRN Pre/post blood products, medications, blood draw, and to maintain line patency      LABS:                        8.8    26.73 )-----------( 214      ( 29 Jul 2020 18:40 )             26.6     Hgb Trend: 8.8<--, 7.9<--, 9.7<--, 11.2<--, 11.9<--  07-29    139  |  100  |  41<H>  ----------------------------<  178<H>  4.9   |  18<L>  |  6.93<H>    Ca    7.3<L>      29 Jul 2020 18:40  Phos  6.9     07-29  Mg     1.6     07-29    TPro  3.8<L>  /  Alb  2.5<L>  /  TBili  15.4<H>  /  DBili  >10.0<H>  /  AST  71<H>  /  ALT  25  /  AlkPhos  57  07-29    Creatinine Trend: 6.93<--, 11.04<--, 11.92<--, 10.69<--, 9.85<--, 9.64<--  PT/INR - ( 29 Jul 2020 18:40 )   PT: 21.6 sec;   INR: 1.87 ratio         PTT - ( 29 Jul 2020 18:40 )  PTT:37.7 sec    Arterial Blood Gas:  07-29 @ 18:44  7.19/52/331/19/99/-8.3  ABG lactate: --  Arterial Blood Gas:  07-29 @ 18:24  7.22/56/348/22/100/-4.7  ABG lactate: --        MICROBIOLOGY:     RADIOLOGY:  [ ] Reviewed and interpreted by me    EKG: CHIEF COMPLAINT: Gastric Variceal Bleed    HPI:    38 yo M PMH ethanol abuser with cirrhosis, portal hypertension, traumatic bladder rupture due to fall s/p ex-lap and repair 2019 with recent admission for acute alcoholic hepatitis and alcohol withdrawal complicated by seizures presented 7/26 with acute renal failure, jaundice, and increasing ascites in the setting of profuse diarrhea and likely dehydration. Last drink reportedly 7/4.  Admitted to medicine service for management of acute on chronic liver failure, MARQUISE. Evaluated by  transplant hepatology: concern for infection vs. worsening liver failure from alcoholic hepatitis. Diagnostic paracentesis 7/26 negative for SBP. Empirically treated with Zosyn. Per Hepatology, renal failure likely ATN, though could rule out HRS. Albumin, octreotide initiated at that time. 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 Progressive renal failure requiring initiation of HD. Shiley placed by IR. Renal suggesting renal biopsy for confirmation of diagnosis.  7/29--Patient had episode of hematemesis. Decision made for EGD.  Gastritis vs. Variceal bleed. EGD showing:     PAST MEDICAL & SURGICAL HISTORY:  No pertinent past medical history  S/P exploratory laparotomy      FAMILY HISTORY:  FH: alpha 1 antitrypsin deficiency      Allergies: No Known Allergies      REVIEW OF SYSTEMS:  Constitutional: [ ] fevers [ ] chills [ ] weight loss [ ] weight gain  HEENT: [ ] dry eyes [ ] eye irritation [ ] postnasal drip [ ] nasal congestion  CV: [ ] chest pain [ ] orthopnea [ ] palpitations [ ] murmur  Resp: [ ] cough [ ] shortness of breath [ ] dyspnea [ ] wheezing [ ] sputum [ ] hemoptysis  GI: [ ] nausea [ ] vomiting [ ] diarrhea [ ] constipation [ ] abd pain [ ] dysphagia   : [ ] dysuria [ ] nocturia [ ] hematuria [ ] increased urinary frequency  Musculoskeletal: [ ] back pain [ ] myalgias [ ] arthralgias [ ] fracture  Skin: [ ] rash [ ] itch  Neurological: [ ] headache [ ] dizziness [ ] syncope [ ] weakness [ ] numbness  Psychiatric: [ ] anxiety [ ] depression  Endocrine: [ ] diabetes [ ] thyroid problem  Hematologic/Lymphatic: [ ] anemia [ ] bleeding problem  Allergic/Immunologic: [ ] itchy eyes [ ] nasal discharge [ ] hives [ ] angioedema  [ ] All other systems negative  [x] Unable to assess ROS because intubated and sedated    OBJECTIVE:  ICU Vital Signs Last 24 Hrs  T(C): 36.8 (29 Jul 2020 13:04), Max: 37 (29 Jul 2020 03:15)  T(F): 98.3 (29 Jul 2020 13:04), Max: 98.6 (29 Jul 2020 03:15)  HR: 84 (29 Jul 2020 17:00) (67 - 84)  BP: 98/61 (29 Jul 2020 17:00) (98/61 - 130/80)  BP(mean): --  ABP: --  ABP(mean): --  RR: 17 (29 Jul 2020 13:04) (17 - 18)  SpO2: 98% (29 Jul 2020 13:04) (97% - 99%)        07-28 @ 07:01 - 07-29 @ 07:00  --------------------------------------------------------  IN: 970 mL / OUT: 1300 mL / NET: -330 mL    07-29 @ 07:01 - 07-29 @ 19:06  --------------------------------------------------------  IN: 1100 mL / OUT: 1100 mL / NET: 0 mL      CAPILLARY BLOOD GLUCOSE      POCT Blood Glucose.: 117 mg/dL (29 Jul 2020 16:49)      PHYSICAL EXAM:  General:   HEENT:   Lymph Nodes:  Neck:   Respiratory:   Cardiovascular:   Abdomen:   Extremities:   Skin:   Neurological:  Psychiatry:    LINES:     HOSPITAL MEDICATIONS:  MEDICATIONS  (STANDING):  albumin human 25% IVPB 100 milliLiter(s) IV Intermittent every 6 hours  chlorhexidine 4% Liquid 1 Application(s) Topical <User Schedule>  folic acid 1 milliGRAM(s) Oral daily  midodrine. 10 milliGRAM(s) Oral three times a day  octreotide  Injectable 100 MICROGram(s) IV Push three times a day  piperacillin/tazobactam IVPB.. 3.375 Gram(s) IV Intermittent every 12 hours  rifAXIMin 200 milliGRAM(s) Oral three times a day  thiamine 100 milliGRAM(s) Oral at bedtime    MEDICATIONS  (PRN):  sodium chloride 0.9% lock flush 10 milliLiter(s) IV Push every 1 hour PRN Pre/post blood products, medications, blood draw, and to maintain line patency      LABS:                        8.8    26.73 )-----------( 214      ( 29 Jul 2020 18:40 )             26.6     Hgb Trend: 8.8<--, 7.9<--, 9.7<--, 11.2<--, 11.9<--        139  |  100  |  41<H>  ----------------------------<  178<H>  4.9   |  18<L>  |  6.93<H>    Ca    7.3<L>      29 Jul 2020 18:40  Phos  6.9     07-29  Mg     1.6     07-29    TPro  3.8<L>  /  Alb  2.5<L>  /  TBili  15.4<H>  /  DBili  >10.0<H>  /  AST  71<H>  /  ALT  25  /  AlkPhos  57  07-29    Creatinine Trend: 6.93<--, 11.04<--, 11.92<--, 10.69<--, 9.85<--, 9.64<--  PT/INR - ( 29 Jul 2020 18:40 )   PT: 21.6 sec;   INR: 1.87 ratio         PTT - ( 29 Jul 2020 18:40 )  PTT:37.7 sec    Arterial Blood Gas:  07-29 @ 18:44  7.19/52/331/19/99/-8.3  ABG lactate: --  Arterial Blood Gas:  07-29 @ 18:24  7.22/56/348/22/100/-4.7  ABG lactate: --        MICROBIOLOGY:  7/26: Peritoneal Fluid: NGTD; Blood Culture x 2: NGTD; Urine Culture: NGTD    RADIOLOGY:    07-< from: MR MRCP No Cont (07.27.20 @ 14:24) >    Liver cirrhosis with portal hypertension. Large caliber perisplenic and perigastric varices. Moderate volume ascites, increased from trace on 07/11/2020. Steatosis. Evaluation for HCC is precluded by lack of intravenous contrast.    Normal caliber biliary tree without choledocholithiasis.    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  Course complicated by hematemesis s/p EGD showing.  Now transferred to MICU, intubated s/p EGD.       Neurology:  -Alert and oriented at baseline  -Hx of seizures in setting of etoh withdrawal  -Sedated    Pulm:  -Intubated s/p EGD  -Continue mechanical ventilation  -ABG prn    CV:        GI:    #Acute on chronic liver failure: d/dx includes underlying infection (UA neg, BCx pending, CXR neg, dx para neg for SBP, biliary imaging negative, C diff negative), worsening EtOH hepatitis (MDF = 60 but no interval drinking since discharge and this is an acute change), vs vascular injury  #Cirrhosis due to EtOH, decompensated by ascites  - varices: no prior EGD, on nadolol at home, now w/ hematemesis 7/29 AM awaiting EGD  - ascites: present on exam, neg for SBP 7/26, on Lasix 20mg/spironolactone 25mg daily as outpatient  - HE: none on exam  - HCC: no imaging, MRI without contrast is limited  - MELD-Na = 38 on 7/26  #Pruritis – likely due to elevated bilirubin, improved  #Possible PV thrombosis on U/S w/ doppler  # MARQUISE: unclear etiology, c/f bilirubin pigment nephropathy vs acute tubular necrosis vs obstructive uropathy. Reny is 53, making HRS less likely. Cr 10, however nonoliguric CHIEF COMPLAINT: Gastric Variceal Bleed    HPI:    38 yo M PMH ethanol abuser with cirrhosis, portal hypertension, traumatic bladder rupture due to fall s/p ex-lap and repair 2019 with recent admission for acute alcoholic hepatitis and alcohol withdrawal complicated by seizures presented 7/26 with acute renal failure, jaundice, and increasing ascites in the setting of profuse diarrhea and likely dehydration. Last drink reportedly 7/4.  Admitted to medicine service for management of acute on chronic liver failure, MARQUISE. Evaluated by  transplant hepatology: concern for infection vs. worsening liver failure from alcoholic hepatitis. Diagnostic paracentesis 7/26 negative for SBP. Empirically treated with Zosyn. Per Hepatology, renal failure likely ATN, though could rule out HRS. Albumin, octreotide initiated at that time. 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 Progressive renal failure requiring initiation of HD. Shiley placed by IR. Renal suggesting renal biopsy for confirmation of diagnosis.  7/29--Patient had episode of hematemesis. Decision made for EGD.  Gastritis vs. Variceal bleed. EGD showing:     PAST MEDICAL & SURGICAL HISTORY:  No pertinent past medical history  S/P exploratory laparotomy      FAMILY HISTORY:  FH: alpha 1 antitrypsin deficiency      Allergies: No Known Allergies      REVIEW OF SYSTEMS:  Constitutional: [ ] fevers [ ] chills [ ] weight loss [ ] weight gain  HEENT: [ ] dry eyes [ ] eye irritation [ ] postnasal drip [ ] nasal congestion  CV: [ ] chest pain [ ] orthopnea [ ] palpitations [ ] murmur  Resp: [ ] cough [ ] shortness of breath [ ] dyspnea [ ] wheezing [ ] sputum [ ] hemoptysis  GI: [ ] nausea [ ] vomiting [ ] diarrhea [ ] constipation [ ] abd pain [ ] dysphagia   : [ ] dysuria [ ] nocturia [ ] hematuria [ ] increased urinary frequency  Musculoskeletal: [ ] back pain [ ] myalgias [ ] arthralgias [ ] fracture  Skin: [ ] rash [ ] itch  Neurological: [ ] headache [ ] dizziness [ ] syncope [ ] weakness [ ] numbness  Psychiatric: [ ] anxiety [ ] depression  Endocrine: [ ] diabetes [ ] thyroid problem  Hematologic/Lymphatic: [ ] anemia [ ] bleeding problem  Allergic/Immunologic: [ ] itchy eyes [ ] nasal discharge [ ] hives [ ] angioedema  [ ] All other systems negative  [x] Unable to assess ROS because intubated and sedated    OBJECTIVE:  ICU Vital Signs Last 24 Hrs  T(C): 36.8 (29 Jul 2020 13:04), Max: 37 (29 Jul 2020 03:15)  T(F): 98.3 (29 Jul 2020 13:04), Max: 98.6 (29 Jul 2020 03:15)  HR: 84 (29 Jul 2020 17:00) (67 - 84)  BP: 98/61 (29 Jul 2020 17:00) (98/61 - 130/80)  BP(mean): --  ABP: --  ABP(mean): --  RR: 17 (29 Jul 2020 13:04) (17 - 18)  SpO2: 98% (29 Jul 2020 13:04) (97% - 99%)        07-28 @ 07:01 - 07-29 @ 07:00  --------------------------------------------------------  IN: 970 mL / OUT: 1300 mL / NET: -330 mL    07-29 @ 07:01 - 07-29 @ 19:06  --------------------------------------------------------  IN: 1100 mL / OUT: 1100 mL / NET: 0 mL      CAPILLARY BLOOD GLUCOSE      POCT Blood Glucose.: 117 mg/dL (29 Jul 2020 16:49)      PHYSICAL EXAM:  General:   HEENT:   Lymph Nodes:  Neck:   Respiratory:   Cardiovascular:   Abdomen:   Extremities:   Skin:   Neurological:  Psychiatry:    LINES:     HOSPITAL MEDICATIONS:  MEDICATIONS  (STANDING):  albumin human 25% IVPB 100 milliLiter(s) IV Intermittent every 6 hours  chlorhexidine 4% Liquid 1 Application(s) Topical <User Schedule>  folic acid 1 milliGRAM(s) Oral daily  midodrine. 10 milliGRAM(s) Oral three times a day  octreotide  Injectable 100 MICROGram(s) IV Push three times a day  piperacillin/tazobactam IVPB.. 3.375 Gram(s) IV Intermittent every 12 hours  rifAXIMin 200 milliGRAM(s) Oral three times a day  thiamine 100 milliGRAM(s) Oral at bedtime    MEDICATIONS  (PRN):  sodium chloride 0.9% lock flush 10 milliLiter(s) IV Push every 1 hour PRN Pre/post blood products, medications, blood draw, and to maintain line patency      LABS:                        8.8    26.73 )-----------( 214      ( 29 Jul 2020 18:40 )             26.6     Hgb Trend: 8.8<--, 7.9<--, 9.7<--, 11.2<--, 11.9<--        139  |  100  |  41<H>  ----------------------------<  178<H>  4.9   |  18<L>  |  6.93<H>    Ca    7.3<L>      29 Jul 2020 18:40  Phos  6.9     07-29  Mg     1.6     07-29    TPro  3.8<L>  /  Alb  2.5<L>  /  TBili  15.4<H>  /  DBili  >10.0<H>  /  AST  71<H>  /  ALT  25  /  AlkPhos  57  07-29    Creatinine Trend: 6.93<--, 11.04<--, 11.92<--, 10.69<--, 9.85<--, 9.64<--  PT/INR - ( 29 Jul 2020 18:40 )   PT: 21.6 sec;   INR: 1.87 ratio         PTT - ( 29 Jul 2020 18:40 )  PTT:37.7 sec    Arterial Blood Gas:  07-29 @ 18:44  7.19/52/331/19/99/-8.3  ABG lactate: --  Arterial Blood Gas:  07-29 @ 18:24  7.22/56/348/22/100/-4.7  ABG lactate: --        MICROBIOLOGY:  7/26: Peritoneal Fluid: NGTD; Blood Culture x 2: NGTD; Urine Culture: NGTD    RADIOLOGY:    07-< from: MR MRCP No Cont (07.27.20 @ 14:24) >    Liver cirrhosis with portal hypertension. Large caliber perisplenic and perigastric varices. Moderate volume ascites, increased from trace on 07/11/2020. Steatosis. Evaluation for HCC is precluded by lack of intravenous contrast.    Normal caliber biliary tree without choledocholithiasis.    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  Course complicated by hematemesis s/p EGD showing.  Now transferred to MICU, intubated s/p EGD.       Neurology:  -Alert and oriented at baseline  -Hx of seizures in setting of etoh withdrawal  -Sedated    Pulm:  -Intubated s/p EGD  -Continue mechanical ventilation  -ABG prn    CV:  -VS q1H  -Continue Midodrine  -Maintain MAP > 65--consider Levophed if needed        GI:    #Acute on chronic liver failure: d/dx includes underlying infection (UA neg, BCx pending, CXR neg, dx para neg for SBP, biliary imaging negative, C diff negative), worsening EtOH hepatitis (MDF = 60 but no interval drinking since discharge and this is an acute change), vs vascular injury  #Cirrhosis due to EtOH, decompensated by ascites  - varices: no prior EGD, on nadolol at home, now w/ hematemesis     EGD 7/29:   - ascites: present on exam, neg for SBP 7/26, on Lasix 20mg/spironolactone 25mg daily as outpatient  - HE: Hold Rifaximin in setting of hematemesis  - HCC: no imaging, MRI without contrast is limited  - MELD-Na = 38 on 7/26  #Pruritis – likely due to elevated bilirubin, improved  #Possible PV thrombosis on U/S w/ doppler      #Hematemesis   s/p EGD 7/29 showing:  -Trend CBC q6---tranfuse to maintain Hgb > 7, or for active bleed  -Trend Coags v3s--qohqdao with FFP, cryo as needed  -Maintain NPO    #Transaminitis  -Trend liver enzymes, bilirubin  -Follow up GI, hepatology further recommendations    #Ascites  -Serial abdominal exams  -Paracentesis as needed    Renal:    #MARQUISE: in the setting of liver cirrhosis with severe ascites. MARQUISE 2/2 prerenal (severe diarrhea + diuretics +decrease effective arterial blood volume from Cirrhosis) to r/o hepatorenal syndrome. 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 On Admission 9.64 -- s/p diagnostic paracentesis. -- sCr worsening - requiring HD    First ever HD on 7/28 - tolerated well.   Second HD treatment scheduled 7/29  Kidney Biopsy pending--possible need for plasmapheresis if confirmed bilirubin cast nephropathy  c/w IV albumin infusion   hold diuretics at this time   c/w midodrine keep MAP> 70,   continue octreotide   Hernandez catheter for strict I/O  Monitor electrolytes and urine output.   Avoid NSAIDs, ACEI/ARBS, RCA and nephrotoxins.   Dose medications as per eGFR.     #Hyponatremia: in setting of liver cirrhosis and severe ascites. Serum sodium 133 on admission (7/26/)   previous episodes of hyponatremia. Last admission discharge with Na 133-    check urine electrolytes, urine Osm  Monitor serum sodium Q6 hours.   Do not correct serum sodium >6-8 meq/day.     #Hypocalcemia-  Ionized Calcium daily   keep Ionized calcium around 1.1-1.2  Replete prn      ID:  #All cultures negative to date--      7/26: Blood Culture x 2 NGTD; Urine Culture: NGTD               Para negative for SBP  --Continue empiric Zosyn   --Trend lactate; WBC count    Heme:   --CBC q6H  --Maintain Hgb > 7. Tranfuse as needed  --Trend Coags--reverse coagulopathy as needed or in event of bleed    Endo:  --Glucose q6h  --Monitor for hypoglycemia in setting of liver failure, NPO          ATTENDING ATTESTATION: CHIEF COMPLAINT: Gastric Variceal Bleed    HPI:    38 yo M PMH ethanol abuser with cirrhosis, portal hypertension, traumatic bladder rupture due to fall s/p ex-lap and repair 2019 with recent admission for acute alcoholic hepatitis and alcohol withdrawal complicated by seizures presented 7/26 with acute renal failure, jaundice, and increasing ascites in the setting of profuse diarrhea and likely dehydration. Last drink reportedly 7/4.  Admitted to medicine service for management of acute on chronic liver failure, MARQUISE. Evaluated by  transplant hepatology: concern for infection vs. worsening liver failure from alcoholic hepatitis. Diagnostic paracentesis 7/26 negative for SBP. Empirically treated with Zosyn. Per Hepatology, renal failure likely ATN, though could rule out HRS. Albumin, octreotide initiated at that time. 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 Progressive renal failure requiring initiation of HD. Shiley placed by IR. Renal suggesting renal biopsy for confirmation of diagnosis.  7/29--Patient had episode of hematemesis. Decision made for EGD.  Gastritis vs. Variceal bleed. EGD showing large gastric variceal bleed. In Endoscopy: received 12 units PRBC, 10 units FFP, 2 units Plt, and 1 of Cryo. GI unable to obtain hemostasis. Emergently transferred to IR for further intervention.     PAST MEDICAL & SURGICAL HISTORY:  No pertinent past medical history  S/P exploratory laparotomy      FAMILY HISTORY:  FH: alpha 1 antitrypsin deficiency      Allergies: No Known Allergies      REVIEW OF SYSTEMS:  Constitutional: [ ] fevers [ ] chills [ ] weight loss [ ] weight gain  HEENT: [ ] dry eyes [ ] eye irritation [ ] postnasal drip [ ] nasal congestion  CV: [ ] chest pain [ ] orthopnea [ ] palpitations [ ] murmur  Resp: [ ] cough [ ] shortness of breath [ ] dyspnea [ ] wheezing [ ] sputum [ ] hemoptysis  GI: [ ] nausea [ ] vomiting [ ] diarrhea [ ] constipation [ ] abd pain [ ] dysphagia   : [ ] dysuria [ ] nocturia [ ] hematuria [ ] increased urinary frequency  Musculoskeletal: [ ] back pain [ ] myalgias [ ] arthralgias [ ] fracture  Skin: [ ] rash [ ] itch  Neurological: [ ] headache [ ] dizziness [ ] syncope [ ] weakness [ ] numbness  Psychiatric: [ ] anxiety [ ] depression  Endocrine: [ ] diabetes [ ] thyroid problem  Hematologic/Lymphatic: [ ] anemia [ ] bleeding problem  Allergic/Immunologic: [ ] itchy eyes [ ] nasal discharge [ ] hives [ ] angioedema  [ ] All other systems negative  [x] Unable to assess ROS because intubated and sedated    OBJECTIVE:  ICU Vital Signs Last 24 Hrs  T(C): 36.8 (29 Jul 2020 13:04), Max: 37 (29 Jul 2020 03:15)  T(F): 98.3 (29 Jul 2020 13:04), Max: 98.6 (29 Jul 2020 03:15)  HR: 84 (29 Jul 2020 17:00) (67 - 84)  BP: 98/61 (29 Jul 2020 17:00) (98/61 - 130/80)  BP(mean): --  ABP: --  ABP(mean): --  RR: 17 (29 Jul 2020 13:04) (17 - 18)  SpO2: 98% (29 Jul 2020 13:04) (97% - 99%)        07-28 @ 07:01 - 07-29 @ 07:00  --------------------------------------------------------  IN: 970 mL / OUT: 1300 mL / NET: -330 mL    07-29 @ 07:01 - 07-29 @ 19:06  --------------------------------------------------------  IN: 1100 mL / OUT: 1100 mL / NET: 0 mL      CAPILLARY BLOOD GLUCOSE      POCT Blood Glucose.: 117 mg/dL (29 Jul 2020 16:49)      PHYSICAL EXAM:  General:   HEENT:   Lymph Nodes:  Neck:   Respiratory:   Cardiovascular:   Abdomen:   Extremities:   Skin:   Neurological:  Psychiatry:    LINES:     HOSPITAL MEDICATIONS:  MEDICATIONS  (STANDING):  albumin human 25% IVPB 100 milliLiter(s) IV Intermittent every 6 hours  chlorhexidine 4% Liquid 1 Application(s) Topical <User Schedule>  folic acid 1 milliGRAM(s) Oral daily  midodrine. 10 milliGRAM(s) Oral three times a day  octreotide  Injectable 100 MICROGram(s) IV Push three times a day  piperacillin/tazobactam IVPB.. 3.375 Gram(s) IV Intermittent every 12 hours  rifAXIMin 200 milliGRAM(s) Oral three times a day  thiamine 100 milliGRAM(s) Oral at bedtime    MEDICATIONS  (PRN):  sodium chloride 0.9% lock flush 10 milliLiter(s) IV Push every 1 hour PRN Pre/post blood products, medications, blood draw, and to maintain line patency      LABS:                        8.8    26.73 )-----------( 214      ( 29 Jul 2020 18:40 )             26.6     Hgb Trend: 8.8<--, 7.9<--, 9.7<--, 11.2<--, 11.9<--        139  |  100  |  41<H>  ----------------------------<  178<H>  4.9   |  18<L>  |  6.93<H>    Ca    7.3<L>      29 Jul 2020 18:40  Phos  6.9     07-29  Mg     1.6     07-29    TPro  3.8<L>  /  Alb  2.5<L>  /  TBili  15.4<H>  /  DBili  >10.0<H>  /  AST  71<H>  /  ALT  25  /  AlkPhos  57  07-29    Creatinine Trend: 6.93<--, 11.04<--, 11.92<--, 10.69<--, 9.85<--, 9.64<--  PT/INR - ( 29 Jul 2020 18:40 )   PT: 21.6 sec;   INR: 1.87 ratio         PTT - ( 29 Jul 2020 18:40 )  PTT:37.7 sec    Arterial Blood Gas:  07-29 @ 18:44  7.19/52/331/19/99/-8.3  ABG lactate: --  Arterial Blood Gas:  07-29 @ 18:24  7.22/56/348/22/100/-4.7  ABG lactate: --        MICROBIOLOGY:  7/26: Peritoneal Fluid: NGTD; Blood Culture x 2: NGTD; Urine Culture: NGTD    RADIOLOGY:    07-< from: MR MRCP No Cont (07.27.20 @ 14:24) >    Liver cirrhosis with portal hypertension. Large caliber perisplenic and perigastric varices. Moderate volume ascites, increased from trace on 07/11/2020. Steatosis. Evaluation for HCC is precluded by lack of intravenous contrast.    Normal caliber biliary tree without choledocholithiasis.    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  Course complicated by hematemesis s/p EGD showing.  Now transferred to MICU, intubated s/p EGD.       Neurology:  -Alert and oriented at baseline  -Hx of seizures in setting of etoh withdrawal  -Sedated    Pulm:  -Intubated s/p EGD  -Continue mechanical ventilation  -ABG prn    CV:  -VS q1H  -Continue Midodrine  -Maintain MAP > 65--consider Levophed if needed        GI:    #Acute on chronic liver failure: d/dx includes underlying infection (UA neg, BCx pending, CXR neg, dx para neg for SBP, biliary imaging negative, C diff negative), worsening EtOH hepatitis (MDF = 60 but no interval drinking since discharge and this is an acute change), vs vascular injury  #Cirrhosis due to EtOH, decompensated by ascites  - varices: no prior EGD, on nadolol at home, now w/ hematemesis     EGD 7/29:   - ascites: present on exam, neg for SBP 7/26, on Lasix 20mg/spironolactone 25mg daily as outpatient  - HE: Hold Rifaximin in setting of hematemesis  - HCC: no imaging, MRI without contrast is limited  - MELD-Na = 38 on 7/26  #Pruritis – likely due to elevated bilirubin, improved  #Possible PV thrombosis on U/S w/ doppler      #Hematemesis   s/p EGD 7/29 showing: large gastric variceal bleed--unable to achieve hemostasis--emergently sent to IR for intervention       PRIOR TO EGD: 1 unit PRBC       IN EGD: MTP-- 12u PRBC, 10u FFP, 2u PLT, 1 Cryo       TOTAL: 13 PRBC, 10 FFP, 2 PLT, 1 Cryo  -Trend CBC q6---tranfuse to maintain Hgb > 7, or for active bleed  -Trend Coags f7v--vftoonq with FFP, cryo as needed  -Maintain NPO    #Transaminitis  -Trend liver enzymes, bilirubin  -Follow up GI, hepatology further recommendations    #Ascites  -Serial abdominal exams  -Paracentesis as needed    Renal:    #MARQUISE: in the setting of liver cirrhosis with severe ascites. MARQUISE 2/2 prerenal (severe diarrhea + diuretics +decrease effective arterial blood volume from Cirrhosis) to r/o hepatorenal syndrome. 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 On Admission 9.64 -- s/p diagnostic paracentesis. -- sCr worsening - requiring HD    First ever HD on 7/28 - tolerated well.   Second HD treatment scheduled 7/29  Kidney Biopsy pending--possible need for plasmapheresis if confirmed bilirubin cast nephropathy  c/w IV albumin infusion   hold diuretics at this time   c/w midodrine keep MAP> 70,   continue octreotide   Hernandez catheter for strict I/O  Monitor electrolytes and urine output.   Avoid NSAIDs, ACEI/ARBS, RCA and nephrotoxins.   Dose medications as per eGFR.     #Hyponatremia: in setting of liver cirrhosis and severe ascites. Serum sodium 133 on admission (7/26/)   previous episodes of hyponatremia. Last admission discharge with Na 133-    check urine electrolytes, urine Osm  Monitor serum sodium Q6 hours.   Do not correct serum sodium >6-8 meq/day.     #Hypocalcemia-  Ionized Calcium daily   keep Ionized calcium around 1.1-1.2  Replete prn      ID:  #All cultures negative to date--      7/26: Blood Culture x 2 NGTD; Urine Culture: NGTD               Para negative for SBP  --Continue empiric Zosyn   --Trend lactate; WBC count    Heme:   --CBC q6H  --Maintain Hgb > 7. Tranfuse as needed  --Trend Coags--reverse coagulopathy as needed or in event of bleed  TOTAL TRANSFUSION REQUIREMENTS: 13 PRBC, 10 FFP, 2 PLT, 1 Cryo    Endo:  --Glucose q6h  --Monitor for hypoglycemia in setting of liver failure, NPO          ATTENDING ATTESTATION: CHIEF COMPLAINT: Gastric Variceal Bleed    HPI:    40 yo M PMH ethanol abuser with cirrhosis, portal hypertension, traumatic bladder rupture due to fall s/p ex-lap and repair 2019 with recent admission for acute alcoholic hepatitis and alcohol withdrawal complicated by seizures presented 7/26 with acute renal failure, jaundice, and increasing ascites in the setting of profuse diarrhea and likely dehydration. Last drink reportedly 7/4.  Admitted to medicine service for management of acute on chronic liver failure, MARQUISE. Evaluated by  transplant hepatology: concern for infection vs. worsening liver failure from alcoholic hepatitis. Diagnostic paracentesis 7/26 negative for SBP. Empirically treated with Zosyn. Per Hepatology, renal failure likely ATN, though could rule out HRS. Albumin, octreotide initiated at that time. 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 Progressive renal failure requiring initiation of HD. Shiley placed by IR. Renal suggesting renal biopsy for confirmation of diagnosis.  7/29--Patient had episode of hematemesis. Decision made for EGD.  Gastritis vs. Variceal bleed. EGD showing large gastric variceal bleed. In Endoscopy: received 12 units PRBC, 10 units FFP, 2 units Plt, and 1 of Cryo. GI unable to obtain hemostasis. Emergently transferred to IR for further intervention.     PAST MEDICAL & SURGICAL HISTORY:  No pertinent past medical history  S/P exploratory laparotomy      FAMILY HISTORY:  FH: alpha 1 antitrypsin deficiency      Allergies: No Known Allergies      REVIEW OF SYSTEMS:  Constitutional: [ ] fevers [ ] chills [ ] weight loss [ ] weight gain  HEENT: [ ] dry eyes [ ] eye irritation [ ] postnasal drip [ ] nasal congestion  CV: [ ] chest pain [ ] orthopnea [ ] palpitations [ ] murmur  Resp: [ ] cough [ ] shortness of breath [ ] dyspnea [ ] wheezing [ ] sputum [ ] hemoptysis  GI: [ ] nausea [ ] vomiting [ ] diarrhea [ ] constipation [ ] abd pain [ ] dysphagia   : [ ] dysuria [ ] nocturia [ ] hematuria [ ] increased urinary frequency  Musculoskeletal: [ ] back pain [ ] myalgias [ ] arthralgias [ ] fracture  Skin: [ ] rash [ ] itch  Neurological: [ ] headache [ ] dizziness [ ] syncope [ ] weakness [ ] numbness  Psychiatric: [ ] anxiety [ ] depression  Endocrine: [ ] diabetes [ ] thyroid problem  Hematologic/Lymphatic: [ ] anemia [ ] bleeding problem  Allergic/Immunologic: [ ] itchy eyes [ ] nasal discharge [ ] hives [ ] angioedema  [ ] All other systems negative  [x] Unable to assess ROS because intubated and sedated    OBJECTIVE:  ICU Vital Signs Last 24 Hrs  T(C): 36.8 (29 Jul 2020 13:04), Max: 37 (29 Jul 2020 03:15)  T(F): 98.3 (29 Jul 2020 13:04), Max: 98.6 (29 Jul 2020 03:15)  HR: 84 (29 Jul 2020 17:00) (67 - 84)  BP: 98/61 (29 Jul 2020 17:00) (98/61 - 130/80)  BP(mean): --  ABP: --  ABP(mean): --  RR: 17 (29 Jul 2020 13:04) (17 - 18)  SpO2: 98% (29 Jul 2020 13:04) (97% - 99%)        07-28 @ 07:01 - 07-29 @ 07:00  --------------------------------------------------------  IN: 970 mL / OUT: 1300 mL / NET: -330 mL    07-29 @ 07:01 - 07-29 @ 19:06  --------------------------------------------------------  IN: 1100 mL / OUT: 1100 mL / NET: 0 mL      CAPILLARY BLOOD GLUCOSE      POCT Blood Glucose.: 117 mg/dL (29 Jul 2020 16:49)      PHYSICAL EXAM:  General:   HEENT:   Lymph Nodes:  Neck:   Respiratory:   Cardiovascular:   Abdomen:   Extremities:   Skin:   Neurological:  Psychiatry:    LINES:     HOSPITAL MEDICATIONS:  MEDICATIONS  (STANDING):  albumin human 25% IVPB 100 milliLiter(s) IV Intermittent every 6 hours  chlorhexidine 4% Liquid 1 Application(s) Topical <User Schedule>  folic acid 1 milliGRAM(s) Oral daily  midodrine. 10 milliGRAM(s) Oral three times a day  octreotide  Injectable 100 MICROGram(s) IV Push three times a day  piperacillin/tazobactam IVPB.. 3.375 Gram(s) IV Intermittent every 12 hours  rifAXIMin 200 milliGRAM(s) Oral three times a day  thiamine 100 milliGRAM(s) Oral at bedtime    MEDICATIONS  (PRN):  sodium chloride 0.9% lock flush 10 milliLiter(s) IV Push every 1 hour PRN Pre/post blood products, medications, blood draw, and to maintain line patency      LABS:                        8.8    26.73 )-----------( 214      ( 29 Jul 2020 18:40 )             26.6     Hgb Trend: 8.8<--, 7.9<--, 9.7<--, 11.2<--, 11.9<--        139  |  100  |  41<H>  ----------------------------<  178<H>  4.9   |  18<L>  |  6.93<H>    Ca    7.3<L>      29 Jul 2020 18:40  Phos  6.9     07-29  Mg     1.6     07-29    TPro  3.8<L>  /  Alb  2.5<L>  /  TBili  15.4<H>  /  DBili  >10.0<H>  /  AST  71<H>  /  ALT  25  /  AlkPhos  57  07-29    Creatinine Trend: 6.93<--, 11.04<--, 11.92<--, 10.69<--, 9.85<--, 9.64<--  PT/INR - ( 29 Jul 2020 18:40 )   PT: 21.6 sec;   INR: 1.87 ratio         PTT - ( 29 Jul 2020 18:40 )  PTT:37.7 sec    Arterial Blood Gas:  07-29 @ 18:44  7.19/52/331/19/99/-8.3  ABG lactate: --  Arterial Blood Gas:  07-29 @ 18:24  7.22/56/348/22/100/-4.7  ABG lactate: --        MICROBIOLOGY:  7/26: Peritoneal Fluid: NGTD; Blood Culture x 2: NGTD; Urine Culture: NGTD    RADIOLOGY:    07-< from: MR MRCP No Cont (07.27.20 @ 14:24) >    Liver cirrhosis with portal hypertension. Large caliber perisplenic and perigastric varices. Moderate volume ascites, increased from trace on 07/11/2020. Steatosis. Evaluation for HCC is precluded by lack of intravenous contrast.    Normal caliber biliary tree without choledocholithiasis.    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  Course complicated by hematemesis s/p EGD showing.  Now transferred to MICU, intubated s/p EGD.       Neurology:  -Alert and oriented at baseline  -Hx of seizures in setting of etoh withdrawal  -Sedated    Pulm:  -Intubated s/p EGD  -Continue mechanical ventilation  -ABG prn    CV:  -VS q1H  -Continue Midodrine  -Maintain MAP > 65--consider Levophed if needed        GI:    #Acute on chronic liver failure: d/dx includes underlying infection (UA neg, BCx pending, CXR neg, dx para neg for SBP, biliary imaging negative, C diff negative), worsening EtOH hepatitis (MDF = 60 but no interval drinking since discharge and this is an acute change), vs vascular injury  #Cirrhosis due to EtOH, decompensated by ascites  - varices: no prior EGD, on nadolol at home, now w/ hematemesis     EGD 7/29:   - ascites: present on exam, neg for SBP 7/26, on Lasix 20mg/spironolactone 25mg daily as outpatient  - HE: Hold Rifaximin in setting of hematemesis  - HCC: no imaging, MRI without contrast is limited  - MELD-Na = 38 on 7/26  #Pruritis – likely due to elevated bilirubin, improved  #Possible PV thrombosis on U/S w/ doppler      #Hematemesis   s/p EGD 7/29 showing: large gastric variceal bleed--unable to achieve hemostasis--emergently sent to IR for intervention       PRIOR TO EGD: 1 unit PRBC       IN EGD: MTP-- 12u PRBC, 10u FFP, 2u PLT, 1 Cryo       TOTAL: 13 PRBC, 10 FFP, 2 PLT, 1 Cryo  -Trend CBC q6---tranfuse to maintain Hgb > 7, or for active bleed  -Trend Coags f8q--yfiflvn with FFP, cryo as needed  -Maintain NPO    #Transaminitis  -Trend liver enzymes, bilirubin  -Follow up GI, hepatology further recommendations    #Ascites  -Serial abdominal exams  -Paracentesis as needed    Renal:    #MARQUISE: in the setting of liver cirrhosis with severe ascites. MARQUISE 2/2 prerenal (severe diarrhea + diuretics +decrease effective arterial blood volume from Cirrhosis) to r/o hepatorenal syndrome. 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 On Admission 9.64 -- s/p diagnostic paracentesis. -- sCr worsening - requiring HD    First ever HD on 7/28 - tolerated well.   Second HD treatment scheduled 7/29  Kidney Biopsy pending--possible need for plasmapheresis if confirmed bilirubin cast nephropathy  c/w IV albumin infusion   hold diuretics at this time   c/w midodrine keep MAP> 70,   continue octreotide   Hernandez catheter for strict I/O  Monitor electrolytes and urine output.   Avoid NSAIDs, ACEI/ARBS, RCA and nephrotoxins.   Dose medications as per eGFR.     #Hyponatremia: in setting of liver cirrhosis and severe ascites. Serum sodium 133 on admission (7/26/)   previous episodes of hyponatremia. Last admission discharge with Na 133-    check urine electrolytes, urine Osm  Monitor serum sodium Q6 hours.   Do not correct serum sodium >6-8 meq/day.     #Hypocalcemia-  Ionized Calcium daily   keep Ionized calcium around 1.1-1.2  Replete prn      ID:  #All cultures negative to date--      7/26: Blood Culture x 2 NGTD; Urine Culture: NGTD               Para negative for SBP  --Continue empiric Zosyn   --Trend lactate; WBC count    Heme:   --CBC q6H  --Maintain Hgb > 7. Tranfuse as needed  --Trend Coags--reverse coagulopathy as needed or in event of bleed  TOTAL TRANSFUSION REQUIREMENTS: 13 PRBC, 10 FFP, 2 PLT, 1 Cryo    Endo:  --Glucose q6h  --Monitor for hypoglycemia in setting of liver failure, NPO          ATTENDING ATTESTATION:  40 yo with ETOH cirrhosis, ESR on HD presented with worsening cirrhosis found to have Acute UGIB consult from endoscopy with uncontrolled bleeding, s/p 12(prbc)/10(ffp)/2(plt)/1(cryo) MTP. Called from IR with still uncontrolled bleeding.  Continue to transfuse 1 to 1 blood products, Emrgency surgical consult.  plan for possible CVVH, expanded ABX ifbleed stablises. will follow labs q6 CHIEF COMPLAINT: Gastric Variceal Bleed    HPI:    40 yo M PMH ethanol abuser with cirrhosis, portal hypertension, traumatic bladder rupture due to fall s/p ex-lap and repair 2019 with recent admission for acute alcoholic hepatitis and alcohol withdrawal complicated by seizures presented 7/26 with acute renal failure, jaundice, and increasing ascites in the setting of profuse diarrhea and likely dehydration. Last drink reportedly 7/4.  Admitted to medicine service for management of acute on chronic liver failure, MARQUISE. Evaluated by  transplant hepatology: concern for infection vs. worsening liver failure from alcoholic hepatitis. Diagnostic paracentesis 7/26 negative for SBP. Empirically treated with Zosyn. Per Hepatology, renal failure likely ATN, though could rule out HRS. Albumin, octreotide initiated at that time. 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 Progressive renal failure requiring initiation of HD. Shiley placed by IR. Renal suggesting renal biopsy for confirmation of diagnosis.  7/29--Patient had episode of hematemesis. Decision made for EGD.  Gastritis vs. Variceal bleed. EGD showing large gastric variceal bleed. In Endoscopy: received 12 units PRBC, 10 units FFP, 2 units Plt, and 1 of Cryo. GI unable to obtain hemostasis. Emergently transferred to IR for further intervention.     PAST MEDICAL & SURGICAL HISTORY:  No pertinent past medical history  S/P exploratory laparotomy      FAMILY HISTORY:  FH: alpha 1 antitrypsin deficiency      Allergies: No Known Allergies      REVIEW OF SYSTEMS:  Constitutional: [ ] fevers [ ] chills [ ] weight loss [ ] weight gain  HEENT: [ ] dry eyes [ ] eye irritation [ ] postnasal drip [ ] nasal congestion  CV: [ ] chest pain [ ] orthopnea [ ] palpitations [ ] murmur  Resp: [ ] cough [ ] shortness of breath [ ] dyspnea [ ] wheezing [ ] sputum [ ] hemoptysis  GI: [ ] nausea [ ] vomiting [ ] diarrhea [ ] constipation [ ] abd pain [ ] dysphagia   : [ ] dysuria [ ] nocturia [ ] hematuria [ ] increased urinary frequency  Musculoskeletal: [ ] back pain [ ] myalgias [ ] arthralgias [ ] fracture  Skin: [ ] rash [ ] itch  Neurological: [ ] headache [ ] dizziness [ ] syncope [ ] weakness [ ] numbness  Psychiatric: [ ] anxiety [ ] depression  Endocrine: [ ] diabetes [ ] thyroid problem  Hematologic/Lymphatic: [ ] anemia [ ] bleeding problem  Allergic/Immunologic: [ ] itchy eyes [ ] nasal discharge [ ] hives [ ] angioedema  [ ] All other systems negative  [x] Unable to assess ROS because intubated and sedated    OBJECTIVE:  ICU Vital Signs Last 24 Hrs  T(C): 36.8 (29 Jul 2020 13:04), Max: 37 (29 Jul 2020 03:15)  T(F): 98.3 (29 Jul 2020 13:04), Max: 98.6 (29 Jul 2020 03:15)  HR: 84 (29 Jul 2020 17:00) (67 - 84)  BP: 98/61 (29 Jul 2020 17:00) (98/61 - 130/80)  BP(mean): --  ABP: --  ABP(mean): --  RR: 17 (29 Jul 2020 13:04) (17 - 18)  SpO2: 98% (29 Jul 2020 13:04) (97% - 99%)        07-28 @ 07:01 - 07-29 @ 07:00  --------------------------------------------------------  IN: 970 mL / OUT: 1300 mL / NET: -330 mL    07-29 @ 07:01 - 07-29 @ 19:06  --------------------------------------------------------  IN: 1100 mL / OUT: 1100 mL / NET: 0 mL      CAPILLARY BLOOD GLUCOSE      POCT Blood Glucose.: 117 mg/dL (29 Jul 2020 16:49)      PHYSICAL EXAM:  General: Sedated, acutely ill  HEENT:   Lymph Nodes:  Neck:   Respiratory:   Cardiovascular:   Abdomen:   Extremities:   Skin:   Neurological: Sedated, paralyzed upon assessment s/p EGD  Psychiatry:    LINES:     HOSPITAL MEDICATIONS:  MEDICATIONS  (STANDING):  albumin human 25% IVPB 100 milliLiter(s) IV Intermittent every 6 hours  chlorhexidine 4% Liquid 1 Application(s) Topical <User Schedule>  folic acid 1 milliGRAM(s) Oral daily  midodrine. 10 milliGRAM(s) Oral three times a day  octreotide  Injectable 100 MICROGram(s) IV Push three times a day  piperacillin/tazobactam IVPB.. 3.375 Gram(s) IV Intermittent every 12 hours  rifAXIMin 200 milliGRAM(s) Oral three times a day  thiamine 100 milliGRAM(s) Oral at bedtime    MEDICATIONS  (PRN):  sodium chloride 0.9% lock flush 10 milliLiter(s) IV Push every 1 hour PRN Pre/post blood products, medications, blood draw, and to maintain line patency      LABS:                        8.8    26.73 )-----------( 214      ( 29 Jul 2020 18:40 )             26.6     Hgb Trend: 8.8<--, 7.9<--, 9.7<--, 11.2<--, 11.9<--        139  |  100  |  41<H>  ----------------------------<  178<H>  4.9   |  18<L>  |  6.93<H>    Ca    7.3<L>      29 Jul 2020 18:40  Phos  6.9     07-29  Mg     1.6     07-29    TPro  3.8<L>  /  Alb  2.5<L>  /  TBili  15.4<H>  /  DBili  >10.0<H>  /  AST  71<H>  /  ALT  25  /  AlkPhos  57  07-29    Creatinine Trend: 6.93<--, 11.04<--, 11.92<--, 10.69<--, 9.85<--, 9.64<--  PT/INR - ( 29 Jul 2020 18:40 )   PT: 21.6 sec;   INR: 1.87 ratio         PTT - ( 29 Jul 2020 18:40 )  PTT:37.7 sec    Arterial Blood Gas:  07-29 @ 18:44  7.19/52/331/19/99/-8.3  ABG lactate: --  Arterial Blood Gas:  07-29 @ 18:24  7.22/56/348/22/100/-4.7  ABG lactate: --        MICROBIOLOGY:  7/26: Peritoneal Fluid: NGTD; Blood Culture x 2: NGTD; Urine Culture: NGTD    RADIOLOGY:    07-< from: MR MRCP No Cont (07.27.20 @ 14:24) >    Liver cirrhosis with portal hypertension. Large caliber perisplenic and perigastric varices. Moderate volume ascites, increased from trace on 07/11/2020. Steatosis. Evaluation for HCC is precluded by lack of intravenous contrast.    Normal caliber biliary tree without choledocholithiasis.    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  Course complicated by hematemesis s/p EGD showing.  Now transferred to MICU, intubated s/p EGD.       Neurology:  -Alert and oriented at baseline  -Hx of seizures in setting of etoh withdrawal  -Sedated    Pulm:  -Intubated s/p EGD  -Continue mechanical ventilation  -ABG prn    CV:  -VS q1H  -Continue Midodrine  -Maintain MAP > 65--consider Levophed if needed        GI:    #Acute on chronic liver failure: d/dx includes underlying infection (UA neg, BCx pending, CXR neg, dx para neg for SBP, biliary imaging negative, C diff negative), worsening EtOH hepatitis (MDF = 60 but no interval drinking since discharge and this is an acute change), vs vascular injury  #Cirrhosis due to EtOH, decompensated by ascites  - varices: no prior EGD, on nadolol at home, now w/ hematemesis     EGD 7/29:   - ascites: present on exam, neg for SBP 7/26, on Lasix 20mg/spironolactone 25mg daily as outpatient  - HE: Hold Rifaximin in setting of hematemesis  - HCC: no imaging, MRI without contrast is limited  - MELD-Na = 38 on 7/26  #Pruritis – likely due to elevated bilirubin, improved  #Possible PV thrombosis on U/S w/ doppler      #Hematemesis   s/p EGD 7/29 showing: large gastric variceal bleed--unable to achieve hemostasis--emergently sent to IR for intervention       PRIOR TO EGD: 1 unit PRBC       IN EGD: MTP-- 12u PRBC, 10u FFP, 2u PLT, 1 Cryo       TOTAL: 13 PRBC, 10 FFP, 2 PLT, 1 Cryo  -Trend CBC q6---tranfuse to maintain Hgb > 7, or for active bleed  -Trend Coags t2o--bodrvnm with FFP, cryo as needed  -Maintain NPO    #Transaminitis  -Trend liver enzymes, bilirubin  -Follow up GI, hepatology further recommendations    #Ascites  -Serial abdominal exams  -Paracentesis as needed    Renal:    #MARQUISE: in the setting of liver cirrhosis with severe ascites. MARQUISE 2/2 prerenal (severe diarrhea + diuretics +decrease effective arterial blood volume from Cirrhosis) to r/o hepatorenal syndrome. 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 On Admission 9.64 -- s/p diagnostic paracentesis. -- sCr worsening - requiring HD    First ever HD on 7/28 - tolerated well.   Second HD treatment scheduled 7/29  Kidney Biopsy pending--possible need for plasmapheresis if confirmed bilirubin cast nephropathy  c/w IV albumin infusion   hold diuretics at this time   c/w midodrine keep MAP> 70,   continue octreotide   Hernandez catheter for strict I/O  Monitor electrolytes and urine output.   Avoid NSAIDs, ACEI/ARBS, RCA and nephrotoxins.   Dose medications as per eGFR.     #Hyponatremia: in setting of liver cirrhosis and severe ascites. Serum sodium 133 on admission (7/26/)   previous episodes of hyponatremia. Last admission discharge with Na 133-    check urine electrolytes, urine Osm  Monitor serum sodium Q6 hours.   Do not correct serum sodium >6-8 meq/day.     #Hypocalcemia-  Ionized Calcium daily   keep Ionized calcium around 1.1-1.2  Replete prn      ID:  #All cultures negative to date--      7/26: Blood Culture x 2 NGTD; Urine Culture: NGTD               Para negative for SBP  --Continue empiric Zosyn   --Trend lactate; WBC count    Heme:   --CBC q6H  --Maintain Hgb > 7. Tranfuse as needed  --Trend Coags--reverse coagulopathy as needed or in event of bleed  TOTAL TRANSFUSION REQUIREMENTS: 13 PRBC, 10 FFP, 2 PLT, 1 Cryo    Endo:  --Glucose q6h  --Monitor for hypoglycemia in setting of liver failure, NPO          ATTENDING ATTESTATION:  40 yo with ETOH cirrhosis, ESR on HD presented with worsening cirrhosis found to have Acute UGIB consult from endoscopy with uncontrolled bleeding, s/p 12(prbc)/10(ffp)/2(plt)/1(cryo) MTP. Called from IR with still uncontrolled bleeding.  Continue to transfuse 1 to 1 blood products, Emrgency surgical consult.  plan for possible CVVH, expanded ABX ifbleed stablises. will follow labs q6 CHIEF COMPLAINT: Gastric Variceal Bleed    HPI:    38 yo M PMH ethanol abuser with cirrhosis, portal hypertension, traumatic bladder rupture due to fall s/p ex-lap and repair 2019 with recent admission for acute alcoholic hepatitis and alcohol withdrawal complicated by seizures presented 7/26 with acute renal failure, jaundice, and increasing ascites in the setting of profuse diarrhea and likely dehydration. Last drink reportedly 7/4.  Admitted to medicine service for management of acute on chronic liver failure, MARQUISE. Evaluated by  transplant hepatology: concern for infection vs. worsening liver failure from alcoholic hepatitis. Diagnostic paracentesis 7/26 negative for SBP. Empirically treated with Zosyn. Per Hepatology, renal failure likely ATN, though could rule out HRS. Albumin, octreotide initiated at that time. 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 Progressive renal failure requiring initiation of HD. Shiley placed by IR. Renal suggesting renal biopsy for confirmation of diagnosis.  7/29--Patient had episode of hematemesis. Decision made for EGD.  Gastritis vs. Variceal bleed. EGD showing large gastric variceal bleed. In Endoscopy: received 12 units PRBC, 10 units FFP, 2 units Plt, and 1 of Cryo. GI unable to obtain hemostasis. Emergently transferred to IR for further intervention. In IR, embolization of bleeding gastric varice.  Received TOTAL 34 PRBC, 25  FFP, 5 PLT, and 2 Cryo prior to MICU.      PAST MEDICAL & SURGICAL HISTORY:  No pertinent past medical history  S/P exploratory laparotomy      FAMILY HISTORY:  FH: alpha 1 antitrypsin deficiency      Allergies: No Known Allergies      REVIEW OF SYSTEMS:  Constitutional: [ ] fevers [ ] chills [ ] weight loss [ ] weight gain  HEENT: [ ] dry eyes [ ] eye irritation [ ] postnasal drip [ ] nasal congestion  CV: [ ] chest pain [ ] orthopnea [ ] palpitations [ ] murmur  Resp: [ ] cough [ ] shortness of breath [ ] dyspnea [ ] wheezing [ ] sputum [ ] hemoptysis  GI: [ ] nausea [ ] vomiting [ ] diarrhea [ ] constipation [ ] abd pain [ ] dysphagia   : [ ] dysuria [ ] nocturia [ ] hematuria [ ] increased urinary frequency  Musculoskeletal: [ ] back pain [ ] myalgias [ ] arthralgias [ ] fracture  Skin: [ ] rash [ ] itch  Neurological: [ ] headache [ ] dizziness [ ] syncope [ ] weakness [ ] numbness  Psychiatric: [ ] anxiety [ ] depression  Endocrine: [ ] diabetes [ ] thyroid problem  Hematologic/Lymphatic: [ ] anemia [ ] bleeding problem  Allergic/Immunologic: [ ] itchy eyes [ ] nasal discharge [ ] hives [ ] angioedema  [ ] All other systems negative  [x] Unable to assess ROS because intubated and sedated    OBJECTIVE:  ICU Vital Signs Last 24 Hrs  T(C): 36.8 (29 Jul 2020 13:04), Max: 37 (29 Jul 2020 03:15)  T(F): 98.3 (29 Jul 2020 13:04), Max: 98.6 (29 Jul 2020 03:15)  HR: 84 (29 Jul 2020 17:00) (67 - 84)  BP: 98/61 (29 Jul 2020 17:00) (98/61 - 130/80)  BP(mean): --  ABP: --  ABP(mean): --  RR: 17 (29 Jul 2020 13:04) (17 - 18)  SpO2: 98% (29 Jul 2020 13:04) (97% - 99%)        07-28 @ 07:01 - 07-29 @ 07:00  --------------------------------------------------------  IN: 970 mL / OUT: 1300 mL / NET: -330 mL    07-29 @ 07:01  -  07-29 @ 19:06  --------------------------------------------------------  IN: 1100 mL / OUT: 1100 mL / NET: 0 mL      CAPILLARY BLOOD GLUCOSE      POCT Blood Glucose.: 117 mg/dL (29 Jul 2020 16:49)      PHYSICAL EXAM:  General: Sedated, acutely ill  HEENT: Eyes bilaterally taped shut; face covered in blood  Neck: Supple  Respiratory: Coarse bilaterally, diminished at bases  Cardiovascular: S1, S2  Abdomen: Distended, Hypoactive, notable ecchymosis  Extremities:   Skin: Clammy, Jaundiced  Neurological: Sedated, paralyzed upon assessment s/p EGD  Psychiatry: Unable to assess    LINES:     HOSPITAL MEDICATIONS:  MEDICATIONS  (STANDING):  albumin human 25% IVPB 100 milliLiter(s) IV Intermittent every 6 hours  chlorhexidine 4% Liquid 1 Application(s) Topical <User Schedule>  folic acid 1 milliGRAM(s) Oral daily  midodrine. 10 milliGRAM(s) Oral three times a day  octreotide  Injectable 100 MICROGram(s) IV Push three times a day  piperacillin/tazobactam IVPB.. 3.375 Gram(s) IV Intermittent every 12 hours  rifAXIMin 200 milliGRAM(s) Oral three times a day  thiamine 100 milliGRAM(s) Oral at bedtime    MEDICATIONS  (PRN):  sodium chloride 0.9% lock flush 10 milliLiter(s) IV Push every 1 hour PRN Pre/post blood products, medications, blood draw, and to maintain line patency      LABS:                        8.8    26.73 )-----------( 214      ( 29 Jul 2020 18:40 )             26.6     Hgb Trend: 8.8<--, 7.9<--, 9.7<--, 11.2<--, 11.9<--        139  |  100  |  41<H>  ----------------------------<  178<H>  4.9   |  18<L>  |  6.93<H>    Ca    7.3<L>      29 Jul 2020 18:40  Phos  6.9     07-29  Mg     1.6     07-29    TPro  3.8<L>  /  Alb  2.5<L>  /  TBili  15.4<H>  /  DBili  >10.0<H>  /  AST  71<H>  /  ALT  25  /  AlkPhos  57  07-29    Creatinine Trend: 6.93<--, 11.04<--, 11.92<--, 10.69<--, 9.85<--, 9.64<--  PT/INR - ( 29 Jul 2020 18:40 )   PT: 21.6 sec;   INR: 1.87 ratio         PTT - ( 29 Jul 2020 18:40 )  PTT:37.7 sec    Arterial Blood Gas:  07-29 @ 18:44  7.19/52/331/19/99/-8.3  ABG lactate: --  Arterial Blood Gas:  07-29 @ 18:24  7.22/56/348/22/100/-4.7  ABG lactate: --        MICROBIOLOGY:  7/26: Peritoneal Fluid: NGTD; Blood Culture x 2: NGTD; Urine Culture: NGTD    RADIOLOGY:    07-< from: MR MRCP No Cont (07.27.20 @ 14:24) >    Liver cirrhosis with portal hypertension. Large caliber perisplenic and perigastric varices. Moderate volume ascites, increased from trace on 07/11/2020. Steatosis. Evaluation for HCC is precluded by lack of intravenous contrast.    Normal caliber biliary tree without choledocholithiasis.    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  Course complicated by hematemesis s/p EGD showing.  Now transferred to MICU, intubated s/p EGD.       Neurology:  -Alert and oriented at baseline  -Hx of seizures in setting of etoh withdrawal  -Sedated  -Neuro checks q4H    Pulm:  -Intubated s/p EGD in setting of massive variceal bleed  -Continue mechanical ventilation: high Fio2 requirements     Monitor for s/s TRALI     Monitor for s/s ARDS  -ABG as needed and for vent changes    CV:  -VS q1H  -Maintain MAP approx 60--Levophed as needed  -Hull for continuous BP monitoring  -Tele monitoring        GI:    #Acute on chronic liver failure: d/dx includes underlying infection (UA neg, BCx pending, CXR neg, dx para neg for SBP, biliary imaging negative, C diff negative), worsening EtOH hepatitis (MDF = 60 but no interval drinking since discharge and this is an acute change), vs vascular injury  #Cirrhosis due to EtOH, decompensated by ascites  - varices: no prior EGD, on nadolol at home, now w/ hematemesis     EGD 7/29: massive gastric variceal bleed, unable to control source--Transferred to IR: emobolization of gastric varice     TOTAL: 34 PRBC, 25  FFP, 5 PLT, and 2 Cryo   - ascites: present on exam, neg for SBP 7/26, on Lasix 20mg/spironolactone 25mg daily as outpatient--HOLD  - HE: Hold Rifaximin in setting of hematemesis  - HCC: no imaging, MRI without contrast is limited  - MELD-Na = 38 on 7/26  #Pruritis – likely due to elevated bilirubin, improved  #Possible PV thrombosis on U/S w/ doppler      #Hematemesis secondary to gastric variceal bleed   s/p EGD 7/29 showing: large gastric variceal bleed--unable to achieve hemostasis--emergently sent to IR for intervention       PRIOR TO EGD: 1 unit PRBC       IN EGD: MTP-- 12u PRBC, 10u FFP, 2u PLT, 1 Cryo       TOTAL: 34 PRBC, 25  FFP, 5 PLT, and 2 Cryo   -Trend CBC q6---tranfuse to maintain Hgb > 7, or for active bleed  -Trend Coags x7l--ojrhrjs with FFP, cryo as needed  -Maintain NPO    #Transaminitis  -Trend liver enzymes, bilirubin  -Follow up GI, hepatology further recommendations    #Ascites  -Serial abdominal exams  -Paracentesis as needed  -Follow up Hepatology    Renal:    #MARQUISE: in the setting of liver cirrhosis with severe ascites. MARQUISE 2/2 prerenal (severe diarrhea + diuretics +decrease effective arterial blood volume from Cirrhosis) to r/o hepatorenal syndrome. 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 On Admission 9.64 -- s/p diagnostic paracentesis. -- sCr worsening - requiring HD    First HD 7/28, Second HD 7/29---CVVHD to be started upon arrival to MICU  Kidney Biopsy pending--possible need for plasmapheresis if confirmed bilirubin cast nephropathy  c/w IV albumin infusion   hold diuretics at this time     Monitor electrolytes and urine output.   Avoid NSAIDs, ACEI/ARBS, RCA and nephrotoxins.   Dose medications as per eGFR.     #Hyponatremia: in setting of liver cirrhosis and severe ascites. Serum sodium 133 on admission (7/26/)   previous episodes of hyponatremia. Last admission discharge with Na 133-    check urine electrolytes, urine Osm  Monitor serum sodium Q6 hours.   Do not correct serum sodium >6-8 meq/day.     #Hypocalcemia-  Ionized Calcium daily   keep Ionized calcium around 1.1-1.2  Replete prn      ID:  #All cultures negative to date--      7/26: Blood Culture x 2 NGTD; Urine Culture: NGTD               Para negative for SBP  --Continue empiric Zosyn   --Trend lactate; WBC count    Heme:   --CBC q4H  --TEG to eval for clotting ability  --Maintain Hgb > 7. Transfuse as needed  --Trend Coags--reverse coagulopathy as needed or in event of bleed  TOTAL TRANSFUSION REQUIREMENTS: 34 PRBC, 25  FFP, 5 PLT, and 2 Cryo     Endo:  --Glucose q6h  --Monitor for hypoglycemia in setting of liver failure, NPO          ATTENDING ATTESTATION:  38 yo with ETOH cirrhosis, ESR on HD presented with worsening cirrhosis found to have Acute UGIB consult from endoscopy with uncontrolled bleeding, s/p 12(prbc)/10(ffp)/2(plt)/1(cryo) MTP. Called from IR with still uncontrolled bleeding.  Continue to transfuse 1 to 1 blood products, Emrgency surgical consult.  plan for possible CVVH, expanded ABX ifbleed stablises. will follow labs q6 CHIEF COMPLAINT: Gastric Variceal Bleed    HPI:    40 yo M PMH ethanol abuser with cirrhosis, portal hypertension, traumatic bladder rupture due to fall s/p ex-lap and repair 2019 with recent admission for acute alcoholic hepatitis and alcohol withdrawal complicated by seizures presented 7/26 with acute renal failure, jaundice, and increasing ascites in the setting of profuse diarrhea and likely dehydration. Last drink reportedly 7/4.  Admitted to medicine service for management of acute on chronic liver failure, MARQUISE. Evaluated by  transplant hepatology: concern for infection vs. worsening liver failure from alcoholic hepatitis. Diagnostic paracentesis 7/26 negative for SBP. Empirically treated with Zosyn. Per Hepatology, renal failure likely ATN, though could rule out HRS. Albumin, octreotide initiated at that time. 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 Progressive renal failure requiring initiation of HD. Shiley placed by IR. Renal suggesting renal biopsy for confirmation of diagnosis.  7/29--Patient had episode of hematemesis. Decision made for EGD.  Gastritis vs. Variceal bleed. EGD showing large gastric variceal bleed. In Endoscopy: received 12 units PRBC, 10 units FFP, 2 units Plt, and 1 of Cryo. GI unable to obtain hemostasis. Emergently transferred to IR for further intervention. In IR, embolization of bleeding gastric varice.  Received TOTAL 34 PRBC, 25  FFP, 5 PLT, and 2 Cryo prior to MICU.      PAST MEDICAL & SURGICAL HISTORY:  No pertinent past medical history  S/P exploratory laparotomy      FAMILY HISTORY:  FH: alpha 1 antitrypsin deficiency      Allergies: No Known Allergies      REVIEW OF SYSTEMS:  Constitutional: [ ] fevers [ ] chills [ ] weight loss [ ] weight gain  HEENT: [ ] dry eyes [ ] eye irritation [ ] postnasal drip [ ] nasal congestion  CV: [ ] chest pain [ ] orthopnea [ ] palpitations [ ] murmur  Resp: [ ] cough [ ] shortness of breath [ ] dyspnea [ ] wheezing [ ] sputum [ ] hemoptysis  GI: [ ] nausea [ ] vomiting [ ] diarrhea [ ] constipation [ ] abd pain [ ] dysphagia   : [ ] dysuria [ ] nocturia [ ] hematuria [ ] increased urinary frequency  Musculoskeletal: [ ] back pain [ ] myalgias [ ] arthralgias [ ] fracture  Skin: [ ] rash [ ] itch  Neurological: [ ] headache [ ] dizziness [ ] syncope [ ] weakness [ ] numbness  Psychiatric: [ ] anxiety [ ] depression  Endocrine: [ ] diabetes [ ] thyroid problem  Hematologic/Lymphatic: [ ] anemia [ ] bleeding problem  Allergic/Immunologic: [ ] itchy eyes [ ] nasal discharge [ ] hives [ ] angioedema  [ ] All other systems negative  [x] Unable to assess ROS because intubated and sedated    OBJECTIVE:  ICU Vital Signs Last 24 Hrs  T(C): 36.8 (29 Jul 2020 13:04), Max: 37 (29 Jul 2020 03:15)  T(F): 98.3 (29 Jul 2020 13:04), Max: 98.6 (29 Jul 2020 03:15)  HR: 84 (29 Jul 2020 17:00) (67 - 84)  BP: 98/61 (29 Jul 2020 17:00) (98/61 - 130/80)  BP(mean): --  ABP: --  ABP(mean): --  RR: 17 (29 Jul 2020 13:04) (17 - 18)  SpO2: 98% (29 Jul 2020 13:04) (97% - 99%)        07-28 @ 07:01  -  07-29 @ 07:00  --------------------------------------------------------  IN: 970 mL / OUT: 1300 mL / NET: -330 mL    07-29 @ 07:01  -  07-29 @ 19:06  --------------------------------------------------------  IN: 1100 mL / OUT: 1100 mL / NET: 0 mL      CAPILLARY BLOOD GLUCOSE      POCT Blood Glucose.: 117 mg/dL (29 Jul 2020 16:49)      PHYSICAL EXAM:  General: Sedated, acutely ill  HEENT: Eyes bilaterally taped shut; face covered in blood  Neck: Supple  Respiratory: Coarse bilaterally, diminished at bases  Cardiovascular: S1, S2  Abdomen: Distended, Hypoactive, notable ecchymosis  Extremities: +1 Radial pulses, +1 Pedal pulses                     +2 generalized edema  Skin: Clammy, Jaundiced  Neurological: Sedated, paralyzed upon assessment s/p EGD  Psychiatry: Unable to assess    LINES: DANDY Jones, YAEL Shoemakeris, [facility] MEDICATIONS:  MEDICATIONS  (STANDING):  albumin human 25% IVPB 100 milliLiter(s) IV Intermittent every 6 hours  chlorhexidine 4% Liquid 1 Application(s) Topical <User Schedule>  folic acid 1 milliGRAM(s) Oral daily  midodrine. 10 milliGRAM(s) Oral three times a day  octreotide  Injectable 100 MICROGram(s) IV Push three times a day  piperacillin/tazobactam IVPB.. 3.375 Gram(s) IV Intermittent every 12 hours  rifAXIMin 200 milliGRAM(s) Oral three times a day  thiamine 100 milliGRAM(s) Oral at bedtime    MEDICATIONS  (PRN):  sodium chloride 0.9% lock flush 10 milliLiter(s) IV Push every 1 hour PRN Pre/post blood products, medications, blood draw, and to maintain line patency      LABS:                        8.8    26.73 )-----------( 214      ( 29 Jul 2020 18:40 )             26.6     Hgb Trend: 8.8<--, 7.9<--, 9.7<--, 11.2<--, 11.9<--        139  |  100  |  41<H>  ----------------------------<  178<H>  4.9   |  18<L>  |  6.93<H>    Ca    7.3<L>      29 Jul 2020 18:40  Phos  6.9     07-29  Mg     1.6     07-29    TPro  3.8<L>  /  Alb  2.5<L>  /  TBili  15.4<H>  /  DBili  >10.0<H>  /  AST  71<H>  /  ALT  25  /  AlkPhos  57  07-29    Creatinine Trend: 6.93<--, 11.04<--, 11.92<--, 10.69<--, 9.85<--, 9.64<--  PT/INR - ( 29 Jul 2020 18:40 )   PT: 21.6 sec;   INR: 1.87 ratio         PTT - ( 29 Jul 2020 18:40 )  PTT:37.7 sec    Arterial Blood Gas:  07-29 @ 18:44  7.19/52/331/19/99/-8.3  ABG lactate: --  Arterial Blood Gas:  07-29 @ 18:24  7.22/56/348/22/100/-4.7  ABG lactate: --        MICROBIOLOGY:  7/26: Peritoneal Fluid: NGTD; Blood Culture x 2: NGTD; Urine Culture: NGTD    RADIOLOGY:    07-< from: MR MRCP No Cont (07.27.20 @ 14:24) >    Liver cirrhosis with portal hypertension. Large caliber perisplenic and perigastric varices. Moderate volume ascites, increased from trace on 07/11/2020. Steatosis. Evaluation for HCC is precluded by lack of intravenous contrast.    Normal caliber biliary tree without choledocholithiasis.    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  Course complicated by hematemesis s/p EGD showing.  Now transferred to MICU, intubated s/p EGD.       Neurology:  -Alert and oriented at baseline  -Hx of seizures in setting of etoh withdrawal  -Sedated  -Neuro checks q4H    Pulm:  -Intubated s/p EGD in setting of massive variceal bleed  -Continue mechanical ventilation: high Fio2 requirements     Monitor for s/s TRALI     Monitor for s/s ARDS  -ABG as needed and for vent changes    CV:  -VS q1H  -Maintain MAP approx 60--Levophed as needed  -Kira for continuous BP monitoring  -Tele monitoring        GI:    #Acute on chronic liver failure: d/dx includes underlying infection (UA neg, BCx pending, CXR neg, dx para neg for SBP, biliary imaging negative, C diff negative), worsening EtOH hepatitis (MDF = 60 but no interval drinking since discharge and this is an acute change), vs vascular injury  #Cirrhosis due to EtOH, decompensated by ascites  - varices: no prior EGD, on nadolol at home, now w/ hematemesis     EGD 7/29: massive gastric variceal bleed, unable to control source--Transferred to IR: emobolization of gastric varice     TOTAL: 34 PRBC, 25  FFP, 5 PLT, and 2 Cryo   - ascites: present on exam, neg for SBP 7/26, on Lasix 20mg/spironolactone 25mg daily as outpatient--HOLD  - HE: Hold Rifaximin in setting of hematemesis  - HCC: no imaging, MRI without contrast is limited  - MELD-Na = 38 on 7/26  #Pruritis – likely due to elevated bilirubin, improved  #Possible PV thrombosis on U/S w/ doppler      #Hematemesis secondary to gastric variceal bleed   s/p EGD 7/29 showing: large gastric variceal bleed--unable to achieve hemostasis--emergently sent to IR for intervention       PRIOR TO EGD: 1 unit PRBC       IN EGD: MTP-- 12u PRBC, 10u FFP, 2u PLT, 1 Cryo       TOTAL: 34 PRBC, 25  FFP, 5 PLT, and 2 Cryo   -Trend CBC q6---tranfuse to maintain Hgb > 7, or for active bleed  -Trend Coags m7t--wzdruds with FFP, cryo as needed  -Maintain NPO    #Transaminitis  -Trend liver enzymes, bilirubin  -Follow up GI, hepatology further recommendations    #Ascites  -Serial abdominal exams  -Paracentesis as needed  -Follow up Hepatology    Renal:    #MARQUISE: in the setting of liver cirrhosis with severe ascites. MARQUISE 2/2 prerenal (severe diarrhea + diuretics +decrease effective arterial blood volume from Cirrhosis) to r/o hepatorenal syndrome. 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 On Admission 9.64 -- s/p diagnostic paracentesis. -- sCr worsening - requiring HD    First HD 7/28, Second HD 7/29---CVVHD to be started upon arrival to MICU  Kidney Biopsy pending--possible need for plasmapheresis if confirmed bilirubin cast nephropathy  c/w IV albumin infusion   hold diuretics at this time     Monitor electrolytes and urine output.   Avoid NSAIDs, ACEI/ARBS, RCA and nephrotoxins.   Dose medications as per eGFR.     #Hyponatremia: in setting of liver cirrhosis and severe ascites. Serum sodium 133 on admission (7/26/)   previous episodes of hyponatremia. Last admission discharge with Na 133-    check urine electrolytes, urine Osm  Monitor serum sodium Q6 hours.   Do not correct serum sodium >6-8 meq/day.     #Hypocalcemia-  Ionized Calcium daily   keep Ionized calcium around 1.1-1.2  Replete prn      ID:  #All cultures negative to date--      7/26: Blood Culture x 2 NGTD; Urine Culture: NGTD               Para negative for SBP  --Continue empiric Zosyn   --Trend lactate; WBC count    Heme:   --CBC q4H  --TEG to eval for clotting ability  --Maintain Hgb > 7. Transfuse as needed  --Trend Coags--reverse coagulopathy as needed or in event of bleed  TOTAL TRANSFUSION REQUIREMENTS: 34 PRBC, 25  FFP, 5 PLT, and 2 Cryo     Endo:  --Glucose q6h  --Monitor for hypoglycemia in setting of liver failure, NPO          ATTENDING ATTESTATION:  40 yo with ETOH cirrhosis, ESR on HD presented with worsening cirrhosis found to have Acute UGIB consult from endoscopy with uncontrolled bleeding, s/p 12(prbc)/10(ffp)/2(plt)/1(cryo) MTP. Called from IR with still uncontrolled bleeding.  Continue to transfuse 1 to 1 blood products, Emrgency surgical consult.  plan for possible CVVH, expanded ABX ifbleed stablises. will follow labs q6

## 2020-07-29 NOTE — CHART NOTE - NSCHARTNOTEFT_GEN_A_CORE
-------------------------------------------------------------  Interventional Radiology Brief Operative Note  -------------------------------------------------------------    Pre-Procedure Diagnosis: gastric variceal hemorrhage, paracentesis    Post-Procedure Diagnosis: same    Indication: hemorrhagic shock    Procedure: plug-assisted retrograde transvenous embolization of gastric varices    Operators: Kathryn GREENE; Candy GREENE, Suki GREENE, Joseph GREENE    Anesthesia: General anesthesia sedation was provided by anesthesiology. Local anesthesia was achieved with 2% lidocaine.    EBL: 30cc from puncture site; immeasurable GI losses    Complications: none    Imaging: fluoroscopy and ultrasound    Contrast: 50cc Omnipaque 240    Findings: blakemore placed by Dr. Funez under fluoroscopy during case. Patent splenorenal shunt embolized with 18mm Amplatzer plug and gelfoam. Paracentesis was performed due to worsening hypoxemia, abdominal distention and low lung volumes by fluoroscopy    Specimens Removed: 1.5L ascites    Implants: 18mm Amplatzer plug    Condition/Disposition: to MICU intubated and on vasopressors    Follow-Up Plan of Care:  -blakemore to remain inflated overnight  -close hemodynamic monitoring by MICU overnight  -transfuse PRN  -hematuria in the immediate postoperative period (24-48hrs) is expected  -Please call interventional radiology at [phone] during normal business hours, or [phone] and page [phone] during call hours or weekends with any questions, concerns or issues.    For questions or concerns, please call Interventional Radiology at [phone] during business hours, or [phone] and page [phone] after 5PM and on weekends.    Roberto Peralta MD, RPVI  Chief Resident, Interventional Radiology  [facility]: [phone] (p) [phone]  [facility]: [phone] -------------------------------------------------------------  Interventional Radiology Brief Post Proceduref Note  -------------------------------------------------------------    Pre-Procedure Diagnosis: gastric variceal hemorrhage, paracentesis    Post-Procedure Diagnosis: same    Indication: hemorrhagic shock    Procedure: plug-assisted retrograde transvenous embolization of gastric varices, paracentesis    Operators: Kathryn GREENE; Candy GREENE, Suki GREENE, Joseph GREENE    Anesthesia: General anesthesia sedation was provided by anesthesiology. Local anesthesia was achieved with 2% lidocaine.    EBL: 30cc from puncture site; immeasurable GI losses    Complications: none    Imaging: fluoroscopy and ultrasound    Contrast: 50cc Omnipaque 240    Findings: Blakemore placed by Dr. Funez under fluoroscopy during case. Gastrorenal shunt embolized with 18mm Amplatzer plug and gelfoam. Paracentesis was performed due to worsening hypoxemia, abdominal distention and low lung volumes by fluoroscopy.    Specimens Removed: 1.5L ascites    Implants: 18mm Amplatzer plug    Condition/Disposition: to MICU intubated and on vasopressors    Follow-Up Plan of Care:  -blakemore to remain inflated overnight  -close hemodynamic monitoring by MICU overnight  -transfuse PRN  -hematuria in the immediate postoperative period (24-48hrs) is expected  - if patient rebleeds, endoscopy may be warranted to evaluate for esophageal varices  -Please call interventional radiology at [phone] during normal business hours, or [phone] and page [phone] during call hours or weekends with any questions, concerns or issues.    For questions or concerns, please call Interventional Radiology at [phone] during business hours, or [phone] and page [phone] after 5PM and on weekends.    Roberto Peralta MD, RPVI  Chief Resident, Interventional Radiology  [facility]: [phone] (p) [phone]  [facility]: [phone]

## 2020-07-29 NOTE — CONSULT NOTE ADULT - ASSESSMENT
39M PMH ETOH cirrhosis (last ETOH 7/4), recent admission for seizure, hx traumatic bladder rupture s/p ex-lap (2019), initially presented with abdominal pain. Hospital course c/b hematemesis requiring MTP. Now s/p IR embolization of gastric varices and blakemore placement.    - Appreciate GI reccs  - Continue excellent care per MICU. IVF and blood product recussitation  - Patient is not a surgical candidate at this time as he likely will not survive intra-abdominal surgery. Furthermore, as pt with both gastric and esophageal varices, gastrectomy would be morbid and not sufficient to treat/prevent risk of massive esophogeal bleed. Greg Chi class C - marilynn-operative mortality of >82% with intra-abdominal surgery. MELD 38  - Recc GOC conversation  - Discussed with attending Dr. MARGUERITE Anaya PGY4  Red Surgery  [id number]

## 2020-07-29 NOTE — PROGRESS NOTE ADULT - SUBJECTIVE AND OBJECTIVE BOX
Pre-Endoscopy Evaluation      Referring Physician: dr. kristi dinh                                   Procedure:  upper gastrointestinal endoscopy     Indication for Procedure: hematemesis    PAST MEDICAL & SURGICAL HISTORY:    ETOH abuse  Cirrhosis  MARQUISE requiring HD  S/P exploratory laparotomy      PMH of Gastroparesis [ ]  Gastric Surgery [ ]  Gastric Outlet Obstruction [ ]    Allergies:    No Known Allergies    Intolerances:    Latex allergy: [ ] yes [X] no    Medications:MEDICATIONS  (STANDING):  albumin human 25% IVPB 100 milliLiter(s) IV Intermittent every 6 hours  chlorhexidine 4% Liquid 1 Application(s) Topical <User Schedule>  folic acid 1 milliGRAM(s) Oral daily  midodrine. 10 milliGRAM(s) Oral three times a day  octreotide  Injectable 100 MICROGram(s) IV Push three times a day  piperacillin/tazobactam IVPB.. 3.375 Gram(s) IV Intermittent every 12 hours  rifAXIMin 200 milliGRAM(s) Oral three times a day  thiamine 100 milliGRAM(s) Oral at bedtime    MEDICATIONS  (PRN):  sodium chloride 0.9% lock flush 10 milliLiter(s) IV Push every 1 hour PRN Pre/post blood products, medications, blood draw, and to maintain line patency      Smoking: [ ] yes  [X] no    AICD/PPM: [ ] yes   [X] no    Pertinent lab data:                        7.9    24.64 )-----------( 292      ( 2020 07:20 )             22.5     07-29    136  |  95<L>  |  58<H>  ----------------------------<  91  3.9   |  16<L>  |  11.04<H>    Ca    6.6<L>      2020 07:20  Phos  7.0     07-    TPro  5.2<L>  /  Alb  3.6  /  TBili  26.1<H>  /  DBili  x   /  AST  106<H>  /  ALT  17  /  AlkPhos  98  07-    PT/INR - ( 2020 08:28 )   PT: 22.8 sec;   INR: 2.00 ratio         PTT - ( 2020 15:49 )  PTT:39.7 sec  CARDIAC MARKERS ( 2020 10:11 )  x     / x     / 35 U/L / x     / x          COVID-19 PCR: NotDetec (2020 12:10)      Physical Examination:  Daily     Daily Weight in k.2 (2020 12:40)  Vital Signs Last 24 Hrs  T(C): 36.8 (2020 13:04), Max: 37 (2020 03:15)  T(F): 98.3 (2020 13:04), Max: 98.6 (2020 03:15)  HR: 73 (2020 13:04) (67 - 76)  BP: 101/63 (2020 13:04) (99/53 - 131/85)  BP(mean): --  RR: 17 (2020 13:04) (17 - 18)  SpO2: 98% (2020 13:04) (97% - 99%)    Drug Dosing Weight  Height (cm): 185.4 (2020 21:20)  Weight (kg): 82.8 (2020 21:20)  BMI (kg/m2): 24.1 (2020 21:20)  BSA (m2): 2.07 (2020 21:20)    Constitutional: NAD     Neck:  No JVD    Respiratory: CTAB/L    Cardiovascular: S1 and S2    Gastrointestinal: BS+, soft, NT/ND    Extremities: No peripheral edema    Neurological: A/O x 3    Comments: S/P  HD session #2 today  PRBCS in progress on arrival to Endoscopy      The patient is a suitable candidate for the planned procedure unless box checked [ ]  No, explain:

## 2020-07-29 NOTE — CHART NOTE - NSCHARTNOTEFT_GEN_A_CORE
EGD done for hematemesis.     Pt actively bleeding. Ultimately found to have actively bleeding gastric varix. Could not be injected under direct visualization due to active bleeding obscuring vision. Injected 3cc of glue under EUS guidance. Bleeding slowed but not controlled. Transferred to IR for intervention. 2 physician consent as family could not be contacted.    Pt required MTP, 12 units of blood, as well as plasma + plts. Receiving cryo as well. Post IR will go to MICU - MICU team aware.

## 2020-07-29 NOTE — PROGRESS NOTE ADULT - ASSESSMENT
39 y.o. Male with PMhx of Alcohol liver cirrhosis, alcohol use disorder, traumatic bladder rupture s/p ex lap in 2019, recently admitted to [facility] from 7/11 to 7/17 for seizure and alcoholic cirrhosis. Presented to ED c/o worsening abdominal pain and diarrhea.     Nephrology consulted for MARQUISE

## 2020-07-29 NOTE — PROGRESS NOTE ADULT - ASSESSMENT
39M w/ decompensated EtOH cirrhosis, presenting w/ acute on chronic liver failure of unclear etiology.     Impression:  #Acute on chronic liver failure: d/dx includes underlying infection (UA neg, BCx pending, CXR neg, dx para neg for SBP, biliary imaging negative, C diff negative), worsening EtOH hepatitis (MDF = 60 but no interval drinking since discharge and this is an acute change), vs vascular injury  #Cirrhosis due to EtOH, decompensated by ascites  - varices: no prior EGD, on nadolol at home, now w/ hematemesis 7/29 AM awaiting EGD  - ascites: present on exam, neg for SBP 7/26, on Lasix 20mg/spironolactone 25mg daily as outpatient  - HE: none on exam  - HCC: no imaging, MRI without contrast is limited  - MELD-Na = 38 on 7/26  #Pruritis – likely due to elevated bilirubin, improved  #Possible PV thrombosis on U/S w/ doppler  # MARQUISE: unclear etiology, c/f bilirubin pigment nephropathy vs acute tubular necrosis vs obstructive uropathy. Reny is 53, making HRS less likely. Cr 10, however nonoliguric    Recommendations:  - EGD planned for today  - keep NPO  - empiric abx w/ zosyn for now, though infectious work-up negative  - c/w HD per nephrology  - c/w midodrine, octreotide, albumin  - f/u renal recs  - hold nadolol and diuretics  - continue with xifaxan  - c/w MVI, thiamine and folate  - I/O, ashton cath  - trend CMP, CBC, INR daily  - hepatology will continue to follow    Ross Shook  Gastroenterology Fellow  AT NIGHT AND ON WEEKENDS:  Contact on-call GI fellow via answering service ([phone]) from 5pm-8am and on weekends/holidays  MONDAY-FRIDAY 8AM-5PM:  Available via [employer] Teams  Call [phone] ([facility]) or Page [phone] ([facility]) from 8am-5pm M-F 39M w/ decompensated EtOH cirrhosis, presenting w/ acute on chronic liver failure of unclear etiology.     Impression:  #Acute on chronic liver failure: d/dx includes underlying infection (UA neg, BCx pending, CXR neg, dx para neg for SBP, biliary imaging negative, C diff negative), worsening EtOH hepatitis (MDF = 60 but no interval drinking since discharge and this is an acute change), vs vascular injury  #Cirrhosis due to EtOH, decompensated by ascites  - varices: no prior EGD, on nadolol at home, now w/ hematemesis 7/29 AM awaiting EGD  - ascites: present on exam, neg for SBP 7/26, on Lasix 20mg/spironolactone 25mg daily as outpatient  - HE: none on exam  - HCC: no imaging, MRI without contrast is limited  - MELD-Na = 38 on 7/26  #Pruritis – likely due to elevated bilirubin, improved  #Possible PV thrombosis on U/S w/ doppler  # MARQUISE: unclear etiology, c/f bilirubin pigment nephropathy vs acute tubular necrosis vs obstructive uropathy. Reny is 53, making HRS less likely. Cr 10, however nonoliguric    Recommendations:  - EGD planned for today  - keep NPO  - empiric abx w/ zosyn for now, though infectious work-up negative  - c/w HD per nephrology  - c/w midodrine, octreotide, albumin  - f/u renal recs  - hold nadolol and diuretics  - continue with xifaxan  - c/w MVI, thiamine and folate  - I/O, ashton cath  - trend CMP, CBC, INR daily  - no hepatology contraindication to renal biopsy, would monitor INR and plts pre-and post biopsy, threshold for transfusion of plts and FFP per biopsy team, trend Hb post biopsy and monitor for signs of RP bleeding  - hepatology will continue to follow    Ross Shook  Gastroenterology Fellow  AT NIGHT AND ON WEEKENDS:  Contact on-call GI fellow via answering service ([phone]) from 5pm-8am and on weekends/holidays  MONDAY-FRIDAY 8AM-5PM:  Available via [employer] Teams  Call [phone] ([facility]) or Page [phone] ([facility]) from 8am-5pm M-F

## 2020-07-29 NOTE — DISCHARGE NOTE NURSING/CASE MANAGEMENT/SOCIAL WORK - NSDCPEWEB_GEN_ALL_CORE
[place] website --- [email or web address] Tobacco [email or web address] website --- [email or web address]

## 2020-07-29 NOTE — PROGRESS NOTE ADULT - PROBLEM SELECTOR PLAN 2
Pt. with hyponatremia in setting of liver cirrhosis and severe ascites. Serum sodium 133 on admission (7/26/)   previous episodes of hyponatremia. Last admission discharge with Na 133-      check urine electrolytes, urine Osm  Monitor serum sodium Q6 hours.   Do not correct serum sodium >6-8 meq/day.

## 2020-07-29 NOTE — DISCHARGE NOTE NURSING/CASE MANAGEMENT/SOCIAL WORK - PATIENT PORTAL LINK FT
You can access the FollowMyHealth Patient Portal offered by [facility] by registering at the following website: [email or web address]. By joining [employer]’s FollowMyHealth portal, you will also be able to view your health information using other applications (apps) compatible with our system.

## 2020-07-29 NOTE — PROGRESS NOTE ADULT - PROBLEM SELECTOR PLAN 2
1 episode bloody emesis this morning. Possibly gastritis vs. PUD given patient admitted with abd pain and diarrhea, vs. must r/o variceal bleed given liver failure  - plan for EGD today  - will give prbc x 1 for acute drop in Hb since admission  - IV PPI  - cont zosyn for sbp ppx

## 2020-07-29 NOTE — CONSULT NOTE ADULT - SUBJECTIVE AND OBJECTIVE BOX
General Surgery Consult  Consulting surgical team: General Surgery  Consulting attending: Dr. Honorio Samayoa    HPI:  39M PMH ETOH cirrhosis (last ETOH 7/4), recent admission for seizure, hx traumatic bladder rupture s/p ex-lap (2019), initially presented 7/26/20 with diffuse crampy abdominal pain. Found on presentation to have acute renal failure, jaundice, and increasing ascites in the setting of profuse diarrhea and dehydration. Patient admitted to medicine service to undergo further evaluation and treatment. This (7/29/20) afternoon patient had an episode of hematemesis. Emergent EGD showing large gastric variceal bleed, GI unable to control bleeding at that time. MTP called - at time of surgery consult pt s/p 15uPRBC, 10uFFP, 2uplt, 1ucryo. Patient then brought to IR suite where he underwent IR embolization of gastric varices as well as placement of Blakemore under fluoroscopy. Patient now in MICU.     PAST MEDICAL HISTORY:  ETOH cirrhosis    PAST SURGICAL HISTORY:  S/P exploratory laparotomy      MEDICATIONS:  albumin human 25% IVPB 100 milliLiter(s) IV Intermittent every 6 hours  chlorhexidine 4% Liquid 1 Application(s) Topical <User Schedule>  CRRT Treatment    <Continuous>  octreotide  Infusion 50 MICROgram(s)/Hr IV Continuous <Continuous>  octreotide  Injectable 50 MICROGram(s) IV Push once  pantoprazole  Injectable 40 milliGRAM(s) IV Push once  pantoprazole Infusion 8 mG/Hr IV Continuous <Continuous>  piperacillin/tazobactam IVPB.. 2.25 Gram(s) IV Intermittent every 6 hours  PrismaSATE Dialysate BGK 4 / 2.5 5000 milliLiter(s) CRRT <Continuous>  sodium chloride 0.9% lock flush 10 milliLiter(s) IV Push every 1 hour PRN  thiamine IVPB 500 milliGRAM(s) IV Intermittent daily      ALLERGIES:  No Known Allergies      VITALS & I/Os:  Vital Signs Last 24 Hrs  T(C): 36.8 (29 Jul 2020 13:04), Max: 37 (29 Jul 2020 03:15)  T(F): 98.3 (29 Jul 2020 13:04), Max: 98.6 (29 Jul 2020 03:15)  HR: 89 (29 Jul 2020 23:00) (67 - 89)  BP: 98/61 (29 Jul 2020 17:00) (98/61 - 128/78)  BP(mean): --  RR: 33 (29 Jul 2020 23:00) (17 - 33)  SpO2: 94% (29 Jul 2020 23:00) (94% - 99%)    I&O's Summary    28 Jul 2020 07:01  -  29 Jul 2020 07:00  --------------------------------------------------------  IN: 970 mL / OUT: 1300 mL / NET: -330 mL    29 Jul 2020 07:01  -  29 Jul 2020 23:55  --------------------------------------------------------  IN: 1100 mL / OUT: 1100 mL / NET: 0 mL        PHYSICAL EXAM:  General: Laying in bed, intubated, jaundiced  Respiratory: On mechanical vent  Abdominal: Soft, distended  Extremities: Warm    LABS:                        11.8   12.29 )-----------( x        ( 29 Jul 2020 23:13 )             34.8     07-29    139  |  100  |  41<H>  ----------------------------<  178<H>  4.9   |  18<L>  |  6.93<H>    Ca    7.3<L>      29 Jul 2020 18:40  Phos  6.9     07-29  Mg     1.6     07-29    TPro  4.5<L>  /  Alb  2.9<L>  /  TBili  11.5<H>  /  DBili  7.5<H>  /  AST  50<H>  /  ALT  23  /  AlkPhos  56  07-29    Lactate: Lactate, Blood: 4.0 mmol/L (07-29 @ 18:40)   07-29 @ 23:19  2.7    PT/INR - ( 29 Jul 2020 18:40 )   PT: 21.6 sec;   INR: 1.87 ratio         PTT - ( 29 Jul 2020 18:40 )  PTT:37.7 sec  ABG - ( 29 Jul 2020 21:59 )  pH, Arterial: 7.34  pH, Blood: x     /  pCO2: 44    /  pO2: 85    / HCO3: 23    / Base Excess: -2.5  /  SaO2: 97                CARDIAC MARKERS ( 28 Jul 2020 10:11 )  x     / x     / 35 U/L / x     / x                IMAGING:

## 2020-07-29 NOTE — CHART NOTE - NSCHARTNOTEFT_GEN_A_CORE
------------------------------------------------------------  Interventional Radiology Pre-Procedure Note  ------------------------------------------------------------    Procedure: BRTO    Indication: 39y Male with massive variceal hemorrhage from gastric varices with unsuccessful attempt to control bleeding endoscopically is referred to IR for emergent BRTO/embolization of gastric varcies.    Past Medical History:    No pertinent past medical history      Allergies: No Known Allergies      Vital Signs: T(F): 98.3 (13:04), Max: 98.6 (03:15)  HR: 84 (17:00)  BP: 98/61 (17:00)  RR: 17 (13:04)  SpO2: 98% (13:04)    Imaging: MRI reviewed, there are gastric varices with a patent splenorenal shunt    Consent: emergency procedure--2 physician consent was obtained    Plan: BRTO/embolization of gastric varices    Roberto Peralta MD, RPVI  Chief Resident, Interventional Radiology  [facility]: [phone] ([phone]  [facility]: [phone]

## 2020-07-29 NOTE — PROGRESS NOTE ADULT - PROBLEM SELECTOR PLAN 3
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 less likely hepatorenal  -IV albumin infusion, octreotide 100mcg tid  - midodrine 10mg po tid  urine lytes  renally dose all meds  avoid nsaids/morphine/hold lasix and aldactone   renal sono  ashton catheter for strict i/o  appreciate renal recs- pt to begin HD, has been consented  - US guided renal biopsy to r/o bile cast nephropathy: radiology contacted, awaiting timeslot. Patient to get ddavp prior to procedure. 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 less likely hepatorenal  -IV albumin infusion, octreotide 100mcg tid  - midodrine 10mg po tid  urine lytes  renally dose all meds  avoid nsaids/morphine/hold lasix and aldactone   renal sono  ashton catheter for strict i/o  appreciate renal recs- pt to begin HD, has been consented  - US guided renal biopsy to r/o bile cast nephropathy: radiology contacted, awaiting timeslot. Patient to get ddavp and platelets prior to procedure.

## 2020-07-29 NOTE — PROGRESS NOTE ADULT - PROBLEM SELECTOR PLAN 1
Pt with MARQUISE in the setting of liver cirrhosis with severe ascites. MARQUISE 2/2 prerenal (severe diarrhea + diuretics +decrease effective arterial blood volume from Cirrhosis) to r/o hepatorenal syndrome.  On recent admission baseline sCr 0.6-0.8. On Admission 9.64 -- s/p diagnostic paracentesis. -- sCr worsening - requiring HD    First ever HD on 7/28 - tolerated well.     for Second HD treatment today   c/w IV albumin infusion   hold diuretics at this time   c/w midodrine keep MAP> 70,   continue octreotide   Hernandez catheter for strict I/O  Monitor electrolytes and urine output.   Avoid NSAIDs, ACEI/ARBS, RCA and nephrotoxins.   Dose medications as per eGFR. Pt with MARQUISE in the setting of liver cirrhosis with severe ascites. MARQUISE 2/2 prerenal (severe diarrhea + diuretics +decrease effective arterial blood volume from Cirrhosis) to r/o hepatorenal syndrome. 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 On Admission 9.64 -- s/p diagnostic paracentesis. -- sCr worsening - requiring HD    First ever HD on 7/28 - tolerated well.     for Second HD treatment today   please schedule for kidney biopsy   c/w IV albumin infusion   hold diuretics at this time   c/w midodrine keep MAP> 70,   continue octreotide   Hernandez catheter for strict I/O  Monitor electrolytes and urine output.   Avoid NSAIDs, ACEI/ARBS, RCA and nephrotoxins.   Dose medications as per eGFR.

## 2020-07-29 NOTE — PROGRESS NOTE ADULT - SUBJECTIVE AND OBJECTIVE BOX
Elver Abrams MD  Division of Hospital Medicine  Pager: [phone]  If no response or off-hours, page [phone]  -------------------------------------    Patient is a 39y old  Male who presents with a chief complaint of abdominal pain (29 Jul 2020 10:31)      SUBJECTIVE / OVERNIGHT EVENTS: none acute  ADDITIONAL REVIEW OF SYSTEMS: pt reports episode of bloody emesis this morning. Has some ongoing abdominal 'sensitivity.' No fevers/chills. Has ongoing B/L LE and UE aches/pains, rated 6/10. No cough/sob. 14 point ros otherwise negative.     MEDICATIONS  (STANDING):  albumin human 25% IVPB 100 milliLiter(s) IV Intermittent every 6 hours  chlorhexidine 4% Liquid 1 Application(s) Topical <User Schedule>  folic acid 1 milliGRAM(s) Oral daily  midodrine. 10 milliGRAM(s) Oral three times a day  octreotide  Injectable 100 MICROGram(s) IV Push three times a day  piperacillin/tazobactam IVPB.. 3.375 Gram(s) IV Intermittent every 12 hours  rifAXIMin 200 milliGRAM(s) Oral three times a day  thiamine 100 milliGRAM(s) Oral at bedtime    MEDICATIONS  (PRN):  sodium chloride 0.9% lock flush 10 milliLiter(s) IV Push every 1 hour PRN Pre/post blood products, medications, blood draw, and to maintain line patency      CAPILLARY BLOOD GLUCOSE        I&O's Summary    28 Jul 2020 07:01  -  29 Jul 2020 07:00  --------------------------------------------------------  IN: 970 mL / OUT: 1300 mL / NET: -330 mL    29 Jul 2020 07:01  -  29 Jul 2020 14:31  --------------------------------------------------------  IN: 1100 mL / OUT: 1100 mL / NET: 0 mL        PHYSICAL EXAM:  Vital Signs Last 24 Hrs  T(C): 36.8 (29 Jul 2020 13:04), Max: 37 (29 Jul 2020 03:15)  T(F): 98.3 (29 Jul 2020 13:04), Max: 98.6 (29 Jul 2020 03:15)  HR: 73 (29 Jul 2020 13:04) (67 - 76)  BP: 101/63 (29 Jul 2020 13:04) (99/53 - 131/85)  BP(mean): --  RR: 17 (29 Jul 2020 13:04) (17 - 18)  SpO2: 98% (29 Jul 2020 13:04) (97% - 99%)  CONSTITUTIONAL: NAD, well-developed, well-groomed  EYES: PERRLA; conjunctiva and sclera icteric  ENMT: Moist oral mucosa, no pharyngeal injection or exudates; normal dentition  NECK: Supple, no palpable masses; no thyromegaly  RESPIRATORY: Normal respiratory effort; lungs are clear to auscultation bilaterally  CARDIOVASCULAR: Regular rate and rhythm, normal S1 and S2, no murmur/rub/gallop; No lower extremity edema; Peripheral pulses are 2+ bilaterally  ABDOMEN: Nontender to palpation, normoactive bowel sounds, no rebound/guarding; No hepatosplenomegaly  MUSCLOSKELETAL:  Normal gait; no clubbing or cyanosis of digits; no joint swelling or tenderness to palpation  PSYCH: A+O to person, place, and time; affect appropriate  NEUROLOGY: CN 2-12 are intact and symmetric; no gross sensory deficits;   SKIN: No rashes; no palpable lesions, +jaundiced    LABS:                        7.9    24.64 )-----------( 292      ( 29 Jul 2020 07:20 )             22.5     07-29    136  |  95<L>  |  58<H>  ----------------------------<  91  3.9   |  16<L>  |  11.04<H>    Ca    6.6<L>      29 Jul 2020 07:20  Phos  7.0     07-29    TPro  5.2<L>  /  Alb  3.6  /  TBili  26.1<H>  /  DBili  x   /  AST  106<H>  /  ALT  17  /  AlkPhos  98  07-29    PT/INR - ( 29 Jul 2020 08:28 )   PT: 22.8 sec;   INR: 2.00 ratio         PTT - ( 27 Jul 2020 15:49 )  PTT:39.7 sec  CARDIAC MARKERS ( 28 Jul 2020 10:11 )  x     / x     / 35 U/L / x     / x              Culture - Blood (collected 26 Jul 2020 17:54)  Source: .Blood Blood-Peripheral  Preliminary Report (27 Jul 2020 18:01):    No growth to date.    Culture - Blood (collected 26 Jul 2020 17:54)  Source: .Blood Blood-Venous  Preliminary Report (27 Jul 2020 18:01):    No growth to date.    Culture - Urine (collected 26 Jul 2020 17:50)  Source: .Urine Clean Catch (Midstream)  Final Report (28 Jul 2020 21:08):    <10,000 CFU/ml of other organisms    Culture - Body Fluid with Gram Stain (collected 26 Jul 2020 17:25)  Source: Peritoneal Peritoneal Fluid  Gram Stain (26 Jul 2020 22:38):    No polymorphonuclear cells seen    No organisms seen    by cytocentrifuge  Preliminary Report (27 Jul 2020 19:22):    No growth        RADIOLOGY & ADDITIONAL TESTS:  Results Reviewed:   Imaging Personally Reviewed:  Electrocardiogram Personally Reviewed:    COORDINATION OF CARE:  Care Discussed with Consultants/Other Providers [Y/N]: hepatology  Prior or Outpatient Records Reviewed [Y/N]: Elver Abrams MD  Division of Hospital Medicine  Pager: [phone]  If no response or off-hours, page [phone]  -------------------------------------    Patient is a 39y old  Male who presents with a chief complaint of abdominal pain (29 Jul 2020 10:31)      SUBJECTIVE / OVERNIGHT EVENTS: none acute  ADDITIONAL REVIEW OF SYSTEMS: pt reports episode of bloody emesis this morning. Has some ongoing abdominal 'sensitivity.' No fevers/chills. Has ongoing B/L LE and UE aches/pains, rated 6/10. No cough/sob. 14 point ros otherwise negative.     MEDICATIONS  (STANDING):  albumin human 25% IVPB 100 milliLiter(s) IV Intermittent every 6 hours  chlorhexidine 4% Liquid 1 Application(s) Topical <User Schedule>  folic acid 1 milliGRAM(s) Oral daily  midodrine. 10 milliGRAM(s) Oral three times a day  octreotide  Injectable 100 MICROGram(s) IV Push three times a day  piperacillin/tazobactam IVPB.. 3.375 Gram(s) IV Intermittent every 12 hours  rifAXIMin 200 milliGRAM(s) Oral three times a day  thiamine 100 milliGRAM(s) Oral at bedtime    MEDICATIONS  (PRN):  sodium chloride 0.9% lock flush 10 milliLiter(s) IV Push every 1 hour PRN Pre/post blood products, medications, blood draw, and to maintain line patency      CAPILLARY BLOOD GLUCOSE        I&O's Summary    28 Jul 2020 07:01  -  29 Jul 2020 07:00  --------------------------------------------------------  IN: 970 mL / OUT: 1300 mL / NET: -330 mL    29 Jul 2020 07:01  -  29 Jul 2020 14:31  --------------------------------------------------------  IN: 1100 mL / OUT: 1100 mL / NET: 0 mL        PHYSICAL EXAM:  Vital Signs Last 24 Hrs  T(C): 36.8 (29 Jul 2020 13:04), Max: 37 (29 Jul 2020 03:15)  T(F): 98.3 (29 Jul 2020 13:04), Max: 98.6 (29 Jul 2020 03:15)  HR: 73 (29 Jul 2020 13:04) (67 - 76)  BP: 101/63 (29 Jul 2020 13:04) (99/53 - 131/85)  BP(mean): --  RR: 17 (29 Jul 2020 13:04) (17 - 18)  SpO2: 98% (29 Jul 2020 13:04) (97% - 99%)  CONSTITUTIONAL: NAD, well-developed, well-groomed  EYES: PERRLA; conjunctiva and sclera icteric  ENMT: Moist oral mucosa, no pharyngeal injection or exudates; normal dentition  NECK: Supple, no palpable masses; no thyromegaly  RESPIRATORY: Normal respiratory effort; lungs are clear to auscultation bilaterally  CARDIOVASCULAR: Regular rate and rhythm, normal S1 and S2, no murmur/rub/gallop; No lower extremity edema; Peripheral pulses are 2+ bilaterally  ABDOMEN: Nontender to palpation, normoactive bowel sounds, no rebound/guarding; No hepatosplenomegaly  MUSCLOSKELETAL:  Normal gait; no clubbing or cyanosis of digits; no joint swelling or tenderness to palpation  PSYCH: A+O to person, place, and time; affect appropriate  NEUROLOGY: CN 2-12 are intact and symmetric; no gross sensory deficits;   SKIN: No rashes; no palpable lesions, +jaundiced    LABS:                        7.9    24.64 )-----------( 292      ( 29 Jul 2020 07:20 )             22.5     07-29    136  |  95<L>  |  58<H>  ----------------------------<  91  3.9   |  16<L>  |  11.04<H>    Ca    6.6<L>      29 Jul 2020 07:20  Phos  7.0     07-29    TPro  5.2<L>  /  Alb  3.6  /  TBili  26.1<H>  /  DBili  x   /  AST  106<H>  /  ALT  17  /  AlkPhos  98  07-29    PT/INR - ( 29 Jul 2020 08:28 )   PT: 22.8 sec;   INR: 2.00 ratio         PTT - ( 27 Jul 2020 15:49 )  PTT:39.7 sec  CARDIAC MARKERS ( 28 Jul 2020 10:11 )  x     / x     / 35 U/L / x     / x              Culture - Blood (collected 26 Jul 2020 17:54)  Source: .Blood Blood-Peripheral  Preliminary Report (27 Jul 2020 18:01):    No growth to date.    Culture - Blood (collected 26 Jul 2020 17:54)  Source: .Blood Blood-Venous  Preliminary Report (27 Jul 2020 18:01):    No growth to date.    Culture - Urine (collected 26 Jul 2020 17:50)  Source: .Urine Clean Catch (Midstream)  Final Report (28 Jul 2020 21:08):    <10,000 CFU/ml of other organisms    Culture - Body Fluid with Gram Stain (collected 26 Jul 2020 17:25)  Source: Peritoneal Peritoneal Fluid  Gram Stain (26 Jul 2020 22:38):    No polymorphonuclear cells seen    No organisms seen    by cytocentrifuge  Preliminary Report (27 Jul 2020 19:22):    No growth        RADIOLOGY & ADDITIONAL TESTS:  Results Reviewed:   Imaging Personally Reviewed:  Electrocardiogram Personally Reviewed:    COORDINATION OF CARE:  Care Discussed with Consultants/Other Providers [Y/N]: hepatology, nephrology  Prior or Outpatient Records Reviewed [Y/N]:

## 2020-07-30 NOTE — PROGRESS NOTE ADULT - PROBLEM SELECTOR PLAN 2
Pt. with hyponatremia in setting of liver cirrhosis and severe ascites. Serum sodium 133 on admission (7/26/) previous episodes of hyponatremia. Today at 143    Monitor serum sodium Q6 hours.   Do not correct serum sodium >6-8 meq/day. Pt. with hyponatremia in setting of liver cirrhosis and severe ascites. Serum sodium 133 on admission (7/26/) previous episodes of hyponatremia. Today at 143  Monitor serum sodium Q6 hours.   Do not correct serum sodium >6-8 meq/day.

## 2020-07-30 NOTE — PROGRESS NOTE ADULT - PROBLEM SELECTOR PLAN 3
ionized calcium is 0.84    keep Ionized calcium around 1.1-1.2  continue to monitor electrolytes ionized calcium is 0.84  keep Ionized calcium around 1.1-1.2  continue to monitor electrolytes

## 2020-07-30 NOTE — PROGRESS NOTE ADULT - SUBJECTIVE AND OBJECTIVE BOX
RED SURGERY DAILY PROGRESS NOTE:       SUBJECTIVE/ROS: Patient intubated and sedated  Denies nausea, vomiting, chest pain, shortness of breath         MEDICATIONS  (STANDING):  albumin human 25% IVPB 100 milliLiter(s) IV Intermittent every 6 hours  chlorhexidine 0.12% Liquid 15 milliLiter(s) Oral Mucosa every 12 hours  chlorhexidine 4% Liquid 1 Application(s) Topical <User Schedule>  CRRT Treatment    <Continuous>  fentaNYL   Infusion... 0.5 MICROgram(s)/kG/Hr (2.07 mL/Hr) IV Continuous <Continuous>  octreotide  Infusion 50 MICROgram(s)/Hr (10 mL/Hr) IV Continuous <Continuous>  pantoprazole Infusion 8 mG/Hr (10 mL/Hr) IV Continuous <Continuous>  phenylephrine    Infusion 1 MICROgram(s)/kG/Min (31.1 mL/Hr) IV Continuous <Continuous>  piperacillin/tazobactam IVPB.. 3.375 Gram(s) IV Intermittent every 8 hours  PrismaSATE Dialysate BGK 4 / 2.5 5000 milliLiter(s) (2500 mL/Hr) CRRT <Continuous>  propofol Infusion 5 MICROgram(s)/kG/Min (2.48 mL/Hr) IV Continuous <Continuous>  thiamine IVPB 500 milliGRAM(s) IV Intermittent daily    MEDICATIONS  (PRN):  sodium chloride 0.9% lock flush 10 milliLiter(s) IV Push every 1 hour PRN Pre/post blood products, medications, blood draw, and to maintain line patency      OBJECTIVE:    Vital Signs Last 24 Hrs  T(C): 35.7 (2020 08:00), Max: 36.8 (2020 13:04)  T(F): 96.3 (2020 08:00), Max: 98.3 (2020 13:04)  HR: 69 (2020 11:00) (67 - 89)  BP: 98/61 (2020 17:00) (98/61 - 101/63)  BP(mean): --  RR: 20 (2020 11:00) (17 - 33)  SpO2: 97% (2020 11:00) (94% - 100%)        I&O's Detail    2020 07:01  -  2020 07:00  --------------------------------------------------------  IN:    fentaNYL   Infusion: 86.8 mL    octreotide  Infusion: 70 mL    Other: 1100 mL    pantoprazole Infusion: 70 mL    phenylephrine   Infusion: 31.2 mL    propofol Infusion: 172.2 mL    Solution: 100 mL    Solution: 150 mL  Total IN: 1780.2 mL    OUT:    Indwelling Catheter - Urethral: 30 mL    Other: 1703 mL  Total OUT: 1733 mL    Total NET: 47.2 mL      2020 07:01  -  2020 11:15  --------------------------------------------------------  IN:    fentaNYL   Infusion: 58 mL    octreotide  Infusion: 40 mL    pantoprazole Infusion: 40 mL    phenylephrine   Infusion: 161.5 mL    propofol Infusion: 98.4 mL    Solution: 50 mL  Total IN: 447.9 mL    OUT:    Other: 516 mL  Total OUT: 516 mL    Total NET: -68.1 mL          Daily     Daily Weight in k.2 (2020 12:40)    LABS:                        10.5   14.05 )-----------( 83       ( 2020 10:11 )             29.6     07-30    140  |  101  |  31<H>  ----------------------------<  115<H>  3.6   |  26  |  4.74<H>    Ca    9.0      2020 10:11  Phos  4.7     07-30  Mg     1.8     07-30    TPro  4.9<L>  /  Alb  3.5  /  TBili  16.8<H>  /  DBili  x   /  AST  80<H>  /  ALT  22  /  AlkPhos  51  07-30    PT/INR - ( 2020 10:11 )   PT: 18.5 sec;   INR: 1.59 ratio         PTT - ( 2020 10:11 )  PTT:33.6 sec  Urinalysis Basic - ( 2020 10:44 )    Color: Light Yellow / Appearance: Turbid / S.010 / pH: x  Gluc: x / Ketone: Negative  / Bili: Small / Urobili: <2 mg/dL   Blood: x / Protein: Negative / Nitrite: Negative   Leuk Esterase: Negative / RBC: 12 /HPF / WBC 1 /HPF   Sq Epi: x / Non Sq Epi: 0 /HPF / Bacteria: Negative              PHYSICAL EXAM:  General: Laying in bed, intubated, jaundiced  Respiratory: On mechanical vent  Abdominal: Soft, distended  Extremities: Warm

## 2020-07-30 NOTE — PROCEDURE NOTE - NSPROCDETAILS_GEN_ALL_CORE
ultrasound guidance/sterile technique, catheter placed/sterile dressing applied/guidewire recovered/lumen(s) aspirated and flushed

## 2020-07-30 NOTE — CHART NOTE - NSCHARTNOTEFT_GEN_A_CORE
:    INDICATION:    PROCEDURE:  [ x] LIMITED ECHO  [ x] LIMITED CHEST  [ ] LIMITED RETROPERITONEAL  [x ] LIMITED ABDOMINAL  [ ] LIMITED DVT  [ ] NEEDLE GUIDANCE VASCULAR  [ ] NEEDLE GUIDANCE THORACENTESIS  [ ] NEEDLE GUIDANCE PARACENTESIS  [ ] NEEDLE GUIDANCE PERICARDIOCENTESIS  [ ] OTHER    FINDINGS:    B/l B lines, R pleural effusion and ascites around the liver, and Dilated RV with good cardiac squeeze    INTERPRETATION:    Patient seems to be overloaded in the setting of multiorgan failure. : Terrence Correa    INDICATION: Shock, Respiratory Failure, Liver Failure    PROCEDURE:  [ x] LIMITED ECHO  [ x] LIMITED CHEST  [ ] LIMITED RETROPERITONEAL  [x ] LIMITED ABDOMINAL  [ ] LIMITED DVT  [ ] NEEDLE GUIDANCE VASCULAR  [ ] NEEDLE GUIDANCE THORACENTESIS  [ ] NEEDLE GUIDANCE PARACENTESIS  [ ] NEEDLE GUIDANCE PERICARDIOCENTESIS  [ ] OTHER    FINDINGS:    Lungs: B/l B lines, R pleural effusion and   Abd: small ascites around the liver, no dependent ascites noted  Cards: limited views with no significant pericardial effusion, dilated RV, grossly normal LV function, dilated IVC    INTERPRETATION:    Volume overload and right heart dysfunction with grossly normal LV function.    Images uploaded to Care Thread

## 2020-07-30 NOTE — PROGRESS NOTE ADULT - ASSESSMENT
39M PMH ETOH cirrhosis (last ETOH 7/4), recent admission for seizure, hx traumatic bladder rupture s/p ex-lap (2019), initially presented with abdominal pain. Hospital course c/b hematemesis requiring MTP. Now s/p IR embolization of gastric varices and blakemore placement.    - Appreciate GI reccs  - Continue excellent care per MICU. IVF and blood product resuscitation   - Patient is not a surgical candidate at this time as he likely will not survive intra-abdominal surgery. Furthermore, as pt with both gastric and esophageal varices, gastrectomy would be morbid and not sufficient to treat/prevent risk of massive esophogeal bleed. Greg Chi class C - marilynn-operative mortality of >82% with intra-abdominal surgery. MELD 38  - Recc GOC conversation  - will sign off at this time, call with any questions     Flaquita Sumner PA-C [id number]

## 2020-07-30 NOTE — CHART NOTE - NSCHARTNOTEFT_GEN_A_CORE
I spoke with the patients mother about who should be the primary contact for healthcare related information. The patients mother clearly stated that she wanted her niece (patients 1st cousin) who is a nurse to be the first contact because of her knowledge of the healthcare system and healthcare terminology. She additionally requested that her son (patients brother) be the second line contact if the 1st cousin cannot be reached and he is a teacher. The mother would like to remain as a 3rd line contact. All questions were answered to apparent satisfaction and the mother explicitly stated that "I understand they will be contacted first for any urgent healthcare related situations, emergency care or for any prognostic and updated information. I believe they will better understand the situations better."       Primary contact: 1st boogie Alvarez Fredis: [phone]   Secondary contact: Brother Marcell Adames: [phone]  Third contact: Please see patient info which is the mothers contact number.     Justin Metz MD   Internal Medicine PGY1

## 2020-07-30 NOTE — PROGRESS NOTE ADULT - SUBJECTIVE AND OBJECTIVE BOX
Chief Complaint:  Patient is a 39y old  Male who presents with a chief complaint of abdominal pain (2020 08:30)    Reason for consult: ACLF    Interval Events: s/p massive UGIB from gastric varix yesterday, requiring 25u PRBC, s/p PARTO, blakemore in place, in MICU    Hospital Medications:  albumin human 25% IVPB 100 milliLiter(s) IV Intermittent every 6 hours  chlorhexidine 0.12% Liquid 15 milliLiter(s) Oral Mucosa every 12 hours  chlorhexidine 4% Liquid 1 Application(s) Topical <User Schedule>  CRRT Treatment    <Continuous>  fentaNYL   Infusion... 0.5 MICROgram(s)/kG/Hr IV Continuous <Continuous>  octreotide  Infusion 50 MICROgram(s)/Hr IV Continuous <Continuous>  pantoprazole Infusion 8 mG/Hr IV Continuous <Continuous>  phenylephrine    Infusion 1 MICROgram(s)/kG/Min IV Continuous <Continuous>  piperacillin/tazobactam IVPB.. 3.375 Gram(s) IV Intermittent every 8 hours  PrismaSATE Dialysate BGK 4 / 2.5 5000 milliLiter(s) CRRT <Continuous>  propofol Infusion 5 MICROgram(s)/kG/Min IV Continuous <Continuous>  sodium chloride 0.9% lock flush 10 milliLiter(s) IV Push every 1 hour PRN  thiamine IVPB 500 milliGRAM(s) IV Intermittent daily      ROS: Pt unable to provide    PHYSICAL EXAM:   Vital Signs:  Vital Signs Last 24 Hrs  T(C): 35.7 (2020 08:00), Max: 36.8 (2020 13:04)  T(F): 96.3 (2020 08:00), Max: 98.3 (2020 13:04)  HR: 71 (2020 08:30) (67 - 89)  BP: 98/61 (2020 17:00) (98/61 - 101/63)  BP(mean): --  RR: 20 (2020 08:30) (17 - 33)  SpO2: 97% (2020 08:30) (94% - 100%)  Daily     Daily Weight in k.2 (2020 12:40)    GENERAL: no acute distress, intubated, blakemore in place  NEURO: sedated  HEENT: icteric sclera, dried blood at mouth, < 25cc dark red blood in suction cannister  CHEST: no respiratory distress, no accessory muscle use  CARDIAC: regular rate, rhythm  ABDOMEN: soft, non-tender, distended, no rebound or guarding  EXTREMITIES: warm, well perfused, no edema  SKIN:++ jaundice    LABS: reviewed                        10.5   10.04 )-----------( 72       ( 2020 06:25 )             29.9     07-30    140  |  101  |  34<H>  ----------------------------<  154<H>  3.5   |  23  |  5.14<H>    Ca    9.2      2020 06:25  Phos  5.1     07-30  Mg     1.7     07-30    TPro  4.6<L>  /  Alb  3.3  /  TBili  16.9<H>  /  DBili  x   /  AST  60<H>  /  ALT  21  /  AlkPhos  49  07-30    LIVER FUNCTIONS - ( 2020 06:25 )  Alb: 3.3 g/dL / Pro: 4.6 g/dL / ALK PHOS: 49 U/L / ALT: 21 U/L / AST: 60 U/L / GGT: x             Interval Diagnostic Studies: see sunrise for full report

## 2020-07-30 NOTE — PROGRESS NOTE ADULT - ASSESSMENT
39M w/ decompensated EtOH cirrhosis, presenting w/ acute on chronic liver failure of unclear etiology. Course c/b massive GIB from gastric varix requiring 25u pRBC, s/p glue injection and PARTO by IR.     Impression:  #Acute on chronic liver failure: d/dx includes underlying infection (UA neg, BCx pending, CXR neg, dx para neg for SBP, biliary imaging negative, C diff negative), worsening EtOH hepatitis (MDF = 60 but no interval drinking since discharge and this is an acute change), vs vascular injury  #Cirrhosis due to EtOH, decompensated by ascites  - varices: no esophageal varices on EGD 7/29, actively bleeding gastric varix s/p glue injection and PARTO by IR, Blakemore tube in place  - ascites: present on exam, neg for SBP 7/26, on Lasix 20mg/spironolactone 25mg daily as outpatient, s/p 1.5L removed during PARTO 7/29  - HE: none on exam  - HCC: no imaging, MRI without contrast is limited  - MELD-Na = 38 on 7/26  #Pruritis – likely due to elevated bilirubin, improved  #Possible PV thrombosis on U/S w/ doppler  # MARQUISE: unclear etiology, c/f bilirubin pigment nephropathy vs acute tubular necrosis vs obstructive uropathy. Reny is 53, making HRS less likely. On CVVH    Recommendations:  - f/u AXR to ensure deflation of Blakemore tube, will keep in place for now in case pt re-bleeds  - trend Hb, transfuse to Hb > 7 or if unstable  - NPO  - c/w octreotide and IV PPI gtt  - c/w zosyn for at least 7d after GI bleed (ending 8/4)  - c/w CVVH, f/u nephrology recs  - f/u renal recs  - hold nadolol and diuretics  - continue with rifaximin  - c/w MVI, thiamine and folate  - trend CMP, CBC, INR daily  - hepatology will continue to follow    Ross Shook  Gastroenterology Fellow  AT NIGHT AND ON WEEKENDS:  Contact on-call GI fellow via answering service ([phone]) from 5pm-8am and on weekends/holidays  MONDAY-FRIDAY 8AM-5PM:  Available via [employer] Teams  Call [phone] ([facility]) or Page [phone] ([facility]) from 8am-5pm M-F

## 2020-07-30 NOTE — PROGRESS NOTE ADULT - PROBLEM SELECTOR PLAN 1
Pt with MARQUISE in the setting of liver cirrhosis with severe ascites. MARQUISE 2/2 prerenal (severe diarrhea + diuretics +decrease effective arterial blood volume from Cirrhosis) to r/o hepatorenal syndrome. 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 On Admission 9.64 -- s/p diagnostic paracentesis. -- sCr worsening - requiring HD -- First ever HD on 7/28 - tolerated well.     7/29- taken for EGD which for complicated with active massive bleeding   requiring IR for embolization - now transferred to MICU, required intubation     Started on CRRT - currently to -198cc. on IV pressors    c/w IV albumin infusion   hold diuretics at this time   c/w IV pressors MAP >70  continue octreotide   Hernandez catheter for strict I/O  Monitor electrolytes and urine output.   Avoid NSAIDs, ACEI/ARBS, RCA and nephrotoxins.   Dose medications as per eGFR.  rest of management as per ICU Pt with MAQRUISE in the setting of liver cirrhosis with severe ascites. MARQUISE 2/2 prerenal (severe diarrhea + diuretics +decrease effective arterial blood volume from Cirrhosis) to r/o hepatorenal syndrome. 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 On Admission 9.64 -- s/p diagnostic paracentesis. -- sCr worsening - requiring HD -- First ever HD on 7/28 - tolerated well.   7/29- taken for EGD which for complicated with active massive bleeding   requiring IR for embolization - now transferred to MICU, required intubation   Started on CRRT - currently to -198cc. on IV pressors    c/w IV albumin infusion   hold diuretics at this time   c/w IV pressors MAP >70  continue octreotide   Hernandez catheter for strict I/O  Monitor electrolytes and urine output.   Avoid NSAIDs, ACEI/ARBS, RCA and nephrotoxins.   Dose medications as per eGFR.  rest of management as per ICU

## 2020-07-30 NOTE — PROGRESS NOTE ADULT - ASSESSMENT
39 y.o. Male with PMhx of Alcohol liver cirrhosis, alcohol use disorder, traumatic bladder rupture s/p ex lap in 2019, recently admitted to [facility] from 7/11 to 7/17 for seizure and alcoholic cirrhosis. Presented to ED c/o worsening abdominal pain and diarrhea. - hospital course complicated with massive GI bleed, now intubated on 7/29     Nephrology consulted for MARQUISE

## 2020-07-30 NOTE — DIETITIAN INITIAL EVALUATION ADULT. - ENERGY NEEDS
Ht: 73"  Wt: 7/28 196  BMI: 25.9 kg/m2   IBW: 184 (+/-10%)     within IBW  Edema: 2+ generalized       Skin: no pressure injuries documented

## 2020-07-30 NOTE — PROGRESS NOTE ADULT - SUBJECTIVE AND OBJECTIVE BOX
INTERVAL HPI/OVERNIGHT EVENTS:  Since interval note, he went down to IR and had PARTO, paracentesis (1.5 L), and was transfused for a total of 34 PRBC, 25 FFP, 20 PLT, and 5 cryo. A blakemore was placed and the gastric portion inflated.    In the MICU, he was HDS and CRRT was started. To support reaching his goal CRRT removal rate of -100 mL/hr, he was started on kassi to 0.4.     Hgb 10.5 < 11.8    SUBJECTIVE: Patient seen and examined at bedside.     Cannot obtain as patient intubated and sedated    OBJECTIVE:    VITAL SIGNS:  ICU Vital Signs Last 24 Hrs  T(C): 35.4 (2020 04:00), Max: 36.8 (2020 13:04)  T(F): 95.7 (2020 04:00), Max: 98.3 (2020 13:04)  HR: 71 (2020 07:15) (67 - 89)  BP: 98/61 (2020 17:00) (98/61 - 112/63)  BP(mean): --  ABP: 101/47 (2020 07:15) (99/49 - 139/70)  ABP(mean): 62 (2020 07:15) (62 - 87)  RR: 20 (2020 07:15) (17 - 33)  SpO2: 100% (2020 07:15) (94% - 100%)    Mode: AC/ CMV (Assist Control/ Continuous Mandatory Ventilation), RR (machine): 20, TV (machine): 450, FiO2: 50, PEEP: 10, ITime: 1, MAP: 16, PIP: 34    07-29 @ 07:01  -  -30 @ 07:00  --------------------------------------------------------  IN: 1780.2 mL / OUT: 1733 mL / NET: 47.2 mL      CAPILLARY BLOOD GLUCOSE      POCT Blood Glucose.: 117 mg/dL (2020 16:49)      PHYSICAL EXAM:    General: NAD  HEENT: Icteric. PERRL.  Neck: supple  Respiratory: CTA b/l. Vented breath sounds.  Cardiovascular: +S1/S2; RRR  Abdomen: Abdomen moderately distended.  Extremities: Warm under rayshawn hugger. Dorsalis pedis 2+ bilaterally  Skin: Icteric  Neurological: Intubated and sedated.    MEDICATIONS:  MEDICATIONS  (STANDING):  albumin human 25% IVPB 100 milliLiter(s) IV Intermittent every 6 hours  chlorhexidine 0.12% Liquid 15 milliLiter(s) Oral Mucosa every 12 hours  chlorhexidine 4% Liquid 1 Application(s) Topical <User Schedule>  CRRT Treatment    <Continuous>  fentaNYL   Infusion... 0.5 MICROgram(s)/kG/Hr (2.07 mL/Hr) IV Continuous <Continuous>  octreotide  Infusion 50 MICROgram(s)/Hr (10 mL/Hr) IV Continuous <Continuous>  pantoprazole Infusion 8 mG/Hr (10 mL/Hr) IV Continuous <Continuous>  phenylephrine    Infusion 1 MICROgram(s)/kG/Min (31.1 mL/Hr) IV Continuous <Continuous>  piperacillin/tazobactam IVPB.. 2.25 Gram(s) IV Intermittent every 6 hours  PrismaSATE Dialysate BGK 4 / 2.5 5000 milliLiter(s) (2500 mL/Hr) CRRT <Continuous>  propofol Infusion 5 MICROgram(s)/kG/Min (2.48 mL/Hr) IV Continuous <Continuous>  thiamine IVPB 500 milliGRAM(s) IV Intermittent daily    MEDICATIONS  (PRN):  sodium chloride 0.9% lock flush 10 milliLiter(s) IV Push every 1 hour PRN Pre/post blood products, medications, blood draw, and to maintain line patency      ALLERGIES:  Allergies    No Known Allergies    Intolerances        LABS:                        10.5   10.04 )-----------( 72       ( 2020 06:25 )             29.9     07-30    140  |  101  |  34<H>  ----------------------------<  154<H>  3.5   |  23  |  5.14<H>    Ca    9.2      2020 06:25  Phos  5.1     07-30  Mg     1.7     07-30    TPro  4.6<L>  /  Alb  3.3  /  TBili  16.9<H>  /  DBili  x   /  AST  60<H>  /  ALT  21  /  AlkPhos  49  07-30    PT/INR - ( 2020 06:25 )   PT: 18.0 sec;   INR: 1.54 ratio         PTT - ( 2020 06:25 )  PTT:34.7 sec  Urinalysis Basic - ( 2020 10:44 )    Color: Light Yellow / Appearance: Turbid / S.010 / pH: x  Gluc: x / Ketone: Negative  / Bili: Small / Urobili: <2 mg/dL   Blood: x / Protein: Negative / Nitrite: Negative   Leuk Esterase: Negative / RBC: 12 /HPF / WBC 1 /HPF   Sq Epi: x / Non Sq Epi: 0 /HPF / Bacteria: Negative        RADIOLOGY & ADDITIONAL TESTS: Reviewed.

## 2020-07-30 NOTE — PROGRESS NOTE ADULT - ASSESSMENT
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      Neurology:  -Alert and oriented at baseline  -Hx of seizures in setting of etoh withdrawal  -Sedated  -Neuro checks q4H  -Cannot give PO rifaxamin given GI bleed    Pulm:  Fluid overload in the setting of massive transfusion on 7/30  -ABG as needed and for vent changes  -Wean FiO2    CV:  Hypovolemic shock 2/2 gastric variceal bleed on 7/29 in the setting of CRRT for fluid overload and subsequent hypoxia  -VS q1H  -Maintain MAP approximate 60--Levophed as needed  -Fisher for continuous BP monitoring  -Tele monitoring    GI:  #Acute on chronic liver failure: d/dx includes underlying infection (UA neg, BCx pending, CXR neg, dx para neg for SBP, biliary imaging negative, C diff negative), worsening EtOH hepatitis (MDF = 60 but no interval drinking since discharge and this is an acute change), vs vascular injury  #Decompensated alcoholic cirrhosis, gastric variceal bleed and ascites  - varices: no prior EGD, on nadolol at home, now holding due to hematemesis  - ascites: present on exam, neg for SBP 7/26, on Lasix 20mg/spironolactone 25mg daily as outpatient--HOLD  - HE: Hold Rifaximin in setting of hematemesis  - HCC: no imaging, MRI without contrast is limited  - MELD-Na = 38 on 7/26  #Pruritis – likely due to elevated bilirubin, improved  #Possible PV thrombosis on U/S w/ doppler    #Hematemesis secondary to gastric variceal bleed  -Refractory to endoscopic intervention 7/29 s/p IR PARTO 7/30, blakemore 7/30, and 34 U PRBC, 25 U FFP, 20 U PLT, and 5 U cryo all on 7/30   -Trend CBC q4---tranfuse to maintain Hgb > 7, or for active bleed  -Trend Coags b6p--skkdllh with FFP, cryo as needed  -Maintain NPO    #Transaminitis 2/2 alcoholic hepatitis  -Trend liver enzymes, bilirubin  -Follow up GI, hepatology further recommendations  -F/u hepatology    #Ascites, negative for SBP on admission  -Serial abdominal exams  -Paracentesis as needed  -Follow up Hepatology    Renal:  #MARQUISE: in the setting of liver cirrhosis with severe ascites. MARQUISE 2/2 prerenal (severe diarrhea + diuretics +decrease effective arterial blood volume from Cirrhosis) to r/o hepatorenal syndrome. 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 On Admission 9.64 -- s/p diagnostic paracentesis. -- sCr worsening - requiring HD    First HD 7/28, Second HD 7/29---CVVHD to be started upon arrival to MICU  Kidney Biopsy pending--possible need for plasmapheresis if confirmed bilirubin cast nephropathy  c/w IV albumin infusion   hold diuretics at this time     Monitor electrolytes and urine output.   Avoid NSAIDs, ACEI/ARBS, RCA and nephrotoxins.   Dose medications as per eGFR.     #Hypocalcemia, monitor in setting of massive transfusion  Ionized Calcium daily   keep Ionized calcium around 1.1-1.2  Replete prn      ID:  #All cultures negative to date--      7/26: Blood Culture x 2 NGTD; Urine Culture: NGTD               Para negative for SBP  --Continue empiric Zosyn   --Trend lactate; WBC count    Heme:   --CBC q4H  --TEG to eval for clotting ability  --Maintain Hgb > 7. Transfuse as needed  --Trend Coags--reverse coagulopathy as needed or in event of bleed  TOTAL TRANSFUSION REQUIREMENTS: 34 PRBC, 25  FFP, 5 PLT, and 2 Cryo     Endo:  --Glucose q6h  --Monitor for hypoglycemia in setting of liver failure, NPO

## 2020-07-30 NOTE — DIETITIAN INITIAL EVALUATION ADULT. - OTHER INFO
Pt seen for: MICU Length Of Stay   Adm dx: abd pain, MICU adm 7/29 for decompensated ETOH cirrhosis, hemorrhagic Shock 2/2 variceal Bleed, Hepatorenal HRS, MARQUISE on CVVHD, s/p embolization of gastric varices and Blakemore tube placement.   GI issues: abd distended, had liquid red BM today    Food allergies/Intolerances: NKFA  Vit/supplement PTA: thiamine, folic acid per H+P    Diet PTA: no diet restrictions      Subjective/Objective information: Pt intubated, RD assessment done last adm on 7/13, pt had reported fair appetite w/ usual wt of 185-190 lb, dosing wt of 192 lb at that time. 7/28 standing wt 196 lb.    Education:  Not indicated at this time

## 2020-07-30 NOTE — PROGRESS NOTE ADULT - SUBJECTIVE AND OBJECTIVE BOX
[facility] DIVISION OF KIDNEY DISEASES AND HYPERTENSION -- FOLLOW UP NOTE  --------------------------------------------------------------------------------  If any questions, please feel free to contact me  NS pager: [phone], [facility]: [phone]  Adan Monae M.D.  Nephrology Fellow    (After 5 pm or on weekends please page the on-call fellow)  --------------------------------------------------------------------------------    Chief Complaint:  Patient is a 39y old  Male who presents with a chief complaint of abdominal pain (30 Jul 2020 08:06)    24 hour events/subjective:  Patient seen and examined at bedside, intubated and sedated  yesterday taken for EGD which for complicated with active massive bleeding   requiring IR for embolization - now transferred to MICU, required intubation and MTP  Started on CRRT - currently to -198cc. on IV pressors       PAST HISTORY  --------------------------------------------------------------------------------  No significant changes to PMH, PSH, FHx, SHx, unless otherwise noted    ALLERGIES & MEDICATIONS  --------------------------------------------------------------------------------  Allergies    No Known Allergies    Intolerances      Standing Inpatient Medications  albumin human 25% IVPB 100 milliLiter(s) IV Intermittent every 6 hours  chlorhexidine 0.12% Liquid 15 milliLiter(s) Oral Mucosa every 12 hours  chlorhexidine 4% Liquid 1 Application(s) Topical <User Schedule>  CRRT Treatment    <Continuous>  fentaNYL   Infusion... 0.5 MICROgram(s)/kG/Hr IV Continuous <Continuous>  octreotide  Infusion 50 MICROgram(s)/Hr IV Continuous <Continuous>  pantoprazole Infusion 8 mG/Hr IV Continuous <Continuous>  phenylephrine    Infusion 1 MICROgram(s)/kG/Min IV Continuous <Continuous>  piperacillin/tazobactam IVPB.. 2.25 Gram(s) IV Intermittent every 6 hours  PrismaSATE Dialysate BGK 4 / 2.5 5000 milliLiter(s) CRRT <Continuous>  propofol Infusion 5 MICROgram(s)/kG/Min IV Continuous <Continuous>  thiamine IVPB 500 milliGRAM(s) IV Intermittent daily    PRN Inpatient Medications  sodium chloride 0.9% lock flush 10 milliLiter(s) IV Push every 1 hour PRN      REVIEW OF SYSTEMS  --------------------------------------------------------------------------------  unable to obtain - intubated     VITALS/PHYSICAL EXAM  --------------------------------------------------------------------------------  T(C): 35.4 (07-30-20 @ 04:00), Max: 36.8 (07-29-20 @ 13:04)  HR: 71 (07-30-20 @ 08:15) (67 - 89)  BP: 98/61 (07-29-20 @ 17:00) (98/61 - 112/63)  RR: 20 (07-30-20 @ 08:15) (17 - 33)  SpO2: 97% (07-30-20 @ 08:15) (94% - 100%)  Wt(kg): --        07-29-20 @ 07:01  -  07-30-20 @ 07:00  --------------------------------------------------------  IN: 1780.2 mL / OUT: 1733 mL / NET: 47.2 mL        Physical Exam:  	Gen: intubated  	HEENT: with blakemore in place - OG tube in place  	Pulm: Coarse breath sounds   	CV: S1S2, RRR  	Abd: Soft, distended    	Ext: No LE edema B/L  	Neuro: sedated  	Skin: Warm and dry  	Vascular access: right IJ      LABS/STUDIES  --------------------------------------------------------------------------------              10.5   10.04 >-----------<  72       [07-30-20 @ 06:25]              29.9     140  |  101  |  34  ----------------------------<  154      [07-30-20 @ 06:25]  3.5   |  23  |  5.14        Ca     9.2     [07-30-20 @ 06:25]      iCa    0.84     [07-29 @ 07:23]      Mg     1.7     [07-30-20 @ 06:25]      Phos  5.1     [07-30-20 @ 06:25]    TPro  4.6  /  Alb  3.3  /  TBili  16.9  /  DBili  x   /  AST  60  /  ALT  21  /  AlkPhos  49  [07-30-20 @ 06:25]    PT/INR: PT 18.0 , INR 1.54       [07-30-20 @ 06:25]  PTT: 34.7       [07-30-20 @ 06:25]    CK 35      [07-28-20 @ 10:11]    Creatinine Trend:  SCr 5.14 [07-30 @ 06:25]  SCr 5.87 [07-29 @ 23:13]  SCr 6.93 [07-29 @ 18:40]  SCr 11.04 [07-29 @ 07:20]  SCr 11.92 [07-28 @ 10:11]    Urinalysis - [07-29-20 @ 10:44]      Color Light Yellow / Appearance Turbid / SG 1.010 / pH 6.0      Gluc Negative / Ketone Negative  / Bili Small / Urobili <2 mg/dL       Blood Large / Protein Negative / Leuk Est Negative / Nitrite Negative      RBC 12 / WBC 1 / Hyaline 0 / Gran  / Sq Epi  / Non Sq Epi 0 / Bacteria Negative    Urine Creatinine 196      [07-27-20 @ 18:42]  Urine Protein 101      [07-27-20 @ 18:42]  Urine Sodium 53      [07-27-20 @ 18:42]  Urine Potassium 51      [07-27-20 @ 18:42]    Iron 34, TIBC 124, %sat 27      [07-14-20 @ 10:34]  Ferritin 812      [07-12-20 @ 08:58]  PTH -- (Ca 6.2)      [07-29-20 @ 09:18]   223    HBsAg Nonreact      [07-12-20 @ 01:16]  HCV 0.14, Nonreact      [07-12-20 @ 01:16]  HIV Nonreact      [07-15-20 @ 10:47]    SHAUN: titer Negative, pattern --      [07-12-20 @ 09:37]  Free Light Chains: kappa 2.35, lambda 2.06, ratio = 1.14      [07-12 @ 08:58]

## 2020-07-30 NOTE — DIETITIAN INITIAL EVALUATION ADULT. - PERTINENT MEDS FT
MEDICATIONS  (STANDING):  albumin human 25% IVPB 100 milliLiter(s) IV Intermittent every 6 hours  chlorhexidine 0.12% Liquid 15 milliLiter(s) Oral Mucosa every 12 hours  chlorhexidine 4% Liquid 1 Application(s) Topical <User Schedule>  CRRT Treatment    <Continuous>  fentaNYL   Infusion... 0.5 MICROgram(s)/kG/Hr (2.07 mL/Hr) IV Continuous <Continuous>  octreotide  Infusion 50 MICROgram(s)/Hr (10 mL/Hr) IV Continuous <Continuous>  pantoprazole Infusion 8 mG/Hr (10 mL/Hr) IV Continuous <Continuous>  phenylephrine    Infusion 1 MICROgram(s)/kG/Min (31.1 mL/Hr) IV Continuous <Continuous>  piperacillin/tazobactam IVPB.. 3.375 Gram(s) IV Intermittent every 8 hours  PrismaSATE Dialysate BGK 4 / 2.5 5000 milliLiter(s) (2500 mL/Hr) CRRT <Continuous>  propofol Infusion 5 MICROgram(s)/kG/Min (2.48 mL/Hr) IV Continuous <Continuous>  thiamine IVPB 500 milliGRAM(s) IV Intermittent daily

## 2020-07-30 NOTE — PROGRESS NOTE ADULT - SUBJECTIVE AND OBJECTIVE BOX
Interventional Radiology Follow- Up Note    This is a 39y Male s/p embolization of gastric varices and blakemore placement on 7/29in Interventional Radiology with Dr. Ruiz, Dr. Cohn and Dr. Gupta.   Patient seen and examined @ bedside. Patient remains on multiple gtts, CVVHD, and warming blanket.     Vitals: T(F): 97.7 (07-30-20 @ 12:00), Max: 97.7 (07-30-20 @ 12:00)  HR: 70 (07-30-20 @ 12:45) (64 - 89)  BP: 98/61 (07-29-20 @ 17:00) (98/61 - 98/61)  RR: 20 (07-30-20 @ 12:45) (20 - 33)  SpO2: 96% (07-30-20 @ 12:45) (94% - 100%)  Wt(kg): --    LABS:                        10.5   14.05 )-----------( 83       ( 30 Jul 2020 10:11 )             29.6     07-30    140  |  101  |  31<H>  ----------------------------<  115<H>  3.6   |  26  |  4.74<H>    Ca    9.0      30 Jul 2020 10:11  Phos  4.7     07-30  Mg     1.8     07-30    TPro  4.9<L>  /  Alb  3.5  /  TBili  16.8<H>  /  DBili  x   /  AST  80<H>  /  ALT  22  /  AlkPhos  51  07-30  PT/INR - ( 30 Jul 2020 10:11 )   PT: 18.5 sec;   INR: 1.59 ratio    PTT - ( 30 Jul 2020 10:11 )  PTT:33.6 sec    Antibiotics:     PHYSICAL EXAM:  General: Intubated and sedated  Abdomen: soft, NTND  Extremities: Right groin clean, dry and intact, soft with no evidence of hematoma, bilateral femoral/dp/pt pulses +2.    Assessment/Plan:  Impression: 39M w/ decompensated EtOH cirrhosis, presenting w/ acute on chronic liver failure of unclear etiology. Course c/b massive GIB from gastric varix requiring 25u pRBC, s/p glue injection and plug-assisted retrograde transvenous embolization of gastric varices.    -continue global care per ICU team  -monitor h/h; transfuse as needed  -case discussed with IR attendings     Please call IR at extension [phone] with any questions, concerns, or issues regarding above.        Alexandra Robledo, [facility]-BC  Spectra # [phone]

## 2020-07-31 NOTE — PROGRESS NOTE ADULT - SUBJECTIVE AND OBJECTIVE BOX
Interventional Radiology Follow- Up Note    This is a 39y Male s/p embolization of gastric varices and blakemore placement on 7/29/2020 in Interventional Radiology with Dr. Ruiz, Dr. Cohn and Dr. Gupta.     Patient seen and examined @ bedside. Patient remains on multiple pressors, CVVHD, and mechanical ventilation     Vitals: T(F): 109.4 (07-31-20 @ 09:00), Max: 109.4 (07-31-20 @ 09:00)  HR: 58 (07-31-20 @ 09:00) (53 - 83)  BP: 94/54 (07-30-20 @ 20:00) (94/54 - 94/54)  RR: 20 (07-31-20 @ 09:00) (20 - 21)  SpO2: 97% (07-31-20 @ 09:00) (91% - 100%)  Wt(kg): --    LABS:                        10.0   20.63 )-----------( 57       ( 31 Jul 2020 06:28 )             29.4     07-31    139  |  101  |  18  ----------------------------<  85  3.7   |  22  |  3.32<H>    Ca    8.3<L>      31 Jul 2020 06:28  Phos  2.3     07-31  Mg     2.2     07-31    TPro  5.2<L>  /  Alb  3.9  /  TBili  16.3<H>  /  DBili  x   /  AST  227<H>  /  ALT  31  /  AlkPhos  56  07-31    PT/INR - ( 31 Jul 2020 06:28 )   PT: 19.2 sec;   INR: 1.65 ratio         PTT - ( 31 Jul 2020 06:28 )  PTT:37.9 sec  I&O's Detail    30 Jul 2020 07:01  -  31 Jul 2020 07:00  --------------------------------------------------------  IN:    fentaNYL   Infusion: 345.9 mL    IV PiggyBack: 100 mL    norepinephrine Infusion: 29.4 mL    octreotide  Infusion: 240 mL    pantoprazole Infusion: 240 mL    phenylephrine   Infusion: 228.2 mL    phenylephrine   Infusion: 1296.9 mL    propofol Infusion: 592.8 mL    Solution: 400 mL    Solution: 325 mL  Total IN: 3798.2 mL    OUT:    Indwelling Catheter - Urethral: 5 mL    Other: 1734 mL  Total OUT: 1739 mL    Total NET: 2059.2 mL      31 Jul 2020 07:01  -  31 Jul 2020 09:17  --------------------------------------------------------  IN:    fentaNYL   Infusion: 24.8 mL    norepinephrine Infusion: 10.8 mL    octreotide  Infusion: 20 mL    pantoprazole Infusion: 20 mL    phenylephrine   Infusion: 46.5 mL    propofol Infusion: 39 mL    Solution: 25 mL  Total IN: 186.1 mL    OUT:  Total OUT: 0 mL    Total NET: 186.1 mL    PHYSICAL EXAM:  General: Intubated and sedated, icteric  Vascular access: R IJ shiley catheter  Abdomen: soft, NTND  Extremities: Right groin clean, dry and intact, no evidence of hematoma, bilateral femoral pulses +2.    Assessment: 39M w/ decompensated EtOH cirrhosis, presenting w/ acute on chronic liver failure of unclear etiology. Course c/b massive GIB from gastric varix requiring 25u pRBC, s/p glue injection and plug-assisted retrograde transvenous embolization of gastric varices.    Plan  -continue care per ICU team  -monitor h/h; transfuse as needed  -will discuss with IR attendings and continue to follow

## 2020-07-31 NOTE — PROGRESS NOTE ADULT - ASSESSMENT
39 y.o. Male with PMhx of Alcohol liver cirrhosis, alcohol use disorder, traumatic bladder rupture s/p ex lap in 2019, recently admitted to [facility] from 7/11 to 7/17 for seizure and alcoholic cirrhosis. Presented to ED c/o worsening abdominal pain and diarrhea. - hospital course complicated with massive GI bleed, now intubated on 7/29     Nephrology consulted for MARQUISE

## 2020-07-31 NOTE — PROGRESS NOTE ADULT - PROBLEM SELECTOR PLAN 1
Pt with MARQUISE in the setting of liver cirrhosis with severe ascites. MARQUISE 2/2 prerenal (severe diarrhea + diuretics +decrease effective arterial blood volume from Cirrhosis) to r/o hepatorenal syndrome. 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 On Admission 9.64 -- s/p diagnostic paracentesis. -- sCr worsening - requiring HD -- First ever HD on 7/28 - tolerated well.   7/29- taken for EGD which for complicated with active massive bleeding   requiring IR for embolization - now transferred to MICU, required intubation     Started on CRRT -  on IV pressors      c/w IV albumin infusion   c/w IV pressors MAP >70  continue octreotide   Hernandez catheter for strict I/O  Monitor electrolytes and urine output.   Avoid NSAIDs, ACEI/ARBS, RCA and nephrotoxins.   Dose medications as per eGFR.  rest of management as per ICU

## 2020-07-31 NOTE — CHART NOTE - NSCHARTNOTEFT_GEN_A_CORE
Brother was at the bedside. The patient's brother was updated on the patients worsening blood pressure and increased presser requirement. Goals for the patient were explained in detail including changing antimicrobial medications, monitoring blood pressure and adjusting pressors as needed, and the goal to wean the patient's ventilator requirements. The brother understood the goals of care and asked multiple questions which were addressed in full. All questions were answered to apparent satisfaction.     Justin Metz MD  Internal Medicine PGY1

## 2020-07-31 NOTE — PROGRESS NOTE ADULT - ASSESSMENT
39M w/ decompensated EtOH cirrhosis, presenting w/ acute on chronic liver failure of unclear etiology. Course c/b massive GIB from gastric varix requiring 25u pRBC, s/p glue injection and PARTO by IR. Hb stable post procedures, no e/o bleeding.    Impression:  #Acute on chronic liver failure: d/dx includes underlying infection (UA neg, BCx pending, CXR neg, dx para neg for SBP, biliary imaging negative, C diff negative), worsening EtOH hepatitis (MDF = 60 but no interval drinking since discharge and this is an acute change), vs vascular injury  #Cirrhosis due to EtOH, decompensated by ascites  - varices: no esophageal varices on EGD 7/29, actively bleeding gastric varix s/p glue injection and PARTO by IR, bleeding appears to be controlled  - ascites: present on exam, neg for SBP 7/26, on Lasix 20mg/spironolactone 25mg daily as outpatient, s/p 1.5L removed during PARTO 7/29  - HE: none on exam  - HCC: no imaging, MRI without contrast is limited  - MELD-Na = 38 on 7/26  #Hypotension – still requiring pressors, d/dx sepsis? Does not appear to be bleeding related  #High PEEP requirement – required paracentesis 7/29 for difficulty oxygenating during PARTO  #Pruritis – likely due to elevated bilirubin, improved  #Possible PV thrombosis on U/S w/ doppler  # MARQUISE: unclear etiology, c/f bilirubin pigment nephropathy vs acute tubular necrosis vs obstructive uropathy. Reny is 53, making HRS less likely. On CVVH    Recommendations:  - RUQ U/S, consider repeat LVP if large ascites is present that could be contributing to high PEEP requirement  - trend Hb, transfuse to Hb > 7 or if unstable  - NPO  - can discontinue octreotide gtt  - IV PPI BID  - c/w zosyn  - repeat BCx given persistent pressor requirement  - c/w CVVH, f/u nephrology recs  - f/u renal recs  - hold nadolol and diuretics  - continue with rifaximin  - c/w MVI, thiamine and folate  - trend CMP, CBC, INR daily  - hepatology will continue to follow    Ross Shook  Gastroenterology Fellow  AT NIGHT AND ON WEEKENDS:  Contact on-call GI fellow via answering service ([phone]) from 5pm-8am and on weekends/holidays  MONDAY-FRIDAY 8AM-5PM:  Available via [employer] Teams  Call [phone] ([facility]) or Page [phone] ([facility]) from 8am-5pm M-F 39M w/ decompensated EtOH cirrhosis, presenting w/ acute on chronic liver failure of unclear etiology. Course c/b massive GIB from gastric varix requiring 25u pRBC, s/p glue injection and PARTO by IR. Hb stable post procedures, no e/o bleeding.    Impression:  #Acute on chronic liver failure: d/dx includes underlying infection (UA neg, BCx pending, CXR neg, dx para neg for SBP, biliary imaging negative, C diff negative), worsening EtOH hepatitis (MDF = 60 but no interval drinking since discharge and this is an acute change), vs vascular injury  #Cirrhosis due to EtOH, decompensated by ascites  - varices: no esophageal varices on EGD 7/29, actively bleeding gastric varix s/p glue injection and PARTO by IR, bleeding appears to be controlled  - ascites: present on exam, neg for SBP 7/26, on Lasix 20mg/spironolactone 25mg daily as outpatient, s/p 1.5L removed during PARTO 7/29  - HE: none on exam  - HCC: no imaging, MRI without contrast is limited  - MELD-Na = 36 on 7/31  #Hypotension – still requiring pressors, d/dx sepsis? Does not appear to be bleeding related  #High PEEP requirement – required paracentesis 7/29 for difficulty oxygenating during PARTO  #Pruritis – likely due to elevated bilirubin, improved  #Possible PV thrombosis on U/S w/ doppler  # MARQUISE: unclear etiology, c/f bilirubin pigment nephropathy vs acute tubular necrosis vs obstructive uropathy. Reny is 53, making HRS less likely. On CVVH    Recommendations:  - RUQ U/S, consider repeat LVP if large ascites is present that could be contributing to high PEEP requirement  - trend Hb, transfuse to Hb > 7 or if unstable  - can discontinue octreotide gtt  - IV PPI BID  - c/w broad spectrum abx  - repeat BCx given persistent pressor requirement to evaluate for resistant organism  - c/w CVVH, f/u nephrology recs  - f/u renal recs  - hold nadolol and diuretics  - continue with rifaximin  - c/w MVI, thiamine and folate  - trend CMP, CBC, INR daily  - hepatology will continue to follow    Ross Shook  Gastroenterology Fellow  AT NIGHT AND ON WEEKENDS:  Contact on-call GI fellow via answering service ([phone]) from 5pm-8am and on weekends/holidays  MONDAY-FRIDAY 8AM-5PM:  Available via [employer] Teams  Call [phone] ([facility]) or Page [phone] ([facility]) from 8am-5pm M-F

## 2020-07-31 NOTE — PROGRESS NOTE ADULT - SUBJECTIVE AND OBJECTIVE BOX
INTERVAL HPI/OVERNIGHT EVENTS:      SUBJECTIVE: Patient seen and examined at bedside.     Cannot obtain as patient intubated and sedated    OBJECTIVE:    VITAL SIGNS:  ICU Vital Signs Last 24 Hrs  T(C): 35.4 (2020 04:00), Max: 36.8 (2020 13:04)  T(F): 95.7 (2020 04:00), Max: 98.3 (2020 13:04)  HR: 71 (2020 07:15) (67 - 89)  BP: 98/61 (2020 17:00) (98/61 - 112/63)  BP(mean): --  ABP: 101/47 (2020 07:15) (99/49 - 139/70)  ABP(mean): 62 (2020 07:15) (62 - 87)  RR: 20 (2020 07:15) (17 - 33)  SpO2: 100% (2020 07:15) (94% - 100%)    Mode: AC/ CMV (Assist Control/ Continuous Mandatory Ventilation), RR (machine): 20, TV (machine): 450, FiO2: 50, PEEP: 10, ITime: 1, MAP: 16, PIP: 34    07-29 @ 07:01  -  07-30 @ 07:00  --------------------------------------------------------  IN: 1780.2 mL / OUT: 1733 mL / NET: 47.2 mL      CAPILLARY BLOOD GLUCOSE      POCT Blood Glucose.: 117 mg/dL (2020 16:49)      PHYSICAL EXAM:    General: NAD  HEENT: Icteric. PERRL.  Neck: supple  Respiratory: CTA b/l. Vented breath sounds.  Cardiovascular: +S1/S2; RRR  Abdomen: Abdomen moderately distended.  Extremities: Warm under rayshawn hugger. Dorsalis pedis 2+ bilaterally  Skin: Icteric  Neurological: Intubated and sedated.    MEDICATIONS:  MEDICATIONS  (STANDING):  albumin human 25% IVPB 100 milliLiter(s) IV Intermittent every 6 hours  chlorhexidine 0.12% Liquid 15 milliLiter(s) Oral Mucosa every 12 hours  chlorhexidine 4% Liquid 1 Application(s) Topical <User Schedule>  CRRT Treatment    <Continuous>  fentaNYL   Infusion... 0.5 MICROgram(s)/kG/Hr (2.07 mL/Hr) IV Continuous <Continuous>  octreotide  Infusion 50 MICROgram(s)/Hr (10 mL/Hr) IV Continuous <Continuous>  pantoprazole Infusion 8 mG/Hr (10 mL/Hr) IV Continuous <Continuous>  phenylephrine    Infusion 1 MICROgram(s)/kG/Min (31.1 mL/Hr) IV Continuous <Continuous>  piperacillin/tazobactam IVPB.. 2.25 Gram(s) IV Intermittent every 6 hours  PrismaSATE Dialysate BGK 4 / 2.5 5000 milliLiter(s) (2500 mL/Hr) CRRT <Continuous>  propofol Infusion 5 MICROgram(s)/kG/Min (2.48 mL/Hr) IV Continuous <Continuous>  thiamine IVPB 500 milliGRAM(s) IV Intermittent daily    MEDICATIONS  (PRN):  sodium chloride 0.9% lock flush 10 milliLiter(s) IV Push every 1 hour PRN Pre/post blood products, medications, blood draw, and to maintain line patency      ALLERGIES:  Allergies    No Known Allergies    Intolerances        LABS:                        10.5   10.04 )-----------( 72       ( 2020 06:25 )             29.9     07-30    140  |  101  |  34<H>  ----------------------------<  154<H>  3.5   |  23  |  5.14<H>    Ca    9.2      2020 06:25  Phos  5.1     07-30  Mg     1.7     07-30    TPro  4.6<L>  /  Alb  3.3  /  TBili  16.9<H>  /  DBili  x   /  AST  60<H>  /  ALT  21  /  AlkPhos  49  07-30    PT/INR - ( 2020 06:25 )   PT: 18.0 sec;   INR: 1.54 ratio         PTT - ( 2020 06:25 )  PTT:34.7 sec  Urinalysis Basic - ( 2020 10:44 )    Color: Light Yellow / Appearance: Turbid / S.010 / pH: x  Gluc: x / Ketone: Negative  / Bili: Small / Urobili: <2 mg/dL   Blood: x / Protein: Negative / Nitrite: Negative   Leuk Esterase: Negative / RBC: 12 /HPF / WBC 1 /HPF   Sq Epi: x / Non Sq Epi: 0 /HPF / Bacteria: Negative        RADIOLOGY & ADDITIONAL TESTS: Reviewed. INTERVAL HPI/OVERNIGHT EVENTS:    SUBJECTIVE: Patient seen and examined at bedside.     Cannot obtain as patient intubated and sedated    OBJECTIVE:    VITAL SIGNS:  ICU Vital Signs Last 24 Hrs  T(C): 35.4 (2020 04:00), Max: 36.8 (2020 13:04)  T(F): 95.7 (2020 04:00), Max: 98.3 (2020 13:04)  HR: 71 (2020 07:15) (67 - 89)  BP: 98/61 (2020 17:00) (98/61 - 112/63)  BP(mean): --  ABP: 101/47 (2020 07:15) (99/49 - 139/70)  ABP(mean): 62 (2020 07:15) (62 - 87)  RR: 20 (2020 07:15) (17 - 33)  SpO2: 100% (2020 07:15) (94% - 100%)    Mode: AC/ CMV (Assist Control/ Continuous Mandatory Ventilation), RR (machine): 20, TV (machine): 450, FiO2: 50, PEEP: 10, ITime: 1, MAP: 16, PIP: 34    07-29 @ 07:01  -  07-30 @ 07:00  --------------------------------------------------------  IN: 1780.2 mL / OUT: 1733 mL / NET: 47.2 mL      CAPILLARY BLOOD GLUCOSE      POCT Blood Glucose.: 117 mg/dL (2020 16:49)    PHYSICAL EXAM:  General: NAD  HEENT: Icteric. PERRL   Neck: supple  Respiratory: CTA b/l. Vented breath sounds.  Cardiovascular: +S1/S2; RRR  Abdomen: Abdomen moderately distended.  Extremities: Warm under rayshawn hugger. Dorsalis pedis 2+ bilaterally  Skin: Icteric  Neurological: Intubated and sedated.    MEDICATIONS:  MEDICATIONS  (STANDING):  albumin human 25% IVPB 100 milliLiter(s) IV Intermittent every 6 hours  chlorhexidine 0.12% Liquid 15 milliLiter(s) Oral Mucosa every 12 hours  chlorhexidine 4% Liquid 1 Application(s) Topical <User Schedule>  CRRT Treatment    <Continuous>  fentaNYL   Infusion... 0.5 MICROgram(s)/kG/Hr (2.07 mL/Hr) IV Continuous <Continuous>  octreotide  Infusion 50 MICROgram(s)/Hr (10 mL/Hr) IV Continuous <Continuous>  pantoprazole Infusion 8 mG/Hr (10 mL/Hr) IV Continuous <Continuous>  phenylephrine    Infusion 1 MICROgram(s)/kG/Min (31.1 mL/Hr) IV Continuous <Continuous>  piperacillin/tazobactam IVPB.. 2.25 Gram(s) IV Intermittent every 6 hours  PrismaSATE Dialysate BGK 4 / 2.5 5000 milliLiter(s) (2500 mL/Hr) CRRT <Continuous>  propofol Infusion 5 MICROgram(s)/kG/Min (2.48 mL/Hr) IV Continuous <Continuous>  thiamine IVPB 500 milliGRAM(s) IV Intermittent daily    MEDICATIONS  (PRN):  sodium chloride 0.9% lock flush 10 milliLiter(s) IV Push every 1 hour PRN Pre/post blood products, medications, blood draw, and to maintain line patency      ALLERGIES:  Allergies    No Known Allergies    Intolerances        LABS:                        10.5   10.04 )-----------( 72       ( 2020 06:25 )             29.9     07-30    140  |  101  |  34<H>  ----------------------------<  154<H>  3.5   |  23  |  5.14<H>    Ca    9.2      2020 06:25  Phos  5.1     07-30  Mg     1.7     07-30    TPro  4.6<L>  /  Alb  3.3  /  TBili  16.9<H>  /  DBili  x   /  AST  60<H>  /  ALT  21  /  AlkPhos  49  07-30    PT/INR - ( 2020 06:25 )   PT: 18.0 sec;   INR: 1.54 ratio         PTT - ( 2020 06:25 )  PTT:34.7 sec  Urinalysis Basic - ( 2020 10:44 )    Color: Light Yellow / Appearance: Turbid / S.010 / pH: x  Gluc: x / Ketone: Negative  / Bili: Small / Urobili: <2 mg/dL   Blood: x / Protein: Negative / Nitrite: Negative   Leuk Esterase: Negative / RBC: 12 /HPF / WBC 1 /HPF   Sq Epi: x / Non Sq Epi: 0 /HPF / Bacteria: Negative        RADIOLOGY & ADDITIONAL TESTS: Reviewed.

## 2020-07-31 NOTE — PROGRESS NOTE ADULT - SUBJECTIVE AND OBJECTIVE BOX
[facility] DIVISION OF KIDNEY DISEASES AND HYPERTENSION -- FOLLOW UP NOTE  --------------------------------------------------------------------------------  If any questions, please feel free to contact me  NS pager: [phone], [facility]: [phone]  Adan Monae M.D.  Nephrology Fellow    (After 5 pm or on weekends please page the on-call fellow)  --------------------------------------------------------------------------------    Chief Complaint:  Patient is a 39y old  Male who presents with a chief complaint of abdominal pain (30 Jul 2020 13:08)    24 hour events/subjective:  Patient seen and examined at bedside, intubated, sedated   on CRRT - tolerating - requiring IV pressors.   anastasia was removed by IR      PAST HISTORY  --------------------------------------------------------------------------------  No significant changes to PMH, PSH, FHx, SHx, unless otherwise noted    ALLERGIES & MEDICATIONS  --------------------------------------------------------------------------------  Allergies    No Known Allergies    Intolerances      Standing Inpatient Medications  albumin human 25% IVPB 100 milliLiter(s) IV Intermittent every 6 hours  chlorhexidine 0.12% Liquid 15 milliLiter(s) Oral Mucosa every 12 hours  chlorhexidine 4% Liquid 1 Application(s) Topical <User Schedule>  CRRT Treatment    <Continuous>  fentaNYL   Infusion... 0.5 MICROgram(s)/kG/Hr IV Continuous <Continuous>  norepinephrine Infusion 0.05 MICROgram(s)/kG/Min IV Continuous <Continuous>  octreotide  Infusion 50 MICROgram(s)/Hr IV Continuous <Continuous>  pantoprazole Infusion 8 mG/Hr IV Continuous <Continuous>  phenylephrine    Infusion 3.3 MICROgram(s)/kG/Min IV Continuous <Continuous>  piperacillin/tazobactam IVPB.. 3.375 Gram(s) IV Intermittent every 8 hours  PrismaSATE Dialysate BGK 4 / 2.5 5000 milliLiter(s) CRRT <Continuous>  propofol Infusion 5 MICROgram(s)/kG/Min IV Continuous <Continuous>  thiamine IVPB 500 milliGRAM(s) IV Intermittent daily    PRN Inpatient Medications  sodium chloride 0.9% lock flush 10 milliLiter(s) IV Push every 1 hour PRN      REVIEW OF SYSTEMS  --------------------------------------------------------------------------------  unable to obtain- patient intubated.       VITALS/PHYSICAL EXAM  --------------------------------------------------------------------------------  T(C): 36.1 (07-31-20 @ 04:00), Max: 37.9 (07-30-20 @ 20:00)  HR: 59 (07-31-20 @ 07:00) (59 - 83)  BP: 94/54 (07-30-20 @ 20:00) (94/54 - 94/54)  RR: 20 (07-31-20 @ 07:00) (20 - 32)  SpO2: 95% (07-31-20 @ 07:00) (91% - 100%)  Wt(kg): --        07-30-20 @ 07:01  -  07-31-20 @ 07:00  --------------------------------------------------------  IN: 3798.2 mL / OUT: 1739 mL / NET: 2059.2 mL        Physical Exam:  	Gen: intubated  	HEENT:  OG tube in place  	Pulm: Coarse breath sounds   	CV: S1S2, RRR  	Abd: Soft, distended    	Ext: No LE edema B/L  	Neuro: sedated  	Skin: Warm and dry  	Vascular access: right IJ      LABS/STUDIES  --------------------------------------------------------------------------------              10.0   20.63 >-----------<  57       [07-31-20 @ 06:28]              29.4     139  |  101  |  18  ----------------------------<  85      [07-31-20 @ 06:28]  3.7   |  22  |  3.32        Ca     8.3     [07-31-20 @ 06:28]      iCa    0.84     [07-29 @ 07:23]      Mg     2.2     [07-31-20 @ 06:28]      Phos  2.3     [07-31-20 @ 06:28]    TPro  5.2  /  Alb  3.9  /  TBili  16.3  /  DBili  x   /  AST  227  /  ALT  31  /  AlkPhos  56  [07-31-20 @ 06:28]    PT/INR: PT 19.2 , INR 1.65       [07-31-20 @ 06:28]  PTT: 37.9       [07-31-20 @ 06:28]      Creatinine Trend:  SCr 3.32 [07-31 @ 06:28]  SCr 3.70 [07-31 @ 03:18]  SCr 3.95 [07-30 @ 22:38]  SCr 3.98 [07-30 @ 18:39]  SCr 4.38 [07-30 @ 15:23]    Urinalysis - [07-29-20 @ 10:44]      Color Light Yellow / Appearance Turbid / SG 1.010 / pH 6.0      Gluc Negative / Ketone Negative  / Bili Small / Urobili <2 mg/dL       Blood Large / Protein Negative / Leuk Est Negative / Nitrite Negative      RBC 12 / WBC 1 / Hyaline 0 / Gran  / Sq Epi  / Non Sq Epi 0 / Bacteria Negative    Urine Creatinine 196      [07-27-20 @ 18:42]  Urine Protein 101      [07-27-20 @ 18:42]  Urine Sodium 53      [07-27-20 @ 18:42]  Urine Potassium 51      [07-27-20 @ 18:42]    Iron 34, TIBC 124, %sat 27      [07-14-20 @ 10:34]  Ferritin 812      [07-12-20 @ 08:58]  PTH -- (Ca 6.2)      [07-29-20 @ 09:18]   223    HBsAg Nonreact      [07-12-20 @ 01:16]  HCV 0.14, Nonreact      [07-12-20 @ 01:16]  HIV Nonreact      [07-15-20 @ 10:47]    SHAUN: titer Negative, pattern --      [07-12-20 @ 09:37]  Free Light Chains: kappa 2.35, lambda 2.06, ratio = 1.14      [07-12 @ 08:58]

## 2020-07-31 NOTE — PROGRESS NOTE ADULT - ASSESSMENT
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      Neurology:  -Alert and oriented at baseline  -Hx of seizures in setting of etoh withdrawal  -Sedated  -Neuro checks q4H  -Cannot give PO rifaxamin given GI bleed    Pulm:  Fluid overload in the setting of massive transfusion on 7/30  -ABG as needed and for vent changes  -Wean FiO2    CV:  Hypovolemic shock 2/2 gastric variceal bleed on 7/29 in the setting of CRRT for fluid overload and subsequent hypoxia  -VS q1H  -Maintain MAP approximate 60--Levophed as needed  -[place] for continuous BP monitoring  -Tele monitoring    GI:  #Acute on chronic liver failure: d/dx includes underlying infection (UA neg, BCx pending, CXR neg, dx para neg for SBP, biliary imaging negative, C diff negative), worsening EtOH hepatitis (MDF = 60 but no interval drinking since discharge and this is an acute change), vs vascular injury  #Decompensated alcoholic cirrhosis, gastric variceal bleed and ascites  - varices: no prior EGD, on nadolol at home, now holding due to hematemesis  - ascites: present on exam, neg for SBP 7/26, on Lasix 20mg/spironolactone 25mg daily as outpatient--HOLD  - HE: Hold Rifaximin in setting of hematemesis  - HCC: no imaging, MRI without contrast is limited  - MELD-Na = 38 on 7/26  #Pruritis – likely due to elevated bilirubin, improved  #Possible PV thrombosis on U/S w/ doppler    #Hematemesis secondary to gastric variceal bleed  -Refractory to endoscopic intervention 7/29 s/p IR PARTO 7/30, blakemore 7/30, and 34 U PRBC, 25 U FFP, 20 U PLT, and 5 U cryo all on 7/30   -Trend CBC q4---tranfuse to maintain Hgb > 7, or for active bleed  -Trend Coags d2m--zeppfyf with FFP, cryo as needed  -Maintain NPO    #Transaminitis 2/2 alcoholic hepatitis  -Trend liver enzymes, bilirubin  -Follow up GI, hepatology further recommendations  -F/u hepatology    #Ascites, negative for SBP on admission  -Serial abdominal exams  -Paracentesis as needed  -Follow up Hepatology    Renal:  #MARQUISE: in the setting of liver cirrhosis with severe ascites. MARQUISE 2/2 prerenal (severe diarrhea + diuretics +decrease effective arterial blood volume from Cirrhosis) to r/o hepatorenal syndrome. 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 On Admission 9.64 -- s/p diagnostic paracentesis. -- sCr worsening - requiring HD    First HD 7/28, Second HD 7/29---CVVHD to be started upon arrival to MICU  Kidney Biopsy pending--possible need for plasmapheresis if confirmed bilirubin cast nephropathy  c/w IV albumin infusion   hold diuretics at this time     Monitor electrolytes and urine output.   Avoid NSAIDs, ACEI/ARBS, RCA and nephrotoxins.   Dose medications as per eGFR.     #Hypocalcemia, monitor in setting of massive transfusion  Ionized Calcium daily   keep Ionized calcium around 1.1-1.2  Replete prn      ID:  #All cultures negative to date--      7/26: Blood Culture x 2 NGTD; Urine Culture: NGTD               Para negative for SBP  --Continue empiric Zosyn   --Trend lactate; WBC count    Heme:   --CBC q4H  --TEG to eval for clotting ability  --Maintain Hgb > 7. 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 The patient is no longer actively bleeding, but is increasing presser requirements possible secondary to sepsis. WBC is improving, but Neutrophil count is increasing suggesting continued inflammatory response    Neurology:  -Alert and oriented at baseline  -Hx of seizures in setting of etoh withdrawal  -Sedated  -Neuro checks q4H  -Cannot give PO rifaxamin given GI bleed    Pulm:  Fluid overload in the setting of massive transfusion on 7/30  - Last ABG PH 7.43, CO2 38, O2 78, HCO3 25   - Will wean FiO2    CV:  Hypovolemic shock 2/2 gastric variceal bleed on 7/29 in the setting of CRRT for fluid overload and subsequent hypoxia  -VS q1H  -Maintain MAP approximate 60--Levophed   -[place] for continuous BP monitoring  -Tele monitoring     GI:  #Acute on chronic liver failure: d/dx includes underlying infection (UA neg, BCx pending, CXR neg, dx para neg for SBP, biliary imaging negative, C diff negative), worsening EtOH hepatitis (MDF = 60 but no interval drinking since discharge and this is an acute change), vs vascular injury  #Decompensated alcoholic cirrhosis, gastric variceal bleed and ascites  - varices: no prior EGD, on nadolol at home, now holding due to hematemesis  - ascites: present on exam, neg for SBP 7/26, on Lasix 20mg/spironolactone 25mg daily as outpatient--HOLD  - HE: Hold Rifaximin in setting of hematemesis  - HCC: no imaging, MRI without contrast is limited  - MELD-Na = 38 on 7/26  - Continue with 25% albumin   - Continue with Octreotide per GI      #Hematemesis secondary to gastric variceal bleed  -Refractory to endoscopic intervention 7/29 s/p IR PARTO 7/30, blakemore 7/30, and 34 U PRBC, 25 U FFP, 20 U PLT, and 5 U cryo all on 7/30   -Trend CBC q4---tranfuse to maintain Hgb > 7, or for active bleed  -Trend Coags p1m--nbahszm with FFP, cryo as needed  - Maintain NPO  - Blakemore removed 7/30     #Transaminitis 2/2 alcoholic hepatitis  -Trend liver enzymes, bilirubin  -Follow up with hepatology and GI recommendations  -Continue with 25% albumin   -Continue with Octreotide       #Ascites, negative for SBP on admission  -Serial abdominal exams  -Paracentesis as needed  -Continue MVI, thiamine, folate   -Follow up Hepatology    Renal:  #MARQUISE: in the setting of liver cirrhosis with severe ascites. MARQUISE 2/2 prerenal (severe diarrhea + diuretics +decrease effective arterial blood volume from Cirrhosis) to r/o hepatorenal syndrome. 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 On recent admission baseline sCr 0.6-0.8. On Admission 9.64 -- s/p diagnostic paracentesis. -- sCr worsening - requiring HD  - First HD 7/28, Second HD 7/29---CVVHD to be started upon arrival to MICU  - Kidney Biopsy pending--possible need for plasmapheresis if confirmed bilirubin cast nephropathy  - c/w IV albumin infusion   - hold diuretics at this time   - Monitor electrolytes and urine output.   - Avoid NSAIDs, ACEI/ARBS, RCA and nephrotoxins.   - Dose medications as per eGFR.     #Hypocalcemia, monitor in setting of massive transfusion  - Ionized Calcium daily   - keep Ionized calcium around 1.1-1.2  - Replete prn    ID:  #All cultures negative to date--      7/26: Blood Culture x 2 NGTD; Urine Culture: NGTD               Para negative for SBP  - Will re culture   -- D/c Zosyn, started meropenem  -- Caspofungin started   --Trend lactate; WBC count    Heme:   --CBC q4H  --TEG to eval for clotting ability  --Maintain Hgb > 7. Transfuse as needed  --Trend Coags--reverse coagulopathy as needed or in event of bleed  TOTAL TRANSFUSION REQUIREMENTS: 34 PRBC, 25  FFP, 5 PLT, and 2 Cryo     Endo:  --Glucose q6h  --Monitor for hypoglycemia in setting of liver failure, NPO

## 2020-07-31 NOTE — PROGRESS NOTE ADULT - SUBJECTIVE AND OBJECTIVE BOX
Chief Complaint:  Patient is a 39y old  Male who presents with a chief complaint of abdominal pain (2020 09:16)    Reason for consult: aclf, gastric varices bleed    Interval Events: Hb stable, no overt GI bleeding, remains on pressors    Hospital Medications:  albumin human 25% IVPB 100 milliLiter(s) IV Intermittent every 6 hours  chlorhexidine 0.12% Liquid 15 milliLiter(s) Oral Mucosa every 12 hours  chlorhexidine 4% Liquid 1 Application(s) Topical <User Schedule>  CRRT Treatment    <Continuous>  fentaNYL   Infusion... 0.5 MICROgram(s)/kG/Hr IV Continuous <Continuous>  norepinephrine Infusion 0.05 MICROgram(s)/kG/Min IV Continuous <Continuous>  octreotide  Infusion 50 MICROgram(s)/Hr IV Continuous <Continuous>  pantoprazole Infusion 8 mG/Hr IV Continuous <Continuous>  phenylephrine    Infusion 3.3 MICROgram(s)/kG/Min IV Continuous <Continuous>  piperacillin/tazobactam IVPB.. 3.375 Gram(s) IV Intermittent every 8 hours  PrismaSATE Dialysate BGK 4 / 2.5 5000 milliLiter(s) CRRT <Continuous>  propofol Infusion 5 MICROgram(s)/kG/Min IV Continuous <Continuous>  sodium chloride 0.9% lock flush 10 milliLiter(s) IV Push every 1 hour PRN  sodium phosphate IVPB 15 milliMole(s) IV Intermittent once  thiamine IVPB 500 milliGRAM(s) IV Intermittent daily      ROS: Pt unable to provide    PHYSICAL EXAM:   Vital Signs:  Vital Signs Last 24 Hrs  T(C): 34.5 (2020 09:00), Max: 43 (2020 09:00)  T(F): 109.4 (2020 09:00), Max: 109.4 (2020 09:00)  HR: 58 (2020 09:00) (53 - 83)  BP: 94/54 (2020 20:00) (94/54 - 94/54)  BP(mean): 71 (2020 20:00) (71 - 71)  RR: 20 (2020 09:00) (20 - 21)  SpO2: 97% (2020 09:00) (91% - 100%)  Daily     Daily Weight in k.6 (2020 13:10)    GENERAL: no acute distress, intubated  NEURO: sedated  HEENT: icteric sclera, no conjunctival pallor appreciated  CHEST: no respiratory distress, no accessory muscle use  CARDIAC: regular rate, rhythm  ABDOMEN: soft, non-tender, non-distended, no rebound or guarding  EXTREMITIES: warm, well perfused, no edema  SKIN: ++ jaundice    LABS: reviewed                        10.0   20.63 )-----------( 57       ( 2020 06:28 )             29.4     07-31    139  |  101  |  18  ----------------------------<  85  3.7   |  22  |  3.32<H>    Ca    8.3<L>      2020 06:28  Phos  2.3     07-31  Mg     2.2     07-31    TPro  5.2<L>  /  Alb  3.9  /  TBili  16.3<H>  /  DBili  x   /  AST  227<H>  /  ALT  31  /  AlkPhos  56  07-31    LIVER FUNCTIONS - ( 2020 06:28 )  Alb: 3.9 g/dL / Pro: 5.2 g/dL / ALK PHOS: 56 U/L / ALT: 31 U/L / AST: 227 U/L / GGT: x             Interval Diagnostic Studies: see sunrise for full report

## 2020-07-31 NOTE — PROGRESS NOTE ADULT - PROBLEM SELECTOR PLAN 2
Pt. with hyponatremia in setting of liver cirrhosis and severe ascites. Serum sodium 133 on admission (7/26/) previous episodes of hyponatremia. Today at 140  Monitor serum sodium Q6 hours.   Do not correct serum sodium >6-8 meq/day.

## 2020-08-01 NOTE — PROGRESS NOTE ADULT - SUBJECTIVE AND OBJECTIVE BOX
INTERVAL HPI/OVERNIGHT EVENTS:    SUBJECTIVE: Patient seen and examined at bedside.     Cannot obtain as patient intubated and sedated    OBJECTIVE:    VITAL SIGNS:  ICU Vital Signs Last 24 Hrs  T(C): 35.4 (2020 04:00), Max: 36.8 (2020 13:04)  T(F): 95.7 (2020 04:00), Max: 98.3 (2020 13:04)  HR: 71 (2020 07:15) (67 - 89)  BP: 98/61 (2020 17:00) (98/61 - 112/63)  BP(mean): --  ABP: 101/47 (2020 07:15) (99/49 - 139/70)  ABP(mean): 62 (2020 07:15) (62 - 87)  RR: 20 (2020 07:15) (17 - 33)  SpO2: 100% (2020 07:15) (94% - 100%)    Mode: AC/ CMV (Assist Control/ Continuous Mandatory Ventilation), RR (machine): 20, TV (machine): 450, FiO2: 50, PEEP: 10, ITime: 1, MAP: 16, PIP: 34    07-29 @ 07:01  -  07-30 @ 07:00  --------------------------------------------------------  IN: 1780.2 mL / OUT: 1733 mL / NET: 47.2 mL      CAPILLARY BLOOD GLUCOSE      POCT Blood Glucose.: 117 mg/dL (2020 16:49)    PHYSICAL EXAM:  General: NAD  HEENT: Icteric. PERRL   Neck: supple  Respiratory: CTA b/l. Vented breath sounds.  Cardiovascular: +S1/S2; RRR  Abdomen: Abdomen moderately distended.  Extremities: Warm under rayshawn hugger. Dorsalis pedis 2+ bilaterally  Skin: Icteric  Neurological: Intubated and sedated.    MEDICATIONS:  MEDICATIONS  (STANDING):  albumin human 25% IVPB 100 milliLiter(s) IV Intermittent every 6 hours  chlorhexidine 0.12% Liquid 15 milliLiter(s) Oral Mucosa every 12 hours  chlorhexidine 4% Liquid 1 Application(s) Topical <User Schedule>  CRRT Treatment    <Continuous>  fentaNYL   Infusion... 0.5 MICROgram(s)/kG/Hr (2.07 mL/Hr) IV Continuous <Continuous>  octreotide  Infusion 50 MICROgram(s)/Hr (10 mL/Hr) IV Continuous <Continuous>  pantoprazole Infusion 8 mG/Hr (10 mL/Hr) IV Continuous <Continuous>  phenylephrine    Infusion 1 MICROgram(s)/kG/Min (31.1 mL/Hr) IV Continuous <Continuous>  piperacillin/tazobactam IVPB.. 2.25 Gram(s) IV Intermittent every 6 hours  PrismaSATE Dialysate BGK 4 / 2.5 5000 milliLiter(s) (2500 mL/Hr) CRRT <Continuous>  propofol Infusion 5 MICROgram(s)/kG/Min (2.48 mL/Hr) IV Continuous <Continuous>  thiamine IVPB 500 milliGRAM(s) IV Intermittent daily    MEDICATIONS  (PRN):  sodium chloride 0.9% lock flush 10 milliLiter(s) IV Push every 1 hour PRN Pre/post blood products, medications, blood draw, and to maintain line patency      ALLERGIES:  Allergies    No Known Allergies    Intolerances        LABS:                        10.5   10.04 )-----------( 72       ( 2020 06:25 )             29.9     07-30    140  |  101  |  34<H>  ----------------------------<  154<H>  3.5   |  23  |  5.14<H>    Ca    9.2      2020 06:25  Phos  5.1     07-30  Mg     1.7     07-30    TPro  4.6<L>  /  Alb  3.3  /  TBili  16.9<H>  /  DBili  x   /  AST  60<H>  /  ALT  21  /  AlkPhos  49  07-30    PT/INR - ( 2020 06:25 )   PT: 18.0 sec;   INR: 1.54 ratio         PTT - ( 2020 06:25 )  PTT:34.7 sec  Urinalysis Basic - ( 2020 10:44 )    Color: Light Yellow / Appearance: Turbid / S.010 / pH: x  Gluc: x / Ketone: Negative  / Bili: Small / Urobili: <2 mg/dL   Blood: x / Protein: Negative / Nitrite: Negative   Leuk Esterase: Negative / RBC: 12 /HPF / WBC 1 /HPF   Sq Epi: x / Non Sq Epi: 0 /HPF / Bacteria: Negative        RADIOLOGY & ADDITIONAL TESTS: Reviewed. INTERVAL HPI/OVERNIGHT EVENTS: Overnight the patient did not have acute issues.     SUBJECTIVE: Patient seen and examined at bedside.     Cannot obtain as patient intubated and sedated    OBJECTIVE:    VITAL SIGNS:  ICU Vital Signs Last 24 Hrs  T(C): 35.4 (2020 04:00), Max: 36.8 (2020 13:04)  T(F): 95.7 (2020 04:00), Max: 98.3 (2020 13:04)  HR: 71 (2020 07:15) (67 - 89)  BP: 98/61 (2020 17:00) (98/61 - 112/63)  BP(mean): --  ABP: 101/47 (2020 07:15) (99/49 - 139/70)  ABP(mean): 62 (2020 07:15) (62 - 87)  RR: 20 (2020 07:15) (17 - 33)  SpO2: 100% (2020 07:15) (94% - 100%)    Mode: AC/ CMV (Assist Control/ Continuous Mandatory Ventilation), RR (machine): 20, TV (machine): 450, FiO2: 50, PEEP: 10, ITime: 1, MAP: 16, PIP: 34    07-29 @ 07:01  -  07-30 @ 07:00  --------------------------------------------------------  IN: 1780.2 mL / OUT: 1733 mL / NET: 47.2 mL      CAPILLARY BLOOD GLUCOSE      POCT Blood Glucose.: 117 mg/dL (2020 16:49)    PHYSICAL EXAM:  General: NAD  HEENT: Icteric. PERRL   Neck: supple  Respiratory: CTA b/l. Vented breath sounds.  Cardiovascular: +S1/S2; RRR   Abdomen: Abdomen moderately distended.  Extremities: Warm under rayshawn hugger. Dorsalis pedis 2+ bilaterally  Skin: Icteric  Neurological: Intubated and less sedated tracking movement and able to nod his head.    MEDICATIONS:  MEDICATIONS  (STANDING):  albumin human 25% IVPB 100 milliLiter(s) IV Intermittent every 6 hours  chlorhexidine 0.12% Liquid 15 milliLiter(s) Oral Mucosa every 12 hours  chlorhexidine 4% Liquid 1 Application(s) Topical <User Schedule>  CRRT Treatment    <Continuous>  fentaNYL   Infusion... 0.5 MICROgram(s)/kG/Hr (2.07 mL/Hr) IV Continuous <Continuous>  octreotide  Infusion 50 MICROgram(s)/Hr (10 mL/Hr) IV Continuous <Continuous>  pantoprazole Infusion 8 mG/Hr (10 mL/Hr) IV Continuous <Continuous>  phenylephrine    Infusion 1 MICROgram(s)/kG/Min (31.1 mL/Hr) IV Continuous <Continuous>  piperacillin/tazobactam IVPB.. 2.25 Gram(s) IV Intermittent every 6 hours  PrismaSATE Dialysate BGK 4 / 2.5 5000 milliLiter(s) (2500 mL/Hr) CRRT <Continuous>  propofol Infusion 5 MICROgram(s)/kG/Min (2.48 mL/Hr) IV Continuous <Continuous>  thiamine IVPB 500 milliGRAM(s) IV Intermittent daily    MEDICATIONS  (PRN):  sodium chloride 0.9% lock flush 10 milliLiter(s) IV Push every 1 hour PRN Pre/post blood products, medications, blood draw, and to maintain line patency      ALLERGIES:  Allergies    No Known Allergies    Intolerances        LABS:                        10.5   10.04 )-----------( 72       ( 2020 06:25 )             29.9     07-30    140  |  101  |  34<H>  ----------------------------<  154<H>  3.5   |  23  |  5.14<H>    Ca    9.2      2020 06:25  Phos  5.1     07-30  Mg     1.7     07-30    TPro  4.6<L>  /  Alb  3.3  /  TBili  16.9<H>  /  DBili  x   /  AST  60<H>  /  ALT  21  /  AlkPhos  49  07-30    PT/INR - ( 2020 06:25 )   PT: 18.0 sec;   INR: 1.54 ratio         PTT - ( 2020 06:25 )  PTT:34.7 sec  Urinalysis Basic - ( 2020 10:44 )    Color: Light Yellow / Appearance: Turbid / S.010 / pH: x  Gluc: x / Ketone: Negative  / Bili: Small / Urobili: <2 mg/dL   Blood: x / Protein: Negative / Nitrite: Negative   Leuk Esterase: Negative / RBC: 12 /HPF / WBC 1 /HPF   Sq Epi: x / Non Sq Epi: 0 /HPF / Bacteria: Negative        RADIOLOGY & ADDITIONAL TESTS: Reviewed.

## 2020-08-01 NOTE — PROGRESS NOTE ADULT - ASSESSMENT
39M w/ decompensated EtOH cirrhosis, presenting w/ acute on chronic liver failure of unclear etiology. Course c/b massive GIB from gastric varix requiring 25u pRBC, s/p glue injection and PARTO by IR. Hb stable post procedures, no e/o bleeding.    Impression:  #Acute on chronic liver failure: d/dx includes underlying infection (UA neg, BCx pending, CXR neg, dx para neg for SBP, biliary imaging negative, C diff negative), worsening EtOH hepatitis (MDF = 60 but no interval drinking since discharge and this is an acute change), vs vascular injury  #Cirrhosis due to EtOH, decompensated by ascites  - varices: no esophageal varices on EGD 7/29, actively bleeding gastric varix s/p glue injection and PARTO by IR, bleeding appears to be controlled  - ascites: present on exam, neg for SBP 7/26, on Lasix 20mg/spironolactone 25mg daily as outpatient, s/p 1.5L removed during PARTO 7/29  - HE: none on exam  - HCC: no imaging, MRI without contrast is limited  - MELD-Na = 36 on 7/31  #Hypotension – still requiring pressors, d/dx sepsis? Does not appear to be bleeding related  #High PEEP requirement – required paracentesis 7/29 for difficulty oxygenating during PARTO  #Pruritis – likely due to elevated bilirubin, improved  #Possible PV thrombosis on U/S w/ doppler  # MARQUISE: unclear etiology, c/f bilirubin pigment nephropathy vs acute tubular necrosis vs obstructive uropathy. Reny is 53, making HRS less likely. On CVVH    Recommendations:  - RUQ U/S, consider repeat LVP if large ascites is present that could be contributing to high PEEP requirement  - trend Hb, transfuse to Hb > 7 or if unstable  - can discontinue octreotide gtt  - IV PPI BID  - c/w broad spectrum abx  - follow up blood cultures  - c/w CVVH, f/u nephrology recs  - f/u renal recs  - hold nadolol and diuretics  - continue with rifaximin  - c/w MVI, thiamine and folate  - trend CMP, CBC, INR daily  - hepatology will continue to follow

## 2020-08-01 NOTE — PROVIDER CONTACT NOTE (CHANGE IN STATUS NOTIFICATION) - BACKGROUND
38 y/o male, Hepatorenal, etoh liver cirrhosis, gi bleed. PT recent admit to [facility] from IR where gastric varices were embolized. approximately 61 blood products given in IR. admitted to [facility] with blakemore tube which was d/genaro yesterday. PT sedated on propofol 35 and fent 3. On cvv, octreotide, protonix, gtts. Norepi gtt at 0.45

## 2020-08-01 NOTE — PROGRESS NOTE ADULT - PROBLEM SELECTOR PLAN 2
Pt. with hyponatremia in setting of liver cirrhosis and severe ascites. Serum sodium 133 on admission (7/26), improving today 138    Monitor BMP daily  avoid hypotonic saline/D5w, glycemic control

## 2020-08-01 NOTE — PROGRESS NOTE ADULT - ASSESSMENT
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 The patient is no longer actively bleeding, but is increasing presser requirements possible secondary to sepsis. WBC is improving, but Neutrophil count is increasing suggesting continued inflammatory response    Neurology:  -Alert and oriented at baseline  -Hx of seizures in setting of etoh withdrawal  -Sedated  -Neuro checks q4H  -Cannot give PO rifaxamin given GI bleed    Pulm:  Fluid overload in the setting of massive transfusion on 7/30  - Last ABG PH 7.43, CO2 38, O2 78, HCO3 25   - Will wean FiO2    CV:  Hypovolemic shock 2/2 gastric variceal bleed on 7/29 in the setting of CRRT for fluid overload and subsequent hypoxia  -VS q1H  -Maintain MAP approximate 60--Levophed   -[place] for continuous BP monitoring  -Tele monitoring     GI:  #Acute on chronic liver failure: d/dx includes underlying infection (UA neg, BCx pending, CXR neg, dx para neg for SBP, biliary imaging negative, C diff negative), worsening EtOH hepatitis (MDF = 60 but no interval drinking since discharge and this is an acute change), vs vascular injury  #Decompensated alcoholic cirrhosis, gastric variceal bleed and ascites  - varices: no prior EGD, on nadolol at home, now holding due to hematemesis  - ascites: present on exam, neg for SBP 7/26, on Lasix 20mg/spironolactone 25mg daily as outpatient--HOLD  - HE: Hold Rifaximin in setting of hematemesis  - HCC: no imaging, MRI without contrast is limited  - MELD-Na = 38 on 7/26  - Continue with 25% albumin   - Continue with Octreotide per GI      #Hematemesis secondary to gastric variceal bleed  -Refractory to endoscopic intervention 7/29 s/p IR PARTO 7/30, blakemore 7/30, and 34 U PRBC, 25 U FFP, 20 U PLT, and 5 U cryo all on 7/30   -Trend CBC q4---tranfuse to maintain Hgb > 7, or for active bleed  -Trend Coags v3i--ruiihec with FFP, cryo as needed  - Maintain NPO  - Blakemore removed 7/30     #Transaminitis 2/2 alcoholic hepatitis  -Trend liver enzymes, bilirubin  -Follow up with hepatology and GI recommendations  -Continue with 25% albumin   -Continue with Octreotide       #Ascites, negative for SBP on admission  -Serial abdominal exams  -Paracentesis as needed  -Continue MVI, thiamine, folate   -Follow up Hepatology    Renal:  #MARQUISE: in the setting of liver cirrhosis with severe ascites. MARQUISE 2/2 prerenal (severe diarrhea + diuretics +decrease effective arterial blood volume from Cirrhosis) to r/o hepatorenal syndrome. 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 On recent admission baseline sCr 0.6-0.8. On Admission 9.64 -- s/p diagnostic paracentesis. -- sCr worsening - requiring HD  - First HD 7/28, Second HD 7/29---CVVHD to be started upon arrival to MICU  - Kidney Biopsy pending--possible need for plasmapheresis if confirmed bilirubin cast nephropathy  - c/w IV albumin infusion   - hold diuretics at this time   - Monitor electrolytes and urine output.   - Avoid NSAIDs, ACEI/ARBS, RCA and nephrotoxins.   - Dose medications as per eGFR.     #Hypocalcemia, monitor in setting of massive transfusion  - Ionized Calcium daily   - keep Ionized calcium around 1.1-1.2  - Replete prn    ID:  #All cultures negative to date--      7/26: Blood Culture x 2 NGTD; Urine Culture: NGTD               Para negative for SBP  - Will re culture   -- D/c Zosyn, started meropenem  -- Caspofungin started   --Trend lactate; WBC count    Heme:   --CBC q4H  --TEG to eval for clotting ability  --Maintain Hgb > 7. 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 The patient is no longer actively bleeding, but is increasing presser requirements possible secondary to sepsis. WBC is improving, but Neutrophil count is increasing suggesting continued inflammatory response    Neurology:  -Alert and oriented at baseline  -Hx of seizures in setting of etoh withdrawal  -Mildly sedated   -Neuro checks q4H  -Cannot give PO rifaxamin given GI bleed    Pulm:  Fluid overload in the setting of massive transfusion on 7/30  - Peep decreased to 8   - Will wean FiO2    CV:  Hypovolemic shock 2/2 gastric variceal bleed on 7/29 in the setting of CRRT for fluid overload and subsequent hypoxia  -VS q1H  -Maintain MAP approximate 60--Levophed   -[place] for continuous BP monitoring  -Tele monitoring     GI:  #Acute on chronic liver failure: d/dx includes underlying infection (UA neg, BCx pending, CXR neg, dx para neg for SBP, biliary imaging negative, C diff negative), worsening EtOH hepatitis (MDF = 60 but no interval drinking since discharge and this is an acute change), vs vascular injury  #Decompensated alcoholic cirrhosis, gastric variceal bleed and ascites  - varices: no prior EGD, on nadolol at home, now holding due to shock   - ascites: present on exam, neg for SBP 7/26, on Lasix 20mg/spironolactone 25mg daily as outpatient--HOLD for hypotension   - HE: Hold Rifaximin in setting of hematemesis  - HCC: no imaging, MRI without contrast is limited  - MELD-Na = 38 on 7/26  - Continue with 25% albumin   - D/c octreotide per GI   - F/U abdominal US per GI recommendations     #Hematemesis secondary to gastric variceal bleed  -Refractory to endoscopic intervention 7/29 s/p IR PARTO 7/30, blakemore 7/30, and 34 U PRBC, 25 U FFP, 20 U PLT, and 5 U cryo all on 7/30   -Trend CBC q4---tranfuse to maintain Hgb > 7, or for active bleed  -Trend Coags w6a--rhcwwlo with FFP, cryo as needed  - Maintain NPO  - Blakemore removed 7/30     #Transaminitis 2/2 alcoholic hepatitis  -Trend liver enzymes, bilirubin  -Follow up with hepatology and GI recommendations  -Continue with 25% albumin   -Continue protonix infusion       #Ascites, negative for SBP on admission  -Serial abdominal exams  -Paracentesis as needed  -Continue MVI, thiamine, folate   -Follow up Hepatology    Renal:  #MARQUISE: in the setting of liver cirrhosis with severe ascites. MARQUISE 2/2 prerenal (severe diarrhea + diuretics +decrease effective arterial blood volume from Cirrhosis) to r/o hepatorenal syndrome. 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 Baseline creatinine sCr 0.6-0.8. On Admission 9.64 -- s/p diagnostic paracentesis. -- sCr worsening - requiring HD  - First HD 7/28, Second HD 7/29---CVVHD to be started upon arrival to MICU  - c/w IV albumin infusion   - hold diuretics at this time   - Monitor electrolytes and urine output.   - Avoid NSAIDs, ACEI/ARBS, RCA and nephrotoxins.   - Dose medications as per eGFR.     #Hypocalcemia, monitor in setting of massive transfusion  - Ionized Calcium daily   - keep Ionized calcium around 1.1-1.2  - Replete prn    ID:  #All cultures negative to date--      7/26: Blood Culture x 2 NGTD; Urine Culture: NGTD               Para negative for SBP      7/30 culture       8/1: fungitell sent   	  -- D/c Zosyn, started meropenem  -- Caspofungin started 70 mg loading dose   --Trend lactate; WBC count    Heme:   --CBC q4H  --TEG to eval for clotting ability  --Maintain Hgb > 7. Transfuse as needed  --Trend Coags--reverse coagulopathy as needed or in event of bleed  TOTAL TRANSFUSION REQUIREMENTS: 34 PRBC, 25  FFP, 5 PLT, and 2 Cryo     Endo:  --Glucose q6h  --Monitor for hypoglycemia in setting of liver failure, NPO

## 2020-08-01 NOTE — PROGRESS NOTE ADULT - PROBLEM SELECTOR PLAN 3
ionized calcium is 1.08    Recommend IV repletion (goal >1.1) improve response vasopressor  continue to monitor electrolytes

## 2020-08-01 NOTE — PROGRESS NOTE ADULT - SUBJECTIVE AND OBJECTIVE BOX
Chief Complaint:  Patient is a 39y old  Male who presents with a chief complaint of abdominal pain (31 Jul 2020 09:16)      Interval Events: Hgb has remained stable. No blood transfusion. Liver enzymes remained largely unchanged.   ROS: All 12 point system except listed above were otherwise negative.    Allergies:  No Known Allergies        Hospital Medications:  albumin human 25% IVPB 100 milliLiter(s) IV Intermittent every 6 hours  chlorhexidine 0.12% Liquid 15 milliLiter(s) Oral Mucosa every 12 hours  chlorhexidine 4% Liquid 1 Application(s) Topical <User Schedule>  CRRT Treatment    <Continuous>  dextrose 10%. 1000 milliLiter(s) IV Continuous <Continuous>  fentaNYL   Infusion... 0.5 MICROgram(s)/kG/Hr IV Continuous <Continuous>  meropenem  IVPB 1000 milliGRAM(s) IV Intermittent every 12 hours  norepinephrine Infusion 0.05 MICROgram(s)/kG/Min IV Continuous <Continuous>  nystatin Powder 1 Application(s) Topical two times a day  octreotide  Infusion 50 MICROgram(s)/Hr IV Continuous <Continuous>  pantoprazole Infusion 8 mG/Hr IV Continuous <Continuous>  Phoxillum Filtration BK 4 / 2.5 5000 milliLiter(s) CRRT <Continuous>  Phoxillum Filtration BK 4 / 2.5 5000 milliLiter(s) CRRT <Continuous>  Phoxillum Filtration BK 4 / 2.5 5000 milliLiter(s) CRRT <Continuous>  propofol Infusion 5 MICROgram(s)/kG/Min IV Continuous <Continuous>  sodium chloride 0.9% lock flush 10 milliLiter(s) IV Push every 1 hour PRN  thiamine IVPB 500 milliGRAM(s) IV Intermittent daily      PMHX/PSHX:  No pertinent past medical history  S/P exploratory laparotomy  No significant past surgical history      Family history:  FH: alpha 1 antitrypsin deficiency  No pertinent family history in first degree relatives    There is no family history of peptic ulcer disease, gastric cancer, colon polyps, colon cancer, celiac disease, biliary, hepatic, or pancreatic disease.  None of the female relatives have breast, uterine, or ovarian cancer.     PHYSICAL EXAM:   Vital Signs:  Vital Signs Last 24 Hrs  T(C): 36.1 (01 Aug 2020 06:00), Max: 43 (31 Jul 2020 09:00)  T(F): 97 (01 Aug 2020 06:00), Max: 109.4 (31 Jul 2020 09:00)  HR: 75 (01 Aug 2020 07:00) (52 - 91)  BP: --  BP(mean): --  RR: 20 (01 Aug 2020 07:00) (20 - 21)  SpO2: 98% (01 Aug 2020 07:00) (94% - 100%)  Daily     Daily     GENERAL: no acute distress, intubated  NEURO: sedated  HEENT: icteric sclera, no conjunctival pallor appreciated  CHEST: no respiratory distress, no accessory muscle use  CARDIAC: regular rate, rhythm  ABDOMEN: soft, non-tender, non-distended, no rebound or guarding  EXTREMITIES: warm, well perfused, no edema  SKIN: ++ jaundice    LABS:                        9.2    22.09 )-----------( 43       ( 01 Aug 2020 04:43 )             27.5     Mean Cell Volume: 89.3 fl (08-01-20 @ 04:43)    08-01    138  |  101  |  12  ----------------------------<  93  3.9   |  19<L>  |  2.79<H>    Ca    8.1<L>      01 Aug 2020 04:43  Phos  3.6     08-01  Mg     2.3     08-01    TPro  5.5<L>  /  Alb  4.2  /  TBili  16.7<H>  /  DBili  x   /  AST  220<H>  /  ALT  35  /  AlkPhos  68  08-01    LIVER FUNCTIONS - ( 01 Aug 2020 04:43 )  Alb: 4.2 g/dL / Pro: 5.5 g/dL / ALK PHOS: 68 U/L / ALT: 35 U/L / AST: 220 U/L / GGT: x           PT/INR - ( 01 Aug 2020 04:43 )   PT: 21.7 sec;   INR: 1.88 ratio         PTT - ( 01 Aug 2020 04:43 )  PTT:47.8 sec                            9.2    22.09 )-----------( 43       ( 01 Aug 2020 04:43 )             27.5                         9.4    20.55 )-----------( 44       ( 01 Aug 2020 00:42 )             28.1                         9.8    20.60 )-----------( 47       ( 31 Jul 2020 19:12 )             28.8                         9.7    18.47 )-----------( 46       ( 31 Jul 2020 15:15 )             28.3                         9.7    18.97 )-----------( 48       ( 31 Jul 2020 11:21 )             28.5     Imaging:

## 2020-08-01 NOTE — PROVIDER CONTACT NOTE (CHANGE IN STATUS NOTIFICATION) - ACTION/TREATMENT ORDERED:
AS per micu team, md conklin and md che mcgowan, no CT abdomen needed at this moment. Decrease fentanyl gtt, bladder pressure taken, result 11.  Micu team made aware. Will continue to monitor.

## 2020-08-01 NOTE — PROGRESS NOTE ADULT - SUBJECTIVE AND OBJECTIVE BOX
[facility] DIVISION OF KIDNEY DISEASES AND HYPERTENSION   -- FOLLOW UP NOTE --   Bhupinder Truong  Nephrology Fellow  Pager NS: [phone]   /  Pager [place]: [phone]  (after 5pm or weekend please page the on-call fellow)  --------------------------------------------------------------------------------  24 hour events/subjective:  - overnight no events reported, vitals afebrile no hypotensive episode, total UOP CRRT 3L in the past 24hr  - patient seen and examined at bedside this morning intubated/sedated on vasporessor on CRRT (no clotting issues)  - vitals/lab/medications reviewed    PAST HISTORY  --------------------------------------------------------------------------------  No significant changes to PMH, PSH, FHx, SHx, unless otherwise noted    ALLERGIES & MEDICATIONS  --------------------------------------------------------------------------------  Allergies    No Known Allergies    Intolerances      Standing Inpatient Medications  albumin human 25% IVPB 100 milliLiter(s) IV Intermittent every 6 hours  chlorhexidine 0.12% Liquid 15 milliLiter(s) Oral Mucosa every 12 hours  chlorhexidine 4% Liquid 1 Application(s) Topical <User Schedule>  CRRT Treatment    <Continuous>  dextrose 10%. 1000 milliLiter(s) IV Continuous <Continuous>  fentaNYL   Infusion... 0.5 MICROgram(s)/kG/Hr IV Continuous <Continuous>  meropenem  IVPB 1000 milliGRAM(s) IV Intermittent every 12 hours  norepinephrine Infusion 0.05 MICROgram(s)/kG/Min IV Continuous <Continuous>  nystatin Powder 1 Application(s) Topical two times a day  octreotide  Infusion 50 MICROgram(s)/Hr IV Continuous <Continuous>  pantoprazole Infusion 8 mG/Hr IV Continuous <Continuous>  Phoxillum Filtration BK 4 / 2.5 5000 milliLiter(s) CRRT <Continuous>  Phoxillum Filtration BK 4 / 2.5 5000 milliLiter(s) CRRT <Continuous>  Phoxillum Filtration BK 4 / 2.5 5000 milliLiter(s) CRRT <Continuous>  propofol Infusion 5 MICROgram(s)/kG/Min IV Continuous <Continuous>  thiamine IVPB 500 milliGRAM(s) IV Intermittent daily    PRN Inpatient Medications  sodium chloride 0.9% lock flush 10 milliLiter(s) IV Push every 1 hour PRN      REVIEW OF SYSTEMS  unable to obtain d/t intubated/sedated    VITALS/PHYSICAL EXAM  --------------------------------------------------------------------------------  T(C): 36.5 (08-01-20 @ 08:00), Max: 43 (07-31-20 @ 09:00)  HR: 81 (08-01-20 @ 08:15) (52 - 91)  BP: --  RR: 20 (08-01-20 @ 08:15) (20 - 21)  SpO2: 97% (08-01-20 @ 08:15) (94% - 100%)  Wt(kg): --    07-31-20 @ 07:01  -  08-01-20 @ 07:00  --------------------------------------------------------  IN: 3289.8 mL / OUT: 3090 mL / NET: 199.8 mL    08-01-20 @ 07:01  -  08-01-20 @ 08:29  --------------------------------------------------------  IN: 107.4 mL / OUT: 160 mL / NET: -52.6 mL    Physical Exam:              Gen: intubated/sedated  	HEENT: intubated  	Pulm: CTA b/l  	CV: non tachycardic, RRR, S1S2  	GI: +BS, soft, distended  	: ashton in place  	MSK: Warm, no edema              Neuro: sedated  	Psych: sedated  	Skin: Warm, no cyanosis  	Vascular access: RIJ non tunnel hd catheter    LABS/STUDIES  --------------------------------------------------------------------------------              9.2    22.09 >-----------<  43       [08-01-20 @ 04:43]              27.5     138  |  101  |  12  ----------------------------<  93      [08-01-20 @ 04:43]  3.9   |  19  |  2.79        Ca     8.1     [08-01-20 @ 04:43]      Mg     2.3     [08-01-20 @ 04:43]      Phos  3.6     [08-01-20 @ 04:43]    TPro  5.5  /  Alb  4.2  /  TBili  16.7  /  DBili  x   /  AST  220  /  ALT  35  /  AlkPhos  68  [08-01-20 @ 04:43]    PT/INR: PT 21.7 , INR 1.88       [08-01-20 @ 04:43]  PTT: 47.8       [08-01-20 @ 04:43]      Creatinine Trend:  SCr 2.79 [08-01 @ 04:43]  SCr 2.93 [08-01 @ 00:45]  SCr 2.84 [07-31 @ 19:12]  SCr 3.13 [07-31 @ 15:15]  SCr 3.09 [07-31 @ 11:21]    Urinalysis - [07-29-20 @ 10:44]      Color Light Yellow / Appearance Turbid / SG 1.010 / pH 6.0      Gluc Negative / Ketone Negative  / Bili Small / Urobili <2 mg/dL       Blood Large / Protein Negative / Leuk Est Negative / Nitrite Negative      RBC 12 / WBC 1 / Hyaline 0 / Gran  / Sq Epi  / Non Sq Epi 0 / Bacteria Negative    Urine Creatinine 196      [07-27-20 @ 18:42]  Urine Protein 101      [07-27-20 @ 18:42]  Urine Sodium 53      [07-27-20 @ 18:42]  Urine Potassium 51      [07-27-20 @ 18:42]    Iron 34, TIBC 124, %sat 27      [07-14-20 @ 10:34]  Ferritin 812      [07-12-20 @ 08:58]  PTH -- (Ca 6.2)      [07-29-20 @ 09:18]   223    HBsAg Nonreact      [07-12-20 @ 01:16]  HCV 0.14, Nonreact      [07-12-20 @ 01:16]  HIV Nonreact      [07-15-20 @ 10:47]    SHAUN: titer Negative, pattern --      [07-12-20 @ 09:37]  Free Light Chains: kappa 2.35, lambda 2.06, ratio = 1.14      [07-12 @ 08:58]

## 2020-08-01 NOTE — PROGRESS NOTE ADULT - PROBLEM SELECTOR PLAN 1
Pt with MARQUISE in the setting of liver cirrhosis with severe ascites. MARQUISE 2/2 prerenal (severe diarrhea + diuretics +decrease effective arterial blood volume from Cirrhosis) to r/o hepatorenal syndrome. 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 On Admission 9.64 -- s/p diagnostic paracentesis. -- sCr worsening - requiring HD -- First ever HD on 7/28 - tolerated well.   7/29- taken for EGD which for complicated with active massive bleeding   requiring IR for embolization - now transferred to MICU, required intubation     Continue on CRRT   c/w IV albumin infusion (goal serum albumin >4)  c/w IV pressors MAP >70  continue octreotide   Hernandez catheter for strict I/O  Monitor electrolytes and urine output.   Avoid NSAIDs, ACEI/ARBS, RCA and nephrotoxins.   Dose medications as per eGFR.  rest of management as per ICU

## 2020-08-01 NOTE — PROGRESS NOTE ADULT - ASSESSMENT
39 y.o. Male with PMhx of Alcohol liver cirrhosis, alcohol use disorder, traumatic bladder rupture s/p ex lap in 2019, recently admitted to [facility] from 7/11 to 7/17 for seizure and alcoholic cirrhosis. Presented to ED c/o worsening abdominal pain and diarrhea. - hospital course complicated with massive GI bleed, now intubated on 7/29     Nephrology consulted for MARQUISE

## 2020-08-01 NOTE — PROVIDER CONTACT NOTE (CHANGE IN STATUS NOTIFICATION) - ASSESSMENT
MD Maxwell Singleton and MD Clemente Riley were made aware that pt does not have bowel sounds, firm, distended abdomen. WBC increasing.

## 2020-08-02 NOTE — PROGRESS NOTE ADULT - ASSESSMENT
39 y.o. Male with PMhx of Alcohol liver cirrhosis, alcohol use disorder, traumatic bladder rupture s/p ex lap in 2019, recently admitted to [facility] from 7/11 to 7/17 for seizure and alcoholic cirrhosis. Presented to ED c/o worsening abdominal pain and diarrhea. - hospital course complicated with massive GI bleed, now intubated on 7/29     Nephrology consulted for MARQUISE

## 2020-08-02 NOTE — PROGRESS NOTE ADULT - SUBJECTIVE AND OBJECTIVE BOX
INTERVAL HPI/OVERNIGHT EVENTS:    SUBJECTIVE: Patient seen and examined at bedside. Overnight, pt got NGT, rifaxamin/lactulose, and was placed on CPAP. This AM pt is sedated and unable to answer questions.      VITAL SIGNS:  ICU Vital Signs Last 24 Hrs  T(C): 36.8 (02 Aug 2020 07:45), Max: 36.8 (02 Aug 2020 00:00)  T(F): 98.2 (02 Aug 2020 07:45), Max: 98.2 (02 Aug 2020 00:00)  HR: 83 (02 Aug 2020 10:15) (70 - 89)  BP: --  BP(mean): --  ABP: 114/46 (02 Aug 2020 10:15) (93/33 - 130/68)  ABP(mean): 67 (02 Aug 2020 10:15) (51 - 88)  RR: 20 (02 Aug 2020 10:15) (20 - 25)  SpO2: 93% (02 Aug 2020 10:15) (88% - 100%)    Mode: AC/ CMV (Assist Control/ Continuous Mandatory Ventilation), RR (machine): 20, TV (machine): 450, FiO2: 40, PEEP: 5, ITime: 1, MAP: 11, PIP: 28  Plateau pressure:   P/F ratio:     08-01 @ 07:01  -  08-02 @ 07:00  --------------------------------------------------------  IN: 3226.2 mL / OUT: 3549 mL / NET: -322.8 mL    08-02 @ 07:01  -  08-02 @ 11:29  --------------------------------------------------------  IN: 366.5 mL / OUT: 320 mL / NET: 46.5 mL      CAPILLARY BLOOD GLUCOSE      POCT Blood Glucose.: 83 mg/dL (01 Aug 2020 20:14)    ECG:    PHYSICAL EXAM:    General: NAD  HEENT: Icteric. PERRL   Neck: supple  Respiratory: CTA b/l. Vented breath sounds.  Cardiovascular: +S1/S2; RRR   Abdomen: Abdomen moderately distended.  Extremities: Warm under rayshawn hugger. Dorsalis pedis 2+ bilaterally  Skin: Icteric  Neurological: Intubated and less sedated tracking movement and able to nod his head.    MEDICATIONS:  MEDICATIONS  (STANDING):  caspofungin IVPB 35 milliGRAM(s) IV Intermittent every 24 hours  chlorhexidine 0.12% Liquid 15 milliLiter(s) Oral Mucosa every 12 hours  chlorhexidine 4% Liquid 1 Application(s) Topical <User Schedule>  CRRT Treatment    <Continuous>  dextrose 10%. 1000 milliLiter(s) (40 mL/Hr) IV Continuous <Continuous>  fentaNYL   Infusion... 0.5 MICROgram(s)/kG/Hr (2.07 mL/Hr) IV Continuous <Continuous>  lactulose Syrup 30 Gram(s) Oral every 6 hours  meropenem  IVPB 1000 milliGRAM(s) IV Intermittent every 12 hours  norepinephrine Infusion 0.05 MICROgram(s)/kG/Min (3.88 mL/Hr) IV Continuous <Continuous>  nystatin Powder 1 Application(s) Topical two times a day  pantoprazole Infusion 8 mG/Hr (10 mL/Hr) IV Continuous <Continuous>  Phoxillum Filtration BK 4 / 2.5 5000 milliLiter(s) (1200 mL/Hr) CRRT <Continuous>  Phoxillum Filtration BK 4 / 2.5 5000 milliLiter(s) (200 mL/Hr) CRRT <Continuous>  Phoxillum Filtration BK 4 / 2.5 5000 milliLiter(s) (1500 mL/Hr) CRRT <Continuous>  propofol Infusion 5 MICROgram(s)/kG/Min (2.48 mL/Hr) IV Continuous <Continuous>  rifAXIMin 550 milliGRAM(s) Oral two times a day  thiamine IVPB 500 milliGRAM(s) IV Intermittent daily    MEDICATIONS  (PRN):  sodium chloride 0.9% lock flush 10 milliLiter(s) IV Push every 1 hour PRN Pre/post blood products, medications, blood draw, and to maintain line patency      ALLERGIES:  Allergies    No Known Allergies    Intolerances        LABS:                        9.2    23.59 )-----------( 46       ( 02 Aug 2020 06:14 )             28.0     08-02    136  |  100  |  9   ----------------------------<  92  3.9   |  19<L>  |  2.29<H>    Ca    8.3<L>      02 Aug 2020 00:19  Phos  3.4     08-02  Mg     2.4     08-02    TPro  5.6<L>  /  Alb  4.3  /  TBili  15.7<H>  /  DBili  x   /  AST  210<H>  /  ALT  33  /  AlkPhos  78  08-02    PT/INR - ( 02 Aug 2020 06:14 )   PT: 21.8 sec;   INR: 1.89 ratio         PTT - ( 02 Aug 2020 06:14 )  PTT:46.4 sec      RADIOLOGY & ADDITIONAL TESTS: Reviewed.

## 2020-08-02 NOTE — PROGRESS NOTE ADULT - ASSESSMENT
39M w/ decompensated EtOH cirrhosis, presenting w/ acute on chronic liver failure of unclear etiology. Course c/b massive GIB from gastric varix requiring 25u pRBC, s/p glue injection and PARTO by IR. Hb stable post procedures, no e/o bleeding.    Impression:  #Acute on chronic liver failure: d/dx includes underlying infection (UA neg, BCx pending, CXR neg, dx para neg for SBP, biliary imaging negative, C diff negative), worsening EtOH hepatitis (MDF = 60 but no interval drinking since discharge and this is an acute change), vs vascular injury  #Cirrhosis due to EtOH, decompensated by ascites  - varices: no esophageal varices on EGD 7/29, actively bleeding gastric varix s/p glue injection and PARTO by IR, bleeding appears to be controlled  - ascites: present on exam, neg for SBP 7/26, on Lasix 20mg/spironolactone 25mg daily as outpatient, s/p 1.5L removed during PARTO 7/29  - HE: none on exam  - HCC: no imaging, MRI without contrast is limited  - MELD-Na = 36 on 7/31  #Hypotension – still requiring pressors, d/dx sepsis? Does not appear to be bleeding related  #High PEEP requirement – required paracentesis 7/29 for difficulty oxygenating during PARTO  #Pruritis – likely due to elevated bilirubin, improved  #Possible PV thrombosis on U/S w/ doppler  # MARQUISE: unclear etiology, c/f bilirubin pigment nephropathy vs acute tubular necrosis vs obstructive uropathy. Reny is 53, making HRS less likely. On CVVH    Recommendations:  - trend Hb, transfuse to Hb > 7 or if unstable  - recommend NGT placement for nutrition  - IV PPI BID  - c/w broad spectrum abx  - c/w CVVH, f/u nephrology recs  - f/u renal recs  - hold nadolol and diuretics  - continue with rifaximin  - c/w MVI, thiamine and folate  - trend CMP, CBC, INR daily  - hepatology will continue to follow

## 2020-08-02 NOTE — PROGRESS NOTE ADULT - ASSESSMENT
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 The patient is no longer actively bleeding, but is increasing presser requirements possible secondary to sepsis. WBC is improving, but Neutrophil count is increasing suggesting continued inflammatory response    Neurology:  -Alert and oriented at baseline  -Hx of seizures in setting of etoh withdrawal  - Sedated on prop and fent   -Neuro checks q4H  -Cannot give PO rifaxamin given GI bleed    Pulm:  Fluid overload in the setting of massive transfusion on 7/30  - Peep decreased to 5  - Will wean FiO2    CV:  Hypovolemic shock 2/2 gastric variceal bleed on 7/29 in the setting of CRRT for fluid overload and subsequent hypoxia  -VS q1H  -On Levo to maintain MAP > 65   -[place] for continuous BP monitoring  -Tele monitoring    GI:  #Acute on chronic liver failure: d/dx includes underlying infection (UA neg, BCx negative, CXR neg, dx para neg for SBP, biliary imaging negative, C diff negative), worsening EtOH hepatitis (MDF = 60 but no interval drinking since discharge and this is an acute change), vs vascular injury  #Decompensated alcoholic cirrhosis, gastric variceal bleed and ascites  - varices: no prior EGD, on nadolol at home, now holding due to shock   - ascites: present on exam, neg for SBP 7/26, on Lasix 20mg/spironolactone 25mg daily as outpatient--HOLD for hypotension   - HE: Hold Rifaximin in setting of hematemesis  - HCC: no imaging, MRI without contrast is limited  - MELD-Na = 38 on 7/26  - Continue with 25% albumin   - D/c octreotide per GI   - F/U abdominal US per GI recommendations     #Hematemesis secondary to gastric variceal bleed  -Refractory to endoscopic intervention 7/29 s/p IR PARTO 7/30, blakemore 7/30, and 34 U PRBC, 25 U FFP, 20 U PLT, and 5 U cryo all on 7/30   -Trend CBC q4---tranfuse to maintain Hgb > 7, or for active bleed  -Trend Coags b8f--kgfsgjs with FFP, cryo as needed  - Maintain NPO  - Blak[place]ore removed 7/30     #Transaminitis 2/2 alcoholic hepatitis  -Trend liver enzymes, bilirubin  -Follow up with hepatology and GI recommendations  -Continue with 25% albumin   -Continue protonix infusion       #Ascites, negative for SBP on admission  -Serial abdominal exams  -Paracentesis as needed  -Continue MVI, thiamine, folate   -Follow up Hepatology    Renal:  #MARQUISE: in the setting of liver cirrhosis with severe ascites. MARQUISE 2/2 prerenal (severe diarrhea + diuretics +decrease effective arterial blood volume from Cirrhosis) to r/o hepatorenal syndrome. 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 Baseline creatinine sCr 0.6-0.8. On Admission 9.64 -- s/p diagnostic paracentesis. -- sCr worsening - requiring HD  - First HD 7/28, Second HD 7/29---CVVHD to be started upon arrival to MICU  - c/w IV albumin infusion   - hold diuretics at this time   - Monitor electrolytes and urine output.   - Avoid NSAIDs, ACEI/ARBS, RCA and nephrotoxins.   - Dose medications as per eGFR.     #Hypocalcemia, monitor in setting of massive transfusion  - Ionized Calcium daily   - keep Ionized calcium around 1.1-1.2  - Replete prn    ID:  #All cultures negative to date--      7/26: Blood Culture x 2 NGTD; Urine Culture: NGTD               Para negative for SBP      7/30 culture       8/1: fungitell sent   	  -- D/c Zosyn, started meropenem  -- Caspofungin started 70 mg loading dose   --Trend lactate; WBC count    Heme:   --CBC q4H  --TEG to eval for clotting ability  --Maintain Hgb > 7. 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 The patient is no longer actively bleeding, but is increasing presser requirements possible secondary to sepsis. WBC is improving, but Neutrophil count is increasing suggesting continued inflammatory response    Neurology:  -Alert and oriented at baseline  -Hx of seizures in setting of etoh withdrawal  - Sedated on prop and fent   -Neuro checks q4H  -Cannot give PO rifaxamin given GI bleed    Pulm:  Fluid overload in the setting of massive transfusion on 7/30. Tolerated CPAP overnight  - CPAP trial today  - Will wean FiO2    CV:  Hypovolemic shock 2/2 gastric variceal bleed on 7/29 in the setting of CRRT for fluid overload and subsequent hypoxia  -VS q1H  -On Levo to maintain MAP > 65   -[place] for continuous BP monitoring  -Tele monitoring    GI:  #Acute on chronic liver failure: d/dx includes underlying infection (UA neg, BCx negative, CXR neg, dx para neg for SBP, biliary imaging negative, C diff negative), worsening EtOH hepatitis (MDF = 60 but no interval drinking since discharge and this is an acute change), vs vascular injury  #Decompensated alcoholic cirrhosis, gastric variceal bleed and ascites  - varices: no prior EGD, on nadolol at home, now holding due to shock   - ascites: present on exam, neg for SBP 7/26, on Lasix 20mg/spironolactone 25mg daily as outpatient--HOLD for hypotension   - HE: Gave rifaxamin overnight 8/1  - HCC: no imaging, MRI without contrast is limited  - MELD-Na = 38 on 7/26  - Continue with 25% albumin   - D/c octreotide per GI   - F/U abdominal US per GI recommendations     #Hematemesis secondary to gastric variceal bleed  -Refractory to endoscopic intervention 7/29 s/p IR PARTO 7/30, blakemore 7/30, and 34 U PRBC, 25 U FFP, 20 U PLT, and 5 U cryo all on 7/30   -Trend CBC q4---tranfuse to maintain Hgb > 7, or for active bleed  -Trend Coags f5w--jwxclnp with FFP, cryo as needed  - Maintain NPO  - Blakemore removed 7/30     #Transaminitis 2/2 alcoholic hepatitis  -Trend liver enzymes, bilirubin  -Follow up with hepatology and GI recommendations  -Continue with 25% albumin   -Continue protonix infusion       #Ascites, negative for SBP on admission  -Serial abdominal exams  -Paracentesis as needed  -Continue MVI, thiamine, folate   -Follow up Hepatology    Renal:  #MARQUISE: in the setting of liver cirrhosis with severe ascites. MARQUISE 2/2 prerenal (severe diarrhea + diuretics +decrease effective arterial blood volume from Cirrhosis) to r/o hepatorenal syndrome. 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 Baseline creatinine sCr 0.6-0.8. On Admission 9.64 -- s/p diagnostic paracentesis. -- sCr worsening - requiring HD  - First HD 7/28, Second HD 7/29---CVVHD started on arrival to MICU  - c/w IV albumin infusion   - hold diuretics at this time   - Monitor electrolytes and urine output.   - Avoid NSAIDs, ACEI/ARBS, RCA and nephrotoxins.   - Dose medications as per eGFR.     #Hypocalcemia, monitor in setting of massive transfusion  - Ionized Calcium daily   - keep Ionized calcium around 1.1-1.2  - Replete prn    ID:  #All cultures negative to date--      7/26: Blood Culture x 2 NGTD; Urine Culture: NGTD               Para negative for SBP      7/30 culture       8/1: fungitell sent   	  -- D/c Zosyn, started meropenem  -- Caspofungin  --Trend lactate; WBC count    Heme:   --CBC q12h  --TEG to eval for clotting ability  --Maintain Hgb > 7. Transfuse as needed  --Trend Coags--reverse coagulopathy as needed or in event of bleed  TOTAL TRANSFUSION REQUIREMENTS: 34 PRBC, 25  FFP, 5 PLT, and 2 Cryo     Endo:  --Glucose q6h  --Monitor for hypoglycemia in setting of liver failure, NPO

## 2020-08-02 NOTE — PROGRESS NOTE ADULT - SUBJECTIVE AND OBJECTIVE BOX
Chief Complaint:  Patient is a 39y old  Male who presents with a chief complaint of abdominal pain (02 Aug 2020 07:39)      Interval Events: Pressor requirements largely unchanged. Pt opens eyes and wakes up, follows some simple commands.   ROS: All 12 point system except listed above were otherwise negative.    Allergies:  No Known Allergies        Hospital Medications:  caspofungin IVPB 35 milliGRAM(s) IV Intermittent every 24 hours  chlorhexidine 0.12% Liquid 15 milliLiter(s) Oral Mucosa every 12 hours  chlorhexidine 4% Liquid 1 Application(s) Topical <User Schedule>  CRRT Treatment    <Continuous>  dextrose 10%. 1000 milliLiter(s) IV Continuous <Continuous>  fentaNYL   Infusion... 0.5 MICROgram(s)/kG/Hr IV Continuous <Continuous>  lactulose Syrup 30 Gram(s) Oral every 6 hours  meropenem  IVPB 1000 milliGRAM(s) IV Intermittent every 12 hours  norepinephrine Infusion 0.05 MICROgram(s)/kG/Min IV Continuous <Continuous>  nystatin Powder 1 Application(s) Topical two times a day  pantoprazole Infusion 8 mG/Hr IV Continuous <Continuous>  Phoxillum Filtration BK 4 / 2.5 5000 milliLiter(s) CRRT <Continuous>  Phoxillum Filtration BK 4 / 2.5 5000 milliLiter(s) CRRT <Continuous>  Phoxillum Filtration BK 4 / 2.5 5000 milliLiter(s) CRRT <Continuous>  propofol Infusion 5 MICROgram(s)/kG/Min IV Continuous <Continuous>  rifAXIMin 550 milliGRAM(s) Oral two times a day  sodium chloride 0.9% lock flush 10 milliLiter(s) IV Push every 1 hour PRN  thiamine IVPB 500 milliGRAM(s) IV Intermittent daily      PMHX/PSHX:  No pertinent past medical history  S/P exploratory laparotomy  No significant past surgical history      Family history:  FH: alpha 1 antitrypsin deficiency  No pertinent family history in first degree relatives    There is no family history of peptic ulcer disease, gastric cancer, colon polyps, colon cancer, celiac disease, biliary, hepatic, or pancreatic disease.  None of the female relatives have breast, uterine, or ovarian cancer.     PHYSICAL EXAM:   Vital Signs:  Vital Signs Last 24 Hrs  T(C): 36.8 (02 Aug 2020 07:45), Max: 36.9 (01 Aug 2020 11:00)  T(F): 98.2 (02 Aug 2020 07:45), Max: 98.4 (01 Aug 2020 11:00)  HR: 84 (02 Aug 2020 09:15) (70 - 89)  BP: --  BP(mean): --  RR: 20 (02 Aug 2020 09:15) (20 - 25)  SpO2: 94% (02 Aug 2020 09:15) (88% - 100%)  Daily     Daily     GENERAL: no acute distress, intubated  NEURO: sedated  HEENT: icteric sclera, no conjunctival pallor appreciated  CHEST: no respiratory distress, no accessory muscle use  CARDIAC: regular rate, rhythm  ABDOMEN: soft, non-tender, non-distended, no rebound or guarding  EXTREMITIES: warm, well perfused, no edema  SKIN: ++ jaundice    LABS:                        9.2    23.59 )-----------( 46       ( 02 Aug 2020 06:14 )             28.0     Mean Cell Volume: 92.1 fl (08-02-20 @ 06:14)    08-02    136  |  100  |  9   ----------------------------<  92  3.9   |  19<L>  |  2.29<H>    Ca    8.3<L>      02 Aug 2020 00:19  Phos  3.4     08-02  Mg     2.4     08-02    TPro  5.6<L>  /  Alb  4.3  /  TBili  15.7<H>  /  DBili  x   /  AST  210<H>  /  ALT  33  /  AlkPhos  78  08-02    LIVER FUNCTIONS - ( 02 Aug 2020 00:19 )  Alb: 4.3 g/dL / Pro: 5.6 g/dL / ALK PHOS: 78 U/L / ALT: 33 U/L / AST: 210 U/L / GGT: x           PT/INR - ( 02 Aug 2020 06:14 )   PT: 21.8 sec;   INR: 1.89 ratio         PTT - ( 02 Aug 2020 06:14 )  PTT:46.4 sec    Amylase Serum--      Lipase serum--       [id number]                          9.2    23.59 )-----------( 46       ( 02 Aug 2020 06:14 )             28.0                         8.8    22.13 )-----------( 43       ( 02 Aug 2020 00:20 )             26.6                         9.0    23.43 )-----------( 42       ( 01 Aug 2020 19:08 )             27.2                         9.1    22.83 )-----------( 42       ( 01 Aug 2020 14:39 )             27.0                         9.0    22.40 )-----------( 41       ( 01 Aug 2020 09:41 )             27.0     Imaging:

## 2020-08-02 NOTE — PROGRESS NOTE ADULT - PROBLEM SELECTOR PLAN 1
Pt with MARQUISE in the setting of liver cirrhosis with severe ascites. MARQUISE 2/2 prerenal (severe diarrhea + diuretics +decrease effective arterial blood volume from Cirrhosis) to r/o hepatorenal syndrome. 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 On Admission 9.64 -- s/p diagnostic paracentesis. -- sCr worsening - requiring HD -- First ever HD on 7/28 - tolerated well.   7/29- taken for EGD which for complicated with active massive bleeding   requiring IR for embolization - now transferred to MICU, required intubation     Continue on CRRT   c/w IV pressors MAP >70  Hernandez catheter for strict I/O  Monitor electrolytes and urine output.   Avoid NSAIDs, ACEI/ARBS, RCA and nephrotoxins.   Dose medications as per eGFR.  rest of management as per ICU

## 2020-08-02 NOTE — PROGRESS NOTE ADULT - PROBLEM SELECTOR PLAN 2
Pt. with hyponatremia in setting of liver cirrhosis and severe ascites. Serum sodium 133 on admission (7/26), improving today 136    Monitor BMP daily  avoid hypotonic saline/D5w, glycemic control

## 2020-08-02 NOTE — PROGRESS NOTE ADULT - SUBJECTIVE AND OBJECTIVE BOX
[facility] DIVISION OF KIDNEY DISEASES AND HYPERTENSION   -- FOLLOW UP NOTE --   Bhupinder Truong  Nephrology Fellow  Pager [facility]: [phone]   /  Pager [facility]: [phone]  (after 5pm or weekend please page the on-call fellow)  --------------------------------------------------------------------------------  24 hour events/subjective:  - overnight no events reported, vitals afebrile no hypotensive episode, total UOP CRRT 3.5L and ashton 20 cc in the past 24hr  - patient seen and examined at bedside this morning intubated/sedated on CRRT   - vitals/lab/medications reviewed    PAST HISTORY  --------------------------------------------------------------------------------  No significant changes to PMH, PSH, FHx, SHx, unless otherwise noted    ALLERGIES & MEDICATIONS  --------------------------------------------------------------------------------  Allergies    No Known Allergies    Intolerances    Standing Inpatient Medications  caspofungin IVPB 35 milliGRAM(s) IV Intermittent every 24 hours  chlorhexidine 0.12% Liquid 15 milliLiter(s) Oral Mucosa every 12 hours  chlorhexidine 4% Liquid 1 Application(s) Topical <User Schedule>  CRRT Treatment    <Continuous>  dextrose 10%. 1000 milliLiter(s) IV Continuous <Continuous>  fentaNYL   Infusion... 0.5 MICROgram(s)/kG/Hr IV Continuous <Continuous>  lactulose Syrup 30 Gram(s) Oral every 6 hours  meropenem  IVPB 1000 milliGRAM(s) IV Intermittent every 12 hours  norepinephrine Infusion 0.05 MICROgram(s)/kG/Min IV Continuous <Continuous>  nystatin Powder 1 Application(s) Topical two times a day  pantoprazole Infusion 8 mG/Hr IV Continuous <Continuous>  Phoxillum Filtration BK 4 / 2.5 5000 milliLiter(s) CRRT <Continuous>  Phoxillum Filtration BK 4 / 2.5 5000 milliLiter(s) CRRT <Continuous>  Phoxillum Filtration BK 4 / 2.5 5000 milliLiter(s) CRRT <Continuous>  propofol Infusion 5 MICROgram(s)/kG/Min IV Continuous <Continuous>  rifAXIMin 550 milliGRAM(s) Oral two times a day  thiamine IVPB 500 milliGRAM(s) IV Intermittent daily    PRN Inpatient Medications  sodium chloride 0.9% lock flush 10 milliLiter(s) IV Push every 1 hour PRN    REVIEW OF SYSTEMS  unable to obtain d/t intubated/sedated     VITALS/PHYSICAL EXAM  --------------------------------------------------------------------------------  T(C): 36.5 (08-02-20 @ 06:00), Max: 36.9 (08-01-20 @ 11:00)  HR: 83 (08-02-20 @ 07:00) (70 - 89)  BP: --  RR: 20 (08-02-20 @ 07:00) (20 - 25)  SpO2: 93% (08-02-20 @ 07:00) (88% - 100%)  Wt(kg): --    08-01-20 @ 07:01  -  08-02-20 @ 07:00  --------------------------------------------------------  IN: 3226.2 mL / OUT: 3549 mL / NET: -322.8 mL    Physical Exam:              Gen: intubated/sedated  	HEENT: intubated  	Pulm: CTA b/l  	CV: non tachycardic, RRR, S1S2  	GI: +BS, soft, distended  	: ashton in place  	MSK: Warm, no edema              Neuro: sedated  	Psych: sedated  	Skin: Warm, no cyanosis  	Vascular access: RIJ non tunnel hd catheter    LABS/STUDIES  --------------------------------------------------------------------------------              9.2    23.59 >-----------<  46       [08-02-20 @ 06:14]              28.0     136  |  100  |  9   ----------------------------<  92      [08-02-20 @ 00:19]  3.9   |  19  |  2.29        Ca     8.3     [08-02-20 @ 00:19]      Mg     2.4     [08-02-20 @ 06:14]      Phos  3.4     [08-02-20 @ 06:14]    TPro  5.6  /  Alb  4.3  /  TBili  15.7  /  DBili  x   /  AST  210  /  ALT  33  /  AlkPhos  78  [08-02-20 @ 00:19]    PT/INR: PT 21.8 , INR 1.89       [08-02-20 @ 06:14]  PTT: 46.4       [08-02-20 @ 06:14]    Creatinine Trend:  SCr 2.29 [08-02 @ 00:19]  SCr 2.31 [08-01 @ 19:09]  SCr 2.58 [08-01 @ 14:39]  SCr 2.79 [08-01 @ 04:43]  SCr 2.93 [08-01 @ 00:45]    Urinalysis - [07-29-20 @ 10:44]      Color Light Yellow / Appearance Turbid / SG 1.010 / pH 6.0      Gluc Negative / Ketone Negative  / Bili Small / Urobili <2 mg/dL       Blood Large / Protein Negative / Leuk Est Negative / Nitrite Negative      RBC 12 / WBC 1 / Hyaline 0 / Gran  / Sq Epi  / Non Sq Epi 0 / Bacteria Negative    Urine Creatinine 196      [07-27-20 @ 18:42]  Urine Protein 101      [07-27-20 @ 18:42]  Urine Sodium 53      [07-27-20 @ 18:42]  Urine Potassium 51      [07-27-20 @ 18:42]    Iron 34, TIBC 124, %sat 27      [07-14-20 @ 10:34]  Ferritin 812      [07-12-20 @ 08:58]  PTH -- (Ca 6.2)      [07-29-20 @ 09:18]   223    HBsAg Nonreact      [07-12-20 @ 01:16]  HCV 0.14, Nonreact      [07-12-20 @ 01:16]  HIV Nonreact      [07-15-20 @ 10:47]    SHAUN: titer Negative, pattern --      [07-12-20 @ 09:37]  Free Light Chains: kappa 2.35, lambda 2.06, ratio = 1.14      [07-12 @ 08:58] [facility] DIVISION OF KIDNEY DISEASES AND HYPERTENSION   -- FOLLOW UP NOTE --   Bhupinder Truong  Nephrology Fellow  Pager [facility]: [phone]   /  Pager [facility]: [phone]  (after 5pm or weekend please page the on-call fellow)  --------------------------------------------------------------------------------  24 hour events/subjective:  - overnight no events reported, vitals afebrile no hypotensive episode, total UOP CRRT 3.5L and ashton 20 cc in the past 24hr  - patient seen and examined at bedside this morning intubated/sedated on CRRT   - vitals/lab/medications reviewed    PAST HISTORY  --------------------------------------------------------------------------------  No significant changes to PMH, PSH, FHx, SHx, unless otherwise noted    ALLERGIES & MEDICATIONS  --------------------------------------------------------------------------------  Allergies    No Known Allergies    Intolerances    Standing Inpatient Medications  caspofungin IVPB 35 milliGRAM(s) IV Intermittent every 24 hours  chlorhexidine 0.12% Liquid 15 milliLiter(s) Oral Mucosa every 12 hours  chlorhexidine 4% Liquid 1 Application(s) Topical <User Schedule>  CRRT Treatment    <Continuous>  dextrose 10%. 1000 milliLiter(s) IV Continuous <Continuous>  fentaNYL   Infusion... 0.5 MICROgram(s)/kG/Hr IV Continuous <Continuous>  lactulose Syrup 30 Gram(s) Oral every 6 hours  meropenem  IVPB 1000 milliGRAM(s) IV Intermittent every 12 hours  norepinephrine Infusion 0.05 MICROgram(s)/kG/Min IV Continuous <Continuous>  nystatin Powder 1 Application(s) Topical two times a day  pantoprazole Infusion 8 mG/Hr IV Continuous <Continuous>  Phoxillum Filtration BK 4 / 2.5 5000 milliLiter(s) CRRT <Continuous>  Phoxillum Filtration BK 4 / 2.5 5000 milliLiter(s) CRRT <Continuous>  Phoxillum Filtration BK 4 / 2.5 5000 milliLiter(s) CRRT <Continuous>  propofol Infusion 5 MICROgram(s)/kG/Min IV Continuous <Continuous>  rifAXIMin 550 milliGRAM(s) Oral two times a day  thiamine IVPB 500 milliGRAM(s) IV Intermittent daily    PRN Inpatient Medications  sodium chloride 0.9% lock flush 10 milliLiter(s) IV Push every 1 hour PRN    REVIEW OF SYSTEMS  unable to obtain d/t intubated/sedated     VITALS/PHYSICAL EXAM  --------------------------------------------------------------------------------  T(C): 36.5 (08-02-20 @ 06:00), Max: 36.9 (08-01-20 @ 11:00)  HR: 83 (08-02-20 @ 07:00) (70 - 89)  BP: --  RR: 20 (08-02-20 @ 07:00) (20 - 25)  SpO2: 93% (08-02-20 @ 07:00) (88% - 100%)  Wt(kg): --    08-01-20 @ 07:01  -  08-02-20 @ 07:00  --------------------------------------------------------  IN: 3226.2 mL / OUT: 3549 mL / NET: -322.8 mL    Physical Exam:              Gen: intubated/sedated  	HEENT: intubated  	Pulm: CTA b/l  	CV: non tachycardic, RRR, S1S2  	GI: +BS, soft, distended  	: ashton in place  	MSK: Warm, no edema              Neuro: sedated  	Psych: sedated  	Skin: Warm, no cyanosis, jaundice appearing  	Vascular access: RIJ non tunnel hd catheter    LABS/STUDIES  --------------------------------------------------------------------------------              9.2    23.59 >-----------<  46       [08-02-20 @ 06:14]              28.0     136  |  100  |  9   ----------------------------<  92      [08-02-20 @ 00:19]  3.9   |  19  |  2.29        Ca     8.3     [08-02-20 @ 00:19]      Mg     2.4     [08-02-20 @ 06:14]      Phos  3.4     [08-02-20 @ 06:14]    TPro  5.6  /  Alb  4.3  /  TBili  15.7  /  DBili  x   /  AST  210  /  ALT  33  /  AlkPhos  78  [08-02-20 @ 00:19]    PT/INR: PT 21.8 , INR 1.89       [08-02-20 @ 06:14]  PTT: 46.4       [08-02-20 @ 06:14]    Creatinine Trend:  SCr 2.29 [08-02 @ 00:19]  SCr 2.31 [08-01 @ 19:09]  SCr 2.58 [08-01 @ 14:39]  SCr 2.79 [08-01 @ 04:43]  SCr 2.93 [08-01 @ 00:45]    Urinalysis - [07-29-20 @ 10:44]      Color Light Yellow / Appearance Turbid / SG 1.010 / pH 6.0      Gluc Negative / Ketone Negative  / Bili Small / Urobili <2 mg/dL       Blood Large / Protein Negative / Leuk Est Negative / Nitrite Negative      RBC 12 / WBC 1 / Hyaline 0 / Gran  / Sq Epi  / Non Sq Epi 0 / Bacteria Negative    Urine Creatinine 196      [07-27-20 @ 18:42]  Urine Protein 101      [07-27-20 @ 18:42]  Urine Sodium 53      [07-27-20 @ 18:42]  Urine Potassium 51      [07-27-20 @ 18:42]    Iron 34, TIBC 124, %sat 27      [07-14-20 @ 10:34]  Ferritin 812      [07-12-20 @ 08:58]  PTH -- (Ca 6.2)      [07-29-20 @ 09:18]   223    HBsAg Nonreact      [07-12-20 @ 01:16]  HCV 0.14, Nonreact      [07-12-20 @ 01:16]  HIV Nonreact      [07-15-20 @ 10:47]    SHAUN: titer Negative, pattern --      [07-12-20 @ 09:37]  Free Light Chains: kappa 2.35, lambda 2.06, ratio = 1.14      [07-12 @ 08:58] [facility] DIVISION OF KIDNEY DISEASES AND HYPERTENSION   -- FOLLOW UP NOTE --   Bhupinder Truong  Nephrology Fellow  Pager [facility]: [phone]   /  Pager [facility]: [phone]  (after 5pm or weekend please page the on-call fellow)  --------------------------------------------------------------------------------  24 hour events/subjective:  - overnight no events reported, vitals afebrile no hypotensive episode, total UF CRRT 3.5L and ashton 20 cc in the past 24hr  - patient seen and examined at bedside this morning intubated/sedated on CRRT   - vitals/lab/medications reviewed    PAST HISTORY  --------------------------------------------------------------------------------  No significant changes to PMH, PSH, FHx, SHx, unless otherwise noted    ALLERGIES & MEDICATIONS  --------------------------------------------------------------------------------  Allergies    No Known Allergies    Intolerances    Standing Inpatient Medications  caspofungin IVPB 35 milliGRAM(s) IV Intermittent every 24 hours  chlorhexidine 0.12% Liquid 15 milliLiter(s) Oral Mucosa every 12 hours  chlorhexidine 4% Liquid 1 Application(s) Topical <User Schedule>  CRRT Treatment    <Continuous>  dextrose 10%. 1000 milliLiter(s) IV Continuous <Continuous>  fentaNYL   Infusion... 0.5 MICROgram(s)/kG/Hr IV Continuous <Continuous>  lactulose Syrup 30 Gram(s) Oral every 6 hours  meropenem  IVPB 1000 milliGRAM(s) IV Intermittent every 12 hours  norepinephrine Infusion 0.05 MICROgram(s)/kG/Min IV Continuous <Continuous>  nystatin Powder 1 Application(s) Topical two times a day  pantoprazole Infusion 8 mG/Hr IV Continuous <Continuous>  Phoxillum Filtration BK 4 / 2.5 5000 milliLiter(s) CRRT <Continuous>  Phoxillum Filtration BK 4 / 2.5 5000 milliLiter(s) CRRT <Continuous>  Phoxillum Filtration BK 4 / 2.5 5000 milliLiter(s) CRRT <Continuous>  propofol Infusion 5 MICROgram(s)/kG/Min IV Continuous <Continuous>  rifAXIMin 550 milliGRAM(s) Oral two times a day  thiamine IVPB 500 milliGRAM(s) IV Intermittent daily    PRN Inpatient Medications  sodium chloride 0.9% lock flush 10 milliLiter(s) IV Push every 1 hour PRN    REVIEW OF SYSTEMS  unable to obtain d/t intubated/sedated     VITALS/PHYSICAL EXAM  --------------------------------------------------------------------------------  T(C): 36.5 (08-02-20 @ 06:00), Max: 36.9 (08-01-20 @ 11:00)  HR: 83 (08-02-20 @ 07:00) (70 - 89)  BP: --  RR: 20 (08-02-20 @ 07:00) (20 - 25)  SpO2: 93% (08-02-20 @ 07:00) (88% - 100%)  Wt(kg): --    08-01-20 @ 07:01  -  08-02-20 @ 07:00  --------------------------------------------------------  IN: 3226.2 mL / OUT: 3549 mL / NET: -322.8 mL    Physical Exam:              Gen: intubated/sedated  	HEENT: intubated  	Pulm: CTA b/l  	CV: non tachycardic, RRR, S1S2  	GI: +BS, soft, distended  	: ashton in place  	MSK: Warm, no edema              Neuro: sedated  	Psych: sedated  	Skin: Warm, no cyanosis, jaundice appearing  	Vascular access: RIJ non tunnel hd catheter    LABS/STUDIES  --------------------------------------------------------------------------------              9.2    23.59 >-----------<  46       [08-02-20 @ 06:14]              28.0     136  |  100  |  9   ----------------------------<  92      [08-02-20 @ 00:19]  3.9   |  19  |  2.29        Ca     8.3     [08-02-20 @ 00:19]      Mg     2.4     [08-02-20 @ 06:14]      Phos  3.4     [08-02-20 @ 06:14]    TPro  5.6  /  Alb  4.3  /  TBili  15.7  /  DBili  x   /  AST  210  /  ALT  33  /  AlkPhos  78  [08-02-20 @ 00:19]    PT/INR: PT 21.8 , INR 1.89       [08-02-20 @ 06:14]  PTT: 46.4       [08-02-20 @ 06:14]    Creatinine Trend:  SCr 2.29 [08-02 @ 00:19]  SCr 2.31 [08-01 @ 19:09]  SCr 2.58 [08-01 @ 14:39]  SCr 2.79 [08-01 @ 04:43]  SCr 2.93 [08-01 @ 00:45]    Urinalysis - [07-29-20 @ 10:44]      Color Light Yellow / Appearance Turbid / SG 1.010 / pH 6.0      Gluc Negative / Ketone Negative  / Bili Small / Urobili <2 mg/dL       Blood Large / Protein Negative / Leuk Est Negative / Nitrite Negative      RBC 12 / WBC 1 / Hyaline 0 / Gran  / Sq Epi  / Non Sq Epi 0 / Bacteria Negative    Urine Creatinine 196      [07-27-20 @ 18:42]  Urine Protein 101      [07-27-20 @ 18:42]  Urine Sodium 53      [07-27-20 @ 18:42]  Urine Potassium 51      [07-27-20 @ 18:42]    Iron 34, TIBC 124, %sat 27      [07-14-20 @ 10:34]  Ferritin 812      [07-12-20 @ 08:58]  PTH -- (Ca 6.2)      [07-29-20 @ 09:18]   223    HBsAg Nonreact      [07-12-20 @ 01:16]  HCV 0.14, Nonreact      [07-12-20 @ 01:16]  HIV Nonreact      [07-15-20 @ 10:47]    SHAUN: titer Negative, pattern --      [07-12-20 @ 09:37]  Free Light Chains: kappa 2.35, lambda 2.06, ratio = 1.14      [07-12 @ 08:58]

## 2020-08-03 NOTE — CHART NOTE - NSCHARTNOTEFT_GEN_A_CORE
Nutrition Follow Up Note  Patient seen for: follow up on MICU. Chart reviewed and events noted. Pt is a 40 y/o M with ETOH abuse with cirrhosis, portal hypertension, acute alcoholic hepatitis and alcohol withdrawal complicated by seizures. Admitted for acute on chronic liver failure and MARQUISE with ARF 2/2 requiring HD complicated by gastric varices refractory to endoscopic intervention s/p PARTO.  The patient is no longer actively bleeding, but is increasing presser requirements possible secondary to sepsis. WBC is improving, but Neutrophil count is increasing suggesting continued inflammatory response. Hypovolemic shock 2/2 gastric variceal bleed on  in the setting of CRRT for fluid overload and subsequent hypoxia.     Source:   RN, Medical record, and team    Diet : NPO    Pt remains NPO x5 days. Per RN, pt still with blood in stomach; plan for possible abdominal US per GI recommendations. Pt had 3 bowel movements without blood overnight (-8/3).     Propofol infusing at 12.4 cc/hr. Received 363.2cc x24 hours () providing ~400kcals.     Nutrition focused physical exam conducted with RN present reveals moderate muscle wasting to temples.     Daily Weight in k.6 (-30), Weight in k.2 (-), Weight in k.2 (-), Weight in k.6 (), Weight in k.5 (), Weight in k.9 (-), Weight in k.2 (-)  Weight fluctuations noted, likely related to fluid shifts. Pt on CRRT and with varying degree of edema throughout admission.     Pertinent Medications:   caspofungin IVPB 35 milliGRAM(s) IV Intermittent every 24 hours  chlorhexidine 0.12% Liquid 15 milliLiter(s) Oral Mucosa every 12 hours  chlorhexidine 4% Liquid 1 Application(s) Topical <User Schedule>  CRRT Treatment    <Continuous>  dextrose 10%. 1000 milliLiter(s) (40 mL/Hr) IV Continuous <Continuous>  fentaNYL   Infusion... 0.5 MICROgram(s)/kG/Hr (2.07 mL/Hr) IV Continuous <Continuous>  lactulose Syrup 30 Gram(s) Oral every 6 hours  meropenem  IVPB 1000 milliGRAM(s) IV Intermittent every 12 hours  norepinephrine Infusion 0.05 MICROgram(s)/kG/Min (3.88 mL/Hr) IV Continuous <Continuous>  nystatin Powder 1 Application(s) Topical two times a day  pantoprazole Infusion 8 mG/Hr (10 mL/Hr) IV Continuous <Continuous>  Phoxillum Filtration BK 4 / 2.5 5000 milliLiter(s) (1200 mL/Hr) CRRT <Continuous>  Phoxillum Filtration BK 4 / 2.5 5000 milliLiter(s) (200 mL/Hr) CRRT <Continuous>  Phoxillum Filtration BK 4 / 2.5 5000 milliLiter(s) (1500 mL/Hr) CRRT <Continuous>  propofol Infusion 5 MICROgram(s)/kG/Min (2.48 mL/Hr) IV Continuous <Continuous>  rifAXIMin 550 milliGRAM(s) Oral two times a day  thiamine IVPB 500 milliGRAM(s) IV Intermittent daily    MEDICATIONS  (PRN):  sodium chloride 0.9% lock flush 10 milliLiter(s) IV Push every 1 hour PRN Pre/post blood products, medications, blood draw, and to maintain line patency    Pertinent Labs:  @ 05:57: Na 135, BUN 10, Cr 2.09<H>, <H>, K+ 4.1, Phos 3.7, Mg 2.5, Alk Phos 97, ALT/SGPT 43, AST/SGOT 236<H>, HbA1c --   @ 00:26: Na 136, BUN 9, Cr 2.02<H>, <H>, K+ 4.1, Phos 3.7, Mg 2.6, Alk Phos 90, ALT/SGPT 41, AST/SGOT 237<H>, HbA1c --   @ 17:41: Na 136, BUN 9, Cr 2.03<H>, <H>, K+ 4.1, Phos 3.6, Mg 2.6, Alk Phos 89, ALT/SGPT 39, AST/SGOT 231<H>, HbA1c --   @ 11:49: Na 138, BUN 9, Cr 2.13<H>, <H>, K+ 4.2, Phos 3.5, Mg 2.5, Alk Phos 87, ALT/SGPT 37, AST/SGOT 226<H>, HbA1c --    Skin per nursing documentation: No pressure injuries noted  Edema per nursing documentation: +1 dependent, head, generalized. +2 dependent, generalized     Estimated Needs:   The [place] State Equation (PSU) 2003b was used to calculate resting energy expenditure. 1942kcals   Energy-Protein needs based on dosing wt of 82.8 kg  Range Estimated Energy Needs (20-25 calories/kg): 1164-8850  Estimated Protein Needs (1.2-1.4 gm/kg): 99.4-116 gm    Previous Nutrition Diagnosis: Inadequate protein, energy intake  Nutrition Diagnosis is: upgraded, see below.    New Nutrition Diagnosis: severe acute  Related to: inability to meet estimated nutrient needs   As evidenced by: <50% of estimated energy requirement x5days and moderate muscle wasting to temples     Interventions: Recommend provision of EN when medically feasible.     Recommend  1) RD to follow for provision of nutrition.   2) If EN warranted, recommend Vital AF at goal rate of 85cc/hr x18 hours providing 1530cc, 1836kcals (22kcals/kg), 115gm protein (1.4gm protein/kg), and 1241cc free water.   2) Continue thiamin as ordered  3) Malnutrition alert placed in chart, team made aware.     Monitoring and Evaluation:   Continue to monitor Nutritional intake, Tolerance to diet prescription, weights, labs, skin integrity    RD remains available upon request and will follow up per protocol  Mitzi Frye MS, RD, Pager #[phone] Nutrition Follow Up Note  Patient seen for: follow up on MICU. Chart reviewed and events noted. Pt is a 38 y/o M with ETOH abuse with cirrhosis, portal hypertension, acute alcoholic hepatitis and alcohol withdrawal complicated by seizures. Admitted for acute on chronic liver failure and MARQUISE with ARF 2/2 requiring HD complicated by gastric varices refractory to endoscopic intervention s/p PARTO.  The patient is no longer actively bleeding, but is increasing presser requirements possible secondary to sepsis. WBC is improving, but Neutrophil count is increasing suggesting continued inflammatory response. Hypovolemic shock 2/2 gastric variceal bleed on  in the setting of CRRT for fluid overload and subsequent hypoxia.     Source:   RN, Medical record, and team    Diet : NPO    Pt remains NPO x5 days. Per RN, pt still with blood in stomach; plan for possible abdominal US per GI recommendations. Pt had 3 bowel movements without blood overnight (-8/3).     Propofol infusing at 12.4 cc/hr. Received 363.2cc x24 hours () providing ~400kcals.     Nutrition focused physical exam conducted with RN present reveals moderate muscle wasting to temples.     Daily Weight in k.6 (-30), Weight in k.2 (-), Weight in k.2 (-), Weight in k.6 (), Weight in k.5 (), Weight in k.9 (-), Weight in k.2 (-)  Weight fluctuations noted, likely related to fluid shifts. Pt on CRRT and with varying degree of edema throughout admission.     Pertinent Medications:   caspofungin IVPB 35 milliGRAM(s) IV Intermittent every 24 hours  chlorhexidine 0.12% Liquid 15 milliLiter(s) Oral Mucosa every 12 hours  chlorhexidine 4% Liquid 1 Application(s) Topical <User Schedule>  CRRT Treatment    <Continuous>  dextrose 10%. 1000 milliLiter(s) (40 mL/Hr) IV Continuous <Continuous>  fentaNYL   Infusion... 0.5 MICROgram(s)/kG/Hr (2.07 mL/Hr) IV Continuous <Continuous>  lactulose Syrup 30 Gram(s) Oral every 6 hours  meropenem  IVPB 1000 milliGRAM(s) IV Intermittent every 12 hours  norepinephrine Infusion 0.05 MICROgram(s)/kG/Min (3.88 mL/Hr) IV Continuous <Continuous>  nystatin Powder 1 Application(s) Topical two times a day  pantoprazole Infusion 8 mG/Hr (10 mL/Hr) IV Continuous <Continuous>  Phoxillum Filtration BK 4 / 2.5 5000 milliLiter(s) (1200 mL/Hr) CRRT <Continuous>  Phoxillum Filtration BK 4 / 2.5 5000 milliLiter(s) (200 mL/Hr) CRRT <Continuous>  Phoxillum Filtration BK 4 / 2.5 5000 milliLiter(s) (1500 mL/Hr) CRRT <Continuous>  propofol Infusion 5 MICROgram(s)/kG/Min (2.48 mL/Hr) IV Continuous <Continuous>  rifAXIMin 550 milliGRAM(s) Oral two times a day  thiamine IVPB 500 milliGRAM(s) IV Intermittent daily    MEDICATIONS  (PRN):  sodium chloride 0.9% lock flush 10 milliLiter(s) IV Push every 1 hour PRN Pre/post blood products, medications, blood draw, and to maintain line patency    Pertinent Labs:  @ 05:57: Na 135, BUN 10, Cr 2.09<H>, <H>, K+ 4.1, Phos 3.7, Mg 2.5, Alk Phos 97, ALT/SGPT 43, AST/SGOT 236<H>, HbA1c --   @ 00:26: Na 136, BUN 9, Cr 2.02<H>, <H>, K+ 4.1, Phos 3.7, Mg 2.6, Alk Phos 90, ALT/SGPT 41, AST/SGOT 237<H>, HbA1c --   @ 17:41: Na 136, BUN 9, Cr 2.03<H>, <H>, K+ 4.1, Phos 3.6, Mg 2.6, Alk Phos 89, ALT/SGPT 39, AST/SGOT 231<H>, HbA1c --   @ 11:49: Na 138, BUN 9, Cr 2.13<H>, <H>, K+ 4.2, Phos 3.5, Mg 2.5, Alk Phos 87, ALT/SGPT 37, AST/SGOT 226<H>, HbA1c --    Skin per nursing documentation: No pressure injuries noted  Edema per nursing documentation: +1 dependent, head, generalized. +2 dependent, generalized     Estimated Needs:   The [place] State Equation (PSU) 2003b was used to calculate resting energy expenditure. 1942kcals   Energy-Protein needs based on dosing wt of 82.8 kg  Range Estimated Energy Needs (20-25 calories/kg): 6039-8944  Estimated Protein Needs (1.2-1.4 gm/kg): 99.4-116 gm    Previous Nutrition Diagnosis: Inadequate protein, energy intake  Nutrition Diagnosis is: upgraded, see below.    New Nutrition Diagnosis: severe acute  Related to: inability to meet estimated nutrient needs   As evidenced by: <50% of estimated energy requirement x5days and moderate muscle wasting to temples     Interventions: Recommend provision of EN when medically feasible.     Recommend  1) RD to follow for provision of nutrition.   2) If EN warranted, recommend Vital AF at goal rate of 85cc/hr x18 hours providing 1530cc, 1836kcals (22kcals/kg), 115gm protein (1.4gm protein/kg), and 1241cc free water.   3) Continue thiamin as ordered  4) Malnutrition alert placed in chart, team made aware.     Monitoring and Evaluation:   Continue to monitor Nutritional intake, Tolerance to diet prescription, weights, labs, skin integrity    RD remains available upon request and will follow up per protocol  Mitzi Frye MS, RD, Pager #[phone] Nutrition Follow Up Note  Patient seen for: follow up on MICU. Chart reviewed and events noted. Pt is a 38 y/o M with ETOH abuse with cirrhosis, portal hypertension, acute alcoholic hepatitis and alcohol withdrawal complicated by seizures. Admitted for acute on chronic liver failure and MARQUISE with ARF 2/2 requiring HD complicated by gastric varices refractory to endoscopic intervention s/p PARTO.  The patient is no longer actively bleeding, but is increasing presser requirements possible secondary to sepsis. WBC is improving, but Neutrophil count is increasing suggesting continued inflammatory response. Hypovolemic shock 2/2 gastric variceal bleed on  in the setting of CRRT for fluid overload and subsequent hypoxia.     Source:   RN, Medical record, and communication with team    Diet : NPO    Pt remains NPO x5 days. Per RN, pt still with blood in stomach; plan for possible abdominal US per GI recommendations. Pt had 3 bowel movements without blood overnight (-8/3).     Propofol infusing at 12.4 cc/hr. Received 363.2cc x24 hours () providing ~400kcals.     Nutrition focused physical exam conducted with RN present reveals moderate muscle wasting to temples.     Daily Weight in k.6 (-30), Weight in k.2 (-), Weight in k.2 (-), Weight in k.6 (), Weight in k.5 (), Weight in k.9 (-), Weight in k.2 (-)  Weight fluctuations noted, likely related to fluid shifts. Pt on CRRT and with varying degree of edema throughout admission.     Pertinent Medications:   caspofungin IVPB 35 milliGRAM(s) IV Intermittent every 24 hours  chlorhexidine 0.12% Liquid 15 milliLiter(s) Oral Mucosa every 12 hours  chlorhexidine 4% Liquid 1 Application(s) Topical <User Schedule>  CRRT Treatment    <Continuous>  dextrose 10%. 1000 milliLiter(s) (40 mL/Hr) IV Continuous <Continuous>  fentaNYL   Infusion... 0.5 MICROgram(s)/kG/Hr (2.07 mL/Hr) IV Continuous <Continuous>  lactulose Syrup 30 Gram(s) Oral every 6 hours  meropenem  IVPB 1000 milliGRAM(s) IV Intermittent every 12 hours  norepinephrine Infusion 0.05 MICROgram(s)/kG/Min (3.88 mL/Hr) IV Continuous <Continuous>  nystatin Powder 1 Application(s) Topical two times a day  pantoprazole Infusion 8 mG/Hr (10 mL/Hr) IV Continuous <Continuous>  Phoxillum Filtration BK 4 / 2.5 5000 milliLiter(s) (1200 mL/Hr) CRRT <Continuous>  Phoxillum Filtration BK 4 / 2.5 5000 milliLiter(s) (200 mL/Hr) CRRT <Continuous>  Phoxillum Filtration BK 4 / 2.5 5000 milliLiter(s) (1500 mL/Hr) CRRT <Continuous>  propofol Infusion 5 MICROgram(s)/kG/Min (2.48 mL/Hr) IV Continuous <Continuous>  rifAXIMin 550 milliGRAM(s) Oral two times a day  thiamine IVPB 500 milliGRAM(s) IV Intermittent daily    MEDICATIONS  (PRN):  sodium chloride 0.9% lock flush 10 milliLiter(s) IV Push every 1 hour PRN Pre/post blood products, medications, blood draw, and to maintain line patency    Pertinent Labs:  @ 05:57: Na 135, BUN 10, Cr 2.09<H>, <H>, K+ 4.1, Phos 3.7, Mg 2.5, Alk Phos 97, ALT/SGPT 43, AST/SGOT 236<H>, HbA1c --   @ 00:26: Na 136, BUN 9, Cr 2.02<H>, <H>, K+ 4.1, Phos 3.7, Mg 2.6, Alk Phos 90, ALT/SGPT 41, AST/SGOT 237<H>, HbA1c --   @ 17:41: Na 136, BUN 9, Cr 2.03<H>, <H>, K+ 4.1, Phos 3.6, Mg 2.6, Alk Phos 89, ALT/SGPT 39, AST/SGOT 231<H>, HbA1c --   @ 11:49: Na 138, BUN 9, Cr 2.13<H>, <H>, K+ 4.2, Phos 3.5, Mg 2.5, Alk Phos 87, ALT/SGPT 37, AST/SGOT 226<H>, HbA1c --    Skin per nursing documentation: No pressure injuries noted  Edema per nursing documentation: +1 dependent, head, generalized. +2 dependent, generalized     Estimated Needs:   The [place] State Equation (PSU) 2003b was used to calculate resting energy expenditure. 1942kcals   Energy-Protein needs based on dosing wt of 82.8 kg  Range Estimated Energy Needs (20-25 calories/kg): 9642-4749  Estimated Protein Needs (1.2-1.4 gm/kg): 99.4-116 gm    Previous Nutrition Diagnosis: Inadequate protein, energy intake  Nutrition Diagnosis is: upgraded, see below.    New Nutrition Diagnosis: severe acute  Related to: inability to meet estimated nutrient needs   As evidenced by: <50% of estimated energy requirement x5days and moderate muscle wasting to temples     Interventions: Recommend provision of EN when medically feasible.     Recommend  1) RD to follow for provision of nutrition.   2) If EN warranted, recommend Vital AF at goal rate of 85cc/hr x18 hours providing 1530cc, 1836kcals (22kcals/kg), 115gm protein (1.4gm protein/kg), and 1241cc free water via route designated by provider.   3) Continue thiamin as ordered  4) Malnutrition alert placed in chart, team made aware.     Monitoring and Evaluation:   Continue to monitor Nutritional intake, Tolerance to diet prescription, weights, labs, skin integrity    RD remains available upon request and will follow up per protocol  Mitzi Frye MS, RD, Pager #[phone] Nutrition Follow Up Note  Patient seen for: follow up on MICU. Chart reviewed and events noted. Pt is a 38 y/o M with ETOH abuse with cirrhosis, portal hypertension, acute alcoholic hepatitis and alcohol withdrawal complicated by seizures. Admitted for acute on chronic liver failure and MARQUISE with ARF 2/2 requiring HD complicated by gastric varices refractory to endoscopic intervention s/p PARTO.  The patient is no longer actively bleeding, but is increasing presser requirements possible secondary to sepsis. WBC is improving, but Neutrophil count is increasing suggesting continued inflammatory response. Hypovolemic shock 2/2 gastric variceal bleed on  in the setting of CRRT for fluid overload and subsequent hypoxia.     Source:   RN, Medical record, and communication with team    Diet : NPO    Pt remains NPO x5 days. Per RN, pt still with blood in stomach; plan for possible abdominal US per GI recommendations. Pt had 3 bowel movements without blood overnight (-8/3).     Propofol infusing at 12.4 cc/hr. Received 363.2cc x24 hours () providing ~400kcals.     Nutrition focused physical exam conducted with RN present reveals moderate muscle wasting to temples.     Daily Weight in k.6 (-30), Weight in k.2 (-), Weight in k.2 (-), Weight in k.6 (), Weight in k.5 (), Weight in k.9 (-), Weight in k.2 (-)  Weight fluctuations noted, likely related to fluid shifts. Pt on CRRT and with varying degree of edema throughout admission.     Pertinent Medications:   caspofungin IVPB 35 milliGRAM(s) IV Intermittent every 24 hours  chlorhexidine 0.12% Liquid 15 milliLiter(s) Oral Mucosa every 12 hours  chlorhexidine 4% Liquid 1 Application(s) Topical <User Schedule>  CRRT Treatment    <Continuous>  dextrose 10%. 1000 milliLiter(s) (40 mL/Hr) IV Continuous <Continuous>  fentaNYL   Infusion... 0.5 MICROgram(s)/kG/Hr (2.07 mL/Hr) IV Continuous <Continuous>  lactulose Syrup 30 Gram(s) Oral every 6 hours  meropenem  IVPB 1000 milliGRAM(s) IV Intermittent every 12 hours  norepinephrine Infusion 0.05 MICROgram(s)/kG/Min (3.88 mL/Hr) IV Continuous <Continuous>  nystatin Powder 1 Application(s) Topical two times a day  pantoprazole Infusion 8 mG/Hr (10 mL/Hr) IV Continuous <Continuous>  Phoxillum Filtration BK 4 / 2.5 5000 milliLiter(s) (1200 mL/Hr) CRRT <Continuous>  Phoxillum Filtration BK 4 / 2.5 5000 milliLiter(s) (200 mL/Hr) CRRT <Continuous>  Phoxillum Filtration BK 4 / 2.5 5000 milliLiter(s) (1500 mL/Hr) CRRT <Continuous>  propofol Infusion 5 MICROgram(s)/kG/Min (2.48 mL/Hr) IV Continuous <Continuous>  rifAXIMin 550 milliGRAM(s) Oral two times a day  thiamine IVPB 500 milliGRAM(s) IV Intermittent daily    MEDICATIONS  (PRN):  sodium chloride 0.9% lock flush 10 milliLiter(s) IV Push every 1 hour PRN Pre/post blood products, medications, blood draw, and to maintain line patency    Pertinent Labs:  @ 05:57: Na 135, BUN 10, Cr 2.09<H>, <H>, K+ 4.1, Phos 3.7, Mg 2.5, Alk Phos 97, ALT/SGPT 43, AST/SGOT 236<H>, HbA1c --   @ 00:26: Na 136, BUN 9, Cr 2.02<H>, <H>, K+ 4.1, Phos 3.7, Mg 2.6, Alk Phos 90, ALT/SGPT 41, AST/SGOT 237<H>, HbA1c --   @ 17:41: Na 136, BUN 9, Cr 2.03<H>, <H>, K+ 4.1, Phos 3.6, Mg 2.6, Alk Phos 89, ALT/SGPT 39, AST/SGOT 231<H>, HbA1c --   @ 11:49: Na 138, BUN 9, Cr 2.13<H>, <H>, K+ 4.2, Phos 3.5, Mg 2.5, Alk Phos 87, ALT/SGPT 37, AST/SGOT 226<H>, HbA1c --    Skin per nursing documentation: No pressure injuries noted  Edema per nursing documentation: +1 dependent, head, generalized. +2 dependent, generalized     Estimated Needs:   The [place] State Equation (PSU) 2003b was used to calculate resting energy expenditure. 1942kcals   Energy-Protein needs based on dosing wt of 82.8 kg  Range Estimated Energy Needs (20-25 calories/kg): 0495-1745  Estimated Protein Needs (1.2-1.4 gm/kg): 99.4-116 gm    Previous Nutrition Diagnosis: Inadequate protein, energy intake  Nutrition Diagnosis is: upgraded, see below.    New Nutrition Diagnosis: severe acute  Related to: inability to meet estimated nutrient needs   As evidenced by: <50% of estimated energy requirement x5days and moderate muscle wasting to temples     Interventions: Recommend provision of EN when medically feasible.     Recommend  1) RD to follow for provision of nutrition.   2) If EN warranted, recommend Vital AF at goal rate of 85cc/hr x18 hours providing 1530cc, 1836kcals (22kcals/kg), 115gm protein (1.4gm protein/kg), and 1241cc free water via route designated by provider.   3) Continue thiamine as ordered  4) Malnutrition alert placed in chart, team made aware.     Monitoring and Evaluation:   Continue to monitor Nutritional intake, Tolerance to diet prescription, weights, labs, skin integrity    RD remains available upon request and will follow up per protocol  Mitzi Frye MS, RD, Pager #[phone]

## 2020-08-03 NOTE — PROGRESS NOTE ADULT - PROBLEM SELECTOR PLAN 2
Pt. with hyponatremia in setting of liver cirrhosis and severe ascites. Serum sodium 133 on admission (7/26), improving today 135    Monitor BMP daily  avoid hypotonic saline/D5w, glycemic control

## 2020-08-03 NOTE — PROGRESS NOTE ADULT - ASSESSMENT
39 y.o. Male with PMhx of Alcohol liver cirrhosis, alcohol use disorder, traumatic bladder rupture s/p ex lap in 2019, recently admitted to [facility] from 7/11 to 7/17 for seizure and alcoholic cirrhosis. Presented to ED c/o worsening abdominal pain and diarrhea. - hospital course complicated with massive GI bleed, now intubated on 7/29     Nephrology consulted for MARQUISE

## 2020-08-03 NOTE — PROGRESS NOTE ADULT - SUBJECTIVE AND OBJECTIVE BOX
Chief Complaint:  Patient is a 39y old  Male who presents with a chief complaint of abdominal pain (03 Aug 2020 07:02)    Reason for consult: alc hep, gastric variceal bleed    Interval Events: Stable Hb, pressor requirement coming down. 20cc bright red blood suctioned from OG tube this AM by nursing. Pt passing some dark stools overnight.     Hospital Medications:  caspofungin IVPB 35 milliGRAM(s) IV Intermittent every 24 hours  chlorhexidine 0.12% Liquid 15 milliLiter(s) Oral Mucosa every 12 hours  chlorhexidine 4% Liquid 1 Application(s) Topical <User Schedule>  CRRT Treatment    <Continuous>  dextrose 10%. 1000 milliLiter(s) IV Continuous <Continuous>  fentaNYL   Infusion... 0.5 MICROgram(s)/kG/Hr IV Continuous <Continuous>  lactulose Syrup 30 Gram(s) Oral every 6 hours  meropenem  IVPB 1000 milliGRAM(s) IV Intermittent every 12 hours  norepinephrine Infusion 0.05 MICROgram(s)/kG/Min IV Continuous <Continuous>  nystatin Powder 1 Application(s) Topical two times a day  pantoprazole Infusion 8 mG/Hr IV Continuous <Continuous>  Phoxillum Filtration BK 4 / 2.5 5000 milliLiter(s) CRRT <Continuous>  Phoxillum Filtration BK 4 / 2.5 5000 milliLiter(s) CRRT <Continuous>  Phoxillum Filtration BK 4 / 2.5 5000 milliLiter(s) CRRT <Continuous>  propofol Infusion 5 MICROgram(s)/kG/Min IV Continuous <Continuous>  rifAXIMin 550 milliGRAM(s) Oral two times a day  sodium chloride 0.9% lock flush 10 milliLiter(s) IV Push every 1 hour PRN  thiamine IVPB 500 milliGRAM(s) IV Intermittent daily      ROS: Pt unable to provide    PHYSICAL EXAM:   Vital Signs:  Vital Signs Last 24 Hrs  T(C): 36.6 (03 Aug 2020 07:00), Max: 36.6 (03 Aug 2020 07:00)  T(F): 97.9 (03 Aug 2020 07:00), Max: 97.9 (03 Aug 2020 07:00)  HR: 85 (03 Aug 2020 08:23) (70 - 93)  BP: --  BP(mean): --  RR: 20 (03 Aug 2020 07:45) (12 - 23)  SpO2: 99% (03 Aug 2020 08:23) (88% - 100%)  Daily     Daily     GENERAL: no acute distress, intubated  NEURO: sedated  HEENT: icteric sclera, no conjunctival pallor appreciated  CHEST: no respiratory distress, no accessory muscle use  CARDIAC: regular rate, rhythm  ABDOMEN: soft, non-tender, distended, no rebound or guarding  EXTREMITIES: warm, well perfused, no edema  SKIN: ++ jaundice    LABS: reviewed                        9.6    26.95 )-----------( 48       ( 03 Aug 2020 05:57 )             29.3     08-03    135  |  100  |  10  ----------------------------<  134<H>  4.1   |  19<L>  |  2.09<H>    Ca    8.4      03 Aug 2020 05:57  Phos  3.7     08-03  Mg     2.5     08-03    TPro  5.5<L>  /  Alb  4.1  /  TBili  16.6<H>  /  DBili  x   /  AST  236<H>  /  ALT  43  /  AlkPhos  97  08-03    LIVER FUNCTIONS - ( 03 Aug 2020 05:57 )  Alb: 4.1 g/dL / Pro: 5.5 g/dL / ALK PHOS: 97 U/L / ALT: 43 U/L / AST: 236 U/L / GGT: x             Interval Diagnostic Studies: see sunrise for full report

## 2020-08-03 NOTE — PROGRESS NOTE ADULT - PROBLEM SELECTOR PLAN 1
Pt with MARQUISE in the setting of liver cirrhosis with severe ascites. MARQUISE 2/2 prerenal (severe diarrhea + diuretics +decrease effective arterial blood volume from Cirrhosis) to r/o hepatorenal syndrome. 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 On Admission 9.64 -- s/p diagnostic paracentesis. -- sCr worsening - requiring HD -- First ever HD on 7/28 - tolerated well.   7/29- taken for EGD which for complicated with active massive bleeding   requiring IR for embolization - now transferred to MICU, required intubation     Continue on CRRT for fluid overload  c/w IV pressors MAP >70  Hernandez catheter for strict I/O  Monitor electrolytes and urine output.   Avoid NSAIDs, ACEI/ARBS, RCA and nephrotoxins.   Dose medications as per eGFR.  rest of management as per ICU

## 2020-08-03 NOTE — PROGRESS NOTE ADULT - ASSESSMENT
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 The patient is no longer actively bleeding, but is increasing presser requirements possible secondary to sepsis. WBC is improving, but Neutrophil count is increasing suggesting continued inflammatory response    Neurology:  -Alert and oriented at baseline  -Hx of seizures in setting of etoh withdrawal  - Sedated on prop and fent   -Neuro checks q4H  -Cannot give PO rifaxamin given GI bleed    Pulm:  Fluid overload in the setting of massive transfusion on 7/30. Tolerated CPAP overnight  - CPAP trial today  - Will wean FiO2    CV:  Hypovolemic shock 2/2 gastric variceal bleed on 7/29 in the setting of CRRT for fluid overload and subsequent hypoxia  -VS q1H  -On Levo to maintain MAP > 65   -Kira for continuous BP monitoring  -Tele monitoring    GI:  #Acute on chronic liver failure: d/dx includes underlying infection (UA neg, BCx negative, CXR neg, dx para neg for SBP, biliary imaging negative, C diff negative), worsening EtOH hepatitis (MDF = 60 but no interval drinking since discharge and this is an acute change), vs vascular injury  #Decompensated alcoholic cirrhosis, gastric variceal bleed and ascites  - varices: no prior EGD, on nadolol at home, now holding due to shock   - ascites: present on exam, neg for SBP 7/26, on Lasix 20mg/spironolactone 25mg daily as outpatient--HOLD for hypotension   - HE: Gave rifaxamin overnight 8/1  - HCC: no imaging, MRI without contrast is limited  - MELD-Na = 38 on 7/26  - Continue with 25% albumin   - D/c octreotide per GI   - F/U abdominal US per GI recommendations     #Hematemesis secondary to gastric variceal bleed  -Refractory to endoscopic intervention 7/29 s/p IR PARTO 7/30, blakemore 7/30, and 34 U PRBC, 25 U FFP, 20 U PLT, and 5 U cryo all on 7/30   -Trend CBC q4---tranfuse to maintain Hgb > 7, or for active bleed  -Trend Coags k9k--gmosrjj with FFP, cryo as needed  - Maintain NPO  - Blakemore removed 7/30     #Transaminitis 2/2 alcoholic hepatitis  -Trend liver enzymes, bilirubin  -Follow up with hepatology and GI recommendations  -Continue with 25% albumin   -Continue protonix infusion       #Ascites, negative for SBP on admission  -Serial abdominal exams  -Paracentesis as needed  -Continue MVI, thiamine, folate   -Follow up Hepatology    Renal:  #MARQUISE: in the setting of liver cirrhosis with severe ascites. MARQUISE 2/2 prerenal (severe diarrhea + diuretics +decrease effective arterial blood volume from Cirrhosis) to r/o hepatorenal syndrome. 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 Baseline creatinine sCr 0.6-0.8. On Admission 9.64 -- s/p diagnostic paracentesis. -- sCr worsening - requiring HD  - First HD 7/28, Second HD 7/29---CVVHD started on arrival to MICU  - c/w IV albumin infusion   - hold diuretics at this time   - Monitor electrolytes and urine output.   - Avoid NSAIDs, ACEI/ARBS, RCA and nephrotoxins.   - Dose medications as per eGFR.     #Hypocalcemia, monitor in setting of massive transfusion  - Ionized Calcium daily   - keep Ionized calcium around 1.1-1.2  - Replete prn    ID:  #All cultures negative to date--      7/26: Blood Culture x 2 NGTD; Urine Culture: NGTD               Para negative for SBP      7/30 culture       8/1: fungitell sent   	  -- D/c Zosyn, started meropenem  -- Caspofungin  --Trend lactate; WBC count    Heme:   --CBC q12h  --TEG to eval for clotting ability  --Maintain Hgb > 7. 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 The patient is no longer actively bleeding, but is increasing presser requirements possible secondary to sepsis. WBC is improving, but Neutrophil count is increasing suggesting continued inflammatory response    Neurology:  -Alert and oriented at baseline  -Hx of seizures in setting of etoh withdrawal  -Sedated on propofol drip   -Neuro checks q4H      Pulm:  Fluid overload in the setting of massive transfusion on 7/30. Tolerated CPAP overnight  - Spontaneous breathing trial 30 minutes in the am   - Will consider additional trial and extubation     CV:  Hypovolemic shock 2/2 gastric variceal bleed on 7/29 in the setting of CRRT for fluid overload and subsequent hypoxia  -VS q1H  - Downtrending hemoglobin given 3 pRBCs, no evidence of change in volume status post transfusion assessment   -On Levo to maintain MAP > 65   -Kira for continuous BP monitoring  -Tele monitoring    GI:  #Acute on chronic liver failure: d/dx includes underlying infection (UA neg, BCx negative, CXR neg, dx para neg for SBP, biliary imaging negative, C diff negative), worsening EtOH hepatitis (MDF = 60 but no interval drinking since discharge and this is an acute change), vs vascular injury  #Decompensated alcoholic cirrhosis, gastric variceal bleed and ascites  - varices: no prior EGD, on nadolol at home, now holding due to shock   - ascites: present on exam, neg for SBP 7/26, on Lasix 20mg/spironolactone 25mg daily as outpatient--HOLD for hypotension   - HE: Gave rifaxamin overnight 8/1 and started on lactulose 8/2 had 3 subsequent bowel movements without blood   - HCC: no imaging, MRI without contrast is limited  - MELD-Na = 38 on 7/26  - Continue with 25% albumin   - D/c octreotide per GI   - F/U abdominal US per GI recommendations     #Hematemesis secondary to gastric variceal bleed  -Refractory to endoscopic intervention 7/29 s/p IR PARTO 7/30, blakemore 7/30, and 34 U PRBC, 25 U FFP, 20 U PLT, and 5 U cryo all on 7/30   -Trend CBC q4---tranfuse to maintain Hgb > 7, or for active bleed  -Trend Coags s7f--upwwepm with FFP, cryo as needed  -Blood suctioned from NG tube will defer to GI on further management   - Blakemore removed 7/30     #Transaminitis 2/2 alcoholic hepatitis  -Trend liver enzymes, bilirubin  -Follow up with hepatology and GI recommendations  -Continue with 25% albumin   -Continue protonix infusion       #Ascites, negative for SBP on admission  -Serial abdominal exams  -Paracentesis as needed  -Continue MVI, thiamine, folate   -Follow up Hepatology    Renal:  #MARQUISE: in the setting of liver cirrhosis with severe ascites. MARQUISE 2/2 prerenal (severe diarrhea + diuretics +decrease effective arterial blood volume from Cirrhosis) to r/o hepatorenal syndrome. 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 Baseline creatinine sCr 0.6-0.8. On Admission 9.64 -- s/p diagnostic paracentesis. -- sCr worsening - requiring HD  - First HD 7/28, Second HD 7/29---CVVHD started on arrival to MICU  - c/w IV albumin infusion   - hold diuretics at this time   - Monitor electrolytes and urine output.   - Avoid NSAIDs, ACEI/ARBS, RCA and nephrotoxins.   - Dose medications as per eGFR.     #Hypocalcemia, monitor in setting of massive transfusion  - Ionized Calcium daily   - keep Ionized calcium around 1.1-1.2  - Replete prn    ID:  #All cultures negative to date--      7/26: Blood Culture x 2 NGTD; Urine Culture: NGTD               Para negative for SBP      7/30 culture       8/1: fungitell sent   	  -- D/c Zosyn, started meropenem  -- Caspofungin  --Trend lactate; WBC count continues to be elevated     Heme:   --CBC q12h  --TEG to eval for clotting ability  --Maintain Hgb > 7. 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 The patient is no longer actively bleeding and presser requirement is stable, although some small blood clots are apparent from newly placed NG tube. Patient's clinical presentation is possible complicated by sepsis with leukocytosis >20,000 despite antimicrobial therapy.     Neurology:  -Alert and oriented at baseline  -Hx of seizures in setting of etoh withdrawal  -Sedated on propofol drip   -Neuro checks q4H      Pulm:  Fluid overload in the setting of massive transfusion on 7/30. Tolerated CPAP overnight  - Spontaneous breathing trial 30 minutes in the am   - Will consider additional trial and extubation     CV:  Hypovolemic shock 2/2 gastric variceal bleed on 7/29 in the setting of CRRT for fluid overload and subsequent hypoxia  -VS q1H  - Downtrending hemoglobin given 3 pRBCs, no evidence of change in volume status post transfusion assessment   -On Levo to maintain MAP > 65   -Byers for continuous BP monitoring  -Tele monitoring    GI:  #Acute on chronic liver failure: d/dx includes underlying infection (UA neg, BCx negative, CXR neg, dx para neg for SBP, biliary imaging negative, C diff negative), worsening EtOH hepatitis (MDF = 60 but no interval drinking since discharge and this is an acute change), vs vascular injury  #Decompensated alcoholic cirrhosis, gastric variceal bleed and ascites  - varices: no prior EGD, on nadolol at home, now holding due to shock   - ascites: present on exam, neg for SBP 7/26, on Lasix 20mg/spironolactone 25mg daily as outpatient--HOLD for hypotension   - HE: Gave rifaxamin overnight 8/1 and started on lactulose 8/2 had 3 subsequent bowel movements without blood   - HCC: no imaging, MRI without contrast is limited  - MELD-Na = 38 on 7/26  - Continue with 25% albumin   - D/c octreotide per GI   - F/U abdominal US per GI recommendations     #Hematemesis secondary to gastric variceal bleed  -Refractory to endoscopic intervention 7/29 s/p IR PARTO 7/30, blakemore 7/30, and 34 U PRBC, 25 U FFP, 20 U PLT, and 5 U cryo all on 7/30   -Trend CBC q4---tranfuse to maintain Hgb > 7, or for active bleed  -Trend Coags d6c--khdefnf with FFP, cryo as needed  -Blood suctioned from NG tube will defer to GI on further management   - Blakemore removed 7/30     #Transaminitis 2/2 alcoholic hepatitis  -Trend liver enzymes, bilirubin  -Follow up with hepatology and GI recommendations  -Continue with 25% albumin   -Continue protonix infusion       #Ascites, negative for SBP on admission  -Serial abdominal exams  -Paracentesis as needed  -Continue MVI, thiamine, folate   -Follow up Hepatology    Renal:  #MARQUISE: in the setting of liver cirrhosis with severe ascites. MARQUISE 2/2 prerenal (severe diarrhea + diuretics +decrease effective arterial blood volume from Cirrhosis) to r/o hepatorenal syndrome. 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 Baseline creatinine sCr 0.6-0.8. On Admission 9.64 -- s/p diagnostic paracentesis. -- sCr worsening - requiring HD  - First HD 7/28, Second HD 7/29---CVVHD started on arrival to MICU  - c/w IV albumin infusion   - hold diuretics at this time   - Monitor electrolytes and urine output.   - Avoid NSAIDs, ACEI/ARBS, RCA and nephrotoxins.   - Dose medications as per eGFR.     #Hypocalcemia, monitor in setting of massive transfusion  - Ionized Calcium daily   - keep Ionized calcium around 1.1-1.2  - Replete prn    ID:  #All cultures negative to date--      7/26: Blood Culture x 2 NGTD; Urine Culture: NGTD               Para negative for SBP      7/30 culture       8/1: fungitell sent   	  -- D/c Zosyn, started meropenem  -- Caspofungin  --Trend lactate; WBC count continues to be elevated     Heme:   --CBC q12h  --TEG to eval for clotting ability  --Maintain Hgb > 7. 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 The patient is no longer actively bleeding and presser requirement is stable, although some small blood clots are apparent from newly placed NG tube. Patient's clinical presentation is possible complicated by sepsis with leukocytosis >20,000 despite antimicrobial therapy.     Neurology:  -Alert and oriented at baseline  -Hx of seizures in setting of etoh withdrawal  -Sedated on propofol drip   -Neuro checks q4H      Pulm:  Fluid overload in the setting of massive transfusion on 7/30. Tolerated CPAP overnight  - Spontaneous breathing trial 30 minutes in the am   - Will consider additional trial and extubation     CV:  Hypovolemic shock 2/2 gastric variceal bleed on 7/29 in the setting of CRRT for fluid overload and subsequent hypoxia  -VS q1H  - Downtrending hemoglobin given 3 pRBCs, no evidence of change in volume status post transfusion assessment   -On Levo to maintain MAP > 65   -Humphrey for continuous BP monitoring  -Tele monitoring    GI:  #Acute on chronic liver failure: d/dx includes underlying infection (UA neg, BCx negative, CXR neg, dx para neg for SBP, biliary imaging negative, C diff negative), worsening EtOH hepatitis (MDF = 60 but no interval drinking since discharge and this is an acute change), vs vascular injury  #Decompensated alcoholic cirrhosis, gastric variceal bleed and ascites  - varices: no prior EGD, on nadolol at home, now holding due to shock   - ascites: present on exam, neg for SBP 7/26, on Lasix 20mg/spironolactone 25mg daily as outpatient--HOLD for hypotension   - HE: Gave rifaxamin overnight 8/1 and started on lactulose 8/2 had 3 subsequent bowel movements without blood   - HCC: no imaging, MRI without contrast is limited  - MELD-Na = 38 on 7/26  - Continue with 25% albumin   - D/c octreotide per GI   - F/U abdominal US per GI recommendations     #Hematemesis secondary to gastric variceal bleed  -Refractory to endoscopic intervention 7/29 s/p IR PARTO 7/30, blakemore 7/30, and 34 U PRBC, 25 U FFP, 20 U PLT, and 5 U cryo all on 7/30   -Trend CBC q4---tranfuse to maintain Hgb > 7, or for active bleed  -Trend Coags d4l--byikzna with FFP, cryo as needed  -Blood suctioned from NG tube will defer to GI on further management   - Blakemore removed 7/30     #Transaminitis 2/2 alcoholic hepatitis  -Trend liver enzymes, bilirubin  -Follow up with hepatology and GI recommendations  -Continue with 25% albumin   -Continue protonix infusion       #Ascites, negative for SBP on admission  -Serial abdominal exams  -Paracentesis as needed  -Continue MVI, thiamine, folate   -Follow up Hepatology    Renal:  #MARQUISE: in the setting of liver cirrhosis with severe ascites. MARQUISE 2/2 prerenal (severe diarrhea + diuretics +decrease effective arterial blood volume from Cirrhosis) to r/o hepatorenal syndrome. 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 Baseline creatinine sCr 0.6-0.8. On Admission 9.64 -- s/p diagnostic paracentesis. -- sCr worsening - requiring HD  - First HD 7/28, Second HD 7/29---CVVHD started on arrival to MICU  - c/w IV albumin infusion   - hold diuretics at this time   - Monitor electrolytes and urine output.   - Avoid NSAIDs, ACEI/ARBS, RCA and nephrotoxins.   - Dose medications as per eGFR.     #Hypocalcemia, monitor in setting of massive transfusion  - Ionized Calcium daily   - keep Ionized calcium around 1.1-1.2  - Replete prn    ID:  #All cultures negative to date--      7/26: Blood Culture x 2 NGTD; Urine Culture: NGTD               Para negative for SBP      7/30 culture       8/1: fungitell sent   	  -- D/c Zosyn, started meropenem no change in leukocytosisconferring with confer with family about risk factors for ESBL will switch back to zosyn if none exist  -- Caspofungin, will d/c if fungitell comes back negative or clinical utility   --Trend lactate; WBC count continues to be elevated     Heme:   --CBC q12h  --TEG to eval for clotting ability  --Maintain Hgb > 7. 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 The patient is no longer actively bleeding and presser requirement is stable, although some small blood clots are apparent from newly placed NG tube. Patient's clinical presentation is possiblly complicated by sepsis with leukocytosis >20,000 with uptrend despite antimicrobial therapy and resolved bleeding.     Neurology:  -Alert and oriented at baseline  -Hx of seizures in setting of etoh withdrawal  -Sedated on propofol drip   -Neuro checks q4H    Pulm:  Fluid overload in the setting of massive transfusion on 7/30. Tolerated CPAP overnight  - Spontaneous breathing trial 30 minutes in the am   - Will consider additional spont trial and extubation in next 24-48 hours     CV:  Hypovolemic shock 2/2 gastric variceal bleed on 7/29 in the setting of CRRT for fluid overload and subsequent hypoxia  -VS q1H  - Downtrending hemoglobin given 3 pRBCs, no evidence of change in volume status post transfusion assessment   -On Levo to maintain MAP > 65   -Kira for continuous BP monitoring  -Tele monitoring    GI:  #Acute on chronic liver failure: d/dx includes underlying infection (UA neg, BCx negative, CXR neg, dx para neg for SBP, biliary imaging negative, C diff negative), worsening EtOH hepatitis (MDF = 60 but no interval drinking since discharge and this is an acute change), vs vascular injury  #Decompensated alcoholic cirrhosis, gastric variceal bleed and ascites  - varices: no prior EGD, on nadolol at home, now holding due to shock   - ascites: present on exam, neg for SBP 7/26, on Lasix 20mg/spironolactone 25mg daily as outpatient--HOLD for hypotension   - HE: Gave rifaxamin overnight 8/1 and started on lactulose 8/2 had 3 subsequent bowel movements without blood   - HCC: no imaging, MRI without contrast is limited  - MELD-Na = 38 on 7/26  - Continue with 25% albumin   - D/c octreotide per GI   - F/U abdominal US per GI recommendations     #Hematemesis secondary to gastric variceal bleed  -Refractory to endoscopic intervention 7/29 s/p IR PARTO 7/30, blakemore 7/30, and 34 U PRBC, 25 U FFP, 20 U PLT, and 5 U cryo all on 7/30   -Trend CBC q4---tranfuse to maintain Hgb > 7, or for active bleed  -Trend Coags g9n--gjzvgfi with FFP, cryo as needed  -Blood suctioned from NG tube will defer to GI on further management   - Blakemore removed 7/30     #Transaminitis 2/2 alcoholic hepatitis  -Trend liver enzymes, bilirubin  -Follow up with hepatology and GI recommendations  -Continue with 25% albumin   -Continue protonix infusion       #Ascites, negative for SBP on admission  -Serial abdominal exams  -Paracentesis as needed  -Continue MVI, thiamine, folate   -Follow up Hepatology    Renal:  #MARQUISE: in the setting of liver cirrhosis with severe ascites. MARQUISE 2/2 prerenal (severe diarrhea + diuretics +decrease effective arterial blood volume from Cirrhosis) to r/o hepatorenal syndrome. 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 Baseline creatinine sCr 0.6-0.8. On Admission 9.64 -- s/p diagnostic paracentesis. -- sCr worsening - requiring HD  - First HD 7/28, Second HD 7/29---CVVHD started on arrival to MICU  - c/w IV albumin infusion   - hold diuretics at this time   - Monitor electrolytes and urine output.   - Avoid NSAIDs, ACEI/ARBS, RCA and nephrotoxins.   - Dose medications as per eGFR.     #Hypocalcemia, monitor in setting of massive transfusion  - Ionized Calcium daily   - keep Ionized calcium around 1.1-1.2  - Replete prn    ID:  #All cultures negative to date--      7/26: Blood Culture x 2 NGTD; Urine Culture: NGTD               Para negative for SBP      7/30 culture       8/1: fungitell sent   	  -- D/c Zosyn, started meropenem no change in leukocytosisconferring with confer with family about risk factors for ESBL will switch back to zosyn if none exist  -- Caspofungin, will d/c as fungal infection appears unlikely given increasing leukocytosis with concurrent increase in Neutrophils   --Trend lactate; WBC count continues to be elevated     Heme:   --CBC q12h  --TEG to eval for clotting ability  --Maintain Hgb > 7. Transfuse as needed  --Trend Coags--reverse coagulopathy as needed or in event of bleed  TOTAL TRANSFUSION REQUIREMENTS: 34 PRBC, 25  FFP, 5 PLT, and 2 Cryo     Endo:  --Glucose q6h  --Monitor for hypoglycemia in setting of liver failure

## 2020-08-03 NOTE — CHART NOTE - NSCHARTNOTEFT_GEN_A_CORE
Upon Nutritional Assessment by the Registered Dietitian your patient was determined to meet criteria / has evidence of the following diagnosis/diagnoses:          [x]  Mild Protein Calorie Malnutrition        [ ]  Moderate Protein Calorie Malnutrition        [ ] Severe Protein Calorie Malnutrition        [ ] Unspecified Protein Calorie Malnutrition        [ ] Underweight / BMI <19        [ ] Morbid Obesity / BMI > 40      Findings as based on:  [x] Comprehensive nutrition assessment   [x] Nutrition Focused Physical Exam  [x] Other: <50% of estimated energy requirement x5days and moderate muscle wasting to temples      Nutrition Plan/Recommendations:    1) RD to follow for provision of nutrition.   2) If EN warranted, recommend Vital AF at goal rate of 85cc/hr x18 hours providing 1530cc, 1836kcals (22kcals/kg), 115gm protein (1.4gm protein/kg), and 1241cc free water via route designated by provider.   3) Continue thiamine as ordered    Mitzi Frye MS, RD, Pager #[phone]   PROVIDER Section:   By signing this assessment you are acknowledging and agree with the diagnosis/diagnoses assigned by the Registered Dietitian    Comments: Upon Nutritional Assessment by the Registered Dietitian your patient was determined to meet criteria / has evidence of the following diagnosis/diagnoses:          [ ]  Mild Protein Calorie Malnutrition        [ ]  Moderate Protein Calorie Malnutrition        [x] Severe Protein Calorie Malnutrition        [ ] Unspecified Protein Calorie Malnutrition        [ ] Underweight / BMI <19        [ ] Morbid Obesity / BMI > 40      Findings as based on:  [x] Comprehensive nutrition assessment   [x] Nutrition Focused Physical Exam  [x] Other: <50% of estimated energy requirement x5days and moderate muscle wasting to temples      Nutrition Plan/Recommendations:    1) RD to follow for provision of nutrition.   2) If EN warranted, recommend Vital AF at goal rate of 85cc/hr x18 hours providing 1530cc, 1836kcals (22kcals/kg), 115gm protein (1.4gm protein/kg), and 1241cc free water via route designated by provider.   3) Continue thiamine as ordered    Mitzi Frye MS, RD, Pager #[phone]   PROVIDER Section:   By signing this assessment you are acknowledging and agree with the diagnosis/diagnoses assigned by the Registered Dietitian    Comments:

## 2020-08-03 NOTE — PROGRESS NOTE ADULT - SUBJECTIVE AND OBJECTIVE BOX
Interventional Radiology Follow- Up Note    This is a 39y Male s/p embolization of gastric varices and blakemore placement on 7/29/2020 in Interventional Radiology with Dr. Ruiz, Dr. Cohn and Dr. Gupta.     Patient seen and examined @ bedside. Patient remains on multiple pressors, CVVHD, and mechanical ventilation.    Vitals: T(F): 97.9 (08-03-20 @ 07:00), Max: 97.9 (08-03-20 @ 07:00)  HR: 87 (08-03-20 @ 09:00) (70 - 93)  RR: 20 (08-03-20 @ 09:00) (12 - 23)  SpO2: 99% (08-03-20 @ 09:00) (88% - 100%)  Wt(kg): --    LABS:                        9.6    26.95 )-----------( 48       ( 03 Aug 2020 05:57 )             29.3     08-03    135  |  100  |  10  ----------------------------<  134<H>  4.1   |  19<L>  |  2.09<H>    Ca    8.4      03 Aug 2020 05:57  Phos  3.7     08-03  Mg     2.5     08-03    TPro  5.5<L>  /  Alb  4.1  /  TBili  16.6<H>  /  DBili  x   /  AST  236<H>  /  ALT  43  /  AlkPhos  97  08-03  PT/INR - ( 03 Aug 2020 05:57 )   PT: 21.5 sec;   INR: 1.86 ratio    PTT - ( 03 Aug 2020 05:57 )  PTT:44.6 sec      PHYSICAL EXAM:  General: Intubated and sedated, icteric  Vascular access: R IJ shiley catheter  Abdomen: soft, NTND  Extremities: Right groin clean, dry and intact, no evidence of hematoma, bilateral femoral pulses +2.    Assessment/Plan:  Impression: 39M w/ decompensated EtOH cirrhosis, presenting w/ acute on chronic liver failure of unclear etiology. Course c/b massive GIB from gastric varix requiring 25u pRBC, s/p glue injection and plug-assisted retrograde transvenous embolization of gastric varices.    -patient following on all 4's when assessed at bedside  -continue global care per ICU team  -trend h/h; transfuse accordingly  -trend vitals/labs  -case discussed with Dr. Cohn  -IR signing off at this point- please reconsult PRN     Please call IR at extension [phone] with any questions, concerns, or issues regarding above.        ERNESTINA North-BC  Spectra # [phone]

## 2020-08-03 NOTE — PROGRESS NOTE ADULT - ASSESSMENT
39M w/ decompensated EtOH cirrhosis, presenting w/ acute on chronic liver failure of unclear etiology. Course c/b massive GIB from gastric varix requiring 25u pRBC, s/p glue injection and PARTO by IR. Hb stable post procedures, no e/o bleeding, pressor requirement improving.     Impression:  #Acute on chronic liver failure: d/dx includes underlying infection (UA neg, BCx NGTD from this admission, CXR neg, dx para neg for SBP, biliary imaging negative, C diff negative) vs worsening EtOH hepatitis (MDF = 60 but no interval drinking since discharge and this is an acute change)  #Cirrhosis due to EtOH, decompensated by ascites  - varices: no esophageal varices on EGD 7/29, actively bleeding gastric varix s/p glue injection and PARTO by IR, bleeding appears to be controlled  - ascites: present on exam, neg for SBP 7/26, on Lasix 20mg/spironolactone 25mg daily as outpatient, s/p 1.5L removed during PARTO 7/29  - HE: none on exam  - HCC: no imaging, MRI without contrast is limited  - MELD-Na = 38 on 8/3  #Hypotension – still requiring pressors, d/dx sepsis? Does not appear to be bleeding related, pressor requirement improving  #Presumed infection - on merrem and caspofungin  #High PEEP requirement – required paracentesis 7/29 for difficulty oxygenating during PARTO  #Pruritis – likely due to elevated bilirubin, improved  #MARQUISE: unclear etiology, c/f bilirubin pigment nephropathy vs acute tubular necrosis vs obstructive uropathy. Reny is 53, making HRS less likely. On CVVH    Recommendations:  - trend Hb, transfuse to Hb > 7 or if unstable  - recommend NGT placement for nutrition  - IV PPI BID  - c/w broad spectrum abx + caspofungin  - c/w CVVH, f/u nephrology recs  - f/u renal recs  - hold nadolol and diuretics  - continue with rifaximin  - c/w MVI, thiamine and folate  - trend CMP, CBC, INR daily  - hepatology will continue to follow    Ross Shook  Gastroenterology Fellow  AT NIGHT AND ON WEEKENDS:  Contact on-call GI fellow via answering service ([phone]) from 5pm-8am and on weekends/holidays  MONDAY-FRIDAY 8AM-5PM:  Available via [employer] Teams  Call [phone] ([facility]) or Page [phone] ([facility]) from 8am-5pm M-F

## 2020-08-03 NOTE — PROGRESS NOTE ADULT - SUBJECTIVE AND OBJECTIVE BOX
[facility] DIVISION OF KIDNEY DISEASES AND HYPERTENSION   -- FOLLOW UP NOTE --   Bhupinder Truong  Nephrology Fellow  Pager NS: [phone]   /  Pager [place]: [phone]  (after 5pm or weekend please page the on-call fellow)  --------------------------------------------------------------------------------  24 hour events/subjective:  - overnight NGT placed received rifaxamin/lactulose and placed on CPAP, vitals afebrile no hypotensive episode, total UOP ashton 10 cc and CRRT 3.7Lin the past 24hr  - patient seen and examined at bedside this morning intubated on CRRT (no clotting issues)  - vitals/lab/medications reviewed, noted for NH3 130    PAST HISTORY  --------------------------------------------------------------------------------  No significant changes to PMH, PSH, FHx, SHx, unless otherwise noted    ALLERGIES & MEDICATIONS  --------------------------------------------------------------------------------  Allergies    No Known Allergies    Intolerances    Standing Inpatient Medications  chlorhexidine 0.12% Liquid 15 milliLiter(s) Oral Mucosa every 12 hours  chlorhexidine 4% Liquid 1 Application(s) Topical <User Schedule>  dextrose 10%. 1000 milliLiter(s) IV Continuous <Continuous>  fentaNYL   Infusion... 0.5 MICROgram(s)/kG/Hr IV Continuous <Continuous>  lactulose Syrup 30 Gram(s) Oral every 6 hours  meropenem  IVPB 1000 milliGRAM(s) IV Intermittent every 8 hours  norepinephrine Infusion 0.05 MICROgram(s)/kG/Min IV Continuous <Continuous>  nystatin Powder 1 Application(s) Topical two times a day  pantoprazole Infusion 8 mG/Hr IV Continuous <Continuous>  propofol Infusion 5 MICROgram(s)/kG/Min IV Continuous <Continuous>  rifAXIMin 550 milliGRAM(s) Oral two times a day  thiamine IVPB 500 milliGRAM(s) IV Intermittent daily    PRN Inpatient Medications  sodium chloride 0.9% lock flush 10 milliLiter(s) IV Push every 1 hour PRN    REVIEW OF SYSTEMS  unable to obtain d/t intubated/sedated    VITALS/PHYSICAL EXAM  --------------------------------------------------------------------------------  T(C): 37 (08-03-20 @ 11:00), Max: 37 (08-03-20 @ 11:00)  HR: 82 (08-03-20 @ 11:30) (70 - 92)  BP: --  RR: 20 (08-03-20 @ 11:30) (12 - 22)  SpO2: 99% (08-03-20 @ 11:30) (94% - 100%)  Wt(kg): --    08-02-20 @ 07:01  -  08-03-20 @ 07:00  --------------------------------------------------------  IN: 3022.5 mL / OUT: 3956 mL / NET: -933.5 mL    08-03-20 @ 07:01  -  08-03-20 @ 11:53  --------------------------------------------------------  IN: 482.6 mL / OUT: 0 mL / NET: 482.6 mL    Physical Exam:              Gen: intubated/sedated  	HEENT: intubated, +NGT  	Pulm: CTA b/l  	CV: non tachycardic, RRR, S1S2  	GI: +BS, soft, distended  	: ashton in place  	MSK: Warm, no edema              Neuro: sedated  	Psych: sedated  	Skin: Warm, no cyanosis, jaundice appearing  	Vascular access: RIJ non tunnel hd catheter    LABS/STUDIES  --------------------------------------------------------------------------------              9.6    26.95 >-----------<  48       [08-03-20 @ 05:57]              29.3     135  |  100  |  10  ----------------------------<  134      [08-03-20 @ 05:57]  4.1   |  19  |  2.09        Ca     8.4     [08-03-20 @ 05:57]      Mg     2.5     [08-03-20 @ 05:57]      Phos  3.7     [08-03-20 @ 05:57]    TPro  5.5  /  Alb  4.1  /  TBili  16.6  /  DBili  x   /  AST  236  /  ALT  43  /  AlkPhos  97  [08-03-20 @ 05:57]    PT/INR: PT 21.5 , INR 1.86       [08-03-20 @ 05:57]  PTT: 44.6       [08-03-20 @ 05:57]    Creatinine Trend:  SCr 2.09 [08-03 @ 05:57]  SCr 2.02 [08-03 @ 00:26]  SCr 2.03 [08-02 @ 17:41]  SCr 2.13 [08-02 @ 11:49]  SCr 2.29 [08-02 @ 00:19]    Urinalysis - [07-29-20 @ 10:44]      Color Light Yellow / Appearance Turbid / SG 1.010 / pH 6.0      Gluc Negative / Ketone Negative  / Bili Small / Urobili <2 mg/dL       Blood Large / Protein Negative / Leuk Est Negative / Nitrite Negative      RBC 12 / WBC 1 / Hyaline 0 / Gran  / Sq Epi  / Non Sq Epi 0 / Bacteria Negative    Urine Creatinine 196      [07-27-20 @ 18:42]  Urine Protein 101      [07-27-20 @ 18:42]  Urine Sodium 53      [07-27-20 @ 18:42]  Urine Potassium 51      [07-27-20 @ 18:42]    Iron 34, TIBC 124, %sat 27      [07-14-20 @ 10:34]  Ferritin 812      [07-12-20 @ 08:58]  PTH -- (Ca 6.2)      [07-29-20 @ 09:18]   223    HBsAg Nonreact      [07-12-20 @ 01:16]  HCV 0.14, Nonreact      [07-12-20 @ 01:16]  HIV Nonreact      [07-15-20 @ 10:47]    SHAUN: titer Negative, pattern --      [07-12-20 @ 09:37]  Free Light Chains: kappa 2.35, lambda 2.06, ratio = 1.14      [07-12 @ 08:58]

## 2020-08-03 NOTE — PROGRESS NOTE ADULT - SUBJECTIVE AND OBJECTIVE BOX
INTERVAL HPI/OVERNIGHT EVENTS:    SUBJECTIVE: Patient seen and examined at bedside. Overnight, pt got NGT, rifaxamin/lactulose, and was placed on CPAP. This AM pt is sedated and unable to answer questions.      VITAL SIGNS:  ICU Vital Signs Last 24 Hrs  T(C): 36.8 (02 Aug 2020 07:45), Max: 36.8 (02 Aug 2020 00:00)  T(F): 98.2 (02 Aug 2020 07:45), Max: 98.2 (02 Aug 2020 00:00)  HR: 83 (02 Aug 2020 10:15) (70 - 89)  BP: --  BP(mean): --  ABP: 114/46 (02 Aug 2020 10:15) (93/33 - 130/68)  ABP(mean): 67 (02 Aug 2020 10:15) (51 - 88)  RR: 20 (02 Aug 2020 10:15) (20 - 25)  SpO2: 93% (02 Aug 2020 10:15) (88% - 100%)    Mode: AC/ CMV (Assist Control/ Continuous Mandatory Ventilation), RR (machine): 20, TV (machine): 450, FiO2: 40, PEEP: 5, ITime: 1, MAP: 11, PIP: 28  Plateau pressure:   P/F ratio:     08-01 @ 07:01  -  08-02 @ 07:00  --------------------------------------------------------  IN: 3226.2 mL / OUT: 3549 mL / NET: -322.8 mL    08-02 @ 07:01  -  08-02 @ 11:29  --------------------------------------------------------  IN: 366.5 mL / OUT: 320 mL / NET: 46.5 mL      CAPILLARY BLOOD GLUCOSE      POCT Blood Glucose.: 83 mg/dL (01 Aug 2020 20:14)    ECG:    PHYSICAL EXAM:    General: NAD  HEENT: Icteric. PERRL   Neck: supple  Respiratory: CTA b/l. Vented breath sounds.  Cardiovascular: +S1/S2; RRR   Abdomen: Abdomen moderately distended.  Extremities: Warm under rayshawn hugger. Dorsalis pedis 2+ bilaterally  Skin: Icteric  Neurological: Intubated and less sedated tracking movement and able to nod his head.    MEDICATIONS:  MEDICATIONS  (STANDING):  caspofungin IVPB 35 milliGRAM(s) IV Intermittent every 24 hours  chlorhexidine 0.12% Liquid 15 milliLiter(s) Oral Mucosa every 12 hours  chlorhexidine 4% Liquid 1 Application(s) Topical <User Schedule>  CRRT Treatment    <Continuous>  dextrose 10%. 1000 milliLiter(s) (40 mL/Hr) IV Continuous <Continuous>  fentaNYL   Infusion... 0.5 MICROgram(s)/kG/Hr (2.07 mL/Hr) IV Continuous <Continuous>  lactulose Syrup 30 Gram(s) Oral every 6 hours  meropenem  IVPB 1000 milliGRAM(s) IV Intermittent every 12 hours  norepinephrine Infusion 0.05 MICROgram(s)/kG/Min (3.88 mL/Hr) IV Continuous <Continuous>  nystatin Powder 1 Application(s) Topical two times a day  pantoprazole Infusion 8 mG/Hr (10 mL/Hr) IV Continuous <Continuous>  Phoxillum Filtration BK 4 / 2.5 5000 milliLiter(s) (1200 mL/Hr) CRRT <Continuous>  Phoxillum Filtration BK 4 / 2.5 5000 milliLiter(s) (200 mL/Hr) CRRT <Continuous>  Phoxillum Filtration BK 4 / 2.5 5000 milliLiter(s) (1500 mL/Hr) CRRT <Continuous>  propofol Infusion 5 MICROgram(s)/kG/Min (2.48 mL/Hr) IV Continuous <Continuous>  rifAXIMin 550 milliGRAM(s) Oral two times a day  thiamine IVPB 500 milliGRAM(s) IV Intermittent daily    MEDICATIONS  (PRN):  sodium chloride 0.9% lock flush 10 milliLiter(s) IV Push every 1 hour PRN Pre/post blood products, medications, blood draw, and to maintain line patency      ALLERGIES:  Allergies    No Known Allergies    Intolerances        LABS:                        9.2    23.59 )-----------( 46       ( 02 Aug 2020 06:14 )             28.0     08-02    136  |  100  |  9   ----------------------------<  92  3.9   |  19<L>  |  2.29<H>    Ca    8.3<L>      02 Aug 2020 00:19  Phos  3.4     08-02  Mg     2.4     08-02    TPro  5.6<L>  /  Alb  4.3  /  TBili  15.7<H>  /  DBili  x   /  AST  210<H>  /  ALT  33  /  AlkPhos  78  08-02    PT/INR - ( 02 Aug 2020 06:14 )   PT: 21.8 sec;   INR: 1.89 ratio         PTT - ( 02 Aug 2020 06:14 )  PTT:46.4 sec      RADIOLOGY & ADDITIONAL TESTS: Reviewed. INTERVAL HPI/OVERNIGHT EVENTS: Overnight, pt got NGT, rifaxamin/lactulose, and was placed on CPAP. This morning the pt continues to be sedated and unable to answer questions. Nurse reports mild amounts of blood and clots suctioned from NG tube. Hemoglobin had consistent small and 3 units were transfused.     SUBJECTIVE: Patient seen and examined at bedside.       VITAL SIGNS:  ICU Vital Signs Last 24 Hrs  T(C): 36.8 (02 Aug 2020 07:45), Max: 36.8 (02 Aug 2020 00:00)  T(F): 98.2 (02 Aug 2020 07:45), Max: 98.2 (02 Aug 2020 00:00)  HR: 83 (02 Aug 2020 10:15) (70 - 89)  BP: --  BP(mean): --  ABP: 114/46 (02 Aug 2020 10:15) (93/33 - 130/68)  ABP(mean): 67 (02 Aug 2020 10:15) (51 - 88)  RR: 20 (02 Aug 2020 10:15) (20 - 25)  SpO2: 93% (02 Aug 2020 10:15) (88% - 100%)    Mode: AC/ CMV (Assist Control/ Continuous Mandatory Ventilation), RR (machine): 20, TV (machine): 450, FiO2: 40, PEEP: 5, ITime: 1, MAP: 11, PIP: 28  Plateau pressure:   P/F ratio:     08-01 @ 07:01  -  08-02 @ 07:00  --------------------------------------------------------  IN: 3226.2 mL / OUT: 3549 mL / NET: -322.8 mL    08-02 @ 07:01  -  08-02 @ 11:29  --------------------------------------------------------  IN: 366.5 mL / OUT: 320 mL / NET: 46.5 mL      CAPILLARY BLOOD GLUCOSE      POCT Blood Glucose.: 83 mg/dL (01 Aug 2020 20:14)    ECG:    PHYSICAL EXAM:    General: NAD  HEENT: Icteric. PERRL   Neck: supple  Respiratory: CTA b/l. Vented breath sounds.  Cardiovascular: +S1/S2; RRR   Abdomen: Abdomen moderately distended, improved from prior exam 8/1  Extremities: Warm under rayshawn hugger. Dorsalis pedis 2+ bilaterally, Radial 2+ bilaterally   Skin: Icteric  Neurological: Intubated and sedated without ability to answer questions     MEDICATIONS:  MEDICATIONS  (STANDING):  caspofungin IVPB 35 milliGRAM(s) IV Intermittent every 24 hours  chlorhexidine 0.12% Liquid 15 milliLiter(s) Oral Mucosa every 12 hours  chlorhexidine 4% Liquid 1 Application(s) Topical <User Schedule>  CRRT Treatment    <Continuous>  dextrose 10%. 1000 milliLiter(s) (40 mL/Hr) IV Continuous <Continuous>  fentaNYL   Infusion... 0.5 MICROgram(s)/kG/Hr (2.07 mL/Hr) IV Continuous <Continuous>  lactulose Syrup 30 Gram(s) Oral every 6 hours  meropenem  IVPB 1000 milliGRAM(s) IV Intermittent every 12 hours  norepinephrine Infusion 0.05 MICROgram(s)/kG/Min (3.88 mL/Hr) IV Continuous <Continuous>  nystatin Powder 1 Application(s) Topical two times a day  pantoprazole Infusion 8 mG/Hr (10 mL/Hr) IV Continuous <Continuous>  Phoxillum Filtration BK 4 / 2.5 5000 milliLiter(s) (1200 mL/Hr) CRRT <Continuous>  Phoxillum Filtration BK 4 / 2.5 5000 milliLiter(s) (200 mL/Hr) CRRT <Continuous>  Phoxillum Filtration BK 4 / 2.5 5000 milliLiter(s) (1500 mL/Hr) CRRT <Continuous>  propofol Infusion 5 MICROgram(s)/kG/Min (2.48 mL/Hr) IV Continuous <Continuous>  rifAXIMin 550 milliGRAM(s) Oral two times a day  thiamine IVPB 500 milliGRAM(s) IV Intermittent daily    MEDICATIONS  (PRN):  sodium chloride 0.9% lock flush 10 milliLiter(s) IV Push every 1 hour PRN Pre/post blood products, medications, blood draw, and to maintain line patency      ALLERGIES:  Allergies    No Known Allergies    Intolerances        LABS:                        9.2    23.59 )-----------( 46       ( 02 Aug 2020 06:14 )             28.0     08-02    136  |  100  |  9   ----------------------------<  92  3.9   |  19<L>  |  2.29<H>    Ca    8.3<L>      02 Aug 2020 00:19  Phos  3.4     08-02  Mg     2.4     08-02    TPro  5.6<L>  /  Alb  4.3  /  TBili  15.7<H>  /  DBili  x   /  AST  210<H>  /  ALT  33  /  AlkPhos  78  08-02    PT/INR - ( 02 Aug 2020 06:14 )   PT: 21.8 sec;   INR: 1.89 ratio         PTT - ( 02 Aug 2020 06:14 )  PTT:46.4 sec      RADIOLOGY & ADDITIONAL TESTS: Reviewed. INTERVAL HPI/OVERNIGHT EVENTS: Overnight, pt got NGT, rifaxamin/lactulose, and was placed on CPAP. This morning the pt continues to be sedated and unable to answer questions. Nurse reports mild amounts of blood and clots suctioned from NG tube.     SUBJECTIVE: Patient seen and examined at bedside.       VITAL SIGNS:  ICU Vital Signs Last 24 Hrs  T(C): 36.8 (02 Aug 2020 07:45), Max: 36.8 (02 Aug 2020 00:00)  T(F): 98.2 (02 Aug 2020 07:45), Max: 98.2 (02 Aug 2020 00:00)  HR: 83 (02 Aug 2020 10:15) (70 - 89)  BP: --  BP(mean): --  ABP: 114/46 (02 Aug 2020 10:15) (93/33 - 130/68)  ABP(mean): 67 (02 Aug 2020 10:15) (51 - 88)  RR: 20 (02 Aug 2020 10:15) (20 - 25)  SpO2: 93% (02 Aug 2020 10:15) (88% - 100%)    Mode: AC/ CMV (Assist Control/ Continuous Mandatory Ventilation), RR (machine): 20, TV (machine): 450, FiO2: 40, PEEP: 5, ITime: 1, MAP: 11, PIP: 28  Plateau pressure:   P/F ratio:     08-01 @ 07:01  -  08-02 @ 07:00  --------------------------------------------------------  IN: 3226.2 mL / OUT: 3549 mL / NET: -322.8 mL    08-02 @ 07:01  -  08-02 @ 11:29  --------------------------------------------------------  IN: 366.5 mL / OUT: 320 mL / NET: 46.5 mL      CAPILLARY BLOOD GLUCOSE      POCT Blood Glucose.: 83 mg/dL (01 Aug 2020 20:14)    ECG:    PHYSICAL EXAM:    General: NAD  HEENT: Icteric. PERRL   Neck: supple  Respiratory: CTA b/l. Vented breath sounds.  Cardiovascular: +S1/S2; RRR   Abdomen: Abdomen moderately distended, improved from prior exam 8/1  Extremities: Warm under rayshawn hugger. Dorsalis pedis 2+ bilaterally, Radial 2+ bilaterally   Skin: Icteric  Neurological: Intubated and sedated without ability to answer questions     MEDICATIONS:  MEDICATIONS  (STANDING):  caspofungin IVPB 35 milliGRAM(s) IV Intermittent every 24 hours  chlorhexidine 0.12% Liquid 15 milliLiter(s) Oral Mucosa every 12 hours  chlorhexidine 4% Liquid 1 Application(s) Topical <User Schedule>  CRRT Treatment    <Continuous>  dextrose 10%. 1000 milliLiter(s) (40 mL/Hr) IV Continuous <Continuous>  fentaNYL   Infusion... 0.5 MICROgram(s)/kG/Hr (2.07 mL/Hr) IV Continuous <Continuous>  lactulose Syrup 30 Gram(s) Oral every 6 hours  meropenem  IVPB 1000 milliGRAM(s) IV Intermittent every 12 hours  norepinephrine Infusion 0.05 MICROgram(s)/kG/Min (3.88 mL/Hr) IV Continuous <Continuous>  nystatin Powder 1 Application(s) Topical two times a day  pantoprazole Infusion 8 mG/Hr (10 mL/Hr) IV Continuous <Continuous>  Phoxillum Filtration BK 4 / 2.5 5000 milliLiter(s) (1200 mL/Hr) CRRT <Continuous>  Phoxillum Filtration BK 4 / 2.5 5000 milliLiter(s) (200 mL/Hr) CRRT <Continuous>  Phoxillum Filtration BK 4 / 2.5 5000 milliLiter(s) (1500 mL/Hr) CRRT <Continuous>  propofol Infusion 5 MICROgram(s)/kG/Min (2.48 mL/Hr) IV Continuous <Continuous>  rifAXIMin 550 milliGRAM(s) Oral two times a day  thiamine IVPB 500 milliGRAM(s) IV Intermittent daily    MEDICATIONS  (PRN):  sodium chloride 0.9% lock flush 10 milliLiter(s) IV Push every 1 hour PRN Pre/post blood products, medications, blood draw, and to maintain line patency      ALLERGIES:  Allergies    No Known Allergies    Intolerances        LABS:                        9.2    23.59 )-----------( 46       ( 02 Aug 2020 06:14 )             28.0     08-02    136  |  100  |  9   ----------------------------<  92  3.9   |  19<L>  |  2.29<H>    Ca    8.3<L>      02 Aug 2020 00:19  Phos  3.4     08-02  Mg     2.4     08-02    TPro  5.6<L>  /  Alb  4.3  /  TBili  15.7<H>  /  DBili  x   /  AST  210<H>  /  ALT  33  /  AlkPhos  78  08-02    PT/INR - ( 02 Aug 2020 06:14 )   PT: 21.8 sec;   INR: 1.89 ratio         PTT - ( 02 Aug 2020 06:14 )  PTT:46.4 sec      RADIOLOGY & ADDITIONAL TESTS: Reviewed.

## 2020-08-04 NOTE — PROGRESS NOTE ADULT - SUBJECTIVE AND OBJECTIVE BOX
Chief Complaint:  Patient is a 39y old  Male who presents with a chief complaint of abdominal pain (04 Aug 2020 08:06)    Reason for consult: aclf    Interval Events: Pressor requirement stable/improving, no e/o continued bleeding, WBC continues to uptrend    Hospital Medications:  chlorhexidine 0.12% Liquid 15 milliLiter(s) Oral Mucosa every 12 hours  chlorhexidine 4% Liquid 1 Application(s) Topical <User Schedule>  CRRT Treatment    <Continuous>  dexMEDEtomidine Infusion 0.2 MICROgram(s)/kG/Hr IV Continuous <Continuous>  dextrose 10%. 1000 milliLiter(s) IV Continuous <Continuous>  fentaNYL   Infusion... 0.5 MICROgram(s)/kG/Hr IV Continuous <Continuous>  lactulose Syrup 30 Gram(s) Oral every 6 hours  meropenem  IVPB 1000 milliGRAM(s) IV Intermittent every 8 hours  norepinephrine Infusion 0.05 MICROgram(s)/kG/Min IV Continuous <Continuous>  nystatin Powder 1 Application(s) Topical two times a day  pantoprazole Infusion 8 mG/Hr IV Continuous <Continuous>  Phoxillum Filtration BK 4 / 2.5 5000 milliLiter(s) CRRT <Continuous>  Phoxillum Filtration BK 4 / 2.5 5000 milliLiter(s) CRRT <Continuous>  Phoxillum Filtration BK 4 / 2.5 5000 milliLiter(s) CRRT <Continuous>  propofol Infusion 5 MICROgram(s)/kG/Min IV Continuous <Continuous>  rifAXIMin 550 milliGRAM(s) Oral two times a day  sodium chloride 0.9% lock flush 10 milliLiter(s) IV Push every 1 hour PRN  thiamine IVPB 500 milliGRAM(s) IV Intermittent daily      ROS: Pt unable to provide    PHYSICAL EXAM:   Vital Signs:  Vital Signs Last 24 Hrs  T(C): 36 (04 Aug 2020 07:00), Max: 37 (03 Aug 2020 11:00)  T(F): 96.8 (04 Aug 2020 07:00), Max: 98.6 (03 Aug 2020 11:00)  HR: 84 (04 Aug 2020 08:41) (71 - 91)  BP: --  BP(mean): --  RR: 18 (04 Aug 2020 08:39) (14 - 28)  SpO2: 100% (04 Aug 2020 08:41) (95% - 100%)  Daily     Daily Weight in k.4 (04 Aug 2020 05:00)    GENERAL: no acute distress, intubated  NEURO: sedated, follows some commands  HEENT: icteric sclera, no conjunctival pallor appreciated  CHEST: no respiratory distress, no accessory muscle use  CARDIAC: regular rate, rhythm  ABDOMEN: soft, non-tender, distended, no rebound or guarding  EXTREMITIES: warm, well perfused, no edema  SKIN: +++ jaundice    LABS: reviewed                        9.6    25.77 )-----------( 44       ( 04 Aug 2020 06:05 )             29.7     08-04    135  |  100  |  14  ----------------------------<  128<H>  3.7   |  22  |  2.20<H>    Ca    8.2<L>      04 Aug 2020 00:26  Phos  3.0     08-04  Mg     2.6     08-04    TPro  5.5<L>  /  Alb  3.7  /  TBili  17.3<H>  /  DBili  x   /  AST  209<H>  /  ALT  47<H>  /  AlkPhos  99  08-04    LIVER FUNCTIONS - ( 04 Aug 2020 00:26 )  Alb: 3.7 g/dL / Pro: 5.5 g/dL / ALK PHOS: 99 U/L / ALT: 47 U/L / AST: 209 U/L / GGT: x             Interval Diagnostic Studies: see sunrise for full report

## 2020-08-04 NOTE — PROGRESS NOTE ADULT - SUBJECTIVE AND OBJECTIVE BOX
INTERVAL HPI/OVERNIGHT EVENTS: Overnight, ventilator settings were able to be decreased and patient was placed on CPAP. Planned for extubation today.     SUBJECTIVE: Patient seen and examined at bedside.       VITAL SIGNS:  ICU Vital Signs Last 24 Hrs  T(C): 36.8 (02 Aug 2020 07:45), Max: 36.8 (02 Aug 2020 00:00)  T(F): 98.2 (02 Aug 2020 07:45), Max: 98.2 (02 Aug 2020 00:00)  HR: 83 (02 Aug 2020 10:15) (70 - 89)  BP: --  BP(mean): --  ABP: 114/46 (02 Aug 2020 10:15) (93/33 - 130/68)  ABP(mean): 67 (02 Aug 2020 10:15) (51 - 88)  RR: 20 (02 Aug 2020 10:15) (20 - 25)  SpO2: 93% (02 Aug 2020 10:15) (88% - 100%)    Mode: AC/ CMV (Assist Control/ Continuous Mandatory Ventilation), RR (machine): 20, TV (machine): 450, FiO2: 40, PEEP: 5, ITime: 1, MAP: 11, PIP: 28  Plateau pressure:   P/F ratio:     08-01 @ 07:01  -  08-02 @ 07:00  --------------------------------------------------------  IN: 3226.2 mL / OUT: 3549 mL / NET: -322.8 mL    08-02 @ 07:01  -  08-02 @ 11:29  --------------------------------------------------------  IN: 366.5 mL / OUT: 320 mL / NET: 46.5 mL      CAPILLARY BLOOD GLUCOSE      POCT Blood Glucose.: 83 mg/dL (01 Aug 2020 20:14)    ECG:    PHYSICAL EXAM:    General: NAD  HEENT: Icteric. PERRL   Neck: supple  Respiratory: CTA b/l. Vented breath sounds.  Cardiovascular: +S1/S2; RRR   Abdomen: Abdomen moderately distended, improved from prior exam 8/1  Extremities: Warm under rayshawn hugger. Dorsalis pedis 2+ bilaterally, Radial 2+ bilaterally   Skin: Icteric  Neurological: Intubated and sedated without ability to answer questions     MEDICATIONS:  MEDICATIONS  (STANDING):  caspofungin IVPB 35 milliGRAM(s) IV Intermittent every 24 hours  chlorhexidine 0.12% Liquid 15 milliLiter(s) Oral Mucosa every 12 hours  chlorhexidine 4% Liquid 1 Application(s) Topical <User Schedule>  CRRT Treatment    <Continuous>  dextrose 10%. 1000 milliLiter(s) (40 mL/Hr) IV Continuous <Continuous>  fentaNYL   Infusion... 0.5 MICROgram(s)/kG/Hr (2.07 mL/Hr) IV Continuous <Continuous>  lactulose Syrup 30 Gram(s) Oral every 6 hours  meropenem  IVPB 1000 milliGRAM(s) IV Intermittent every 12 hours  norepinephrine Infusion 0.05 MICROgram(s)/kG/Min (3.88 mL/Hr) IV Continuous <Continuous>  nystatin Powder 1 Application(s) Topical two times a day  pantoprazole Infusion 8 mG/Hr (10 mL/Hr) IV Continuous <Continuous>  Phoxillum Filtration BK 4 / 2.5 5000 milliLiter(s) (1200 mL/Hr) CRRT <Continuous>  Phoxillum Filtration BK 4 / 2.5 5000 milliLiter(s) (200 mL/Hr) CRRT <Continuous>  Phoxillum Filtration BK 4 / 2.5 5000 milliLiter(s) (1500 mL/Hr) CRRT <Continuous>  propofol Infusion 5 MICROgram(s)/kG/Min (2.48 mL/Hr) IV Continuous <Continuous>  rifAXIMin 550 milliGRAM(s) Oral two times a day  thiamine IVPB 500 milliGRAM(s) IV Intermittent daily    MEDICATIONS  (PRN):  sodium chloride 0.9% lock flush 10 milliLiter(s) IV Push every 1 hour PRN Pre/post blood products, medications, blood draw, and to maintain line patency      ALLERGIES:  Allergies    No Known Allergies    Intolerances        LABS:                        9.2    23.59 )-----------( 46       ( 02 Aug 2020 06:14 )             28.0     08-02    136  |  100  |  9   ----------------------------<  92  3.9   |  19<L>  |  2.29<H>    Ca    8.3<L>      02 Aug 2020 00:19  Phos  3.4     08-02  Mg     2.4     08-02    TPro  5.6<L>  /  Alb  4.3  /  TBili  15.7<H>  /  DBili  x   /  AST  210<H>  /  ALT  33  /  AlkPhos  78  08-02    PT/INR - ( 02 Aug 2020 06:14 )   PT: 21.8 sec;   INR: 1.89 ratio         PTT - ( 02 Aug 2020 06:14 )  PTT:46.4 sec      RADIOLOGY & ADDITIONAL TESTS: Reviewed.

## 2020-08-04 NOTE — PROGRESS NOTE ADULT - ASSESSMENT
39 y.o. Male with PMhx of Alcohol liver cirrhosis, alcohol use disorder, traumatic bladder rupture s/p ex lap in 2019, recently admitted to [facility] from 7/11 to 7/17 for seizure and alcoholic cirrhosis. Presented to ED c/o worsening abdominal pain and diarrhea. - hospital course complicated with massive GI bleed, now intubated on 7/29     Nephrology consulted for MARQUISE

## 2020-08-04 NOTE — AIRWAY REMOVAL NOTE  ADULT & PEDS - ARTIFICAL AIRWAY REMOVAL COMMENTS
[place] order for extubation verified.  The patient was identified by full name and birth day compared to the identification band. Present during the procedure was  Laci Emerson RN. Pt successfully extubated to NC 4L sat 100% do distress noted. Pt suctioned pre and post extubation for minimal secretion will continue to monitor.

## 2020-08-04 NOTE — PROGRESS NOTE ADULT - PROBLEM SELECTOR PLAN 4
decrease today serum phos 3.0    Continue CRRT w/ phosphate containing solution  monitor serum phos daily

## 2020-08-04 NOTE — PROGRESS NOTE ADULT - SUBJECTIVE AND OBJECTIVE BOX
[facility] DIVISION OF KIDNEY DISEASES AND HYPERTENSION   -- FOLLOW UP NOTE --   Bhupinder Truong  Nephrology Fellow  Pager [facility]: [phone]   /  Pager [facility]: [phone]  (after 5pm or weekend please page the on-call fellow)  --------------------------------------------------------------------------------  24 hour events/subjective:  - overnight decreasing vent support, vitals afebrile no hypotensive episode, total UOP CRRT 3.8 L in the past 24hr  - patient seen and examined at bedside this morning intubated on CPAP and CRRT (no clotting issues per RN)  - vitals/lab/medications reviewed    PAST HISTORY  --------------------------------------------------------------------------------  No significant changes to PMH, PSH, FHx, SHx, unless otherwise noted    ALLERGIES & MEDICATIONS  --------------------------------------------------------------------------------  Allergies    No Known Allergies    Intolerances    Standing Inpatient Medications  chlorhexidine 0.12% Liquid 15 milliLiter(s) Oral Mucosa every 12 hours  chlorhexidine 4% Liquid 1 Application(s) Topical <User Schedule>  CRRT Treatment    <Continuous>  dexMEDEtomidine Infusion 0.2 MICROgram(s)/kG/Hr IV Continuous <Continuous>  dextrose 10%. 1000 milliLiter(s) IV Continuous <Continuous>  fentaNYL   Infusion... 0.5 MICROgram(s)/kG/Hr IV Continuous <Continuous>  lactulose Syrup 30 Gram(s) Oral every 6 hours  meropenem  IVPB 1000 milliGRAM(s) IV Intermittent every 8 hours  norepinephrine Infusion 0.05 MICROgram(s)/kG/Min IV Continuous <Continuous>  nystatin Powder 1 Application(s) Topical two times a day  pantoprazole Infusion 8 mG/Hr IV Continuous <Continuous>  Phoxillum Filtration BK 4 / 2.5 5000 milliLiter(s) CRRT <Continuous>  Phoxillum Filtration BK 4 / 2.5 5000 milliLiter(s) CRRT <Continuous>  Phoxillum Filtration BK 4 / 2.5 5000 milliLiter(s) CRRT <Continuous>  propofol Infusion 5 MICROgram(s)/kG/Min IV Continuous <Continuous>  rifAXIMin 550 milliGRAM(s) Oral two times a day  thiamine IVPB 500 milliGRAM(s) IV Intermittent daily    PRN Inpatient Medications  sodium chloride 0.9% lock flush 10 milliLiter(s) IV Push every 1 hour PRN    REVIEW OF SYSTEMS  unable to obtain d/t intubated/sedated    VITALS/PHYSICAL EXAM  --------------------------------------------------------------------------------  T(C): 36 (08-04-20 @ 07:00), Max: 37 (08-03-20 @ 11:00)  HR: 83 (08-04-20 @ 08:00) (71 - 91)  BP: --  RR: 16 (08-04-20 @ 08:00) (14 - 28)  SpO2: 100% (08-04-20 @ 08:00) (95% - 100%)  Wt(kg): --    08-03-20 @ 07:01  -  08-04-20 @ 07:00  --------------------------------------------------------  IN: 2856.5 mL / OUT: 3834 mL / NET: -977.5 mL    08-04-20 @ 07:01  -  08-04-20 @ 08:06  --------------------------------------------------------  IN: 81 mL / OUT: 0 mL / NET: 81 mL    Physical Exam:              Gen: intubated/sedated  	HEENT: intubated, +NGT  	Pulm: CTA b/l  	CV: non tachycardic, RRR, S1S2  	GI: +BS, soft, distended  	: no ashton  	MSK: Warm, no edema              Neuro: sedated  	Psych: sedated  	Skin: Warm, no cyanosis, jaundice appearing  	Vascular access: RIJ non tunnel hd catheter    LABS/STUDIES  --------------------------------------------------------------------------------              9.6    25.77 >-----------<  44       [08-04-20 @ 06:05]              29.7     135  |  100  |  14  ----------------------------<  128      [08-04-20 @ 00:26]  3.7   |  22  |  2.20        Ca     8.2     [08-04-20 @ 00:26]      Mg     2.6     [08-04-20 @ 06:05]      Phos  3.0     [08-04-20 @ 06:05]    TPro  5.5  /  Alb  3.7  /  TBili  17.3  /  DBili  x   /  AST  209  /  ALT  47  /  AlkPhos  99  [08-04-20 @ 00:26]    PT/INR: PT 19.4 , INR 1.67       [08-04-20 @ 00:26]  PTT: 40.8       [08-04-20 @ 06:05]    Creatinine Trend:  SCr 2.20 [08-04 @ 00:26]  SCr 2.08 [08-03 @ 18:04]  SCr 2.14 [08-03 @ 12:04]  SCr 2.09 [08-03 @ 05:57]  SCr 2.02 [08-03 @ 00:26]    Urinalysis - [07-29-20 @ 10:44]      Color Light Yellow / Appearance Turbid / SG 1.010 / pH 6.0      Gluc Negative / Ketone Negative  / Bili Small / Urobili <2 mg/dL       Blood Large / Protein Negative / Leuk Est Negative / Nitrite Negative      RBC 12 / WBC 1 / Hyaline 0 / Gran  / Sq Epi  / Non Sq Epi 0 / Bacteria Negative    Iron 34, TIBC 124, %sat 27      [07-14-20 @ 10:34]  Ferritin 812      [07-12-20 @ 08:58]  PTH -- (Ca 6.2)      [07-29-20 @ 09:18]   223  Lipid: chol --, , HDL --, LDL --      [08-03-20 @ 12:04]    HBsAg Nonreact      [07-12-20 @ 01:16]  HCV 0.14, Nonreact      [07-12-20 @ 01:16]  HIV Nonreact      [07-15-20 @ 10:47]    SHAUN: titer Negative, pattern --      [07-12-20 @ 09:37]  Free Light Chains: kappa 2.35, lambda 2.06, ratio = 1.14      [07-12 @ 08:58]

## 2020-08-04 NOTE — PROGRESS NOTE ADULT - PROBLEM SELECTOR PLAN 1
Pt with MARQUISE in the setting of liver cirrhosis with severe ascites. MARQUISE 2/2 prerenal (severe diarrhea + diuretics +decrease effective arterial blood volume from Cirrhosis) to r/o hepatorenal syndrome. 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 On Admission 9.64 -- s/p diagnostic paracentesis. -- sCr worsening - requiring HD -- First ever HD on 7/28 - tolerated well.  s/p EGD (7/29) c/b active massive bleeding requiring IR for embolization s/p intubation and MICU    will continue CRRT to optimize for extubation (today)  c/w IV pressors per ICU team  Monitor electrolytes and urine output.   Avoid NSAIDs, ACEI/ARBS, RCA and nephrotoxins.   Dose medications as per eGFR.  rest of management as per ICU

## 2020-08-04 NOTE — PROGRESS NOTE ADULT - ASSESSMENT
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 The patient is no longer actively bleeding and presser requirement is stable, although some small blood clots are apparent from newly placed NG tube. Patient's clinical presentation is possiblly complicated by sepsis with leukocytosis >20,000 with uptrend despite antimicrobial therapy and resolved bleeding.     Neurology:  -Alert and oriented at baseline  -Hx of seizures in setting of etoh withdrawal  -Sedated on propofol drip   -Neuro checks q4H    Pulm:  Fluid overload in the setting of massive transfusion on 7/30. Tolerated CPAP overnight  - Spontaneous breathing trial 30 minutes in the am   - Will consider additional spont trial and extubation in next 24-48 hours     CV:  Hypovolemic shock 2/2 gastric variceal bleed on 7/29 in the setting of CRRT for fluid overload and subsequent hypoxia  -VS q1H  - Downtrending hemoglobin given 3 pRBCs, no evidence of change in volume status post transfusion assessment   -On Levo to maintain MAP > 65   -[place] for continuous BP monitoring  -Tele monitoring    GI:  #Acute on chronic liver failure: d/dx includes underlying infection (UA neg, BCx negative, CXR neg, dx para neg for SBP, biliary imaging negative, C diff negative), worsening EtOH hepatitis (MDF = 60 but no interval drinking since discharge and this is an acute change), vs vascular injury  #Decompensated alcoholic cirrhosis, gastric variceal bleed and ascites  - varices: no prior EGD, on nadolol at home, now holding due to shock   - ascites: present on exam, neg for SBP 7/26, on Lasix 20mg/spironolactone 25mg daily as outpatient--HOLD for hypotension   - HE: Gave rifaxamin overnight 8/1 and started on lactulose 8/2 had 3 subsequent bowel movements without blood   - HCC: no imaging, MRI without contrast is limited  - MELD-Na = 38 on 7/26  - Continue with 25% albumin   - D/c octreotide per GI   - F/U abdominal US per GI recommendations     #Hematemesis secondary to gastric variceal bleed  -Refractory to endoscopic intervention 7/29 s/p IR PARTO 7/30, blakemore 7/30, and 34 U PRBC, 25 U FFP, 20 U PLT, and 5 U cryo all on 7/30   -Trend CBC q4---tranfuse to maintain Hgb > 7, or for active bleed  -Trend Coags q7v--wrcpkgj with FFP, cryo as needed  -Blood suctioned from NG tube will defer to GI on further management   - Blakemore removed 7/30     #Transaminitis 2/2 alcoholic hepatitis  -Trend liver enzymes, bilirubin  -Follow up with hepatology and GI recommendations  -Continue with 25% albumin   -Continue protonix infusion       #Ascites, negative for SBP on admission  -Serial abdominal exams  -Paracentesis as needed  -Continue MVI, thiamine, folate   -Follow up Hepatology    Renal:  #MARQUISE: in the setting of liver cirrhosis with severe ascites. MARQUISE 2/2 prerenal (severe diarrhea + diuretics +decrease effective arterial blood volume from Cirrhosis) to r/o hepatorenal syndrome. 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 Baseline creatinine sCr 0.6-0.8. On Admission 9.64 -- s/p diagnostic paracentesis. -- sCr worsening - requiring HD  - First HD 7/28, Second HD 7/29---CVVHD started on arrival to MICU  - c/w IV albumin infusion   - hold diuretics at this time   - Monitor electrolytes and urine output.   - Avoid NSAIDs, ACEI/ARBS, RCA and nephrotoxins.   - Dose medications as per eGFR.     #Hypocalcemia, monitor in setting of massive transfusion  - Ionized Calcium daily   - keep Ionized calcium around 1.1-1.2  - Replete prn    ID:  #All cultures negative to date--      7/26: Blood Culture x 2 NGTD; Urine Culture: NGTD               Para negative for SBP      7/30 culture       8/1: fungitell sent   	  -- D/c Zosyn, started meropenem no change in leukocytosisconferring with confer with family about risk factors for ESBL will switch back to zosyn if none exist  -- Caspofungin, will d/c as fungal infection appears unlikely given increasing leukocytosis with concurrent increase in Neutrophils   --Trend lactate; WBC count continues to be elevated     Heme:   --CBC q12h  --TEG to eval for clotting ability  --Maintain Hgb > 7. 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 The patient is no longer actively bleeding and presser requirement is stable, although some small blood clots are apparent from newly placed NG tube. Patient's clinical presentation is possiblly complicated by sepsis with leukocytosis >20,000 with continued elevation despite antimicrobial therapy and resolved bleeding.     Neurology:  -Alert and oriented at baseline  -Hx of seizures in setting of etoh withdrawal  -Alert and mildly confused   -Neuro checks q4H  - Hepatic encephalopathy being treated with lactulose and rifaximin     Pulm:  Fluid overload in the setting of massive transfusion on 7/30. Tolerated CPAP overnight  - Will assess post extubation     CV:  Hypovolemic shock 2/2 gastric variceal bleed on 7/29 in the setting of CRRT for fluid overload and subsequent hypoxia  -VS q1H   -On Levo to maintain MAP > 65   -[place] for continuous BP monitoring  -Tele monitoring    GI:  #Acute on chronic liver failure: d/dx includes underlying infection (UA neg, BCx negative, CXR neg, dx para neg for SBP, biliary imaging negative, C diff negative), worsening EtOH hepatitis (MDF = 60 but no interval drinking since discharge and this is an acute change), vs vascular injury  #Decompensated alcoholic cirrhosis, gastric variceal bleed and ascites  - varices: no prior EGD, on nadolol at home, now holding due to shock   - ascites: present on exam, neg for SBP 7/26, on Lasix 20mg/spironolactone 25mg daily as outpatient--HOLD for hypotension   - HE: Gave rifaxamin overnight 8/1 and started on lactulose 8/2 had 3 subsequent bowel movements without blood   - HCC: no imaging, MRI without contrast is limited  - MELD-Na = 38 on 7/26  - Continue with 25% albumin, Will follow up with hepatology on duration of therapy     #Hematemesis secondary to gastric variceal bleed  -Refractory to endoscopic intervention 7/29 s/p IR PARTO 7/30, blakemore 7/30, and 34 U PRBC, 25 U FFP, 20 U PLT, and 5 U cryo all on 7/30   -Trend CBC q4---tranfuse to maintain Hgb > 7, or for active bleed  -Trend Coags n17a--vdyxhsw with FFP, cryo as needed   - Blakemore removed 7/30   -Will reach out to hepatology about NG tube replacement after extubation     #Transaminitis 2/2 alcoholic hepatitis  -Trend liver enzymes, bilirubin  -Follow up with hepatology and GI recommendations  -Reassess need for 25% albumin therapy   -Continue protonix infusion       #Ascites, negative for SBP on admission  -Serial abdominal exams  -Paracentesis as needed  -Continue MVI, thiamine, folate     Renal:  #MARQUISE: in the setting of liver cirrhosis with severe ascites. MARQUISE 2/2 prerenal (severe diarrhea + diuretics +decrease effective arterial blood volume from Cirrhosis) to r/o hepatorenal syndrome. 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 Baseline creatinine sCr 0.6-0.8. On Admission 9.64 -- s/p diagnostic paracentesis. -- sCr worsening - requiring HD  - First HD 7/28, Second HD 7/29---CVVHD started on arrival to MICU, Possiblly switching back to HD tomorrow will f/u with renal    - hold diuretics at this time   - Monitor electrolytes and urine output   - Avoid NSAIDs, ACEI/ARBS, RCA and nephrotoxins.   - Dose medications as per eGFR.     ID:  #All cultures negative to date--      7/26: Blood Culture x 2 NGTD; Urine Culture: NGTD               Para negative for SBP      7/30 culture       8/1: fungitell sent   	  -- D/c Zosyn, will continue with meropenem   -- Caspofungin, will d/c as fungal infection appears unlikely given increasing leukocytosis with concurrent increase in Neutrophils   --Trend lactate; WBC count continues to be elevated     Heme:   --CBC q12h  --TEG to eval for clotting ability  --Maintain Hgb > 7. Transfuse as needed  --Trend Coags--reverse coagulopathy as needed or in event of bleed  TOTAL TRANSFUSION REQUIREMENTS: 34 PRBC, 25  FFP, 5 PLT, and 2 Cryo     Endo:  --Glucose q6h  --Monitor for hypoglycemia in setting of liver failure

## 2020-08-04 NOTE — PROGRESS NOTE ADULT - PROBLEM SELECTOR PLAN 2
Pt. with hyponatremia in setting of liver cirrhosis and severe ascites. Serum sodium 133 on admission (7/26), improving today 135    Continue CRRT, Monitor BMP daily  avoid hypotonic saline/D5w, glycemic control

## 2020-08-04 NOTE — PROGRESS NOTE ADULT - ASSESSMENT
39M w/ decompensated EtOH cirrhosis, presenting w/ acute on chronic liver failure of unclear etiology (infection NOS vs continued inflammation due to EtOH)a. Course c/b massive GIB from gastric varix requiring 25u pRBC, s/p glue injection and PARTO by IR. Hb stable post procedures, no e/o bleeding, pressor requirement improving.     Impression:  #Acute on chronic liver failure: d/dx includes underlying infection (UA neg, BCx NGTD from this admission, CXR neg, dx para neg for SBP, biliary imaging negative, C diff negative) vs worsening EtOH hepatitis (MDF = 60 but no interval drinking since discharge and this is an acute change)  #Cirrhosis due to EtOH, decompensated by ascites  - varices: no esophageal varices on EGD 7/29, actively bleeding gastric varix s/p glue injection and PARTO by IR, bleeding appears to be controlled  - ascites: present on exam, neg for SBP 7/26, on Lasix 20mg/spironolactone 25mg daily as outpatient, s/p 1.5L removed during PARTO 7/29  - HE: none on exam  - HCC: no imaging, MRI without contrast is limited  - MELD-Na = 37 on 8/4  #Hypotension – still requiring pressors, d/dx sepsis? Does not appear to be bleeding related, pressor requirement improving  #Presumed infection - on merrem and caspofungin  #Pruritis – likely due to elevated bilirubin, improved  #Acute renal failure: unclear etiology, c/f bilirubin pigment nephropathy vs acute tubular necrosis vs obstructive uropathy. Reny is 53, making HRS less likely. On CVVH now.    Recommendations:  - would decrease MAP goal to > 60; will be difficult to achieve MAP > 65 in a cirrhotic w/ portal hypertension  - trend Hb, transfuse to Hb > 7 or if unstable  - IV PPI BID  - extubation/vent management per ICU team  - c/w broad spectrum abx + caspofungin  - c/w CVVH, f/u nephrology recs  - f/u renal recs  - hold nadolol and diuretics  - continue with rifaximin  - c/w MVI, thiamine and folate  - trend CMP, CBC, INR daily  - hepatology will continue to follow    Ross Shook  Gastroenterology Fellow  AT NIGHT AND ON WEEKENDS:  Contact on-call GI fellow via answering service ([phone]) from 5pm-8am and on weekends/holidays  MONDAY-FRIDAY 8AM-5PM:  Available via [place] Teams  Call [phone] ([facility]) or Page [phone] ([facility]) from 8am-5pm M-F weight-bearing as tolerated

## 2020-08-05 NOTE — PROGRESS NOTE ADULT - SUBJECTIVE AND OBJECTIVE BOX
Chief Complaint:  Patient is a 39y old  Male who presents with a chief complaint of abdominal pain (05 Aug 2020 11:51)    Reason for consult: acute on chronic liver failure    Interval Events: Pt extubated, doing well.     Hospital Medications:  chlorhexidine 4% Liquid 1 Application(s) Topical <User Schedule>  dexMEDEtomidine Infusion 0.2 MICROgram(s)/kG/Hr IV Continuous <Continuous>  dextrose 10%. 1000 milliLiter(s) IV Continuous <Continuous>  lactulose Syrup 30 Gram(s) Oral every 6 hours  meropenem  IVPB 1000 milliGRAM(s) IV Intermittent every 8 hours  norepinephrine Infusion 0.05 MICROgram(s)/kG/Min IV Continuous <Continuous>  nystatin Powder 1 Application(s) Topical two times a day  pantoprazole  Injectable 40 milliGRAM(s) IV Push two times a day  rifAXIMin 550 milliGRAM(s) Oral two times a day  sodium chloride 0.9% lock flush 10 milliLiter(s) IV Push every 1 hour PRN  thiamine IVPB 500 milliGRAM(s) IV Intermittent daily    ROS:   General:  No  fevers, chills, night sweats, fatigue  Eyes:  Good vision, no reported pain  ENT:  No sore throat, pain, runny nose  CV:  No pain, palpitations  Pulm:  No dyspnea, cough  GI:  See HPI, otherwise negative  :  No  incontinence, nocturia  Muscle:  No pain, weakness  Neuro:  No memory problems  Psych:  No insomnia, mood problems, depression  Endocrine:  No polyuria, polydipsia, cold/heat intolerance  Heme:  No petechiae, ecchymosis, easy bruisability  Skin:  No rash    PHYSICAL EXAM:   Vital Signs:  Vital Signs Last 24 Hrs  T(C): 36.9 (05 Aug 2020 11:00), Max: 37.3 (04 Aug 2020 22:00)  T(F): 98.5 (05 Aug 2020 11:00), Max: 99.1 (04 Aug 2020 22:00)  HR: 108 (05 Aug 2020 11:00) (75 - 109)  BP: --  BP(mean): --  RR: 20 (05 Aug 2020 11:00) (15 - 23)  SpO2: 95% (05 Aug 2020 11:00) (92% - 100%)  Daily     Daily     GENERAL: no acute distress  NEURO: alert, no asterixis  HEENT: icteric sclera, no conjunctival pallor appreciated  CHEST: no respiratory distress, no accessory muscle use  CARDIAC: regular rate, rhythm  ABDOMEN: soft, non-tender, non-distended, no rebound or guarding  EXTREMITIES: warm, well perfused, no edema  SKIN: ++ jaundice    LABS: reviewed                        9.6    25.02 )-----------( 53       ( 05 Aug 2020 06:23 )             28.4     08-04    138  |  101  |  17  ----------------------------<  119<H>  4.0   |  22  |  1.99<H>    Ca    8.7      04 Aug 2020 23:53  Phos  2.5     08-04  Mg     2.5     08-04    TPro  5.9<L>  /  Alb  3.8  /  TBili  18.3<H>  /  DBili  x   /  AST  253<H>  /  ALT  55<H>  /  AlkPhos  107  08-04    LIVER FUNCTIONS - ( 04 Aug 2020 23:53 )  Alb: 3.8 g/dL / Pro: 5.9 g/dL / ALK PHOS: 107 U/L / ALT: 55 U/L / AST: 253 U/L / GGT: x             Interval Diagnostic Studies: see sunrise for full report

## 2020-08-05 NOTE — SWALLOW BEDSIDE ASSESSMENT ADULT - SLP GENERAL OBSERVATIONS
Encountered awake/alert, A0x4, in bed, 30 degree angle. NGT in situ R nares. Pt denied pain. Voice mildly hypophonic, however improved overtime. Vocal quality, clear.

## 2020-08-05 NOTE — PROGRESS NOTE ADULT - ASSESSMENT
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 The patient is no longer actively bleeding and presser requirement is stable, although some small blood clots are apparent from newly placed NG tube. Patient's clinical presentation is possiblly complicated by sepsis with leukocytosis >20,000 with continued elevation despite antimicrobial therapy and resolved bleeding.     Neurology:  -Alert and oriented at baseline  -Hx of seizures in setting of etoh withdrawal  -Alert and mildly confused   -Neuro checks q4H  - Hepatic encephalopathy being treated with lactulose and rifaximin     Pulm:  Fluid overload in the setting of massive transfusion on 7/30. Tolerated CPAP overnight  - Will assess post extubation     CV:  Hypovolemic shock 2/2 gastric variceal bleed on 7/29 in the setting of CRRT for fluid overload and subsequent hypoxia  -VS q1H   -On Levo to maintain MAP > 65   -[place] for continuous BP monitoring  -Tele monitoring    GI:  #Acute on chronic liver failure: d/dx includes underlying infection (UA neg, BCx negative, CXR neg, dx para neg for SBP, biliary imaging negative, C diff negative), worsening EtOH hepatitis (MDF = 60 but no interval drinking since discharge and this is an acute change), vs vascular injury  #Decompensated alcoholic cirrhosis, gastric variceal bleed and ascites  - varices: no prior EGD, on nadolol at home, now holding due to shock   - ascites: present on exam, neg for SBP 7/26, on Lasix 20mg/spironolactone 25mg daily as outpatient--HOLD for hypotension   - HE: Gave rifaxamin overnight 8/1 and started on lactulose 8/2 had 3 subsequent bowel movements without blood   - HCC: no imaging, MRI without contrast is limited  - MELD-Na = 38 on 7/26  - Continue with 25% albumin, Will follow up with hepatology on duration of therapy     #Hematemesis secondary to gastric variceal bleed  -Refractory to endoscopic intervention 7/29 s/p IR PARTO 7/30, blakemore 7/30, and 34 U PRBC, 25 U FFP, 20 U PLT, and 5 U cryo all on 7/30   -Trend CBC q4---tranfuse to maintain Hgb > 7, or for active bleed  -Trend Coags c47r--[id number] with FFP, cryo as needed   - Blakemore removed 7/30   -Will reach out to hepatology about NG tube replacement after extubation     #Transaminitis 2/2 alcoholic hepatitis  -Trend liver enzymes, bilirubin  -Follow up with hepatology and GI recommendations  -Reassess need for 25% albumin therapy   -Continue protonix infusion       #Ascites, negative for SBP on admission  -Serial abdominal exams  -Paracentesis as needed  -Continue MVI, thiamine, folate     Renal:  #MARQUISE: in the setting of liver cirrhosis with severe ascites. MARQUISE 2/2 prerenal (severe diarrhea + diuretics +decrease effective arterial blood volume from Cirrhosis) to r/o hepatorenal syndrome. 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 Baseline creatinine sCr 0.6-0.8. On Admission 9.64 -- s/p diagnostic paracentesis. -- sCr worsening - requiring HD  - First HD 7/28, Second HD 7/29---CVVHD started on arrival to MICU, Possiblly switching back to HD tomorrow will f/u with renal    - hold diuretics at this time   - Monitor electrolytes and urine output   - Avoid NSAIDs, ACEI/ARBS, RCA and nephrotoxins.   - Dose medications as per eGFR.     ID:  #All cultures negative to date--      7/26: Blood Culture x 2 NGTD; Urine Culture: NGTD               Para negative for SBP      7/30 culture       8/1: fungitell sent   	  -- D/c Zosyn, will continue with meropenem   -- Caspofungin, will d/c as fungal infection appears unlikely given increasing leukocytosis with concurrent increase in Neutrophils   --Trend lactate; WBC count continues to be elevated     Heme:   --CBC q12h  --TEG to eval for clotting ability  --Maintain Hgb > 7. Transfuse as needed  --Trend Coags--reverse coagulopathy as needed or in event of bleed  TOTAL TRANSFUSION REQUIREMENTS: 34 PRBC, 25  FFP, 5 PLT, and 2 Cryo     Endo:  --Glucose q6h  --Monitor for hypoglycemia in setting of liver failure 40 yo M PMH ETOH abuse with cirrhosis, heterozygous for alpha-1 antitrypsin, portal hypertension, acute alcoholic hepatitis and alcohol withdrawal complicated by seizures admitted for acute on chronic liver failure and MARQUISE with ARF secondary to bilirubin pigment nephropathy vs HRS requiring HD complicated by gastric varices refractory to endoscopic intervention s/p PARTO and 34 U PRBC, 25 U FFP, 20 U PLT, and 5 U cryo all overnight from 7/29-7/30. The patient is no longer actively bleeding and presser requirement are decreasing gradually with periods overnight tolerated with no pressers. The patients leukocytosis continues despite being on antimicrobial therapy with meropenem and infection is likely GI in etiology given history of liver cirrohosis.     Neurology:  -Alert and oriented   -Hx of seizures in setting of etoh withdrawal  -Neuro checks q4H  - Hepatic encephalopathy being treated with lactulose and rifaximin, titrated to 3-4 BM's with 3 overnight     Pulm:  Fluid overload in the setting of massive transfusion on 7/30. Tolerated CPAP overnight  - Will assess post extubation     CV:  Hypovolemic shock 2/2 gastric variceal bleed on 7/29 in the setting of CRRT for fluid overload and subsequent hypoxia  -VS q1H   -On Levo to maintain MAP > 65   -[place] for continuous BP monitoring  -Tele monitoring    GI:  #Acute on chronic liver failure: d/dx includes underlying infection (UA neg, BCx negative, CXR neg, dx para neg for SBP, biliary imaging negative, C diff negative), worsening EtOH hepatitis (MDF = 60 but no interval drinking since discharge and this is an acute change), vs vascular injury  #Decompensated alcoholic cirrhosis, gastric variceal bleed and ascites  - varices: no prior EGD, on nadolol at home, now holding due to shock   - ascites: present on exam, neg for SBP 7/26, on Lasix 20mg/spironolactone 25mg daily as outpatient--HOLD for hypotension   - HE: Gave rifaxamin overnight 8/1 and started on lactulose 8/2 had 3 subsequent bowel movements without blood   - HCC: no imaging, MRI without contrast is limited  - MELD-Na = 38 on 7/26  - Continue with 25% albumin, Will follow up with hepatology on duration of therapy     #Hematemesis secondary to gastric variceal bleed  -Refractory to endoscopic intervention 7/29 s/p IR PARTO 7/30, blakemore 7/30, and 34 U PRBC, 25 U FFP, 20 U PLT, and 5 U cryo all on 7/30   -Trend CBC q4---tranfuse to maintain Hgb > 7, or for active bleed  -Trend Coags a11r--[id number] with FFP, cryo as needed   - Blakemore removed 7/30   -Will reach out to hepatology about NG tube replacement after extubation     #Transaminitis 2/2 alcoholic hepatitis  -Trend liver enzymes, bilirubin  -Follow up with hepatology and GI recommendations  -Reassess need for 25% albumin therapy   -Continue protonix infusion       #Ascites, negative for SBP on admission  -Serial abdominal exams  -Paracentesis as needed  -Continue MVI, thiamine, folate     Renal:  #AMRQUISE: in the setting of liver cirrhosis with severe ascites. MARQUISE 2/2 prerenal (severe diarrhea + diuretics +decrease effective arterial blood volume from Cirrhosis) to r/o hepatorenal syndrome. 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 Baseline creatinine sCr 0.6-0.8. On Admission 9.64 -- s/p diagnostic paracentesis. -- sCr worsening - requiring HD  - First HD 7/28, Second HD 7/29---CVVHD started on arrival to MICU, Possiblly switching back to HD tomorrow will f/u with renal    - hold diuretics at this time   - Monitor electrolytes and urine output   - Avoid NSAIDs, ACEI/ARBS, RCA and nephrotoxins.   - Dose medications as per eGFR.     ID:  #All cultures negative to date--      7/26: Blood Culture x 2 NGTD; Urine Culture: NGTD               Para negative for SBP      7/30 culture       8/1: fungitell sent   	  -- D/c Zosyn, will continue with meropenem   -- Caspofungin, will d/c as fungal infection appears unlikely given increasing leukocytosis with concurrent increase in Neutrophils   --Trend lactate; WBC count continues to be elevated     Heme:   --CBC q12h  --TEG to eval for clotting ability  --Maintain Hgb > 7. Transfuse as needed  --Trend Coags--reverse coagulopathy as needed or in event of bleed  TOTAL TRANSFUSION REQUIREMENTS: 34 PRBC, 25  FFP, 5 PLT, and 2 Cryo     Endo:  --Glucose q6h  --Monitor for hypoglycemia in setting of liver failure 38 yo M PMH ETOH abuse with cirrhosis, heterozygous for alpha-1 antitrypsin, portal hypertension, acute alcoholic hepatitis and alcohol withdrawal complicated by seizures admitted for acute on chronic liver failure and MARQUISE with ARF secondary to bilirubin pigment nephropathy vs HRS requiring HD complicated by gastric varices refractory to endoscopic intervention s/p PARTO and 34 U PRBC, 25 U FFP, 20 U PLT, and 5 U cryo all overnight from 7/29-7/30. The patient is no longer actively bleeding and presser requirement are decreasing gradually with periods overnight tolerated with no pressers. The patients leukocytosis continues despite being on antimicrobial therapy with meropenem and infection is likely GI in etiology given history of liver disease.     Neurology:  -Alert and oriented   -Hx of seizures in setting of etoh withdrawal  -Neuro checks q4H  -Hepatic encephalopathy being treated with lactulose and rifaximin, titrated to 3-4 BM's with 3 overnight     Pulm:  Fluid overload  - No longer intubated   - Fluid status improving from CVVHD yesterday     CV:  Hypovolemic shock 2/2 gastric variceal bleed on 7/29 in the setting of CRRT for fluid overload and subsequent hypoxia, Trial off CRRT   -VS q1H   -On Levo to maintain MAP > 65   -[place] for continuous BP monitoring  -Tele monitoring    GI:  #Acute on chronic liver failure: d/dx includes underlying infection (UA neg, BCx negative, CXR neg, dx para neg for SBP, biliary imaging negative, C diff negative), worsening EtOH hepatitis (MDF = 60 but no interval drinking since discharge and this is an acute change), vs vascular injury  #Decompensated alcoholic cirrhosis, gastric variceal bleed and ascites  - varices: no prior EGD, on nadolol at home, now holding due to shock   - ascites: present on exam, neg for SBP 7/26, on Lasix 20mg/spironolactone 25mg daily as outpatient--HOLD for hypotension   - HE: Gave rifaxamin overnight 8/1 and started on lactulose 8/2 had 3 subsequent bowel movements without blood   - HCC: no imaging, MRI without contrast is limited  - MELD-Na = 38 on 7/26  - Will discontinue albumin, albumin levels are between 3.7-4.1    #Hematemesis secondary to gastric variceal bleed  -Refractory to endoscopic intervention 7/29 s/p IR PARTO 7/30, blakemore 7/30, and 34 U PRBC, 25 U FFP, 20 U PLT, and 5 U cryo all on 7/30   -Blakemore removed 7/30   -Tolerated tube feeds without evidence of bleeding  -Trend CBC q12- tranfuse to maintain Hgb > 7, or for active bleed  -Trend Coags [id number]--yhrfpia with FFP, cryo as needed     #Transaminitis 2/2 alcoholic hepatitis  -Trend liver enzymes, bilirubin  -Follow up with hepatology and GI recommendations  -D/c'd 25% albumin therapy   -Switching protonix drip to 40mg IV push BID     #Ascites, negative for SBP on admission  -Serial abdominal exams  -Paracentesis as needed  -Continue MVI, thiamine, folate     Renal:  #MARQUISE: in the setting of liver cirrhosis with severe ascites. MARQUISE secondary to prerenal (severe diarrhea + diuretics +decrease effective arterial blood volume from Cirrhosis) to r/o hepatorenal syndrome. 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 Baseline creatinine sCr 0.6-0.8. On Admission 9.64 -- s/p diagnostic paracentesis. -- sCr worsening  - First HD 7/28, Second HD 7/29---CVVHD started, 8/5 trial off CVVHD to monitor kidney function for recovery   - hold diuretics at this time   - Monitor electrolytes and urine output   - Avoid NSAIDs, ACEI/ARBS, RCA and nephrotoxins.   - Dose medications as per eGFR.     ID:  #All cultures negative to date--      7/26: Blood Culture x 2 NGTD; Urine Culture: NGTD               Para negative for SBP      7/30 culture       8/1: fungitell sent   	  -- D/c Zosyn, will continue with meropenem   -- D/c caspofungin as fungal infection appears unlikely given increasing leukocytosis with concurrent increase in Neutrophils   --Trend lactate; WBC count continues to be elevated     Heme:   --CBC q12h  --TEG to eval for clotting ability  --Maintain Hgb > 7. Transfuse as needed  --Trend Coags--reverse coagulopathy as needed or in event of bleed  TOTAL TRANSFUSION REQUIREMENTS: 34 PRBC, 25  FFP, 5 PLT, and 2 Cryo     Endo:  --Glucose q6h  --Monitor for hypoglycemia in setting of liver failure 38 yo M PMH ETOH abuse with cirrhosis, heterozygous for alpha-1 antitrypsin, portal hypertension, acute alcoholic hepatitis and alcohol withdrawal complicated by seizures admitted for acute on chronic liver failure and MARQUISE with ARF secondary to bilirubin pigment nephropathy vs HRS requiring HD complicated by gastric varices refractory to endoscopic intervention s/p PARTO and 34 U PRBC, 25 U FFP, 20 U PLT, and 5 U cryo all overnight from 7/29-7/30. The patient is no longer actively bleeding and presser requirement are decreasing gradually with periods overnight tolerated with no pressers. The patients leukocytosis continues despite being on antimicrobial therapy with meropenem and infection is likely GI in etiology given history of liver disease.     Neurology:  -Alert and oriented   -Hx of seizures in setting of etoh withdrawal  -Neuro checks q4H  -Hepatic encephalopathy being treated with lactulose and rifaximin, titrated to 3-4 BM's with 3 overnight     Pulm:  Fluid overload  - No longer intubated   - Fluid status improving from CVVHD yesterday     CV:  Hypovolemic shock 2/2 gastric variceal bleed on 7/29 in the setting of CRRT for fluid overload and subsequent hypoxia, Trial off CRRT   -VS q1H   -On Levo to maintain MAP > 65   -[place] for continuous BP monitoring  -Tele monitoring    GI:  #Acute on chronic liver failure: d/dx includes underlying infection (UA neg, BCx negative, CXR neg, dx para neg for SBP, biliary imaging negative, C diff negative), worsening EtOH hepatitis (MDF = 60 but no interval drinking since discharge and this is an acute change), vs vascular injury  #Decompensated alcoholic cirrhosis, gastric variceal bleed and ascites  - varices: no prior EGD, on nadolol at home, now holding due to shock   - ascites: present on exam, neg for SBP 7/26, on Lasix 20mg/spironolactone 25mg daily as outpatient--HOLD for hypotension   - HE: Gave rifaxamin overnight 8/1 and started on lactulose 8/2 had 3 subsequent bowel movements without blood   - HCC: no imaging, MRI without contrast is limited  - MELD-Na = 38 on 7/26  - Will discontinue albumin, albumin levels are between 3.7-4.1    #Hematemesis secondary to gastric variceal bleed  -Refractory to endoscopic intervention 7/29 s/p IR PARTO 7/30, blakemore 7/30, and 34 U PRBC, 25 U FFP, 20 U PLT, and 5 U cryo all on 7/30   -Blakemore removed 7/30   -Tolerated tube feeds without evidence of bleeding  -Trend CBC q12- tranfuse to maintain Hgb > 7, or for active bleed  -Trend Coags q21z--yqaoeqi with FFP, cryo as needed     #Transaminitis 2/2 alcoholic hepatitis  -Trend liver enzymes, bilirubin  -Follow up with hepatology and GI recommendations  -D/c'd 25% albumin therapy   -Switching protonix drip to 40mg IV push BID     #Ascites, negative for SBP on admission  -Serial abdominal exams  -Paracentesis as needed  -Continue MVI, thiamine, folate     Renal:  #MARQUISE: in the setting of liver cirrhosis with severe ascites. MARQUISE secondary to prerenal (severe diarrhea + diuretics +decrease effective arterial blood volume from Cirrhosis) to r/o hepatorenal syndrome. 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 Baseline creatinine sCr 0.6-0.8. On Admission 9.64 -- s/p diagnostic paracentesis. -- sCr worsening  - First HD 7/28, Second HD 7/29---CVVHD started, 8/5 trial off CVVHD will monitor kidney function for recovery   - hold diuretics at this time   - Monitor electrolytes and urine output   - Avoid NSAIDs, ACEI/ARBS, RCA and nephrotoxins.   - Dose medications as per eGFR.     ID:  #All cultures negative to date--      7/26: Blood Culture x 2 NGTD; Urine Culture: NGTD               Para negative for SBP      7/30 culture       8/1: fungitell sent   	  -- D/c Zosyn, will continue with meropenem   -- D/c caspofungin as fungal infection appears unlikely given increasing leukocytosis with concurrent increase in Neutrophils   --Trend lactate; WBC count continues to be elevated     Heme:   --CBC q12h  --Maintain Hgb > 7. Transfuse as needed  --Trend Coags--reverse coagulopathy as needed or in event of bleed  TOTAL TRANSFUSION REQUIREMENTS: 34 PRBC, 25  FFP, 5 PLT, and 2 Cryo     Endo:  --Glucose q6h  --Monitor for hypoglycemia in setting of liver failure    Diet:   - Nutrition consult, will f/u on recommendations

## 2020-08-05 NOTE — SWALLOW BEDSIDE ASSESSMENT ADULT - COMMENTS
on 7/4. Also complaining of increased itchiness all over body and yellowing of skin. (+) MARQUISE in the setting of liver cirrhosis with severe ascites. MARQUISE 2/2 prerenal (severe diarrhea + diuretics +decrease effective arterial blood volume from Cirrhosis) to r/o hepatorenal syndrome, now requiring dialysis. EGD done for hematemesis. Renal suggesting renal biopsy for confirmation of diagnosis.  7/29--Patient had episode of hematemesis. Decision made for EGD.  Gastritis vs. Variceal bleed. EGD showing large gastric variceal bleed. In Endoscopy: received 12 units PRBC, 10 units FFP, 2 units Plt, and 1 of Cryo. GI unable to obtain hemostasis. Emergently transferred to IR for further intervention. In IR, embolization of bleeding gastric varices.  Received TOTAL 34 PRBC, 25  FFP, 5 PLT, and 2 Cryo prior to MICU.  Pt extubated on 8/4 on NC. CRRT d/c on 8/5 assessing for renal recovery today. As per nursing, pt passed RN swallow screen however concern given pt hypophonic and weak cough.

## 2020-08-05 NOTE — SWALLOW BEDSIDE ASSESSMENT ADULT - SWALLOW EVAL: DIAGNOSIS
Haroon is s/p embolization of bleeding gastric varices.  Received TOTAL 34 PRBC, 25  FFP, 5 PLT, and 2 Cryo prior to MICU.  Pt extubated on 8/4 on NC. Oropharyngeal swallow skills are c/b adequate orientation/reception, manipulation/mastication, and suspected timely onset of swallow initiation. No overt s/s of aspiration. No change in breathe sounds. No change in RR. Pt denied 'stuck sensation'. Pt mildly hypophonic however improvement appreciated as trials progressed. Can not r/o silent aspiration at the bedside, therefore will continue to follow and monitor tolerance during h-course, at this time.

## 2020-08-05 NOTE — PROGRESS NOTE ADULT - SUBJECTIVE AND OBJECTIVE BOX
INTERVAL HPI/OVERNIGHT EVENTS: No acute overnight events. Patient is now extubated and doing well.     SUBJECTIVE: Patient seen and examined at bedside.       VITAL SIGNS:  ICU Vital Signs Last 24 Hrs  T(C): 36.8 (02 Aug 2020 07:45), Max: 36.8 (02 Aug 2020 00:00)  T(F): 98.2 (02 Aug 2020 07:45), Max: 98.2 (02 Aug 2020 00:00)  HR: 83 (02 Aug 2020 10:15) (70 - 89)  BP: --  BP(mean): --  ABP: 114/46 (02 Aug 2020 10:15) (93/33 - 130/68)  ABP(mean): 67 (02 Aug 2020 10:15) (51 - 88)  RR: 20 (02 Aug 2020 10:15) (20 - 25)  SpO2: 93% (02 Aug 2020 10:15) (88% - 100%)    Mode: AC/ CMV (Assist Control/ Continuous Mandatory Ventilation), RR (machine): 20, TV (machine): 450, FiO2: 40, PEEP: 5, ITime: 1, MAP: 11, PIP: 28  Plateau pressure:   P/F ratio:     08-01 @ 07:01  -  08-02 @ 07:00  --------------------------------------------------------  IN: 3226.2 mL / OUT: 3549 mL / NET: -322.8 mL    08-02 @ 07:01  -  08-02 @ 11:29  --------------------------------------------------------  IN: 366.5 mL / OUT: 320 mL / NET: 46.5 mL      CAPILLARY BLOOD GLUCOSE      POCT Blood Glucose.: 83 mg/dL (01 Aug 2020 20:14)    ECG:    PHYSICAL EXAM:    General: NAD  HEENT: Icteric. PERRL   Neck: supple  Respiratory: CTA b/l. Vented breath sounds.  Cardiovascular: +S1/S2; RRR   Abdomen: Abdomen moderately distended, improved from prior exam 8/1  Extremities: Warm under rayshawn hugger. Dorsalis pedis 2+ bilaterally, Radial 2+ bilaterally   Skin: Icteric  Neurological: Intubated and sedated without ability to answer questions     MEDICATIONS:  MEDICATIONS  (STANDING):  caspofungin IVPB 35 milliGRAM(s) IV Intermittent every 24 hours  chlorhexidine 0.12% Liquid 15 milliLiter(s) Oral Mucosa every 12 hours  chlorhexidine 4% Liquid 1 Application(s) Topical <User Schedule>  CRRT Treatment    <Continuous>  dextrose 10%. 1000 milliLiter(s) (40 mL/Hr) IV Continuous <Continuous>  fentaNYL   Infusion... 0.5 MICROgram(s)/kG/Hr (2.07 mL/Hr) IV Continuous <Continuous>  lactulose Syrup 30 Gram(s) Oral every 6 hours  meropenem  IVPB 1000 milliGRAM(s) IV Intermittent every 12 hours  norepinephrine Infusion 0.05 MICROgram(s)/kG/Min (3.88 mL/Hr) IV Continuous <Continuous>  nystatin Powder 1 Application(s) Topical two times a day  pantoprazole Infusion 8 mG/Hr (10 mL/Hr) IV Continuous <Continuous>  Phoxillum Filtration BK 4 / 2.5 5000 milliLiter(s) (1200 mL/Hr) CRRT <Continuous>  Phoxillum Filtration BK 4 / 2.5 5000 milliLiter(s) (200 mL/Hr) CRRT <Continuous>  Phoxillum Filtration BK 4 / 2.5 5000 milliLiter(s) (1500 mL/Hr) CRRT <Continuous>  propofol Infusion 5 MICROgram(s)/kG/Min (2.48 mL/Hr) IV Continuous <Continuous>  rifAXIMin 550 milliGRAM(s) Oral two times a day  thiamine IVPB 500 milliGRAM(s) IV Intermittent daily    MEDICATIONS  (PRN):  sodium chloride 0.9% lock flush 10 milliLiter(s) IV Push every 1 hour PRN Pre/post blood products, medications, blood draw, and to maintain line patency      ALLERGIES:  Allergies    No Known Allergies    Intolerances        LABS:                        9.2    23.59 )-----------( 46       ( 02 Aug 2020 06:14 )             28.0     08-02    136  |  100  |  9   ----------------------------<  92  3.9   |  19<L>  |  2.29<H>    Ca    8.3<L>      02 Aug 2020 00:19  Phos  3.4     08-02  Mg     2.4     08-02    TPro  5.6<L>  /  Alb  4.3  /  TBili  15.7<H>  /  DBili  x   /  AST  210<H>  /  ALT  33  /  AlkPhos  78  08-02    PT/INR - ( 02 Aug 2020 06:14 )   PT: 21.8 sec;   INR: 1.89 ratio         PTT - ( 02 Aug 2020 06:14 )  PTT:46.4 sec      RADIOLOGY & ADDITIONAL TESTS: Reviewed. INTERVAL HPI/OVERNIGHT EVENTS: No acute overnight events. Patient is now extubated and doing well.     SUBJECTIVE: Patient seen and examined at bedside. No acute complaints.    VITAL SIGNS:  ICU Vital Signs Last 24 Hrs  T(C): 36.8 (02 Aug 2020 07:45), Max: 36.8 (02 Aug 2020 00:00)  T(F): 98.2 (02 Aug 2020 07:45), Max: 98.2 (02 Aug 2020 00:00)  HR: 83 (02 Aug 2020 10:15) (70 - 89)  BP: --  BP(mean): --  ABP: 114/46 (02 Aug 2020 10:15) (93/33 - 130/68)  ABP(mean): 67 (02 Aug 2020 10:15) (51 - 88)  RR: 20 (02 Aug 2020 10:15) (20 - 25)  SpO2: 93% (02 Aug 2020 10:15) (88% - 100%)    Mode: AC/ CMV (Assist Control/ Continuous Mandatory Ventilation), RR (machine): 20, TV (machine): 450, FiO2: 40, PEEP: 5, ITime: 1, MAP: 11, PIP: 28  Plateau pressure:   P/F ratio:     08-01 @ 07:01  -  08-02 @ 07:00  --------------------------------------------------------  IN: 3226.2 mL / OUT: 3549 mL / NET: -322.8 mL    08-02 @ 07:01  -  08-02 @ 11:29  --------------------------------------------------------  IN: 366.5 mL / OUT: 320 mL / NET: 46.5 mL      CAPILLARY BLOOD GLUCOSE      POCT Blood Glucose.: 83 mg/dL (01 Aug 2020 20:14)    ECG:    PHYSICAL EXAM:  General: NAD, lying in bed comfortably   HEENT: Icteric. PERRL   Neck: supple  Respiratory: Bilateral expiratory rhonchi, no wheezing or rales   Cardiovascular: +S1/S2; RRR   Abdomen: Abdomen moderately distended, moderate epigastric tenderness, - bowel sounds, no rebound tenderness or guarding   Extremities: Warm, Dorsalis pedis 2+ bilaterally, Radial 2+ bilaterally   Skin: Icteric  Neurological: Alert and oriented x3     MEDICATIONS:  MEDICATIONS  (STANDING):  caspofungin IVPB 35 milliGRAM(s) IV Intermittent every 24 hours  chlorhexidine 0.12% Liquid 15 milliLiter(s) Oral Mucosa every 12 hours  chlorhexidine 4% Liquid 1 Application(s) Topical <User Schedule>  CRRT Treatment    <Continuous>  dextrose 10%. 1000 milliLiter(s) (40 mL/Hr) IV Continuous <Continuous>  fentaNYL   Infusion... 0.5 MICROgram(s)/kG/Hr (2.07 mL/Hr) IV Continuous <Continuous>  lactulose Syrup 30 Gram(s) Oral every 6 hours  meropenem  IVPB 1000 milliGRAM(s) IV Intermittent every 12 hours  norepinephrine Infusion 0.05 MICROgram(s)/kG/Min (3.88 mL/Hr) IV Continuous <Continuous>  nystatin Powder 1 Application(s) Topical two times a day  pantoprazole Infusion 8 mG/Hr (10 mL/Hr) IV Continuous <Continuous>  Phoxillum Filtration BK 4 / 2.5 5000 milliLiter(s) (1200 mL/Hr) CRRT <Continuous>  Phoxillum Filtration BK 4 / 2.5 5000 milliLiter(s) (200 mL/Hr) CRRT <Continuous>  Phoxillum Filtration BK 4 / 2.5 5000 milliLiter(s) (1500 mL/Hr) CRRT <Continuous>  propofol Infusion 5 MICROgram(s)/kG/Min (2.48 mL/Hr) IV Continuous <Continuous>  rifAXIMin 550 milliGRAM(s) Oral two times a day  thiamine IVPB 500 milliGRAM(s) IV Intermittent daily    MEDICATIONS  (PRN):  sodium chloride 0.9% lock flush 10 milliLiter(s) IV Push every 1 hour PRN Pre/post blood products, medications, blood draw, and to maintain line patency      ALLERGIES:  Allergies    No Known Allergies    Intolerances        LABS:                        9.2    23.59 )-----------( 46       ( 02 Aug 2020 06:14 )             28.0     08-02    136  |  100  |  9   ----------------------------<  92  3.9   |  19<L>  |  2.29<H>    Ca    8.3<L>      02 Aug 2020 00:19  Phos  3.4     08-02  Mg     2.4     08-02    TPro  5.6<L>  /  Alb  4.3  /  TBili  15.7<H>  /  DBili  x   /  AST  210<H>  /  ALT  33  /  AlkPhos  78  08-02    PT/INR - ( 02 Aug 2020 06:14 )   PT: 21.8 sec;   INR: 1.89 ratio         PTT - ( 02 Aug 2020 06:14 )  PTT:46.4 sec      RADIOLOGY & ADDITIONAL TESTS: Reviewed.

## 2020-08-05 NOTE — PROGRESS NOTE ADULT - ASSESSMENT
39M w/ decompensated EtOH cirrhosis, presenting w/ acute on chronic liver failure of unclear etiology (infection NOS vs continued inflammation due to EtOH)a. Course c/b massive GIB from gastric varix requiring 25u pRBC, s/p glue injection and PARTO by IR. Hb stable post procedures, no e/o bleeding, pressor requirement improving.     Impression:  #Acute on chronic liver failure: d/dx includes underlying infection (UA neg, BCx NGTD from this admission, CXR neg, dx para neg for SBP, biliary imaging negative, C diff negative) vs worsening EtOH hepatitis (MDF = 60 but no interval drinking since discharge and this is an acute change)  #Cirrhosis due to EtOH, decompensated by ascites  - varices: no esophageal varices on EGD 7/29, actively bleeding gastric varix s/p glue injection and PARTO by IR, bleeding appears to be controlled  - ascites: present on exam, neg for SBP 7/26, on Lasix 20mg/spironolactone 25mg daily as outpatient, s/p 1.5L removed during PARTO 7/29  - HE: none on exam  - HCC: no imaging, MRI without contrast is limited  - MELD-Na = 37 on 8/5  #Hypotension – still requiring pressors, d/dx sepsis? Does not appear to be bleeding related, pressor requirement improving  #Presumed infection - on merrem and caspofungin  #Pruritis – likely due to elevated bilirubin, improved  #Acute renal failure: unclear etiology, c/f bilirubin pigment nephropathy vs acute tubular necrosis vs obstructive uropathy. Reny is 53, making HRS less likely. On CVVH now.    Recommendations:  - trend Hb, transfuse to Hb > 7 or if unstable  - IV PPI BID  - c/w broad spectrum abx + caspofungin, recommend 14d course  - f/u renal recs  - hold nadolol and diuretics  - continue with rifaximin  - c/w MVI, thiamine and folate  - trend CMP, CBC, INR daily  - hepatology will continue to follow    Ross Shook  Gastroenterology Fellow  AT NIGHT AND ON WEEKENDS:  Contact on-call GI fellow via answering service ([phone]) from 5pm-8am and on weekends/holidays  MONDAY-FRIDAY 8AM-5PM:  Available via [employer] Teams  Call [phone] ([facility]) or Page [phone] ([facility]) from 8am-5pm M-F 39M w/ decompensated EtOH cirrhosis, presenting w/ acute on chronic liver failure of unclear etiology (infection NOS vs continued inflammation due to EtOH)a. Course c/b massive GIB from gastric varix requiring 25u pRBC, s/p glue injection and PARTO by IR. Hb stable post procedures, no e/o bleeding, pressor requirement improving.     Impression:  #Acute on chronic liver failure: d/dx includes underlying infection (UA neg, BCx NGTD from this admission, CXR neg, dx para neg for SBP, biliary imaging negative, C diff negative) vs worsening EtOH hepatitis (MDF = 60 but no interval drinking since discharge and this is an acute change)  #Cirrhosis due to EtOH, decompensated by ascites  - varices: no esophageal varices on EGD 7/29, actively bleeding gastric varix s/p glue injection and PARTO by IR, bleeding appears to be controlled  - ascites: present on exam, neg for SBP 7/26, on Lasix 20mg/spironolactone 25mg daily as outpatient, s/p 1.5L removed during PARTO 7/29  - HE: none on exam  - HCC: no imaging, MRI without contrast is limited  - MELD-Na = 37 on 8/5  #Hypotension – still requiring pressors, d/dx sepsis? Does not appear to be bleeding related, pressor requirement improving  #Presumed infection - on merrem and caspofungin  #Pruritis – likely due to elevated bilirubin, improved  #Acute renal failure: unclear etiology, c/f bilirubin pigment nephropathy vs acute tubular necrosis vs obstructive uropathy. Reny is 53, making HRS less likely. On CVVH now.    Recommendations:  - trend Hb, transfuse to Hb > 7 or if unstable  - IV PPI BID  - c/w broad spectrum abx, recommend 14d course  - f/u renal recs  - hold nadolol and diuretics  - continue with rifaximin  - c/w MVI, thiamine and folate  - trend CMP, CBC, INR daily  - hepatology will continue to follow    Ross Shook  Gastroenterology Fellow  AT NIGHT AND ON WEEKENDS:  Contact on-call GI fellow via answering service ([phone]) from 5pm-8am and on weekends/holidays  MONDAY-FRIDAY 8AM-5PM:  Available via [employer] Teams  Call [phone] ([facility]) or Page [phone] ([facility]) from 8am-5pm M-F

## 2020-08-05 NOTE — SWALLOW BEDSIDE ASSESSMENT ADULT - CONSISTENCIES ADMINISTERED
ice chips x3, water via cup / straw, puree/pudding via tsp, hard solids-cookie x2/hard solid/thin liquid/puree thick

## 2020-08-05 NOTE — PROGRESS NOTE ADULT - PROBLEM SELECTOR PLAN 1
Pt with MARQUISE in the setting of liver cirrhosis with severe ascites. MARQUISE 2/2 prerenal (severe diarrhea + diuretics +decrease effective arterial blood volume from Cirrhosis) to r/o hepatorenal syndrome. 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 On Admission 9.64 -- s/p diagnostic paracentesis. -- sCr worsening - requiring HD -- First ever HD on 7/28 - tolerated well.  s/p EGD (7/29) c/b active massive bleeding requiring IR for embolization s/p intubation and MICU.  s/p extubation 8/4, discontinued CRRT 8/5 ~6am    hold off on dialysis today to assess for renal recovery, if not obstructive can consider diuretic challenge  c/w IV pressors per ICU team  Monitor electrolytes and urine output.   Avoid NSAIDs, ACEI/ARBS, RCA and nephrotoxins.   Dose medications as per eGFR.  rest of management as per ICU

## 2020-08-05 NOTE — PROGRESS NOTE ADULT - SUBJECTIVE AND OBJECTIVE BOX
[facility] DIVISION OF KIDNEY DISEASES AND HYPERTENSION   -- FOLLOW UP NOTE --   Bhupinder Truong  Nephrology Fellow  Pager NS: [phone]   /  Pager [place]: [phone]  (after 5pm or weekend please page the on-call fellow)  --------------------------------------------------------------------------------  24 hour events/subjective:  - yesterday pt was extubated and transition nasal cannula.  CRRT stopped this morning at ~6am, still not making urine  - overnight no events reported, vitals afebrile no hypotensive episode, total UOP CRRT 3.6 L in the past 24hr  - patient seen and examined at bedside this morning who endorse weakness and mild abdominal pain  - vitals/lab/medications reviewed    PAST HISTORY  --------------------------------------------------------------------------------  No significant changes to PMH, PSH, FHx, SHx, unless otherwise noted    ALLERGIES & MEDICATIONS  --------------------------------------------------------------------------------  Allergies    No Known Allergies    Intolerances    Standing Inpatient Medications  chlorhexidine 4% Liquid 1 Application(s) Topical <User Schedule>  CRRT Treatment    <Continuous>  dexMEDEtomidine Infusion 0.2 MICROgram(s)/kG/Hr IV Continuous <Continuous>  dextrose 10%. 1000 milliLiter(s) IV Continuous <Continuous>  lactulose Syrup 30 Gram(s) Oral every 6 hours  meropenem  IVPB 1000 milliGRAM(s) IV Intermittent every 8 hours  norepinephrine Infusion 0.05 MICROgram(s)/kG/Min IV Continuous <Continuous>  nystatin Powder 1 Application(s) Topical two times a day  pantoprazole Infusion 8 mG/Hr IV Continuous <Continuous>  Phoxillum Filtration BK 4 / 2.5 5000 milliLiter(s) CRRT <Continuous>  Phoxillum Filtration BK 4 / 2.5 5000 milliLiter(s) CRRT <Continuous>  Phoxillum Filtration BK 4 / 2.5 5000 milliLiter(s) CRRT <Continuous>  rifAXIMin 550 milliGRAM(s) Oral two times a day  thiamine IVPB 500 milliGRAM(s) IV Intermittent daily    PRN Inpatient Medications  sodium chloride 0.9% lock flush 10 milliLiter(s) IV Push every 1 hour PRN    REVIEW OF SYSTEMS  --------------------------------------------------------------------------------  Gen: no fever, chills.  + weakness  Respiratory: No dyspnea, cough  CV: No chest pain, orthopnea  GI: No nausea, vomiting, diarrhea. + abdominal pain  MSK: no edema  Neuro: No dizziness, lightheadedness  Heme: No bleeding  All other systems were reviewed and are negative, except as noted.    VITALS/PHYSICAL EXAM  --------------------------------------------------------------------------------  T(C): 36.9 (08-05-20 @ 06:00), Max: 37.3 (08-04-20 @ 22:00)  HR: 108 (08-05-20 @ 06:45) (74 - 108)  BP: --  RR: 18 (08-05-20 @ 06:45) (14 - 23)  SpO2: 96% (08-05-20 @ 06:45) (94% - 100%)  Wt(kg): --    08-04-20 @ 07:01  -  08-05-20 @ 07:00  --------------------------------------------------------  IN: 1795 mL / OUT: 3630 mL / NET: -1835 mL    Physical Exam:              Gen: NAD on nasal cannula  	HEENT: +NGT  	Pulm: CTA b/l  	CV: tachycardic, RRR, S1S2  	GI: +BS, soft, distended, mild tenderness to palpation  	: no ashton  	MSK: Warm, no edema              Neuro: sedated  	Psych: sedated  	Skin: Warm, no cyanosis, jaundice appearing  	Vascular access: RIJ non tunnel hd catheter    LABS/STUDIES  --------------------------------------------------------------------------------              9.6    25.02 >-----------<  53       [08-05-20 @ 06:23]              28.4     138  |  101  |  17  ----------------------------<  119      [08-04-20 @ 23:53]  4.0   |  22  |  1.99        Ca     8.7     [08-04-20 @ 23:53]      Mg     2.5     [08-04-20 @ 23:53]      Phos  2.5     [08-04-20 @ 23:53]    TPro  5.9  /  Alb  3.8  /  TBili  18.3  /  DBili  x   /  AST  253  /  ALT  55  /  AlkPhos  107  [08-04-20 @ 23:53]    PT/INR: PT 19.2 , INR 1.65       [08-04-20 @ 20:27]  PTT: 40.3       [08-04-20 @ 20:27]      Creatinine Trend:  SCr 1.99 [08-04 @ 23:53]  SCr 1.92 [08-04 @ 14:38]  SCr 2.20 [08-04 @ 00:26]  SCr 2.08 [08-03 @ 18:04]  SCr 2.14 [08-03 @ 12:04]    Urinalysis - [07-29-20 @ 10:44]      Color Light Yellow / Appearance Turbid / SG 1.010 / pH 6.0      Gluc Negative / Ketone Negative  / Bili Small / Urobili <2 mg/dL       Blood Large / Protein Negative / Leuk Est Negative / Nitrite Negative      RBC 12 / WBC 1 / Hyaline 0 / Gran  / Sq Epi  / Non Sq Epi 0 / Bacteria Negative    Iron 34, TIBC 124, %sat 27      [07-14-20 @ 10:34]  Ferritin 812      [07-12-20 @ 08:58]  PTH -- (Ca 6.2)      [07-29-20 @ 09:18]   223  Lipid: chol --, , HDL --, LDL --      [08-03-20 @ 12:04]    HBsAg Nonreact      [07-12-20 @ 01:16]  HCV 0.14, Nonreact      [07-12-20 @ 01:16]  HIV Nonreact      [07-15-20 @ 10:47]    SHAUN: titer Negative, pattern --      [07-12-20 @ 09:37]  Free Light Chains: kappa 2.35, lambda 2.06, ratio = 1.14      [07-12 @ 08:58]

## 2020-08-05 NOTE — SWALLOW BEDSIDE ASSESSMENT ADULT - ORAL PREPARATORY PHASE
Within functional limits/able to strip the bolus, able to generate negative pressure for bolus extraction via straw, able to bite cookie

## 2020-08-05 NOTE — SWALLOW BEDSIDE ASSESSMENT ADULT - SLP PERTINENT HISTORY OF CURRENT PROBLEM
This is a 39 y.o. Male with PMhx of Alcohol liver cirrhosis, alcohol use disorder, and traumatic bladder rupture s/p ex lap in 2019 p/w diffuse crampy abdominal pain. Patient reports that since last discharge on 7/17 has had multiple episode of watery diarrhea (about 9-10 per day), worsening abdominal pain and distension, associated weakness and nausea no vomiting. He has been compliant with Lasix and Aldactone, but has not taken lactulose due to diarrhea. Last drink

## 2020-08-05 NOTE — SWALLOW BEDSIDE ASSESSMENT ADULT - SPECIFY REASON(S)
Follow up visit. Wife present. Pt spoke of many visitors yesterday which he was pleasantly surprised. Pt waiting on test results. I will follow up with pt at [facility] appointment in a week for spiritual care. Pt agreeable    1150: Spoke with wife \"Sis\" in hallway. She was tearful. She fears his ca is in his lungs. Encouraged her to stay away from [employer] concerning dx. Encouraged her to care for herself. She said \"I have to be strong for him (pt).\" She desires my presence at the meeting next week with Dr Russo. Her feelings were processed.    Order received/appreciated. Evaluation ordered to determine current swallow function and least restrictive diet.

## 2020-08-05 NOTE — CHART NOTE - NSCHARTNOTEFT_GEN_A_CORE
Nutrition Follow Up Note  Patient seen for: follow up on MICU. Chart reviewed and events noted. Pt is a 40 y/o M with ETOH abuse with cirrhosis, portal hypertension, acute alcoholic hepatitis and alcohol withdrawal complicated by seizures. Admitted for acute on chronic liver failure and MARQUISE with ARF 2/2 requiring HD complicated by gastric varices refractory to endoscopic intervention s/p PARTO.  The patient is no longer actively bleeding, but is increasing presser requirements possible secondary to sepsis. WBC is improving, but Neutrophil count is increasing suggesting continued inflammatory response. Hypovolemic shock 2/2 gastric variceal bleed on  in the setting of CRRT for fluid overload and subsequent hypoxia. Pt extubated on  on NC. CRRT d/c on  assessing for renal recovery today.     Source:   RN, Medical record, and communication with team    Diet :       Daily Weight in k.4 (), Weight in k.6 (), Weight in k.2 (), Weight in k.6 (), Weight in k.5 (), Weight in k.9 (), Weight in k.2 ()  Weight fluctuations noted, likely related to fluid shifts. Pt was on CRRT and with varying degree of edema throughout admission.     Pertinent Medications: MEDICATIONS  (STANDING):  chlorhexidine 4% Liquid 1 Application(s) Topical <User Schedule>  CRRT Treatment    <Continuous>  dexMEDEtomidine Infusion 0.2 MICROgram(s)/kG/Hr (4.14 mL/Hr) IV Continuous <Continuous>  dextrose 10%. 1000 milliLiter(s) (40 mL/Hr) IV Continuous <Continuous>  lactulose Syrup 30 Gram(s) Oral every 6 hours  meropenem  IVPB 1000 milliGRAM(s) IV Intermittent every 8 hours  norepinephrine Infusion 0.05 MICROgram(s)/kG/Min (3.88 mL/Hr) IV Continuous <Continuous>  nystatin Powder 1 Application(s) Topical two times a day  pantoprazole Infusion 8 mG/Hr (10 mL/Hr) IV Continuous <Continuous>  Phoxillum Filtration BK 4 / 2.5 5000 milliLiter(s) (1200 mL/Hr) CRRT <Continuous>  Phoxillum Filtration BK 4 / 2.5 5000 milliLiter(s) (200 mL/Hr) CRRT <Continuous>  Phoxillum Filtration BK 4 / 2.5 5000 milliLiter(s) (1500 mL/Hr) CRRT <Continuous>  rifAXIMin 550 milliGRAM(s) Oral two times a day  thiamine IVPB 500 milliGRAM(s) IV Intermittent daily    MEDICATIONS  (PRN):  sodium chloride 0.9% lock flush 10 milliLiter(s) IV Push every 1 hour PRN Pre/post blood products, medications, blood draw, and to maintain line patency    Pertinent Labs:  @ 23:53: Na 138, BUN 17, Cr 1.99<H>, <H>, K+ 4.0, Phos 2.5, Mg 2.5, Alk Phos 107, ALT/SGPT 55<H>, AST/SGOT 253<H>, HbA1c --   @ 14:38: Na 137, BUN 14, Cr 1.92<H>, <H>, K+ 3.8, Phos 2.5, Mg 2.5, Alk Phos --, ALT/SGPT --, AST/SGOT --, HbA1c --    Finger Sticks:  POCT Blood Glucose.: 127 mg/dL ( @ 06:17)  POCT Blood Glucose.: 119 mg/dL ( @ 23:42)      Skin per nursing documentation:   Edema:    Estimated Needs:   [ ] no change since previous assessment  [ ] recalculated:     Previous Nutrition Diagnosis:   Nutrition Diagnosis is:    New Nutrition Diagnosis:  Related to:    As evidenced by:      Interventions:     Recommend  1)    Monitoring and Evaluation:   Continue to monitor Nutritional intake, Tolerance to diet prescription, weights, labs, skin integrity    RD remains available upon request and will follow up per protocol  Mitzi Frye MS, RD, Pager #[phone] Nutrition Follow Up Note  Patient seen for: follow up on MICU. Chart reviewed and events noted. Pt is a 40 y/o M with ETOH abuse with cirrhosis, portal hypertension, acute alcoholic hepatitis and alcohol withdrawal complicated by seizures. Admitted for acute on chronic liver failure and MARQUISE with ARF 2/2 requiring HD complicated by gastric varices refractory to endoscopic intervention s/p PARTO.  The patient is no longer actively bleeding, but is increasing presser requirements possible secondary to sepsis. WBC is improving, but Neutrophil count is increasing suggesting continued inflammatory response. Hypovolemic shock 2/2 gastric variceal bleed on  in the setting of CRRT for fluid overload and subsequent hypoxia. Pt extubated on  on NC. CRRT d/c on  assessing for renal recovery today.     Source:   RN, Medical record, and communication with team    Diet :   No current order; Previously on NPO with feeds via NGT: Vital AF @ goal 85cc/hr x18 hours providing 1530cc, 1836kcals (22kcals/kg), 115gm protein (1.4gm protein/kg), and 1241cc free water via route designated by provider.     Pt seen sitting, reports appetite is good. Pt eagerly awaiting diet advancement. Pt denies any recent N/V, or constipation. Pt noted with x2 loose BM .    Nutrition Events:   Pt was extubated and NGT removed on . Per RN, pt seen by SLP this morning, awaiting recommendations.    Daily Weight in k.4 (-), Weight in k.6 (), Weight in k.2 (), Weight in k.6 (), Weight in k.5 (), Weight in k.9 (), Weight in k.2 ()  Weight fluctuations noted, likely related to fluid shifts. Pt was on CRRT and with varying degree of edema throughout admission.     Pertinent Medications: MEDICATIONS  (STANDING):  chlorhexidine 4% Liquid 1 Application(s) Topical <User Schedule>  CRRT Treatment    <Continuous>  dexMEDEtomidine Infusion 0.2 MICROgram(s)/kG/Hr (4.14 mL/Hr) IV Continuous <Continuous>  dextrose 10%. 1000 milliLiter(s) (40 mL/Hr) IV Continuous <Continuous>  lactulose Syrup 30 Gram(s) Oral every 6 hours  meropenem  IVPB 1000 milliGRAM(s) IV Intermittent every 8 hours  norepinephrine Infusion 0.05 MICROgram(s)/kG/Min (3.88 mL/Hr) IV Continuous <Continuous>  nystatin Powder 1 Application(s) Topical two times a day  pantoprazole Infusion 8 mG/Hr (10 mL/Hr) IV Continuous <Continuous>  Phoxillum Filtration BK 4 / 2.5 5000 milliLiter(s) (1200 mL/Hr) CRRT <Continuous>  Phoxillum Filtration BK 4 / 2.5 5000 milliLiter(s) (200 mL/Hr) CRRT <Continuous>  Phoxillum Filtration BK 4 / 2.5 5000 milliLiter(s) (1500 mL/Hr) CRRT <Continuous>  rifAXIMin 550 milliGRAM(s) Oral two times a day  thiamine IVPB 500 milliGRAM(s) IV Intermittent daily    MEDICATIONS  (PRN):  sodium chloride 0.9% lock flush 10 milliLiter(s) IV Push every 1 hour PRN Pre/post blood products, medications, blood draw, and to maintain line patency    Pertinent Labs:  @ 23:53: Na 138, BUN 17, Cr 1.99<H>, <H>, K+ 4.0, Phos 2.5, Mg 2.5, Alk Phos 107, ALT/SGPT 55<H>, AST/SGOT 253<H>, HbA1c --   @ 14:38: Na 137, BUN 14, Cr 1.92<H>, <H>, K+ 3.8, Phos 2.5, Mg 2.5, Alk Phos --, ALT/SGPT --, AST/SGOT --, HbA1c --    Finger Sticks:  POCT Blood Glucose.: 127 mg/dL ( @ 06:17)  POCT Blood Glucose.: 119 mg/dL ( @ 23:42)    Skin per nursing documentation: No pressure injuries noted.  Edema per nursing documentation: +2 dependent and generalized     Estimated Needs:   Energy-Protein needs based on dosing wt of 82.8 kg  Range Estimated Energy Needs (20-25 calories/kg): 7857-0439  Estimated Protein Needs (1.2-1.4 gm/kg): 99.4-116 gm    Previous Nutrition Diagnosis:  severe acute malnutrition  Nutrition Diagnosis is: ongoing and being addressed with recommended diet and supplements.     New Nutrition Diagnosis: N/A  Related to: N/A   As evidenced by: N/A     Interventions: N/A    Recommend  1) If diet advanced by provider, recommend Regular diet to optimize intake (pt's K, Na, and Phos are WNL). RD to remain available for further diet recommendations. Consistency deferred to team.   2) Recommend adding Ensure Enlive x1 daily and Ensure Pudding x1 daily to optimize nutrition intake, once diet advanced.    3) Continue Thiamine as ordered.     Monitoring and Evaluation:   Continue to monitor Nutritional intake, Tolerance to diet prescription, weights, labs, skin integrity    RD remains available upon request and will follow up per protocol  Mitzi Frye MS, RD, Pager #[phone] Nutrition Follow Up Note  Patient seen for: follow up on MICU. Chart reviewed and events noted. Pt is a 40 y/o M with ETOH abuse with cirrhosis, portal hypertension, acute alcoholic hepatitis and alcohol withdrawal complicated by seizures. Admitted for acute on chronic liver failure and MARQUISE with ARF 2/2 requiring HD complicated by gastric varices refractory to endoscopic intervention s/p PARTO.  The patient is no longer actively bleeding, but is increasing presser requirements possible secondary to sepsis. WBC is improving, but Neutrophil count is increasing suggesting continued inflammatory response. Hypovolemic shock 2/2 gastric variceal bleed on  in the setting of CRRT for fluid overload and subsequent hypoxia. Pt extubated on  on NC. CRRT d/c on  assessing for renal recovery today.     Source:   RN, Medical record, and communication with team    Diet :   No current order; Previously on NPO with feeds via NGT: Vital AF @ goal 85cc/hr x18 hours providing 1530cc, 1836kcals (22kcals/kg), 115gm protein (1.4gm protein/kg), and 1241cc free water via route designated by provider.     Pt seen sitting, reports appetite is good. Pt eagerly awaiting diet advancement. Pt denies any recent N/V, or constipation. Pt noted with x2 nonbloody loose BM .    Nutrition Events:   Pt was extubated and NGT removed on . Per RN, pt seen by SLP this morning, awaiting recommendations.    Daily Weight in k.4 (-), Weight in k.6 (), Weight in k.2 (), Weight in k.6 (), Weight in k.5 (), Weight in k.9 (), Weight in k.2 ()  Weight fluctuations noted, likely related to fluid shifts. Pt was on CRRT and with varying degree of edema throughout admission.     Pertinent Medications: MEDICATIONS  (STANDING):  chlorhexidine 4% Liquid 1 Application(s) Topical <User Schedule>  CRRT Treatment    <Continuous>  dexMEDEtomidine Infusion 0.2 MICROgram(s)/kG/Hr (4.14 mL/Hr) IV Continuous <Continuous>  dextrose 10%. 1000 milliLiter(s) (40 mL/Hr) IV Continuous <Continuous>  lactulose Syrup 30 Gram(s) Oral every 6 hours  meropenem  IVPB 1000 milliGRAM(s) IV Intermittent every 8 hours  norepinephrine Infusion 0.05 MICROgram(s)/kG/Min (3.88 mL/Hr) IV Continuous <Continuous>  nystatin Powder 1 Application(s) Topical two times a day  pantoprazole Infusion 8 mG/Hr (10 mL/Hr) IV Continuous <Continuous>  Phoxillum Filtration BK 4 / 2.5 5000 milliLiter(s) (1200 mL/Hr) CRRT <Continuous>  Phoxillum Filtration BK 4 / 2.5 5000 milliLiter(s) (200 mL/Hr) CRRT <Continuous>  Phoxillum Filtration BK 4 / 2.5 5000 milliLiter(s) (1500 mL/Hr) CRRT <Continuous>  rifAXIMin 550 milliGRAM(s) Oral two times a day  thiamine IVPB 500 milliGRAM(s) IV Intermittent daily    MEDICATIONS  (PRN):  sodium chloride 0.9% lock flush 10 milliLiter(s) IV Push every 1 hour PRN Pre/post blood products, medications, blood draw, and to maintain line patency    Pertinent Labs:  @ 23:53: Na 138, BUN 17, Cr 1.99<H>, <H>, K+ 4.0, Phos 2.5, Mg 2.5, Alk Phos 107, ALT/SGPT 55<H>, AST/SGOT 253<H>, HbA1c --   @ 14:38: Na 137, BUN 14, Cr 1.92<H>, <H>, K+ 3.8, Phos 2.5, Mg 2.5, Alk Phos --, ALT/SGPT --, AST/SGOT --, HbA1c --    Finger Sticks:  POCT Blood Glucose.: 127 mg/dL ( @ 06:17)  POCT Blood Glucose.: 119 mg/dL ( @ 23:42)    Skin per nursing documentation: No pressure injuries noted.  Edema per nursing documentation: +2 dependent and generalized     Estimated Needs:   Energy-Protein needs based on dosing wt of 82.8 kg  Range Estimated Energy Needs (20-25 calories/kg): 5130-6305  Estimated Protein Needs (1.2-1.4 gm/kg): 99.4-116 gm    Previous Nutrition Diagnosis:  severe acute malnutrition  Nutrition Diagnosis is: ongoing and being addressed with recommended diet and supplements.     New Nutrition Diagnosis: N/A  Related to: N/A   As evidenced by: N/A     Interventions: N/A    Recommend  1) If diet advanced by provider, recommend Regular diet to optimize intake (pt's K, Na, and Phos are WNL). RD to remain available for further diet recommendations. Consistency deferred to team.   2) Recommend adding Ensure Enlive x1 daily and Ensure Pudding x1 daily to optimize nutrition intake, once diet advanced.    3) Continue Thiamine as ordered.     Monitoring and Evaluation:   Continue to monitor Nutritional intake, Tolerance to diet prescription, weights, labs, skin integrity    RD remains available upon request and will follow up per protocol  Mitzi Frye MS, RD, Pager #[phone]

## 2020-08-05 NOTE — PROGRESS NOTE ADULT - ASSESSMENT
39 y.o. Male with PMhx of Alcohol liver cirrhosis, alcohol use disorder, traumatic bladder rupture s/p ex lap in 2019, recently admitted to [facility] from 7/11 to 7/17 for seizure and alcoholic cirrhosis. Presented to ED c/o worsening abdominal pain and diarrhea. - hospital course complicated with massive GI bleed, now intubated on 7/29     Nephrology consulted for MARQUISE

## 2020-08-06 NOTE — PROGRESS NOTE ADULT - ASSESSMENT
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 The patient is no longer actively bleeding and presser requirement are decreasing gradually with periods overnight tolerated with no pressers. The patients leukocytosis continues despite being on antimicrobial therapy with meropenem and infection is likely GI in etiology given history of liver disease.     Neurology:  -Alert and oriented   -Hx of seizures in setting of etoh withdrawal  -Neuro checks q4H  -Hepatic encephalopathy being treated with lactulose and rifaximin, titrated to 3-4 BM's with 3 over past 12 hours     Pulm:  Fluid overload  - No longer intubated   - Fluid status improving from CVVHD yesterday     CV:  Hypovolemic shock 2/2 gastric variceal bleed on 7/29 in the setting of CRRT for fluid overload and subsequent hypoxia, Trial off CRRT   -VS q1H   -On Levo to maintain MAP > 65   -Kira for continuous BP monitoring  -Tele monitoring    GI:  #Acute on chronic liver failure: d/dx includes underlying infection (UA neg, BCx negative, CXR neg, dx para neg for SBP, biliary imaging negative, C diff negative), worsening EtOH hepatitis (MDF = 60 but no interval drinking since discharge and this is an acute change), vs vascular injury  #Decompensated alcoholic cirrhosis, gastric variceal bleed and ascites  - varices: no prior EGD, on nadolol at home, now holding due to shock   - ascites: present on exam, neg for SBP 7/26, on Lasix 20mg/spironolactone 25mg daily as outpatient--HOLD for hypotension   - HE: Gave rifaxamin overnight 8/1 and started on lactulose 8/2 had 3 subsequent bowel movements without blood   - HCC: no imaging, MRI without contrast is limited  - MELD-Na = 38 on 7/26  - Will discontinue albumin, albumin levels are between 3.7-4.1    #Hematemesis secondary to gastric variceal bleed  -Refractory to endoscopic intervention 7/29 s/p IR PARTO 7/30, blakemore 7/30, and 34 U PRBC, 25 U FFP, 20 U PLT, and 5 U cryo all on 7/30   -Blakemore removed 7/30   -Tolerated tube feeds without evidence of bleeding  -Trend CBC q12- tranfuse to maintain Hgb > 7, or for active bleed  -Trend Coags u20g--nbqabpi with FFP, cryo as needed     #Transaminitis 2/2 alcoholic hepatitis  -Trend liver enzymes, bilirubin  -Follow up with hepatology and GI recommendations  -D/c'd 25% albumin therapy   -Switching protonix drip to 40mg IV push BID     #Ascites, negative for SBP on admission  -Serial abdominal exams  -Paracentesis as needed  -Continue MVI, thiamine, folate     Renal:  #MARQUSIE: in the setting of liver cirrhosis with severe ascites. MARQUISE secondary to prerenal (severe diarrhea + diuretics +decrease effective arterial blood volume from Cirrhosis) to r/o hepatorenal syndrome. 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 Baseline creatinine sCr 0.6-0.8. On Admission 9.64 -- s/p diagnostic paracentesis. -- sCr worsening  - First HD 7/28, Second HD 7/29---CVVHD started, 8/5 trial off CVVHD will monitor kidney function for recovery   - hold diuretics at this time   - Monitor electrolytes and urine output   - Avoid NSAIDs, ACEI/ARBS, RCA and nephrotoxins.   - Dose medications as per eGFR.     ID:  #All cultures negative to date--      7/26: Blood Culture x 2 NGTD; Urine Culture: NGTD               Para negative for SBP      7/30 culture       8/1: fungitell sent   	  -- D/c Zosyn, will continue with meropenem   -- D/c caspofungin as fungal infection appears unlikely given increasing leukocytosis with concurrent increase in Neutrophils   --Trend lactate; WBC count continues to be elevated     Heme:   --CBC q12h  --Maintain Hgb > 7. Transfuse as needed  --Trend Coags--reverse coagulopathy as needed or in event of bleed  TOTAL TRANSFUSION REQUIREMENTS: 34 PRBC, 25  FFP, 5 PLT, and 2 Cryo     Endo:  --Glucose q6h  --Monitor for hypoglycemia in setting of liver failure    Diet:   - Nutrition consult, will f/u on recommendations 38 yo M PMH ETOH abuse with cirrhosis, heterozygous for alpha-1 antitrypsin, portal hypertension, acute alcoholic hepatitis and alcohol withdrawal complicated by seizures admitted for acute on chronic liver failure and MARQUISE with ARF secondary to bilirubin pigment nephropathy vs HRS requiring HD complicated by gastric varices refractory to endoscopic intervention s/p PARTO and 34 U PRBC, 25 U FFP, 20 U PLT, and 5 U cryo all overnight from 7/29-7/30. The patient is no longer actively bleeding and presser requirement are decreasing gradually with periods overnight tolerated with no pressers. The patients leukocytosis continues despite being on antimicrobial therapy with meropenem and infection is likely GI in etiology given history of liver disease.     Neurology:  -Alert and oriented   -Hx of seizures in setting of etoh withdrawal  -Neuro checks q4H  -Hepatic encephalopathy being treated with lactulose and rifaximin, titrated to 3-4 BM's with 3 over past 12 hours     Pulm:  Fluid overload  - No longer intubated   - Fluid status improving from CVVHD yesterday     CV:  Hypovolemic shock 2/2 gastric variceal bleed on 7/29 in the setting of CRRT for fluid overload and subsequent hypoxia, Trial off CRRT   -VS q1H   -On Levo to maintain MAP > 65   -Kira for continuous BP monitoring  -Tele monitoring    GI:  #Acute on chronic liver failure: d/dx includes underlying infection (UA neg, BCx negative, CXR neg, dx para neg for SBP, biliary imaging negative, C diff negative), worsening EtOH hepatitis (MDF = 60 but no interval drinking since discharge and this is an acute change), vs vascular injury  #Decompensated alcoholic cirrhosis, gastric variceal bleed and ascites  - varices: no prior EGD, on nadolol at home, now holding due to shock   - ascites: present on exam, neg for SBP 7/26, on Lasix 20mg/spironolactone 25mg daily as outpatient--HOLD for hypotension   - HE: Gave rifaxamin overnight 8/1 and started on lactulose 8/2 had 3 subsequent bowel movements without blood   - HCC: no imaging, MRI without contrast is limited  - MELD-Na = 38 on 7/26  - Will discontinue albumin, albumin levels are between 3.7-4.1    #Hematemesis secondary to gastric variceal bleed  -Refractory to endoscopic intervention 7/29 s/p IR PARTO 7/30, blakemore 7/30, and 34 U PRBC, 25 U FFP, 20 U PLT, and 5 U cryo all on 7/30   -Blakemore removed 7/30   -Tolerated tube feeds without evidence of bleeding  -Trend CBC q12- tranfuse to maintain Hgb > 7, or for active bleed  -Trend Coags a37y--ojoevng with FFP, cryo as needed     #Transaminitis 2/2 alcoholic hepatitis  -Trend liver enzymes, bilirubin  -Follow up with hepatology and GI recommendations  -Protonix 40mg IV push BID     #Ascites, negative for SBP on admission  -Serial abdominal exams  -Paracentesis as needed  -Continue MVI, thiamine, folate     #Abdominal pain   - CT abdomen   - Lipase 507 will trend q48 hours     Renal:  #MARQUISE: in the setting of liver cirrhosis with severe ascites. MARQUISE secondary to prerenal (severe diarrhea + diuretics +decrease effective arterial blood volume from Cirrhosis) to r/o hepatorenal syndrome. 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 Baseline creatinine sCr 0.6-0.8. On Admission 9.64 -- s/p diagnostic paracentesis. -- sCr worsening  - First HD 7/28, Second HD 7/29---CVVHD started, 8/5 trial off CVVHD will monitor kidney function for recovery   - hold diuretics at this time   - Monitor electrolytes and urine output   - Avoid NSAIDs, ACEI/ARBS, RCA and nephrotoxins.   - Dose medications as per eGFR.     ID:  #All cultures negative to date--      7/26: Blood Culture x 2 NGTD; Urine Culture: NGTD               Para negative for SBP      7/30 culture       8/1: fungitell sent   -- D/c Zosyn, will continue with meropenem   -- D/c caspofungin as fungal infection appears unlikely given increasing leukocytosis with concurrent increase in Neutrophils   --Trend lactate; WBC count continues to be elevated     Heme:   --CBC q12h  --Maintain Hgb > 7. Transfuse as needed  --Trend Coags--reverse coagulopathy as needed or in event of bleed  TOTAL TRANSFUSION REQUIREMENTS: 34 PRBC, 25  FFP, 5 PLT, and 2 Cryo     Endo:  --Glucose q6h  --Monitor for hypoglycemia in setting of liver failure    Diet:   - Regular diet per Nutrition experts with ensure supplementation 40 yo M PMH ETOH abuse with cirrhosis, heterozygous for alpha-1 antitrypsin, portal hypertension, acute alcoholic hepatitis and alcohol withdrawal complicated by seizures admitted for acute on chronic liver failure and MARQUISE with ARF secondary to bilirubin pigment nephropathy vs HRS requiring HD complicated by gastric varices refractory to endoscopic intervention s/p PARTO and 34 U PRBC, 25 U FFP, 20 U PLT, and 5 U cryo all overnight from 7/29-7/30. The patient is no longer actively bleeding and presser requirement are decreasing gradually with periods overnight tolerated with no pressers. The patients leukocytosis continues despite being on antimicrobial therapy with meropenem and infection is likely GI in etiology given history of liver disease.     Neurology:  -Alert and oriented   -Hx of seizures in setting of etoh withdrawal  -Neuro checks q4H  -Hepatic encephalopathy being treated with lactulose and rifaximin, titrated to 3-4 BM's with 3 over past 12 hours     Pulm:  Fluid overload  - No longer intubated   - Fluid status improving from CVVHD yesterday     CV:  Hypovolemic shock 2/2 gastric variceal bleed on 7/29 in the setting of CRRT for fluid overload and subsequent hypoxia, Trial off CRRT   -VS q1H   -On Levo to maintain MAP > 65   -Kira for continuous BP monitoring  -Tele monitoring    GI:  #Acute on chronic liver failure: d/dx includes underlying infection (UA neg, BCx negative, CXR neg, dx para neg for SBP, biliary imaging negative, C diff negative), worsening EtOH hepatitis (MDF = 60 but no interval drinking since discharge and this is an acute change), vs vascular injury  #Decompensated alcoholic cirrhosis, gastric variceal bleed and ascites  - varices: no prior EGD, on nadolol at home, now holding due to shock   - ascites: present on exam, neg for SBP 7/26, on Lasix 20mg/spironolactone 25mg daily as outpatient--HOLD for hypotension   - HE: Gave rifaxamin overnight 8/1 and started on lactulose 8/2 had 3 subsequent bowel movements without blood   - HCC: no imaging, MRI without contrast is limited  - MELD-Na = 38 on 7/26  - Will discontinue albumin, albumin levels are between 3.7-4.1    #Hematemesis secondary to gastric variceal bleed  -Refractory to endoscopic intervention 7/29 s/p IR PARTO 7/30, blakemore 7/30, and 34 U PRBC, 25 U FFP, 20 U PLT, and 5 U cryo all on 7/30   -Blakemore removed 7/30   -Tolerated tube feeds without evidence of bleeding  -Trend CBC q12- tranfuse to maintain Hgb > 7, or for active bleed  -Trend Coags a30o--ptsgsem with FFP, cryo as needed     #Transaminitis 2/2 alcoholic hepatitis  -Trend liver enzymes, bilirubin  -Follow up with hepatology and GI recommendations  -Protonix 40mg IV push BID     #Ascites, negative for SBP on admission  -Serial abdominal exams  -Paracentesis as needed  -Continue MVI, thiamine, folate     #Abdominal pain   - CT abdomen to rule pancreatitis or pancreatic abscess   - Lipase 507 will trend q48 hours     Renal:  #Acute renal failure possibly secondary to  hepatorenal syndrome. 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 Baseline creatinine sCr 0.6-0.8. On Admission 9.64 -- s/p diagnostic paracentesis. -- sCr worsening  - First HD 7/28, Second HD 7/29---CVVHD started, 8/5 trial off CVVHD, will resume today HD 8/6   - hold diuretics at this time   - Monitor electrolytes and urine output   - Avoid NSAIDs, ACEI/ARBS, RCA and nephrotoxins.   - Dose medications as per eGFR.     ID:  #All cultures negative to date--      7/26: Blood Culture x 2 NGTD; Urine Culture: NGTD               Para negative for SBP      7/30 culture       8/1: fungitell sent   -- D/c Zosyn 7/26 - 7/31 will continue with meropenem 8/1- present, Hepatology recommends 14 day course   -- D/c caspofungin as fungal infection appears unlikely given increasing leukocytosis with concurrent increase in Neutrophils   --Trend lactate; WBC count continues to be elevated     Heme:   --CBC q12h  --Maintain Hgb > 7. Transfuse as needed  --Trend Coags--reverse coagulopathy as needed or in event of bleed  TOTAL TRANSFUSION REQUIREMENTS: 34 PRBC, 25  FFP, 5 PLT, and 2 Cryo     Endo:  --Glucose q6h  --Monitor for hypoglycemia in setting of liver failure    Diet:   - Regular diet per Nutrition experts with ensure supplementation 40 yo M PMH ETOH abuse with cirrhosis, heterozygous for alpha-1 antitrypsin, portal hypertension, acute alcoholic hepatitis and alcohol withdrawal complicated by seizures admitted for acute on chronic liver failure and MARQUISE with ARF secondary to bilirubin pigment nephropathy vs HRS requiring HD complicated by gastric varices refractory to endoscopic intervention s/p PARTO and 34 U PRBC, 25 U FFP, 20 U PLT, and 5 U cryo all overnight from 7/29-7/30. The patient is no longer actively bleeding and presser requirement are decreasing gradually with periods overnight tolerated with no pressers. The patients leukocytosis continues despite being on antimicrobial therapy with meropenem and infection is likely GI in etiology given history of liver disease.     Neurology:  -Alert and oriented   -Hx of seizures in setting of etoh withdrawal  -Neuro checks q4H  -Hepatic encephalopathy being treated with lactulose and rifaximin, titrated to 3-4 BM's with 3 over past 12 hours     Pulm:  Fluid overload  - No longer intubated   - Fluid status improving from CVVHD yesterday     CV:  Hypovolemic shock 2/2 gastric variceal bleed on 7/29 in the setting of CRRT for fluid overload and subsequent hypoxia, Trial off CRRT   -VS q1H   -On Levo to maintain MAP > 65   -Kira for continuous BP monitoring  -Tele monitoring    GI:  #Acute on chronic liver failure: d/dx includes underlying infection (UA neg, BCx negative, CXR neg, dx para neg for SBP, biliary imaging negative, C diff negative), worsening EtOH hepatitis (MDF = 60 but no interval drinking since discharge and this is an acute change), vs vascular injury  #Decompensated alcoholic cirrhosis, gastric variceal bleed and ascites  - varices: no prior EGD, on nadolol at home, now holding due to shock   - ascites: present on exam, neg for SBP 7/26, on Lasix 20mg/spironolactone 25mg daily as outpatient--HOLD for hypotension   - HE: Gave rifaxamin overnight 8/1 and started on lactulose 8/2 had 3 subsequent bowel movements without blood   - HCC: no imaging, MRI without contrast is limited  - MELD-Na = 38 on 7/26  - Will discontinue albumin, albumin levels are between 3.7-4.1    #Hematemesis secondary to gastric variceal bleed  -Refractory to endoscopic intervention 7/29 s/p IR PARTO 7/30, blakemore 7/30, and 34 U PRBC, 25 U FFP, 20 U PLT, and 5 U cryo all on 7/30   -Blakemore removed 7/30   -Tolerated tube feeds without evidence of bleeding  -Trend CBC q12- tranfuse to maintain Hgb > 7, or for active bleed  -Trend Coags t12c--fycobgr with FFP, cryo as needed     #Transaminitis 2/2 alcoholic hepatitis  -Trend liver enzymes, bilirubin  -Follow up with hepatology and GI recommendations  -Protonix 40mg IV push BID     #Ascites, negative for SBP on admission  -Serial abdominal exams  -Paracentesis as needed  -Continue MVI, thiamine, folate     #Abdominal pain   - CT abdomen to rule pancreatitis or pancreatic abscess   - Lipase 507 will trend q48 hours     Renal:  #Acute renal failure possibly secondary to  hepatorenal syndrome. 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 Baseline creatinine sCr 0.6-0.8. On Admission 9.64 -- s/p diagnostic paracentesis. -- sCr worsening  - First HD 7/28, Second HD 7/29---CVVHD started, 8/5 trial off CVVHD, will resume today HD 8/6   - hold diuretics at this time   - Monitor electrolytes and urine output   - Avoid NSAIDs, ACEI/ARBS, RCA and nephrotoxins.   - Dose medications as per eGFR.     ID:  #All cultures negative to date--      7/26: Blood Culture x 2 NGTD; Urine Culture: NGTD               Para negative for SBP      7/30 culture       8/1: fungitell sent       8/6: Repeat cultures   -- D/c Zosyn 7/26 - 7/31 will continue with meropenem 8/1- present, Hepatology recommends 14 day course   -- D/c caspofungin as fungal infection appears unlikely given increasing leukocytosis with concurrent increase in Neutrophils   --Trend lactate; WBC count continues to be elevated     Heme:   --CBC q12h  --Maintain Hgb > 7. Transfuse as needed  --Trend Coags--reverse coagulopathy as needed or in event of bleed  TOTAL TRANSFUSION REQUIREMENTS: 34 PRBC, 25  FFP, 5 PLT, and 2 Cryo     Endo:  --Glucose q6h  --Monitor for hypoglycemia in setting of liver failure    Diet:   - Regular diet per Nutrition experts with ensure supplementation

## 2020-08-06 NOTE — PHYSICAL THERAPY INITIAL EVALUATION ADULT - PERTINENT HX OF CURRENT PROBLEM, REHAB EVAL
39t M PMhx ETOH cirrhosis, traumatic bladder rupture s/p ex lap in 2019 adm with abd pain with watery diarrhea and weakness.  Hosp Course: 7/28 worsening renal function; started HD; MRCP -Liver cirrhosis with portal hypertension. Large caliber perisplenic and perigastric varices. Moderate volume ascites. Steatosis. 7/27 LE dopplers negative for DVT; developed bleeding gastric varices refractory to endoscopic Tx; txferred to MICU for hypotension- on pressors; intubated 7/29; extubated 8/4; 39 M PMhx ETOH cirrhosis, traumatic bladder rupture s/p ex lap in 2019 adm with abd pain with watery diarrhea and weakness.  Hosp Course: 7/28 worsening renal function; started HD; MRCP -Liver cirrhosis with portal hypertension. Large caliber perisplenic and perigastric varices. Moderate volume ascites. Steatosis. 7/27 LE dopplers negative for DVT; developed bleeding gastric varices refractory to endoscopic Tx; txferred to MICU for hypotension- on pressors; intubated 7/29; extubated 8/4;

## 2020-08-06 NOTE — PROGRESS NOTE ADULT - SUBJECTIVE AND OBJECTIVE BOX
[facility] DIVISION OF KIDNEY DISEASES AND HYPERTENSION   -- FOLLOW UP NOTE --   Bhupinder Truong  Nephrology Fellow  Pager [facility]: [phone]   /  Pager [facility]: [phone]  (after 5pm or weekend please page the on-call fellow)  --------------------------------------------------------------------------------  24 hour events/subjective:  - overnight no events reported, vitals afebrile no hypotensive episode, no UOP   - patient seen and examined at bedside this morning without complaints sitting comfortably in chair on room air   - vitals/lab/medications reviewed, noted for uptrend sCr 2.9 to 4      PAST HISTORY  --------------------------------------------------------------------------------  No significant changes to PMH, PSH, FHx, SHx, unless otherwise noted    ALLERGIES & MEDICATIONS  --------------------------------------------------------------------------------  Allergies    No Known Allergies    Intolerances    Standing Inpatient Medications  chlorhexidine 4% Liquid 1 Application(s) Topical <User Schedule>  dexMEDEtomidine Infusion 0.2 MICROgram(s)/kG/Hr IV Continuous <Continuous>  dextrose 10%. 1000 milliLiter(s) IV Continuous <Continuous>  lactulose Syrup 30 Gram(s) Oral every 6 hours  melatonin 1 milliGRAM(s) Oral at bedtime  midodrine 10 milliGRAM(s) Oral every 8 hours  norepinephrine Infusion 0.05 MICROgram(s)/kG/Min IV Continuous <Continuous>  nystatin Powder 1 Application(s) Topical two times a day  pantoprazole  Injectable 40 milliGRAM(s) IV Push two times a day  rifAXIMin 550 milliGRAM(s) Oral two times a day  thiamine IVPB 500 milliGRAM(s) IV Intermittent daily    PRN Inpatient Medications  HYDROmorphone  Injectable 0.2 milliGRAM(s) IV Push every 4 hours PRN  sodium chloride 0.9% lock flush 10 milliLiter(s) IV Push every 1 hour PRN    REVIEW OF SYSTEMS  --------------------------------------------------------------------------------  Gen: no fever, chills, weakness  Respiratory: No dyspnea, cough  CV: No chest pain, orthopnea  GI: + abdominal pain.  No ausea, vomiting, diarrhea  MSK: no edema  Neuro: No dizziness, lightheadedness  Heme: No bleeding  All other systems were reviewed and are negative, except as noted.    VITALS/PHYSICAL EXAM  --------------------------------------------------------------------------------  T(C): 37 (08-06-20 @ 11:00), Max: 37.1 (08-05-20 @ 15:00)  HR: 101 (08-06-20 @ 12:00) (100 - 110)  BP: 123/72 (08-06-20 @ 12:00) (112/69 - 124/91)  RR: 18 (08-06-20 @ 12:00) (14 - 23)  SpO2: 92% (08-06-20 @ 11:15) (92% - 100%)  Wt(kg): --    08-05-20 @ 07:01  -  08-06-20 @ 07:00  --------------------------------------------------------  IN: 1520 mL / OUT: 5 mL / NET: 1515 mL    08-06-20 @ 07:01  -  08-06-20 @ 12:19  --------------------------------------------------------  IN: 160 mL / OUT: 0 mL / NET: 160 mL    Physical Exam:              Gen: NAD on room air  	Pulm: CTA b/l  	CV: tachycardic, RRR, S1S2  	GI: +BS, soft, distended  	: no ashton  	MSK: Warm, no edema              Neuro: sedated  	Psych: sedated  	Skin: Warm, no cyanosis, jaundice appearing  	Vascular access: RIJ non tunnel hd catheter    LABS/STUDIES  --------------------------------------------------------------------------------              8.9    28.88 >-----------<  82       [08-06-20 @ 02:40]              26.7     137  |  101  |  34  ----------------------------<  109      [08-06-20 @ 02:40]  3.7   |  18  |  4.00        Ca     9.2     [08-06-20 @ 02:40]      Mg     2.6     [08-06-20 @ 02:40]      Phos  4.2     [08-06-20 @ 02:40]    TPro  5.6  /  Alb  3.6  /  TBili  18.3  /  DBili  x   /  AST  213  /  ALT  51  /  AlkPhos  114  [08-06-20 @ 02:40]    PT/INR: PT 18.7 , INR 1.61       [08-06-20 @ 02:40]  PTT: 37.9       [08-06-20 @ 02:40]    Creatinine Trend:  SCr 4.00 [08-06 @ 02:40]  SCr 2.97 [08-05 @ 13:36]  SCr 1.99 [08-04 @ 23:53]  SCr 1.92 [08-04 @ 14:38]  SCr 2.20 [08-04 @ 00:26]    Urinalysis - [07-29-20 @ 10:44]      Color Light Yellow / Appearance Turbid / SG 1.010 / pH 6.0      Gluc Negative / Ketone Negative  / Bili Small / Urobili <2 mg/dL       Blood Large / Protein Negative / Leuk Est Negative / Nitrite Negative      RBC 12 / WBC 1 / Hyaline 0 / Gran  / Sq Epi  / Non Sq Epi 0 / Bacteria Negative    Iron 34, TIBC 124, %sat 27      [07-14-20 @ 10:34]  Ferritin 812      [07-12-20 @ 08:58]  PTH -- (Ca 6.2)      [07-29-20 @ 09:18]   223  Lipid: chol --, , HDL --, LDL --      [08-03-20 @ 12:04]    HBsAg Nonreact      [07-12-20 @ 01:16]  HCV 0.14, Nonreact      [07-12-20 @ 01:16]  HIV Nonreact      [07-15-20 @ 10:47]    SHAUN: titer Negative, pattern --      [07-12-20 @ 09:37]  Free Light Chains: kappa 2.35, lambda 2.06, ratio = 1.14      [07-12 @ 08:58]

## 2020-08-06 NOTE — PROGRESS NOTE ADULT - ASSESSMENT
39M w/ decompensated EtOH cirrhosis, presenting w/ acute on chronic liver failure of unclear etiology (infection NOS vs continued inflammation due to EtOH)a. Course c/b massive GIB from gastric varix requiring 25u pRBC, s/p glue injection and PARTO by IR. Hb stable post procedures, no e/o bleeding, off pressors and extubated now.     Impression:  #Acute on chronic liver failure: d/dx includes underlying infection (UA neg, BCx NGTD from this admission, CXR neg, dx para neg for SBP, biliary imaging negative, C diff negative) vs worsening EtOH hepatitis (MDF = 60 but no interval drinking since discharge and this is an acute change)  #Cirrhosis due to EtOH, decompensated by ascites  - varices: no esophageal varices on EGD 7/29, actively bleeding gastric varix s/p glue injection and PARTO by IR, bleeding appears to be controlled  - ascites: present on exam, neg for SBP 7/26, on Lasix 20mg/spironolactone 25mg daily as outpatient, s/p 1.5L removed during PARTO 7/29  - HE: none on exam  - HCC: no imaging, MRI without contrast is limited  - MELD-Na = 37 on 8/6  #Hypotension – still requiring pressors, d/dx sepsis? Does not appear to be bleeding related, pressor requirement improving  #Presumed infection - on meropenem  #Pruritis – likely due to elevated bilirubin, improved  #Acute renal failure: unclear etiology, c/f bilirubin pigment nephropathy vs acute tubular necrosis vs obstructive uropathy. Reny is 53, making HRS less likely. S/p CVVH/HD w/ improvement in renal fx, but Cr doubled in 1 day from 2-4 off HD.     Recommendations:  - trend Hb, transfuse to Hb > 7 or if unstable  - IV PPI BID  - c/w broad spectrum abx, recommend 14d course  - f/u renal recs re: HD  - hold nadolol and diuretics  - continue with rifaximin  - c/w MVI, thiamine and folate  - trend CMP, CBC, INR daily  - hepatology will continue to follow    Ross Shook  Gastroenterology Fellow  AT NIGHT AND ON WEEKENDS:  Contact on-call GI fellow via answering service ([phone]) from 5pm-8am and on weekends/holidays  MONDAY-FRIDAY 8AM-5PM:  Available via [employer] Teams  Call [phone] ([facility]) or Page [phone] ([facility]) from 8am-5pm M-F

## 2020-08-06 NOTE — PROGRESS NOTE ADULT - SUBJECTIVE AND OBJECTIVE BOX
Chief Complaint:  Patient is a 39y old  Male who presents with a chief complaint of abdominal pain (05 Aug 2020 13:22)    Reason for consult: acute on chronic liver failure    Interval Events: Pt continues to feel well, no melena/hematochezia per nursing     Hospital Medications:  chlorhexidine 4% Liquid 1 Application(s) Topical <User Schedule>  dexMEDEtomidine Infusion 0.2 MICROgram(s)/kG/Hr IV Continuous <Continuous>  dextrose 10%. 1000 milliLiter(s) IV Continuous <Continuous>  HYDROmorphone  Injectable 0.2 milliGRAM(s) IV Push every 4 hours PRN  lactulose Syrup 30 Gram(s) Oral every 6 hours  melatonin 1 milliGRAM(s) Oral at bedtime  meropenem  IVPB 1000 milliGRAM(s) IV Intermittent every 8 hours  midodrine 10 milliGRAM(s) Oral every 8 hours  norepinephrine Infusion 0.05 MICROgram(s)/kG/Min IV Continuous <Continuous>  nystatin Powder 1 Application(s) Topical two times a day  pantoprazole  Injectable 40 milliGRAM(s) IV Push two times a day  rifAXIMin 550 milliGRAM(s) Oral two times a day  sodium chloride 0.9% lock flush 10 milliLiter(s) IV Push every 1 hour PRN  thiamine IVPB 500 milliGRAM(s) IV Intermittent daily      ROS:   General:  No  fevers, chills, night sweats, fatigue  Eyes:  Good vision, no reported pain  ENT:  No sore throat, pain, runny nose  CV:  No pain, palpitations  Pulm:  No dyspnea, cough  GI:  See HPI, otherwise negative  :  No  incontinence, nocturia  Muscle:  No pain, weakness  Neuro:  No memory problems  Psych:  No insomnia, mood problems, depression  Endocrine:  No polyuria, polydipsia, cold/heat intolerance  Heme:  No petechiae, ecchymosis, easy bruisability  Skin:  No rash    PHYSICAL EXAM:   Vital Signs:  Vital Signs Last 24 Hrs  T(C): 36.8 (06 Aug 2020 08:00), Max: 37.1 (05 Aug 2020 15:00)  T(F): 98.2 (06 Aug 2020 08:00), Max: 98.8 (05 Aug 2020 15:00)  HR: 101 (06 Aug 2020 09:00) (100 - 111)  BP: 112/69 (06 Aug 2020 09:00) (112/69 - 124/75)  BP(mean): 88 (06 Aug 2020 09:00) (85 - 97)  RR: 16 (06 Aug 2020 09:00) (14 - 24)  SpO2: 98% (06 Aug 2020 09:00) (93% - 100%)  Daily     Daily     GENERAL: no acute distress  NEURO: alert, no asterixis  HEENT: icteric sclera, no conjunctival pallor appreciated  CHEST: no respiratory distress, no accessory muscle use  CARDIAC: regular rate, rhythm  ABDOMEN: soft, non-tender, non-distended, no rebound or guarding  EXTREMITIES: warm, well perfused, no edema  SKIN: ++ jaundice    LABS: reviewed                        8.9    28.88 )-----------( 82       ( 06 Aug 2020 02:40 )             26.7     08-06    137  |  101  |  34<H>  ----------------------------<  109<H>  3.7   |  18<L>  |  4.00<H>    Ca    9.2      06 Aug 2020 02:40  Phos  4.2     08-06  Mg     2.6     08-06    TPro  5.6<L>  /  Alb  3.6  /  TBili  18.3<H>  /  DBili  x   /  AST  213<H>  /  ALT  51<H>  /  AlkPhos  114  08-06    LIVER FUNCTIONS - ( 06 Aug 2020 02:40 )  Alb: 3.6 g/dL / Pro: 5.6 g/dL / ALK PHOS: 114 U/L / ALT: 51 U/L / AST: 213 U/L / GGT: x             Interval Diagnostic Studies: see sunrise for full report

## 2020-08-06 NOTE — PROGRESS NOTE ADULT - SUBJECTIVE AND OBJECTIVE BOX
INTERVAL HPI/OVERNIGHT EVENTS: No acute overnight events. Patient continues to do well with improving blood pressures on Midodrine.       SUBJECTIVE: Patient seen and examined at bedside. No acute complaints.    VITAL SIGNS:  ICU Vital Signs Last 24 Hrs  T(C): 36.8 (02 Aug 2020 07:45), Max: 36.8 (02 Aug 2020 00:00)  T(F): 98.2 (02 Aug 2020 07:45), Max: 98.2 (02 Aug 2020 00:00)  HR: 83 (02 Aug 2020 10:15) (70 - 89)  BP: --  BP(mean): --  ABP: 114/46 (02 Aug 2020 10:15) (93/33 - 130/68)  ABP(mean): 67 (02 Aug 2020 10:15) (51 - 88)  RR: 20 (02 Aug 2020 10:15) (20 - 25)  SpO2: 93% (02 Aug 2020 10:15) (88% - 100%)    Mode: AC/ CMV (Assist Control/ Continuous Mandatory Ventilation), RR (machine): 20, TV (machine): 450, FiO2: 40, PEEP: 5, ITime: 1, MAP: 11, PIP: 28  Plateau pressure:   P/F ratio:     08-01 @ 07:01  -  08-02 @ 07:00  --------------------------------------------------------  IN: 3226.2 mL / OUT: 3549 mL / NET: -322.8 mL    08-02 @ 07:01  -  08-02 @ 11:29  --------------------------------------------------------  IN: 366.5 mL / OUT: 320 mL / NET: 46.5 mL      CAPILLARY BLOOD GLUCOSE      POCT Blood Glucose.: 83 mg/dL (01 Aug 2020 20:14)    ECG:    PHYSICAL EXAM:  General: NAD, lying in bed comfortably   HEENT: Icteric. PERRL, icteric   Neck: supple  Respiratory: CTA bilaterally without wheezing, rales or rhonchi  Cardiovascular: +S1/S2; RRR   Abdomen: Abdomen moderately distended, mild, epigastric tenderness, + bowel sounds, no rebound tenderness or guarding   Extremities: Warm, Dorsalis pedis 2+ bilaterally, Radial 2+ bilaterally   Skin: Icteric  Neurological: Alert and oriented x3     MEDICATIONS:  MEDICATIONS  (STANDING):  caspofungin IVPB 35 milliGRAM(s) IV Intermittent every 24 hours  chlorhexidine 0.12% Liquid 15 milliLiter(s) Oral Mucosa every 12 hours  chlorhexidine 4% Liquid 1 Application(s) Topical <User Schedule>  CRRT Treatment    <Continuous>  dextrose 10%. 1000 milliLiter(s) (40 mL/Hr) IV Continuous <Continuous>  fentaNYL   Infusion... 0.5 MICROgram(s)/kG/Hr (2.07 mL/Hr) IV Continuous <Continuous>  lactulose Syrup 30 Gram(s) Oral every 6 hours  meropenem  IVPB 1000 milliGRAM(s) IV Intermittent every 12 hours  norepinephrine Infusion 0.05 MICROgram(s)/kG/Min (3.88 mL/Hr) IV Continuous <Continuous>  nystatin Powder 1 Application(s) Topical two times a day  pantoprazole Infusion 8 mG/Hr (10 mL/Hr) IV Continuous <Continuous>  Phoxillum Filtration BK 4 / 2.5 5000 milliLiter(s) (1200 mL/Hr) CRRT <Continuous>  Phoxillum Filtration BK 4 / 2.5 5000 milliLiter(s) (200 mL/Hr) CRRT <Continuous>  Phoxillum Filtration BK 4 / 2.5 5000 milliLiter(s) (1500 mL/Hr) CRRT <Continuous>  propofol Infusion 5 MICROgram(s)/kG/Min (2.48 mL/Hr) IV Continuous <Continuous>  rifAXIMin 550 milliGRAM(s) Oral two times a day  thiamine IVPB 500 milliGRAM(s) IV Intermittent daily    MEDICATIONS  (PRN):  sodium chloride 0.9% lock flush 10 milliLiter(s) IV Push every 1 hour PRN Pre/post blood products, medications, blood draw, and to maintain line patency      ALLERGIES:  Allergies    No Known Allergies    Intolerances        LABS:                        9.2    23.59 )-----------( 46       ( 02 Aug 2020 06:14 )             28.0     08-02    136  |  100  |  9   ----------------------------<  92  3.9   |  19<L>  |  2.29<H>    Ca    8.3<L>      02 Aug 2020 00:19  Phos  3.4     08-02  Mg     2.4     08-02    TPro  5.6<L>  /  Alb  4.3  /  TBili  15.7<H>  /  DBili  x   /  AST  210<H>  /  ALT  33  /  AlkPhos  78  08-02    PT/INR - ( 02 Aug 2020 06:14 )   PT: 21.8 sec;   INR: 1.89 ratio         PTT - ( 02 Aug 2020 06:14 )  PTT:46.4 sec      RADIOLOGY & ADDITIONAL TESTS: Reviewed. INTERVAL HPI/OVERNIGHT EVENTS: No acute overnight events. Patient continues to do well with improving blood pressures on Midodrine.       SUBJECTIVE: Patient seen and examined at bedside. Bilateral hip discomfort that has been going on for months. Mild numbness and tingling in the finger tips and toes.     VITAL SIGNS:  ICU Vital Signs Last 24 Hrs  T(C): 36.8 (02 Aug 2020 07:45), Max: 36.8 (02 Aug 2020 00:00)  T(F): 98.2 (02 Aug 2020 07:45), Max: 98.2 (02 Aug 2020 00:00)  HR: 83 (02 Aug 2020 10:15) (70 - 89)  BP: --  BP(mean): --  ABP: 114/46 (02 Aug 2020 10:15) (93/33 - 130/68)  ABP(mean): 67 (02 Aug 2020 10:15) (51 - 88)  RR: 20 (02 Aug 2020 10:15) (20 - 25)  SpO2: 93% (02 Aug 2020 10:15) (88% - 100%)    Mode: AC/ CMV (Assist Control/ Continuous Mandatory Ventilation), RR (machine): 20, TV (machine): 450, FiO2: 40, PEEP: 5, ITime: 1, MAP: 11, PIP: 28  Plateau pressure:   P/F ratio:     08-01 @ 07:01  -  08-02 @ 07:00  --------------------------------------------------------  IN: 3226.2 mL / OUT: 3549 mL / NET: -322.8 mL    08-02 @ 07:01  -  08-02 @ 11:29  --------------------------------------------------------  IN: 366.5 mL / OUT: 320 mL / NET: 46.5 mL      CAPILLARY BLOOD GLUCOSE      POCT Blood Glucose.: 83 mg/dL (01 Aug 2020 20:14)    ECG:    PHYSICAL EXAM:  General: NAD, lying in bed comfortably   HEENT: Icteric. PERRL, icteric   Neck: supple  Respiratory: CTA bilaterally without wheezing, rales or rhonchi  Cardiovascular: +S1/S2; RRR   Abdomen: Abdomen moderately distended, mild, epigastric tenderness, + bowel sounds, no rebound tenderness or guarding   Extremities: Warm, Dorsalis pedis 2+ bilaterally, Radial 2+ bilaterally, sensation intact in extremities x4   Skin: Icteric  Neurological: Alert and oriented x3     MEDICATIONS:  MEDICATIONS  (STANDING):  caspofungin IVPB 35 milliGRAM(s) IV Intermittent every 24 hours  chlorhexidine 0.12% Liquid 15 milliLiter(s) Oral Mucosa every 12 hours  chlorhexidine 4% Liquid 1 Application(s) Topical <User Schedule>  CRRT Treatment    <Continuous>  dextrose 10%. 1000 milliLiter(s) (40 mL/Hr) IV Continuous <Continuous>  fentaNYL   Infusion... 0.5 MICROgram(s)/kG/Hr (2.07 mL/Hr) IV Continuous <Continuous>  lactulose Syrup 30 Gram(s) Oral every 6 hours  meropenem  IVPB 1000 milliGRAM(s) IV Intermittent every 12 hours  norepinephrine Infusion 0.05 MICROgram(s)/kG/Min (3.88 mL/Hr) IV Continuous <Continuous>  nystatin Powder 1 Application(s) Topical two times a day  pantoprazole Infusion 8 mG/Hr (10 mL/Hr) IV Continuous <Continuous>  Phoxillum Filtration BK 4 / 2.5 5000 milliLiter(s) (1200 mL/Hr) CRRT <Continuous>  Phoxillum Filtration BK 4 / 2.5 5000 milliLiter(s) (200 mL/Hr) CRRT <Continuous>  Phoxillum Filtration BK 4 / 2.5 5000 milliLiter(s) (1500 mL/Hr) CRRT <Continuous>  propofol Infusion 5 MICROgram(s)/kG/Min (2.48 mL/Hr) IV Continuous <Continuous>  rifAXIMin 550 milliGRAM(s) Oral two times a day  thiamine IVPB 500 milliGRAM(s) IV Intermittent daily    MEDICATIONS  (PRN):  sodium chloride 0.9% lock flush 10 milliLiter(s) IV Push every 1 hour PRN Pre/post blood products, medications, blood draw, and to maintain line patency      ALLERGIES:  Allergies    No Known Allergies    Intolerances        LABS:                        9.2    23.59 )-----------( 46       ( 02 Aug 2020 06:14 )             28.0     08-02    136  |  100  |  9   ----------------------------<  92  3.9   |  19<L>  |  2.29<H>    Ca    8.3<L>      02 Aug 2020 00:19  Phos  3.4     08-02  Mg     2.4     08-02    TPro  5.6<L>  /  Alb  4.3  /  TBili  15.7<H>  /  DBili  x   /  AST  210<H>  /  ALT  33  /  AlkPhos  78  08-02    PT/INR - ( 02 Aug 2020 06:14 )   PT: 21.8 sec;   INR: 1.89 ratio         PTT - ( 02 Aug 2020 06:14 )  PTT:46.4 sec      RADIOLOGY & ADDITIONAL TESTS: Reviewed.

## 2020-08-06 NOTE — PROGRESS NOTE ADULT - PROBLEM SELECTOR PLAN 2
Pt. with hyponatremia in setting of liver cirrhosis and severe ascites. Serum sodium 133 on admission (7/26), Resolved today 137    Monitor BMP daily  avoid hypotonic saline/D5w, glycemic control

## 2020-08-06 NOTE — PROGRESS NOTE ADULT - PROBLEM SELECTOR PLAN 1
Pt with MARQUISE in the setting of liver cirrhosis with severe ascites. MARQUISE 2/2 prerenal (severe diarrhea + diuretics +decrease effective arterial blood volume from Cirrhosis) to r/o hepatorenal syndrome. 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 On Admission 9.64 -- s/p diagnostic paracentesis. -- sCr worsening - requiring HD -- First ever HD on 7/28 - tolerated well.  s/p EGD (7/29) c/b active massive bleeding requiring IR for embolization s/p intubation and MICU.  s/p extubation 8/4, discontinued CRRT 8/5 ~6am    Plan for HD today given no UOP with sodium modeling and low temp, UF 1-2L as BP tolerate  consider ashton catheter placement  c/w IV pressors per ICU team  Monitor electrolytes and urine output.   Avoid NSAIDs, ACEI/ARBS, RCA and nephrotoxins.   Dose medications as per eGFR.  rest of management as per ICU

## 2020-08-06 NOTE — PHYSICAL THERAPY INITIAL EVALUATION ADULT - ADDITIONAL COMMENTS
Lives alone in 5th story walk-up apartment in [place]. Will be staying with parents in private home with 4 steps to enter

## 2020-08-06 NOTE — PROGRESS NOTE ADULT - ASSESSMENT
39 y.o. Male with PMhx of Alcohol liver cirrhosis, alcohol use disorder, traumatic bladder rupture s/p ex lap in 2019, recently admitted to [facility] from 7/11 to 7/17 for seizure and alcoholic cirrhosis. Presented to ED c/o worsening abdominal pain and diarrhea. - hospital course complicated with massive GI bleed, now intubated on 7/29     Nephrology consulted for MARQUISE

## 2020-08-07 NOTE — PROGRESS NOTE ADULT - PROBLEM SELECTOR PLAN 7
secondary to gastric variceal bleed. refractory to endoscopic intervention 7/29 s/p IR PARTO 7/30, blakemore 7/30, and 34 U PRBC, 25 U FFP, 20 U PLT, and 5 U cryo all on 7/30. blakemore removed 7/30. tolerated tube feeds without evidence of bleeding  -trend CBC q12- transfuse to maintain Hgb > 7, or for active bleed  -trend coags [id number] with FFP, cryo as needed

## 2020-08-07 NOTE — PROGRESS NOTE ADULT - ASSESSMENT
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 Blakemore removed 7/31 and the pt no longer with active bleeding with stable Hgb, successfully extubated 8/4 and titrated off vasopressors 8/5. The patients leukocytosis continues despite being on antimicrobial therapy with meropenem and infection is likely GI in etiology given history of liver disease.

## 2020-08-07 NOTE — PROGRESS NOTE ADULT - PROBLEM SELECTOR PLAN 2
Pt. with hyponatremia in setting of liver cirrhosis and severe ascites. Serum sodium 133 on admission (7/26), Resolved today 136    Monitor BMP daily  avoid hypotonic saline/D5w, glycemic control

## 2020-08-07 NOTE — PROGRESS NOTE ADULT - SUBJECTIVE AND OBJECTIVE BOX
[facility] DIVISION OF KIDNEY DISEASES AND HYPERTENSION   -- FOLLOW UP NOTE --   Bhupinder Truong  Nephrology Fellow  Pager [facility]: [phone]   /  Pager [facility]: [phone]  (after 5pm or weekend please page the on-call fellow)  --------------------------------------------------------------------------------  24 hour events/subjective:  - overnight no events reported, vitals afebrile no hypotensive episode, total UOP voided 100 cc and HD 1.5 L in the past 24hr  - patient seen and examined at bedside this morning without complaints   - vitals/lab/medications reviewed    PAST HISTORY  --------------------------------------------------------------------------------  No significant changes to PMH, PSH, FHx, SHx, unless otherwise noted    ALLERGIES & MEDICATIONS  --------------------------------------------------------------------------------  Allergies    No Known Allergies    Intolerances    Standing Inpatient Medications  chlorhexidine 4% Liquid 1 Application(s) Topical <User Schedule>  dextrose 10%. 1000 milliLiter(s) IV Continuous <Continuous>  lactulose Syrup 30 Gram(s) Oral every 6 hours  melatonin 1 milliGRAM(s) Oral at bedtime  meropenem  IVPB 500 milliGRAM(s) IV Intermittent every 24 hours  midodrine 10 milliGRAM(s) Oral every 8 hours  nystatin Powder 1 Application(s) Topical two times a day  pantoprazole  Injectable 40 milliGRAM(s) IV Push two times a day  rifAXIMin 550 milliGRAM(s) Oral two times a day  thiamine IVPB 500 milliGRAM(s) IV Intermittent daily    PRN Inpatient Medications  HYDROmorphone  Injectable 0.2 milliGRAM(s) IV Push every 4 hours PRN  sodium chloride 0.9% lock flush 10 milliLiter(s) IV Push every 1 hour PRN    REVIEW OF SYSTEMS  --------------------------------------------------------------------------------  Gen: no fever, chills, weakness  Respiratory: No dyspnea, cough  CV: No chest pain, orthopnea  GI: mild abdominal pain.  no nausea, vomiting, diarrhea  MSK: no edema  Neuro: No dizziness, lightheadedness  Heme: No bleeding  All other systems were reviewed and are negative, except as noted.    VITALS/PHYSICAL EXAM  --------------------------------------------------------------------------------  T(C): 36.6 (08-07-20 @ 04:00), Max: 37 (08-06-20 @ 11:00)  HR: 108 (08-07-20 @ 06:00) (99 - 114)  BP: 103/57 (08-07-20 @ 06:00) (100/73 - 145/86)  RR: 16 (08-07-20 @ 06:00) (14 - 25)  SpO2: 90% (08-07-20 @ 06:00) (90% - 100%)  Wt(kg): --    08-06-20 @ 07:01  -  08-07-20 @ 07:00  --------------------------------------------------------  IN: 1580 mL / OUT: 2100 mL / NET: -520 mL    Physical Exam:              Gen: NAD on room air  	Pulm: CTA b/l  	CV: tachycardic, RRR, S1S2  	GI: +BS, soft, distended  	: no ashton  	MSK: Warm, no edema              Neuro: sedated  	Psych: sedated  	Skin: Warm, no cyanosis, jaundice appearing  	Vascular access: RIJ non tunnel hd catheter    LABS/STUDIES  --------------------------------------------------------------------------------              9.2    32.18 >-----------<  81       [08-07-20 @ 02:35]              27.1     136  |  98  |  33  ----------------------------<  107      [08-07-20 @ 02:35]  3.8   |  19  |  4.26        Ca     9.2     [08-07-20 @ 02:35]      Mg     2.2     [08-07-20 @ 02:35]      Phos  3.7     [08-07-20 @ 02:35]    TPro  5.8  /  Alb  3.7  /  TBili  19.4  /  DBili  x   /  AST  200  /  ALT  38  /  AlkPhos  124  [08-07-20 @ 02:35]    PT/INR: PT 19.0 , INR 1.63       [08-07-20 @ 02:35]  PTT: 38.9       [08-07-20 @ 02:35]    Creatinine Trend:  SCr 4.26 [08-07 @ 02:35]  SCr 4.92 [08-06 @ 14:42]  SCr 4.00 [08-06 @ 02:40]  SCr 2.97 [08-05 @ 13:36]  SCr 1.99 [08-04 @ 23:53]    Urinalysis - [08-07-20 @ 04:54]      Color Dark Orange / Appearance Turbid / SG 1.041 / pH 6.0      Gluc Negative / Ketone Negative  / Bili Moderate / Urobili 8 mg/dL       Blood Moderate / Protein 100 mg/dL Test Performed on Urine Supernate. / Leuk Est Negative / Nitrite Negative      RBC  / WBC  / Hyaline  / Gran  / Sq Epi  / Non Sq Epi  / Bacteria     Urine Creatinine 118      [08-06-20 @ 22:42]  Urine Sodium <35      [08-06-20 @ 22:42]  Urine Urea Nitrogen 302      [08-07-20 @ 01:54]    Iron 34, TIBC 124, %sat 27      [07-14-20 @ 10:34]  Ferritin 812      [07-12-20 @ 08:58]  PTH -- (Ca 6.2)      [07-29-20 @ 09:18]   223  Lipid: chol --, , HDL --, LDL --      [08-03-20 @ 12:04]    HBsAg Nonreact      [07-12-20 @ 01:16]  HCV 0.14, Nonreact      [07-12-20 @ 01:16]  HIV Nonreact      [07-15-20 @ 10:47]    SHAUN: titer Negative, pattern --      [07-12-20 @ 09:37]  Free Light Chains: kappa 2.35, lambda 2.06, ratio = 1.14      [07-12 @ 08:58]

## 2020-08-07 NOTE — PROGRESS NOTE ADULT - PROBLEM SELECTOR PLAN 6
2/2 alcoholic hepatitis  -daily CMP  -hepatology following, appreciate recs  -protonix 40mg PO daily

## 2020-08-07 NOTE — CHART NOTE - NSCHARTNOTEFT_GEN_A_CORE
MICU Transfer Note    Transfer from: MICU    Transfer to: (  ) Medicine    (  ) Telemetry     (   ) RCU        (    ) Palliative         (   ) Stroke Unit          (   ) __________________    Accepting Physician:  Signout given to:     MICU COURSE:  38 yo male PMHx of EtOH cirrhosis with portal hypertension, traumatic bladder rupture d/t fall s/p ex-lap and repair (2019) with recent admission for acute alcoholic hepatitis and alcohol withdrawal c/b seizures. Pt originally presented 7/26 with acute renal failure, jaundice and increasing ascites. 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 S/p diagnostic paracentesis 7/26 negative for SBP s/p empiric course of Zosyn. Pt underwent shiley placement by IR for initiation of dialysis. On 7/29 pt with hematemesis and underwent EGD revealing large gastric variceal bleed. In EGD pt received 12 U PRBC, 10 U FFP, 2 U Plt, 1 Cryo. GI unable to achieve hemostasis and Pt emergently transferred to IR where he underwent embolization (glue injection and PARTO) of bleeding gastric varice and placement of blakemore tube. In total pt received 34 U PRBC, 25 FFP, 5 U Plt, 2 cryo prior to transfer to MICU for further care.     On arrival to MICU pt remained intubated and sedated and remained on vasopressors for hemorrhagic and hypovolemic shock state then later ? component of distributive d/t improvement in hemoglobin s/p MTP. Blakemore was removed by GI on 7/30 and pt had no evidence of further bleeding s/p. Pt started on CVVHDF for acute renal failure. Pt was successfully extubated 8/4 to nasal cannula and returned at baseline mental status. CRRT was discontinued on 8/5 and transitioned to intermittent HD 8/6. Pt weaned off vasopressors on 8/5. All cultures NGTD, pt remained on empiric abx and transitioned to meropenem 8/6 i/s/o worsening leukocytosis and elevated lipase to 504 as well as abdominal pain with nausea and vomiting. Pt underwent CTA/P to r/o pancreatitis with pre-curiel findings unremarkable pancreas, nonspecific gastric wall thickening/edema, redomenstrated cirrhotic liver with steatosis and splenomegaly, small to mod volume ascites, redemonstrated fluid collection without peripheral enhancement in the lower pelvis and wall thickening of the ascending colon and cecum. Pt started on a clear diet and advanced as tolerated.       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 Blakemore removed 7/31 and the pt no longer with e/o active bleeding with stable Hgb, successfully extubated 8/4 and titrated off vasopressors 8/5. The patients leukocytosis continues despite being on antimicrobial therapy with meropenem and infection is likely GI in etiology given history of liver disease.     Neurology:  -Alert and oriented   -Hx of seizures in setting of etoh withdrawal  -Neuro checks q4H  -Hepatic encephalopathy being treated with lactulose and rifaximin, titrated to 3-4 BM's     Pulm:  intubated for airway protection i/s/o variceal bleed with acute hypoxic respiratory failure and pulmonary edema preventing weaning, now successfully extubated 8/4 to nasal cannula  - requiring anywhere from room air to 2L NC   - respiratory status improving s/p fluid removal    CV:  Hypovolemic shock 2/2 gastric variceal bleed on 7/29, titrated off vasopressors 8/5  -continue to monitor vital signs  -continue midodrine 10q8  -SBP maintaining in 120s    GI:  #Acute on chronic liver failure: d/dx includes underlying infection (UA neg, BCx negative, CXR neg, dx para neg for SBP, biliary imaging negative, C diff negative), worsening EtOH hepatitis (MDF = 60 but no interval drinking since discharge and this is an acute change), vs vascular injury  #Decompensated alcoholic cirrhosis, gastric variceal bleed and ascites  - varices: no prior EGD, on nadolol at home, now holding due to shock   - ascites: present on exam, neg for SBP 7/26, on Lasix 20mg/spironolactone 25mg daily as outpatient--on HOLD for hypotension   - HE: on rifaximin and lactulose titrate to 3-4 BMs a day, no current signs of hepatic encephalopathy  - HCC: no imaging, MRI without contrast is limited  - MELD-Na = 38 on 7/26  - Will discontinue albumin, albumin levels are between 3.7-4.1    #Hematemesis secondary to gastric variceal bleed  -Refractory to endoscopic intervention 7/29 s/p IR PARTO 7/30, blakemore 7/30, and 34 U PRBC, 25 U FFP, 20 U PLT, and 5 U cryo all on 7/30   -Blakemore removed 7/30   -Tolerated tube feeds without evidence of bleeding  -Trend CBC q12- transfuse to maintain Hgb > 7, or for active bleed  -Trend Coags p51o--gahntts with FFP, cryo as needed     #Transaminitis 2/2 alcoholic hepatitis  -Trend liver enzymes, bilirubin  -Follow up with hepatology and GI recommendations  -Protonix 40mg IV push BID     #Ascites, negative for SBP on admission  -Serial abdominal exams  -Paracentesis as needed  -Continue MVI, thiamine, folate     #Abdominal pain   - CT abdomen to rule pancreatitis or pancreatic abscess - preliminary negative for pancreatitis, will maintain meropenem for now and progress diet as tolerated   - Lipase 507 will trend q48 hours     Renal:  #Acute renal failure possibly secondary to  hepatorenal syndrome. 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 Baseline creatinine sCr 0.6-0.8. On Admission 9.64 -- s/p diagnostic paracentesis. -- sCr worsening  - First HD 7/28, Second HD 7/29---CVVHD started, 8/5 trial off CVVHD, will resume HD 8/6   - hold diuretics at this time   - Monitor electrolytes and urine output   - Avoid NSAIDs, ACEI/ARBS, RCA and nephrotoxins.   - Dose medications as per eGFR.     ID:  #All cultures negative to date--      7/26: Blood Culture x 2 NGTD; Urine Culture: NGTD               Para negative for SBP      7/30 culture       8/1: fungitell sent       8/6: Repeat cultures   -- D/c Zosyn 7/26 - 7/31 will continue with meropenem 8/1- present, Hepatology recommends 14 day course   -- D/c caspofungin as fungal infection appears unlikely given increasing leukocytosis with concurrent increase in Neutrophils   --Trend lactate; WBC count continues to be elevated     Heme:   --CBC q12h  --Maintain Hgb > 7. Transfuse as needed  --Trend Coags--reverse coagulopathy as needed or in event of bleed  TOTAL TRANSFUSION REQUIREMENTS: 34 PRBC, 25  FFP, 5 PLT, and 2 Cryo     Endo:  --Glucose q6h  --Monitor for hypoglycemia in setting of liver failure    Diet:   - advance diet as tolerated       FOR FOLLOW UP:  - continue to f/u Transplant Hepatology recommendations  - continue to f/u with Renal recommendations regarding further HD, evaluate UOP  - continue Meropenem for now, continue to trend leukocytosis and fever curve and f/u with culture results  - f/u final read of CTA/P from 8/6  - evaluate abdomen for worsening ascites and consider diagnostic/therapeutic paracentesis  - continue to f/u Hgb and e/o bleeding   - advance diet as tolerated MICU Transfer Note    Transfer from: MICU    Transfer to: ( X ) Medicine    (  ) Telemetry     (   ) RCU        (    ) Palliative         (   ) Stroke Unit          (   ) __________________    Accepting Physician:  Signout given to:     MICU COURSE:  38 yo male PMHx of EtOH cirrhosis with portal hypertension, traumatic bladder rupture d/t fall s/p ex-lap and repair (2019) with recent admission for acute alcoholic hepatitis and alcohol withdrawal c/b seizures. Pt originally presented 7/26 with acute renal failure, jaundice and increasing ascites. 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 S/p diagnostic paracentesis 7/26 negative for SBP s/p empiric course of Zosyn. Pt underwent shiley placement by IR for initiation of dialysis. On 7/29 pt with hematemesis and underwent EGD revealing large gastric variceal bleed. In EGD pt received 12 U PRBC, 10 U FFP, 2 U Plt, 1 Cryo. GI unable to achieve hemostasis and Pt emergently transferred to IR where he underwent embolization (glue injection and PARTO) of bleeding gastric varice and placement of blakemore tube. In total pt received 34 U PRBC, 25 FFP, 5 U Plt, 2 cryo prior to transfer to MICU for further care.     On arrival to MICU pt remained intubated and sedated and remained on vasopressors for hemorrhagic and hypovolemic shock state then later ? component of distributive d/t improvement in hemoglobin s/p MTP. Blakemore was removed by GI on 7/30 and pt had no evidence of further bleeding s/p. Pt started on CVVHDF for acute renal failure. Pt was successfully extubated 8/4 to nasal cannula and returned at baseline mental status. CRRT was discontinued on 8/5 and transitioned to intermittent HD 8/6. Pt weaned off vasopressors on 8/5. All cultures NGTD, pt remained on empiric abx and transitioned to meropenem 8/6 i/s/o worsening leukocytosis and elevated lipase to 504 as well as abdominal pain with nausea and vomiting. Pt underwent CTA/P to r/o pancreatitis with pre-curiel findings unremarkable pancreas, nonspecific gastric wall thickening/edema, redomenstrated cirrhotic liver with steatosis and splenomegaly, small to mod volume ascites, redemonstrated fluid collection without peripheral enhancement in the lower pelvis and wall thickening of the ascending colon and cecum. Pt started on a clear diet and advanced as tolerated.       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 Blakemore removed 7/31 and the pt no longer with e/o active bleeding with stable Hgb, successfully extubated 8/4 and titrated off vasopressors 8/5. The patients leukocytosis continues despite being on antimicrobial therapy with meropenem and infection is likely GI in etiology given history of liver disease.     Neurology:  -Alert and oriented   -Hx of seizures in setting of etoh withdrawal  -Neuro checks q4H  -Hepatic encephalopathy being treated with lactulose and rifaximin, titrated to 3-4 BM's     Pulm:  intubated for airway protection i/s/o variceal bleed with acute hypoxic respiratory failure and pulmonary edema preventing weaning, now successfully extubated 8/4 to nasal cannula  - requiring anywhere from room air to 2L NC   - respiratory status improving s/p fluid removal    CV:  Hypovolemic shock 2/2 gastric variceal bleed on 7/29, titrated off vasopressors 8/5  -continue to monitor vital signs  -continue midodrine 10q8  -SBP maintaining in 120s    GI:  #Acute on chronic liver failure: d/dx includes underlying infection (UA neg, BCx negative, CXR neg, dx para neg for SBP, biliary imaging negative, C diff negative), worsening EtOH hepatitis (MDF = 60 but no interval drinking since discharge and this is an acute change), vs vascular injury  #Decompensated alcoholic cirrhosis, gastric variceal bleed and ascites  - varices: no prior EGD, on nadolol at home, now holding due to shock   - ascites: present on exam, neg for SBP 7/26, on Lasix 20mg/spironolactone 25mg daily as outpatient--on HOLD for hypotension   - HE: on rifaximin and lactulose titrate to 3-4 BMs a day, no current signs of hepatic encephalopathy  - HCC: no imaging, MRI without contrast is limited  - MELD-Na = 38 on 7/26  - Will discontinue albumin, albumin levels are between 3.7-4.1    #Hematemesis secondary to gastric variceal bleed  -Refractory to endoscopic intervention 7/29 s/p IR PARTO 7/30, blakemore 7/30, and 34 U PRBC, 25 U FFP, 20 U PLT, and 5 U cryo all on 7/30   -Blakemore removed 7/30   -Tolerated tube feeds without evidence of bleeding  -Trend CBC q12- transfuse to maintain Hgb > 7, or for active bleed  -Trend Coags c08u--zaftmjh with FFP, cryo as needed     #Transaminitis 2/2 alcoholic hepatitis  -Trend liver enzymes, bilirubin  -Follow up with hepatology and GI recommendations  -Protonix 40mg IV push BID     #Ascites, negative for SBP on admission  -Serial abdominal exams  -Paracentesis as needed  -Continue MVI, thiamine, folate     #Abdominal pain   - CT abdomen to rule pancreatitis or pancreatic abscess - preliminary negative for pancreatitis, will maintain meropenem for now and progress diet as tolerated   - Lipase 507 will trend q48 hours     Renal:  #Acute renal failure possibly secondary to  hepatorenal syndrome. 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 Baseline creatinine sCr 0.6-0.8. On Admission 9.64 -- s/p diagnostic paracentesis. -- sCr worsening  - First HD 7/28, Second HD 7/29---CVVHD started, 8/5 trial off CVVHD, will resume HD 8/6   - hold diuretics at this time   - Monitor electrolytes and urine output   - Avoid NSAIDs, ACEI/ARBS, RCA and nephrotoxins.   - Dose medications as per eGFR.     ID:  #All cultures negative to date--      7/26: Blood Culture x 2 NGTD; Urine Culture: NGTD               Para negative for SBP      7/30 culture       8/1: fungitell sent       8/6: Repeat cultures   -- D/c Zosyn 7/26 - 7/31 will continue with meropenem 8/1- present, Hepatology recommends 14 day course   -- D/c caspofungin as fungal infection appears unlikely given increasing leukocytosis with concurrent increase in Neutrophils   --Trend lactate; WBC count continues to be elevated     Heme:   --CBC q12h  --Maintain Hgb > 7. Transfuse as needed  --Trend Coags--reverse coagulopathy as needed or in event of bleed  TOTAL TRANSFUSION REQUIREMENTS: 34 PRBC, 25  FFP, 5 PLT, and 2 Cryo     Endo:  --Glucose q6h  --Monitor for hypoglycemia in setting of liver failure    Diet:   - advance diet as tolerated       FOR FOLLOW UP:  - continue to f/u Transplant Hepatology recommendations  - continue to f/u with Renal recommendations regarding further HD, evaluate UOP  - continue Meropenem for now, continue to trend leukocytosis and fever curve and f/u with culture results  - f/u final read of CTA/P from 8/6  - evaluate abdomen for worsening ascites and consider diagnostic/therapeutic paracentesis  - continue to f/u Hgb and e/o bleeding   - advance diet as tolerated MICU Transfer Note    Transfer from: MICU    Transfer to: ( X ) Medicine    (  ) Telemetry     (   ) RCU        (    ) Palliative         (   ) Stroke Unit          (   ) __________________    Accepting Physician:  Signout given to:     MICU COURSE:  40 yo male PMHx of EtOH cirrhosis with portal hypertension, traumatic bladder rupture d/t fall s/p ex-lap and repair (2019) with recent admission for acute alcoholic hepatitis and alcohol withdrawal c/b seizures. Pt originally presented 7/26 with acute renal failure, jaundice and increasing ascites. 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 S/p diagnostic paracentesis 7/26 negative for SBP s/p empiric course of Zosyn. Pt underwent shiley placement by IR for initiation of dialysis. On 7/29 pt with hematemesis and underwent EGD revealing large gastric variceal bleed. In EGD pt received 12 U PRBC, 10 U FFP, 2 U Plt, 1 Cryo. GI unable to achieve hemostasis and Pt emergently transferred to IR where he underwent embolization (glue injection and PARTO) of bleeding gastric varice and placement of blakemore tube. In total pt received 34 U PRBC, 25 FFP, 5 U Plt, 2 cryo prior to transfer to MICU for further care.     On arrival to MICU pt remained intubated and sedated and remained on vasopressors for hemorrhagic and hypovolemic shock state then later ? component of distributive d/t improvement in hemoglobin s/p MTP. Blakemore was removed by GI on 7/30 and pt had no evidence of further bleeding s/p. Pt started on CVVHDF for acute renal failure. Pt was successfully extubated 8/4 to nasal cannula and returned at baseline mental status. CRRT was discontinued on 8/5 and transitioned to intermittent HD 8/6. Pt weaned off vasopressors on 8/5. All cultures NGTD, pt remained on empiric abx and transitioned to meropenem 8/6 i/s/o worsening leukocytosis and elevated lipase to 504 as well as abdominal pain with nausea and vomiting. Pt underwent CTA/P to r/o pancreatitis with pre-curiel findings unremarkable pancreas, nonspecific gastric wall thickening/edema, redomenstrated cirrhotic liver with steatosis and splenomegaly, small to mod volume ascites, redemonstrated fluid collection without peripheral enhancement in the lower pelvis and wall thickening of the ascending colon and cecum. Pt started on a clear diet and advanced as tolerated.       ASSESSMENT & PLAN:   40 yo M PMH ETOH abuse with cirrhosis, heterozygous for alpha-1 antitrypsin, portal hypertension, acute alcoholic hepatitis and alcohol withdrawal complicated by seizures admitted for acute on chronic liver failure and MARQUISE with ARF secondary to bilirubin pigment nephropathy vs HRS requiring HD complicated by gastric varices refractory to endoscopic intervention s/p PARTO and 34 U PRBC, 25 U FFP, 20 U PLT, and 5 U cryo all overnight from 7/29-7/30. Blakemore removed 7/31 and the pt no longer with e/o active bleeding with stable Hgb, successfully extubated 8/4 and titrated off vasopressors 8/5. The patients leukocytosis continues despite being on antimicrobial therapy with meropenem and infection is likely GI in etiology given history of liver disease.     Neurology:  -Alert and oriented   -Hx of seizures in setting of etoh withdrawal  -Neuro checks q4H  -Hepatic encephalopathy being treated with lactulose and rifaximin, titrated to 3-4 BM's     Pulm:  intubated for airway protection i/s/o variceal bleed with acute hypoxic respiratory failure and pulmonary edema preventing weaning, now successfully extubated 8/4 to nasal cannula  - requiring anywhere from room air to 2L NC   - respiratory status improving s/p fluid removal    CV:  Hypovolemic shock 2/2 gastric variceal bleed on 7/29, titrated off vasopressors 8/5  -continue to monitor vital signs  -continue midodrine 10q8  -SBP maintaining in 120s    GI:  #Acute on chronic liver failure: d/dx includes underlying infection (UA neg, BCx negative, CXR neg, dx para neg for SBP, biliary imaging negative, C diff negative), worsening EtOH hepatitis (MDF = 60 but no interval drinking since discharge and this is an acute change), vs vascular injury  #Decompensated alcoholic cirrhosis, gastric variceal bleed and ascites  - varices: no prior EGD, on nadolol at home, now holding due to shock   - ascites: present on exam, neg for SBP 7/26, on Lasix 20mg/spironolactone 25mg daily as outpatient--on HOLD for hypotension   - HE: on rifaximin and lactulose titrate to 3-4 BMs a day, no current signs of hepatic encephalopathy  - HCC: no imaging, MRI without contrast is limited  - MELD-Na = 38 on 7/26  - Will discontinue albumin, albumin levels are between 3.7-4.1    #Hematemesis secondary to gastric variceal bleed  -Refractory to endoscopic intervention 7/29 s/p IR PARTO 7/30, blakemore 7/30, and 34 U PRBC, 25 U FFP, 20 U PLT, and 5 U cryo all on 7/30   -Blakemore removed 7/30   -Tolerated tube feeds without evidence of bleeding  -Trend CBC q12- transfuse to maintain Hgb > 7, or for active bleed  -Trend Coags m30a--zamseez with FFP, cryo as needed     #Transaminitis 2/2 alcoholic hepatitis  -Trend liver enzymes, bilirubin  -Follow up with hepatology and GI recommendations  -Protonix 40mg IV push BID     #Ascites, negative for SBP on admission  -Serial abdominal exams  -Paracentesis as needed  -Continue MVI, thiamine, folate     #Abdominal pain   - CT abdomen to rule pancreatitis or pancreatic abscess - preliminary negative for pancreatitis, will maintain meropenem for now and progress diet as tolerated   - Lipase 507 will trend q48 hours     Renal:  #Acute renal failure possibly secondary to  hepatorenal syndrome. 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 Baseline creatinine sCr 0.6-0.8. On Admission 9.64 -- s/p diagnostic paracentesis. -- sCr worsening  - First HD 7/28, Second HD 7/29---CVVHD started, 8/5 trial off CVVHD, underwent HD 8/6 (-1.5L removed)  - hold diuretics at this time   - Monitor electrolytes and urine output   - Avoid NSAIDs, ACEI/ARBS, RCA and nephrotoxins.   - Dose medications as per eGFR.     ID:  #All cultures negative to date--      7/26: Blood Culture x 2 NGTD; Urine Culture: NGTD               Para negative for SBP      7/30 culture       8/1: fungitell sent       8/6: Repeat cultures   -- D/c Zosyn 7/26 - 7/31 will continue with meropenem 8/1- present, Hepatology recommends 14 day course   -- D/c caspofungin as fungal infection appears unlikely given increasing leukocytosis with concurrent increase in Neutrophils   --Trend lactate; WBC count continues to be elevated     Heme:   --CBC q12h  --Maintain Hgb > 7. Transfuse as needed  --Trend Coags--reverse coagulopathy as needed or in event of bleed  TOTAL TRANSFUSION REQUIREMENTS: 34 PRBC, 25  FFP, 5 PLT, and 2 Cryo     Endo:  --Glucose q6h  --Monitor for hypoglycemia in setting of liver failure    Diet:   - advance diet as tolerated       FOR FOLLOW UP:  - continue to f/u Transplant Hepatology recommendations  - continue to f/u with Renal recommendations regarding further HD, evaluate UOP  - continue Meropenem for now, continue to trend leukocytosis and fever curve and f/u with culture results  - f/u final read of CTA/P from 8/6  - evaluate abdomen for worsening ascites and consider diagnostic/therapeutic paracentesis  - continue to f/u Hgb and e/o bleeding   - advance diet as tolerated MICU Transfer Note    Transfer from: MICU    Transfer to: ( X ) Medicine    (  ) Telemetry     (   ) RCU        (    ) Palliative         (   ) Stroke Unit          (   ) __________________    Accepting Physician: Dr. Kevin Rangel  Signout given to:     MICU COURSE:  38 yo male PMHx of EtOH cirrhosis with portal hypertension, traumatic bladder rupture d/t fall s/p ex-lap and repair (2019) with recent admission for acute alcoholic hepatitis and alcohol withdrawal c/b seizures. Pt originally presented 7/26 with acute renal failure, jaundice and increasing ascites. 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 S/p diagnostic paracentesis 7/26 negative for SBP s/p empiric course of Zosyn. Pt underwent shiley placement by IR for initiation of dialysis. On 7/29 pt with hematemesis and underwent EGD revealing large gastric variceal bleed. In EGD pt received 12 U PRBC, 10 U FFP, 2 U Plt, 1 Cryo. GI unable to achieve hemostasis and Pt emergently transferred to IR where he underwent embolization (glue injection and PARTO) of bleeding gastric varice and placement of blakemore tube. In total pt received 34 U PRBC, 25 FFP, 5 U Plt, 2 cryo prior to transfer to MICU for further care.     On arrival to MICU pt remained intubated and sedated and remained on vasopressors for hemorrhagic and hypovolemic shock state then later ? component of distributive d/t improvement in hemoglobin s/p MTP. Blakemore was removed by GI on 7/30 and pt had no evidence of further bleeding s/p. Pt started on CVVHDF for acute renal failure. Pt was successfully extubated 8/4 to nasal cannula and returned at baseline mental status. CRRT was discontinued on 8/5 and transitioned to intermittent HD 8/6. Pt weaned off vasopressors on 8/5. All cultures NGTD, pt remained on empiric abx and transitioned to meropenem 8/6 i/s/o worsening leukocytosis and elevated lipase to 504 as well as abdominal pain with nausea and vomiting. Pt underwent CTA/P to r/o pancreatitis with pre-curiel findings unremarkable pancreas, nonspecific gastric wall thickening/edema, redomenstrated cirrhotic liver with steatosis and splenomegaly, small to mod volume ascites, redemonstrated fluid collection without peripheral enhancement in the lower pelvis and wall thickening of the ascending colon and cecum. Pt started on a clear diet and advanced as tolerated.       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 Blakemore removed 7/31 and the pt no longer with e/o active bleeding with stable Hgb, successfully extubated 8/4 and titrated off vasopressors 8/5. The patients leukocytosis continues despite being on antimicrobial therapy with meropenem and infection is likely GI in etiology given history of liver disease.     Neurology:  -Alert and oriented   -Hx of seizures in setting of etoh withdrawal  -Neuro checks q4H  -Hepatic encephalopathy being treated with lactulose and rifaximin, titrated to 3-4 BM's     Pulm:  intubated for airway protection i/s/o variceal bleed with acute hypoxic respiratory failure and pulmonary edema preventing weaning, now successfully extubated 8/4 to nasal cannula  - requiring anywhere from room air to 2L NC   - respiratory status improving s/p fluid removal    CV:  Hypovolemic shock 2/2 gastric variceal bleed on 7/29, titrated off vasopressors 8/5  -continue to monitor vital signs  -continue midodrine 10q8  -SBP maintaining in 120s    GI:  #Acute on chronic liver failure: d/dx includes underlying infection (UA neg, BCx negative, CXR neg, dx para neg for SBP, biliary imaging negative, C diff negative), worsening EtOH hepatitis (MDF = 60 but no interval drinking since discharge and this is an acute change), vs vascular injury  #Decompensated alcoholic cirrhosis, gastric variceal bleed and ascites  - varices: no prior EGD, on nadolol at home, now holding due to shock   - ascites: present on exam, neg for SBP 7/26, on Lasix 20mg/spironolactone 25mg daily as outpatient--on HOLD for hypotension   - HE: on rifaximin and lactulose titrate to 3-4 BMs a day, no current signs of hepatic encephalopathy  - HCC: no imaging, MRI without contrast is limited  - MELD-Na = 38 on 7/26  - Will discontinue albumin, albumin levels are between 3.7-4.1    #Hematemesis secondary to gastric variceal bleed  -Refractory to endoscopic intervention 7/29 s/p IR PARTO 7/30, blakemore 7/30, and 34 U PRBC, 25 U FFP, 20 U PLT, and 5 U cryo all on 7/30   -Blakemore removed 7/30   -Tolerated tube feeds without evidence of bleeding  -Trend CBC q12- transfuse to maintain Hgb > 7, or for active bleed  -Trend Coags n15t--[id number] with FFP, cryo as needed     #Transaminitis 2/2 alcoholic hepatitis  -Trend liver enzymes, bilirubin  -Follow up with hepatology and GI recommendations  -Protonix 40mg IV push BID     #Ascites, negative for SBP on admission  -Serial abdominal exams  -Paracentesis as needed  -Continue MVI, thiamine, folate     #Abdominal pain   - CT abdomen to rule pancreatitis or pancreatic abscess - preliminary negative for pancreatitis, will maintain meropenem for now and progress diet as tolerated   - Lipase 507 will trend q48 hours     Renal:  #Acute renal failure possibly secondary to  hepatorenal syndrome. 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 Baseline creatinine sCr 0.6-0.8. On Admission 9.64 -- s/p diagnostic paracentesis. -- sCr worsening  - First HD 7/28, Second HD 7/29---CVVHD started, 8/5 trial off CVVHD, underwent HD 8/6 (-1.5L removed)  - hold diuretics at this time   - Monitor electrolytes and urine output   - Avoid NSAIDs, ACEI/ARBS, RCA and nephrotoxins.   - Dose medications as per eGFR.     ID:  #All cultures negative to date--      7/26: Blood Culture x 2 NGTD; Urine Culture: NGTD               Para negative for SBP      7/30 culture       8/1: fungitell sent       8/6: Repeat cultures   -- D/c Zosyn 7/26 - 7/31 will continue with meropenem 8/1- present, Hepatology recommends 14 day course   -- D/c caspofungin as fungal infection appears unlikely given increasing leukocytosis with concurrent increase in Neutrophils   --Trend lactate; WBC count continues to be elevated     Heme:   --CBC q12h  --Maintain Hgb > 7. Transfuse as needed  --Trend Coags--reverse coagulopathy as needed or in event of bleed  TOTAL TRANSFUSION REQUIREMENTS: 34 PRBC, 25  FFP, 5 PLT, and 2 Cryo     Endo:  --Glucose q6h  --Monitor for hypoglycemia in setting of liver failure    Diet:   - advance diet as tolerated       FOR FOLLOW UP:  - continue to f/u Transplant Hepatology recommendations  - continue to f/u with Renal recommendations regarding further HD, evaluate UOP  - continue Meropenem for now, continue to trend leukocytosis and fever curve and f/u with culture results  - f/u final read of CTA/P from 8/6  - evaluate abdomen for worsening ascites and consider diagnostic/therapeutic paracentesis  - continue to f/u Hgb and e/o bleeding   - advance diet as tolerated

## 2020-08-07 NOTE — CONSULT NOTE ADULT - ASSESSMENT
39y M w/ PMH etoh cirrhosis, h/o traumatic bladder rupture s/p ex lap  who presented on  with days of progressive abdominal pain with distention and watery diarrhea, initially admitted to medicine with multiple marked derangements including neutrophil predominant WBC 28.9, sCr 9.6, total bili 33.4 (direct >10) with mild transaminitis. 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 Evaluated by hepatology as having acute liver failure a/w alcoholic hepatitis and decompensated etoh cirrhosis. Complicated hospital course. In brief, developed renal failure and started on HD  via RIJ tunneled HD cath place . Developed hematemesis on , found to have massive hemorrhage from a large gastric varice on EGD, unable to achieve hemostasis endoscopically, transferred to IR for embolization/ 1.5L para/ and blakemore (removed ). Received 34 PRBC, 25 FFP, 20 plt, and 10 cyro. Transferred to MICU on  remaining intubated. Course complications included hemorrhagic/hypovolemic shock requiring pressors, pulmonary edema/hypoxemia requiring high fio2, hypothermia, and pancreatitis. ID consulted for leukocytosis. Did not receive steroids. WBC 28.9 on  with 3.5% bands. WBC downtrended to kristi of 10 on  and began uptrending later the same day and has continued to uptrend since then. Uptrended from 20s on  to now 30s since .    Infectious workup thus far unrevealing includin/26: CXR, COVID PCR, AXR, peritoneal culture/cell count for SBP, BCx, UA/UCx, cdiff  : MRCP  : CXR, BCx, TTE  : CXR, BCx   : fungitell, COVID PCR  : CTAP, repeat BCx in lab  : UA with 18 WBC negative for LE/nitrite    Given:  Zosyn -  Shauna  -  Caspofungin -    Recommendations: 39y M w/ PMH etoh cirrhosis, h/o traumatic bladder rupture s/p ex lap  who presented on  with days of progressive abdominal pain with distention and watery diarrhea, initially admitted to medicine with multiple derangements including neutrophil predominant WBC 28.9, sCr 9.6, total bili 33.4 (direct >10). 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 Evaluated by hepatology as having acute liver failure a/w alcoholic hepatitis and decompensated etoh cirrhosis. Complicated hospital course. In brief, developed renal failure and started on HD  via RIJ tunneled HD cath placed  and still in place. Developed hematemesis on  and was found to have massive hemorrhage from a large gastric varice on EGD, unable to achieve hemostasis endoscopically, transferred to IR for embolization/ 1.5L para/ and blakemore (removed ). Received 34 PRBC, 25 FFP, 20 plt, and 10 cyro. Transferred to MICU on  intubated. Course complications included hemorrhagic/hypovolemic shock requiring pressors, pulmonary edema/hypoxemia requiring high fio2, hypothermia, and pancreatitis. ID consulted for leukocytosis. WBC 28.9 on  with 3.5% bands. WBC downtrended to kristi of 10 on  and began uptrending later the same day and has continued to uptrend since then. Uptrended from 20s on  to now 30s since  while on chris. No fevers but intermittent hypothermia -. Unclear infectious etiology of leukocytosis. Did not receive steroids during hospitalization. ?hepatitis related with component of reactive. Reports a mild cough since extubation which is overall improved and recent CXR clear. Reports some generalized abdominal pain and intermittent loose stool which are overall improved and recent CTAP unrevealing. Denies other localizing infectious symptoms including headache, upper respiratory symptoms, chest pain, urinary symptoms, joint pain, rash.    Infectious workup thus far unrevealing includin/26: CXR, COVID PCR, AXR, peritoneal culture/cell count for SBP, BCx, UA/UCx, cdiff  : MRCP  : CXR, BCx, TTE  : CXR, BCx   : fungitell, COVID PCR  : CTAP, repeat BCx in lab  : UA with 18 WBC negative for LE/nitrite    Given:  Zosyn -  Chris  -  Caspofungin -    Recommendations:  - f/u repeat BCx in lab  - consider stool studies if persistent diarrhea  - consider infectious hepatitis workup  - currently on empiric chris through 8/10; if remains without clear infectious source, would recommend trial of monitoring off abx  - trend CBC w/ diff    Plan to be discussed with attending 39y M w/ PMH etoh cirrhosis, h/o traumatic bladder rupture s/p ex lap  who presented on  with days of progressive abdominal pain with distention and watery diarrhea, initially admitted to medicine with multiple derangements including neutrophil predominant WBC 28.9, sCr 9.6, total bili 33.4 (direct >10). 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 Evaluated by hepatology as having acute liver failure a/w alcoholic hepatitis and decompensated etoh cirrhosis. Complicated hospital course. In brief, developed renal failure and started on HD  via RIJ tunneled HD cath placed  and still in place. Developed hematemesis on  and was found to have massive hemorrhage from a large gastric varice on EGD, unable to achieve hemostasis endoscopically, transferred to IR for embolization/ 1.5L para/ and blakemore (removed ). Received 34 PRBC, 25 FFP, 20 plt, and 10 cyro. Transferred to MICU on  intubated. Course complications included hemorrhagic/hypovolemic shock requiring pressors, pulmonary edema/hypoxemia requiring high fio2, hypothermia, and pancreatitis. ID consulted for leukocytosis. WBC 28.9 on  with 3.5% bands. WBC downtrended to kristi of 10 on  and began uptrending later the same day and has continued to uptrend since then. Uptrended from 20s on  to now 30s since  while on chris. No fevers but intermittent hypothermia -. Unclear infectious etiology of leukocytosis. Did not receive steroids during hospitalization. ?hepatitis related with component of reactive. Reports a mild cough since extubation which is overall improved and recent CXR clear. Reports some generalized abdominal pain and intermittent loose stool which are overall improved and recent CTAP unrevealing. Denies other localizing infectious symptoms including headache, upper respiratory symptoms, chest pain, urinary symptoms, joint pain, rash.    Infectious workup thus far unrevealing includin/26: CXR, COVID PCR, AXR, peritoneal culture/cell count for SBP, BCx, UA/UCx, cdiff  : MRCP  : BCx, CXR, TTE  : BCx, CXR  : fungitell, COVID PCR  : CTAP, repeat BCx in lab  : UA with 18 WBC negative for LE/nitrite    Given:  Zosyn -  Chris  -  Caspofungin -    Recommendations:  - f/u repeat BCx in lab  - consider stool studies if persistent diarrhea  - consider infectious hepatitis workup  - currently on empiric chris through 8/10; if remains without clear infectious source, would recommend trial of monitoring off abx  - trend CBC w/ diff    Plan to be discussed with attending 39y M w/ PMH etoh cirrhosis, h/o traumatic bladder rupture s/p ex lap  who presented on  with days of progressive abdominal pain with distention and watery diarrhea, initially admitted to medicine with multiple derangements including neutrophil predominant WBC 28.9, sCr 9.6, total bili 33.4 (direct >10). 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 Evaluated by hepatology as having acute liver failure a/w alcoholic hepatitis and decompensated etoh cirrhosis. Complicated hospital course. In brief, developed renal failure and started on HD  via RIJ tunneled HD cath placed  and still in place. Developed hematemesis on  and was found to have massive hemorrhage from a large gastric varice on EGD, unable to achieve hemostasis endoscopically, transferred to IR for embolization/ 1.5L para/ and blakemore (removed ). Received 34 PRBC, 25 FFP, 20 plt, and 10 cyro. Transferred to MICU on  intubated. Course complications included hemorrhagic/hypovolemic shock requiring pressors, pulmonary edema/hypoxemia requiring high fio2, hypothermia, and pancreatitis. ID consulted for leukocytosis. WBC 28.9 on  with 3.5% bands. WBC downtrended to kristi of 10 on  and began uptrending later the same day and has continued to uptrend since then. Uptrended from 20s on  to now 30s since  while on chris. No fevers but intermittent hypothermia -. Unclear infectious etiology of leukocytosis. Did not receive steroids during hospitalization. ?hepatitis related with component of reactive. Reports a mild cough since extubation which is overall improved and recent CXR clear. Reports some generalized abdominal pain and intermittent loose stool which are overall improved and recent CTAP unrevealing. Denies other localizing infectious symptoms including headache, upper respiratory symptoms, chest pain, urinary symptoms, joint pain, rash.    Infectious workup thus far unrevealing includin/26: CXR, COVID PCR, AXR, peritoneal culture/cell count for SBP, BCx, UA/UCx, cdiff  : MRCP  : BCx, CXR, TTE  : BCx, CXR  : fungitell, COVID PCR  : CTAP, repeat BCx in lab  : UA with 18 WBC negative for LE/nitrite    Given:  Zosyn -  Chris  -  Caspofungin -    Recommendations:  - f/u repeat BCx in lab  - consider stool studies if persistent diarrhea  - consider infectious hepatitis workup  - currently on empiric chris through 8/10; if remains stable without clear infectious source, would recommend trial of monitoring off abx  - trend CBC w/ diff    Plan to be discussed with attending 39M with alcoholic cirrhosis, recent seizures in July, readmitted 22 with progressive abdominal pain and distention and watery diarrhea, found to have marked leukocytosis, hyperbilirubinemia, and acute renal failure (attributed to pigment nephropathy).   Decompensated cirrhosis.   Complicated hospital course. In brief, developed renal failure and started on HD  via RIJ tunneled HD cath placed  and still in place. Developed hematemesis on  and was found to have massive hemorrhage from a large gastric varice on EGD, unable to achieve hemostasis endoscopically, transferred to IR for embolization/ 1.5L para/ and blakemore (removed ). Received 34 PRBC, 25 FFP, 20 plt, and 10 cyro. Transferred to MICU on  intubated. Course complications included hemorrhagic/hypovolemic shock requiring pressors, pulmonary edema/hypoxemia requiring high fio2, hypothermia, and pancreatitis. ID consulted for leukocytosis. WBC 28.9 on  with 3.5% bands. WBC downtrended to kristi of 10 on  and began uptrending later the same day and has continued to uptrend since then. Uptrended from 20s on  to now 30s since  while on chris. No fevers but intermittent hypothermia -. Unclear infectious etiology of leukocytosis. Did not receive steroids during hospitalization. ?hepatitis related with component of reactive. Reports a mild cough since extubation which is overall improved and recent CXR clear. Reports some generalized abdominal pain and intermittent loose stool which are overall improved and recent CTAP unrevealing. Denies other localizing infectious symptoms including headache, upper respiratory symptoms, chest pain, urinary symptoms, joint pain, rash.    Infectious workup thus far unrevealing includin/26: CXR, COVID PCR, AXR, peritoneal culture/cell count for SBP, BCx, UA/UCx, cdiff  : MRCP  : BCx, CXR, TTE  : BCx, CXR  : fungitell, COVID PCR  : CTAP, repeat BCx in lab  : UA with 18 WBC negative for LE/nitrite    Given:  Zosyn -  Chris  -  Caspofungin -    Recommendations:  - f/u repeat BCx in lab  - consider stool studies if persistent diarrhea  - I don't think meropenem is treating anything but can wait for cultures from today   - trend CBC w/ diff    Plan to be discussed with attending 39M with alcoholic cirrhosis, recent seizures in July, readmitted 22 with progressive abdominal pain and distention and watery diarrhea, found to have marked leukocytosis, hyperbilirubinemia, and acute renal failure (attributed to pigment nephropathy).   Decompensated cirrhosis.   Complicated hospital course. In brief, developed renal failure and started on HD  via RIJ tunneled HD cath placed  and still in place. Developed hematemesis on  and was found to have massive hemorrhage from a large gastric varice on EGD, unable to achieve hemostasis endoscopically, transferred to IR for embolization/ 1.5L para/ and blakemore (removed ). Received 34 PRBC, 25 FFP, 20 plt, and 10 cyro. Transferred to MICU on  intubated. Course complications included hemorrhagic/hypovolemic shock requiring pressors, pulmonary edema/hypoxemia requiring high fio2, hypothermia, and pancreatitis. ID consulted for leukocytosis. WBC 28.9 on  with 3.5% bands. WBC downtrended to kristi of 10 on  and began uptrending later the same day and has continued to uptrend since then. Uptrended from 20s on  to now 30s since  while on chris. No fevers but intermittent hypothermia -. Unclear infectious etiology of leukocytosis. Did not receive steroids during hospitalization. ?hepatitis related with component of reactive. Reports a mild cough since extubation which is overall improved and recent CXR clear. Reports some generalized abdominal pain and intermittent loose stool which are overall improved and recent CTAP unrevealing. Denies other localizing infectious symptoms including headache, upper respiratory symptoms, chest pain, urinary symptoms, joint pain, rash.    Infectious workup thus far unrevealing includin/26: CXR, COVID PCR, AXR, peritoneal culture/cell count for SBP, BCx, UA/UCx, cdiff  : MRCP  : BCx, CXR, TTE  : BCx, CXR  : fungitell, COVID PCR  : CTAP, repeat BCx in lab  : UA with 18 WBC negative for LE/nitrite    Given:  Zosyn -  Chris  -  Caspofungin -    Recommendations:  - f/u repeat BCx in lab  - consider stool studies if persistent diarrhea  - I don't think meropenem is treating anything but can wait for cultures from today   - trend CBC w/ diff  - he's not a candidate for transplant currently, needs alcohol rehab, but I will order the following in preparation: HBVs Ab, HSV 1/2 IgG, CMV IgG, VZV IgG, Measles, Mumps and Rubella IgG, Quantiferon Gold, Syphilis Screen, Toxoplasma IgG  - he should be vaccinated for HAV, pneumococcus, TDaP at some point

## 2020-08-07 NOTE — PROGRESS NOTE ADULT - SUBJECTIVE AND OBJECTIVE BOX
PROGRESS NOTE:   Authored by Deandre Dwyer [phone]    Patient is a 39y old  Male who presents with a chief complaint of abdominal pain (07 Aug 2020 07:05)    SUBJECTIVE / OVERNIGHT EVENTS:  MEDICINE ACCEPT NOTE      REVIEW OF SYSTEMS:    MEDICATIONS  (STANDING):  chlorhexidine 4% Liquid 1 Application(s) Topical <User Schedule>  dextrose 10%. 1000 milliLiter(s) (40 mL/Hr) IV Continuous <Continuous>  lactulose Syrup 30 Gram(s) Oral every 6 hours  melatonin 1 milliGRAM(s) Oral at bedtime  meropenem  IVPB 500 milliGRAM(s) IV Intermittent every 24 hours  midodrine 10 milliGRAM(s) Oral every 8 hours  nystatin Powder 1 Application(s) Topical two times a day  pantoprazole  Injectable 40 milliGRAM(s) IV Push two times a day  rifAXIMin 550 milliGRAM(s) Oral two times a day  thiamine IVPB 500 milliGRAM(s) IV Intermittent daily    MEDICATIONS  (PRN):  HYDROmorphone  Injectable 0.2 milliGRAM(s) IV Push every 4 hours PRN Severe Pain (7 - 10)  sodium chloride 0.9% lock flush 10 milliLiter(s) IV Push every 1 hour PRN Pre/post blood products, medications, blood draw, and to maintain line patency    CAPILLARY BLOOD GLUCOSE  POCT Blood Glucose.: 147 mg/dL (07 Aug 2020 05:18)    I&O's Summary  06 Aug 2020 07:01  -  07 Aug 2020 07:00  --------------------------------------------------------  IN: 1580 mL / OUT: 2100 mL / NET: -520 mL    PHYSICAL EXAM:  Vital Signs Last 24 Hrs  T(C): 36.6 (07 Aug 2020 04:00), Max: 37 (06 Aug 2020 11:00)  T(F): 97.8 (07 Aug 2020 04:00), Max: 98.6 (06 Aug 2020 11:00)  HR: 108 (07 Aug 2020 06:00) (99 - 114)  BP: 103/57 (07 Aug 2020 06:00) (100/73 - 145/86)  BP(mean): 76 (07 Aug 2020 06:00) (76 - 101)  RR: 16 (07 Aug 2020 06:00) (14 - 25)  SpO2: 90% (07 Aug 2020 06:00) (90% - 100%)    GENERAL: No acute distress, well-developed  HEAD:  Atraumatic, Normocephalic  EYES: conjunctiva and sclera clear  NECK: Supple, no JVD  CHEST/LUNG: CTAB, no wheezes, rales, or rhonchi  HEART: Regular rate and rhythm, no murmurs, rubs, or gallops  ABDOMEN: Soft, non-tender, non-distended, normal bowel sounds  EXTREMITIES:  2+ peripheral pulses b/l, no clubbing, cyanosis, or edema  NEUROLOGY: A&O x 3, no focal deficits  SKIN: No rashes or lesions    LABS:             9.2    32.18 )-----------( 81       ( 07 Aug 2020 02:35 )             27.1     08-07  136  |  98  |  33<H>  ----------------------------<  107<H>  3.8   |  19<L>  |  4.26<H>  Ca    9.2      07 Aug 2020 02:35  Phos  3.7     08-07  Mg     2.2     08-07  TPro  5.8<L>  /  Alb  3.7  /  TBili  19.4<H>  /  DBili  x   /  AST  200<H>  /  ALT  38  /  AlkPhos  124<H>  08-07    PT/INR - ( 07 Aug 2020 02:35 )   PT: 19.0 sec;   INR: 1.63 ratio    PTT - ( 07 Aug 2020 02:35 )  PTT:38.9 sec    Urinalysis Basic - ( 07 Aug 2020 04:54 )    Color: Dark Orange / Appearance: Turbid / S.041 / pH: x  Gluc: x / Ketone: Negative  / Bili: Moderate / Urobili: 8 mg/dL   Blood: x / Protein: 100 mg/dL Test Performed on Urine Supernate. / Nitrite: Negative   Leuk Esterase: Negative / RBC: 30 /hpf / WBC 18 /HPF   Sq Epi: x / Non Sq Epi: 7 / Bacteria: Negative    RADIOLOGY & ADDITIONAL TESTS:  Results Reviewed [Y/N]: Y  Imaging Personally Reviewed [Y/N]: Y  Electrocardiogram Personally Reviewed [Y/N]: Y    COORDINATION OF CARE:  Care Discussed with Consultants/Other Providers [Y/N]: Y PROGRESS NOTE:   Authored by Deandre Dwyer [phone]    Patient is a 39y old  Male who presents with a chief complaint of abdominal pain (07 Aug 2020 07:05)    SUBJECTIVE / OVERNIGHT EVENTS:  MEDICINE ACCEPT NOTE  Patient transferred to [facility] this am. Patient endorses LE pain. Patient also endorses void and BM this am. He denies melena/hematochezia/hematuria. He is A&Ox3. He denies cough, SOB, chest pain. He states he does not want to continue w/ lactulose.     REVIEW OF SYSTEMS:  CONSTITUTIONAL: No weakness, fevers or chills  EYES/ENT: No visual changes;  No vertigo or throat pain   NECK: No pain or stiffness  RESPIRATORY: No cough, wheezing, hemoptysis; No shortness of breath  CARDIOVASCULAR: No chest pain or palpitations  GASTROINTESTINAL: No abdominal or epigastric pain. No nausea, vomiting, or hematemesis; No diarrhea or constipation. No melena or hematochezia.  GENITOURINARY: No dysuria, frequency or hematuria  NEUROLOGICAL: No numbness or weakness  SKIN: No itching, rashes    MEDICATIONS  (STANDING):  chlorhexidine 4% Liquid 1 Application(s) Topical <User Schedule>  dextrose 10%. 1000 milliLiter(s) (40 mL/Hr) IV Continuous <Continuous>  lactulose Syrup 30 Gram(s) Oral every 6 hours  melatonin 1 milliGRAM(s) Oral at bedtime  meropenem  IVPB 500 milliGRAM(s) IV Intermittent every 24 hours  midodrine 10 milliGRAM(s) Oral every 8 hours  nystatin Powder 1 Application(s) Topical two times a day  pantoprazole  Injectable 40 milliGRAM(s) IV Push two times a day  rifAXIMin 550 milliGRAM(s) Oral two times a day  thiamine IVPB 500 milliGRAM(s) IV Intermittent daily    MEDICATIONS  (PRN):  HYDROmorphone  Injectable 0.2 milliGRAM(s) IV Push every 4 hours PRN Severe Pain (7 - 10)  sodium chloride 0.9% lock flush 10 milliLiter(s) IV Push every 1 hour PRN Pre/post blood products, medications, blood draw, and to maintain line patency    CAPILLARY BLOOD GLUCOSE  POCT Blood Glucose.: 147 mg/dL (07 Aug 2020 05:18)    I&O's Summary  06 Aug 2020 07:01  -  07 Aug 2020 07:00  --------------------------------------------------------  IN: 1580 mL / OUT: 2100 mL / NET: -520 mL    PHYSICAL EXAM:  Vital Signs Last 24 Hrs  T(C): 36.6 (07 Aug 2020 04:00), Max: 37 (06 Aug 2020 11:00)  T(F): 97.8 (07 Aug 2020 04:00), Max: 98.6 (06 Aug 2020 11:00)  HR: 108 (07 Aug 2020 06:00) (99 - 114)  BP: 103/57 (07 Aug 2020 06:00) (100/73 - 145/86)  BP(mean): 76 (07 Aug 2020 06:00) (76 - 101)  RR: 16 (07 Aug 2020 06:00) (14 - 25)  SpO2: 90% (07 Aug 2020 06:00) (90% - 100%)    GENERAL: No acute distress, well-developed  HEAD:  Atraumatic, Normocephalic  EYES: conjunctiva and sclera clear  NECK: Supple, no JVD  CHEST/LUNG: CTAB, no wheezes, rales, or rhonchi  HEART: Regular rate and rhythm, no murmurs, rubs, or gallops  ABDOMEN: Soft, non-tender, non-distended, normal bowel sounds  EXTREMITIES:  2+ peripheral pulses b/l, no clubbing, cyanosis, or edema  NEUROLOGY: A&O x 3, no focal deficits  SKIN: No rashes or lesions    LABS:             9.2    32.18 )-----------( 81       ( 07 Aug 2020 02:35 )             27.1     08-07  136  |  98  |  33<H>  ----------------------------<  107<H>  3.8   |  19<L>  |  4.26<H>  Ca    9.2      07 Aug 2020 02:35  Phos  3.7     08-07  Mg     2.2     08-07  TPro  5.8<L>  /  Alb  3.7  /  TBili  19.4<H>  /  DBili  x   /  AST  200<H>  /  ALT  38  /  AlkPhos  124<H>  08-07    PT/INR - ( 07 Aug 2020 02:35 )   PT: 19.0 sec;   INR: 1.63 ratio    PTT - ( 07 Aug 2020 02:35 )  PTT:38.9 sec    Urinalysis Basic - ( 07 Aug 2020 04:54 )  Color: Dark Orange / Appearance: Turbid / S.041 / pH: x  Gluc: x / Ketone: Negative  / Bili: Moderate / Urobili: 8 mg/dL   Blood: x / Protein: 100 mg/dL Test Performed on Urine Supernate. / Nitrite: Negative   Leuk Esterase: Negative / RBC: 30 /hpf / WBC 18 /HPF   Sq Epi: x / Non Sq Epi: 7 / Bacteria: Negative    RADIOLOGY & ADDITIONAL TESTS:  Results Reviewed [Y/N]: Y  Imaging Personally Reviewed [Y/N]: Y  Electrocardiogram Personally Reviewed [Y/N]: Y    COORDINATION OF CARE:  Care Discussed with Consultants/Other Providers [Y/N]: MJ PROGRESS NOTE:   Authored by Deandre Dwyer [phone]    Patient is a 39y old  Male who presents with a chief complaint of abdominal pain (07 Aug 2020 07:05)    SUBJECTIVE / OVERNIGHT EVENTS:  MEDICINE ACCEPT NOTE  Patient transferred to [facility] this am. Patient endorses LE pain. Patient also endorses void and BM this am. He denies melena/hematochezia/hematuria. He is A&Ox3. He denies cough, SOB, chest pain. He states he does not want to continue w/ lactulose.     REVIEW OF SYSTEMS:  CONSTITUTIONAL: No weakness, fevers or chills  EYES/ENT: No visual changes;  No vertigo or throat pain   NECK: No pain or stiffness  RESPIRATORY: No cough, wheezing, hemoptysis; No shortness of breath  CARDIOVASCULAR: No chest pain or palpitations  GASTROINTESTINAL: No abdominal or epigastric pain. No nausea, vomiting, or hematemesis; No diarrhea or constipation. No melena or hematochezia.  GENITOURINARY: No dysuria, frequency or hematuria  NEUROLOGICAL: No numbness or weakness  SKIN: No itching, rashes    MEDICATIONS  (STANDING):  chlorhexidine 4% Liquid 1 Application(s) Topical <User Schedule>  dextrose 10%. 1000 milliLiter(s) (40 mL/Hr) IV Continuous <Continuous>  lactulose Syrup 30 Gram(s) Oral every 6 hours  melatonin 1 milliGRAM(s) Oral at bedtime  meropenem  IVPB 500 milliGRAM(s) IV Intermittent every 24 hours  midodrine 10 milliGRAM(s) Oral every 8 hours  nystatin Powder 1 Application(s) Topical two times a day  pantoprazole  Injectable 40 milliGRAM(s) IV Push two times a day  rifAXIMin 550 milliGRAM(s) Oral two times a day  thiamine IVPB 500 milliGRAM(s) IV Intermittent daily    MEDICATIONS  (PRN):  HYDROmorphone  Injectable 0.2 milliGRAM(s) IV Push every 4 hours PRN Severe Pain (7 - 10)  sodium chloride 0.9% lock flush 10 milliLiter(s) IV Push every 1 hour PRN Pre/post blood products, medications, blood draw, and to maintain line patency    CAPILLARY BLOOD GLUCOSE  POCT Blood Glucose.: 147 mg/dL (07 Aug 2020 05:18)    I&O's Summary  06 Aug 2020 07:01  -  07 Aug 2020 07:00  --------------------------------------------------------  IN: 1580 mL / OUT: 2100 mL / NET: -520 mL    PHYSICAL EXAM:  Vital Signs Last 24 Hrs  T(C): 36.6 (07 Aug 2020 04:00), Max: 37 (06 Aug 2020 11:00)  T(F): 97.8 (07 Aug 2020 04:00), Max: 98.6 (06 Aug 2020 11:00)  HR: 108 (07 Aug 2020 06:00) (99 - 114)  BP: 103/57 (07 Aug 2020 06:00) (100/73 - 145/86)  BP(mean): 76 (07 Aug 2020 06:00) (76 - 101)  RR: 16 (07 Aug 2020 06:00) (14 - 25)  SpO2: 90% (07 Aug 2020 06:00) (90% - 100%)    GENERAL: jaundice no acute distress, well-developed  HEAD:  Atraumatic, Normocephalic  EYES: scleral icterus  NECK: Supple, no JVD  CHEST/LUNG: CTAB, no wheezes, rales, or rhonchi  HEART: Regular rate and rhythm, no murmurs, rubs, or gallops  ABDOMEN: abdomen distended, ascites present, RUQ & RLQ tenderness soft, normal bowel sounds  EXTREMITIES:  2+ peripheral pulses b/l, no clubbing, cyanosis, or edema  NEUROLOGY: A&O x 3, no focal deficits  SKIN: No rashes or lesions    LABS:             9.2    32.18 )-----------( 81       ( 07 Aug 2020 02:35 )             27.1     08-07  136  |  98  |  33<H>  ----------------------------<  107<H>  3.8   |  19<L>  |  4.26<H>  Ca    9.2      07 Aug 2020 02:35  Phos  3.7     08-07  Mg     2.2     08-07  TPro  5.8<L>  /  Alb  3.7  /  TBili  19.4<H>  /  DBili  x   /  AST  200<H>  /  ALT  38  /  AlkPhos  124<H>  08-    PT/INR - ( 07 Aug 2020 02:35 )   PT: 19.0 sec;   INR: 1.63 ratio    PTT - ( 07 Aug 2020 02:35 )  PTT:38.9 sec    Blood Gas Arterial, Lactate: 1.5 mmol/L (20 @ 22:25)    Sodium, Random Urine: <35  mmol/L (20 @ 22:42)  Creatinine, Random Urine: 118 mg/dL (20 @ 22:42)  Urea Nitrogen,  Random Urine: 302 mg/dL (20 @ 01:54)    Urinalysis Basic - ( 07 Aug 2020 04:54 )  Color: Dark Orange / Appearance: Turbid / S.041 / pH: x  Gluc: x / Ketone: Negative  / Bili: Moderate / Urobili: 8 mg/dL   Blood: x / Protein: 100 mg/dL Test Performed on Urine Supernate. / Nitrite: Negative   Leuk Esterase: Negative / RBC: 30 /hpf / WBC 18 /HPF   Sq Epi: x / Non Sq Epi: 7 / Bacteria: Negative    RADIOLOGY & ADDITIONAL TESTS:  Results Reviewed [Y/N]: Y  Imaging Personally Reviewed [Y/N]: Y  Electrocardiogram Personally Reviewed [Y/N]: Y    COORDINATION OF CARE:  Care Discussed with Consultants/Other Providers [Y/N]: Y

## 2020-08-07 NOTE — PROGRESS NOTE ADULT - ASSESSMENT
39M w/ decompensated EtOH cirrhosis, presenting w/ acute on chronic liver failure of unclear etiology (infection NOS vs continued inflammation due to EtOH)a. Course c/b massive GIB from gastric varix requiring 25u pRBC, s/p glue injection and PARTO by IR. Hb stable post procedure, now on floor w/ stably high MELD-Na labs. Remains HD dependent, plan for outpatient liver transplant evaluation.      Impression:  #Acute on chronic liver failure: d/dx includes underlying infection (UA neg, BCx NGTD from this admission, CXR neg, dx para neg for SBP, biliary imaging negative, C diff negative) vs worsening EtOH hepatitis (MDF = 60 but no interval drinking since discharge and this is an acute change)  #Cirrhosis due to EtOH, decompensated by ascites  - varices: no esophageal varices on EGD 7/29, actively bleeding gastric varix s/p glue injection and PARTO by IR, bleeding appears to be controlled  - ascites: present on exam, neg for SBP 7/26, on Lasix 20mg/spironolactone 25mg daily as outpatient, s/p 1.5L removed during PARTO 7/29  - HE: none on exam  - HCC: no imaging, MRI without contrast is limited  - MELD-Na = 36 on 8/7  - Transplant evaluation to be completed as outpatient, pt has already joined online Et[facility] rehab and reports commitment to continued attendance  #Presumed infection - on meropenem (day 14 of abx is 8/8)  #Acute renal failure: unclear etiology, c/f bilirubin pigment nephropathy vs acute tubular necrosis vs obstructive uropathy. Reny is 53, making HRS less likely. S/p CVVH/HD w/ improvement in renal fx, but remains HD dependent     Recommendations:  - pt will likely need permacath for continued HD  - f/u renal recs re: HD, may need placement in outpatient HD center  - can switch to PO PPI daily  - c/w broad spectrum abx, recommend 14d course (ends 8/8)  - continue with rifaximin  - c/w MVI, thiamine and folate  - trend CMP, CBC, INR daily  - transplant hepatology will continue to follow  - please consult PT/rehab for evaluation  - dispo planning per primary team, will likely need to go at acute rehab/[facility]  - outpatient f/u w/ Dr. Funez within 1-2 weeks post-discharge, please contact us on discharge to arrange    Ross Shook  Gastroenterology Fellow  AT NIGHT AND ON WEEKENDS:  Contact on-call GI fellow via answering service ([phone]) from 5pm-8am and on weekends/holidays  MONDAY-FRIDAY 8AM-5PM:  Available via [employer] Teams  Call [phone] ([facility]) or Page [phone] ([facility]) from 8am-5pm M-F

## 2020-08-07 NOTE — PROGRESS NOTE ADULT - PROBLEM SELECTOR PLAN 3
possibly secondary to hepatorenal syndrome. 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 Baseline creatinine sCr 0.6-0.8. On admission 9.64, today 4.26. s/p CVVHD w/ improvement in renal fx, but remains HD dependent   -nephrology following, appreciate recs - HD 8/06 and again today 8/07, will hold HD over weekend and monitor for renal recovery  -c/w hold diuretics at this time   -daily CMP  -monitor UOP, strict I&Os  -avoid NSAIDs, ACEI/ARBS, RCA and nephrotoxins.   -dose medications as per eGFR.

## 2020-08-07 NOTE — CONSULT NOTE ADULT - ATTENDING COMMENTS
Leukocytosis is rising but I think it's reactive in the setting of a complicated hospital course. I don't think the meropenem is treating anything. Cultures are all negative. CXR and CT abdomen without focus. Nontoxic. Plan on stopping if repeat blood cultures are negative.     Justin Mena MD   Infectious Disease   Pager [phone]   After 5PM and on weekends please page fellow on call or call [phone] Leukocytosis is rising but I think it's reactive in the setting of a complicated hospital course. I don't think the meropenem is treating anything. Cultures are all negative. CXR and CT abdomen without focus. Nontoxic. Plan on stopping if repeat blood cultures are negative.   Pre-transplant workup as above although not currently a candidate.     Justin Mena MD   Infectious Disease   Pager [phone]   After 5PM and on weekends please page fellow on call or call [phone]

## 2020-08-07 NOTE — PROGRESS NOTE ADULT - PROBLEM SELECTOR PLAN 1
anuric MARQUISE 2/2 prerenal in the setting of varices bleed/anemia, diarrhea, diuretics and decrease effective arterial blood volume from Cirrhosis  On recent admission baseline sCr 0.6-0.8. On admission 9.64, peaked to 11.9, pt initiated on HD on 7/28 for anuric. Pt underwent EGD (7/29) c/b active massive bleeding s/p IR for embolization and intubation.  Pt was extubation on 8/4 and discontinued CRRT 8/5 6am.  Now transitioned to HD 8/6 given still anuric, last HD 8/6    No plan for HD, monitor for renal recovery  consider ashton catheter placement  c/w IV pressors per ICU team  Monitor electrolytes and urine output.   Avoid NSAIDs, ACEI/ARBS, RCA and nephrotoxins.   Dose medications as per eGFR.  rest of management as per ICU anuric MARQUISE 2/2 prerenal in the setting of varices bleed/anemia, diarrhea, diuretics and decrease effective arterial blood volume from Cirrhosis  On recent admission baseline sCr 0.6-0.8. On admission 9.64, peaked to 11.9, pt initiated on HD on 7/28 for anuric. Pt underwent EGD (7/29) c/b active massive bleeding s/p IR for embolization and intubation.  Pt was extubation on 8/4 and discontinued CRRT 8/5 6am.  Now transitioned to HD 8/6 given still anuric, last HD 8/6    Plan for HD today then will monitor for renal recovery over weekend (if no absolute indication for HD)  consider ashton catheter placement  c/w IV pressors per ICU team  Monitor electrolytes and urine output.   Avoid NSAIDs, ACEI/ARBS, RCA and nephrotoxins.   Dose medications as per eGFR.  rest of management as per ICU

## 2020-08-07 NOTE — PROGRESS NOTE ADULT - PROBLEM SELECTOR PLAN 2
etoh cirrhosis, gastric variceal bleed and ascites. varices - no prior EGD, on nadolol at home, now holding due to shock. ascites present on exam, neg for SBP 7/26, on Lasix 20mg/spironolactone 25mg daily as outpatient, now holding due to hypotension.  -hepatology following, appreciate recs  -MELD score 36 on 08/07  -transplant eval as outpatient

## 2020-08-07 NOTE — PROGRESS NOTE ADULT - PROBLEM SELECTOR PLAN 4
increase in neutrophils. all cultures negative to date (7/26, 7/30, 8/06). paracentesis negative for SBP on 7/26. fungitell sent 08/01. s/p zosyn 7/26-7/31. UA not c/w UTI. abdominal pain on exam. c/f pancreatitis - lipase 504, CT w/ no acute pancreatitis  -meropenem through 8/08.   -repeat diagnostic paracentesis to r/o SBP  -CXR  -trend lactate  -daily CBC w/ diff

## 2020-08-07 NOTE — PROGRESS NOTE ADULT - PROBLEM SELECTOR PLAN 5
negative for SBP on 07/26  -monitor serial abdominal exams  -IR consult for diagnostic paracentesis  -c/w MVI, thiamine, folate

## 2020-08-07 NOTE — CHART NOTE - NSCHARTNOTEFT_GEN_A_CORE
MAR Accept Note  Transfer to:  Medicine  Accepting Attending Physician:  Dr. Rangel  Assigned Room:  5mon 521W    Patient seen and examined.   Labs and data reviewed.   No findings precluding transfer of service.       HPI/MICU COURSE:   Please refer to MICU transfer note for full details. Briefly, this is a 40 yo M PMH ETOH abuse with cirrhosis, heterozygous for alpha-1 antitrypsin, portal hypertension, acute alcoholic hepatitis and alcohol withdrawal complicated by seizures admitted for acute hepatitis s/p empiric Zosyn for SBP (negative paracentesis) as well as pigmented (bilirubin) nephropathy requiring HD. Course further c/b by gastric variceal bleeding during EGD on 7/29, s/p urgent embolization (glue injection and PARTO) of bleeding gastric varice and placement of blakemore tube and admitted to MICU for hemorraghic shock. Received total 34 U PRBC, 25 FFP, 5 U Plt, 2 cryo.    Carroll removed 7/30. He required CVVHDF for in the MICU and transitioned to HD on 8/6.   Pt extubated on 8/4 with good mental status.   Weaned off pressors on midodrine 10mg q8hr.    Course then complicated by worsening leukocytosis (all Cx NGTD) and abd pain, n/v, with lipase > 500. CT A/P prelim negative for pancreatitis. He was started on Meropenum, and advanced diet as tolerated.     FOR FOLLOW-UP:  -f/u transplant hepatology and renal recs  -f/u cultures and c/w Meropenem, consider ID consult if worsening leukocytosis/fever of unclear etiology, and diagnostic/therapeutic paracentesis if BP tolerating  -f/u final read of CTA/P from 8/6 to r/o pancreatitis  -active type and screen and trend CBC  -advance diet as tolerated  -wean midodrine    Jean Pierre Strauss PGY3  MAR [id number]

## 2020-08-07 NOTE — PROGRESS NOTE ADULT - PROBLEM SELECTOR PLAN 1
Acute on chronic liver failure. ddx includes underlying infection (UA neg, BCx negative, CXR neg, dx para neg for SBP, biliary imaging negative, C diff negative), worsening EtOH hepatitis (MDF = 60 but no interval drinking since discharge and this is an acute change), vs vascular injury. no current signs of hepatic encephalopathy (A&Ox4). INR elevated, platelets low  -c/w rifaximin and lactulose  -hepatology following, appreciate recs

## 2020-08-07 NOTE — PROGRESS NOTE ADULT - SUBJECTIVE AND OBJECTIVE BOX
Chief Complaint:  Patient is a 39y old  Male who presents with a chief complaint of abdominal pain (07 Aug 2020 08:13)    Reason for consult: acute on chronic liver failure    Interval Events: Pt out of ICU today. Reporting that he joined online [facility] rehab. Labs stable, renal fx still not improved.     Hospital Medications:  chlorhexidine 4% Liquid 1 Application(s) Topical <User Schedule>  dextrose 10%. 1000 milliLiter(s) IV Continuous <Continuous>  HYDROmorphone  Injectable 0.2 milliGRAM(s) IV Push every 4 hours PRN  lactulose Syrup 30 Gram(s) Oral every 6 hours  melatonin 1 milliGRAM(s) Oral at bedtime  meropenem  IVPB 500 milliGRAM(s) IV Intermittent every 24 hours  midodrine 10 milliGRAM(s) Oral every 8 hours  nystatin Powder 1 Application(s) Topical two times a day  pantoprazole  Injectable 40 milliGRAM(s) IV Push two times a day  rifAXIMin 550 milliGRAM(s) Oral two times a day  sodium chloride 0.9% lock flush 10 milliLiter(s) IV Push every 1 hour PRN  thiamine IVPB 500 milliGRAM(s) IV Intermittent daily      ROS:   General:  No  fevers, chills, night sweats, fatigue  Eyes:  Good vision, no reported pain  ENT:  No sore throat, pain, runny nose  CV:  No pain, palpitations  Pulm:  No dyspnea, cough  GI:  See HPI, otherwise negative  :  No  incontinence, nocturia  Muscle:  No pain, weakness  Neuro:  No memory problems  Psych:  No insomnia, mood problems, depression  Endocrine:  No polyuria, polydipsia, cold/heat intolerance  Heme:  No petechiae, ecchymosis, easy bruisability  Skin:  No rash    PHYSICAL EXAM:   Vital Signs:  Vital Signs Last 24 Hrs  T(C): 36.6 (07 Aug 2020 04:00), Max: 37 (06 Aug 2020 11:00)  T(F): 97.8 (07 Aug 2020 04:00), Max: 98.6 (06 Aug 2020 11:00)  HR: 108 (07 Aug 2020 06:00) (99 - 114)  BP: 103/57 (07 Aug 2020 06:00) (100/73 - 145/86)  BP(mean): 76 (07 Aug 2020 06:00) (76 - 101)  RR: 16 (07 Aug 2020 06:00) (14 - 25)  SpO2: 90% (07 Aug 2020 06:00) (90% - 100%)  Daily     Daily Weight in k.4 (06 Aug 2020 23:31)    GENERAL: no acute distress  NEURO: alert, no asterixis  HEENT: icteric sclera, no conjunctival pallor appreciated  CHEST: no respiratory distress, no accessory muscle use  CARDIAC: regular rate, rhythm  ABDOMEN: soft, non-tender, distended, no rebound or guarding  EXTREMITIES: warm, well perfused, no edema  SKIN: ++ jaundice    LABS: reviewed                        9.2    32.18 )-----------( 81       ( 07 Aug 2020 02:35 )             27.1     08-07    136  |  98  |  33<H>  ----------------------------<  107<H>  3.8   |  19<L>  |  4.26<H>    Ca    9.2      07 Aug 2020 02:35  Phos  3.7     08-07  Mg     2.2     08-07    TPro  5.8<L>  /  Alb  3.7  /  TBili  19.4<H>  /  DBili  x   /  AST  200<H>  /  ALT  38  /  AlkPhos  124<H>  08-07    LIVER FUNCTIONS - ( 07 Aug 2020 02:35 )  Alb: 3.7 g/dL / Pro: 5.8 g/dL / ALK PHOS: 124 U/L / ALT: 38 U/L / AST: 200 U/L / GGT: x             Interval Diagnostic Studies: see sunrise for full report

## 2020-08-07 NOTE — CONSULT NOTE ADULT - SUBJECTIVE AND OBJECTIVE BOX
Patient is a 39y old  Male who presents with a chief complaint of abdominal pain (07 Aug 2020 15:45)    HPI per chart review:  38 yo male PMHx of EtOH cirrhosis with portal hypertension, traumatic bladder rupture d/t fall s/p ex-lap and repair () with recent admission for acute alcoholic hepatitis and alcohol withdrawal c/b seizures. Pt originally presented  with acute renal failure, jaundice and increasing ascites. 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 S/p diagnostic paracentesis  negative for SBP s/p empiric course of Zosyn. Pt underwent shiley placement by IR for initiation of dialysis. On  pt with hematemesis and underwent EGD revealing large gastric variceal bleed. In EGD pt received 12 U PRBC, 10 U FFP, 2 U Plt, 1 Cryo. GI unable to achieve hemostasis and Pt emergently transferred to IR where he underwent embolization (glue injection and PARTO) of bleeding gastric varice and placement of blakemore tube. In total pt received 34 U PRBC, 25 FFP, 5 U Plt, 2 cryo prior to transfer to MICU for further care.     On arrival to MICU pt remained intubated and sedated and remained on vasopressors for hemorrhagic and hypovolemic shock state then later ? component of distributive d/t improvement in hemoglobin s/p MTP. Blakemore was removed by GI on  and pt had no evidence of further bleeding s/p. Pt started on CVVHDF for acute renal failure. Pt was successfully extubated  to nasal cannula and returned at baseline mental status. CRRT was discontinued on  and transitioned to intermittent HD . Pt weaned off vasopressors on . All cultures NGTD, pt remained on empiric abx and transitioned to meropenem  i/s/o worsening leukocytosis and elevated lipase to 504 as well as abdominal pain with nausea and vomiting. Pt underwent CTA/P to r/o pancreatitis with pre-curiel findings unremarkable pancreas, nonspecific gastric wall thickening/edema, redomenstrated cirrhotic liver with steatosis and splenomegaly, small to mod volume ascites, redemonstrated fluid collection without peripheral enhancement in the lower pelvis and wall thickening of the ascending colon and cecum. Pt started on a clear diet and advanced as tolerated.     ROS:  General: No fevers, chills  HEENT: No headaches, acute visual changes, rhinorrhea, sore throat  CVS: No chest pain, palpitations  Resp: + mild cough; no dyspnea, wheezing  GI: + intermittent diarrhea; No abdominal pain, nausea, vomiting, hematochezia, melena  : No dysuria, frequency, hematuria, or discharge  MSK: No joint pain or swelling  Ext: No edema, no claudication  Skin: + jaundice  Heme: No easy bruising or petechiae  Neuro: No confusion, numbness, focal weakness, or loss of consciousness  Endocrine: No excessive heat or cold symptoms, polyuria, polydipsia    Allergies  No Known Allergies    PAST MEDICAL & SURGICAL HISTORY:  No pertinent past medical history  S/P exploratory laparotomy    FAMILY HISTORY:  FH: alpha 1 antitrypsin deficiency    SOCIAL HISTORY:  Lives at home, etoh abuse, denies other recreational drug use, 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 and 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, no pets    Vital Signs Last 24 Hrs  T(C): 36.9 (07 Aug 2020 14:33), Max: 37.2 (07 Aug 2020 08:15)  T(F): 98.4 (07 Aug 2020 14:33), Max: 98.9 (07 Aug 2020 08:15)  HR: 112 (07 Aug 2020 14:33) (99 - 113)  BP: 125/85 (07 Aug 2020 14:33) (100/73 - 145/86)  BP(mean): 76 (07 Aug 2020 06:00) (76 - 99)  RR: 18 (07 Aug 2020 14:33) (14 - 25)  SpO2: 95% (07 Aug 2020 14:33) (90% - 98%)    Physical Exam:  Gen: Alert, well-developed, NAD  HEENT: NCAT, clear conjunctiva, scleral icterus  Neck: Supple, no JVD  CV: RRR, S1S2, no m/r/g  Resp: CTAB, normal respiratory effort  Abd: Soft, NT, ND, normal bowel sounds  Ext: no edema, no clubbing or cyanosis  Neuro: AOx3, CN2-12 grossly intact, LEWIS  Skin: warm, perfused      LABS:                        9.2    32.18 )-----------( 81       ( 07 Aug 2020 02:35 )             27.1     Hgb Trend: 9.2<--, 9.4<--, 8.9<--, 9.8<--, 9.6<--  08    136  |  98  |  33<H>  ----------------------------<  107<H>  3.8   |  19<L>  |  4.26<H>    Ca    9.2      07 Aug 2020 02:35  Phos  3.7     08  Mg     2.2         TPro  5.8<L>  /  Alb  3.7  /  TBili  19.4<H>  /  DBili  x   /  AST  200<H>  /  ALT  38  /  AlkPhos  124<H>      Creatinine Trend: 4.26<--, 4.92<--, 4.00<--, 2.97<--, 1.99<--, 1.92<--  LIVER FUNCTIONS - ( 07 Aug 2020 02:35 )  Alb: 3.7 g/dL / Pro: 5.8 g/dL / ALK PHOS: 124 U/L / ALT: 38 U/L / AST: 200 U/L / GGT: x           PT/INR - ( 07 Aug 2020 14:33 )   PT: 18.4 sec;   INR: 1.58 ratio         PTT - ( 07 Aug 2020 14:33 )  PTT:40.3 sec      Urinalysis Basic - ( 07 Aug 2020 04:54 )    Color: Dark Orange / Appearance: Turbid / S.041 / pH: x  Gluc: x / Ketone: Negative  / Bili: Moderate / Urobili: 8 mg/dL   Blood: x / Protein: 100 mg/dL Test Performed on Urine Supernate. / Nitrite: Negative   Leuk Esterase: Negative / RBC: 30 /hpf / WBC 18 /HPF   Sq Epi: x / Non Sq Epi: 7 / Bacteria: Negative    Culture - Blood (20 @ 09:07)    Specimen Source: .Blood Blood-Peripheral    Culture Results:   No Growth Final    Culture - Urine (20 @ 17:50)    Specimen Source: .Urine Clean Catch (Midstream)    Culture Results:   <10,000 CFU/ml of other organisms    Culture - Body Fluid with Gram Stain (07.26.20 @ 17:25)    Gram Stain:   No polymorphonuclear cells seen  No organisms seen  by cytocentrifuge    Specimen Source: Peritoneal Peritoneal Fluid    Culture Results:   No growth at 5 days      Imaging:  < from: Xray Chest 1 View AP/PA (20 @ 14:25) >  IMPRESSION:    Clear lungs.  There are 2 cm shaped densities along the right and left hemithorax. Evaluation is recommended is recommended to verify if these are outside the patient.    < end of copied text >    < from: CT Abdomen and Pelvis w/ IV Cont (20 @ 17:15) >  FINDINGS:  LOWER CHEST: Trace left pleural effusion with mild bibasilar linear type atelectasis..    LIVER: Cirrhosis. Heterogeneous steatosis. Nodular foci in both hepatic lobes at arterial phase imaging without washout at delayed phase imaging and without correlate at diffusion-weighted imaging at MRI 2020 are indeterminate however likely represent perfusional changes in the setting of hepatic steatosis. Attention at follow-up contrast-enhanced MRI is recommended.  BILE DUCTS: Normal caliber.  GALLBLADDER: Within normal limits.  SPLEEN: Within normal limits.  PANCREAS: Heterogeneous enhancement. Trace fluid in the left retroperitoneum is unchanged. No CT evidence of pancreatitis.  ADRENALS: New left adrenal nodule possibly related to hemorrhage.  KIDNEYS/URETERS: Within normal limits.    BLADDER: Minimally distended.  REPRODUCTIVE ORGANS: Prostate within normal limits.    BOWEL: No bowel obstruction. Right colonic thickening likely related to portal colopathy Appendix is normal.  PERITONEUM: Moderate ascites, unchanged.  VESSELS: Status post occlusion of a gastrorenal shunt. Upper abdominal varices including paraesophageal varices consistent with portal hypertension.  RETROPERITONEUM/LYMPH NODES: No lymphadenopathy.  ABDOMINAL WALL: Within normal limits.  BONES: Within normal limits.    IMPRESSION:    No CT evidence for acute pancreatitis.    New left adrenal nodularity which may represent hemorrhage.    < end of copied text > Patient is a 39y old  Male who presents with a chief complaint of abdominal pain (07 Aug 2020 15:45)    HPI per chart review:  40 yo male PMHx of EtOH cirrhosis with portal hypertension, traumatic bladder rupture d/t fall s/p ex-lap and repair () with recent admission for acute alcoholic hepatitis and alcohol withdrawal c/b seizures. Pt originally presented  with acute renal failure, jaundice and increasing ascites. 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 S/p diagnostic paracentesis  negative for SBP s/p empiric course of Zosyn. Pt underwent shiley placement by IR for initiation of dialysis. On  pt with hematemesis and underwent EGD revealing large gastric variceal bleed. In EGD pt received 12 U PRBC, 10 U FFP, 2 U Plt, 1 Cryo. GI unable to achieve hemostasis and Pt emergently transferred to IR where he underwent embolization (glue injection and PARTO) of bleeding gastric varice and placement of blakemore tube. In total pt received 34 U PRBC, 25 FFP, 5 U Plt, 2 cryo prior to transfer to MICU for further care.     On arrival to MICU pt remained intubated and sedated and remained on vasopressors for hemorrhagic and hypovolemic shock state then later ? component of distributive d/t improvement in hemoglobin s/p MTP. Blakemore was removed by GI on  and pt had no evidence of further bleeding s/p. Pt started on CVVHDF for acute renal failure. Pt was successfully extubated  to nasal cannula and returned at baseline mental status. CRRT was discontinued on  and transitioned to intermittent HD . Pt weaned off vasopressors on . All cultures NGTD, pt remained on empiric abx and transitioned to meropenem  i/s/o worsening leukocytosis and elevated lipase to 504 as well as abdominal pain with nausea and vomiting. Pt underwent CTA/P to r/o pancreatitis with pre-curiel findings unremarkable pancreas, nonspecific gastric wall thickening/edema, redomenstrated cirrhotic liver with steatosis and splenomegaly, small to mod volume ascites, redemonstrated fluid collection without peripheral enhancement in the lower pelvis and wall thickening of the ascending colon and cecum. Pt started on a clear diet and advanced as tolerated.     ROS:  General: No fevers, chills  HEENT: No headaches, acute visual changes, rhinorrhea, sore throat  CVS: No chest pain, palpitations  Resp: + mild cough; no dyspnea, wheezing  GI: + intermittent diarrhea; No abdominal pain, nausea, vomiting, hematochezia, melena  : No dysuria, frequency, hematuria, or discharge  MSK: No joint pain or swelling  Ext: No edema, no claudication  Skin: + jaundice  Heme: No easy bruising or petechiae  Neuro: No confusion, numbness, focal weakness, or loss of consciousness  Endocrine: No excessive heat or cold symptoms, polyuria, polydipsia    Allergies  No Known Allergies    PAST MEDICAL & SURGICAL HISTORY:  No pertinent past medical history  S/P exploratory laparotomy    FAMILY HISTORY:  FH: alpha 1 antitrypsin deficiency    SOCIAL HISTORY:  Lives at home, etoh abuse, denies other recreational drug use, 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 and 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, no pets    Vital Signs Last 24 Hrs  T(C): 36.9 (07 Aug 2020 14:33), Max: 37.2 (07 Aug 2020 08:15)  T(F): 98.4 (07 Aug 2020 14:33), Max: 98.9 (07 Aug 2020 08:15)  HR: 112 (07 Aug 2020 14:33) (99 - 113)  BP: 125/85 (07 Aug 2020 14:33) (100/73 - 145/86)  BP(mean): 76 (07 Aug 2020 06:00) (76 - 99)  RR: 18 (07 Aug 2020 14:33) (14 - 25)  SpO2: 95% (07 Aug 2020 14:33) (90% - 98%)    Physical Exam:  Gen: Alert, well-developed, NAD  HEENT: NCAT, clear conjunctiva, scleral icterus  Neck: Supple, no JVD, RIJ HD catheter  CV: RRR, S1S2, no m/r/g  Resp: CTAB, normal respiratory effort  Abd: Soft, NT, ND, normal bowel sounds  Ext: no edema, no clubbing or cyanosis  Neuro: AOx3, CN2-12 grossly intact, LEWIS  Skin: warm, perfused      LABS:                        9.2    32.18 )-----------( 81       ( 07 Aug 2020 02:35 )             27.1     Hgb Trend: 9.2<--, 9.4<--, 8.9<--, 9.8<--, 9.6<--  0807    136  |  98  |  33<H>  ----------------------------<  107<H>  3.8   |  19<L>  |  4.26<H>    Ca    9.2      07 Aug 2020 02:35  Phos  3.7     0807  Mg     2.2         TPro  5.8<L>  /  Alb  3.7  /  TBili  19.4<H>  /  DBili  x   /  AST  200<H>  /  ALT  38  /  AlkPhos  124<H>      Creatinine Trend: 4.26<--, 4.92<--, 4.00<--, 2.97<--, 1.99<--, 1.92<--  LIVER FUNCTIONS - ( 07 Aug 2020 02:35 )  Alb: 3.7 g/dL / Pro: 5.8 g/dL / ALK PHOS: 124 U/L / ALT: 38 U/L / AST: 200 U/L / GGT: x           PT/INR - ( 07 Aug 2020 14:33 )   PT: 18.4 sec;   INR: 1.58 ratio         PTT - ( 07 Aug 2020 14:33 )  PTT:40.3 sec      Urinalysis Basic - ( 07 Aug 2020 04:54 )    Color: Dark Orange / Appearance: Turbid / S.041 / pH: x  Gluc: x / Ketone: Negative  / Bili: Moderate / Urobili: 8 mg/dL   Blood: x / Protein: 100 mg/dL Test Performed on Urine Supernate. / Nitrite: Negative   Leuk Esterase: Negative / RBC: 30 /hpf / WBC 18 /HPF   Sq Epi: x / Non Sq Epi: 7 / Bacteria: Negative    Culture - Blood (20 @ 09:07)    Specimen Source: .Blood Blood-Peripheral    Culture Results:   No Growth Final    Culture - Urine (20 @ 17:50)    Specimen Source: .Urine Clean Catch (Midstream)    Culture Results:   <10,000 CFU/ml of other organisms    Culture - Body Fluid with Gram Stain (20 @ 17:25)    Gram Stain:   No polymorphonuclear cells seen  No organisms seen  by cytocentrifuge    Specimen Source: Peritoneal Peritoneal Fluid    Culture Results:   No growth at 5 days      Imaging:  < from: Xray Chest 1 View AP/PA (20 @ 14:25) >  IMPRESSION:    Clear lungs.  There are 2 cm shaped densities along the right and left hemithorax. Evaluation is recommended is recommended to verify if these are outside the patient.    < end of copied text >    < from: CT Abdomen and Pelvis w/ IV Cont (20 @ 17:15) >  FINDINGS:  LOWER CHEST: Trace left pleural effusion with mild bibasilar linear type atelectasis..    LIVER: Cirrhosis. Heterogeneous steatosis. Nodular foci in both hepatic lobes at arterial phase imaging without washout at delayed phase imaging and without correlate at diffusion-weighted imaging at MRI 2020 are indeterminate however likely represent perfusional changes in the setting of hepatic steatosis. Attention at follow-up contrast-enhanced MRI is recommended.  BILE DUCTS: Normal caliber.  GALLBLADDER: Within normal limits.  SPLEEN: Within normal limits.  PANCREAS: Heterogeneous enhancement. Trace fluid in the left retroperitoneum is unchanged. No CT evidence of pancreatitis.  ADRENALS: New left adrenal nodule possibly related to hemorrhage.  KIDNEYS/URETERS: Within normal limits.    BLADDER: Minimally distended.  REPRODUCTIVE ORGANS: Prostate within normal limits.    BOWEL: No bowel obstruction. Right colonic thickening likely related to portal colopathy Appendix is normal.  PERITONEUM: Moderate ascites, unchanged.  VESSELS: Status post occlusion of a gastrorenal shunt. Upper abdominal varices including paraesophageal varices consistent with portal hypertension.  RETROPERITONEUM/LYMPH NODES: No lymphadenopathy.  ABDOMINAL WALL: Within normal limits.  BONES: Within normal limits.    IMPRESSION:    No CT evidence for acute pancreatitis.    New left adrenal nodularity which may represent hemorrhage.    < end of copied text > Patient is a 39y old  Male who presents with a chief complaint of abdominal pain (07 Aug 2020 15:45)    HPI per chart review:  40 yo male PMHx of EtOH cirrhosis with portal hypertension, traumatic bladder rupture d/t fall s/p ex-lap and repair () with recent admission for acute alcoholic hepatitis and alcohol withdrawal c/b seizures. Pt originally presented  with acute renal failure, jaundice and increasing ascites. 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 S/p diagnostic paracentesis  negative for SBP s/p empiric course of Zosyn. Pt underwent shiley placement by IR for initiation of dialysis. On  pt with hematemesis and underwent EGD revealing large gastric variceal bleed. In EGD pt received 12 U PRBC, 10 U FFP, 2 U Plt, 1 Cryo. GI unable to achieve hemostasis and Pt emergently transferred to IR where he underwent embolization (glue injection and PARTO) of bleeding gastric varice and placement of blakemore tube. In total pt received 34 U PRBC, 25 FFP, 5 U Plt, 2 cryo prior to transfer to MICU for further care.     On arrival to MICU pt remained intubated and sedated and remained on vasopressors for hemorrhagic and hypovolemic shock state then later ? component of distributive d/t improvement in hemoglobin s/p MTP. Blakemore was removed by GI on  and pt had no evidence of further bleeding s/p. Pt started on CVVHDF for acute renal failure. Pt was successfully extubated  to nasal cannula and returned at baseline mental status. CRRT was discontinued on  and transitioned to intermittent HD . Pt weaned off vasopressors on . All cultures NGTD, pt remained on empiric abx and transitioned to meropenem  i/s/o worsening leukocytosis and elevated lipase to 504 as well as abdominal pain with nausea and vomiting. Pt underwent CTA/P to r/o pancreatitis with pre-curiel findings unremarkable pancreas, nonspecific gastric wall thickening/edema, redomenstrated cirrhotic liver with steatosis and splenomegaly, small to mod volume ascites, redemonstrated fluid collection without peripheral enhancement in the lower pelvis and wall thickening of the ascending colon and cecum. Pt started on a clear diet and advanced as tolerated.     ROS:  General: No fevers, chills  HEENT: No headaches, acute visual changes, rhinorrhea, sore throat  CVS: No chest pain, palpitations  Resp: + mild cough; no dyspnea, wheezing  GI: + intermittent diarrhea; No abdominal pain, nausea, vomiting, hematochezia, melena  : No dysuria, frequency, hematuria, or discharge  MSK: No joint pain or swelling  Ext: No edema, no claudication  Skin: + jaundice  Heme: No easy bruising or petechiae  Neuro: No confusion, numbness, focal weakness, or loss of consciousness  Endocrine: No excessive heat or cold symptoms, polyuria, polydipsia    Allergies  No Known Allergies    PAST MEDICAL & SURGICAL HISTORY:  No pertinent past medical history  S/P exploratory laparotomy    FAMILY HISTORY:  FH: alpha 1 antitrypsin deficiency    SOCIAL HISTORY:  Lives at home, etoh abuse, denies other recreational drug use, 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 and 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, no pets    Vital Signs Last 24 Hrs  T(C): 36.9 (07 Aug 2020 14:33), Max: 37.2 (07 Aug 2020 08:15)  T(F): 98.4 (07 Aug 2020 14:33), Max: 98.9 (07 Aug 2020 08:15)  HR: 112 (07 Aug 2020 14:33) (99 - 113)  BP: 125/85 (07 Aug 2020 14:33) (100/73 - 145/86)  BP(mean): 76 (07 Aug 2020 06:00) (76 - 99)  RR: 18 (07 Aug 2020 14:33) (14 - 25)  SpO2: 95% (07 Aug 2020 14:33) (90% - 98%)    Physical Exam:  Gen: Alert, well-developed, NAD  HEENT: NCAT, clear conjunctiva, scleral icterus  Neck: Supple, no JVD, RIJ HD catheter  CV: tachycardic   Resp: grossly clear bilaterally, normal respiratory effort  Abd: distended, Soft, NT, ND, normal bowel sounds  Ext: no edema, no clubbing or cyanosis  Neuro: AOx3, CN2-12 grossly intact, LEWIS  Skin: warm, perfused. jaundiced       LABS:                        9.2    32.18 )-----------( 81       ( 07 Aug 2020 02:35 )             27.1     Hgb Trend: 9.2<--, 9.4<--, 8.9<--, 9.8<--, 9.6<--  08    136  |  98  |  33<H>  ----------------------------<  107<H>  3.8   |  19<L>  |  4.26<H>    Ca    9.2      07 Aug 2020 02:35  Phos  3.7       Mg     2.2         TPro  5.8<L>  /  Alb  3.7  /  TBili  19.4<H>  /  DBili  x   /  AST  200<H>  /  ALT  38  /  AlkPhos  124<H>      Creatinine Trend: 4.26<--, 4.92<--, 4.00<--, 2.97<--, 1.99<--, 1.92<--  LIVER FUNCTIONS - ( 07 Aug 2020 02:35 )  Alb: 3.7 g/dL / Pro: 5.8 g/dL / ALK PHOS: 124 U/L / ALT: 38 U/L / AST: 200 U/L / GGT: x           PT/INR - ( 07 Aug 2020 14:33 )   PT: 18.4 sec;   INR: 1.58 ratio         PTT - ( 07 Aug 2020 14:33 )  PTT:40.3 sec      Urinalysis Basic - ( 07 Aug 2020 04:54 )    Color: Dark Orange / Appearance: Turbid / S.041 / pH: x  Gluc: x / Ketone: Negative  / Bili: Moderate / Urobili: 8 mg/dL   Blood: x / Protein: 100 mg/dL Test Performed on Urine Supernate. / Nitrite: Negative   Leuk Esterase: Negative / RBC: 30 /hpf / WBC 18 /HPF   Sq Epi: x / Non Sq Epi: 7 / Bacteria: Negative        MICROBIOLOGY:  Culture - Blood (collected 20 @ 09:07)  Source: .Blood Blood-Peripheral  Final Report (20 @ 10:00):    No Growth Final    Culture - Blood (collected 20 @ 04:23)  Source: .Blood Blood-Venous  Final Report (20 @ 05:00):    No Growth Final    Culture - Blood (20 @ 03:00)    Specimen Source: .Blood Blood-Peripheral    Culture Results:   No Growth Final    Culture - Blood (20 @ 03:00)    Specimen Source: .Blood Blood-Peripheral    Culture Results:   No Growth Final    Culture - Body Fluid with Gram Stain (20 @ 17:25)    Gram Stain:   No polymorphonuclear cells seen  No organisms seen  by cytocentrifuge    Specimen Source: Peritoneal Peritoneal Fluid    Culture Results:   No growth at 5 days    Cell Count, Body Fluid (20 @ 14:13)    Mesothelial Cells - Body Fluid: 2 %    Monocyte/Macrophage Count - Body Fluid: 90 %    Fluid Segmented Granulocytes: 1 %    Fluid Type: Peritoneal fl    Body Fluid Appearance: Clear    BF Lymphocytes: 7 %    Tube Type: Lavender    Color - Body Fluid: Yellow    Total Nucleated Cell Count, Body Fluid: 162    Total RBC Count: 144 /uL    Imaging:  Personally reviewed:   Xray Chest 1 View AP/PA (20 @ 14:25)   Clear lungs.  There are 2 cm shaped densities along the right and left hemithorax. Evaluation is recommended is recommended to verify if these are outside the patient.    CT Abdomen and Pelvis w/ IV Cont (20 @ 17:15)   FINDINGS:  LOWER CHEST: Trace left pleural effusion with mild bibasilar linear type atelectasis..  LIVER: Cirrhosis. Heterogeneous steatosis. Nodular foci in both hepatic lobes at arterial phase imaging without washout at delayed phase imaging and without correlate at diffusion-weighted imaging at MRI 2020 are indeterminate however likely represent perfusional changes in the setting of hepatic steatosis. Attention at follow-up contrast-enhanced MRI is recommended.  BILE DUCTS: Normal caliber.  GALLBLADDER: Within normal limits.  SPLEEN: Within normal limits.  PANCREAS: Heterogeneous enhancement. Trace fluid in the left retroperitoneum is unchanged. No CT evidence of pancreatitis.  ADRENALS: New left adrenal nodule possibly related to hemorrhage.  KIDNEYS/URETERS: Within normal limits.  BLADDER: Minimally distended.  REPRODUCTIVE ORGANS: Prostate within normal limits.  BOWEL: No bowel obstruction. Right colonic thickening likely related to portal colopathy Appendix is normal.  PERITONEUM: Moderate ascites, unchanged.  VESSELS: Status post occlusion of a gastrorenal shunt. Upper abdominal varices including paraesophageal varices consistent with portal hypertension.  RETROPERITONEUM/LYMPH NODES: No lymphadenopathy.  ABDOMINAL WALL: Within normal limits.  BONES: Within normal limits.  IMPRESSION:  No CT evidence for acute pancreatitis.  New left adrenal nodularity which may represent hemorrhage. Patient is a 39y old  Male who presents with a chief complaint of abdominal pain (07 Aug 2020 15:45)    HPI per chart review:  38 yo male PMHx of EtOH cirrhosis with portal hypertension, traumatic bladder rupture d/t fall s/p ex-lap and repair () with recent admission for acute alcoholic hepatitis and alcohol withdrawal c/b seizures. Pt originally presented  with acute renal failure, jaundice and increasing ascites. 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 S/p diagnostic paracentesis  negative for SBP s/p empiric course of Zosyn. Pt underwent shiley placement by IR for initiation of dialysis. On  pt with hematemesis and underwent EGD revealing large gastric variceal bleed. In EGD pt received 12 U PRBC, 10 U FFP, 2 U Plt, 1 Cryo. GI unable to achieve hemostasis and Pt emergently transferred to IR where he underwent embolization (glue injection and PARTO) of bleeding gastric varice and placement of blakemore tube. In total pt received 34 U PRBC, 25 FFP, 5 U Plt, 2 cryo prior to transfer to MICU for further care.     On arrival to MICU pt remained intubated and sedated and remained on vasopressors for hemorrhagic and hypovolemic shock state then later ? component of distributive d/t improvement in hemoglobin s/p MTP. Blakemore was removed by GI on  and pt had no evidence of further bleeding s/p. Pt started on CVVHDF for acute renal failure. Pt was successfully extubated  to nasal cannula and returned at baseline mental status. CRRT was discontinued on  and transitioned to intermittent HD . Pt weaned off vasopressors on . All cultures NGTD, pt remained on empiric abx and transitioned to meropenem  i/s/o worsening leukocytosis and elevated lipase to 504 as well as abdominal pain with nausea and vomiting. Pt underwent CTA/P to r/o pancreatitis with pre-curiel findings unremarkable pancreas, nonspecific gastric wall thickening/edema, redomenstrated cirrhotic liver with steatosis and splenomegaly, small to mod volume ascites, redemonstrated fluid collection without peripheral enhancement in the lower pelvis and wall thickening of the ascending colon and cecum. Pt started on a clear diet and advanced as tolerated.     ROS:  General: No fevers, chills  HEENT: No headaches, acute visual changes, rhinorrhea, sore throat  CVS: No chest pain, palpitations  Resp: + mild cough; no dyspnea, wheezing  GI: + intermittent diarrhea; No abdominal pain, nausea, vomiting, hematochezia, melena  : No dysuria, frequency, hematuria, or discharge  MSK: No joint pain or swelling  Ext: No edema, no claudication  Skin: + jaundice  Heme: No easy bruising or petechiae  Neuro: No confusion, numbness, focal weakness, or loss of consciousness  Endocrine: No excessive heat or cold symptoms, polyuria, polydipsia    Allergies  No Known Allergies    PAST MEDICAL & SURGICAL HISTORY:  No pertinent past medical history  S/P exploratory laparotomy    FAMILY HISTORY:  FH: alpha 1 antitrypsin deficiency    SOCIAL HISTORY:  Lives at home, etoh abuse, denies other recreational drug use, 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 and 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, no pets    Vital Signs Last 24 Hrs  T(C): 36.9 (07 Aug 2020 14:33), Max: 37.2 (07 Aug 2020 08:15)  T(F): 98.4 (07 Aug 2020 14:33), Max: 98.9 (07 Aug 2020 08:15)  HR: 112 (07 Aug 2020 14:33) (99 - 113)  BP: 125/85 (07 Aug 2020 14:33) (100/73 - 145/86)  BP(mean): 76 (07 Aug 2020 06:00) (76 - 99)  RR: 18 (07 Aug 2020 14:33) (14 - 25)  SpO2: 95% (07 Aug 2020 14:33) (90% - 98%)    Physical Exam:  Gen: Alert, well-developed, NAD  HEENT: NCAT, clear conjunctiva, scleral icterus  Neck: Supple, no JVD, RIJ HD catheter  CV: tachycardic   Resp: grossly clear bilaterally, normal respiratory effort  Abd: distended, Soft, NT, ND, normal bowel sounds  Ext: no edema, no clubbing or cyanosis  Neuro: AOx3, CN2-12 grossly intact, LEWIS  Skin: warm, perfused. jaundiced       LABS:                        9.2    32.18 )-----------( 81       ( 07 Aug 2020 02:35 )             27.1     Hgb Trend: 9.2<--, 9.4<--, 8.9<--, 9.8<--, 9.6<--  08-07    136  |  98  |  33<H>  ----------------------------<  107<H>  3.8   |  19<L>  |  4.26<H>    Ca    9.2      07 Aug 2020 02:35  Phos  3.7     08-07  Mg     2.2     0807    TPro  5.8<L>  /  Alb  3.7  /  TBili  19.4<H>  /  DBili  x   /  AST  200<H>  /  ALT  38  /  AlkPhos  124<H>  08    Creatinine Trend: 4.26<--, 4.92<--, 4.00<--, 2.97<--, 1.99<--, 1.92<--  LIVER FUNCTIONS - ( 07 Aug 2020 02:35 )  Alb: 3.7 g/dL / Pro: 5.8 g/dL / ALK PHOS: 124 U/L / ALT: 38 U/L / AST: 200 U/L / GGT: x           PT/INR - ( 07 Aug 2020 14:33 )   PT: 18.4 sec;   INR: 1.58 ratio         PTT - ( 07 Aug 2020 14:33 )  PTT:40.3 sec      Urinalysis Basic - ( 07 Aug 2020 04:54 )    Color: Dark Orange / Appearance: Turbid / S.041 / pH: x  Gluc: x / Ketone: Negative  / Bili: Moderate / Urobili: 8 mg/dL   Blood: x / Protein: 100 mg/dL Test Performed on Urine Supernate. / Nitrite: Negative   Leuk Esterase: Negative / RBC: 30 /hpf / WBC 18 /HPF   Sq Epi: x / Non Sq Epi: 7 / Bacteria: Negative    TRANSPLANT CLEARANCE LABWORK  HIV-1/2 Combo Result: Nonreact (07-15-20 @ 10:47)    EBV VCA IgM EIA: <10.0 U/mL (20 @ 09:33)  EBV VCA IgG EIA: 182.0 U/mL (20 @ 09:33)  EBV EA Ab EIA: <5.0 U/mL (20 @ 09:33)     Hepatitis A IgG Ab Result: Nonreact (20 @ 08:58)     Hepatitis B Surface Antigen: Nonreact (20 @ 01:16)  Hepatitis B Core IgM Antibody: Nonreact (20 @ 01:16)     Hepatitis C Virus S/CO Ratio: 0.14 S/CO (20 @ 01:16)  Hepatitis C Virus Interpretation: Nonreact (20 @ 01:16)     Hepatitis E IgG Antibodies: Negative (20 @ 10:01)    MICROBIOLOGY:  Culture - Blood (collected 20 @ 09:07)  Source: .Blood Blood-Peripheral  Final Report (20 @ 10:00):    No Growth Final    Culture - Blood (collected 20 @ 04:23)  Source: .Blood Blood-Venous  Final Report (20 @ 05:00):    No Growth Final    Culture - Blood (20 @ 03:00)    Specimen Source: .Blood Blood-Peripheral    Culture Results:   No Growth Final    Culture - Blood (20 @ 03:00)    Specimen Source: .Blood Blood-Peripheral    Culture Results:   No Growth Final    Culture - Body Fluid with Gram Stain (20 @ 17:25)    Gram Stain:   No polymorphonuclear cells seen  No organisms seen  by cytocentrifuge    Specimen Source: Peritoneal Peritoneal Fluid    Culture Results:   No growth at 5 days    Cell Count, Body Fluid (20 @ 14:13)    Mesothelial Cells - Body Fluid: 2 %    Monocyte/Macrophage Count - Body Fluid: 90 %    Fluid Segmented Granulocytes: 1 %    Fluid Type: Peritoneal fl    Body Fluid Appearance: Clear    BF Lymphocytes: 7 %    Tube Type: Lavender    Color - Body Fluid: Yellow    Total Nucleated Cell Count, Body Fluid: 162    Total RBC Count: 144 /uL    Imaging:  Personally reviewed:   Xray Chest 1 View AP/PA (20 @ 14:25)   Clear lungs.  There are 2 cm shaped densities along the right and left hemithorax. Evaluation is recommended is recommended to verify if these are outside the patient.    CT Abdomen and Pelvis w/ IV Cont (20 @ 17:15)   FINDINGS:  LOWER CHEST: Trace left pleural effusion with mild bibasilar linear type atelectasis..  LIVER: Cirrhosis. Heterogeneous steatosis. Nodular foci in both hepatic lobes at arterial phase imaging without washout at delayed phase imaging and without correlate at diffusion-weighted imaging at MRI 2020 are indeterminate however likely represent perfusional changes in the setting of hepatic steatosis. Attention at follow-up contrast-enhanced MRI is recommended.  BILE DUCTS: Normal caliber.  GALLBLADDER: Within normal limits.  SPLEEN: Within normal limits.  PANCREAS: Heterogeneous enhancement. Trace fluid in the left retroperitoneum is unchanged. No CT evidence of pancreatitis.  ADRENALS: New left adrenal nodule possibly related to hemorrhage.  KIDNEYS/URETERS: Within normal limits.  BLADDER: Minimally distended.  REPRODUCTIVE ORGANS: Prostate within normal limits.  BOWEL: No bowel obstruction. Right colonic thickening likely related to portal colopathy Appendix is normal.  PERITONEUM: Moderate ascites, unchanged.  VESSELS: Status post occlusion of a gastrorenal shunt. Upper abdominal varices including paraesophageal varices consistent with portal hypertension.  RETROPERITONEUM/LYMPH NODES: No lymphadenopathy.  ABDOMINAL WALL: Within normal limits.  BONES: Within normal limits.  IMPRESSION:  No CT evidence for acute pancreatitis.  New left adrenal nodularity which may represent hemorrhage.

## 2020-08-08 NOTE — PROGRESS NOTE ADULT - PROBLEM SELECTOR PLAN 2
etoh cirrhosis, gastric variceal bleed and ascites. varices - no prior EGD, on nadolol at home, now holding due to shock. ascites present on exam, neg for SBP 7/26, on Lasix 20mg/spironolactone 25mg daily as outpatient, now holding due to hypotension.  -hepatology following, appreciate recs  -MELD score 36 on 08/07  -transplant eval as outpatient pending alcohol rehab, not currently a candidate at this time

## 2020-08-08 NOTE — PROGRESS NOTE ADULT - PROBLEM SELECTOR PLAN 8
-advance diet as tolerated (clear liquid diet)  -monitor for hypoglycemia secondary to gastric variceal bleed. refractory to endoscopic intervention 7/29 s/p IR PARTO 7/30, blakemore 7/30, and 34 U PRBC, 25 U FFP, 20 U PLT, and 5 U cryo all on 7/30. blakemore removed 7/30. tolerated tube feeds without evidence of bleeding  -trend CBC q12- transfuse to maintain Hgb > 7, or for active bleed  -trend coags l19s--phdaryw with FFP, cryo as needed

## 2020-08-08 NOTE — PROGRESS NOTE ADULT - ASSESSMENT
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   Developed massive variceal bleed with shock 7/29 s/p unsuccessful endoscopic intervention requiring IR embolization and lots of blood products.   Recovered and downgraded 8/7.   Leukocytosis is common in this setting. I think it's reactive in the setting of a complicated hospital course. He's received an adequate course after bleeding and I don't think the meropenem is treating anything. Cultures are all negative. CXR and CT abdomen in the last few days without focus. Nontoxic.   Currently not a candidate for transplants, need alcohol rehab first.     Suggest  -stop Meropenem and monitor clinically   -f/u HSV 1/2 IgG, CMV IgG, VZV IgG, Measles, Mumps and Rubella IgG, Quantiferon Gold, Syphilis Screen, Toxoplasma IgG  -he should be vaccinated for HAV, pneumococcus, TDaP at some point     Spoke with primary team     Justin Mena MD   Infectious Disease   Pager [phone]   After 5PM and on weekends please page fellow on call or call [phone]

## 2020-08-08 NOTE — PROGRESS NOTE ADULT - PROBLEM SELECTOR PLAN 4
increase in neutrophils. all cultures negative to date (7/26, 7/30, 8/06). paracentesis negative for SBP on 7/26. fungitell sent 08/01. s/p zosyn 7/26-7/31. UA not c/w UTI. abdominal pain on exam. c/f pancreatitis - lipase 504, CT w/ no acute pancreatitis  -meropenem through 8/08.   -repeat diagnostic paracentesis to r/o SBP  -CXR  -trend lactate  -daily CBC w/ diff Incidental finding on CTAP (8/6) found "new left adrenal nodularity which may represent hemorrhage"  - Discussed with Hepatology, continue to monitor, INR<2  - Pending further evaluation with MRI A/P w/ IV con

## 2020-08-08 NOTE — PROGRESS NOTE ADULT - PROBLEM SELECTOR PLAN 6
2/2 alcoholic hepatitis  -daily CMP  -hepatology following, appreciate recs  -protonix 40mg PO daily negative for SBP on 07/26  -monitor serial abdominal exams  -will consider diagnostic/therapeutic paracentesis if clinically worsens  -c/w MVI, thiamine, folate

## 2020-08-08 NOTE — PROGRESS NOTE ADULT - ASSESSMENT
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 Blakemore removed 7/31 and the pt no longer with active bleeding with stable Hgb, successfully extubated 8/4 and titrated off vasopressors 8/5. The patients leukocytosis continues despite being on antimicrobial therapy with meropenem and infection is likely GI in etiology given history of liver disease. 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 Blakemore removed 7/31 and the pt no longer with active bleeding with stable Hgb, successfully extubated 8/4 and titrated off vasopressors 8/5. Persistent leukocytosis despite being on meropenem, likely reactive. Not currently candidate for liver transplant.

## 2020-08-08 NOTE — PROGRESS NOTE ADULT - PROBLEM SELECTOR PLAN 5
negative for SBP on 07/26  -monitor serial abdominal exams  -will consider IR consult for diagnostic/therapeutic paracentesis if clinically worsens  -c/w MVI, thiamine, folate increase in neutrophils. all cultures negative to date (7/26, 7/30, 8/06). paracentesis negative for SBP on 7/26. fungitell sent 08/01. s/p zosyn 7/26-7/31. UA not c/w UTI. abdominal pain on exam. c/f pancreatitis - lipase 504, however CT w/ no acute pancreatitis  -As per ID, likely reactive due to complicated hospital course  -c/w meropenem for now, consider dc'ing if BCx remains neg  -can consider repeat diagnostic paracentesis to r/o SBP if clinically worsens  -CXR  -trend lactate  -daily CBC w/ diff increase in neutrophils. all cultures negative to date (7/26, 7/30, 8/06). paracentesis negative for SBP on 7/26. fungitell sent 08/01. s/p zosyn 7/26-7/31. UA not c/w UTI. abdominal pain on exam. c/f pancreatitis - lipase 504, however CT w/ no acute pancreatitis  -As per ID, likely reactive due to complicated hospital course, dc'ed meropenem  -can consider repeat diagnostic paracentesis to r/o SBP if clinically worsens  -trend lactate  -daily CBC w/ diff

## 2020-08-08 NOTE — PROGRESS NOTE ADULT - SUBJECTIVE AND OBJECTIVE BOX
PROGRESS NOTE:   Authored by Dr. Dano Gauthier MD  Pager [phone] [facility], [id number] [facility]     Patient is a 39y old  Male who presents with a chief complaint of abdominal pain (07 Aug 2020 15:45)      SUBJECTIVE / OVERNIGHT EVENTS:  - Overnight, no acute events.  - Today, pt seen and examined at bedside in AM.    ADDITIONAL REVIEW OF SYSTEMS:    CONSTITUTIONAL: No weakness, fevers or chills  EYES/ENT: No visual changes;  No vertigo or throat pain   NECK: No pain or stiffness  RESPIRATORY: No cough, wheezing, hemoptysis; No shortness of breath  CARDIOVASCULAR: No chest pain or palpitations  GASTROINTESTINAL: No abdominal or epigastric pain. No nausea, vomiting, or hematemesis; No diarrhea or constipation. No melena or hematochezia.  GENITOURINARY: No dysuria, frequency or hematuria  NEUROLOGICAL: No numbness or weakness  SKIN: No itching, burning, rashes, or lesions   All other review of systems is negative unless indicated above.    MEDICATIONS  (STANDING):  chlorhexidine 4% Liquid 1 Application(s) Topical <User Schedule>  dextrose 10%. 1000 milliLiter(s) (40 mL/Hr) IV Continuous <Continuous>  lactulose Syrup 30 Gram(s) Oral every 6 hours  melatonin 1 milliGRAM(s) Oral at bedtime  meropenem  IVPB 500 milliGRAM(s) IV Intermittent every 24 hours  midodrine 10 milliGRAM(s) Oral every 8 hours  nystatin Powder 1 Application(s) Topical two times a day  pantoprazole    Tablet 40 milliGRAM(s) Oral before breakfast  rifAXIMin 550 milliGRAM(s) Oral two times a day  thiamine IVPB 500 milliGRAM(s) IV Intermittent daily    MEDICATIONS  (PRN):  HYDROmorphone  Injectable 0.2 milliGRAM(s) IV Push every 4 hours PRN Severe Pain (7 - 10)  sodium chloride 0.9% lock flush 10 milliLiter(s) IV Push every 1 hour PRN Pre/post blood products, medications, blood draw, and to maintain line patency      CAPILLARY BLOOD GLUCOSE      POCT Blood Glucose.: 121 mg/dL (08 Aug 2020 05:40)  POCT Blood Glucose.: 102 mg/dL (08 Aug 2020 00:18)  POCT Blood Glucose.: 97 mg/dL (07 Aug 2020 19:04)  POCT Blood Glucose.: 110 mg/dL (07 Aug 2020 13:18)    I&O's Summary    07 Aug 2020 07:01  -  08 Aug 2020 07:00  --------------------------------------------------------  IN: 2390 mL / OUT: 1800 mL / NET: 590 mL        PHYSICAL EXAM:  Vital Signs Last 24 Hrs  T(C): 37 (08 Aug 2020 05:13), Max: 37.4 (07 Aug 2020 20:34)  T(F): 98.6 (08 Aug 2020 05:13), Max: 99.4 (07 Aug 2020 20:34)  HR: 83 (08 Aug 2020 05:13) (83 - 117)  BP: 108/65 (08 Aug 2020 05:13) (98/50 - 125/85)  BP(mean): --  RR: 18 (08 Aug 2020 05:13) (18 - 18)  SpO2: 94% (08 Aug 2020 05:13) (91% - 95%)    GENERAL: No acute distress, well-developed  HEENT: EOMI, PERRL, scleral icterus  CHEST/LUNG: CTAB; No wheezes, rales, or rhonchi  HEART: Regular rate and rhythm; No murmurs, rubs, or gallops  ABDOMEN: +BS, soft, distended, TTP in RUQ and RLQ  EXTREMITIES:  2+ peripheral pulses b/l, No clubbing, cyanosis, or edema  NEUROLOGY: AAOx3, no focal deficits  SKIN: Jaundiced, no rashes or lesions    LABS:                        8.4    30.59 )-----------( 96       ( 08 Aug 2020 05:50 )             24.7     08-08    137  |  96  |  26<H>  ----------------------------<  119<H>  3.5   |  23  |  3.98<H>    Ca    8.5      08 Aug 2020 05:50  Phos  3.6     08-08  Mg     2.1     08-08    TPro  5.9<L>  /  Alb  3.7  /  TBili  19.0<H>  /  DBili  x   /  AST  189<H>  /  ALT  29  /  AlkPhos  128<H>  08-08    PT/INR - ( 08 Aug 2020 05:50 )   PT: 20.1 sec;   INR: 1.73 ratio         PTT - ( 08 Aug 2020 05:50 )  PTT:39.6 sec      Urinalysis Basic - ( 07 Aug 2020 04:54 )    Color: Dark Orange / Appearance: Turbid / S.041 / pH: x  Gluc: x / Ketone: Negative  / Bili: Moderate / Urobili: 8 mg/dL   Blood: x / Protein: 100 mg/dL Test Performed on Urine Supernate. / Nitrite: Negative   Leuk Esterase: Negative / RBC: 30 /hpf / WBC 18 /HPF   Sq Epi: x / Non Sq Epi: 7 / Bacteria: Negative        Culture - Blood (collected 07 Aug 2020 04:30)  Source: .Blood Blood-Peripheral  Preliminary Report (08 Aug 2020 05:00):    No growth to date.    Culture - Blood (collected 07 Aug 2020 01:16)  Source: .Blood Blood-Venous  Preliminary Report (08 Aug 2020 02:01):    No growth to date.        RADIOLOGY & ADDITIONAL TESTS:  Results Reviewed:   Imaging Personally Reviewed:  Electrocardiogram Personally Reviewed:    COORDINATION OF CARE:  Care Discussed with Consultants/Other Providers [Y/N]:  Prior or Outpatient Records Reviewed [Y/N]: PROGRESS NOTE:   Authored by Dr. Dano Gauthier MD  Pager [phone] [facility], [id number] [facility]     Patient is a 39y old  Male who presents with a chief complaint of abdominal pain (07 Aug 2020 15:45)      SUBJECTIVE / OVERNIGHT EVENTS:  - Overnight, no acute events.  - Today, pt seen and examined at bedside in AM. Reported feeling much better. Reported some soreness of abdomen, but no pain. Denied CP, SOB, abdominal pain, constipation, diarrhea, hematuria, dysuria, hematochezia, melena.    ADDITIONAL REVIEW OF SYSTEMS:    CONSTITUTIONAL: No weakness, fevers or chills  EYES/ENT: No visual changes;  No vertigo or throat pain   NECK: No pain or stiffness  RESPIRATORY: No cough, wheezing, hemoptysis; No shortness of breath  CARDIOVASCULAR: No chest pain or palpitations  GASTROINTESTINAL: No abdominal or epigastric pain. No nausea, vomiting, or hematemesis; No diarrhea or constipation. No melena or hematochezia.  GENITOURINARY: No dysuria, frequency or hematuria  NEUROLOGICAL: No numbness or weakness  SKIN: No itching, burning, rashes, or lesions   All other review of systems is negative unless indicated above.    MEDICATIONS  (STANDING):  chlorhexidine 4% Liquid 1 Application(s) Topical <User Schedule>  dextrose 10%. 1000 milliLiter(s) (40 mL/Hr) IV Continuous <Continuous>  lactulose Syrup 30 Gram(s) Oral every 6 hours  melatonin 1 milliGRAM(s) Oral at bedtime  meropenem  IVPB 500 milliGRAM(s) IV Intermittent every 24 hours  midodrine 10 milliGRAM(s) Oral every 8 hours  nystatin Powder 1 Application(s) Topical two times a day  pantoprazole    Tablet 40 milliGRAM(s) Oral before breakfast  rifAXIMin 550 milliGRAM(s) Oral two times a day  thiamine IVPB 500 milliGRAM(s) IV Intermittent daily    MEDICATIONS  (PRN):  HYDROmorphone  Injectable 0.2 milliGRAM(s) IV Push every 4 hours PRN Severe Pain (7 - 10)  sodium chloride 0.9% lock flush 10 milliLiter(s) IV Push every 1 hour PRN Pre/post blood products, medications, blood draw, and to maintain line patency      CAPILLARY BLOOD GLUCOSE      POCT Blood Glucose.: 121 mg/dL (08 Aug 2020 05:40)  POCT Blood Glucose.: 102 mg/dL (08 Aug 2020 00:18)  POCT Blood Glucose.: 97 mg/dL (07 Aug 2020 19:04)  POCT Blood Glucose.: 110 mg/dL (07 Aug 2020 13:18)    I&O's Summary    07 Aug 2020 07:01  -  08 Aug 2020 07:00  --------------------------------------------------------  IN: 2390 mL / OUT: 1800 mL / NET: 590 mL        PHYSICAL EXAM:  Vital Signs Last 24 Hrs  T(C): 37 (08 Aug 2020 05:13), Max: 37.4 (07 Aug 2020 20:34)  T(F): 98.6 (08 Aug 2020 05:13), Max: 99.4 (07 Aug 2020 20:34)  HR: 83 (08 Aug 2020 05:13) (83 - 117)  BP: 108/65 (08 Aug 2020 05:13) (98/50 - 125/85)  BP(mean): --  RR: 18 (08 Aug 2020 05:13) (18 - 18)  SpO2: 94% (08 Aug 2020 05:13) (91% - 95%)    GENERAL: No acute distress, well-developed  HEENT: EOMI, PERRL, scleral icterus  CHEST/LUNG: CTAB; No wheezes, rales, or rhonchi  HEART: Regular rate and rhythm; No murmurs, rubs, or gallops  ABDOMEN: +BS, soft, distended, non-tender to palpation  EXTREMITIES:  2+ peripheral pulses b/l, No clubbing, cyanosis, or edema  NEUROLOGY: AAOx3, no focal deficits  SKIN: Jaundiced, no rashes or lesions; midline abdominal scar, bruises on left abdomen    LABS:                        8.4    30.59 )-----------( 96       ( 08 Aug 2020 05:50 )             24.7     08    137  |  96  |  26<H>  ----------------------------<  119<H>  3.5   |  23  |  3.98<H>    Ca    8.5      08 Aug 2020 05:50  Phos  3.6     08-08  Mg     2.1     08-08    TPro  5.9<L>  /  Alb  3.7  /  TBili  19.0<H>  /  DBili  x   /  AST  189<H>  /  ALT  29  /  AlkPhos  128<H>  08-08    PT/INR - ( 08 Aug 2020 05:50 )   PT: 20.1 sec;   INR: 1.73 ratio         PTT - ( 08 Aug 2020 05:50 )  PTT:39.6 sec      Urinalysis Basic - ( 07 Aug 2020 04:54 )    Color: Dark Orange / Appearance: Turbid / S.041 / pH: x  Gluc: x / Ketone: Negative  / Bili: Moderate / Urobili: 8 mg/dL   Blood: x / Protein: 100 mg/dL Test Performed on Urine Supernate. / Nitrite: Negative   Leuk Esterase: Negative / RBC: 30 /hpf / WBC 18 /HPF   Sq Epi: x / Non Sq Epi: 7 / Bacteria: Negative        Culture - Blood (collected 07 Aug 2020 04:30)  Source: .Blood Blood-Peripheral  Preliminary Report (08 Aug 2020 05:00):    No growth to date.    Culture - Blood (collected 07 Aug 2020 01:16)  Source: .Blood Blood-Venous  Preliminary Report (08 Aug 2020 02:01):    No growth to date.        RADIOLOGY & ADDITIONAL TESTS:  Results Reviewed:   Imaging Personally Reviewed:  Electrocardiogram Personally Reviewed:    COORDINATION OF CARE:  Care Discussed with Consultants/Other Providers [Y/N]:  Prior or Outpatient Records Reviewed [Y/N]:

## 2020-08-08 NOTE — PROGRESS NOTE ADULT - PROBLEM SELECTOR PLAN 7
secondary to gastric variceal bleed. refractory to endoscopic intervention 7/29 s/p IR PARTO 7/30, blakemore 7/30, and 34 U PRBC, 25 U FFP, 20 U PLT, and 5 U cryo all on 7/30. blakemore removed 7/30. tolerated tube feeds without evidence of bleeding  -trend CBC q12- transfuse to maintain Hgb > 7, or for active bleed  -trend coags q70y--yqnmagn with FFP, cryo as needed 2/2 alcoholic hepatitis  -daily CMP  -hepatology following, appreciate recs  -protonix 40mg PO daily

## 2020-08-08 NOTE — PROGRESS NOTE ADULT - SUBJECTIVE AND OBJECTIVE BOX
Follow Up: Leukocytosis     Interval History/ROS: Feels fine, getting ready to watch a soccer game on his laptop. No fevers or chills. No headache, rhinorrhea, sore throat. No chest pain or dyspnea. Occasional cough, mild. No abdominal pain or diarrhea. No vomiting. No dysuria. No rash. No pain.     Allergies  No Known Allergies      ANTIMICROBIALS:  rifAXIMin 550 two times a day      OTHER MEDS:  chlorhexidine 4% Liquid 1 Application(s) Topical <User Schedule>  dextrose 10%. 1000 milliLiter(s) IV Continuous <Continuous>  HYDROmorphone  Injectable 0.2 milliGRAM(s) IV Push every 4 hours PRN  lactulose Syrup 30 Gram(s) Oral every 6 hours  melatonin 1 milliGRAM(s) Oral at bedtime  midodrine 10 milliGRAM(s) Oral every 8 hours  nystatin Powder 1 Application(s) Topical two times a day  pantoprazole    Tablet 40 milliGRAM(s) Oral before breakfast  sodium chloride 0.9% lock flush 10 milliLiter(s) IV Push every 1 hour PRN  thiamine 100 milliGRAM(s) Oral daily      Vital Signs Last 24 Hrs  T(C): 36.5 (08 Aug 2020 13:54), Max: 37.4 (07 Aug 2020 20:34)  T(F): 97.7 (08 Aug 2020 13:54), Max: 99.4 (07 Aug 2020 20:34)  HR: 91 (08 Aug 2020 13:54) (83 - 117)  BP: 117/77 (08 Aug 2020 13:54) (98/50 - 120/72)  BP(mean): --  RR: 18 (08 Aug 2020 13:54) (18 - 18)  SpO2: 95% (08 Aug 2020 13:54) (91% - 95%)    Physical Exam:  General: awake, alert, non toxic  Head: atraumatic, normocephalic  Eye: icteric sclera   Cardio: regular rate and rhythm   Respiratory: nonlabored on room air, clear bilaterally, no wheezing  abd: distended, soft, bowel sounds present, no tenderness  : no suprapubic tenderness, no ashton  vascular: right IJ CVC dialysis, peripheral IV, no phlebitis   Skin: jaundice  Neurologic: no focal deficit  psych: normal affect                          8.4    30.59 )-----------( 96       ( 08 Aug 2020 05:50 )             24.7           137  |  96  |  26<H>  ----------------------------<  119<H>  3.5   |  23  |  3.98<H>    Ca    8.5      08 Aug 2020 05:50  Phos  3.6       Mg     2.1         TPro  5.9<L>  /  Alb  3.7  /  TBili  19.0<H>  /  DBili  x   /  AST  189<H>  /  ALT  29  /  AlkPhos  128<H>        Urinalysis Basic - ( 07 Aug 2020 04:54 )    Color: Dark Orange / Appearance: Turbid / S.041 / pH: x  Gluc: x / Ketone: Negative  / Bili: Moderate / Urobili: 8 mg/dL   Blood: x / Protein: 100 mg/dL Test Performed on Urine Supernate. / Nitrite: Negative   Leuk Esterase: Negative / RBC: 30 /hpf / WBC 18 /HPF   Sq Epi: x / Non Sq Epi: 7 / Bacteria: Negative        MICROBIOLOGY:  Culture - Blood (collected 20 @ 04:30)  Source: .Blood Blood-Peripheral  Preliminary Report (20 @ 05:00):    No growth to date.    Culture - Blood (collected 20 @ 01:16)  Source: .Blood Blood-Venous  Preliminary Report (20 @ 02:01):    No growth to date.    TRANSPLANT CLEARANCE LABWORK  HIV-1/2 Combo Result: Nonreact (07-15-20 @ 10:47)    EBV VCA IgM EIA: <10.0 U/mL (20 @ 09:33)  EBV VCA IgG EIA: 182.0 U/mL (20 @ 09:33)  EBV EA Ab EIA: <5.0 U/mL (20 @ 09:33)     Hepatitis A IgG Ab Result: Nonreact (20 @ 08:58)     Hepatitis B Surface Antibody: Reactive (20 @ 10:42)  Hepatitis B Surface Antigen: Nonreact (20 @ 01:16)  Hepatitis B Core IgM Antibody: Nonreact (20 @ 01:16)  Hepatitis C Virus S/CO Ratio: 0.14 S/CO (20 @ 01:16)  Hepatitis C Virus Interpretation: Nonreact (20 @ 01:16)     Hepatitis E IgG Antibodies: Negative (20 @ 10:01)      RADIOLOGY:  Images below reviewed personally  Xray Chest 1 View AP/PA (20 @ 14:25)   Clear lungs.  There are 2 cm shaped densities along the right and left hemithorax. Evaluation is recommended is recommended to verify if these are outside the patient.    CT Abdomen and Pelvis w/ IV Cont (20 @ 17:15)   FINDINGS:  LOWER CHEST: Trace left pleural effusion with mild bibasilar linear type atelectasis..  LIVER: Cirrhosis. Heterogeneous steatosis. Nodular foci in both hepatic lobes at arterial phase imaging without washout at delayed phase imaging and without correlate at diffusion-weighted imaging at MRI 2020 are indeterminate however likely represent perfusional changes in the setting of hepatic steatosis. Attention at follow-up contrast-enhanced MRI is recommended.  BILE DUCTS: Normal caliber.  GALLBLADDER: Within normal limits.  SPLEEN: Within normal limits.  PANCREAS: Heterogeneous enhancement. Trace fluid in the left retroperitoneum is unchanged. No CT evidence of pancreatitis.  ADRENALS: New left adrenal nodule possibly related to hemorrhage.  KIDNEYS/URETERS: Within normal limits.  BLADDER: Minimally distended.  REPRODUCTIVE ORGANS: Prostate within normal limits.  BOWEL: No bowel obstruction. Right colonic thickening likely related to portal colopathy Appendix is normal.  PERITONEUM: Moderate ascites, unchanged.  VESSELS: Status post occlusion of a gastrorenal shunt. Upper abdominal varices including paraesophageal varices consistent with portal hypertension.  RETROPERITONEUM/LYMPH NODES: No lymphadenopathy.  ABDOMINAL WALL: Within normal limits.  BONES: Within normal limits.  IMPRESSION:  No CT evidence for acute pancreatitis.  New left adrenal nodularity which may represent hemorrhage.

## 2020-08-08 NOTE — PROGRESS NOTE ADULT - PROBLEM SELECTOR PLAN 3
possibly secondary to hepatorenal syndrome. 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 Baseline creatinine sCr 0.6-0.8. On admission 9.64, today 4.26. s/p CVVHD w/ improvement in renal fx, but remains HD dependent   -nephrology following, appreciate recs - HD 8/06 and again today 8/07, will hold HD over weekend and monitor for renal recovery  -c/w hold diuretics at this time   -daily CMP  -monitor UOP, strict I&Os  -avoid NSAIDs, ACEI/ARBS, RCA and nephrotoxins.   -dose medications as per eGFR. possibly secondary to hepatorenal syndrome. 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 Baseline creatinine sCr 0.6-0.8. On admission 9.64, currently downtrending. s/p CVVHD w/ improvement in renal fx, but remains HD dependent   -nephrology following, appreciate recs - HD 8/06 and again today 8/07, will hold HD over weekend and monitor for renal recovery  -c/w hold diuretics at this time   -daily CMP  -monitor UOP, strict I&Os  -avoid NSAIDs, ACEI/ARBS, RCA and nephrotoxins.   -dose medications as per eGFR.

## 2020-08-09 NOTE — PROGRESS NOTE ADULT - PROBLEM SELECTOR PLAN 8
secondary to gastric variceal bleed. refractory to endoscopic intervention 7/29 s/p IR PARTO 7/30, blakemore 7/30, and 34 U PRBC, 25 U FFP, 20 U PLT, and 5 U cryo all on 7/30. blakemore removed 7/30. tolerated tube feeds without evidence of bleeding  -trend CBC q12- transfuse to maintain Hgb > 7, or for active bleed  -trend coags [id number] with FFP, cryo as needed

## 2020-08-09 NOTE — CHART NOTE - NSCHARTNOTEFT_GEN_A_CORE
Chart reviewed. S/p MR abdomen:  1.3 x 0.9 cm left adrenal hemorrhage. Cirrhotic liver with evidence of portal hypertension.  Call placed to team, Rosaura, to guide intervention; advance diet vs monitor tolerance.     Team to lucero back service to determine role of slp.     Essie Farrell MS CCC-SLP pager [phone] / ext: [phone] Chart reviewed. S/p MR abdomen:  1.3 x 0.9 cm left adrenal hemorrhage. Cirrhotic liver with evidence of portal hypertension.  Call placed to team, Rosaura, to guide intervention; advance diet vs monitor tolerance.     Plan to advance to soft solids w/ this service to assess 8/10 for possible upgrade to regular solids.     Essie Farrell MS CCC-SLP pager [phone] / ext: [phone]#

## 2020-08-09 NOTE — PROGRESS NOTE ADULT - PROBLEM SELECTOR PLAN 6
negative for SBP on 07/26  -monitor serial abdominal exams  -will consider diagnostic/therapeutic paracentesis if clinically worsens  -c/w MVI, thiamine, folate

## 2020-08-09 NOTE — CHART NOTE - NSCHARTNOTEFT_GEN_A_CORE
Spoke to Speech and Swallow, Essie.  Will advance to soft for today.  Requested patient to be seen tomorrow to see tolerance for regular.  Informed the team intern.

## 2020-08-09 NOTE — CHART NOTE - NSCHARTNOTEFT_GEN_A_CORE
Informed Hepatology regarding the finding of L adrenal hemorrhage.  Deferred any new work up for now.    Spoke to Radiology Reading.  From 8/6 CT, L adrenal hemorrhage remains unchanged in terms of size.   CT showed 1.6 cm by 1.7 cm vs MRI 1.3 cm x 0.9 cm.  Vitals stable. Let the team know to touch back tomorrow (8/10).

## 2020-08-09 NOTE — PROGRESS NOTE ADULT - SUBJECTIVE AND OBJECTIVE BOX
PROGRESS NOTE:   Authored by Deandre Dwyer [phone]    Patient is a 39y old  Male who presents with a chief complaint of abdominal pain (08 Aug 2020 14:38)      SUBJECTIVE / OVERNIGHT EVENTS:    REVIEW OF SYSTEMS:    MEDICATIONS  (STANDING):  chlorhexidine 4% Liquid 1 Application(s) Topical <User Schedule>  dextrose 10%. 1000 milliLiter(s) (40 mL/Hr) IV Continuous <Continuous>  lactulose Syrup 30 Gram(s) Oral every 6 hours  melatonin 1 milliGRAM(s) Oral at bedtime  midodrine 10 milliGRAM(s) Oral every 8 hours  nystatin Powder 1 Application(s) Topical two times a day  pantoprazole    Tablet 40 milliGRAM(s) Oral before breakfast  rifAXIMin 550 milliGRAM(s) Oral two times a day  thiamine 100 milliGRAM(s) Oral daily    MEDICATIONS  (PRN):  HYDROmorphone  Injectable 0.2 milliGRAM(s) IV Push every 4 hours PRN Severe Pain (7 - 10)  sodium chloride 0.9% lock flush 10 milliLiter(s) IV Push every 1 hour PRN Pre/post blood products, medications, blood draw, and to maintain line patency      CAPILLARY BLOOD GLUCOSE      POCT Blood Glucose.: 134 mg/dL (09 Aug 2020 06:41)  POCT Blood Glucose.: 108 mg/dL (08 Aug 2020 23:44)  POCT Blood Glucose.: 107 mg/dL (08 Aug 2020 19:01)  POCT Blood Glucose.: 105 mg/dL (08 Aug 2020 12:01)    I&O's Summary    07 Aug 2020 07:01  -  08 Aug 2020 07:00  --------------------------------------------------------  IN: 2390 mL / OUT: 1800 mL / NET: 590 mL    08 Aug 2020 07:01  -  09 Aug 2020 06:58  --------------------------------------------------------  IN: 1560 mL / OUT: 10 mL / NET: 1550 mL        PHYSICAL EXAM:  Vital Signs Last 24 Hrs  T(C): 37.1 (09 Aug 2020 04:53), Max: 37.1 (09 Aug 2020 04:53)  T(F): 98.8 (09 Aug 2020 04:53), Max: 98.8 (09 Aug 2020 04:53)  HR: 97 (09 Aug 2020 04:53) (91 - 111)  BP: 117/76 (09 Aug 2020 04:53) (109/71 - 117/77)  BP(mean): --  RR: 18 (09 Aug 2020 04:53) (16 - 18)  SpO2: 92% (09 Aug 2020 04:53) (92% - 95%)    GENERAL: No acute distress, well-developed  HEAD:  Atraumatic, Normocephalic  EYES: conjunctiva and sclera clear  NECK: Supple, no JVD  CHEST/LUNG: CTAB, no wheezes, rales, or rhonchi  HEART: Regular rate and rhythm, no murmurs, rubs, or gallops  ABDOMEN: Soft, non-tender, non-distended, normal bowel sounds  EXTREMITIES:  2+ peripheral pulses b/l, no clubbing, cyanosis, or edema  NEUROLOGY: A&O x 3, no focal deficits  SKIN: No rashes or lesions    LABS:                        8.8    29.62 )-----------( 83       ( 08 Aug 2020 18:45 )             25.6     08-08    137  |  96  |  26<H>  ----------------------------<  119<H>  3.5   |  23  |  3.98<H>    Ca    8.5      08 Aug 2020 05:50  Phos  3.6     08-08  Mg     2.1     08-08    TPro  5.9<L>  /  Alb  3.7  /  TBili  19.0<H>  /  DBili  x   /  AST  189<H>  /  ALT  29  /  AlkPhos  128<H>  08-08    PT/INR - ( 08 Aug 2020 05:50 )   PT: 20.1 sec;   INR: 1.73 ratio         PTT - ( 08 Aug 2020 05:50 )  PTT:39.6 sec          Culture - Blood (collected 07 Aug 2020 04:30)  Source: .Blood Blood-Peripheral  Preliminary Report (08 Aug 2020 05:00):    No growth to date.    Culture - Blood (collected 07 Aug 2020 01:16)  Source: .Blood Blood-Venous  Preliminary Report (08 Aug 2020 02:01):    No growth to date.        RADIOLOGY & ADDITIONAL TESTS:  Results Reviewed [Y/N]:   Imaging Personally Reviewed [Y/N]:  Electrocardiogram Personally Reviewed [Y/N]:    COORDINATION OF CARE:  Care Discussed with Consultants/Other Providers [Y/N]: PROGRESS NOTE:   Authored by Deandre Dwyer -[phone]    Patient is a 39y old  Male who presents with a chief complaint of abdominal pain (08 Aug 2020 14:38)    SUBJECTIVE / OVERNIGHT EVENTS:  No acute events overnight. Patient w/ no complaints this am. He notes that he always has nausea w/ HD and would like prn zofran order. He reports voiding this am. He denies abdominal pain, vomiting, diarrhea, hematuria, hematochezia, melena, fevers/chills, SOB, chest pain.    REVIEW OF SYSTEMS:  CONSTITUTIONAL: No weakness, fevers or chills  EYES/ENT: No visual changes;  No vertigo or throat pain   NECK: No pain or stiffness  RESPIRATORY: No cough, wheezing, hemoptysis; No shortness of breath  CARDIOVASCULAR: No chest pain or palpitations  GASTROINTESTINAL: No abdominal or epigastric pain. No nausea, vomiting, or hematemesis; No diarrhea or constipation. No melena or hematochezia.  GENITOURINARY: No dysuria, frequency or hematuria  NEUROLOGICAL: No numbness or weakness  SKIN: No itching, rashes    MEDICATIONS  (STANDING):  chlorhexidine 4% Liquid 1 Application(s) Topical <User Schedule>  dextrose 10%. 1000 milliLiter(s) (40 mL/Hr) IV Continuous <Continuous>  lactulose Syrup 30 Gram(s) Oral every 6 hours  melatonin 1 milliGRAM(s) Oral at bedtime  midodrine 10 milliGRAM(s) Oral every 8 hours  nystatin Powder 1 Application(s) Topical two times a day  pantoprazole    Tablet 40 milliGRAM(s) Oral before breakfast  rifAXIMin 550 milliGRAM(s) Oral two times a day  thiamine 100 milliGRAM(s) Oral daily    MEDICATIONS  (PRN):  HYDROmorphone  Injectable 0.2 milliGRAM(s) IV Push every 4 hours PRN Severe Pain (7 - 10)  sodium chloride 0.9% lock flush 10 milliLiter(s) IV Push every 1 hour PRN Pre/post blood products, medications, blood draw, and to maintain line patency      CAPILLARY BLOOD GLUCOSE  POCT Blood Glucose.: 134 mg/dL (09 Aug 2020 06:41)  POCT Blood Glucose.: 108 mg/dL (08 Aug 2020 23:44)  POCT Blood Glucose.: 107 mg/dL (08 Aug 2020 19:01)  POCT Blood Glucose.: 105 mg/dL (08 Aug 2020 12:01)    I&O's Summary  07 Aug 2020 07:01  -  08 Aug 2020 07:00  --------------------------------------------------------  IN: 2390 mL / OUT: 1800 mL / NET: 590 mL    08 Aug 2020 07:01  -  09 Aug 2020 06:58  --------------------------------------------------------  IN: 1560 mL / OUT: 10 mL / NET: 1550 mL    PHYSICAL EXAM:  Vital Signs Last 24 Hrs  T(C): 37.1 (09 Aug 2020 04:53), Max: 37.1 (09 Aug 2020 04:53)  T(F): 98.8 (09 Aug 2020 04:53), Max: 98.8 (09 Aug 2020 04:53)  HR: 97 (09 Aug 2020 04:53) (91 - 111)  BP: 117/76 (09 Aug 2020 04:53) (109/71 - 117/77)  BP(mean): --  RR: 18 (09 Aug 2020 04:53) (16 - 18)  SpO2: 92% (09 Aug 2020 04:53) (92% - 95%)    GENERAL: No acute distress, well-developed  HEAD:  Atraumatic, Normocephalic  EYES: conjunctiva and sclera clear  NECK: Supple, no JVD  CHEST/LUNG: CTAB, no wheezes, rales, or rhonchi  HEART: Regular rate and rhythm, no murmurs, rubs, or gallops  ABDOMEN: +distension non-tender, normal bowel sounds  EXTREMITIES:  2+ peripheral pulses b/l, no clubbing, cyanosis, or edema  NEUROLOGY: A&O x 3, no focal deficits  SKIN: jaundice, bruising on lower abdomen, midline abdominal scar    LABS:             8.4    29.33 )-----------( 99       ( 09 Aug 2020 07:20 )             24.9     08-09  133<L>  |  92<L>  |  36<H>  ----------------------------<  82  3.5   |  21<L>  |  5.87<H>  Ca    8.6      09 Aug 2020 07:20  Phos  4.3     08-09  Mg     2.1     08-09  TPro  5.5<L>  /  Alb  3.3  /  TBili  19.6<H>  /  DBili  x   /  AST  153<H>  /  ALT  22  /  AlkPhos  131<H>  08-09    PT/INR - ( 09 Aug 2020 07:20 )   PT: 19.6 sec;   INR: 1.69 ratio    PTT - ( 09 Aug 2020 07:20 )  PTT:40.6 sec    pH, Venous: 7.44 (08.09.20 @ 07:16)  Blood Gas Venous - Lactate: 2.4: Elevated lactate. Consider ordering follow-up lactate to trend. mmoL/L (08.09.20 @ 07:16)    Culture - Blood (collected 07 Aug 2020 04:30)  Source: .Blood Blood-Peripheral  Preliminary Report (08 Aug 2020 05:00):    No growth to date.    Culture - Blood (collected 07 Aug 2020 01:16)  Source: .Blood Blood-Venous  Preliminary Report (08 Aug 2020 02:01):    No growth to date.    RADIOLOGY & ADDITIONAL TESTS:  Results Reviewed [Y/N]: Y  Imaging Personally Reviewed [Y/N]: Y  Electrocardiogram Personally Reviewed [Y/N]: Y    COORDINATION OF CARE:  Care Discussed with Consultants/Other Providers [Y/N]: MJ

## 2020-08-09 NOTE — PROGRESS NOTE ADULT - ASSESSMENT
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 Blakemore removed 7/31 and the pt no longer with active bleeding with stable Hgb, successfully extubated 8/4 and titrated off vasopressors 8/5. Persistent leukocytosis despite being on meropenem, likely reactive. Not currently candidate for liver transplant.

## 2020-08-09 NOTE — PROGRESS NOTE ADULT - PROBLEM SELECTOR PLAN 2
etoh cirrhosis, gastric variceal bleed and ascites. varices - no prior EGD, on nadolol at home, now holding due to shock. ascites present on exam, neg for SBP 7/26, on Lasix 20mg/spironolactone 25mg daily as outpatient, now holding due to hypotension.  -hepatology following, appreciate recs  -MELD score 36 on 08/07  -transplant eval as outpatient pending alcohol rehab, not currently a candidate at this time etoh cirrhosis, gastric variceal bleed and ascites. varices - no prior EGD, on nadolol at home, now holding due to shock. ascites present on exam, neg for SBP 7/26, on Lasix 20mg/spironolactone 25mg daily as outpatient, now holding due to hypotension.  -hepatology following, appreciate recs  -MELD score 37 on 08/09  -transplant eval as outpatient pending alcohol rehab, not currently a candidate at this time etoh cirrhosis, gastric variceal bleed and ascites. varices - no prior EGD, on nadolol at home, now holding due to shock. ascites present on exam, neg for SBP 7/26, on Lasix 20mg/spironolactone 25mg daily as outpatient, now holding due to hypotension.  -hepatology following, appreciate recs  -MELD score 37 on 08/09  - c/w midodrine 10mg q8h  -transplant eval as outpatient pending alcohol rehab, not currently a candidate at this time etoh cirrhosis, gastric variceal bleed and ascites. varices - no prior EGD, on nadolol at home, now holding due to shock. ascites present on exam, neg for SBP 7/26, on Lasix 20mg/spironolactone 25mg daily as outpatient, now holding due to hypotension.  -hepatology following, appreciate recs  -MELD score 37 on 08/09  -c/w midodrine 10mg q8h  -transplant eval as outpatient pending alcohol rehab, not currently a candidate at this time

## 2020-08-09 NOTE — PROGRESS NOTE ADULT - PROBLEM SELECTOR PLAN 4
Incidental finding on CTAP (8/6) found "new left adrenal nodularity which may represent hemorrhage"  - Discussed with Hepatology, continue to monitor, INR<2  - Pending further evaluation with MRI A/P w/ IV con Incidental finding on CTAP (8/6) found "new left adrenal nodularity which may represent hemorrhage"  - Discussed with Hepatology, continue to monitor, INR<2  - MRI A/P w/ IV con today Incidental finding on CTAP (8/6) found "new left adrenal nodularity which may represent hemorrhage"  -discussed with Hepatology, continue to monitor, INR<2  -MRI A/P w/ IV con today

## 2020-08-09 NOTE — PROGRESS NOTE ADULT - PROBLEM SELECTOR PLAN 5
increase in neutrophils. all cultures negative to date (7/26, 7/30, 8/06). paracentesis negative for SBP on 7/26. fungitell sent 08/01. s/p zosyn 7/26-7/31. UA not c/w UTI. abdominal pain on exam. c/f pancreatitis - lipase 504, however CT w/ no acute pancreatitis  -As per ID, likely reactive due to complicated hospital course, dc'ed meropenem  -can consider repeat diagnostic paracentesis to r/o SBP if clinically worsens  -trend lactate  -daily CBC w/ diff increase in neutrophils. all cultures negative to date (7/26, 7/30, 8/06). paracentesis negative for SBP on 7/26. fungitell sent 08/01. s/p zosyn 7/26-7/31. UA not c/w UTI. abdominal pain on exam. c/f pancreatitis - lipase 504, however CT w/ no acute pancreatitis  -ID following, appreciate recs - likely reactive due to complicated hospital course, monitor off abx for now, d/c meropenem  -can consider repeat diagnostic paracentesis to r/o SBP if clinically worsens  -trend lactate  -daily CBC w/ diff

## 2020-08-10 NOTE — CONSULT NOTE ADULT - SUBJECTIVE AND OBJECTIVE BOX
Reason for Consult: 39y Male with alcoholic liver cirrhosis, now with renal failure. Request for PermCath for HD.    History of Present Illness:     39 y.o. Male with PMhx of Alcohol liver cirrhosis, alcohol use disorder, traumatic bladder rupture s/p ex lap in 2019 p/w diffuse crampy abdominal pain. Patient reports that since last discharge on 7/17 has had multiple episode of watery diarrhea (about 9-10 per day), worsening abdominal pain and distension, associated weakness and nausea no vomiting. Stopped taking lactulose few days ago. Also reports SOB that he attributes to abdominal distension. Denies NSAIDs use. Denies fever, vomiting, chills, sick contacts, recent travel. He has been compliant with Lasix and Aldactone, but has not taken lactulose due to diarrhea. Last drink on 7/4. Also complaining of increased itchiness all over body and yellowing of skin. (26 Jul 2020 18:31)      Pertinent PMH/PSH:     No pertinent past medical history  S/P exploratory laparotomy      Allergies:     No Known Allergies      Medications:     sodium chloride 0.9% lock flush  chlorhexidine 4% Liquid  nystatin Powder  midodrine  melatonin  HYDROmorphone  Injectable  pantoprazole    Tablet  lactulose Syrup  rifAXIMin  thiamine  ondansetron Injectable      Vital Signs:    T(C): 36.7 (08-10-20 @ 13:48), Max: 37.2 (08-09-20 @ 20:28)  HR: 96 (08-10-20 @ 13:48) (96 - 106)  BP: 124/82 (08-10-20 @ 13:48) (110/68 - 124/82)  RR: 18 (08-10-20 @ 13:48) (18 - 18)  SpO2: 97% (08-10-20 @ 13:48) (91% - 97%)    Relevant Lab Results:            8.6  29.38)-----(129     (08-10-20 @ 06:56)         24.9     132 | 91 | 50  ----------------------< 80     (08-10-20 @ 06:56)  3.8 | 20 | 7.04       PT: 20.0<H> 08-10-20 @ 06:56  aPTT: 41.9<H> 08-10-20 @ 06:56   INR: 1.72<H> 08-10-20 @ 06:56

## 2020-08-10 NOTE — CONSULT NOTE ADULT - PROVIDER SPECIALTY LIST ADULT
Infectious Disease
Intervent Radiology
MICU
Intervent Radiology
Surgery
Transplant Hepatology
Nephrology

## 2020-08-10 NOTE — PROGRESS NOTE ADULT - PROBLEM SELECTOR PLAN 1
anuric MARQUISE 2/2 ATN, currently dialysis dependent  MARQUISE in the setting of varices bleed/anemia, diarrhea, diuretics and decrease effective arterial blood volume from cirrhosis  On admission 9.64 (baseline 0.6-0.8), peaked to 11.9, initiated on HD on 7/28 for anuric. Pt underwent EGD (7/29) c/b active massive bleeding s/p IR for embolization and intubation.  Pt was extubation on 8/4 and discontinued CRRT 8/5 6am.  Now transitioned to HD 8/6 given still anuric, last HD 8/7 still no renal recovery    Plan for HD today UF 1-2L as BP tolerate given still anuric w/ elevated sCr ~ no renal recovery  Pt will need permacath for outpatient HD until ATN renal recovers and placement  Monitor electrolytes and urine output  Avoid nephrotoxic agents (NSAIDs, PPI, contrast)  Dose medications as per HD

## 2020-08-10 NOTE — CONSULT NOTE ADULT - CONSULT REASON
Gastric varices
Leukocytosis
Permacath for HD
Renal failure
elevated liver enzymes
nontunneled HD access
MARQUISE

## 2020-08-10 NOTE — PROGRESS NOTE ADULT - SUBJECTIVE AND OBJECTIVE BOX
PROGRESS NOTE:   Authored by Deandre Dwyer [phone]    Patient is a 39y old Male who presents with a chief complaint of abdominal pain (09 Aug 2020 06:57)    SUBJECTIVE / OVERNIGHT EVENTS:  MRI A/P w con showed     REVIEW OF SYSTEMS:  CONSTITUTIONAL: No weakness, fevers or chills  EYES/ENT: No visual changes;  No vertigo or throat pain   NECK: No pain or stiffness  RESPIRATORY: No cough, wheezing, hemoptysis; No shortness of breath  CARDIOVASCULAR: No chest pain or palpitations  GASTROINTESTINAL: No abdominal or epigastric pain. No nausea, vomiting, or hematemesis; No diarrhea or constipation. No melena or hematochezia.  GENITOURINARY: No dysuria, frequency or hematuria  NEUROLOGICAL: No numbness or weakness  SKIN: No itching, rashes    MEDICATIONS  (STANDING):  chlorhexidine 4% Liquid 1 Application(s) Topical <User Schedule>  dextrose 10%. 1000 milliLiter(s) (40 mL/Hr) IV Continuous <Continuous>  lactulose Syrup 30 Gram(s) Oral every 6 hours  melatonin 1 milliGRAM(s) Oral at bedtime  midodrine 10 milliGRAM(s) Oral every 8 hours  nystatin Powder 1 Application(s) Topical two times a day  pantoprazole    Tablet 40 milliGRAM(s) Oral before breakfast  rifAXIMin 550 milliGRAM(s) Oral two times a day  thiamine 100 milliGRAM(s) Oral daily    MEDICATIONS  (PRN):  HYDROmorphone  Injectable 0.2 milliGRAM(s) IV Push every 4 hours PRN Severe Pain (7 - 10)  ondansetron Injectable 4 milliGRAM(s) IV Push once PRN Nausea and/or Vomiting  sodium chloride 0.9% lock flush 10 milliLiter(s) IV Push every 1 hour PRN Pre/post blood products, medications, blood draw, and to maintain line patency      CAPILLARY BLOOD GLUCOSE      POCT Blood Glucose.: 100 mg/dL (09 Aug 2020 17:42)    I&O's Summary    09 Aug 2020 07:01  -  10 Aug 2020 07:00  --------------------------------------------------------  IN: 2260 mL / OUT: 0 mL / NET: 2260 mL        PHYSICAL EXAM:  Vital Signs Last 24 Hrs  T(C): 37.1 (10 Aug 2020 05:01), Max: 37.2 (09 Aug 2020 20:28)  T(F): 98.7 (10 Aug 2020 05:01), Max: 99 (09 Aug 2020 20:28)  HR: 96 (10 Aug 2020 05:01) (94 - 106)  BP: 121/71 (10 Aug 2020 05:01) (110/68 - 121/71)  BP(mean): --  RR: 18 (10 Aug 2020 05:01) (18 - 18)  SpO2: 94% (10 Aug 2020 05:01) (91% - 97%)    GENERAL: No acute distress, well-developed  HEAD:  Atraumatic, Normocephalic  EYES: conjunctiva and sclera clear  NECK: Supple, no JVD  CHEST/LUNG: CTAB, no wheezes, rales, or rhonchi  HEART: Regular rate and rhythm, no murmurs, rubs, or gallops  ABDOMEN: Soft, non-tender, non-distended, normal bowel sounds  EXTREMITIES:  2+ peripheral pulses b/l, no clubbing, cyanosis, or edema  NEUROLOGY: A&O x 3, no focal deficits  SKIN: No rashes or lesions    LABS:                        8.6    29.38 )-----------( 129      ( 10 Aug 2020 06:56 )             24.9     08-09    133<L>  |  92<L>  |  36<H>  ----------------------------<  82  3.5   |  21<L>  |  5.87<H>    Ca    8.6      09 Aug 2020 07:20  Phos  4.3     08-09  Mg     2.1     08-09    TPro  5.5<L>  /  Alb  3.3  /  TBili  19.6<H>  /  DBili  x   /  AST  153<H>  /  ALT  22  /  AlkPhos  131<H>  08-09    PT/INR - ( 10 Aug 2020 06:56 )   PT: 20.0 sec;   INR: 1.72 ratio         PTT - ( 10 Aug 2020 06:56 )  PTT:41.9 sec            RADIOLOGY & ADDITIONAL TESTS:  Results Reviewed [Y/N]:   Imaging Personally Reviewed [Y/N]:  Electrocardiogram Personally Reviewed [Y/N]:    COORDINATION OF CARE:  Care Discussed with Consultants/Other Providers [Y/N]: PROGRESS NOTE:   Authored by Deandre Dwyer [phone]    Patient is a 39y old Male who presents with a chief complaint of abdominal pain (09 Aug 2020 06:57)    SUBJECTIVE / OVERNIGHT EVENTS:  No overnight events. Today, the patient describes bilateral anterior thigh tingling and pain. He reports that sharp pain shoots down both LEs with movement. He states that this pain started during his hospitalization. He denies n/v/d, abdominal pain, chest pain, SOB.    REVIEW OF SYSTEMS:  CONSTITUTIONAL: No weakness, fevers or chills  EYES/ENT: No visual changes;  No vertigo or throat pain   NECK: No pain or stiffness  RESPIRATORY: No cough, wheezing, hemoptysis; No shortness of breath  CARDIOVASCULAR: No chest pain or palpitations  GASTROINTESTINAL: No abdominal or epigastric pain. No nausea, vomiting, or hematemesis; No diarrhea or constipation. No melena or hematochezia.  GENITOURINARY: No dysuria, frequency or hematuria  NEUROLOGICAL: No numbness or weakness  SKIN: No itching, rashes    MEDICATIONS  (STANDING):  chlorhexidine 4% Liquid 1 Application(s) Topical <User Schedule>  dextrose 10%. 1000 milliLiter(s) (40 mL/Hr) IV Continuous <Continuous>  lactulose Syrup 30 Gram(s) Oral every 6 hours  melatonin 1 milliGRAM(s) Oral at bedtime  midodrine 10 milliGRAM(s) Oral every 8 hours  nystatin Powder 1 Application(s) Topical two times a day  pantoprazole    Tablet 40 milliGRAM(s) Oral before breakfast  rifAXIMin 550 milliGRAM(s) Oral two times a day  thiamine 100 milliGRAM(s) Oral daily    MEDICATIONS  (PRN):  HYDROmorphone  Injectable 0.2 milliGRAM(s) IV Push every 4 hours PRN Severe Pain (7 - 10)  ondansetron Injectable 4 milliGRAM(s) IV Push once PRN Nausea and/or Vomiting  sodium chloride 0.9% lock flush 10 milliLiter(s) IV Push every 1 hour PRN Pre/post blood products, medications, blood draw, and to maintain line patency      CAPILLARY BLOOD GLUCOSE  POCT Blood Glucose.: 100 mg/dL (09 Aug 2020 17:42)    I&O's Summary  09 Aug 2020 07:01  -  10 Aug 2020 07:00  --------------------------------------------------------  IN: 2260 mL / OUT: 0 mL / NET: 2260 mL    PHYSICAL EXAM:  Vital Signs Last 24 Hrs  T(C): 37.1 (10 Aug 2020 05:01), Max: 37.2 (09 Aug 2020 20:28)  T(F): 98.7 (10 Aug 2020 05:01), Max: 99 (09 Aug 2020 20:28)  HR: 96 (10 Aug 2020 05:01) (94 - 106)  BP: 121/71 (10 Aug 2020 05:01) (110/68 - 121/71)  BP(mean): --  RR: 18 (10 Aug 2020 05:01) (18 - 18)  SpO2: 94% (10 Aug 2020 05:01) (91% - 97%)    GENERAL: No acute distress, well-developed  HEAD:  Atraumatic, Normocephalic  EYES: conjunctiva and sclera clear  NECK: Supple, no JVD  CHEST/LUNG: CTAB, no wheezes, rales, or rhonchi  HEART: Regular rate and rhythm, no murmurs, rubs, or gallops  ABDOMEN: Soft, non-tender, non-distended, normal bowel sounds  EXTREMITIES:  2+ peripheral pulses b/l, no clubbing, cyanosis, or edema  NEUROLOGY: A&O x 3, no focal deficits  SKIN: No rashes or lesions    LABS:             8.6    29.38 )-----------( 129      ( 10 Aug 2020 06:56 )             24.9     08-10  132<L>  |  91<L>  |  50<H>  ----------------------------<  80  3.8   |  20<L>  |  7.04<H>  Ca    8.4      10 Aug 2020 06:56  Phos  6.3     08-10  Mg     2.1     08-10  TPro  5.7<L>  /  Alb  3.4  /  TBili  20.5<H>  /  DBili  x   /  AST  136<H>  /  ALT  19  /  AlkPhos  131<H>  08-10    PT/INR - ( 10 Aug 2020 06:56 )   PT: 20.0 sec;   INR: 1.72 ratio    PTT - ( 10 Aug 2020 06:56 )  PTT:41.9 sec    RADIOLOGY & ADDITIONAL TESTS:  Results Reviewed [Y/N]: Y  Imaging Personally Reviewed [Y/N]: Y  Electrocardiogram Personally Reviewed [Y/N]: Y    COORDINATION OF CARE:  Care Discussed with Consultants/Other Providers [Y/N]: Y

## 2020-08-10 NOTE — PROGRESS NOTE ADULT - PROBLEM SELECTOR PLAN 2
Pt. with hyponatremia in setting of liver cirrhosis and severe ascites. Serum sodium 133 on admission (7/26), improved to 140 however today decrease to 132 likely fluid overload (off HD 2 days)    Plan for HD today w/ UF 1-2L  Monitor BMP daily  avoid hypotonic saline/D5w, glycemic control

## 2020-08-10 NOTE — CONSULT NOTE ADULT - CONSULT REQUESTED DATE/TIME
07-Aug-2020 15:46
10-Aug-2020 17:00
26-Jul-2020 15:24
26-Jul-2020 19:24
28-Jul-2020 14:03
29-Jul-2020
26-Jul-2020 15:40

## 2020-08-10 NOTE — PROGRESS NOTE ADULT - ASSESSMENT
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   Had a massive variceal bleed with shock 7/29 s/p unsuccessful endoscopic intervention requiring IR embolization and transfusion of lots of blood products.   Recovered and downgraded 8/7.   Leukocytosis is common in this setting. Suspect reactive. Completed an adequate course of antibiotics after bleeding. Afebrile, nontoxic, cultures and imaging without infection.   Currently not a candidate for transplants, need alcohol rehab first.     Suggest  -monitor off antibiotics   -no infectious contraindication to placing a permacath for dialysis   -f/u Quantiferon Gold  -would start HAV and pneumococcal vaccines (PCV13) while inpatient and he can complete the series in our office after discharge; reports receiving TDaP two years ago     Spoke with primary team     Justin Mena MD   Infectious Disease   Pager [phone]   After 5PM and on weekends please page fellow on call or call [phone]

## 2020-08-10 NOTE — PROGRESS NOTE ADULT - PROBLEM SELECTOR PLAN 7
2/2 alcoholic hepatitis  -daily CMP  -hepatology following, appreciate recs  -protonix 40mg PO daily secondary to gastric variceal bleed. refractory to endoscopic intervention 7/29 s/p IR PARTO 7/30, blakemore 7/30, and 34 U PRBC, 25 U FFP, 20 U PLT, and 5 U cryo all on 7/30. blakemore removed 7/30. tolerated tube feeds without evidence of bleeding  -CBC daiy - transfuse to maintain Hgb > 7, or for active bleed  -coags daily - replete with FFP, cryo as needed

## 2020-08-10 NOTE — PROGRESS NOTE ADULT - PROBLEM SELECTOR PLAN 3
possibly secondary to hepatorenal syndrome. 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 Baseline creatinine sCr 0.6-0.8. On admission 9.64, was downtrending but uptrended today. s/p CVVHD w/ improvement in renal fx, but remains HD dependent   -nephrology following, appreciate recs - HD 8/06 & 8/07, will hold HD over weekend and monitor for renal recovery - Cr uptrended to 5.87 today  -zofran 4mg IV prn nausea w/ dialysis  -c/w hold diuretics at this time   -daily CMP  -monitor UOP, strict I&Os  -avoid NSAIDs, ACEI/ARBS, RCA and nephrotoxins.   -dose medications as per eGFR. increase in neutrophils. all cultures negative to date (7/26, 7/30, 8/06). paracentesis negative for SBP on 7/26. fungitell sent 08/01. s/p zosyn 7/26-7/31. UA not c/w UTI. abdominal pain on exam. c/f pancreatitis - lipase 504, however CT w/ no acute pancreatitis  -ID following, appreciate recs - likely reactive due to complicated hospital course, monitor off abx for now, d/c meropenem  -can consider repeat diagnostic paracentesis to r/o SBP if clinically worsens  -trend lactate  -daily CBC w/ diff

## 2020-08-10 NOTE — CHART NOTE - NSCHARTNOTEFT_GEN_A_CORE
Pre-Interventional Radiology Procedure Note    LEATHA MACIAS | [id number]    08-10-20 @ 22:42    Interventional Radiology Attending Physician:     Ordering Attending Physician: Reva Mendez    Diagnosis/Indication: Patient is a 39y old  Male who presents with a chief complaint of abdominal pain (10 Aug 2020 16:58)      Procedure: Permacath placement     39y    Male    PAST MEDICAL & SURGICAL HISTORY:  No pertinent past medical history  S/P exploratory laparotomy       CBC Full  -  ( 10 Aug 2020 06:56 )  WBC Count : 29.38 K/uL  RBC Count : 2.69 M/uL  Hemoglobin : 8.6 g/dL  Hematocrit : 24.9 %  Platelet Count - Automated : 129 K/uL  Mean Cell Volume : 92.6 fl  Mean Cell Hemoglobin : 32.0 pg  Mean Cell Hemoglobin Concentration : 34.5 gm/dL  Auto Neutrophil # : 24.39 K/uL  Auto Lymphocyte # : 1.25 K/uL  Auto Monocyte # : 2.58 K/uL  Auto Eosinophil # : 0.68 K/uL  Auto Basophil # : 0.18 K/uL  Auto Neutrophil % : 83.0 %  Auto Lymphocyte % : 4.3 %  Auto Monocyte % : 8.8 %  Auto Eosinophil % : 2.3 %  Auto Basophil % : 0.6 %    08-10    132<L>  |  91<L>  |  50<H>  ----------------------------<  80  3.8   |  20<L>  |  7.04<H>    Ca    8.4      10 Aug 2020 06:56  Phos  6.3     08-10  Mg     2.1     08-10    TPro  5.7<L>  /  Alb  3.4  /  TBili  20.5<H>  /  DBili  x   /  AST  136<H>  /  ALT  19  /  AlkPhos  131<H>  08-10    PT/INR - ( 10 Aug 2020 06:56 )   PT: 20.0 sec;   INR: 1.72 ratio         PTT - ( 10 Aug 2020 06:56 )  PTT:41.9 sec

## 2020-08-10 NOTE — PROGRESS NOTE ADULT - PROBLEM SELECTOR PLAN 8
secondary to gastric variceal bleed. refractory to endoscopic intervention 7/29 s/p IR PARTO 7/30, blakemore 7/30, and 34 U PRBC, 25 U FFP, 20 U PLT, and 5 U cryo all on 7/30. blakemore removed 7/30. tolerated tube feeds without evidence of bleeding  -trend CBC q12- transfuse to maintain Hgb > 7, or for active bleed  -trend coags [id number] with FFP, cryo as needed DVT ppx: SCDs  Diet: regular diet

## 2020-08-10 NOTE — PROGRESS NOTE ADULT - PROBLEM SELECTOR PLAN 4
Incidental finding on CTAP (8/6) "new left adrenal nodularity which may represent hemorrhage". MRI Abdomen (8/9) demonstrated "1.3 x 0.9 cm left adrenal hemorrhage". Per radiology, hemorrhage on MRI unchanged in size from CTAP, so unlikely to be actively bleeding.   -discussed with hepatology - continue to monitor, INR<2, hgb stable  -daily CBC & coags

## 2020-08-10 NOTE — PROGRESS NOTE ADULT - PROBLEM SELECTOR PLAN 3
hypophosphatemia likely 2/2 MARQUISE ATN, today serum phos 6.3    Plan for HD today  Renal diet (low phosphorous diet)  monitor serum phos daily

## 2020-08-10 NOTE — PROGRESS NOTE ADULT - ASSESSMENT
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 Blakemore removed 7/31 and the pt no longer with active bleeding with stable Hgb, successfully extubated 8/4 and titrated off vasopressors 8/5. Persistent leukocytosis despite being on meropenem, likely reactive. Not currently candidate for liver transplant.

## 2020-08-10 NOTE — PROGRESS NOTE ADULT - PROBLEM SELECTOR PLAN 6
negative for SBP on 07/26  -monitor serial abdominal exams  -will consider diagnostic/therapeutic paracentesis if clinically worsens  -c/w MVI, thiamine, folate 2/2 alcoholic hepatitis  -daily CMP  -hepatology following, appreciate recs  -protonix 40mg PO daily

## 2020-08-10 NOTE — CONSULT NOTE ADULT - ASSESSMENT
Assessment: 39y Male with alcoholic liver cirrhosis, now with renal failure. Request for PermCath for HD.      Plan:  -Please place order for IR Procedure, approving attending Dr. Hall  - Plan for 8/12, NPO past midnight  -hold therapeutic and prophylactic anticoagulants  -maintain active type and screen x 2

## 2020-08-10 NOTE — PROGRESS NOTE ADULT - SUBJECTIVE AND OBJECTIVE BOX
Follow Up: Leukocytosis     Interval History/ROS: Feels fine. Afebrile. No abdominal pain. He's eating, no dysuria. Slight dry cough, no dyspnea. Only feels sick when he has to go for dialysis.     Allergies  No Known Allergies    ANTIMICROBIALS:  rifAXIMin 550 two times a day      OTHER MEDS:  chlorhexidine 4% Liquid 1 Application(s) Topical <User Schedule>  HYDROmorphone  Injectable 0.2 milliGRAM(s) IV Push every 4 hours PRN  lactulose Syrup 30 Gram(s) Oral every 6 hours  melatonin 1 milliGRAM(s) Oral at bedtime  midodrine 10 milliGRAM(s) Oral every 8 hours  nystatin Powder 1 Application(s) Topical two times a day  ondansetron Injectable 4 milliGRAM(s) IV Push once PRN  pantoprazole    Tablet 40 milliGRAM(s) Oral before breakfast  sodium chloride 0.9% lock flush 10 milliLiter(s) IV Push every 1 hour PRN  thiamine 100 milliGRAM(s) Oral daily      Vital Signs Last 24 Hrs  T(C): 36.7 (10 Aug 2020 13:48), Max: 37.2 (09 Aug 2020 20:28)  T(F): 98 (10 Aug 2020 13:48), Max: 99 (09 Aug 2020 20:28)  HR: 96 (10 Aug 2020 13:48) (96 - 106)  BP: 124/82 (10 Aug 2020 13:48) (110/68 - 124/82)  BP(mean): --  RR: 18 (10 Aug 2020 13:48) (18 - 18)  SpO2: 97% (10 Aug 2020 13:48) (91% - 97%)    Physical Exam:  General: awake, alert, non toxic  Head: atraumatic, normocephalic  Eye: icteric sclera   Cardio: regular rate   Respiratory: nonlabored on room air, clear bilaterally, no wheezing  abd: distended, soft, bowel sounds present, no tenderness  vascular: right IJ dialysis CVC dressing is nearly completely off, no purulence, local tenderness or clear cellulitis   Skin: jaundiced   Neurologic: no focal deficit  psych: normal affect                          8.6    29.38 )-----------( 129      ( 10 Aug 2020 06:56 )             24.9       08-10    132<L>  |  91<L>  |  50<H>  ----------------------------<  80  3.8   |  20<L>  |  7.04<H>    Ca    8.4      10 Aug 2020 06:56  Phos  6.3     08-10  Mg     2.1     08-10    TPro  5.7<L>  /  Alb  3.4  /  TBili  20.5<H>  /  DBili  x   /  AST  136<H>  /  ALT  19  /  AlkPhos  131<H>  08-10          MICROBIOLOGY:  Culture - Blood (collected 08-07-20 @ 04:30)  Source: .Blood Blood-Peripheral  Preliminary Report (08-08-20 @ 05:00):    No growth to date.    Culture - Blood (collected 08-07-20 @ 01:16)  Source: .Blood Blood-Venous  Preliminary Report (08-08-20 @ 02:01):    No growth to date.    TRANSPLANT CLEARANCE LABWORK  HIV-1/2 Combo Result: Nonreact (07-15-20 @ 10:47)     Treponema Pallidum Antibody Interpretation: Negative (08-08-20 @ 09:47)    EBV VCA IgM EIA: <10.0 U/mL (07-12-20 @ 09:33)  EBV VCA IgG EIA: 182.0 U/mL (07-12-20 @ 09:33)  EBV EA Ab EIA: <5.0 U/mL (07-12-20 @ 09:33)     CMV IgG Antibody: 0.51 U/mL (08-08-20 @ 09:47)  CMV IgG Interpretation: Negative (08-08-20 @ 09:47)     Herpes simplex 1 IgG Ab Result: 5.70 Index (08-08-20 @ 09:47)  Herpes simplex 1 IgG Ab Interp: Positive (08-08-20 @ 09:47)  Herpes simplex 2 IgG Ab Result: 4.08 Index (08-08-20 @ 09:47)  Herpes simplex 2 IgG Ab Interp: Positive (08-08-20 @ 09:47)     Varicella IgG Antibody: 1874.0 Index (08-08-20 @ 09:47)  Varicella IgG Interpretation: Positive (08-08-20 @ 09:47)     Hepatitis A IgG Ab Result: Nonreact (07-12-20 @ 08:58)     Hepatitis B Surface Antibody: Reactive (08-08-20 @ 10:42)  Hepatitis B Surface Antigen: Nonreact (07-12-20 @ 01:16)  Hepatitis B Core IgM Antibody: Nonreact (07-12-20 @ 01:16)    Hepatitis C Virus S/CO Ratio: 0.14 S/CO (07-12-20 @ 01:16)  Hepatitis C Virus Interpretation: Nonreact (07-12-20 @ 01:16)     Hepatitis E IgG Antibodies: Negative (07-12-20 @ 10:01)    Mumps Virus IgG Antibody.: 48.5 AU/mL (08-08-20 @ 09:47)  Mumps Antibody Interpretation: Positive (08-08-20 @ 09:47)    Measles IgG Interpretation: Positive (08-08-20 @ 09:47)    Rubella Virus IgG Antibody.: 2.7 Index (08-08-20 @ 09:47)  Rubella IgG Interp: Positive (08-08-20 @ 09:47)    RADIOLOGY:  Images below reviewed personally Follow Up: Leukocytosis     Interval History/ROS: Feels fine. Afebrile. No abdominal pain. He's eating, no dysuria. Slight dry cough, no dyspnea. Only feels sick when he has to go for dialysis.     Allergies  No Known Allergies    ANTIMICROBIALS:  rifAXIMin 550 two times a day      OTHER MEDS:  chlorhexidine 4% Liquid 1 Application(s) Topical <User Schedule>  HYDROmorphone  Injectable 0.2 milliGRAM(s) IV Push every 4 hours PRN  lactulose Syrup 30 Gram(s) Oral every 6 hours  melatonin 1 milliGRAM(s) Oral at bedtime  midodrine 10 milliGRAM(s) Oral every 8 hours  nystatin Powder 1 Application(s) Topical two times a day  ondansetron Injectable 4 milliGRAM(s) IV Push once PRN  pantoprazole    Tablet 40 milliGRAM(s) Oral before breakfast  sodium chloride 0.9% lock flush 10 milliLiter(s) IV Push every 1 hour PRN  thiamine 100 milliGRAM(s) Oral daily      Vital Signs Last 24 Hrs  T(C): 36.7 (10 Aug 2020 13:48), Max: 37.2 (09 Aug 2020 20:28)  T(F): 98 (10 Aug 2020 13:48), Max: 99 (09 Aug 2020 20:28)  HR: 96 (10 Aug 2020 13:48) (96 - 106)  BP: 124/82 (10 Aug 2020 13:48) (110/68 - 124/82)  BP(mean): --  RR: 18 (10 Aug 2020 13:48) (18 - 18)  SpO2: 97% (10 Aug 2020 13:48) (91% - 97%)    Physical Exam:  General: awake, alert, non toxic  Head: atraumatic, normocephalic  Eye: icteric sclera   Cardio: regular rate   Respiratory: nonlabored on room air, clear bilaterally, no wheezing  abd: distended, soft, bowel sounds present, no tenderness  vascular: right IJ dialysis CVC dressing is nearly completely off, no purulence, local tenderness or clear cellulitis   Skin: jaundiced   Neurologic: no focal deficit  psych: normal affect                          8.6    29.38 )-----------( 129      ( 10 Aug 2020 06:56 )             24.9       08-10    132<L>  |  91<L>  |  50<H>  ----------------------------<  80  3.8   |  20<L>  |  7.04<H>    Ca    8.4      10 Aug 2020 06:56  Phos  6.3     08-10  Mg     2.1     08-10    TPro  5.7<L>  /  Alb  3.4  /  TBili  20.5<H>  /  DBili  x   /  AST  136<H>  /  ALT  19  /  AlkPhos  131<H>  08-10          MICROBIOLOGY:  Culture - Blood (collected 08-07-20 @ 04:30)  Source: .Blood Blood-Peripheral  Preliminary Report (08-08-20 @ 05:00):    No growth to date.    Culture - Blood (collected 08-07-20 @ 01:16)  Source: .Blood Blood-Venous  Preliminary Report (08-08-20 @ 02:01):    No growth to date.    TRANSPLANT CLEARANCE LABWORK  HIV-1/2 Combo Result: Nonreact (07-15-20 @ 10:47)    Treponema Pallidum Antibody Interpretation: Negative (08-08-20 @ 09:47)    EBV VCA IgM EIA: <10.0 U/mL (07-12-20 @ 09:33)  EBV VCA IgG EIA: 182.0 U/mL (07-12-20 @ 09:33)  EBV EA Ab EIA: <5.0 U/mL (07-12-20 @ 09:33)     CMV IgG Antibody: 0.51 U/mL (08-08-20 @ 09:47)  CMV IgG Interpretation: Negative (08-08-20 @ 09:47)     Herpes simplex 1 IgG Ab Result: 5.70 Index (08-08-20 @ 09:47)  Herpes simplex 1 IgG Ab Interp: Positive (08-08-20 @ 09:47)  Herpes simplex 2 IgG Ab Result: 4.08 Index (08-08-20 @ 09:47)  Herpes simplex 2 IgG Ab Interp: Positive (08-08-20 @ 09:47)     Varicella IgG Antibody: 1874.0 Index (08-08-20 @ 09:47)  Varicella IgG Interpretation: Positive (08-08-20 @ 09:47)     Hepatitis A IgG Ab Result: Nonreact (07-12-20 @ 08:58)    Hepatitis B Surface Antibody: Reactive (08-08-20 @ 10:42)  Hepatitis B Surface Antigen: Nonreact (07-12-20 @ 01:16)  Hepatitis B Core IgM Antibody: Nonreact (07-12-20 @ 01:16)    Hepatitis C Virus S/CO Ratio: 0.14 S/CO (07-12-20 @ 01:16)  Hepatitis C Virus Interpretation: Nonreact (07-12-20 @ 01:16)     Hepatitis E IgG Antibodies: Negative (07-12-20 @ 10:01)    Mumps Virus IgG Antibody.: 48.5 AU/mL (08-08-20 @ 09:47)  Mumps Antibody Interpretation: Positive (08-08-20 @ 09:47)    Measles IgG Interpretation: Positive (08-08-20 @ 09:47)    Rubella Virus IgG Antibody.: 2.7 Index (08-08-20 @ 09:47)  Rubella IgG Interp: Positive (08-08-20 @ 09:47)    RADIOLOGY:  Images below reviewed personally  MR Abdomen No Cont (08.09.20 @ 12:30)   1.  1.3 x 0.9 cm left adrenal hemorrhage.  2.  Cirrhotic liver with evidence of portal hypertension.

## 2020-08-10 NOTE — PROGRESS NOTE ADULT - SUBJECTIVE AND OBJECTIVE BOX
Chief Complaint:  Patient is a 39y old  Male who presents with a chief complaint of abdominal pain (10 Aug 2020 07:27)    Reason for consult: acute on chronic liver failure    Interval Events: no weekend events, pt feels well, able to get out of bed and walk    Hospital Medications:  chlorhexidine 4% Liquid 1 Application(s) Topical <User Schedule>  dextrose 10%. 1000 milliLiter(s) IV Continuous <Continuous>  HYDROmorphone  Injectable 0.2 milliGRAM(s) IV Push every 4 hours PRN  lactulose Syrup 30 Gram(s) Oral every 6 hours  melatonin 1 milliGRAM(s) Oral at bedtime  midodrine 10 milliGRAM(s) Oral every 8 hours  nystatin Powder 1 Application(s) Topical two times a day  ondansetron Injectable 4 milliGRAM(s) IV Push once PRN  pantoprazole    Tablet 40 milliGRAM(s) Oral before breakfast  rifAXIMin 550 milliGRAM(s) Oral two times a day  sodium chloride 0.9% lock flush 10 milliLiter(s) IV Push every 1 hour PRN  thiamine 100 milliGRAM(s) Oral daily      ROS:   General:  No  fevers, chills, night sweats, fatigue  Eyes:  Good vision, no reported pain  ENT:  No sore throat, pain, runny nose  CV:  No pain, palpitations  Pulm:  No dyspnea, cough  GI:  See HPI, otherwise negative  :  No  incontinence, nocturia  Muscle:  No pain, weakness  Neuro:  No memory problems  Psych:  No insomnia, mood problems, depression  Endocrine:  No polyuria, polydipsia, cold/heat intolerance  Heme:  No petechiae, ecchymosis, easy bruisability  Skin:  No rash    PHYSICAL EXAM:   Vital Signs:  Vital Signs Last 24 Hrs  T(C): 37.1 (10 Aug 2020 05:01), Max: 37.2 (09 Aug 2020 20:28)  T(F): 98.7 (10 Aug 2020 05:01), Max: 99 (09 Aug 2020 20:28)  HR: 96 (10 Aug 2020 05:01) (94 - 106)  BP: 121/71 (10 Aug 2020 05:01) (110/68 - 121/71)  BP(mean): --  RR: 18 (10 Aug 2020 05:01) (18 - 18)  SpO2: 94% (10 Aug 2020 05:01) (91% - 97%)  Daily     Daily     GENERAL: no acute distress  NEURO: alert, no asterixis  HEENT: icteric sclera, no conjunctival pallor appreciated  CHEST: no respiratory distress, no accessory muscle use  CARDIAC: regular rate, rhythm  ABDOMEN: soft, non-tender, non-distended, no rebound or guarding  EXTREMITIES: warm, well perfused, no edema  SKIN: ++ jaundice    LABS: reviewed                        8.6    29.38 )-----------( 129      ( 10 Aug 2020 06:56 )             24.9     08-10    132<L>  |  91<L>  |  50<H>  ----------------------------<  80  3.8   |  20<L>  |  7.04<H>    Ca    8.4      10 Aug 2020 06:56  Phos  6.3     08-10  Mg     2.1     08-10    TPro  5.7<L>  /  Alb  3.4  /  TBili  20.5<H>  /  DBili  x   /  AST  136<H>  /  ALT  19  /  AlkPhos  131<H>  08-10    LIVER FUNCTIONS - ( 10 Aug 2020 06:56 )  Alb: 3.4 g/dL / Pro: 5.7 g/dL / ALK PHOS: 131 U/L / ALT: 19 U/L / AST: 136 U/L / GGT: x             Interval Diagnostic Studies: see sunrise for full report

## 2020-08-10 NOTE — PROGRESS NOTE ADULT - PROBLEM SELECTOR PLAN 9
DVT ppx: SCDs  Diet: Soft Diet (advance as tolerated)  Dispo: pending medical optimization patient will need HD and etoh rehab upon discharge. PT eval today - will f/u recs.

## 2020-08-10 NOTE — PROGRESS NOTE ADULT - SUBJECTIVE AND OBJECTIVE BOX
[facility] DIVISION OF KIDNEY DISEASES AND HYPERTENSION   -- FOLLOW UP NOTE --   Bhupinder Truong  Nephrology Fellow  Pager [facility]: [phone]   /  Pager [facility]: [phone]  (after 5pm or weekend please page the on-call fellow)  --------------------------------------------------------------------------------  24 hour events/subjective:  - overnight no events reported, vitals afebrile no hypotensive episode  - patient seen and examined at bedside this morning who endorse b/l anterior thigh pain and tingling going down to knee cap  - vitals/lab/medications reviewed, noted for rise sCr     PAST HISTORY  --------------------------------------------------------------------------------  No significant changes to PMH, PSH, FHx, SHx, unless otherwise noted    ALLERGIES & MEDICATIONS  --------------------------------------------------------------------------------  Allergies    No Known Allergies    Intolerances    Standing Inpatient Medications  chlorhexidine 4% Liquid 1 Application(s) Topical <User Schedule>  dextrose 10%. 1000 milliLiter(s) IV Continuous <Continuous>  lactulose Syrup 30 Gram(s) Oral every 6 hours  melatonin 1 milliGRAM(s) Oral at bedtime  midodrine 10 milliGRAM(s) Oral every 8 hours  nystatin Powder 1 Application(s) Topical two times a day  pantoprazole    Tablet 40 milliGRAM(s) Oral before breakfast  rifAXIMin 550 milliGRAM(s) Oral two times a day  thiamine 100 milliGRAM(s) Oral daily    PRN Inpatient Medications  HYDROmorphone  Injectable 0.2 milliGRAM(s) IV Push every 4 hours PRN  ondansetron Injectable 4 milliGRAM(s) IV Push once PRN  sodium chloride 0.9% lock flush 10 milliLiter(s) IV Push every 1 hour PRN    REVIEW OF SYSTEMS  --------------------------------------------------------------------------------  Gen: no fever, chills, weakness  Respiratory: No dyspnea, cough  CV: No chest pain, orthopnea  GI: No abdominal pain, nausea, vomiting, diarrhea  MSK: no edema  Neuro: No dizziness, lightheadedness  All other systems were reviewed and are negative, except as noted.    VITALS/PHYSICAL EXAM  --------------------------------------------------------------------------------  T(C): 36.7 (08-10-20 @ 13:48), Max: 37.2 (08-09-20 @ 20:28)  HR: 96 (08-10-20 @ 13:48) (96 - 106)  BP: 124/82 (08-10-20 @ 13:48) (110/68 - 124/82)  RR: 18 (08-10-20 @ 13:48) (18 - 18)  SpO2: 97% (08-10-20 @ 13:48) (91% - 97%)  Wt(kg): --    08-09-20 @ 07:01  -  08-10-20 @ 07:00  --------------------------------------------------------  IN: 2260 mL / OUT: 0 mL / NET: 2260 mL    Physical Exam:              Gen: NAD on room air  	Pulm: CTA b/l  	CV: tachycardic, RRR, S1S2  	GI: +BS, soft, distended  	: no ashton  	MSK: Warm, no edema              Neuro: sedated  	Psych: sedated  	Skin: Warm, no cyanosis, jaundice appearing  	Vascular access: RIJ non tunnel hd catheter    LABS/STUDIES  --------------------------------------------------------------------------------              8.6    29.38 >-----------<  129      [08-10-20 @ 06:56]              24.9     132  |  91  |  50  ----------------------------<  80      [08-10-20 @ 06:56]  3.8   |  20  |  7.04        Ca     8.4     [08-10-20 @ 06:56]      Mg     2.1     [08-10-20 @ 06:56]      Phos  6.3     [08-10-20 @ 06:56]    TPro  5.7  /  Alb  3.4  /  TBili  20.5  /  DBili  x   /  AST  136  /  ALT  19  /  AlkPhos  131  [08-10-20 @ 06:56]    PT/INR: PT 20.0 , INR 1.72       [08-10-20 @ 06:56]  PTT: 41.9       [08-10-20 @ 06:56]    Creatinine Trend:  SCr 7.04 [08-10 @ 06:56]  SCr 5.87 [08-09 @ 07:20]  SCr 3.98 [08-08 @ 05:50]  SCr 4.26 [08-07 @ 02:35]  SCr 4.92 [08-06 @ 14:42]    Urinalysis - [08-07-20 @ 04:54]      Color Dark Orange / Appearance Turbid / SG 1.041 / pH 6.0      Gluc Negative / Ketone Negative  / Bili Moderate / Urobili 8 mg/dL       Blood Moderate / Protein 100 mg/dL Test Performed on Urine Supernate. / Leuk Est Negative / Nitrite Negative      RBC 30 / WBC 18 / Hyaline 10 / Gran  / Sq Epi  / Non Sq Epi 7 / Bacteria Negative    Urine Creatinine 118      [08-06-20 @ 22:42]  Urine Sodium <35      [08-06-20 @ 22:42]  Urine Urea Nitrogen 302      [08-07-20 @ 01:54]    Iron 34, TIBC 124, %sat 27      [07-14-20 @ 10:34]  Ferritin 812      [07-12-20 @ 08:58]  PTH -- (Ca 6.2)      [07-29-20 @ 09:18]   223  Lipid: chol --, , HDL --, LDL --      [08-03-20 @ 12:04]    HBsAb Reactive      [08-08-20 @ 10:42]  HBsAg Nonreact      [07-12-20 @ 01:16]  HCV 0.14, Nonreact      [07-12-20 @ 01:16]  HIV Nonreact      [07-15-20 @ 10:47]    SHAUN: titer Negative, pattern --      [07-12-20 @ 09:37]  Syphilis Screen (Treponema Pallidum Ab) Negative      [08-08-20 @ 09:47]  Free Light Chains: kappa 2.35, lambda 2.06, ratio = 1.14      [07-12 @ 08:58]

## 2020-08-10 NOTE — PROGRESS NOTE ADULT - PROBLEM SELECTOR PLAN 5
increase in neutrophils. all cultures negative to date (7/26, 7/30, 8/06). paracentesis negative for SBP on 7/26. fungitell sent 08/01. s/p zosyn 7/26-7/31. UA not c/w UTI. abdominal pain on exam. c/f pancreatitis - lipase 504, however CT w/ no acute pancreatitis  -ID following, appreciate recs - likely reactive due to complicated hospital course, monitor off abx for now, d/c meropenem  -can consider repeat diagnostic paracentesis to r/o SBP if clinically worsens  -trend lactate  -daily CBC w/ diff negative for SBP on 07/26  -monitor serial abdominal exams  -will consider diagnostic/therapeutic paracentesis if clinically worsens  -c/w MVI, thiamine, folate

## 2020-08-10 NOTE — PROGRESS NOTE ADULT - PROBLEM SELECTOR PLAN 1
Acute on chronic liver failure. ddx includes underlying infection (UA neg, BCx negative, CXR neg, dx para neg for SBP, biliary imaging negative, C diff negative), worsening EtOH hepatitis (MDF = 60 but no interval drinking since discharge and this is an acute change), vs vascular injury. no current signs of hepatic encephalopathy (A&Ox4). INR elevated, platelets low  -c/w rifaximin and lactulose  -hepatology following, appreciate recs possibly secondary to hepatorenal syndrome. 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 baseline creatinine sCr 0.6-0.8. On admission sCr was 9.64. s/p CVVHD w/ improvement in renal fx, but remains HD dependent.   -nephrology following, appreciate recs - HD held over weekend to monitor for renal recovery, but sCr uptrended & pt w/ continued oliguria so will get HD today  -zofran 4mg IV prn nausea w/ dialysis  -c/w hold diuretics at this time   -daily CMP  -monitor UOP, strict I&Os  -avoid NSAIDs, ACEI/ARBS, RCA and nephrotoxins.   -dose medications as per eGFR.

## 2020-08-10 NOTE — CHART NOTE - NSCHARTNOTEFT_GEN_A_CORE
This is a 39 y.o. Male with PMhx of Alcohol liver cirrhosis, alcohol use disorder, and traumatic bladder rupture s/p ex lap in 2019 p/w diffuse crampy abdominal pain. Patient reports that since last discharge on 7/17 has had multiple episode of watery diarrhea (about 9-10 per day), worsening abdominal pain and distension, associated weakness and nausea no vomiting. He has been compliant with Lasix and Aldactone, but has not taken lactulose due to diarrhea. Also complaining of increased itchiness all over body and yellowing of skin. (+) MARQUISE in the setting of liver cirrhosis with severe ascites. MARQUISE 2/2 prerenal (severe diarrhea + diuretics +decrease effective arterial blood volume from Cirrhosis) to r/o hepatorenal syndrome, now requiring dialysis. EGD done for hematemesis. Renal suggesting renal biopsy for confirmation of diagnosis.  7/29--Patient had episode of hematemesis. Decision made for EGD.  Gastritis vs. Variceal bleed. EGD showing large gastric variceal bleed. In Endoscopy: received 12 units PRBC, 10 units FFP, 2 units Plt, and 1 of Cryo. GI unable to obtain hemostasis. Emergently transferred to IR for further intervention. In IR, embolization of bleeding gastric varices.  Received TOTAL 34 PRBC, 25  FFP, 5 PLT, and 2 Cryo prior to MICU.  Pt extubated on 8/4 on NC. CRRT d/c on 8/5 assessing for renal recovery today. As per nursing, pt passed RN swallow screen however concern given pt hypophonic and weak cough.    Dysphagia history: S/p BSE with recommendations for regular solids/ thin liquids, as per MD. Pt downgraded to full liquids. S/p MR abdomen:  1.3 x 0.9 cm left adrenal hemorrhage. Cirrhotic liver with evidence of portal hypertension. Pt was advanced to soft solid diet with request for this service to follow up on 8/10 to determine if able to tolerate regular solid diet/thin liquids.     Re-assessment:    Encountered pt A0x4. Transferred to 5MON from ICU. Observed pt with breakfast in addition to regular solid/cookie from SLP. Orientation/reception and ability to generate negative pressure for bolus extraction via straw was present; single and serial sips. He was able to bite through a regular solid with ease. Adequate mastication/manipulation present. No oral pocketing. Pharyngeal phase c/b timely swallow trigger, yielding no s/s of aspiration with 4oz of liquids consumed.  Denied stuck sensation and odynophagia. Purposeful proactive rounding reinforced and 5 Ps addressed. Pt left in no distress.     Call placed to teamDeandre.     Recommendations:  Regular solids/ thin liquids  Aspiration precautions     This service is no longer warranted. If any change in status please re-consult.    Essie Farrell MS CCC-SLP pager [phone]

## 2020-08-10 NOTE — CONSULT NOTE ADULT - REASON FOR ADMISSION
Worsening jaundice, ABD pain/distension
abdominal pain

## 2020-08-10 NOTE — PROGRESS NOTE ADULT - PROBLEM SELECTOR PLAN 2
etoh cirrhosis, gastric variceal bleed and ascites. varices - no prior EGD, on nadolol at home, now holding due to shock. ascites present on exam, neg for SBP 7/26, on Lasix 20mg/spironolactone 25mg daily as outpatient, now holding due to hypotension.  -hepatology following, appreciate recs  -MELD score 37 on 08/10  -c/w midodrine 10mg q8h  -transplant eval as outpatient pending alcohol rehab, not currently a candidate at this time Acute on chronic liver failure  pt w/ hx etoh cirrhosis, now c/b gastric variceal bleed and ascites. holding home nadolol, lasix, and spironolactone due to hypotension/shock. differential for acute liver failure includes worsening EtOH hepatitis (MDF = 52.7 but no interval drinking since discharge and this is an acute change) vs. underlying infection (UA neg, BCx negative, CXR neg, dx para neg for SBP, biliary imaging negative, C diff negative) vs vascular injury. no current signs of hepatic encephalopathy (A&Ox4). INR elevated, platelets low.   -hepatology following, appreciate recs - pt clear for d/c from hepatology standpoint, will likely need permacath for HD and placement in HD center   -c/w rifaximin and lactulose  -c/w midodrine 10mg q8h  -c/w protonix 40mg PO daily  -MELD score 37/MDF 52.7 on 8/10  -transplant eval as outpatient pending alcohol rehab, not currently a transplant candidate at this time, pt has joined online etoh rehab during hospitalization

## 2020-08-11 NOTE — PROGRESS NOTE ADULT - SUBJECTIVE AND OBJECTIVE BOX
[facility] DIVISION OF KIDNEY DISEASES AND HYPERTENSION   -- FOLLOW UP NOTE --   Bhupinder Truong  Nephrology Fellow  Pager NS: [phone]   /  Pager [facility]: [phone]  (after 5pm or weekend please page the on-call fellow)  --------------------------------------------------------------------------------  24 hour events/subjective:  - yesterday patient had HD w/ 500 cc removed  - overnight no events reported, vitals afebrile no hypotensive episode  - patient seen and examined at bedside this morning without complaints on room air  - vitals/lab/medications reviewed    PAST HISTORY  --------------------------------------------------------------------------------  No significant changes to PMH, PSH, FHx, SHx, unless otherwise noted    ALLERGIES & MEDICATIONS  --------------------------------------------------------------------------------  Allergies    No Known Allergies    Intolerances    Standing Inpatient Medications  chlorhexidine 4% Liquid 1 Application(s) Topical <User Schedule>  hepatitis A (virus) Vaccine - Adult (HAVRIX) 1 milliLiter(s) IntraMuscular once  melatonin 1 milliGRAM(s) Oral at bedtime  midodrine 10 milliGRAM(s) Oral every 8 hours  nystatin Powder 1 Application(s) Topical two times a day  pantoprazole    Tablet 40 milliGRAM(s) Oral before breakfast  pneumococcal  13 Vaccine (PREVNAR 13) 0.5 milliLiter(s) IntraMuscular once  polyethylene glycol 3350 17 Gram(s) Oral two times a day  rifAXIMin 550 milliGRAM(s) Oral two times a day  thiamine 100 milliGRAM(s) Oral daily    PRN Inpatient Medications  HYDROmorphone  Injectable 0.2 milliGRAM(s) IV Push every 4 hours PRN  ondansetron   Disintegrating Tablet 4 milliGRAM(s) Oral daily PRN  sodium chloride 0.9% lock flush 10 milliLiter(s) IV Push every 1 hour PRN    REVIEW OF SYSTEMS  --------------------------------------------------------------------------------  Gen: no fever, chills, weakness  Respiratory: No dyspnea, cough  CV: No chest pain, orthopnea  GI: No abdominal pain, nausea, vomiting, diarrhea  MSK: no edema  Neuro: No dizziness, lightheadedness  Heme: No bleeding  All other systems were reviewed and are negative, except as noted.    VITALS/PHYSICAL EXAM  --------------------------------------------------------------------------------  T(C): 36.9 (08-11-20 @ 05:26), Max: 37 (08-10-20 @ 21:42)  HR: 97 (08-11-20 @ 05:26) (92 - 118)  BP: 118/73 (08-11-20 @ 05:26) (101/60 - 138/82)  RR: 18 (08-11-20 @ 05:26) (18 - 18)  SpO2: 93% (08-11-20 @ 05:26) (93% - 97%)  Wt(kg): --    08-10-20 @ 07:01  -  08-11-20 @ 07:00  --------------------------------------------------------  IN: 800 mL / OUT: 1300 mL / NET: -500 mL    Physical Exam:              Gen: NAD on room air              HEENT: sclera icterus, MMM  	Pulm: CTA b/l  	CV: RRR, S1S2, no murmur  	GI: +BS, soft, distended  	: no ashton  	MSK: Warm, no edema              Neuro: AAOx3  	Skin: Warm, no cyanosis, jaundice appearing  	Vascular access: RIJ non tunnel hd catheter    LABS/STUDIES  --------------------------------------------------------------------------------              8.2    24.55 >-----------<  133      [08-11-20 @ 06:49]              23.3     133  |  92  |  32  ----------------------------<  104      [08-11-20 @ 06:50]  3.9   |  24  |  5.32        Ca     8.5     [08-11-20 @ 06:50]      Mg     2.1     [08-11-20 @ 06:50]      Phos  4.8     [08-11-20 @ 06:50]    TPro  5.6  /  Alb  3.4  /  TBili  21.3  /  DBili  x   /  AST  153  /  ALT  19  /  AlkPhos  138  [08-11-20 @ 06:50]    PT/INR: PT 23.0 , INR 2.00       [08-11-20 @ 06:49]  PTT: 38.9       [08-11-20 @ 06:49]    Creatinine Trend:  SCr 5.32 [08-11 @ 06:50]  SCr 7.04 [08-10 @ 06:56]  SCr 5.87 [08-09 @ 07:20]  SCr 3.98 [08-08 @ 05:50]  SCr 4.26 [08-07 @ 02:35]    Urinalysis - [08-07-20 @ 04:54]      Color Dark Orange / Appearance Turbid / SG 1.041 / pH 6.0      Gluc Negative / Ketone Negative  / Bili Moderate / Urobili 8 mg/dL       Blood Moderate / Protein 100 mg/dL Test Performed on Urine Supernate. / Leuk Est Negative / Nitrite Negative      RBC 30 / WBC 18 / Hyaline 10 / Gran  / Sq Epi  / Non Sq Epi 7 / Bacteria Negative    Urine Creatinine 118      [08-06-20 @ 22:42]  Urine Sodium <35      [08-06-20 @ 22:42]  Urine Urea Nitrogen 302      [08-07-20 @ 01:54]    Iron 34, TIBC 124, %sat 27      [07-14-20 @ 10:34]  Ferritin 812      [07-12-20 @ 08:58]  PTH -- (Ca 6.2)      [07-29-20 @ 09:18]   223  Lipid: chol --, , HDL --, LDL --      [08-03-20 @ 12:04]    HBsAb 276.9      [08-10-20 @ 23:16]  HBsAb Reactive      [08-08-20 @ 10:42]  HBsAg Nonreact      [08-10-20 @ 23:34]  HCV 0.27, Nonreact      [08-10-20 @ 23:16]  HIV Nonreact      [07-15-20 @ 10:47]    Syphilis Screen (Treponema Pallidum Ab) Negative      [08-08-20 @ 09:47]

## 2020-08-11 NOTE — PROGRESS NOTE ADULT - PROBLEM SELECTOR PLAN 7
secondary to gastric variceal bleed. refractory to endoscopic intervention 7/29 s/p IR PARTO 7/30, blakemore 7/30, and 34 U PRBC, 25 U FFP, 20 U PLT, and 5 U cryo all on 7/30. blakemore removed 7/30. tolerated tube feeds without evidence of bleeding  -CBC daiy - transfuse to maintain Hgb > 7, or for active bleed  -coags daily - replete with FFP, cryo as needed secondary to gastric variceal bleed. refractory to endoscopic intervention 7/29 s/p IR PARTO 7/30, blakemore 7/30, and 34 U PRBC, 25 U FFP, 20 U PLT, and 5 U cryo all on 7/30. blakemore removed 7/30. tolerated tube feeds without evidence of bleeding  -CBC daily - transfuse to maintain Hgb > 7, or for active bleed  -coags daily - replete with FFP, cryo as needed

## 2020-08-11 NOTE — PROGRESS NOTE ADULT - PROBLEM SELECTOR PLAN 2
Pt. with hyponatremia hypervolemia in setting of liver cirrhosis and severe ascites. On admission sNa 133 (7/26), dropped to 131, now improving w/ last sNa 133  - Continue HD as outline above  - Monitor BMP daily  - Avoid hypotonic saline/D5w, glycemic control

## 2020-08-11 NOTE — PROGRESS NOTE ADULT - PROBLEM SELECTOR PLAN 1
possibly secondary to hepatorenal syndrome. 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 baseline creatinine sCr 0.6-0.8. On admission sCr was 9.64. s/p CVVHD w/ improvement in renal fx, but remains HD dependent.   -nephrology following, appreciate recs - HD held over weekend to monitor for renal recovery, but sCr uptrended & pt w/ continued oliguria so will get HD today  -zofran 4mg IV prn nausea w/ dialysis  -c/w hold diuretics at this time   -daily CMP  -monitor UOP, strict I&Os  -avoid NSAIDs, ACEI/ARBS, RCA and nephrotoxins.   -dose medications as per eGFR. possibly secondary to hepatorenal syndrome. 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 baseline creatinine sCr 0.6-0.8. On admission sCr was 9.64. s/p CVVHD w/ improvement in renal fx, but remains HD dependent.   -nephrology following, appreciate recs - HD yesterday, holding today  -IR will place permacath tomorrow 8/12 am, NPO @ midnight, active T&S  -zofran 4mg PO prn nausea w/ dialysis  -c/w hold diuretics at this time   -daily CMP  -monitor UOP, strict I&Os  -avoid NSAIDs, ACEI/ARBS, RCA and nephrotoxins.   -dose medications as per eGFR.

## 2020-08-11 NOTE — PROGRESS NOTE ADULT - ASSESSMENT
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   Had a massive variceal bleed with shock 7/29 s/p unsuccessful endoscopic intervention requiring IR embolization and transfusion of lots of blood products.   Recovered and downgraded 8/7.   Leukocytosis is common in this setting. Suspect reactive. Completed an adequate course of antibiotics after bleeding. Afebrile, nontoxic, cultures and imaging without infection.   Currently not a candidate for transplants, need alcohol rehab first.   Looks well today.     Suggest  -monitor off antibiotics   -no infectious contraindication to placing a permacath for dialysis   -f/u Quantiferon Gold  -start HAV and pneumococcal vaccines (PCV13) while inpatient, he can complete the series in our office after discharge; reports receiving TDaP two years ago     Will sign off, please call back if needed   Spoke with primary team     Justin Mena MD   Infectious Disease   Pager [phone]   After 5PM and on weekends please page fellow on call or call [phone]

## 2020-08-11 NOTE — PROGRESS NOTE ADULT - SUBJECTIVE AND OBJECTIVE BOX
Follow Up: Leukocytosis     Interval History/ROS: Feels fine. No pain. No chills or fevers. No diarrhea or dysuria. Breathing comfortable.     Allergies  No Known Allergies    ANTIMICROBIALS:  rifAXIMin 550 two times a day      OTHER MEDS:  chlorhexidine 4% Liquid 1 Application(s) Topical <User Schedule>  hepatitis A (virus) Vaccine - Adult (HAVRIX) 1 milliLiter(s) IntraMuscular once  HYDROmorphone  Injectable 0.2 milliGRAM(s) IV Push every 4 hours PRN  melatonin 1 milliGRAM(s) Oral at bedtime  midodrine 10 milliGRAM(s) Oral every 8 hours  nystatin Powder 1 Application(s) Topical two times a day  ondansetron   Disintegrating Tablet 4 milliGRAM(s) Oral daily PRN  pantoprazole    Tablet 40 milliGRAM(s) Oral before breakfast  pneumococcal  13 Vaccine (PREVNAR 13) 0.5 milliLiter(s) IntraMuscular once  polyethylene glycol 3350 17 Gram(s) Oral two times a day  sodium chloride 0.9% lock flush 10 milliLiter(s) IV Push every 1 hour PRN  thiamine 100 milliGRAM(s) Oral daily      Vital Signs Last 24 Hrs  T(C): 36.9 (11 Aug 2020 05:26), Max: 37 (10 Aug 2020 21:42)  T(F): 98.5 (11 Aug 2020 05:26), Max: 98.6 (10 Aug 2020 21:42)  HR: 97 (11 Aug 2020 05:26) (92 - 118)  BP: 118/73 (11 Aug 2020 05:26) (101/60 - 138/82)  BP(mean): --  RR: 18 (11 Aug 2020 05:26) (18 - 18)  SpO2: 93% (11 Aug 2020 05:26) (93% - 97%)    Physical Exam:  General: awake, alert, non toxic  Head: atraumatic, normocephalic  Eye: normal sclera and conjunctiva  ENT: no oropharyngeal lesions, no cervical lymphadenopathy   Cardio: regular rate and rhythm   Respiratory: nonlabored on room air, clear bilaterally, no wheezing  abd: soft, bowel sounds present, no tenderness  : no CVAT, no suprapubic tenderness, no ashton  Musculoskeletal: no focal joint swelling, no edema  vascular: no phlebitis   Skin: no rash  Neurologic: no focal deficit  psych: normal affect                          8.2    24.55 )-----------( 133      ( 11 Aug 2020 06:49 )             23.3       08-11    133<L>  |  92<L>  |  32<H>  ----------------------------<  104<H>  3.9   |  24  |  5.32<H>    Ca    8.5      11 Aug 2020 06:50  Phos  4.8     08-11  Mg     2.1     08-11    TPro  5.6<L>  /  Alb  3.4  /  TBili  21.3<H>  /  DBili  x   /  AST  153<H>  /  ALT  19  /  AlkPhos  138<H>  08-11          MICROBIOLOGY:  v  CMV IgG Antibody: 0.51 U/mL (08-08-20 @ 09:47)  Toxoplasma IgG Screen: <3.0 IU/mL (08-08-20 @ 09:47)          RADIOLOGY:  Images below reviewed personally Follow Up: Leukocytosis     Interval History/ROS: Feels fine. No pain. No chills or fevers. No diarrhea or dysuria. Breathing comfortable.     Allergies  No Known Allergies    ANTIMICROBIALS:  rifAXIMin 550 two times a day      OTHER MEDS:  chlorhexidine 4% Liquid 1 Application(s) Topical <User Schedule>  hepatitis A (virus) Vaccine - Adult (HAVRIX) 1 milliLiter(s) IntraMuscular once  HYDROmorphone  Injectable 0.2 milliGRAM(s) IV Push every 4 hours PRN  melatonin 1 milliGRAM(s) Oral at bedtime  midodrine 10 milliGRAM(s) Oral every 8 hours  nystatin Powder 1 Application(s) Topical two times a day  ondansetron   Disintegrating Tablet 4 milliGRAM(s) Oral daily PRN  pantoprazole    Tablet 40 milliGRAM(s) Oral before breakfast  pneumococcal  13 Vaccine (PREVNAR 13) 0.5 milliLiter(s) IntraMuscular once  polyethylene glycol 3350 17 Gram(s) Oral two times a day  sodium chloride 0.9% lock flush 10 milliLiter(s) IV Push every 1 hour PRN  thiamine 100 milliGRAM(s) Oral daily      Vital Signs Last 24 Hrs  T(C): 36.9 (11 Aug 2020 05:26), Max: 37 (10 Aug 2020 21:42)  T(F): 98.5 (11 Aug 2020 05:26), Max: 98.6 (10 Aug 2020 21:42)  HR: 97 (11 Aug 2020 05:26) (92 - 118)  BP: 118/73 (11 Aug 2020 05:26) (101/60 - 138/82)  BP(mean): --  RR: 18 (11 Aug 2020 05:26) (18 - 18)  SpO2: 93% (11 Aug 2020 05:26) (93% - 97%)    Physical Exam:  General: awake, alert, non toxic  Head: atraumatic, normocephalic  Eye: icteric sclera   ENT: no oropharyngeal lesions, no cervical lymphadenopathy   Cardio: regular rate   Respiratory: nonlabored on room air, clear bilaterally, no wheezing  abd: distended, soft, bowel sounds present, no tenderness  vascular: right IJ CVC dialysis, dressing secured today, no phlebitis   Skin: jaundiced   Neurologic: no focal deficit  psych: normal affect                          8.2    24.55 )-----------( 133      ( 11 Aug 2020 06:49 )             23.3       08-11    133<L>  |  92<L>  |  32<H>  ----------------------------<  104<H>  3.9   |  24  |  5.32<H>    Ca    8.5      11 Aug 2020 06:50  Phos  4.8     08-11  Mg     2.1     08-11    TPro  5.6<L>  /  Alb  3.4  /  TBili  21.3<H>  /  DBili  x   /  AST  153<H>  /  ALT  19  /  AlkPhos  138<H>  08-11          MICROBIOLOGY:  Culture - Blood (collected 08-07-20 @ 04:30)  Source: .Blood Blood-Peripheral  Preliminary Report (08-08-20 @ 05:00):    No growth to date.    Culture - Blood (collected 08-07-20 @ 01:16)  Source: .Blood Blood-Venous  Preliminary Report (08-08-20 @ 02:01):    No growth to date.    TRANSPLANT CLEARANCE LABWORK  HIV-1/2 Combo Result: Nonreact (07-15-20 @ 10:47)    Treponema Pallidum Antibody Interpretation: Negative (08-08-20 @ 09:47)    EBV VCA IgM EIA: <10.0 U/mL (07-12-20 @ 09:33)  EBV VCA IgG EIA: 182.0 U/mL (07-12-20 @ 09:33)  EBV EA Ab EIA: <5.0 U/mL (07-12-20 @ 09:33)     CMV IgG Antibody: 0.51 U/mL (08-08-20 @ 09:47)  CMV IgG Interpretation: Negative (08-08-20 @ 09:47)     Herpes simplex 1 IgG Ab Result: 5.70 Index (08-08-20 @ 09:47)  Herpes simplex 1 IgG Ab Interp: Positive (08-08-20 @ 09:47)  Herpes simplex 2 IgG Ab Result: 4.08 Index (08-08-20 @ 09:47)  Herpes simplex 2 IgG Ab Interp: Positive (08-08-20 @ 09:47)     Varicella IgG Antibody: 1874.0 Index (08-08-20 @ 09:47)  Varicella IgG Interpretation: Positive (08-08-20 @ 09:47)     Hepatitis A IgG Ab Result: Nonreact (07-12-20 @ 08:58)    Hepatitis B Surface Antibody: Reactive (08-08-20 @ 10:42)  Hepatitis B Surface Antigen: Nonreact (07-12-20 @ 01:16)  Hepatitis B Core IgM Antibody: Nonreact (07-12-20 @ 01:16)    Hepatitis C Virus S/CO Ratio: 0.14 S/CO (07-12-20 @ 01:16)  Hepatitis C Virus Interpretation: Nonreact (07-12-20 @ 01:16)     Hepatitis E IgG Antibodies: Negative (07-12-20 @ 10:01)    Mumps Virus IgG Antibody.: 48.5 AU/mL (08-08-20 @ 09:47)  Mumps Antibody Interpretation: Positive (08-08-20 @ 09:47)    Measles IgG Interpretation: Positive (08-08-20 @ 09:47)    Rubella Virus IgG Antibody.: 2.7 Index (08-08-20 @ 09:47)  Rubella IgG Interp: Positive (08-08-20 @ 09:47)    RADIOLOGY:  Images below reviewed personally  MR Abdomen No Cont (08.09.20 @ 12:30)   1.  1.3 x 0.9 cm left adrenal hemorrhage.  2.  Cirrhotic liver with evidence of portal hypertension.

## 2020-08-11 NOTE — PROGRESS NOTE ADULT - PROBLEM SELECTOR PLAN 2
Acute on chronic liver failure  pt w/ hx etoh cirrhosis, now c/b gastric variceal bleed and ascites. holding home nadolol, lasix, and spironolactone due to hypotension/shock. differential for acute liver failure includes worsening EtOH hepatitis (MDF = 52.7 but no interval drinking since discharge and this is an acute change) vs. underlying infection (UA neg, BCx negative, CXR neg, dx para neg for SBP, biliary imaging negative, C diff negative) vs vascular injury. no current signs of hepatic encephalopathy (A&Ox4). INR elevated, platelets low.   -hepatology following, appreciate recs - pt clear for d/c from hepatology standpoint, will likely need permacath for HD and placement in HD center   -c/w rifaximin and lactulose  -c/w midodrine 10mg q8h  -c/w protonix 40mg PO daily  -MELD score 37/MDF 52.7 on 8/10  -transplant eval as outpatient pending alcohol rehab, not currently a transplant candidate at this time, pt has joined online etoh rehab during hospitalization Acute on chronic liver failure  pt w/ hx etoh cirrhosis, now c/b gastric variceal bleed and ascites. holding home nadolol, lasix, and spironolactone due to hypotension/shock. differential for acute liver failure includes worsening EtOH hepatitis (MDF = 52.7 but no interval drinking since discharge and this is an acute change) vs. underlying infection (UA neg, BCx negative, CXR neg, dx para neg for SBP, biliary imaging negative, C diff negative) vs vascular injury. no current signs of hepatic encephalopathy (A&Ox4). INR elevated, platelets low.   -hepatology following, appreciate recs - pt clear for d/c from hepatology standpoint, will likely need placement in HD center   -c/w rifaximin  -d/c lactulose, start miralax BID  -c/w midodrine 10mg q8h  -c/w protonix 40mg PO daily  -MELD-Na 39/MDF 67 on 8/11  -transplant eval as outpatient pending alcohol rehab, not currently a transplant candidate at this time, pt has joined online etoh rehab during hospitalization

## 2020-08-11 NOTE — PROGRESS NOTE ADULT - ASSESSMENT
39M w/ decompensated EtOH cirrhosis, presenting w/ acute on chronic liver failure of unclear etiology (infection NOS vs continued inflammation due to EtOH)a. Course c/b massive GIB from gastric varix requiring 25u pRBC, s/p glue injection and PARTO by IR. Hb stable post procedure, now on floor w/ stably high MELD-Na labs. Remains HD dependent, plan for outpatient liver transplant evaluation.      Impression:  #Acute on chronic liver failure: d/dx includes underlying infection (UA neg, BCx NGTD from this admission, CXR neg, dx para neg for SBP, biliary imaging negative, C diff negative) vs worsening EtOH hepatitis (MDF = 60 but no interval drinking since discharge and this is an acute change)  #Cirrhosis due to EtOH, decompensated by ascites  - varices: no esophageal varices on EGD 7/29, actively bleeding gastric varix s/p glue injection and PARTO by IR, bleeding appears to be controlled  - ascites: present on exam, neg for SBP 7/26, on Lasix 20mg/spironolactone 25mg daily as outpatient, s/p 1.5L removed during PARTO 7/29  - HE: none on exam  - HCC: no imaging, MRI without contrast is limited  - MELD-Na = 39 on 8/11  - Transplant evaluation to be completed as outpatient, pt has already joined online EtOH rehab and reports commitment to continued attendance  #L adrenal hemorrhage – no e/o adrenal insufficiency or acute blood loss anemia  #Acute renal failure: unclear etiology, c/f bilirubin pigment nephropathy vs acute tubular necrosis vs obstructive uropathy. Reny is 53, making HRS less likely. S/p CVVH/HD w/ improvement in renal fx, but remains HD dependent     Recommendations:  - would d/c lactulose given bloating and start standing miralax BID  - d/c IV Zofran, would give PO prior to HD given anxiety  - permacath per IR  - please arrange outpatient HD as per renal recs  - HD per renal  - f/u PT/rehab recs re: placement/dispo planning  - c/w PO PPI daily  - continue with rifaximin, miralax BID as above  - c/w MVI, thiamine and folate  - trend CMP, CBC, INR daily  - transplant hepatology will continue to follow  - pt will need to attend alcohol rehab as outpatient  - outpatient f/u w/ Dr. Funez within 1-2 weeks post-discharge, please contact us on discharge to arrange    Ross Shook  Gastroenterology Fellow  AT NIGHT AND ON WEEKENDS:  Contact on-call GI fellow via answering service ([phone]) from 5pm-8am and on weekends/holidays  MONDAY-FRIDAY 8AM-5PM:  Available via [employer] Teams  Call [phone] ([facility]) or Page [phone] ([facility]) from 8am-5pm M-F

## 2020-08-11 NOTE — PROGRESS NOTE ADULT - PROBLEM SELECTOR PLAN 1
.  - No plan for HD today, will schedule for tomorrow 8/12 after permacath placed  - planning for IR permacath placement likely tomorrow 8/12  - Monitor electrolytes and urine output, continue monitor for renal recovery  - Avoid nephrotoxic agents (NSAIDs, PPI, contrast)  - Dose medications as per HD

## 2020-08-11 NOTE — PROGRESS NOTE ADULT - ASSESSMENT
anuric MARQUISE 2/2 ATN, currently dialysis dependent  - MARQUISE in the setting of varices bleed/anemia, diarrhea, diuretics and decrease effective arterial blood volume from cirrhosis  - On admission 9.64 (baseline 0.6-0.8), peaked to 11.9, initiated on HD on 7/28 for anuric and fluid overload.  Pt underwent EGD (7/29) c/b active massive bleeding s/p IR for embolization and intubation.  Pt was extubation on 8/4 and discontinued CRRT 8/5  then transitioned to HD on 8/6.  Currently patient is still anuric, last HD 8/11 awaiting renal recovery

## 2020-08-11 NOTE — PROGRESS NOTE ADULT - PROBLEM SELECTOR PLAN 3
hypophosphatemia likely 2/2 MARQUISE ATN, today serum phos 4.8  - Renal diet (low phosphorous diet)  - monitor serum phos daily

## 2020-08-11 NOTE — CHART NOTE - NSCHARTNOTEFT_GEN_A_CORE
Nutrition Follow-up  Patient seen for: malnutrition follow-up    Source: comprehensive chart review, ( )     Hospital course as per chart: Pt is a 38 yo male with PMH of alcoholic liver cirrhosis, alcohol use disorder, traumatic bladder rupture s/p ex lap (2019), who presented with diffused crampy abdominal pain, admitted 7/26 for acute on chronic liver failure and MARQUISE with ARF secondary to bilirubin pigment nephropathy vs hepatorenal syndrome requiring HD complicated by gastric varices refractory to endoscopic intervention. Extubated 8/4 and titrated off vasopressors 8/5. CRRT discontinued 8/5 and transitioned to HD on 8/6. Transferred to floors 8/7. Last HD yesterday (8/10), 500 ml fluid removed per flowsheets.    Chart reviewed, events noted.    Diet: Regular, No Concentrated Potassium, No Concentrated Phosphorus    Patient reports ( )    Encouraged PO intake as tolerated ( ).     Education provided:     PO intake:  < 50% [ ] 50-75% [ ]   % [ ]  other :    Source for PO intake:     Most recent weight: 188.4 pounds (8/10 post-HD)   Weight fluctuations noted, likely in setting of HD initiation/fluid shifts. Noted currently with edema. Will continue to monitor and trend weights.     Pertinent Medications: MEDICATIONS  (STANDING):  chlorhexidine 4% Liquid 1 Application(s) Topical <User Schedule>  hepatitis A (virus) Vaccine - Adult (HAVRIX) 1 milliLiter(s) IntraMuscular once  melatonin 1 milliGRAM(s) Oral at bedtime  midodrine 10 milliGRAM(s) Oral every 8 hours  nystatin Powder 1 Application(s) Topical two times a day  pantoprazole    Tablet 40 milliGRAM(s) Oral before breakfast  pneumococcal  13 Vaccine (PREVNAR 13) 0.5 milliLiter(s) IntraMuscular once  polyethylene glycol 3350 17 Gram(s) Oral two times a day  rifAXIMin 550 milliGRAM(s) Oral two times a day  thiamine 100 milliGRAM(s) Oral daily    MEDICATIONS  (PRN):  HYDROmorphone  Injectable 0.2 milliGRAM(s) IV Push every 4 hours PRN Severe Pain (7 - 10)  ondansetron   Disintegrating Tablet 4 milliGRAM(s) Oral daily PRN Nausea  sodium chloride 0.9% lock flush 10 milliLiter(s) IV Push every 1 hour PRN Pre/post blood products, medications, blood draw, and to maintain line patency    Pertinent Labs:  08-11 Na133 mmol/L<L> Glu 104 mg/dL<H> K+ 3.9 mmol/L Cr  5.32 mg/dL<H> BUN 32 mg/dL<H> 08-11 Phos 4.8 mg/dL<H> 08-11 Alb 3.4 g/dL 08-03 Chol --    LDL --    HDL --    Trig 163 mg/dL<H>    Skin: no pressure injuries per flowsheets   Edema: 1+ generalized edema, 2+ edema to b/l ankles as per flowsheets    Estimated Needs: recalculated in setting of HD   Based on 8/10 post-HD weight 188.4 pounds (85.6 kg)  Estimated calorie needs (30-35 kcal/kg): 7684-1347 kcal/day  Estimated protein needs (1.2-1.4 g/kg): 103-120 g/day  Defer fluids to team    Previous Nutrition Diagnosis: Severe malnutrition  Nutrition Diagnosis is [x] ongoing  [ ] resolved [ ] not applicable   Care plan: [ ] in progress [ ] achieved  Being addressed with: ( )      New Nutrition Diagnosis: [ ] not applicable    [ ] Inadequate Protein Energy Intake [ ]Inadequate Oral Intake [ ] Excessive Energy Intake     [ ] Underweight [ ] Increased Nutrient Needs [ ] Overweight/Obesity     [ ] Altered GI Function [ ] Unintended Weight Loss [ ] Food & Nutrition Related Knowledge Deficit[ ] Limited Adherence to nutrition related recommendations [ ] Malnutrition  [ ] other: Free text    Recommendations:  1)     Monitoring and Evaluation: Monitor PO intake, weight, labs, skin, GI status, diet     RD remains available upon request and will continue to follow-up per protocol.   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 Extubated 8/4 and titrated off vasopressors 8/5. CRRT discontinued 8/5 and transitioned to HD on 8/6. Transferred to floors 8/7. Last HD yesterday (8/10), 500 ml fluid removed per flowsheets.    Chart reviewed, events noted.    Diet: Regular, No Concentrated Potassium, No Concentrated Phosphorus    Patient reports good appetite and fair intake, consuming ~50% of meals. Denies nausea/vomiting/diarrhea/constipation. Last BM today (8/11) per patient.     Encouraged PO intake as tolerated. Provided Hemodialysis diet education verbally to patient and with handouts. Discussed increased nutrient needs in setting of hemodialysis treatment and importance of adequate calorie and protein intake. Educated patient on dietary restrictions of sodium, potassium, and phosphorus in setting of hemodialysis treatment. Provided Hemodialysis Diet educational handout. Educated patient on food sources high in potassium, phosphorus, and sodium. Discussed with patient availability of Nepro supplement to optimize calorie and protein intake, patient amenable to receiving. Encouraged patient to focus on foods at mealtimes and drink Nepro supplement in-between meals to avoid early satiety at mealtimes. Answered all nutrition-related questions. Discussed availability of RD at outpatient HD center. Patient made aware RD remains available.     PO intake:  ~50%     Source for PO intake: patient    Most recent weight: 188.4 pounds (8/10 post-HD)   Weight fluctuations noted, likely in setting of HD initiation/fluid shifts. Noted currently with edema. Will continue to monitor and trend weights.     Pertinent Medications: MEDICATIONS  (STANDING):  chlorhexidine 4% Liquid 1 Application(s) Topical <User Schedule>  hepatitis A (virus) Vaccine - Adult (HAVRIX) 1 milliLiter(s) IntraMuscular once  melatonin 1 milliGRAM(s) Oral at bedtime  midodrine 10 milliGRAM(s) Oral every 8 hours  nystatin Powder 1 Application(s) Topical two times a day  pantoprazole    Tablet 40 milliGRAM(s) Oral before breakfast  pneumococcal  13 Vaccine (PREVNAR 13) 0.5 milliLiter(s) IntraMuscular once  polyethylene glycol 3350 17 Gram(s) Oral two times a day  rifAXIMin 550 milliGRAM(s) Oral two times a day  thiamine 100 milliGRAM(s) Oral daily    MEDICATIONS  (PRN):  HYDROmorphone  Injectable 0.2 milliGRAM(s) IV Push every 4 hours PRN Severe Pain (7 - 10)  ondansetron   Disintegrating Tablet 4 milliGRAM(s) Oral daily PRN Nausea  sodium chloride 0.9% lock flush 10 milliLiter(s) IV Push every 1 hour PRN Pre/post blood products, medications, blood draw, and to maintain line patency    Pertinent Labs:  08-11 Na133 mmol/L<L> Glu 104 mg/dL<H> K+ 3.9 mmol/L Cr  5.32 mg/dL<H> BUN 32 mg/dL<H> 08-11 Phos 4.8 mg/dL<H> 08-11 Alb 3.4 g/dL 08-03 Chol --    LDL --    HDL --    Trig 163 mg/dL<H>    Skin: no pressure injuries per flowsheets   Edema: 1+ generalized edema, 2+ edema to b/l ankles as per flowsheets    Estimated Needs: recalculated in setting of HD   Based on 8/10 post-HD weight 188.4 pounds (85.6 kg)  Estimated calorie needs (30-35 kcal/kg): 9353-7682 kcal/day  Estimated protein needs (1.2-1.4 g/kg): 103-120 g/day  Defer fluids to team    Previous Nutrition Diagnosis: Severe malnutrition  Nutrition Diagnosis is [x] ongoing  [ ] resolved [ ] not applicable   Care plan: [x] in progress [ ] achieved  Being addressed with: PO diet; to be addressed with Nepro supplements + Nephrovite     New Nutrition Diagnosis: Food & Nutrition Related Knowledge Deficit related to Hemodialysis diet as evidenced by patient with limited prior knowledge of HD dietary restrictions, new HD.  Goal: Pt able to teach back 2 points of nutrition education provided.    Recommendations:  1) Continue current diet: No Concentrated Potassium, No Concentrated Phosphorus restrictions. Monitor need for additional restrictions, adjust as needed.  2) Recommend Nepro 2 daily (425 kcal, 19 g protein in each) to optimize intake   3) Suggest Nephrovite supplementation if no medical contraindications   4) Encouraged PO intake as tolerated. New Hemodialysis diet education provided with handout. Patient made aware RD remains available.     Monitoring and Evaluation: Monitor PO intake, weight, labs, skin, GI status, diet     RD remains available upon request and will continue to follow-up per protocol.   Carmina Colón MS, RD CDN pager #[phone]

## 2020-08-11 NOTE — PROGRESS NOTE ADULT - SUBJECTIVE AND OBJECTIVE BOX
PROGRESS NOTE:   Authored by Deandre Dwyer -[phone]    Patient is a 39y old  Male who presents with a chief complaint of abdominal pain (10 Aug 2020 16:58)    SUBJECTIVE / OVERNIGHT EVENTS:  pt noted to be tachycardic w/ soft BP after HD. No interventions given. Pt states he was feeling anxious at the time. Pt with no complaints this am. He denies chest pain, SOB, abdominal pain, n/v/d.      REVIEW OF SYSTEMS:  CONSTITUTIONAL: No weakness, fevers or chills  EYES/ENT: No visual changes;  No vertigo or throat pain   NECK: No pain or stiffness  RESPIRATORY: No cough, wheezing, hemoptysis; No shortness of breath  CARDIOVASCULAR: No chest pain or palpitations  GASTROINTESTINAL: No abdominal or epigastric pain. No nausea, vomiting, or hematemesis; No diarrhea or constipation. No melena or hematochezia.  GENITOURINARY: No dysuria, frequency or hematuria  NEUROLOGICAL: No numbness or weakness  SKIN: No itching, rashes    MEDICATIONS  (STANDING):  chlorhexidine 4% Liquid 1 Application(s) Topical <User Schedule>  hepatitis A (virus) Vaccine - Adult (HAVRIX) 1 milliLiter(s) IntraMuscular once  lactulose Syrup 30 Gram(s) Oral every 6 hours  melatonin 1 milliGRAM(s) Oral at bedtime  midodrine 10 milliGRAM(s) Oral every 8 hours  nystatin Powder 1 Application(s) Topical two times a day  pantoprazole    Tablet 40 milliGRAM(s) Oral before breakfast  pneumococcal  13 Vaccine (PREVNAR 13) 0.5 milliLiter(s) IntraMuscular once  rifAXIMin 550 milliGRAM(s) Oral two times a day  thiamine 100 milliGRAM(s) Oral daily    MEDICATIONS  (PRN):  HYDROmorphone  Injectable 0.2 milliGRAM(s) IV Push every 4 hours PRN Severe Pain (7 - 10)  sodium chloride 0.9% lock flush 10 milliLiter(s) IV Push every 1 hour PRN Pre/post blood products, medications, blood draw, and to maintain line patency      CAPILLARY BLOOD GLUCOSE      POCT Blood Glucose.: 114 mg/dL (11 Aug 2020 05:40)  POCT Blood Glucose.: 120 mg/dL (10 Aug 2020 23:57)  POCT Blood Glucose.: 101 mg/dL (10 Aug 2020 18:31)  POCT Blood Glucose.: 125 mg/dL (10 Aug 2020 12:04)    I&O's Summary    10 Aug 2020 07:01  -  11 Aug 2020 07:00  --------------------------------------------------------  IN: 800 mL / OUT: 1300 mL / NET: -500 mL        PHYSICAL EXAM:  Vital Signs Last 24 Hrs  T(C): 36.9 (11 Aug 2020 05:26), Max: 37 (10 Aug 2020 21:42)  T(F): 98.5 (11 Aug 2020 05:26), Max: 98.6 (10 Aug 2020 21:42)  HR: 97 (11 Aug 2020 05:26) (92 - 118)  BP: 118/73 (11 Aug 2020 05:26) (101/60 - 138/82)  BP(mean): --  RR: 18 (11 Aug 2020 05:26) (18 - 18)  SpO2: 93% (11 Aug 2020 05:26) (93% - 97%)    GENERAL: No acute distress, well-developed  HEAD:  Atraumatic, Normocephalic  EYES: conjunctiva and sclera clear  NECK: Supple, no JVD  CHEST/LUNG: CTAB, no wheezes, rales, or rhonchi  HEART: Regular rate and rhythm, no murmurs, rubs, or gallops  ABDOMEN: Soft, non-tender, non-distended, normal bowel sounds  EXTREMITIES:  2+ peripheral pulses b/l, no clubbing, cyanosis, or edema  NEUROLOGY: A&O x 3, no focal deficits  SKIN: No rashes or lesions    LABS:                        8.2    24.55 )-----------( 133      ( 11 Aug 2020 06:49 )             23.3     08-11    133<L>  |  92<L>  |  32<H>  ----------------------------<  104<H>  3.9   |  24  |  5.32<H>    Ca    8.5      11 Aug 2020 06:50  Phos  4.8     08-11  Mg     2.1     08-11    TPro  5.6<L>  /  Alb  3.4  /  TBili  21.3<H>  /  DBili  x   /  AST  153<H>  /  ALT  19  /  AlkPhos  138<H>  08-11    PT/INR - ( 11 Aug 2020 06:49 )   PT: 23.0 sec;   INR: 2.00 ratio         PTT - ( 11 Aug 2020 06:49 )  PTT:38.9 sec            RADIOLOGY & ADDITIONAL TESTS:  Results Reviewed [Y/N]:   Imaging Personally Reviewed [Y/N]:  Electrocardiogram Personally Reviewed [Y/N]:    COORDINATION OF CARE:  Care Discussed with Consultants/Other Providers [Y/N]: PROGRESS NOTE:   Authored by Deandre Dwyer [phone]    Patient is a 39y old  Male who presents with a chief complaint of abdominal pain (10 Aug 2020 16:58)    SUBJECTIVE / OVERNIGHT EVENTS:  pt noted to be tachycardic w/ soft BP after HD. No interventions given. Pt states he was feeling anxious at the time. Pt with no complaints this am. He denies chest pain, SOB, abdominal pain, n/v/d.      REVIEW OF SYSTEMS:  CONSTITUTIONAL: No weakness, fevers or chills  EYES/ENT: No visual changes;  No vertigo or throat pain   NECK: No pain or stiffness  RESPIRATORY: No cough, wheezing, hemoptysis; No shortness of breath  CARDIOVASCULAR: No chest pain or palpitations  GASTROINTESTINAL: No abdominal or epigastric pain. No nausea, vomiting, or hematemesis; No diarrhea or constipation. No melena or hematochezia.  GENITOURINARY: No dysuria, frequency or hematuria  NEUROLOGICAL: No numbness or weakness  SKIN: No itching, rashes    MEDICATIONS  (STANDING):  chlorhexidine 4% Liquid 1 Application(s) Topical <User Schedule>  hepatitis A (virus) Vaccine - Adult (HAVRIX) 1 milliLiter(s) IntraMuscular once  lactulose Syrup 30 Gram(s) Oral every 6 hours  melatonin 1 milliGRAM(s) Oral at bedtime  midodrine 10 milliGRAM(s) Oral every 8 hours  nystatin Powder 1 Application(s) Topical two times a day  pantoprazole    Tablet 40 milliGRAM(s) Oral before breakfast  pneumococcal  13 Vaccine (PREVNAR 13) 0.5 milliLiter(s) IntraMuscular once  rifAXIMin 550 milliGRAM(s) Oral two times a day  thiamine 100 milliGRAM(s) Oral daily    MEDICATIONS  (PRN):  HYDROmorphone  Injectable 0.2 milliGRAM(s) IV Push every 4 hours PRN Severe Pain (7 - 10)  sodium chloride 0.9% lock flush 10 milliLiter(s) IV Push every 1 hour PRN Pre/post blood products, medications, blood draw, and to maintain line patency      CAPILLARY BLOOD GLUCOSE  POCT Blood Glucose.: 114 mg/dL (11 Aug 2020 05:40)  POCT Blood Glucose.: 120 mg/dL (10 Aug 2020 23:57)  POCT Blood Glucose.: 101 mg/dL (10 Aug 2020 18:31)  POCT Blood Glucose.: 125 mg/dL (10 Aug 2020 12:04)    I&O's Summary  10 Aug 2020 07:01  -  11 Aug 2020 07:00  --------------------------------------------------------  IN: 800 mL / OUT: 1300 mL / NET: -500 mL    PHYSICAL EXAM:  Vital Signs Last 24 Hrs  T(C): 36.9 (11 Aug 2020 05:26), Max: 37 (10 Aug 2020 21:42)  T(F): 98.5 (11 Aug 2020 05:26), Max: 98.6 (10 Aug 2020 21:42)  HR: 97 (11 Aug 2020 05:26) (92 - 118)  BP: 118/73 (11 Aug 2020 05:26) (101/60 - 138/82)  BP(mean): --  RR: 18 (11 Aug 2020 05:26) (18 - 18)  SpO2: 93% (11 Aug 2020 05:26) (93% - 97%)    GENERAL: No acute distress, well-developed  HEAD:  Atraumatic, Normocephalic  EYES: conjunctiva and sclera clear  NECK: Supple, no JVD  CHEST/LUNG: CTAB, no wheezes, rales, or rhonchi  HEART: Regular rate and rhythm, no murmurs, rubs, or gallops  ABDOMEN: Soft, non-tender, non-distended, normal bowel sounds  EXTREMITIES:  2+ peripheral pulses b/l, no clubbing, cyanosis, or edema  NEUROLOGY: A&O x 3, no focal deficits  SKIN: No rashes or lesions    LABS:             8.2    24.55 )-----------( 133      ( 11 Aug 2020 06:49 )             23.3     08-11  133<L>  |  92<L>  |  32<H>  ----------------------------<  104<H>  3.9   |  24  |  5.32<H>  Ca    8.5      11 Aug 2020 06:50  Phos  4.8     08-11  Mg     2.1     08-11  TPro  5.6<L>  /  Alb  3.4  /  TBili  21.3<H>  /  DBili  x   /  AST  153<H>  /  ALT  19  /  AlkPhos  138<H>  08-11    PT/INR - ( 11 Aug 2020 06:49 )   PT: 23.0 sec;   INR: 2.00 ratio    PTT - ( 11 Aug 2020 06:49 )  PTT:38.9 sec    RADIOLOGY & ADDITIONAL TESTS:  Results Reviewed [Y/N]: Y  Imaging Personally Reviewed [Y/N]: Y  Electrocardiogram Personally Reviewed [Y/N]: Y    COORDINATION OF CARE:  Care Discussed with Consultants/Other Providers [Y/N]: Y PROGRESS NOTE:   Authored by Deandre Dwyer -[phone]    Patient is a 39y old  Male who presents with a chief complaint of abdominal pain (10 Aug 2020 16:58)    SUBJECTIVE / OVERNIGHT EVENTS:  Pt noted to be tachycardic w/ soft BP after HD. No interventions given. Pt states he was feeling anxious at the time. Pt with no complaints this am. He reports 4-5 BMs per day. He denies chest pain, SOB, abdominal pain, n/v/d.      REVIEW OF SYSTEMS:  CONSTITUTIONAL: No weakness, fevers or chills  EYES/ENT: No visual changes;  No vertigo or throat pain   NECK: No pain or stiffness  RESPIRATORY: No cough, wheezing, hemoptysis; No shortness of breath  CARDIOVASCULAR: No chest pain or palpitations  GASTROINTESTINAL: No abdominal or epigastric pain. No nausea, vomiting, or hematemesis; No diarrhea or constipation. No melena or hematochezia.  GENITOURINARY: No dysuria, frequency or hematuria  NEUROLOGICAL: No numbness or weakness  SKIN: No itching, rashes    MEDICATIONS  (STANDING):  chlorhexidine 4% Liquid 1 Application(s) Topical <User Schedule>  hepatitis A (virus) Vaccine - Adult (HAVRIX) 1 milliLiter(s) IntraMuscular once  lactulose Syrup 30 Gram(s) Oral every 6 hours  melatonin 1 milliGRAM(s) Oral at bedtime  midodrine 10 milliGRAM(s) Oral every 8 hours  nystatin Powder 1 Application(s) Topical two times a day  pantoprazole    Tablet 40 milliGRAM(s) Oral before breakfast  pneumococcal  13 Vaccine (PREVNAR 13) 0.5 milliLiter(s) IntraMuscular once  rifAXIMin 550 milliGRAM(s) Oral two times a day  thiamine 100 milliGRAM(s) Oral daily    MEDICATIONS  (PRN):  HYDROmorphone  Injectable 0.2 milliGRAM(s) IV Push every 4 hours PRN Severe Pain (7 - 10)  sodium chloride 0.9% lock flush 10 milliLiter(s) IV Push every 1 hour PRN Pre/post blood products, medications, blood draw, and to maintain line patency      CAPILLARY BLOOD GLUCOSE  POCT Blood Glucose.: 114 mg/dL (11 Aug 2020 05:40)  POCT Blood Glucose.: 120 mg/dL (10 Aug 2020 23:57)  POCT Blood Glucose.: 101 mg/dL (10 Aug 2020 18:31)  POCT Blood Glucose.: 125 mg/dL (10 Aug 2020 12:04)    I&O's Summary  10 Aug 2020 07:01  -  11 Aug 2020 07:00  --------------------------------------------------------  IN: 800 mL / OUT: 1300 mL / NET: -500 mL    PHYSICAL EXAM:  Vital Signs Last 24 Hrs  T(C): 36.9 (11 Aug 2020 05:26), Max: 37 (10 Aug 2020 21:42)  T(F): 98.5 (11 Aug 2020 05:26), Max: 98.6 (10 Aug 2020 21:42)  HR: 97 (11 Aug 2020 05:26) (92 - 118)  BP: 118/73 (11 Aug 2020 05:26) (101/60 - 138/82)  BP(mean): --  RR: 18 (11 Aug 2020 05:26) (18 - 18)  SpO2: 93% (11 Aug 2020 05:26) (93% - 97%)    GENERAL: No acute distress, well-developed  HEAD:  Atraumatic, Normocephalic  EYES: conjunctiva and sclera clear  NECK: Supple, no JVD  CHEST/LUNG: CTAB, no wheezes, rales, or rhonchi  HEART: Regular rate and rhythm, no murmurs, rubs, or gallops  ABDOMEN: Soft, non-tender, non-distended, normal bowel sounds  EXTREMITIES:  2+ peripheral pulses b/l, no clubbing, cyanosis, or edema  NEUROLOGY: A&O x 3, no focal deficits  SKIN: No rashes or lesions    LABS:             8.2    24.55 )-----------( 133      ( 11 Aug 2020 06:49 )             23.3     08-11  133<L>  |  92<L>  |  32<H>  ----------------------------<  104<H>  3.9   |  24  |  5.32<H>  Ca    8.5      11 Aug 2020 06:50  Phos  4.8     08-11  Mg     2.1     08-11  TPro  5.6<L>  /  Alb  3.4  /  TBili  21.3<H>  /  DBili  x   /  AST  153<H>  /  ALT  19  /  AlkPhos  138<H>  08-11    PT/INR - ( 11 Aug 2020 06:49 )   PT: 23.0 sec;   INR: 2.00 ratio    PTT - ( 11 Aug 2020 06:49 )  PTT:38.9 sec    RADIOLOGY & ADDITIONAL TESTS:  Results Reviewed [Y/N]: Y  Imaging Personally Reviewed [Y/N]: Y  Electrocardiogram Personally Reviewed [Y/N]: Y    COORDINATION OF CARE:  Care Discussed with Consultants/Other Providers [Y/N]: Y

## 2020-08-11 NOTE — PROGRESS NOTE ADULT - PROBLEM SELECTOR PLAN 3
increase in neutrophils. all cultures negative to date (7/26, 7/30, 8/06). paracentesis negative for SBP on 7/26. fungitell sent 08/01. s/p zosyn 7/26-7/31. UA not c/w UTI. abdominal pain on exam. c/f pancreatitis - lipase 504, however CT w/ no acute pancreatitis  -ID following, appreciate recs - likely reactive due to complicated hospital course, monitor off abx for now, d/c meropenem  -can consider repeat diagnostic paracentesis to r/o SBP if clinically worsens  -trend lactate  -daily CBC w/ diff increase in neutrophils. all cultures negative to date (7/26, 7/30, 8/06). paracentesis negative for SBP on 7/26. fungitell sent 08/01. s/p zosyn 7/26-7/31. UA not c/w UTI. abdominal pain on exam. c/f pancreatitis - lipase 504, however CT w/ no acute pancreatitis  -ID following, appreciate recs - likely reactive due to complicated hospital course, monitor off abx for now  -can consider repeat diagnostic paracentesis to r/o SBP if clinically worsens  -trend lactate  -daily CBC w/ diff

## 2020-08-11 NOTE — PROGRESS NOTE ADULT - SUBJECTIVE AND OBJECTIVE BOX
Chief Complaint:  Patient is a 39y old  Male who presents with a chief complaint of abdominal pain (11 Aug 2020 08:23)    Reason for consult: acute on chronic liver failure    Interval Events: Pt feeling well, got HD yesterday and had no nausea after getting zofran, but did have anxiety. Also c/o abd bloating.     Hospital Medications:  chlorhexidine 4% Liquid 1 Application(s) Topical <User Schedule>  hepatitis A (virus) Vaccine - Adult (HAVRIX) 1 milliLiter(s) IntraMuscular once  HYDROmorphone  Injectable 0.2 milliGRAM(s) IV Push every 4 hours PRN  lactulose Syrup 30 Gram(s) Oral every 6 hours  melatonin 1 milliGRAM(s) Oral at bedtime  midodrine 10 milliGRAM(s) Oral every 8 hours  nystatin Powder 1 Application(s) Topical two times a day  pantoprazole    Tablet 40 milliGRAM(s) Oral before breakfast  pneumococcal  13 Vaccine (PREVNAR 13) 0.5 milliLiter(s) IntraMuscular once  rifAXIMin 550 milliGRAM(s) Oral two times a day  sodium chloride 0.9% lock flush 10 milliLiter(s) IV Push every 1 hour PRN  thiamine 100 milliGRAM(s) Oral daily      ROS:   General:  No  fevers, chills, night sweats, fatigue  Eyes:  Good vision, no reported pain  ENT:  No sore throat, pain, runny nose  CV:  No pain, palpitations  Pulm:  No dyspnea, cough  GI:  See HPI, otherwise negative  :  No  incontinence, nocturia  Muscle:  No pain, weakness  Neuro:  No memory problems  Psych:  No insomnia, mood problems, depression  Endocrine:  No polyuria, polydipsia, cold/heat intolerance  Heme:  No petechiae, ecchymosis, easy bruisability  Skin:  No rash    PHYSICAL EXAM:   Vital Signs:  Vital Signs Last 24 Hrs  T(C): 36.9 (11 Aug 2020 05:26), Max: 37 (10 Aug 2020 21:42)  T(F): 98.5 (11 Aug 2020 05:26), Max: 98.6 (10 Aug 2020 21:42)  HR: 97 (11 Aug 2020 05:26) (92 - 118)  BP: 118/73 (11 Aug 2020 05:26) (101/60 - 138/82)  BP(mean): --  RR: 18 (11 Aug 2020 05:26) (18 - 18)  SpO2: 93% (11 Aug 2020 05:26) (93% - 97%)  Daily     Daily Weight in k.5 (10 Aug 2020 21:09)    GENERAL: no acute distress  NEURO: alert, no asterixis  HEENT: icteric sclera, no conjunctival pallor appreciated  CHEST: no respiratory distress, no accessory muscle use  CARDIAC: regular rate, rhythm  ABDOMEN: soft, non-tender, non-distended, no rebound or guarding  EXTREMITIES: warm, well perfused, no edema  SKIN: ++ jaundice    LABS: reviewed                        8.2    24.55 )-----------( 133      ( 11 Aug 2020 06:49 )             23.3     08-11    133<L>  |  92<L>  |  32<H>  ----------------------------<  104<H>  3.9   |  24  |  5.32<H>    Ca    8.5      11 Aug 2020 06:50  Phos  4.8       Mg     2.1         TPro  5.6<L>  /  Alb  3.4  /  TBili  21.3<H>  /  DBili  x   /  AST  153<H>  /  ALT  19  /  AlkPhos  138<H>  11    LIVER FUNCTIONS - ( 11 Aug 2020 06:50 )  Alb: 3.4 g/dL / Pro: 5.6 g/dL / ALK PHOS: 138 U/L / ALT: 19 U/L / AST: 153 U/L / GGT: x             Interval Diagnostic Studies: see sunrise for full report

## 2020-08-12 NOTE — PROGRESS NOTE ADULT - PROBLEM SELECTOR PLAN 1
possibly secondary to hepatorenal syndrome. 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 baseline creatinine sCr 0.6-0.8. On admission sCr was 9.64. s/p CVVHD w/ improvement in renal fx, but remains HD dependent.   -nephrology following, appreciate recs - HD today after permacath placement w/ IR, give midodrine 10mg 30-45 min before HD  -zofran 4mg PO prn nausea w/ dialysis  -c/w hold diuretics at this time   -daily CMP  -monitor UOP, strict I&Os  -avoid NSAIDs, ACEI/ARBS, RCA and nephrotoxins.   -dose medications as per eGFR.

## 2020-08-12 NOTE — PRE-ANESTHESIA EVALUATION ADULT - NSANTHPMHFT_GEN_ALL_CORE
39M w/ decompensated EtOH cirrhosis, presenting w/ acute on chronic liver failure of unclear etiology (infection NOS vs continued inflammation due to EtOH)a. Course c/b massive GIB from gastric varix requiring 25u pRBC, s/p glue injection and PARTO by IR. Hb stable post procedure, now on floor w/ stably high MELD-Na labs. Remains HD dependent, plan for outpatient liver transplant evaluation.

## 2020-08-12 NOTE — PROGRESS NOTE ADULT - ASSESSMENT
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 Blakemore removed 7/31 and the pt no longer with active bleeding with stable Hgb, successfully extubated 8/4 and titrated off vasopressors 8/5. Persistent leukocytosis is likely reactive. Not currently candidate for liver transplant.

## 2020-08-12 NOTE — PROGRESS NOTE ADULT - PROBLEM SELECTOR PLAN 4
-Dx in 2004, LETICIA+, dsDNA+, SmRNP+, SSA+, SSB+, leukopenia, thrombocytopenia with symptoms of oral ulcers, alopecia, pleuritis, skin rash and arthritis  -- Continue Plaquenil and Azathiprine  - changed hydrocortisone to Solumedrol 250mg q6h on 3/21 for 3-5 days then 1mg/kg prednisone daily thereafter  - dsDNA in process and c4 DECREASED AT 9 AND c3 WNL AT 71  - Holding anticoagulation for APL, will reassess need for full anticoagulation today  - cyclophosphamide to be initiated after 3 doses of steroids, order per rheumatology  - we appreciate rheumatology's recommendation       Incidental finding on CTAP (8/6) "new left adrenal nodularity which may represent hemorrhage". MRI Abdomen (8/9) demonstrated "1.3 x 0.9 cm left adrenal hemorrhage". Per radiology, hemorrhage on MRI unchanged in size from CTAP, so unlikely to be actively bleeding.   -discussed with hepatology - continue to monitor, INR<2, hgb stable  -daily CBC & coags

## 2020-08-12 NOTE — PROGRESS NOTE ADULT - PROBLEM SELECTOR PLAN 7
secondary to gastric variceal bleed. refractory to endoscopic intervention 7/29 s/p IR PARTO 7/30, blakemore 7/30, and 34 U PRBC, 25 U FFP, 20 U PLT, and 5 U cryo all on 7/30. blakemore removed 7/30. tolerated tube feeds without evidence of bleeding  -CBC daily - transfuse to maintain Hgb > 7, or for active bleed  -coags daily - replete with FFP, cryo as needed

## 2020-08-12 NOTE — PROGRESS NOTE ADULT - PROBLEM SELECTOR PLAN 3
increase in neutrophils. all cultures negative to date (7/26, 7/30, 8/06). paracentesis negative for SBP on 7/26. fungitell sent 08/01. s/p zosyn 7/26-7/31. UA not c/w UTI. abdominal pain on exam. c/f pancreatitis - lipase 504, however CT w/ no acute pancreatitis  -ID following, appreciate recs - likely reactive due to complicated hospital course, monitor off abx for now  -can consider repeat diagnostic paracentesis to r/o SBP if clinically worsens  -trend lactate  -daily CBC w/ diff

## 2020-08-12 NOTE — PROGRESS NOTE ADULT - SUBJECTIVE AND OBJECTIVE BOX
[facility] DIVISION OF KIDNEY DISEASES AND HYPERTENSION   -- FOLLOW UP NOTE --   Bhupinder Truong  Nephrology Fellow  Pager [facility]: [phone]   /  Pager [facility]: [phone]  (after 5pm or weekend please page the on-call fellow)  --------------------------------------------------------------------------------  24 hour events/subjective:  - overnight no events reported, vitals afebrile no hypotensive episode, total UOP voided 100 cc in the past 24hr  - patient seen and examined at bedside this morning without complaints  - vitals/lab/medications reviewed    PAST HISTORY  --------------------------------------------------------------------------------  No significant changes to PMH, PSH, FHx, SHx, unless otherwise noted    ALLERGIES & MEDICATIONS  --------------------------------------------------------------------------------  Allergies    No Known Allergies    Intolerances    Standing Inpatient Medications  chlorhexidine 4% Liquid 1 Application(s) Topical <User Schedule>  hepatitis A (virus) Vaccine - Adult (HAVRIX) 1 milliLiter(s) IntraMuscular once  melatonin 1 milliGRAM(s) Oral at bedtime  midodrine 10 milliGRAM(s) Oral every 8 hours  nystatin Powder 1 Application(s) Topical two times a day  pantoprazole    Tablet 40 milliGRAM(s) Oral before breakfast  pneumococcal  13 Vaccine (PREVNAR 13) 0.5 milliLiter(s) IntraMuscular once  polyethylene glycol 3350 17 Gram(s) Oral two times a day  rifAXIMin 550 milliGRAM(s) Oral two times a day  thiamine 100 milliGRAM(s) Oral daily    PRN Inpatient Medications  HYDROmorphone  Injectable 0.2 milliGRAM(s) IV Push every 4 hours PRN  ondansetron   Disintegrating Tablet 4 milliGRAM(s) Oral daily PRN  sodium chloride 0.9% lock flush 10 milliLiter(s) IV Push every 1 hour PRN    REVIEW OF SYSTEMS  --------------------------------------------------------------------------------  Gen: no fever, chills, weakness  Respiratory: No dyspnea, cough  CV: No chest pain, orthopnea  GI: No abdominal pain, nausea, vomiting, diarrhea  MSK: no edema  Neuro: No dizziness, lightheadedness  Heme: No bleeding  All other systems were reviewed and are negative, except as noted.    VITALS/PHYSICAL EXAM  --------------------------------------------------------------------------------  T(C): 37 (08-12-20 @ 04:46), Max: 37.1 (08-11-20 @ 14:00)  HR: 97 (08-12-20 @ 06:14) (84 - 97)  BP: 112/68 (08-12-20 @ 06:14) (112/68 - 120/71)  RR: 18 (08-12-20 @ 04:46) (18 - 18)  SpO2: 96% (08-12-20 @ 04:46) (93% - 96%)  Wt(kg): --    08-11-20 @ 07:01  -  08-12-20 @ 07:00  --------------------------------------------------------  IN: 0 mL / OUT: 100 mL / NET: -100 mL    Physical Exam:              Gen: NAD on room air              HEENT: sclera icterus, MMM  	Pulm: CTA b/l  	CV: RRR, S1S2, no murmur  	GI: +BS, soft, distended  	: no ashton  	MSK: Warm, no edema              Neuro: AAOx3  	Skin: Warm, no cyanosis, jaundice appearing  	Vascular access: RIJ non tunnel hd catheter    LABS/STUDIES  --------------------------------------------------------------------------------              8.2    24.55 >-----------<  133      [08-11-20 @ 06:49]              23.3     133  |  92  |  32  ----------------------------<  104      [08-11-20 @ 06:50]  3.9   |  24  |  5.32        Ca     8.5     [08-11-20 @ 06:50]      Mg     2.1     [08-11-20 @ 06:50]      Phos  4.8     [08-11-20 @ 06:50]    TPro  5.6  /  Alb  3.4  /  TBili  21.3  /  DBili  x   /  AST  153  /  ALT  19  /  AlkPhos  138  [08-11-20 @ 06:50]    PT/INR: PT 23.0 , INR 2.00       [08-11-20 @ 06:49]  PTT: 38.9       [08-11-20 @ 06:49]    Creatinine Trend:  SCr 5.32 [08-11 @ 06:50]  SCr 7.04 [08-10 @ 06:56]  SCr 5.87 [08-09 @ 07:20]  SCr 3.98 [08-08 @ 05:50]  SCr 4.26 [08-07 @ 02:35]    Urinalysis - [08-07-20 @ 04:54]      Color Dark Orange / Appearance Turbid / SG 1.041 / pH 6.0      Gluc Negative / Ketone Negative  / Bili Moderate / Urobili 8 mg/dL       Blood Moderate / Protein 100 mg/dL Test Performed on Urine Supernate. / Leuk Est Negative / Nitrite Negative      RBC 30 / WBC 18 / Hyaline 10 / Gran  / Sq Epi  / Non Sq Epi 7 / Bacteria Negative    Urine Creatinine 118      [08-06-20 @ 22:42]  Urine Sodium <35      [08-06-20 @ 22:42]  Urine Urea Nitrogen 302      [08-07-20 @ 01:54]    Iron 34, TIBC 124, %sat 27      [07-14-20 @ 10:34]  Ferritin 812      [07-12-20 @ 08:58]  PTH -- (Ca 6.2)      [07-29-20 @ 09:18]   223  Lipid: chol --, , HDL --, LDL --      [08-03-20 @ 12:04]    HBsAb 276.9      [08-10-20 @ 23:16]  HBsAb Reactive      [08-08-20 @ 10:42]  HBsAg Nonreact      [08-10-20 @ 23:34]  HCV 0.27, Nonreact      [08-10-20 @ 23:16]  HIV Nonreact      [07-15-20 @ 10:47]    Syphilis Screen (Treponema Pallidum Ab) Negative      [08-08-20 @ 09:47]

## 2020-08-12 NOTE — PROGRESS NOTE ADULT - SUBJECTIVE AND OBJECTIVE BOX
PROGRESS NOTE:   Authored by Deandre Dwyer [phone]    Patient is a 39y old  Male who presents with a chief complaint of abdominal pain (12 Aug 2020 07:22)    SUBJECTIVE / OVERNIGHT EVENTS:  Patient NPO @ midnight for permacath placement w/ IR today.    REVIEW OF SYSTEMS:  CONSTITUTIONAL: No weakness, fevers or chills  EYES/ENT: No visual changes;  No vertigo or throat pain   NECK: No pain or stiffness  RESPIRATORY: No cough, wheezing, hemoptysis; No shortness of breath  CARDIOVASCULAR: No chest pain or palpitations  GASTROINTESTINAL: No abdominal or epigastric pain. No nausea, vomiting, or hematemesis; No diarrhea or constipation. No melena or hematochezia.  GENITOURINARY: No dysuria, frequency or hematuria  NEUROLOGICAL: No numbness or weakness  SKIN: No itching, rashes    MEDICATIONS  (STANDING):  chlorhexidine 4% Liquid 1 Application(s) Topical <User Schedule>  hepatitis A (virus) Vaccine - Adult (HAVRIX) 1 milliLiter(s) IntraMuscular once  melatonin 1 milliGRAM(s) Oral at bedtime  nystatin Powder 1 Application(s) Topical two times a day  pantoprazole    Tablet 40 milliGRAM(s) Oral before breakfast  pneumococcal  13 Vaccine (PREVNAR 13) 0.5 milliLiter(s) IntraMuscular once  polyethylene glycol 3350 17 Gram(s) Oral two times a day  rifAXIMin 550 milliGRAM(s) Oral two times a day  thiamine 100 milliGRAM(s) Oral daily    MEDICATIONS  (PRN):  HYDROmorphone  Injectable 0.2 milliGRAM(s) IV Push every 4 hours PRN Severe Pain (7 - 10)  ondansetron   Disintegrating Tablet 4 milliGRAM(s) Oral daily PRN Nausea  sodium chloride 0.9% lock flush 10 milliLiter(s) IV Push every 1 hour PRN Pre/post blood products, medications, blood draw, and to maintain line patency    I&O's Summary  11 Aug 2020 07:01  -  12 Aug 2020 07:00  --------------------------------------------------------  IN: 0 mL / OUT: 100 mL / NET: -100 mL    PHYSICAL EXAM:  Vital Signs Last 24 Hrs  T(C): 37 (12 Aug 2020 04:46), Max: 37.1 (11 Aug 2020 14:00)  T(F): 98.6 (12 Aug 2020 04:46), Max: 98.7 (11 Aug 2020 14:00)  HR: 97 (12 Aug 2020 06:14) (84 - 97)  BP: 112/68 (12 Aug 2020 06:14) (112/68 - 120/71)  RR: 18 (12 Aug 2020 04:46) (18 - 18)  SpO2: 96% (12 Aug 2020 04:46) (93% - 96%)    GENERAL: No acute distress, well-developed  HEAD:  Atraumatic, Normocephalic  EYES: conjunctiva and sclera clear  NECK: Supple, no JVD  CHEST/LUNG: CTAB, no wheezes, rales, or rhonchi  HEART: Regular rate and rhythm, no murmurs, rubs, or gallops  ABDOMEN: Soft, non-tender, non-distended, normal bowel sounds  EXTREMITIES:  2+ peripheral pulses b/l, no clubbing, cyanosis, or edema  NEUROLOGY: A&O x 3, no focal deficits  SKIN: No rashes or lesions    LABS:      RADIOLOGY & ADDITIONAL TESTS:  Results Reviewed [Y/N]: Y  Imaging Personally Reviewed [Y/N]: Y  Electrocardiogram Personally Reviewed [Y/N]: Y    COORDINATION OF CARE:  Care Discussed with Consultants/Other Providers [Y/N]: Y PROGRESS NOTE:   Authored by Deandre Dwyer [phone]    Patient is a 39y old  Male who presents with a chief complaint of abdominal pain (12 Aug 2020 07:22)    SUBJECTIVE / OVERNIGHT EVENTS:  Patient NPO @ midnight for permacath placement w/ IR today. He has no complaints this morning. He denies abdominal pain, n/v/d, chest pain, SOB.     REVIEW OF SYSTEMS:  CONSTITUTIONAL: No weakness, fevers or chills  EYES/ENT: No visual changes;  No vertigo or throat pain   NECK: No pain or stiffness  RESPIRATORY: No cough, wheezing, hemoptysis; No shortness of breath  CARDIOVASCULAR: No chest pain or palpitations  GASTROINTESTINAL: No abdominal or epigastric pain. No nausea, vomiting, or hematemesis; No diarrhea or constipation. No melena or hematochezia.  GENITOURINARY: No dysuria, frequency or hematuria  NEUROLOGICAL: No numbness or weakness  SKIN: No itching, rashes    MEDICATIONS  (STANDING):  chlorhexidine 4% Liquid 1 Application(s) Topical <User Schedule>  hepatitis A (virus) Vaccine - Adult (HAVRIX) 1 milliLiter(s) IntraMuscular once  melatonin 1 milliGRAM(s) Oral at bedtime  nystatin Powder 1 Application(s) Topical two times a day  pantoprazole    Tablet 40 milliGRAM(s) Oral before breakfast  pneumococcal  13 Vaccine (PREVNAR 13) 0.5 milliLiter(s) IntraMuscular once  polyethylene glycol 3350 17 Gram(s) Oral two times a day  rifAXIMin 550 milliGRAM(s) Oral two times a day  thiamine 100 milliGRAM(s) Oral daily    MEDICATIONS  (PRN):  HYDROmorphone  Injectable 0.2 milliGRAM(s) IV Push every 4 hours PRN Severe Pain (7 - 10)  ondansetron   Disintegrating Tablet 4 milliGRAM(s) Oral daily PRN Nausea  sodium chloride 0.9% lock flush 10 milliLiter(s) IV Push every 1 hour PRN Pre/post blood products, medications, blood draw, and to maintain line patency    I&O's Summary  11 Aug 2020 07:01  -  12 Aug 2020 07:00  --------------------------------------------------------  IN: 0 mL / OUT: 100 mL / NET: -100 mL    PHYSICAL EXAM:  Vital Signs Last 24 Hrs  T(C): 37 (12 Aug 2020 04:46), Max: 37.1 (11 Aug 2020 14:00)  T(F): 98.6 (12 Aug 2020 04:46), Max: 98.7 (11 Aug 2020 14:00)  HR: 97 (12 Aug 2020 06:14) (84 - 97)  BP: 112/68 (12 Aug 2020 06:14) (112/68 - 120/71)  RR: 18 (12 Aug 2020 04:46) (18 - 18)  SpO2: 96% (12 Aug 2020 04:46) (93% - 96%)    GENERAL: jaundice No acute distress, well-developed  HEAD:  Atraumatic, Normocephalic  EYES: conjunctiva and sclera clear  NECK: Supple, no JVD  CHEST/LUNG: CTAB, no wheezes, rales, or rhonchi  HEART: Regular rate and rhythm, no murmurs, rubs, or gallops  ABDOMEN: abdomen distended Soft, non-tender, normal bowel sounds  EXTREMITIES:  2+ peripheral pulses b/l, no clubbing, cyanosis, or edema  NEUROLOGY: A&O x 3, no focal deficits  SKIN: No rashes or lesions    LABS:             8.4    23.74 )-----------( 140      ( 12 Aug 2020 07:16 )             24.4     08-12  132<L>  |  90<L>  |  44<H>  ----------------------------<  90  3.9   |  21<L>  |  7.32<H>  Ca    8.7      12 Aug 2020 07:16  Phos  5.8     08-12  Mg     2.2     08-12  TPro  5.7<L>  /  Alb  3.2<L>  /  TBili  21.7<H>  /  DBili  x   /  AST  145<H>  /  ALT  19  /  AlkPhos  141<H>  08-12    PT/INR - ( 11 Aug 2020 06:49 )   PT: 23.0 sec;   INR: 2.00 ratio    PTT - ( 11 Aug 2020 06:49 )  PTT:38.9 sec    RADIOLOGY & ADDITIONAL TESTS:  Results Reviewed [Y/N]: Y  Imaging Personally Reviewed [Y/N]: Y  Electrocardiogram Personally Reviewed [Y/N]: Y    COORDINATION OF CARE:  Care Discussed with Consultants/Other Providers [Y/N]: Y

## 2020-08-12 NOTE — PROGRESS NOTE ADULT - PROBLEM SELECTOR PLAN 2
Acute on chronic liver failure  pt w/ hx etoh cirrhosis, now c/b gastric variceal bleed and ascites. holding home nadolol, lasix, and spironolactone due to hypotension/shock. differential for acute liver failure includes worsening EtOH hepatitis (MDF = 52.7 but no interval drinking since discharge and this is an acute change) vs. underlying infection (UA neg, BCx negative, CXR neg, dx para neg for SBP, biliary imaging negative, C diff negative) vs vascular injury. no current signs of hepatic encephalopathy (A&Ox4). INR elevated, platelets low.   -hepatology following, appreciate recs - pt clear for d/c from hepatology standpoint, will likely need placement in HD center   -c/w rifaximin  -c/w miralax BID  -c/w protonix 40mg PO daily  -monitor BP off midodrine  -MELD-Na 39/MDF 67 on 8/12  -transplant eval as outpatient pending alcohol rehab, not currently a transplant candidate at this time

## 2020-08-12 NOTE — PROCEDURE NOTE - PROCEDURE FINDINGS AND DETAILS
tunneled catheter placed via indwelling right IJ nontunneled catheter. tip of catheter is in the SVC

## 2020-08-12 NOTE — PROGRESS NOTE ADULT - ASSESSMENT
39M w/ decompensated EtOH cirrhosis, presenting w/ acute on chronic liver failure of unclear etiology (infection NOS vs continued inflammation due to EtOH)a. Course c/b massive GIB from gastric varix requiring 25u pRBC, s/p glue injection and PARTO by IR. Hb stable post procedure, now on floor w/ stably high MELD-Na labs. Remains HD dependent, plan for outpatient liver transplant evaluation.      Impression:  #Acute on chronic liver failure: d/dx includes underlying infection (UA neg, BCx NGTD from this admission, CXR neg, dx para neg for SBP, biliary imaging negative, C diff negative) vs worsening EtOH hepatitis (MDF = 60 but no interval drinking since discharge and this is an acute change)  #Cirrhosis due to EtOH, decompensated by ascites  - varices: no esophageal varices on EGD 7/29, actively bleeding gastric varix s/p glue injection and PARTO by IR, bleeding appears to be controlled  - ascites: present on exam, neg for SBP 7/26, on Lasix 20mg/spironolactone 25mg daily as outpatient, s/p 1.5L removed during PARTO 7/29  - HE: none on exam  - HCC: no imaging, MRI without contrast is limited  - MELD-Na = 39 on 8/11  - Transplant evaluation to be completed as outpatient, pt has already joined online EtOH rehab and reports commitment to continued attendance  #L adrenal hemorrhage – no e/o adrenal insufficiency or acute blood loss anemia  #Acute renal failure: unclear etiology, c/f bilirubin pigment nephropathy vs acute tubular necrosis vs obstructive uropathy. Reny is 53, making HRS less likely. S/p CVVH/HD w/ improvement in renal fx, but remains HD dependent     Recommendations:  - c/w miralax BID  - permacath per IR, planned for today  - HD per renal  - diuretics per renal  - c/w PO PPI daily  - continue with rifaximin, miralax BID as above  - c/w MVI, thiamine and folate  - trend CMP, CBC, INR daily  - transplant hepatology will continue to follow  - pt will need to attend alcohol rehab as outpatient  - outpatient f/u w/ Dr. Funez within 1-2 weeks post-discharge, please contact us on discharge to arrange    Ross Shook  Gastroenterology Fellow  AT NIGHT AND ON WEEKENDS:  Contact on-call GI fellow via answering service ([phone]) from 5pm-8am and on weekends/holidays  MONDAY-FRIDAY 8AM-5PM:  Available via [employer] Teams  Call [phone] ([facility]) or Page [phone] ([facility]) from 8am-5pm M-F

## 2020-08-12 NOTE — PROGRESS NOTE ADULT - PROBLEM SELECTOR PLAN 3
hypophosphatemia likely 2/2 MARQUISE ATN, last serum phos 4.8  - Renal diet (low phosphorous diet)  - monitor serum phos daily

## 2020-08-12 NOTE — PROGRESS NOTE ADULT - NSHPATTENDINGPLANDISCUSS_GEN_ALL_CORE
MICU team
Medicine and hepatology
med
med
micu
micu
med
Medicine and hepatology
ICU staff
ICU staff
micu

## 2020-08-12 NOTE — PROGRESS NOTE ADULT - SUBJECTIVE AND OBJECTIVE BOX
Chief Complaint:  Patient is a 39y old  Male who presents with a chief complaint of abdominal pain (12 Aug 2020 07:36)    Reason for consult: acute on chronic liver failure    Interval Events: awaiting permacath, worked w/ PT, going home on discharge    Hospital Medications:  chlorhexidine 4% Liquid 1 Application(s) Topical <User Schedule>  hepatitis A (virus) Vaccine - Adult (HAVRIX) 1 milliLiter(s) IntraMuscular once  HYDROmorphone  Injectable 0.2 milliGRAM(s) IV Push every 4 hours PRN  melatonin 1 milliGRAM(s) Oral at bedtime  nystatin Powder 1 Application(s) Topical two times a day  ondansetron   Disintegrating Tablet 4 milliGRAM(s) Oral daily PRN  pantoprazole    Tablet 40 milliGRAM(s) Oral before breakfast  pneumococcal  13 Vaccine (PREVNAR 13) 0.5 milliLiter(s) IntraMuscular once  polyethylene glycol 3350 17 Gram(s) Oral two times a day  rifAXIMin 550 milliGRAM(s) Oral two times a day  sodium chloride 0.9% lock flush 10 milliLiter(s) IV Push every 1 hour PRN  thiamine 100 milliGRAM(s) Oral daily      ROS:   General:  No  fevers, chills, night sweats, fatigue  Eyes:  Good vision, no reported pain  ENT:  No sore throat, pain, runny nose  CV:  No pain, palpitations  Pulm:  No dyspnea, cough  GI:  See HPI, otherwise negative  :  No  incontinence, nocturia  Muscle:  No pain, weakness  Neuro:  No memory problems  Psych:  No insomnia, mood problems, depression  Endocrine:  No polyuria, polydipsia, cold/heat intolerance  Heme:  No petechiae, ecchymosis, easy bruisability  Skin:  No rash    PHYSICAL EXAM:   Vital Signs:  Vital Signs Last 24 Hrs  T(C): 37 (12 Aug 2020 04:46), Max: 37.1 (11 Aug 2020 14:00)  T(F): 98.6 (12 Aug 2020 04:46), Max: 98.7 (11 Aug 2020 14:00)  HR: 97 (12 Aug 2020 08:58) (84 - 97)  BP: 112/68 (12 Aug 2020 08:58) (112/68 - 120/71)  BP(mean): --  RR: 18 (12 Aug 2020 04:46) (18 - 18)  SpO2: 96% (12 Aug 2020 04:46) (93% - 96%)  Daily Height in cm: 185.42 (12 Aug 2020 08:58)    Daily Weight in k.1 (12 Aug 2020 08:00)    GENERAL: no acute distress  NEURO: alert, no asterixis  HEENT: icteric sclera, no conjunctival pallor appreciated  CHEST: no respiratory distress, no accessory muscle use  CARDIAC: regular rate, rhythm  ABDOMEN: soft, non-tender, distended, no rebound or guarding  EXTREMITIES: warm, well perfused, no edema  SKIN: ++ jaundice    LABS: reviewed                        8.4    23.74 )-----------( 140      ( 12 Aug 2020 07:16 )             24.4     08-12    132<L>  |  90<L>  |  44<H>  ----------------------------<  90  3.9   |  21<L>  |  7.32<H>    Ca    8.7      12 Aug 2020 07:16  Phos  5.8     08-12  Mg     2.2     08-12    TPro  5.7<L>  /  Alb  3.2<L>  /  TBili  21.7<H>  /  DBili  x   /  AST  145<H>  /  ALT  19  /  AlkPhos  141<H>  08-12    LIVER FUNCTIONS - ( 12 Aug 2020 07:16 )  Alb: 3.2 g/dL / Pro: 5.7 g/dL / ALK PHOS: 141 U/L / ALT: 19 U/L / AST: 145 U/L / GGT: x             Interval Diagnostic Studies: see sunrise for full report

## 2020-08-12 NOTE — PROGRESS NOTE ADULT - PROBLEM SELECTOR PLAN 1
.  - Plan for HD for today after permacath placed  - planning for IR permacath placement likely today 8/12  - Monitor electrolytes and urine output, continue monitor for renal recovery  - Avoid nephrotoxic agents (NSAIDs, PPI, contrast)  - Dose medications as per HD

## 2020-08-12 NOTE — PROGRESS NOTE ADULT - PROBLEM SELECTOR PLAN 9
patient will need HD and etoh rehab upon discharge. PT rec home w/ outpatient PT.  per sw, pt will need hepatitis B panel and PPD before placement w/ outpatient HD center.     Transitions of Care Status:  1.  Name of PCP:  2.  PCP Contacted on Admission: [ ] Y    [ ] N    3.  PCP contacted at Discharge: [ ] Y    [ ] N    [ ] N/A  4.  Post-Discharge Appointment Date and Location:  5.  Summary of Handoff given to PCP:

## 2020-08-13 NOTE — PROGRESS NOTE ADULT - ASSESSMENT
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 [place] removed 7/31 and the pt no longer with active bleeding with stable Hgb, successfully extubated 8/4 and titrated off vasopressors 8/5. Persistent leukocytosis is likely reactive. Not currently candidate for liver transplant.

## 2020-08-13 NOTE — PROGRESS NOTE ADULT - PROBLEM SELECTOR PLAN 1
possibly secondary to hepatorenal syndrome. 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 baseline creatinine sCr 0.6-0.8. On admission sCr was 9.64. s/p CVVHD w/ improvement in renal fx, but remains HD dependent.   -nephrology following, appreciate recs - no HD today  -accepted at outpatient HD center for Tues/Thurs/Sat HD; awaiting PPD results before discharge  -zofran 4mg PO prn nausea w/ dialysis  -c/w hold diuretics at this time   -daily CMP  -monitor UOP, strict I&Os  -avoid NSAIDs, ACEI/ARBS, RCA and nephrotoxins.   -dose medications as per eGFR.

## 2020-08-13 NOTE — PROGRESS NOTE ADULT - PROBLEM SELECTOR PLAN 2
Acute on chronic liver failure  pt w/ hx etoh cirrhosis, now c/b gastric variceal bleed and ascites. holding home nadolol, lasix, and spironolactone due to hypotension/shock. differential for acute liver failure includes worsening EtOH hepatitis (MDF = 52.7 but no interval drinking since discharge and this is an acute change) vs. underlying infection (UA neg, BCx negative, CXR neg, dx para neg for SBP, biliary imaging negative, C diff negative) vs vascular injury. no current signs of hepatic encephalopathy (A&Ox4). INR elevated, platelets low.   -hepatology following, appreciate recs - pt clear for d/c from hepatology standpoint  -c/w rifaximin  -c/w miralax BID  -c/w protonix 40mg PO daily  -monitor BP off midodrine  -MELD-Na 38/MDF 57 on 8/13  -transplant eval as outpatient pending alcohol rehab, not currently a transplant candidate at this time

## 2020-08-13 NOTE — PROGRESS NOTE ADULT - SUBJECTIVE AND OBJECTIVE BOX
PROGRESS NOTE:   Authored by Deandre Dwyer -[phone]    Patient is a 39y old  Male who presents with a chief complaint of abdominal pain (12 Aug 2020 09:09)    SUBJECTIVE / OVERNIGHT EVENTS:  The patient reported feeling anxious after HD yesterday, so received 0.5mg ativan.     REVIEW OF SYSTEMS:  CONSTITUTIONAL: No weakness, fevers or chills  EYES/ENT: No visual changes;  No vertigo or throat pain   NECK: No pain or stiffness  RESPIRATORY: No cough, wheezing, hemoptysis; No shortness of breath  CARDIOVASCULAR: No chest pain or palpitations  GASTROINTESTINAL: No abdominal or epigastric pain. No nausea, vomiting, or hematemesis; No diarrhea or constipation. No melena or hematochezia.  GENITOURINARY: No dysuria, frequency or hematuria  NEUROLOGICAL: No numbness or weakness  SKIN: No itching, rashes    MEDICATIONS  (STANDING):  chlorhexidine 4% Liquid 1 Application(s) Topical <User Schedule>  hepatitis A (virus) Vaccine - Adult (HAVRIX) 1 milliLiter(s) IntraMuscular once  melatonin 1 milliGRAM(s) Oral at bedtime  nystatin Powder 1 Application(s) Topical two times a day  pantoprazole    Tablet 40 milliGRAM(s) Oral before breakfast  pneumococcal  13 Vaccine (PREVNAR 13) 0.5 milliLiter(s) IntraMuscular once  polyethylene glycol 3350 17 Gram(s) Oral two times a day  rifAXIMin 550 milliGRAM(s) Oral two times a day  sevelamer carbonate 800 milliGRAM(s) Oral three times a day with meals  thiamine 100 milliGRAM(s) Oral daily    MEDICATIONS  (PRN):  ondansetron   Disintegrating Tablet 4 milliGRAM(s) Oral daily PRN Nausea  sodium chloride 0.9% lock flush 10 milliLiter(s) IV Push every 1 hour PRN Pre/post blood products, medications, blood draw, and to maintain line patency    I&O's Summary  12 Aug 2020 07:01  -  13 Aug 2020 07:00  --------------------------------------------------------  IN: 400 mL / OUT: 2125 mL / NET: -1725 mL    PHYSICAL EXAM:  Vital Signs Last 24 Hrs  T(C): 36.9 (13 Aug 2020 04:57), Max: 36.9 (12 Aug 2020 18:22)  T(F): 98.5 (13 Aug 2020 04:57), Max: 98.5 (12 Aug 2020 18:22)  HR: 91 (13 Aug 2020 04:57) (88 - 119)  BP: 102/66 (13 Aug 2020 04:57) (102/66 - 130/83)  BP(mean): 85 (12 Aug 2020 10:25) (77 - 87)  RR: 16 (13 Aug 2020 04:57) (14 - 19)  SpO2: 95% (13 Aug 2020 04:57) (91% - 96%)    GENERAL: No acute distress, well-developed  HEAD:  Atraumatic, Normocephalic  EYES: conjunctiva and sclera clear  NECK: Supple, no JVD  CHEST/LUNG: CTAB, no wheezes, rales, or rhonchi  HEART: Regular rate and rhythm, no murmurs, rubs, or gallops  ABDOMEN: Soft, non-tender, non-distended, normal bowel sounds  EXTREMITIES:  2+ peripheral pulses b/l, no clubbing, cyanosis, or edema  NEUROLOGY: A&O x 3, no focal deficits  SKIN: No rashes or lesions    LABS:    PT/INR - ( 12 Aug 2020 19:27 )   PT: 21.4 sec;   INR: 1.85 ratio    PTT - ( 12 Aug 2020 19:27 )  PTT:143.7 sec    RADIOLOGY & ADDITIONAL TESTS:  Results Reviewed [Y/N]: Y  Imaging Personally Reviewed [Y/N]: Y  Electrocardiogram Personally Reviewed [Y/N]: Y    COORDINATION OF CARE:  Care Discussed with Consultants/Other Providers [Y/N]: Y PROGRESS NOTE:   Authored by Deandre Dwyer -[phone]    Patient is a 39y old  Male who presents with a chief complaint of abdominal pain (12 Aug 2020 09:09)    SUBJECTIVE / OVERNIGHT EVENTS:  The patient reported feeling anxious after HD yesterday, so received 0.5mg ativan.     REVIEW OF SYSTEMS:  CONSTITUTIONAL: No weakness, fevers or chills  EYES/ENT: No visual changes;  No vertigo or throat pain   NECK: No pain or stiffness  RESPIRATORY: No cough, wheezing, hemoptysis; No shortness of breath  CARDIOVASCULAR: No chest pain or palpitations  GASTROINTESTINAL: No abdominal or epigastric pain. No nausea, vomiting, or hematemesis; No diarrhea or constipation. No melena or hematochezia.  GENITOURINARY: No dysuria, frequency or hematuria  NEUROLOGICAL: No numbness or weakness  SKIN: No itching, rashes    MEDICATIONS  (STANDING):  chlorhexidine 4% Liquid 1 Application(s) Topical <User Schedule>  hepatitis A (virus) Vaccine - Adult (HAVRIX) 1 milliLiter(s) IntraMuscular once  melatonin 1 milliGRAM(s) Oral at bedtime  nystatin Powder 1 Application(s) Topical two times a day  pantoprazole    Tablet 40 milliGRAM(s) Oral before breakfast  pneumococcal  13 Vaccine (PREVNAR 13) 0.5 milliLiter(s) IntraMuscular once  polyethylene glycol 3350 17 Gram(s) Oral two times a day  rifAXIMin 550 milliGRAM(s) Oral two times a day  sevelamer carbonate 800 milliGRAM(s) Oral three times a day with meals  thiamine 100 milliGRAM(s) Oral daily    MEDICATIONS  (PRN):  ondansetron   Disintegrating Tablet 4 milliGRAM(s) Oral daily PRN Nausea  sodium chloride 0.9% lock flush 10 milliLiter(s) IV Push every 1 hour PRN Pre/post blood products, medications, blood draw, and to maintain line patency    I&O's Summary  12 Aug 2020 07:01  -  13 Aug 2020 07:00  --------------------------------------------------------  IN: 400 mL / OUT: 2125 mL / NET: -1725 mL    PHYSICAL EXAM:  Vital Signs Last 24 Hrs  T(C): 36.9 (13 Aug 2020 04:57), Max: 36.9 (12 Aug 2020 18:22)  T(F): 98.5 (13 Aug 2020 04:57), Max: 98.5 (12 Aug 2020 18:22)  HR: 91 (13 Aug 2020 04:57) (88 - 119)  BP: 102/66 (13 Aug 2020 04:57) (102/66 - 130/83)  BP(mean): 85 (12 Aug 2020 10:25) (77 - 87)  RR: 16 (13 Aug 2020 04:57) (14 - 19)  SpO2: 95% (13 Aug 2020 04:57) (91% - 96%)    GENERAL: No acute distress, well-developed  HEAD:  Atraumatic, Normocephalic  EYES: conjunctiva and sclera clear  NECK: Supple, no JVD  CHEST/LUNG: CTAB, no wheezes, rales, or rhonchi  HEART: Regular rate and rhythm, no murmurs, rubs, or gallops  ABDOMEN: Soft, non-tender, non-distended, normal bowel sounds  EXTREMITIES:  2+ peripheral pulses b/l, no clubbing, cyanosis, or edema  NEUROLOGY: A&O x 3, no focal deficits  SKIN: No rashes or lesions    LABS:             7.7    19.73 )-----------( 134      ( 13 Aug 2020 07:15 )             22.8     08-13  133<L>  |  92<L>  |  27<H>  ----------------------------<  110<H>  4.0   |  26  |  5.40<H>  Ca    8.5      13 Aug 2020 07:15  Phos  4.4     08-13  Mg     1.9     08-13  TPro  5.7<L>  /  Alb  3.1<L>  /  TBili  20.2<H>  /  DBili  x   /  AST  161<H>  /  ALT  22  /  AlkPhos  139<H>  08-13    PT/INR - ( 13 Aug 2020 07:15 )   PT: 21.2 sec;   INR: 1.83 ratio    PTT - ( 13 Aug 2020 07:15 )  PTT:37.3 sec  PT/INR - ( 12 Aug 2020 19:27 )   PT: 21.4 sec;   INR: 1.85 ratio    PTT - ( 12 Aug 2020 19:27 )  PTT:143.7 sec    RADIOLOGY & ADDITIONAL TESTS:  Results Reviewed [Y/N]: Y  Imaging Personally Reviewed [Y/N]: Y  Electrocardiogram Personally Reviewed [Y/N]: Y    COORDINATION OF CARE:  Care Discussed with Consultants/Other Providers [Y/N]: Y PROGRESS NOTE:   Authored by Deandre Dwyer -[phone]    Patient is a 39y old  Male who presents with a chief complaint of abdominal pain (12 Aug 2020 09:09)    SUBJECTIVE / OVERNIGHT EVENTS:  The patient reported feeling anxious after HD yesterday, so received 0.5mg ativan. According to social work, the patient     REVIEW OF SYSTEMS:  CONSTITUTIONAL: No weakness, fevers or chills  EYES/ENT: No visual changes;  No vertigo or throat pain   NECK: No pain or stiffness  RESPIRATORY: No cough, wheezing, hemoptysis; No shortness of breath  CARDIOVASCULAR: No chest pain or palpitations  GASTROINTESTINAL: No abdominal or epigastric pain. No nausea, vomiting, or hematemesis; No diarrhea or constipation. No melena or hematochezia.  GENITOURINARY: No dysuria, frequency or hematuria  NEUROLOGICAL: No numbness or weakness  SKIN: No itching, rashes    MEDICATIONS  (STANDING):  chlorhexidine 4% Liquid 1 Application(s) Topical <User Schedule>  hepatitis A (virus) Vaccine - Adult (HAVRIX) 1 milliLiter(s) IntraMuscular once  melatonin 1 milliGRAM(s) Oral at bedtime  nystatin Powder 1 Application(s) Topical two times a day  pantoprazole    Tablet 40 milliGRAM(s) Oral before breakfast  pneumococcal  13 Vaccine (PREVNAR 13) 0.5 milliLiter(s) IntraMuscular once  polyethylene glycol 3350 17 Gram(s) Oral two times a day  rifAXIMin 550 milliGRAM(s) Oral two times a day  sevelamer carbonate 800 milliGRAM(s) Oral three times a day with meals  thiamine 100 milliGRAM(s) Oral daily    MEDICATIONS  (PRN):  ondansetron   Disintegrating Tablet 4 milliGRAM(s) Oral daily PRN Nausea  sodium chloride 0.9% lock flush 10 milliLiter(s) IV Push every 1 hour PRN Pre/post blood products, medications, blood draw, and to maintain line patency    I&O's Summary  12 Aug 2020 07:01  -  13 Aug 2020 07:00  --------------------------------------------------------  IN: 400 mL / OUT: 2125 mL / NET: -1725 mL    PHYSICAL EXAM:  Vital Signs Last 24 Hrs  T(C): 36.9 (13 Aug 2020 04:57), Max: 36.9 (12 Aug 2020 18:22)  T(F): 98.5 (13 Aug 2020 04:57), Max: 98.5 (12 Aug 2020 18:22)  HR: 91 (13 Aug 2020 04:57) (88 - 119)  BP: 102/66 (13 Aug 2020 04:57) (102/66 - 130/83)  BP(mean): 85 (12 Aug 2020 10:25) (77 - 87)  RR: 16 (13 Aug 2020 04:57) (14 - 19)  SpO2: 95% (13 Aug 2020 04:57) (91% - 96%)    GENERAL: No acute distress, well-developed  HEAD:  Atraumatic, Normocephalic  EYES: conjunctiva and sclera clear  NECK: Supple, no JVD  CHEST/LUNG: CTAB, no wheezes, rales, or rhonchi  HEART: Regular rate and rhythm, no murmurs, rubs, or gallops  ABDOMEN: Soft, non-tender, non-distended, normal bowel sounds  EXTREMITIES:  2+ peripheral pulses b/l, no clubbing, cyanosis, or edema  NEUROLOGY: A&O x 3, no focal deficits  SKIN: No rashes or lesions    LABS:             7.7    19.73 )-----------( 134      ( 13 Aug 2020 07:15 )             22.8     08-13  133<L>  |  92<L>  |  27<H>  ----------------------------<  110<H>  4.0   |  26  |  5.40<H>  Ca    8.5      13 Aug 2020 07:15  Phos  4.4     08-13  Mg     1.9     08-13  TPro  5.7<L>  /  Alb  3.1<L>  /  TBili  20.2<H>  /  DBili  x   /  AST  161<H>  /  ALT  22  /  AlkPhos  139<H>  08-13    PT/INR - ( 13 Aug 2020 07:15 )   PT: 21.2 sec;   INR: 1.83 ratio    PTT - ( 13 Aug 2020 07:15 )  PTT:37.3 sec  PT/INR - ( 12 Aug 2020 19:27 )   PT: 21.4 sec;   INR: 1.85 ratio    PTT - ( 12 Aug 2020 19:27 )  PTT:143.7 sec    RADIOLOGY & ADDITIONAL TESTS:  Results Reviewed [Y/N]: Y  Imaging Personally Reviewed [Y/N]: Y  Electrocardiogram Personally Reviewed [Y/N]: Y    COORDINATION OF CARE:  Care Discussed with Consultants/Other Providers [Y/N]: Y PROGRESS NOTE:   Authored by Deandre Dwyer -[phone]    Patient is a 39y old  Male who presents with a chief complaint of abdominal pain (12 Aug 2020 09:09)    SUBJECTIVE / OVERNIGHT EVENTS:  The patient reported feeling anxious after HD yesterday, so received 0.5mg ativan. According to social work, the patient's accepting HD center is requiring a PPD test & the patient will have to remain inpatient until the PPD is read. Also, he is unable to start @ HD center over the weekend, so it is possible that he will have to receive inpatient HD on Saturday and remain inpatient through the weekend. The patient is frustrated with this situation and expresses desire to leave today. He denies any other complaints including abdominal pain, n/v/d, SOB, chest pain.     REVIEW OF SYSTEMS:  CONSTITUTIONAL: No weakness, fevers or chills  EYES/ENT: No visual changes;  No vertigo or throat pain   NECK: No pain or stiffness  RESPIRATORY: No cough, wheezing, hemoptysis; No shortness of breath  CARDIOVASCULAR: No chest pain or palpitations  GASTROINTESTINAL: No abdominal or epigastric pain. No nausea, vomiting, or hematemesis; No diarrhea or constipation. No melena or hematochezia.  GENITOURINARY: No dysuria, frequency or hematuria  NEUROLOGICAL: No numbness or weakness  SKIN: No itching, rashes    MEDICATIONS  (STANDING):  chlorhexidine 4% Liquid 1 Application(s) Topical <User Schedule>  hepatitis A (virus) Vaccine - Adult (HAVRIX) 1 milliLiter(s) IntraMuscular once  melatonin 1 milliGRAM(s) Oral at bedtime  nystatin Powder 1 Application(s) Topical two times a day  pantoprazole    Tablet 40 milliGRAM(s) Oral before breakfast  pneumococcal  13 Vaccine (PREVNAR 13) 0.5 milliLiter(s) IntraMuscular once  polyethylene glycol 3350 17 Gram(s) Oral two times a day  rifAXIMin 550 milliGRAM(s) Oral two times a day  sevelamer carbonate 800 milliGRAM(s) Oral three times a day with meals  thiamine 100 milliGRAM(s) Oral daily    MEDICATIONS  (PRN):  ondansetron   Disintegrating Tablet 4 milliGRAM(s) Oral daily PRN Nausea  sodium chloride 0.9% lock flush 10 milliLiter(s) IV Push every 1 hour PRN Pre/post blood products, medications, blood draw, and to maintain line patency    I&O's Summary  12 Aug 2020 07:01  -  13 Aug 2020 07:00  --------------------------------------------------------  IN: 400 mL / OUT: 2125 mL / NET: -1725 mL    PHYSICAL EXAM:  Vital Signs Last 24 Hrs  T(C): 36.9 (13 Aug 2020 04:57), Max: 36.9 (12 Aug 2020 18:22)  T(F): 98.5 (13 Aug 2020 04:57), Max: 98.5 (12 Aug 2020 18:22)  HR: 91 (13 Aug 2020 04:57) (88 - 119)  BP: 102/66 (13 Aug 2020 04:57) (102/66 - 130/83)  BP(mean): 85 (12 Aug 2020 10:25) (77 - 87)  RR: 16 (13 Aug 2020 04:57) (14 - 19)  SpO2: 95% (13 Aug 2020 04:57) (91% - 96%)    GENERAL: No acute distress, well-developed  HEAD:  Atraumatic, Normocephalic  EYES: scleral icterus  NECK: Supple, no JVD  CHEST/LUNG: CTAB, no wheezes, rales, or rhonchi  HEART: Regular rate and rhythm, no murmurs, rubs, or gallops  ABDOMEN: distended Soft, non-tender, normal bowel sounds  EXTREMITIES:  2+ peripheral pulses b/l, no clubbing, cyanosis, or edema  NEUROLOGY: A&O x 3, no focal deficits  SKIN: jaundice No rashes or lesions    LABS:             7.7    19.73 )-----------( 134      ( 13 Aug 2020 07:15 )             22.8     08-13  133<L>  |  92<L>  |  27<H>  ----------------------------<  110<H>  4.0   |  26  |  5.40<H>  Ca    8.5      13 Aug 2020 07:15  Phos  4.4     08-13  Mg     1.9     08-13  TPro  5.7<L>  /  Alb  3.1<L>  /  TBili  20.2<H>  /  DBili  x   /  AST  161<H>  /  ALT  22  /  AlkPhos  139<H>  08-13    PT/INR - ( 13 Aug 2020 07:15 )   PT: 21.2 sec;   INR: 1.83 ratio    PTT - ( 13 Aug 2020 07:15 )  PTT:37.3 sec  PT/INR - ( 12 Aug 2020 19:27 )   PT: 21.4 sec;   INR: 1.85 ratio    PTT - ( 12 Aug 2020 19:27 )  PTT:143.7 sec    RADIOLOGY & ADDITIONAL TESTS:  Results Reviewed [Y/N]: Y  Imaging Personally Reviewed [Y/N]: Y  Electrocardiogram Personally Reviewed [Y/N]: Y    COORDINATION OF CARE:  Care Discussed with Consultants/Other Providers [Y/N]: Y

## 2020-08-13 NOTE — PROGRESS NOTE ADULT - SUBJECTIVE AND OBJECTIVE BOX
Chief Complaint:  Patient is a 39y old  Male who presents with a chief complaint of abdominal pain (13 Aug 2020 07:40)    Reason for consult: acute on chronic liver failure    Interval Events: s/p permacath, discharge pending logistic issues w/ HD center, PPD reading    Hospital Medications:  chlorhexidine 4% Liquid 1 Application(s) Topical <User Schedule>  hepatitis A (virus) Vaccine - Adult (HAVRIX) 1 milliLiter(s) IntraMuscular once  melatonin 1 milliGRAM(s) Oral at bedtime  nystatin Powder 1 Application(s) Topical two times a day  ondansetron   Disintegrating Tablet 4 milliGRAM(s) Oral daily PRN  pantoprazole    Tablet 40 milliGRAM(s) Oral before breakfast  pneumococcal  13 Vaccine (PREVNAR 13) 0.5 milliLiter(s) IntraMuscular once  polyethylene glycol 3350 17 Gram(s) Oral two times a day  rifAXIMin 550 milliGRAM(s) Oral two times a day  sevelamer carbonate 800 milliGRAM(s) Oral three times a day with meals  sodium chloride 0.9% lock flush 10 milliLiter(s) IV Push every 1 hour PRN  thiamine 100 milliGRAM(s) Oral daily      ROS:   General:  No  fevers, chills, night sweats, fatigue  Eyes:  Good vision, no reported pain  ENT:  No sore throat, pain, runny nose  CV:  No pain, palpitations  Pulm:  No dyspnea, cough  GI:  See HPI, otherwise negative  :  No  incontinence, nocturia  Muscle:  No pain, weakness  Neuro:  No memory problems  Psych:  No insomnia, mood problems, depression  Endocrine:  No polyuria, polydipsia, cold/heat intolerance  Heme:  No petechiae, ecchymosis, easy bruisability  Skin:  No rash    PHYSICAL EXAM:   Vital Signs:  Vital Signs Last 24 Hrs  T(C): 36.9 (13 Aug 2020 04:57), Max: 36.9 (12 Aug 2020 18:22)  T(F): 98.5 (13 Aug 2020 04:57), Max: 98.5 (12 Aug 2020 18:22)  HR: 91 (13 Aug 2020 04:57) (88 - 119)  BP: 102/66 (13 Aug 2020 04:57) (102/66 - 130/83)  BP(mean): --  RR: 16 (13 Aug 2020 04:57) (16 - 16)  SpO2: 95% (13 Aug 2020 04:57) (91% - 96%)  Daily     Daily Weight in k.6 (12 Aug 2020 21:22)    GENERAL: no acute distress  NEURO: alert, no asterixis  HEENT: icteric sclera, no conjunctival pallor appreciated  CHEST: no respiratory distress, no accessory muscle use  CARDIAC: regular rate, rhythm  ABDOMEN: soft, non-tender, distended, no rebound or guarding  EXTREMITIES: warm, well perfused, no edema  SKIN: ++ jaundice    LABS: reviewed                        7.7    19.73 )-----------( 134      ( 13 Aug 2020 07:15 )             22.8     08-13    133<L>  |  92<L>  |  27<H>  ----------------------------<  110<H>  4.0   |  26  |  5.40<H>    Ca    8.5      13 Aug 2020 07:15  Phos  4.4     08-  Mg     1.9     08-    TPro  5.7<L>  /  Alb  3.1<L>  /  TBili  20.2<H>  /  DBili  x   /  AST  161<H>  /  ALT  22  /  AlkPhos  139<H>  08-13    LIVER FUNCTIONS - ( 13 Aug 2020 07:15 )  Alb: 3.1 g/dL / Pro: 5.7 g/dL / ALK PHOS: 139 U/L / ALT: 22 U/L / AST: 161 U/L / GGT: x             Interval Diagnostic Studies: see sunrise for full report

## 2020-08-13 NOTE — PROGRESS NOTE ADULT - ASSESSMENT
39M w/ decompensated EtOH cirrhosis, presenting w/ acute on chronic liver failure of unclear etiology (infection NOS vs continued inflammation due to EtOH)a. Course c/b massive GIB from gastric varix requiring 25u pRBC, s/p glue injection and PARTO by IR. Hb stable post procedure, now on floor w/ stably high MELD-Na labs. Remains HD dependent, plan for outpatient liver transplant evaluation.      Impression:  #Acute on chronic liver failure: d/dx includes underlying infection (UA neg, BCx NGTD from this admission, CXR neg, dx para neg for SBP, biliary imaging negative, C diff negative) vs worsening EtOH hepatitis (MDF = 60 but no interval drinking since discharge and this is an acute change)  #Cirrhosis due to EtOH, decompensated by ascites  - varices: no esophageal varices on EGD 7/29, actively bleeding gastric varix s/p glue injection and PARTO by IR, no further bleeding post procedrue  - ascites: present on exam, neg for SBP 7/26, on Lasix 20mg/spironolactone 25mg daily as outpatient  - HE: none on exam  - HCC: no imaging, MRI without contrast is limited  - MELD-Na = 39 on 8/11  - Transplant evaluation to be completed as outpatient, pt has already joined online EtOH rehab and reports commitment to continued attendance  #L adrenal hemorrhage – no e/o adrenal insufficiency or acute blood loss anemia  #Acute renal failure: unclear etiology, now HD dependent    Recommendations:  - discharge planning per primary team  - outpatient HD as per renal recs  - c/w miralax BID, rifaximin  - diuretics as per renal  - PO PPI daily  - c/w MVI, thiamine, folate  - trend CMP, CBC, INR daily while inpatient  - transplant hepatology will continue to follow  - pt will need to attend alcohol rehab as outpatient  - outpatient f/u w/ Dr. Funez within 1-2 weeks post-discharge, please contact us on discharge to arrange    Ross Shook  Gastroenterology Fellow  AT NIGHT AND ON WEEKENDS:  Contact on-call GI fellow via answering service ([phone]) from 5pm-8am and on weekends/holidays  MONDAY-FRIDAY 8AM-5PM:  Available via [employer] Teams  Call [phone] ([facility]) or Page [phone] ([facility]) from 8am-5pm M-F

## 2020-08-14 NOTE — DISCHARGE NOTE PROVIDER - NSDCCPCAREPLAN_GEN_ALL_CORE_FT
PRINCIPAL DISCHARGE DIAGNOSIS  Diagnosis: Decompensated liver disease  Assessment and Plan of Treatment: You were found to be in acute on chronic liver failure during this hospital admission. Your liver disease was complicated by ascites and a severe gastric variceal bleed. Please continue treatment with rifaximin and protonix daily. Please schedule a follow up appointment with the transplant hepatology department for further management of your liver disease and evaluation for a transplant. Return to the ED if you experience abdominal pain, changes in your mental status blood in your vomit, blood in your stool, fevers, or shaking chills.      SECONDARY DISCHARGE DIAGNOSES  Diagnosis: Acute renal failure  Assessment and Plan of Treatment: You were found to be in acute renal failure on admission to the hospital. You remained dependent on dialysis during your hospital stay. Please follow up with outpatient dialysis on Tues/Thurs/Sat in your area. Please continue treatment renvela, furosemide, midodrine, and nephro-vicente supplementation. Schedule a follow up appointment with the nephrology department for further management of your renal failure. Return to the ED if you experience significanly decreased urine output, blood in your urine, changes in your mental status, fevers, or shaking chills. PRINCIPAL DISCHARGE DIAGNOSIS  Diagnosis: Decompensated liver disease  Assessment and Plan of Treatment: You were found to be in acute on chronic liver failure during this hospital admission. Your liver disease was complicated by ascites and a severe gastric variceal bleed. Please continue treatment with rifaximin and protonix daily. Please schedule a follow up appointment with the transplant hepatology department for further management of your liver disease and evaluation for a transplant. Return to the ED if you experience abdominal pain, changes in your mental status blood in your vomit, blood in your stool, fevers, or shaking chills.      SECONDARY DISCHARGE DIAGNOSES  Diagnosis: Acute renal failure  Assessment and Plan of Treatment: You were found to be in acute renal failure on admission to the hospital. You remained dependent on dialysis during your hospital stay. Please follow up with outpatient dialysis on Tues/Thurs/Sat in your area. Please continue treatment renvela, furosemide, midodrine, and nephro-vicente supplementation. Schedule a follow up appointment with the nephrology department for further management of your renal failure. Additionally, your last dialsysis was 8/15, and you need to dialysis again by 8/18 the latest. Please set up the dialysis before that time. Return to the ED if you experience significanly decreased urine output, blood in your urine, changes in your mental status, fevers, or shaking chills.

## 2020-08-14 NOTE — PROGRESS NOTE ADULT - PROBLEM SELECTOR PLAN 1
.  - Plan for HD today UF goal 1-2L as BP tolerate  - Monitor electrolytes and urine output, continue monitor for renal recovery  - Avoid nephrotoxic agents (NSAIDs, PPI, contrast)  - Dose medications as per HD .  - Plan for HD tomorrow (8/14 first shift 7am) UF goal 1-2L as BP tolerate  - okay restart lasix PO 60mg daily.  NO aldactone upon discharge given risk hyperkalemia.   - Monitor electrolytes and urine output, continue monitor for renal recovery  - Avoid nephrotoxic agents (NSAIDs, PPI, contrast)  - Dose medications as per HD    From renal standpoint, no objection to discharge after HD tomorrow given has outpatient slot for Tuesday 8/25/20.  Will need assess renal recovery outpatient .  - Plan for HD tomorrow (8/15 first shift 7am) UF goal 1-2L as BP tolerate  - okay restart lasix PO 60mg daily.  NO aldactone upon discharge given risk hyperkalemia.   - Monitor electrolytes and urine output, continue monitor for renal recovery  - Avoid nephrotoxic agents (NSAIDs, PPI, contrast)  - Dose medications as per HD    From renal standpoint, no objection to discharge after HD tomorrow given has outpatient slot for Tuesday 8/18/20.  Will need assess renal recovery outpatient

## 2020-08-14 NOTE — PROGRESS NOTE ADULT - PROBLEM SELECTOR PLAN 3
hypophosphatemia likely 2/2 MARQUISE ATN, last serum phos 5.2    - Renal diet (low phosphorous diet)  - monitor serum phos daily

## 2020-08-14 NOTE — CHART NOTE - NSCHARTNOTEFT_GEN_A_CORE
PPD placed on 8/12 at 5pm, was read at 520pm on 8/14. There is no swelling or raised lesion at site of PPD implantation, thus, this is a negative test.   Shaka Uriostegui, Internal Medicine  PGY-3, [phone], [id number]

## 2020-08-14 NOTE — PROGRESS NOTE ADULT - PROBLEM SELECTOR PLAN 2
Acute on chronic liver failure  pt w/ hx etoh cirrhosis, now c/b gastric variceal bleed and ascites. holding home nadolol, lasix, and spironolactone due to hypotension/shock. differential for acute liver failure includes worsening EtOH hepatitis (MDF = 52.7 but no interval drinking since discharge and this is an acute change) vs. underlying infection (UA neg, BCx negative, CXR neg, dx para neg for SBP, biliary imaging negative, C diff negative) vs vascular injury. no current signs of hepatic encephalopathy (A&Ox4). INR elevated, platelets low.   -hepatology following, appreciate recs - pt clear for d/c from hepatology standpoint  -c/w rifaximin  -c/w miralax BID  -c/w protonix 40mg PO daily  -monitor BP off midodrine  -MELD-Na 3/MDF 57 on 8/13  -transplant eval as outpatient pending alcohol rehab, not currently a transplant candidate at this time Acute on chronic liver failure  pt w/ hx etoh cirrhosis, now c/b gastric variceal bleed and ascites. holding home nadolol, lasix, and spironolactone due to hypotension/shock. differential for acute liver failure includes worsening EtOH hepatitis (MDF = 52.7 but no interval drinking since discharge and this is an acute change) vs. underlying infection (UA neg, BCx negative, CXR neg, dx para neg for SBP, biliary imaging negative, C diff negative) vs vascular injury. no current signs of hepatic encephalopathy (A&Ox4). INR elevated, platelets low.   -hepatology following, appreciate recs - pt clear for d/c from hepatology standpoint  -c/w rifaximin  -c/w miralax BID  -c/w protonix 40mg PO daily  -monitor BP off midodrine  -MELD-Na 38/MDF 57.9 on 8/14  -transplant eval as outpatient pending alcohol rehab, not currently a transplant candidate at this time

## 2020-08-14 NOTE — DISCHARGE NOTE PROVIDER - HOSPITAL COURSE
Haroon Adames is a 40 yo male with PMHx of EtOH cirrhosis with portal hypertension & traumatic bladder rupture due to a fall s/p ex-lap and repair in 2019 with recent admission for acute alcoholic hepatitis and alcohol withdrawal c/b seizures. The patient presented on 07/26 with acute renal failure, jaundice, and increasing ascites. The patient was initially admitted to the medical floor for management of acute on chronic liver failure with concern for infection vs. alcoholic hepatitis and acute renal failure. 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 The patient underwent a diagnostic paracentesis on 07/26, which was negative for SBP. The patient also underwent Shiley placement by IR for the initiation of dialysis. On 07/29, the patient began having hematemesis. He underwent an EGD, which revealed a large gastric variceal bleed. GI was unable to achieve hemostasis and the patient was emergently transferred to IR where he underwent embolization (via glue injection and PARTO) of the bleeding gastric varice and placement of a blakemore tube. In total, the patient received 34 units of pRBCs, 25 units of FFP, 5 units of platelets, and 2 units of cryo throughout the EGD and IR procedure. The patient was subsequently transferred to the MICU for further care. He remained intubated, sedated, and on vasopressors after arrival to the MICU.                 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 Pt originally presented 7/26 with acute renal failure, jaundice and increasing ascites. 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 S/p diagnostic paracentesis 7/26 negative for SBP s/p empiric course of Zosyn. Pt underwent shiley placement by IR for initiation of dialysis. On 7/29 pt with hematemesis and underwent EGD revealing large gastric variceal bleed. In EGD pt received 12 U PRBC, 10 U FFP, 2 U Plt, 1 Cryo. GI unable to achieve hemostasis and Pt emergently transferred to IR where he underwent embolization (glue injection and PARTO) of bleeding gastric varice and placement of blakemore tube. In total pt received 34 U PRBC, 25 FFP, 5 U Plt, 2 cryo prior to transfer to MICU for further care.         On arrival to MICU pt remained intubated and sedated and remained on vasopressors for hemorrhagic and hypovolemic shock state then later ? component of distributive d/t improvement in hemoglobin s/p MTP. Blakemore was removed by GI on 7/30 and pt had no evidence of further bleeding s/p. Pt started on CVVHDF for acute renal failure. Pt was successfully extubated 8/4 to nasal cannula and returned at baseline mental status. CRRT was discontinued on 8/5 and transitioned to intermittent HD 8/6. Pt weaned off vasopressors on 8/5. All cultures NGTD, pt remained on empiric abx and transitioned to meropenem 8/6 i/s/o worsening leukocytosis and elevated lipase to 504 as well as abdominal pain with nausea and vomiting. Pt underwent CTA/P to r/o pancreatitis with pre-curiel findings unremarkable pancreas, nonspecific gastric wall thickening/edema, redomenstrated cirrhotic liver with steatosis and splenomegaly, small to mod volume ascites, redemonstrated fluid collection without peripheral enhancement in the lower pelvis and wall thickening of the ascending colon and cecum. Pt started on a clear diet and advanced as tolerated. Haroon Adames is a 38 yo male with PMHx of EtOH cirrhosis with portal hypertension & traumatic bladder rupture due to a fall s/p ex-lap and repair in 2019 with recent admission for acute alcoholic hepatitis and alcohol withdrawal c/b seizures. The patient presented on 07/26 with acute renal failure, jaundice, and increasing ascites. The patient was initially admitted to the medical floor for management of acute on chronic liver failure with concern for infection vs. alcoholic hepatitis and acute renal failure. 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 The patient underwent a diagnostic paracentesis on 07/26, which was negative for SBP. The patient also underwent Shiley placement by IR for the initiation of dialysis. On 07/29, the patient began having hematemesis. He underwent an EGD, which revealed a large gastric variceal bleed. GI was unable to achieve hemostasis and the patient was emergently transferred to IR where he underwent embolization (via glue injection and PARTO) of the bleeding gastric varice and placement of a blakemore tube. In total, the patient received 34 units of pRBCs, 25 units of FFP, 5 units of platelets, and 2 units of cryo throughout the EGD and IR procedure. The patient was subsequently transferred to the MICU for further care. He remained intubated, sedated, and on vasopressors after arrival to the MICU. The patient was started on CRRT for his acute renal failure. The blakemore tube was removed by GI on 07/30 and patient had no evidence of further bleeding afterwards. The patient was extubated to nasal cannula on 08/04 and returned to his baseline mental status. He was weaned off of vasopressors on 08/05, but remained on midodrine 10mg to support his BP. He was transitioned to intermittent HD on 08/06. He began having worsening leukocytosis on 08/06 and was transitioned to meropenem. He was complaining of abdominal pain with nausea and vomiting. He was found to have a lipase of 504. However, he underwent CT Abdomen/Pelvis on 08/06, which showed no evidence of acute pancreatitis, re-demonstration of cirrhotic liver with steatosis, and a new left adrenal nodularity, possibly representing a hemorrhage. Follow up MRI Abdomen showed the adrenal hemorrhage, but the size was unchanged from the CT scan so it was unlikely to be actively bleeding. The patient was transferred back to the medical floor on 08/06. The infectious disease team suggested that the patient's leukocytosis was likely reactive, so the meropenem was discontinued on 08/08. The patient remained HD dependent for the remainder of the hospital admission. A permacath was placed by IR on 08/12 so that the patient could continue with HD after discharge from the hospital. PT evaluated the patient and determined that was functional enough to return home with outpatient PT services. Haroon Adames is a 40 yo male with PMHx of EtOH cirrhosis with portal hypertension & traumatic bladder rupture due to a fall s/p ex-lap and repair in 2019 with recent admission for acute alcoholic hepatitis and alcohol withdrawal c/b seizures. The patient presented on 07/26 with acute renal failure, jaundice, and increasing ascites. The patient was initially admitted to the medical floor for management of acute on chronic liver failure with concern for infection vs. alcoholic hepatitis and acute renal failure. 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 The patient underwent a diagnostic paracentesis on 07/26, which was negative for SBP. The patient also underwent Shiley placement by IR for the initiation of dialysis. On 07/29, the patient began having hematemesis. He underwent an EGD, which revealed a large gastric variceal bleed. GI was unable to achieve hemostasis and the patient was emergently transferred to IR where he underwent embolization (via glue injection and PARTO) of the bleeding gastric varices and placement of a blakemore tube. In total, the patient received 34 units of pRBCs, 25 units of FFP, 5 units of platelets, and 2 units of cryo throughout the EGD and IR procedure. The patient was subsequently transferred to the MICU for further care. He remained intubated, sedated, and on vasopressors after arrival to the MICU. The patient was started on CRRT for his acute renal failure. The blakemore tube was removed by GI on 07/30 and patient had no evidence of further bleeding afterwards. The patient was extubated to nasal cannula on 08/04 and returned to his baseline mental status. He was weaned off of vasopressors on 08/05, but remained on midodrine 10mg to support his BP. He was transitioned to intermittent HD on 08/06. He began having worsening leukocytosis on 08/06 and was transitioned to meropenem. He was complaining of abdominal pain with nausea and vomiting. He was found to have a lipase of 504. He underwent CT Abdomen/Pelvis on 08/06, which showed no evidence of acute pancreatitis, re-demonstration of cirrhotic liver with steatosis, and a new left adrenal nodularity, possibly representing a hemorrhage. Follow up MRI Abdomen showed the adrenal hemorrhage, but the size was unchanged from the CT scan so it was unlikely to be actively bleeding. The patient was transferred back to the medical floor on 08/06. The patient remained HD dependent for the remainder of the hospital admission. In addition, he continued to have elevated bilirubin and jaundice, ascites, transaminitis, prolonged INR, and decreased platelets. The infectious disease team suggested that the patient's leukocytosis was likely reactive, so the meropenem was discontinued on 08/08.  He was taken off of midodrine on 08/12 and his BP remained sufficiently elevated. He received a dose of midodrine 30-45 minutes prior to HD to maintain his BP during each session. A permacath was placed by IR on 08/12 so that the patient could continue with HD after discharge from the hospital. There was minimal bleeding from the placement site on 08/14 that was stopped with holding pressure. The patient's prior to admission medications (nadolol, sprionolactone, and lasix) were held throughout the hospital stay due to his hypovolemic shock related to his gastric variceal bleed. He was restarted on Lasix 60mg daily before discharge. PT evaluated the patient and determined that he was functional enough to return home with outpatient PT services. The patient was set up with [facility] dialysis center in [facility]. The center required the patient to undergo PPD placement and reading to rule out TB before discharge. He committed to following up with transplant hepatology and nephrology at [facility]. He was counseled on the importance of starting alcohol rehab in order to become a candidate for liver transplant. On the date of discharge, the patient was evaluated and determined to be clinically stable. He was assessed to be an appropriate candidate for discharge to home. Haroon Adames is a 38 yo male with PMHx of EtOH cirrhosis with portal hypertension & traumatic bladder rupture due to a fall s/p ex-lap and repair in 2019 with recent admission for acute alcoholic hepatitis and alcohol withdrawal c/b seizures. The patient presented on 07/26 with acute renal failure, jaundice, and increasing ascites. The patient was initially admitted to the medical floor for management of acute on chronic liver failure with concern for infection vs. alcoholic hepatitis and acute renal failure. 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 The patient underwent a diagnostic paracentesis on 07/26, which was negative for SBP. The patient also underwent Shiley placement by IR for the initiation of dialysis. On 07/29, the patient began having hematemesis. He underwent an EGD, which revealed a large gastric variceal bleed. GI was unable to achieve hemostasis and the patient was emergently transferred to IR where he underwent embolization (via glue injection and PARTO) of the bleeding gastric varices and placement of a blakemore tube. In total, the patient received 34 units of pRBCs, 25 units of FFP, 5 units of platelets, and 2 units of cryo throughout the EGD and IR procedure. The patient was subsequently transferred to the MICU for further care. He remained intubated, sedated, and on vasopressors after arrival to the MICU. The patient was started on CRRT for his acute renal failure. The blakemore tube was removed by GI on 07/30 and patient had no evidence of further bleeding afterwards. The patient was extubated to nasal cannula on 08/04 and returned to his baseline mental status. He was weaned off of vasopressors on 08/05, but remained on midodrine 10mg to support his BP. He was transitioned to intermittent HD on 08/06. He began having worsening leukocytosis on 08/06 and was transitioned to meropenem. He was complaining of abdominal pain with nausea and vomiting. He was found to have a lipase of 504. He underwent CT Abdomen/Pelvis on 08/06, which showed no evidence of acute pancreatitis, re-demonstration of cirrhotic liver with steatosis, and a new left adrenal nodularity, possibly representing a hemorrhage. Follow up MRI Abdomen showed the adrenal hemorrhage, but the size was unchanged from the CT scan so it was unlikely to be actively bleeding. The patient was transferred back to the medical floor on 08/06. The patient remained HD dependent for the remainder of the hospital admission. In addition, he continued to have elevated bilirubin and jaundice, ascites, transaminitis, prolonged INR, and decreased platelets. The infectious disease team suggested that the patient's leukocytosis was likely reactive, so the meropenem was discontinued on 08/08. He received a dose of midodrine 30-45 minutes prior to HD to maintain his BP during each session. A permacath was placed by IR on 08/12 so that the patient could continue with HD after discharge from the hospital. There was minimal bleeding from the placement site on 08/14 that was stopped with holding pressure. The patient's prior to admission medications (nadolol, sprionolactone, and lasix) were held throughout the hospital stay due to his hypovolemic shock related to his gastric variceal bleed. He was restarted on Lasix 60mg daily before discharge. PT evaluated the patient and determined that he was functional enough to return home with outpatient PT services. The patient was set up with [facility] dialysis center in [facility]. The center required the patient to undergo PPD placement and reading to rule out TB before finalizing discharge. He committed to following up with transplant hepatology and nephrology at [facility]. He was counseled on the importance of starting alcohol rehab in order to become a candidate for liver transplant. According to the transplant hepatology team, it would be reasonable for the patient to be treated for hepatorenal syndrome as an outpatient with albumin infusions and octreotide injections. The hepatology service will organize this treatment at the patient's follow up visits. Haroon Adames is a 40 yo male with PMHx of EtOH cirrhosis with portal hypertension & traumatic bladder rupture due to a fall s/p ex-lap and repair in 2019 with recent admission for acute alcoholic hepatitis and alcohol withdrawal c/b seizures. The patient presented on 07/26 with acute renal failure, jaundice, and increasing ascites. The patient was initially admitted to the medical floor for management of acute on chronic liver failure with concern for infection vs. alcoholic hepatitis and acute renal failure. 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 The patient underwent a diagnostic paracentesis on 07/26, which was negative for SBP. The patient also underwent Shiley placement by IR for the initiation of dialysis. On 07/29, the patient began having hematemesis. He underwent an EGD, which revealed a large gastric variceal bleed. GI was unable to achieve hemostasis and the patient was emergently transferred to IR where he underwent embolization (via glue injection and PARTO) of the bleeding gastric varices and placement of a blakemore tube. In total, the patient received 34 units of pRBCs, 25 units of FFP, 5 units of platelets, and 2 units of cryo throughout the EGD and IR procedure. The patient was subsequently transferred to the MICU for further care. He remained intubated, sedated, and on vasopressors after arrival to the MICU. The patient was started on CRRT for his acute renal failure. The blakemore tube was removed by GI on 07/30 and patient had no evidence of further bleeding afterwards. The patient was extubated to nasal cannula on 08/04 and returned to his baseline mental status. He was weaned off of vasopressors on 08/05, but remained on midodrine 10mg to support his BP. He was transitioned to intermittent HD on 08/06. He began having worsening leukocytosis on 08/06 and was transitioned to meropenem. He was complaining of abdominal pain with nausea and vomiting. He was found to have a lipase of 504. He underwent CT Abdomen/Pelvis on 08/06, which showed no evidence of acute pancreatitis, re-demonstration of cirrhotic liver with steatosis, and a new left adrenal nodularity, possibly representing a hemorrhage. Follow up MRI Abdomen showed the adrenal hemorrhage, but the size was unchanged from the CT scan so it was unlikely to be actively bleeding. The patient was transferred back to the medical floor on 08/06. The patient remained HD dependent for the remainder of the hospital admission. In addition, he continued to have elevated bilirubin and jaundice, ascites, transaminitis, prolonged INR, and decreased platelets. The infectious disease team suggested that the patient's leukocytosis was likely reactive, so the meropenem was discontinued on 08/08. He received a dose of midodrine 30-45 minutes prior to HD to maintain his BP during each session. A permacath was placed by IR on 08/12 so that the patient could continue with HD after discharge from the hospital. There was minimal bleeding from the placement site on 08/14 that was stopped with holding pressure. The patient's prior to admission medications (nadolol, sprionolactone, and lasix) were held throughout the hospital stay due to his hypovolemic shock related to his gastric variceal bleed. He was restarted on Lasix 60mg daily before discharge. PT evaluated the patient and determined that he was functional enough to return home with outpatient PT services. The patient was set up with [facility] dialysis center in [facility]. The center required the patient to undergo PPD placement and reading to rule out TB before finalizing discharge. He committed to following up with transplant hepatology and nephrology at [facility]. He was counseled on the importance of starting alcohol rehab in order to become a candidate for liver transplant. According to the transplant hepatology team, it would be reasonable for the patient to be treated for hepatorenal syndrome as an outpatient with albumin infusions and octreotide injections. The hepatology service will organize this treatment at the patient's follow up visits.     The patient dialysis center has not been set up yet, but patient is leaving [place], despite understanding the risks including death.

## 2020-08-14 NOTE — DISCHARGE NOTE PROVIDER - NSDCMRMEDTOKEN_GEN_ALL_CORE_FT
folic acid 1 mg oral tablet: 1 tab(s) orally once a day  furosemide 20 mg oral tablet: 1 tab(s) orally once a day  lactulose 10 g/15 mL oral syrup: 30 milliliter(s) orally every 6 hours  hold for greater than 3 bowel movements   nadolol 20 mg oral tablet: 1 tab(s) orally once a day  potassium phosphate-sodium phosphate 305 mg-700 mg oral tablet: 1 tab(s) orally 4 times a day (with meals and at bedtime)  rifAXIMin 200 mg oral tablet: 1 tab(s) orally 3 times a day  spironolactone 25 mg oral tablet: 2 tab(s) orally once a day  thiamine 100 mg oral tablet: 1 tab(s) orally once a day folic acid 1 mg oral tablet: 1 tab(s) orally once a day  midodrine 10 mg oral tablet: 1 tab(s) orally 3 times a day   multivitamin: 1   once a day  pantoprazole 40 mg oral delayed release tablet: 1 tab(s) orally once a day (before a meal)  polyethylene glycol 3350 oral powder for reconstitution: 17 gram(s) orally 2 times a day  rifAXIMin 200 mg oral tablet: 1 tab(s) orally 3 times a day  thiamine 100 mg oral tablet: 1 tab(s) orally once a day

## 2020-08-14 NOTE — DISCHARGE NOTE PROVIDER - NSFOLLOWUPCLINICS_GEN_ALL_ED_FT
[facility]  Nephrology  [address] [place] [address], [address] [place] [address]  Phone: [phone]  Fax:   Follow Up Time:

## 2020-08-14 NOTE — PROGRESS NOTE ADULT - PROBLEM SELECTOR PLAN 2
Pt. with hyponatremia hypervolemia in setting of liver cirrhosis and severe ascites. On admission sNa 133 (7/26), dropped to 131, now improving w/ last sNa 131  - Continue HD as outline above  - Monitor BMP daily  - Avoid hypotonic saline/D5w, glycemic control

## 2020-08-14 NOTE — DISCHARGE NOTE PROVIDER - NSDCCPTREATMENT_GEN_ALL_CORE_FT
PRINCIPAL PROCEDURE  Procedure: Percutaneous insertion of tunneled hemodialysis catheter  Findings and Treatment: You underwent insertion of a tunneled catheter to be used for dialysis. Please return to the ED if you notice bleeding from the catheter insertion site, redness or tenderness of the site, fevers, or shaking chills.

## 2020-08-14 NOTE — PROGRESS NOTE ADULT - ASSESSMENT
39M w/ decompensated EtOH cirrhosis, presenting w/ acute on chronic liver failure of unclear etiology (infection NOS vs continued inflammation due to EtOH)a. Course c/b massive GIB from gastric varix requiring 25u pRBC, s/p glue injection and PARTO by IR. Hb stable post procedure, now on floor w/ stably high MELD-Na labs. Remains HD dependent, plan for outpatient liver transplant evaluation.      Impression:  #Acute on chronic liver failure: d/dx includes underlying infection (UA neg, BCx NGTD from this admission, CXR neg, dx para neg for SBP, biliary imaging negative, C diff negative) vs worsening EtOH hepatitis (MDF = 60 but no interval drinking since discharge and this is an acute change)  #Cirrhosis due to EtOH, decompensated by ascites  - varices: no esophageal varices on EGD 7/29, actively bleeding gastric varix s/p glue injection and PARTO by IR, no further bleeding post procedrue  - ascites: present on exam, neg for SBP 7/26, on Lasix 20mg/spironolactone 25mg daily as outpatient  - HE: none on exam  - HCC: no imaging, MRI without contrast is limited  - MELD-Na = 38 on 8/14  - Transplant evaluation to be completed as outpatient, pt has already joined online EtOH rehab and reports commitment to continued attendance  #Permacath bleeding – seen by IR appears to have stopped  #Acute renal failure: unclear etiology, now HD dependent    Recommendations:  - discharge planning per primary team  - outpatient HD as per renal recs  - c/w miralax BID, rifaximin  - diuretics as per renal, consider increasing dose or removing more fluid via HD given slightly worsening abdominal distension  - PO PPI daily  - c/w MVI, thiamine, folate  - trend CMP, CBC, INR daily while inpatient  - transplant hepatology will continue to follow  - pt will need to attend alcohol rehab as outpatient  - outpatient f/u w/ Dr. Funez within 1-2 weeks post-discharge, please contact us on discharge to arrange    Ross Shook  Gastroenterology Fellow  AT NIGHT AND ON WEEKENDS:  Contact on-call GI fellow via answering service ([phone]) from 5pm-8am and on weekends/holidays  MONDAY-FRIDAY 8AM-5PM:  Available via [employer] Teams  Call [phone] ([facility]) or Page [phone] ([facility]) from 8am-5pm M-F

## 2020-08-14 NOTE — PROGRESS NOTE ADULT - SUBJECTIVE AND OBJECTIVE BOX
Chief Complaint:  Patient is a 39y old  Male who presents with a chief complaint of abdominal pain (14 Aug 2020 08:49)    Reason for consult: acute on chronic liver failure    Interval Events: Pt had bleeding from permacath site, now stopped, evaluated by IR. Reports slight worsening of abd distension.     Hospital Medications:  chlorhexidine 4% Liquid 1 Application(s) Topical <User Schedule>  hepatitis A (virus) Vaccine - Adult (HAVRIX) 1 milliLiter(s) IntraMuscular once  melatonin 1 milliGRAM(s) Oral at bedtime  nystatin Powder 1 Application(s) Topical two times a day  ondansetron   Disintegrating Tablet 4 milliGRAM(s) Oral daily PRN  pantoprazole    Tablet 40 milliGRAM(s) Oral before breakfast  pneumococcal  13 Vaccine (PREVNAR 13) 0.5 milliLiter(s) IntraMuscular once  polyethylene glycol 3350 17 Gram(s) Oral two times a day  rifAXIMin 550 milliGRAM(s) Oral two times a day  sevelamer carbonate 800 milliGRAM(s) Oral three times a day with meals  sodium chloride 0.9% lock flush 10 milliLiter(s) IV Push every 1 hour PRN  thiamine 100 milliGRAM(s) Oral daily      ROS:   General:  No  fevers, chills, night sweats, fatigue  Eyes:  Good vision, no reported pain  ENT:  No sore throat, pain, runny nose  CV:  No pain, palpitations  Pulm:  No dyspnea, cough  GI:  See HPI, otherwise negative  :  No  incontinence, nocturia  Muscle:  No pain, weakness  Neuro:  No memory problems  Psych:  No insomnia, mood problems, depression  Endocrine:  No polyuria, polydipsia, cold/heat intolerance  Heme:  No petechiae, ecchymosis, easy bruisability  Skin:  No rash    PHYSICAL EXAM:   Vital Signs:  Vital Signs Last 24 Hrs  T(C): 36.6 (14 Aug 2020 08:37), Max: 37.4 (13 Aug 2020 21:24)  T(F): 97.9 (14 Aug 2020 08:37), Max: 99.3 (13 Aug 2020 21:24)  HR: 102 (14 Aug 2020 08:37) (98 - 104)  BP: 114/77 (14 Aug 2020 08:37) (110/72 - 118/76)  BP(mean): --  RR: 18 (14 Aug 2020 08:37) (18 - 18)  SpO2: 92% (14 Aug 2020 08:37) (90% - 94%)  Daily     Daily     GENERAL: no acute distress  NEURO: alert, no asterixis  HEENT: icteric sclera, no conjunctival pallor appreciated  CHEST: no respiratory distress, no accessory muscle use, permacath site with new dressing, no active bleeding  CARDIAC: regular rate, rhythm  ABDOMEN: soft, non-tender, distended, no rebound or guarding  EXTREMITIES: warm, well perfused, no edema  SKIN: ++ jaundice    LABS: reviewed                        7.8    19.75 )-----------( 153      ( 14 Aug 2020 07:16 )             22.6     08-14    131<L>  |  93<L>  |  37<H>  ----------------------------<  94  3.9   |  24  |  7.11<H>    Ca    8.8      14 Aug 2020 07:15  Phos  5.2     08-14  Mg     2.0     08-14    TPro  5.8<L>  /  Alb  3.1<L>  /  TBili  19.3<H>  /  DBili  x   /  AST  151<H>  /  ALT  23  /  AlkPhos  140<H>  08-14    LIVER FUNCTIONS - ( 14 Aug 2020 07:15 )  Alb: 3.1 g/dL / Pro: 5.8 g/dL / ALK PHOS: 140 U/L / ALT: 23 U/L / AST: 151 U/L / GGT: x             Interval Diagnostic Studies: see sunrise for full report

## 2020-08-14 NOTE — PROGRESS NOTE ADULT - SUBJECTIVE AND OBJECTIVE BOX
Interventional Radiology    IR requested to evaluate pt's RIJ tunnelled HD catheter site for bleeding.  Pt is s/p RIJ tunnelled HD catheter on 8/12/20 by IR.      Earlier today the pt's bleeding had resolved after liquid hemostatic agent was injected to the site by IR team Dr. Peralta.  Recurrent bleeding was noted by the covering DEV Zavala and IR was once again contacted to evaluate the tunnelled HD catheter site.     The dressing was found soiled and removed.   There was minimal active bleeding noted around the site.  The site was cleaned and at a purse string suture was placed with a 2-0 prolene suture.  Hemostasis was achieved.  A new pressure dressing was applied over the site.  The pt was positioned with the head of the at 45 degrees and the dressing was noted to be clean and dry.  DEV Zavala was present in the room.    Discussed with DEV Zavala and primary team Dr. Dwyer that the patient will need to have the purse string suture removed prior to discharge.  IR resident Dr. Peralta will see the patient 8/15/20 for possible suture removal since the patient was tentatively planned for discharge tomorrow.      Melecio Beasley, RYDER-ASHLEY  [facility] [address]  Ext [phone] Interventional Radiology    IR requested to evaluate pt's RIJ tunnelled HD catheter site for bleeding.  Pt is s/p RIJ tunnelled HD catheter on 8/12/20 by IR.      Earlier today the pt's bleeding had resolved after liquid hemostatic agent was injected to the site by IR team Dr. Peralta.  Recurrent bleeding was noted by the covering DEV Zavala and IR was once again contacted to evaluate the tunnelled HD catheter site.     The dressing was found soiled and removed.   There was minimal active bleeding noted around the site.  The site was cleaned and a purse string suture was placed with a 2-0 prolene suture.  Hemostasis was achieved.  A new pressure dressing was applied over the site.  The pt was positioned with the head of the at 45 degrees and the dressing was noted to be clean and dry.  DEV Zavala was present in the room.    Discussed with DEV Zavala and primary team Dr. Dwyer that the patient will need to have the purse string suture removed prior to discharge.  IR resident Dr. Peralta will see the patient 8/15/20 for possible suture removal since the patient was tentatively planned for discharge tomorrow.      Melecio Beasley, RYDER-C  [facility] [address]  Ext [phone]

## 2020-08-14 NOTE — PROGRESS NOTE ADULT - SUBJECTIVE AND OBJECTIVE BOX
[facility] DIVISION OF KIDNEY DISEASES AND HYPERTENSION   -- FOLLOW UP NOTE --   Bhupinder Truong  Nephrology Fellow  Pager NS: [phone]   /  Pager [facility]: [phone]  (after 5pm or weekend please page the on-call fellow)  --------------------------------------------------------------------------------  24 hour events/subjective:  - overnight report bleed from permacath site improved w/ pressure, vitals afebrile no hypotensive episode  - patient seen and examined at bedside this morning without complaints  - vitals/lab/medications reviewed    PAST HISTORY  --------------------------------------------------------------------------------  No significant changes to PMH, PSH, FHx, SHx, unless otherwise noted    ALLERGIES & MEDICATIONS  --------------------------------------------------------------------------------  Allergies    No Known Allergies    Intolerances    Standing Inpatient Medications  chlorhexidine 4% Liquid 1 Application(s) Topical <User Schedule>  hepatitis A (virus) Vaccine - Adult (HAVRIX) 1 milliLiter(s) IntraMuscular once  melatonin 1 milliGRAM(s) Oral at bedtime  nystatin Powder 1 Application(s) Topical two times a day  pantoprazole    Tablet 40 milliGRAM(s) Oral before breakfast  pneumococcal  13 Vaccine (PREVNAR 13) 0.5 milliLiter(s) IntraMuscular once  polyethylene glycol 3350 17 Gram(s) Oral two times a day  rifAXIMin 550 milliGRAM(s) Oral two times a day  sevelamer carbonate 800 milliGRAM(s) Oral three times a day with meals  thiamine 100 milliGRAM(s) Oral daily    PRN Inpatient Medications  ondansetron   Disintegrating Tablet 4 milliGRAM(s) Oral daily PRN  sodium chloride 0.9% lock flush 10 milliLiter(s) IV Push every 1 hour PRN    REVIEW OF SYSTEMS  --------------------------------------------------------------------------------  Gen: no fever, chills, weakness  Respiratory: No dyspnea, cough  CV: No chest pain, orthopnea  GI: No abdominal pain, nausea, vomiting, diarrhea  MSK: no edema  Neuro: No dizziness, lightheadedness  Heme: No bleeding  All other systems were reviewed and are negative, except as noted.    VITALS/PHYSICAL EXAM  --------------------------------------------------------------------------------  T(C): 36.6 (08-14-20 @ 08:37), Max: 37.4 (08-13-20 @ 21:24)  HR: 102 (08-14-20 @ 08:37) (98 - 104)  BP: 114/77 (08-14-20 @ 08:37) (110/72 - 118/76)  RR: 18 (08-14-20 @ 08:37) (18 - 18)  SpO2: 92% (08-14-20 @ 08:37) (90% - 94%)  Wt(kg): --  Height (cm): 185.42 (08-12-20 @ 08:58)    Physical Exam:              Gen: NAD on room air              HEENT: sclera icterus, MMM  	Pulm: CTA b/l  	CV: RRR, S1S2, no murmur  	GI: +BS, soft, distended  	: no poli  	MSK: Warm, no edema              Neuro: AAOx3  	Skin: Warm, no cyanosis, jaundice appearing  	Vascular access: RIJ tunnel hd catheter    LABS/STUDIES  --------------------------------------------------------------------------------              7.8    19.75 >-----------<  153      [08-14-20 @ 07:16]              22.6     131  |  93  |  37  ----------------------------<  94      [08-14-20 @ 07:15]  3.9   |  24  |  7.11        Ca     8.8     [08-14-20 @ 07:15]      Mg     2.0     [08-14-20 @ 07:15]      Phos  5.2     [08-14-20 @ 07:15]    TPro  5.8  /  Alb  3.1  /  TBili  19.3  /  DBili  x   /  AST  151  /  ALT  23  /  AlkPhos  140  [08-14-20 @ 07:15]    PT/INR: PT 21.2 , INR 1.83       [08-13-20 @ 07:15]  PTT: 37.3       [08-13-20 @ 07:15]      Creatinine Trend:  SCr 7.11 [08-14 @ 07:15]  SCr 5.40 [08-13 @ 07:15]  SCr 7.32 [08-12 @ 07:16]  SCr 5.32 [08-11 @ 06:50]  SCr 7.04 [08-10 @ 06:56]    Urinalysis - [08-07-20 @ 04:54]      Color Dark Orange / Appearance Turbid / SG 1.041 / pH 6.0      Gluc Negative / Ketone Negative  / Bili Moderate / Urobili 8 mg/dL       Blood Moderate / Protein 100 mg/dL Test Performed on Urine Supernate. / Leuk Est Negative / Nitrite Negative      RBC 30 / WBC 18 / Hyaline 10 / Gran  / Sq Epi  / Non Sq Epi 7 / Bacteria Negative      Iron 34, TIBC 124, %sat 27      [07-14-20 @ 10:34]  Ferritin 812      [07-12-20 @ 08:58]  PTH -- (Ca 6.2)      [07-29-20 @ 09:18]   223  Lipid: chol --, , HDL --, LDL --      [08-03-20 @ 12:04]    HBsAb 276.9      [08-10-20 @ 23:16]  HBsAb Reactive      [08-12-20 @ 11:42]  HBsAg Nonreact      [08-10-20 @ 23:34]  HBcAb Nonreact      [08-12-20 @ 11:42]  HCV 0.27, Nonreact      [08-10-20 @ 23:16]  HIV Nonreact      [07-15-20 @ 10:47]    Syphilis Screen (Treponema Pallidum Ab) Negative      [08-08-20 @ 09:47] [facility] DIVISION OF KIDNEY DISEASES AND HYPERTENSION   -- FOLLOW UP NOTE --   Bhupinder Truong  Nephrology Fellow  Pager NS: [phone]   /  Pager [facility]: [phone]  (after 5pm or weekend please page the on-call fellow)  --------------------------------------------------------------------------------  24 hour events/subjective:  - overnight report bleed from permacath site improved w/ pressure, vitals afebrile no hypotensive episode  - patient seen and examined at bedside this morning who endorse abdominal pain (epigastrica and RLQ) just pressure like, non radiating and similar to abdominal pain on admission.  - vitals/lab/medications reviewed    PAST HISTORY  --------------------------------------------------------------------------------  No significant changes to PMH, PSH, FHx, SHx, unless otherwise noted    ALLERGIES & MEDICATIONS  --------------------------------------------------------------------------------  Allergies    No Known Allergies    Intolerances    Standing Inpatient Medications  chlorhexidine 4% Liquid 1 Application(s) Topical <User Schedule>  hepatitis A (virus) Vaccine - Adult (HAVRIX) 1 milliLiter(s) IntraMuscular once  melatonin 1 milliGRAM(s) Oral at bedtime  nystatin Powder 1 Application(s) Topical two times a day  pantoprazole    Tablet 40 milliGRAM(s) Oral before breakfast  pneumococcal  13 Vaccine (PREVNAR 13) 0.5 milliLiter(s) IntraMuscular once  polyethylene glycol 3350 17 Gram(s) Oral two times a day  rifAXIMin 550 milliGRAM(s) Oral two times a day  sevelamer carbonate 800 milliGRAM(s) Oral three times a day with meals  thiamine 100 milliGRAM(s) Oral daily    PRN Inpatient Medications  ondansetron   Disintegrating Tablet 4 milliGRAM(s) Oral daily PRN  sodium chloride 0.9% lock flush 10 milliLiter(s) IV Push every 1 hour PRN    REVIEW OF SYSTEMS  --------------------------------------------------------------------------------  Gen: no fever, chills, weakness  Respiratory: No dyspnea, cough  CV: No chest pain, orthopnea  GI: No nausea, vomiting, diarrhea.  + abdominal pain  MSK: no edema  Neuro: No dizziness, lightheadedness  Heme: No bleeding  All other systems were reviewed and are negative, except as noted.    VITALS/PHYSICAL EXAM  --------------------------------------------------------------------------------  T(C): 36.6 (08-14-20 @ 08:37), Max: 37.4 (08-13-20 @ 21:24)  HR: 102 (08-14-20 @ 08:37) (98 - 104)  BP: 114/77 (08-14-20 @ 08:37) (110/72 - 118/76)  RR: 18 (08-14-20 @ 08:37) (18 - 18)  SpO2: 92% (08-14-20 @ 08:37) (90% - 94%)  Wt(kg): --  Height (cm): 185.42 (08-12-20 @ 08:58)    Physical Exam:              Gen: NAD on room air              HEENT: sclera icterus, MMM  	Pulm: CTA b/l  	CV: RRR, S1S2, no murmur  	GI: +BS, soft, distended  	: no ashton  	MSK: Warm, no edema              Neuro: AAOx3  	Skin: Warm, no cyanosis, jaundice appearing  	Vascular access: RIJ tunnel hd catheter with dry blood near site and saline bag on top catheter    LABS/STUDIES  --------------------------------------------------------------------------------              7.8    19.75 >-----------<  153      [08-14-20 @ 07:16]              22.6     131  |  93  |  37  ----------------------------<  94      [08-14-20 @ 07:15]  3.9   |  24  |  7.11        Ca     8.8     [08-14-20 @ 07:15]      Mg     2.0     [08-14-20 @ 07:15]      Phos  5.2     [08-14-20 @ 07:15]    TPro  5.8  /  Alb  3.1  /  TBili  19.3  /  DBili  x   /  AST  151  /  ALT  23  /  AlkPhos  140  [08-14-20 @ 07:15]    PT/INR: PT 21.2 , INR 1.83       [08-13-20 @ 07:15]  PTT: 37.3       [08-13-20 @ 07:15]      Creatinine Trend:  SCr 7.11 [08-14 @ 07:15]  SCr 5.40 [08-13 @ 07:15]  SCr 7.32 [08-12 @ 07:16]  SCr 5.32 [08-11 @ 06:50]  SCr 7.04 [08-10 @ 06:56]    Urinalysis - [08-07-20 @ 04:54]      Color Dark Orange / Appearance Turbid / SG 1.041 / pH 6.0      Gluc Negative / Ketone Negative  / Bili Moderate / Urobili 8 mg/dL       Blood Moderate / Protein 100 mg/dL Test Performed on Urine Supernate. / Leuk Est Negative / Nitrite Negative      RBC 30 / WBC 18 / Hyaline 10 / Gran  / Sq Epi  / Non Sq Epi 7 / Bacteria Negative      Iron 34, TIBC 124, %sat 27      [07-14-20 @ 10:34]  Ferritin 812      [07-12-20 @ 08:58]  PTH -- (Ca 6.2)      [07-29-20 @ 09:18]   223  Lipid: chol --, , HDL --, LDL --      [08-03-20 @ 12:04]    HBsAb 276.9      [08-10-20 @ 23:16]  HBsAb Reactive      [08-12-20 @ 11:42]  HBsAg Nonreact      [08-10-20 @ 23:34]  HBcAb Nonreact      [08-12-20 @ 11:42]  HCV 0.27, Nonreact      [08-10-20 @ 23:16]  HIV Nonreact      [07-15-20 @ 10:47]    Syphilis Screen (Treponema Pallidum Ab) Negative      [08-08-20 @ 09:47]

## 2020-08-14 NOTE — PROGRESS NOTE ADULT - ASSESSMENT
anuric MARQUISE 2/2 ATN, currently dialysis dependent  - MARQUISE in the setting of varices bleed/anemia, diarrhea, diuretics and decrease effective arterial blood volume from cirrhosis  - On admission 9.64 (baseline 0.6-0.8), peaked to 11.9, initiated on HD on 7/28 for anuric and fluid overload.  Pt underwent EGD (7/29) c/b active massive bleeding s/p IR for embolization and intubation.  Pt was extubation on 8/4 and discontinued CRRT 8/5  then transitioned to HD on 8/6.  Currently patient is still anuric, last HD 8/12. s/p permacath 8/12

## 2020-08-14 NOTE — CHART NOTE - NSCHARTNOTEFT_GEN_A_CORE
called to assess patient bleeding permacath. no evidence of hematoma. pt VSS. pt has saline fluid bag applying pressure with surgical tape. told nurse to continue to apply pressure. will recheck CBC in AM (told nurse to draw labs earlier). day team to consult with IR given bleeding.     Raghavendra Barajas   [phone]

## 2020-08-14 NOTE — DISCHARGE NOTE PROVIDER - CARE PROVIDER_API CALL
Yesenia Funez)  Gastroenterology; Transplant Hepatology  [address] [place] [address]  Phone: [phone]  Fax: [phone]  Follow Up Time: 2 weeks

## 2020-08-14 NOTE — PROVIDER CONTACT NOTE (OTHER) - ACTION/TREATMENT ORDERED:
MD made aware. Ok to give midodrine with current blood pressure. Will continue to monitor.
MD made aware. ok for now. awaiting orders. Will continue to monitor.
MD aware. MD said he will contact IR to address situation. Will monitor pt closely.
no new orders ,hold am heparin sc ,send am labs including CBC
ordered zofran 4mg  ivp and tylenol 1000mg iv

## 2020-08-14 NOTE — PROGRESS NOTE ADULT - PROBLEM SELECTOR PLAN 1
possibly secondary to hepatorenal syndrome. 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 baseline creatinine sCr 0.6-0.8. On admission sCr was 9.64. s/p CVVHD w/ improvement in renal fx, but remains HD dependent.   -nephrology following, appreciate recs - HD scheduled for today. will f/u re: sched as outpatient & restarting home meds  -zofran 4mg PO prn nausea w/ dialysis  -c/w renvela 800mg TID  -c/w hold diuretics at this time   -daily CMP  -monitor UOP, strict I&Os  -avoid NSAIDs, ACEI/ARBS, RCA and nephrotoxins.   -dose medications as per eGFR.

## 2020-08-15 NOTE — PROGRESS NOTE ADULT - PROBLEM SELECTOR PLAN 2
Acute on chronic liver failure  pt w/ hx etoh cirrhosis, now c/b gastric variceal bleed and ascites. holding home nadolol, lasix, and spironolactone due to hypotension/shock. differential for acute liver failure includes worsening EtOH hepatitis (MDF = 52.7 but no interval drinking since discharge and this is an acute change) vs. underlying infection (UA neg, BCx negative, CXR neg, dx para neg for SBP, biliary imaging negative, C diff negative) vs vascular injury. no current signs of hepatic encephalopathy (A&Ox4). INR elevated, platelets low.   -hepatology following, appreciate recs - pt clear for d/c from hepatology standpoint  -c/w rifaximin  -c/w miralax BID  -c/w protonix 40mg PO daily  -monitor BP off midodrine  -MELD-Na 38/MDF 57.9 on 8/14  -transplant eval as outpatient pending alcohol rehab, not currently a transplant candidate at this time Acute on chronic liver failure  pt w/ hx etoh cirrhosis, now c/b gastric variceal bleed and ascites. holding home nadolol, lasix, and spironolactone due to hypotension/shock. differential for acute liver failure includes worsening EtOH hepatitis (MDF = 52.7 but no interval drinking since discharge and this is an acute change) vs. underlying infection (UA neg, BCx negative, CXR neg, dx para neg for SBP, biliary imaging negative, C diff negative) vs vascular injury. no current signs of hepatic encephalopathy (A&Ox4). INR elevated, platelets low.   -hepatology following, appreciate recs - pt clear for d/c from hepatology standpoint  -c/w rifaximin  -c/w miralax BID  -c/w protonix 40mg PO daily  -monitor BP off midodrine  -MELD-Na 39/MDF 60.1 on 8/14  -transplant eval as outpatient pending alcohol rehab, not currently a transplant candidate at this time

## 2020-08-15 NOTE — PROGRESS NOTE ADULT - PROBLEM SELECTOR PLAN 1
possibly secondary to hepatorenal syndrome. 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 baseline creatinine sCr 0.6-0.8. On admission sCr was 9.64. s/p CVVHD w/ improvement in renal fx, but remains HD dependent.   -nephrology following, appreciate recs - HD rescheduled for today to accommodate outpatient schedule; pt clear for d/c from renal standpoint after HD today  -restart lasix 60mg PO daily  -c/w hold spironolactone  -zofran 4mg PO prn nausea w/ dialysis  -c/w renvela 800mg TID  -daily CMP  -monitor UOP, strict I&Os  -avoid NSAIDs, ACEI/ARBS, RCA and nephrotoxins.   -dose medications as per eGFR.

## 2020-08-15 NOTE — PROGRESS NOTE ADULT - REASON FOR ADMISSION
abdominal pain

## 2020-08-15 NOTE — CHART NOTE - NSCHARTNOTEFT_GEN_A_CORE
Per d/w renal, it is reasonable to proceed w/ HRS treatment even as outpatient while pt is on HD in hopes of renal recovery. Recommend midodrine 10mg TID for now for goal MAP > 60-65, and potentially arranging for IV albumin infusions as outpatient. Understand that pt is asking to leave today so will be difficult to arrange logistics prior to d/c. Will communicate to outpatient hepatologist to try to arrange as outpatient.     D/w Dr. Sherman.

## 2020-08-15 NOTE — PROGRESS NOTE ADULT - PROBLEM SELECTOR PLAN 1
- HD today UF goal 1-2L as BP tolerate  - From renal standpoint, no objection to discharge after HD tomorrow given has outpatient slot for Tuesday 8/18/20.  Will need assess renal recovery outpatien  - okay restart lasix PO 60mg daily.  NO aldactone upon discharge given risk hyperkalemia.   - Monitor electrolytes and urine output, continue monitor for renal recovery  - Dose medications per HD

## 2020-08-15 NOTE — PROGRESS NOTE ADULT - SUBJECTIVE AND OBJECTIVE BOX
PROGRESS NOTE:   Authored by Deandre Dwyer -[phone]    Patient is a 39y old  Male who presents with a chief complaint of abdominal pain (14 Aug 2020 18:40)    SUBJECTIVE / OVERNIGHT EVENTS:  Permacath site was bleeding again in the evening - IR came to re-evaluate and placed purse string suture, which needs to be removed before patient can be discharged. Patient at HD (first slot) in am.    REVIEW OF SYSTEMS:  CONSTITUTIONAL: No weakness, fevers or chills  EYES/ENT: No visual changes;  No vertigo or throat pain   NECK: No pain or stiffness  RESPIRATORY: No cough, wheezing, hemoptysis; No shortness of breath  CARDIOVASCULAR: No chest pain or palpitations  GASTROINTESTINAL: No abdominal or epigastric pain. No nausea, vomiting, or hematemesis; No diarrhea or constipation. No melena or hematochezia.  GENITOURINARY: No dysuria, frequency or hematuria  NEUROLOGICAL: No numbness or weakness  SKIN: No itching, rashes    MEDICATIONS  (STANDING):  chlorhexidine 4% Liquid 1 Application(s) Topical <User Schedule>  furosemide    Tablet 60 milliGRAM(s) Oral daily  hepatitis A (virus) Vaccine - Adult (HAVRIX) 1 milliLiter(s) IntraMuscular once  melatonin 1 milliGRAM(s) Oral at bedtime  Nephro-vicente 1 Tablet(s) Oral daily  nystatin Powder 1 Application(s) Topical two times a day  pantoprazole    Tablet 40 milliGRAM(s) Oral before breakfast  pneumococcal  13 Vaccine (PREVNAR 13) 0.5 milliLiter(s) IntraMuscular once  polyethylene glycol 3350 17 Gram(s) Oral two times a day  rifAXIMin 550 milliGRAM(s) Oral two times a day  sevelamer carbonate 800 milliGRAM(s) Oral three times a day with meals  thiamine 100 milliGRAM(s) Oral daily    MEDICATIONS  (PRN):  ondansetron   Disintegrating Tablet 4 milliGRAM(s) Oral daily PRN Nausea  sodium chloride 0.9% lock flush 10 milliLiter(s) IV Push every 1 hour PRN Pre/post blood products, medications, blood draw, and to maintain line patency    PHYSICAL EXAM:  Vital Signs Last 24 Hrs  T(C): 36.9 (15 Aug 2020 07:57), Max: 37.4 (15 Aug 2020 04:18)  T(F): 98.4 (15 Aug 2020 07:57), Max: 99.4 (15 Aug 2020 04:18)  HR: 109 (15 Aug 2020 07:57) (102 - 115)  BP: 128/82 (15 Aug 2020 07:57) (114/77 - 128/82)  RR: 18 (15 Aug 2020 07:57) (18 - 18)  SpO2: 94% (15 Aug 2020 07:57) (92% - 94%)    GENERAL: No acute distress, well-developed  HEAD:  Atraumatic, Normocephalic  EYES: conjunctiva and sclera clear  NECK: Supple, no JVD  CHEST/LUNG: CTAB, no wheezes, rales, or rhonchi  HEART: Regular rate and rhythm, no murmurs, rubs, or gallops  ABDOMEN: Soft, non-tender, non-distended, normal bowel sounds  EXTREMITIES:  2+ peripheral pulses b/l, no clubbing, cyanosis, or edema  NEUROLOGY: A&O x 3, no focal deficits  SKIN: No rashes or lesions    LABS:             7.7    20.98 )-----------( 147      ( 15 Aug 2020 07:05 )             22.0     08-15  129<L>  |  90<L>  |  47<H>  ----------------------------<  129<H>  3.8   |  23  |  8.13<H>  Ca    8.8      15 Aug 2020 07:05  Phos  5.2     08-15  Mg     1.9     08-15  TPro  6.1  /  Alb  3.2<L>  /  TBili  21.0<H>  /  DBili  x   /  AST  144<H>  /  ALT  24  /  AlkPhos  135<H>  08-15    PT/INR - ( 14 Aug 2020 17:51 )   PT: 22.1 sec;   INR: 1.92 ratio    PTT - ( 14 Aug 2020 17:51 )  PTT:36.2 sec    RADIOLOGY & ADDITIONAL TESTS:  Results Reviewed [Y/N]: Y  Imaging Personally Reviewed [Y/N]: Y  Electrocardiogram Personally Reviewed [Y/N]: Y    COORDINATION OF CARE:  Care Discussed with Consultants/Other Providers [Y/N]: MJ PROGRESS NOTE:   Authored by Deandre Dwyer -[phone]    Patient is a 39y old  Male who presents with a chief complaint of abdominal pain (14 Aug 2020 18:40)    SUBJECTIVE / OVERNIGHT EVENTS:  Permacath site was bleeding again in the evening - IR came to re-evaluate and placed purse string suture, which needs to be removed before patient can be discharged. Patient at HD (first slot) in am.     REVIEW OF SYSTEMS:  CONSTITUTIONAL: No weakness, fevers or chills  EYES/ENT: No visual changes;  No vertigo or throat pain   NECK: No pain or stiffness  RESPIRATORY: No cough, wheezing, hemoptysis; No shortness of breath  CARDIOVASCULAR: No chest pain or palpitations  GASTROINTESTINAL: No abdominal or epigastric pain. No nausea, vomiting, or hematemesis; No diarrhea or constipation. No melena or hematochezia.  GENITOURINARY: No dysuria, frequency or hematuria  NEUROLOGICAL: No numbness or weakness  SKIN: No itching, rashes    MEDICATIONS  (STANDING):  chlorhexidine 4% Liquid 1 Application(s) Topical <User Schedule>  furosemide    Tablet 60 milliGRAM(s) Oral daily  hepatitis A (virus) Vaccine - Adult (HAVRIX) 1 milliLiter(s) IntraMuscular once  melatonin 1 milliGRAM(s) Oral at bedtime  Nephro-vicente 1 Tablet(s) Oral daily  nystatin Powder 1 Application(s) Topical two times a day  pantoprazole    Tablet 40 milliGRAM(s) Oral before breakfast  pneumococcal  13 Vaccine (PREVNAR 13) 0.5 milliLiter(s) IntraMuscular once  polyethylene glycol 3350 17 Gram(s) Oral two times a day  rifAXIMin 550 milliGRAM(s) Oral two times a day  sevelamer carbonate 800 milliGRAM(s) Oral three times a day with meals  thiamine 100 milliGRAM(s) Oral daily    MEDICATIONS  (PRN):  ondansetron   Disintegrating Tablet 4 milliGRAM(s) Oral daily PRN Nausea  sodium chloride 0.9% lock flush 10 milliLiter(s) IV Push every 1 hour PRN Pre/post blood products, medications, blood draw, and to maintain line patency    PHYSICAL EXAM:  Vital Signs Last 24 Hrs  T(C): 36.9 (15 Aug 2020 07:57), Max: 37.4 (15 Aug 2020 04:18)  T(F): 98.4 (15 Aug 2020 07:57), Max: 99.4 (15 Aug 2020 04:18)  HR: 109 (15 Aug 2020 07:57) (102 - 115)  BP: 128/82 (15 Aug 2020 07:57) (114/77 - 128/82)  RR: 18 (15 Aug 2020 07:57) (18 - 18)  SpO2: 94% (15 Aug 2020 07:57) (92% - 94%)    GENERAL: No acute distress, well-developed  HEAD:  Atraumatic, Normocephalic  EYES: conjunctiva and sclera clear  NECK: Supple, no JVD  CHEST/LUNG: CTAB, no wheezes, rales, or rhonchi  HEART: Regular rate and rhythm, no murmurs, rubs, or gallops  ABDOMEN: Soft, non-tender, non-distended, normal bowel sounds  EXTREMITIES:  2+ peripheral pulses b/l, no clubbing, cyanosis, or edema  NEUROLOGY: A&O x 3, no focal deficits  SKIN: No rashes or lesions    LABS:             7.7    20.98 )-----------( 147      ( 15 Aug 2020 07:05 )             22.0     08-15  129<L>  |  90<L>  |  47<H>  ----------------------------<  129<H>  3.8   |  23  |  8.13<H>  Ca    8.8      15 Aug 2020 07:05  Phos  5.2     08-15  Mg     1.9     08-15  TPro  6.1  /  Alb  3.2<L>  /  TBili  21.0<H>  /  DBili  x   /  AST  144<H>  /  ALT  24  /  AlkPhos  135<H>  08-15    PT/INR - ( 14 Aug 2020 17:51 )   PT: 22.1 sec;   INR: 1.92 ratio    PTT - ( 14 Aug 2020 17:51 )  PTT:36.2 sec    RADIOLOGY & ADDITIONAL TESTS:  Results Reviewed [Y/N]: Y  Imaging Personally Reviewed [Y/N]: Y  Electrocardiogram Personally Reviewed [Y/N]: Y    COORDINATION OF CARE:  Care Discussed with Consultants/Other Providers [Y/N]: MJ PROGRESS NOTE:   Authored by Deandre Dwyer -[phone]    Patient is a 39y old  Male who presents with a chief complaint of abdominal pain (14 Aug 2020 18:40)    SUBJECTIVE / OVERNIGHT EVENTS:  Permacath site was bleeding again in the evening - IR came to re-evaluate and placed purse string suture, which needs to be removed before patient can be discharged. Patient at HD (first slot) in am. The patient reports that he is unwilling to remain inpatient to wait for official outpatient HD set up on Monday.     REVIEW OF SYSTEMS:  CONSTITUTIONAL: No weakness, fevers or chills  EYES/ENT: No visual changes;  No vertigo or throat pain   NECK: No pain or stiffness  RESPIRATORY: No cough, wheezing, hemoptysis; No shortness of breath  CARDIOVASCULAR: No chest pain or palpitations  GASTROINTESTINAL: No abdominal or epigastric pain. No nausea, vomiting, or hematemesis; No diarrhea or constipation. No melena or hematochezia.  GENITOURINARY: No dysuria, frequency or hematuria  NEUROLOGICAL: No numbness or weakness  SKIN: No itching, rashes    MEDICATIONS  (STANDING):  chlorhexidine 4% Liquid 1 Application(s) Topical <User Schedule>  furosemide    Tablet 60 milliGRAM(s) Oral daily  hepatitis A (virus) Vaccine - Adult (HAVRIX) 1 milliLiter(s) IntraMuscular once  melatonin 1 milliGRAM(s) Oral at bedtime  Nephro-vicente 1 Tablet(s) Oral daily  nystatin Powder 1 Application(s) Topical two times a day  pantoprazole    Tablet 40 milliGRAM(s) Oral before breakfast  pneumococcal  13 Vaccine (PREVNAR 13) 0.5 milliLiter(s) IntraMuscular once  polyethylene glycol 3350 17 Gram(s) Oral two times a day  rifAXIMin 550 milliGRAM(s) Oral two times a day  sevelamer carbonate 800 milliGRAM(s) Oral three times a day with meals  thiamine 100 milliGRAM(s) Oral daily    MEDICATIONS  (PRN):  ondansetron   Disintegrating Tablet 4 milliGRAM(s) Oral daily PRN Nausea  sodium chloride 0.9% lock flush 10 milliLiter(s) IV Push every 1 hour PRN Pre/post blood products, medications, blood draw, and to maintain line patency    PHYSICAL EXAM:  Vital Signs Last 24 Hrs  T(C): 36.9 (15 Aug 2020 07:57), Max: 37.4 (15 Aug 2020 04:18)  T(F): 98.4 (15 Aug 2020 07:57), Max: 99.4 (15 Aug 2020 04:18)  HR: 109 (15 Aug 2020 07:57) (102 - 115)  BP: 128/82 (15 Aug 2020 07:57) (114/77 - 128/82)  RR: 18 (15 Aug 2020 07:57) (18 - 18)  SpO2: 94% (15 Aug 2020 07:57) (92% - 94%)    GENERAL: No acute distress, well-developed  HEAD:  Atraumatic, Normocephalic  EYES: conjunctiva and sclera clear  NECK: Supple, no JVD  CHEST/LUNG: CTAB, no wheezes, rales, or rhonchi  HEART: Regular rate and rhythm, no murmurs, rubs, or gallops  ABDOMEN: distension Soft, non-tender, normal bowel sounds  EXTREMITIES:  2+ peripheral pulses b/l, no clubbing, cyanosis, or edema  NEUROLOGY: A&O x 3, no focal deficits  SKIN: jaundice No rashes or lesions    LABS:             7.7    20.98 )-----------( 147      ( 15 Aug 2020 07:05 )             22.0     08-15  129<L>  |  90<L>  |  47<H>  ----------------------------<  129<H>  3.8   |  23  |  8.13<H>  Ca    8.8      15 Aug 2020 07:05  Phos  5.2     08-15  Mg     1.9     08-15  TPro  6.1  /  Alb  3.2<L>  /  TBili  21.0<H>  /  DBili  x   /  AST  144<H>  /  ALT  24  /  AlkPhos  135<H>  08-15    PT/INR - ( 14 Aug 2020 17:51 )   PT: 22.1 sec;   INR: 1.92 ratio    PTT - ( 14 Aug 2020 17:51 )  PTT:36.2 sec    RADIOLOGY & ADDITIONAL TESTS:  Results Reviewed [Y/N]: Y  Imaging Personally Reviewed [Y/N]: Y  Electrocardiogram Personally Reviewed [Y/N]: Y    COORDINATION OF CARE:  Care Discussed with Consultants/Other Providers [Y/N]: Y

## 2020-08-15 NOTE — PROGRESS NOTE ADULT - ATTENDING COMMENTS
Hepatology Staff: Yesenia Funez MD    I saw and examined the patient along with  Dr. Shook 07-31-20 @ 09:10.    Patient Medical Record, hosptial course was reviewed and summarized as below:    Vitals: Vital Signs Last 24 Hrs  T(C): 36.1 (31 Jul 2020 04:00), Max: 37.9 (30 Jul 2020 20:00)  T(F): 97 (31 Jul 2020 04:00), Max: 100.2 (30 Jul 2020 20:00)  HR: 53 (31 Jul 2020 08:45) (53 - 83)  BP: 94/54 (30 Jul 2020 20:00) (94/54 - 94/54)  BP(mean): 71 (30 Jul 2020 20:00) (71 - 71)  RR: 20 (31 Jul 2020 08:45) (20 - 21)  SpO2: 97% (31 Jul 2020 08:45) (91% - 100%)    IV Fluids: albumin human 25% IVPB 100 milliLiter(s) IV Intermittent every 6 hours    Antibiotics:piperacillin/tazobactam IVPB.. 3.375 Gram(s) IV Intermittent every 8 hours      Labs:Creatinine, Serum: 3.32 mg/dL (07-31-20 @ 06:28)  Bilirubin Total, Serum: 16.3 mg/dL (07-31-20 @ 06:28)  INR: 1.65 ratio (07-31-20 @ 06:28)  Creatinine, Serum: 3.70 mg/dL (07-31-20 @ 03:18)  Bilirubin Total, Serum: 17.8 mg/dL (07-31-20 @ 03:18)  INR: 1.56 ratio (07-31-20 @ 03:18)  Creatinine, Serum: 3.95 mg/dL (07-30-20 @ 22:38)  Bilirubin Total, Serum: 16.5 mg/dL (07-30-20 @ 22:38)  INR: 1.57 ratio (07-30-20 @ 22:38)  Creatinine, Serum: 3.98 mg/dL (07-30-20 @ 18:39)  Bilirubin Total, Serum: 15.7 mg/dL (07-30-20 @ 18:39)  INR: 1.60 ratio (07-30-20 @ 18:39)  Creatinine, Serum: 4.38 mg/dL (07-30-20 @ 15:23)  Bilirubin Total, Serum: 16.7 mg/dL (07-30-20 @ 15:23)  INR: 1.59 ratio (07-30-20 @ 15:23)  INR: 1.59 ratio (07-30-20 @ 10:11)  Creatinine, Serum: 4.74 mg/dL (07-30-20 @ 10:11)  Bilirubin Total, Serum: 16.8 mg/dL (07-30-20 @ 10:11)      I/O: I&O's Summary    30 Jul 2020 07:01  -  31 Jul 2020 07:00  --------------------------------------------------------  IN: 3798.2 mL / OUT: 1739 mL / NET: 2059.2 mL    31 Jul 2020 07:01  -  31 Jul 2020 09:10  --------------------------------------------------------  IN: 186.1 mL / OUT: 0 mL / NET: 186.1 mL      Nutritional Status:   Albumin, Serum: 3.9 g/dL (07-31-20 @ 06:28)  Albumin, Serum: 3.7 g/dL (07-31-20 @ 03:18)  Albumin, Serum: 3.6 g/dL (07-30-20 @ 22:38)  Albumin, Serum: 3.7 g/dL (07-30-20 @ 18:39)  Albumin, Serum: 3.6 g/dL (07-30-20 @ 15:23)  Albumin, Serum: 3.5 g/dL (07-30-20 @ 10:11)    Last 24 hour events: No new recurrent GI bleeding. Carroll tube has been pulled out.    Recommendations:  D/c IV octreotide, continue with IV PPI bid.  He is still requiring IV pressors. Concern for infection. Would recommend pancx. Extubation per ICU team.      Plan discussed with Primary team.
39M w/ decompensated EtOH cirrhosis, presenting w/ acute on chronic liver failure of unclear etiology (?ongoing alcohol use), hospital course c/b massive gastric variceal bleed s/p glue injection and PARTO.     Now clinically improving, extubated, off pressors.  Renal function unlikely to recover but not a candidate for SLK.  No evidence of GI bleed.   Transplant candidacy needs to be evaluated.
Doing well. Perma cath placement planned. Dispo planning.  F/u in liver clinic. Needs outpatient alcohol rehab.
Hepatology Staff: Yesenia Funez MD    I saw and examined the patient along with  Dr. Bartholomew  08-02-20 @ 12:44.    Patient Medical Record, hosptial course was reviewed and summarized as below:    Vitals: Vital Signs Last 24 Hrs  T(C): 36.8 (02 Aug 2020 07:45), Max: 36.8 (02 Aug 2020 00:00)  T(F): 98.2 (02 Aug 2020 07:45), Max: 98.2 (02 Aug 2020 00:00)  HR: 93 (02 Aug 2020 11:53) (70 - 93)    BP(mean): --  RR: 23 (02 Aug 2020 11:45) (12 - 25)  SpO2: 93% (02 Aug 2020 11:53) (88% - 100%)  Medications:  IV Fluids:   Antibiotics:caspofungin IVPB 35 milliGRAM(s) IV Intermittent every 24 hours  rifAXIMin 550 milliGRAM(s) Oral two times a day    Diuretics:  Labs:INR: 1.85 ratio (08-02-20 @ 11:49)  Creatinine, Serum: 2.13 mg/dL (08-02-20 @ 11:49)  Bilirubin Total, Serum: 16.7 mg/dL (08-02-20 @ 11:49)  INR: 1.89 ratio (08-02-20 @ 06:14)  INR: 1.89 ratio (08-02-20 @ 00:20)  Creatinine, Serum: 2.29 mg/dL (08-02-20 @ 00:19)  Bilirubin Total, Serum: 15.7 mg/dL (08-02-20 @ 00:19)  Creatinine, Serum: 2.31 mg/dL (08-01-20 @ 19:09)  Bilirubin Total, Serum: 16.0 mg/dL (08-01-20 @ 19:09)  INR: 1.92 ratio (08-01-20 @ 19:08)  INR: 1.93 ratio (08-01-20 @ 14:39)  Creatinine, Serum: 2.58 mg/dL (08-01-20 @ 14:39)  Bilirubin Total, Serum: 15.9 mg/dL (08-01-20 @ 14:39)    I/O: I&O's Summary    01 Aug 2020 07:01  -  02 Aug 2020 07:00  --------------------------------------------------------  IN: 3226.2 mL / OUT: 3549 mL / NET: -322.8 mL    02 Aug 2020 07:01  -  02 Aug 2020 12:44  --------------------------------------------------------  IN: 606 mL / OUT: 779 mL / NET: -173 mL      Nutritional Status:   Albumin, Serum: 4.1 g/dL (08-02-20 @ 11:49)  Albumin, Serum: 4.3 g/dL (08-02-20 @ 00:19)  Albumin, Serum: 4.3 g/dL (08-01-20 @ 19:09)  Albumin, Serum: 4.2 g/dL (08-01-20 @ 14:39)    Last 24 hour events:  Overall unchanged status. No further GI bleeding. Still needing low dose IV pressors. Remains on CVVH.    Recommendations: Cont with ICU supportive care. Recommend an NGT for enteral nutrition. Cont with broad spectrum IV abx ( IV Emily + Caspo). HD per nephrology. May discontinue IV Caspofungin if Fungitail is negative.       Plan discussed with Primary team.
Hepatology Staff: Yesenia Funez MD    I saw and examined the patient along with  Dr. Shook 07-29-20 @ 11:43.    Patient Medical Record, hosptial course was reviewed and summarized as below:    Vitals: Vital Signs Last 24 Hrs  T(C): 36.4 (29 Jul 2020 09:30), Max: 37 (29 Jul 2020 03:15)  T(F): 97.5 (29 Jul 2020 09:30), Max: 98.6 (29 Jul 2020 03:15)  HR: 72 (29 Jul 2020 09:30) (68 - 76)  BP: 112/63 (29 Jul 2020 09:30) (102/64 - 131/85)    RR: 18 (29 Jul 2020 09:30) (18 - 18)  SpO2: 99% (29 Jul 2020 09:30) (96% - 99%)  Medications:  IV Fluids: albumin human 25% IVPB 100 milliLiter(s) IV Intermittent every 6 hours      Labs:INR: 2.00 ratio (07-29-20 @ 08:28)  Creatinine, Serum: 11.04 mg/dL (07-29-20 @ 07:20)  Bilirubin Total, Serum: 26.1 mg/dL (07-29-20 @ 07:20)  INR: 1.59 ratio (07-28-20 @ 15:57)      I/O: I&O's Summary    28 Jul 2020 07:01  -  29 Jul 2020 07:00  --------------------------------------------------------  IN: 970 mL / OUT: 1300 mL / NET: -330 mL      Nutritional Status:   Albumin, Serum: 3.6 g/dL (07-29-20 @ 07:20)    Last 24 hour events: HD was initiated. He however had an episode of hematemesis this am.    Recommendations: Agree with plan for EGD. Continue monitoring H/H. Management of MARQUISE per Nephrology.    Plan discussed with Primary team.
Hepatology Staff: Yesenia Funez MD    I saw and examined the patient along with  Dr. Shook 07-30-20 @ 13:34.    Patient Medical Record, hosptial course was reviewed and summarized as below:    Vitals: Vital Signs Last 24 Hrs  T(C): 36.5 (30 Jul 2020 12:00), Max: 36.5 (30 Jul 2020 12:00)  T(F): 97.7 (30 Jul 2020 12:00), Max: 97.7 (30 Jul 2020 12:00)  HR: 74 (30 Jul 2020 13:15) (64 - 89)  BP: 98/61 (29 Jul 2020 17:00) (98/61 - 98/61)    RR: 20 (30 Jul 2020 13:15) (20 - 33)  SpO2: 95% (30 Jul 2020 13:15) (94% - 100%)  Medications:  IV Fluids: albumin human 25% IVPB 100 milliLiter(s) IV Intermittent every 6 hours    Antibiotics:piperacillin/tazobactam IVPB.. 3.375 Gram(s) IV Intermittent every 8 hours    Diuretics:  Labs:INR: 1.59 ratio (07-30-20 @ 10:11)  Creatinine, Serum: 4.74 mg/dL (07-30-20 @ 10:11)  Bilirubin Total, Serum: 16.8 mg/dL (07-30-20 @ 10:11)  Creatinine, Serum: 5.14 mg/dL (07-30-20 @ 06:25)  Bilirubin Total, Serum: 16.9 mg/dL (07-30-20 @ 06:25)  INR: 1.54 ratio (07-30-20 @ 06:25)  INR: 1.41 ratio (07-30-20 @ 00:14)  Bilirubin Total, Serum: 11.5 mg/dL (07-29-20 @ 23:13)  Bilirubin Total, Serum: 11.5 mg/dL (07-29-20 @ 23:13)  Creatinine, Serum: 5.87 mg/dL (07-29-20 @ 23:13)  Bilirubin Total, Serum: 11.5 mg/dL (07-29-20 @ 23:13)  INR: 1.87 ratio (07-29-20 @ 18:40)  Creatinine, Serum: 6.93 mg/dL (07-29-20 @ 18:40)  Bilirubin Total, Serum: 15.4 mg/dL (07-29-20 @ 18:40)  Bilirubin Total, Serum: 15.4 mg/dL (07-29-20 @ 18:40)      I/O: I&O's Summary    29 Jul 2020 07:01  -  30 Jul 2020 07:00  --------------------------------------------------------  IN: 1780.2 mL / OUT: 1733 mL / NET: 47.2 mL    30 Jul 2020 07:01  -  30 Jul 2020 13:34  --------------------------------------------------------  IN: 890.3 mL / OUT: 970 mL / NET: -79.7 mL      Nutritional Status:   Albumin, Serum: 3.5 g/dL (07-30-20 @ 10:11)  Albumin, Serum: 3.3 g/dL (07-30-20 @ 06:25)  Albumin, Serum: 2.9 g/dL (07-29-20 @ 23:13)  Albumin, Serum: 2.9 g/dL (07-29-20 @ 23:13)  Albumin, Serum: 2.5 g/dL (07-29-20 @ 18:40)    Last 24 hour events:  Patient had a massive gastric variceal bleeding, S/P EGD/EUS,  and PARTO, Carroll tube placement, need about 67 unist of blood products. Ultimately had control of bleeding.    Recommendations: Blakemore tube was deflated this am. Stable, without any recurrent bleeding. Monitor H/H, and keep on IV Octreotide, and IV PPI. Keep on IV antibiotics (IV Zosyn).  Cont with CVVH.      Plan discussed with Primary team.
Hepatology Staff: Yesenia Funez MD  I saw and examined the patient along with  Dr. Bartholomew  08-01-20 @ 08:41 .    Patient Medical Record, hosptial course was reviewed and summarized as below:    Vitals: Vital Signs Last 24 Hrs  T(C): 36.5 (01 Aug 2020 08:00), Max: 43 (31 Jul 2020 09:00)  T(F): 97.7 (01 Aug 2020 08:00), Max: 109.4 (31 Jul 2020 09:00)  HR: 81 (01 Aug 2020 08:15) (52 - 91)    RR: 20 (01 Aug 2020 08:15) (20 - 21)  SpO2: 97% (01 Aug 2020 08:15) (94% - 100%)  Medications:  IV Fluids: albumin human 25% IVPB 100 milliLiter(s) IV Intermittent every 6 hours    Antibiotics:meropenem  IVPB 1000 milliGRAM(s) IV Intermittent every 12 hours    Diuretics:  Labs:INR: 1.88 ratio (08-01-20 @ 04:43)  Creatinine, Serum: 2.79 mg/dL (08-01-20 @ 04:43)  Bilirubin Total, Serum: 16.7 mg/dL (08-01-20 @ 04:43)  Creatinine, Serum: 2.93 mg/dL (08-01-20 @ 00:45)  Bilirubin Total, Serum: 16.6 mg/dL (08-01-20 @ 00:45)  INR: 1.82 ratio (08-01-20 @ 00:42)  Creatinine, Serum: 2.84 mg/dL (07-31-20 @ 19:12)  Bilirubin Total, Serum: 15.6 mg/dL (07-31-20 @ 19:12)  INR: 1.80 ratio (07-31-20 @ 19:12)  INR: 1.71 ratio (07-31-20 @ 15:15)  Creatinine, Serum: 3.13 mg/dL (07-31-20 @ 15:15)  Bilirubin Total, Serum: 16.2 mg/dL (07-31-20 @ 15:15)  Creatinine, Serum: 3.09 mg/dL (07-31-20 @ 11:21)  Bilirubin Total, Serum: 15.2 mg/dL (07-31-20 @ 11:21)  INR: 1.77 ratio (07-31-20 @ 11:21)      I/O: I&O's Summary    31 Jul 2020 07:01  -  01 Aug 2020 07:00  --------------------------------------------------------  IN: 3289.8 mL / OUT: 3090 mL / NET: 199.8 mL    01 Aug 2020 07:01  -  01 Aug 2020 08:41  --------------------------------------------------------  IN: 107.4 mL / OUT: 160 mL / NET: -52.6 mL      Nutritional Status:   Albumin, Serum: 4.2 g/dL (08-01-20 @ 04:43)  Albumin, Serum: 4.1 g/dL (08-01-20 @ 00:45)  Albumin, Serum: 3.9 g/dL (07-31-20 @ 19:12)  Albumin, Serum: 4.1 g/dL (07-31-20 @ 15:15)  Albumin, Serum: 3.8 g/dL (07-31-20 @ 11:21)    Last 24 hour events: Still intubated, sedated. Need for IV pressors is now less ( Levophed 0.5)    Recommendations: Cont with ICU supportive care. Noted antibiotics coverage was broadened to Meropenem from 7/31. Cont with CVVH. Overall making some progress.      Plan discussed with Primary team.
Patient seen and discussed with the fellow.    39M w/ decompensated EtOH cirrhosis, presenting w/ acute on chronic liver failure of unclear etiology (?ongoing alcohol use), hospital course c/b massive gastric variceal bleed s/p glue injection and PARTO.     Transferred to the floors. MELD 37  Renal function unlikely to recover but does not fit criteria for SLK at this time.  Needs perma cath most likely - defer to renal.  Not a liver transplant candidate at this time - needs to do alcohol rehab.  Discharge planning.  Finish 14 days of abx.
Patient seen and discussed with the fellow.    Looks clinically well, physically improving and getting stronger.  Still needs perma cath and HD center placement.  Dispo planning
Patient seen and discussed with the fellow.    Tolerating HD well. Perma cath placement pending tmrw then needs HD center placement.  Almost ready for discharge.  Switch lactulose to miralax BID for bloating.
Patient seen and examined with Liver Team. I agree with the plan as above
Patient seen and examined with liver team. I agree with the plan as above
Patient seen and examined. Patient critically ill requiring frequent bedside visits with therapy change. Patient with complex history including EtOH abuse and portal hypertension who now is hospitalized with liver and renal failure. He had massive hematemesis 7/29 from gastric varices. He underwent EGD which was unable to localize/stop bleeding and then went to IR for embolization. There was cessation of bleeding post procedure but a blakemore tube was placed in case of re-bleed. He was admitted to MICU for further monitoring.    1. Shock state - likely hemorrhagic/hypovolemic - continue vasopressors as needed to maintain MAP > 65. Patient received 34: 25: 20(5 MTP Platelets): 10 (cryo) reportedly 7/29-7/30 with improvement in hemoglobin.  - continue vasopressors with goal MAP > 65  - continue antibiotics  2. Acute Blood loss anemia - from upper GI bleed now s/p IR intervention. Continue serial CBC and transfuse as needed. Per GI, no plan to repeat EGD at this time.  - Blakemore removed by GI on 7/30  3. Alcoholic liver disease and portal hypertension - GI/Hepatology followup. Continue albumin, octreotide, and vasopressor support.  - Hold Nadolol at this time  - No PO access given severe bleeding  - Patient had 1.5 L ascites removed 7/29 in setting of respiratory failure - studies not sent. Ascites starting to reaccumulate  4. Acute Hypoxemic Respiratory Failure - in setting of massive hematemesis  - Maintain O2 sat > 90%  - Monitor blood gas and adjust ventilator settings as able - will decrease PEEP to 8 and follow O2 sats  5. 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 Infectious Disease  - continue antibiotics  - Prior paracentesis without evidence of SBP    Prognosis guarded.
Patient seen and examined. Patient critically ill requiring frequent bedside visits with therapy change. Patient with complex history including EtOH abuse and portal hypertension who now is hospitalized with liver and renal failure. He had massive hematemesis 7/29 from gastric varices. He underwent EGD which was unable to localize/stop bleeding and then went to IR for embolization. There was cessation of bleeding post procedure but a blakemore tube was placed in case of re-bleed. He was admitted to MICU for further monitoring.    1. Shock state - likely hemorrhagic/hypovolemic - continue vasopressors as needed to maintain MAP > 65. Patient received 34: 25: 20: 10 (cryo) reportedly 7/29-7/30 with improvement in hemoglobin.  - increase in vasopressor requirements concerning for infection - will broaden antibiotics   2. Acute Blood loss anemia - from upper GI bleed now s/p IR intervention. Continue serial CBC and transfuse as needed. Per GI, no plan to repeat EGD at this time.  - Blakemore removed by GI on 7/30  3. Alcoholic liver disease and portal hypertension - GI/Hepatology followup. Continue albumin, octreotide, and vasopressor support.  - Hold Nadolol at this time  - No PO access given severe bleeding  - Patient had 1.5 L ascites removed yesterday in setting of respiratory failure - studies not sent.  4. Acute Hypoxemic Respiratory Failure - in setting of massive hematemesis  - Maintain O2 sat > 90%  - Monitor blood gas and adjust ventilator settings as able - PEEP 10 and FiO2 50%  5. 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 Infectious Disease  - Adjust antibiotics  - Prior paracentesis without evidence of SBP    Prognosis guarded.
Patient seen and examined. Patient critically ill requiring frequent bedside visits with therapy change. Patient with complex history including EtOH abuse and portal hypertension who now is hospitalized with liver and renal failure. He had massive hematemesis yesterday from gastric varices. He underwent EGD which was unable to localize/stop bleeding and then went to IR for embolization. There was cessation of bleeding post procedure but a blakemore tube was placed in case of re-bleed. He was admitted to MICU for further monitoring.    1. Shock state - likely hemorrhagic/hypovolemic - continue vasopressors as needed to maintain MAP > 65. Patient received 34: 25: 20: 10 (cryo) reportedly overnight with improvement in hemoglobin.   2. Acute Blood loss anemia - from upper GI bleed now s/p IR intervention. Continue serial CBC and transfuse as needed. Followup with GI regarding further intervention and possible deflation/removal of blakemore. Per report, no plan to repeat EGD at this time.  - Blakemore balloon inflated in stomach on AM CXR  3. Alcoholic liver disease and portal hypertension - GI/Hepatology followup. Continue albumin, octreotide, and vasopressor support.  - Hold Nadolol at this time  - No PO access given blakemore and severe bleeding  - Patient had 1.5 L ascites removed yesterday in setting of respiratory failure - studies not sent.  4. Acute Hypoxemic Respiratory Failure - in setting of massive hematemesis  - Maintain O2 sat > 90%  - Monitor blood gas and adjust ventilator settings as able  5. 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 Infectious Disease  - Continue antibiotics and followup cultures  - Prior paracentesis without evidence of SBP    Prognosis guarded.
Patient seen and examined. Plan as discussed w/ Dr. Dwyer: s/p HD session yesterday; plan for permacath placement per IR tomorrow as pt will likely required continued HD upon discharge; continue to trend WBC, for now holding off on repeat dx paracentesis; SW/CM for dispo planning given need for HD/EtOH rehab/?acute rehab pending evaluation.
Perma cath bleed overnight which has stopped with dressing change.  Needs HD trial with new perma cath.   F/u in liver clinic. Liver tests stable.
39M w/ decompensated EtOH cirrhosis, presenting w/ acute on chronic liver failure of unclear etiology (?ongoing alcohol use), hospital course c/b massive gastric variceal bleed s/p glue injection and PARTO.     Now clinically improving, extubated, improving pressor requirements.  No evidence of GI bleed.   Off pressors.  Renal dysfunction remains, likely at a new baseline.  Transplant candidacy needs to be evaluated.
Patient seen and examined. Chart reviewed.    39 year old man with EtOH abuse, portal hypertension, liver and renal failure. Massive variceal bleed s/p IR embolization and Blakemore tube placement.     1. Shock state - likely hemorrhagic/hypovolemic improving - continue to wean vasopressors   2. Acute Blood loss anemia - Continue to monitor CBC and transfuse as needed hgb stable today  3. Alcoholic liver disease and portal hypertension - GI/Hepatology followup. Continue albumin, octreotide, and vasopressor support.  4. Acute Hypoxemic Respiratory Failure - extubated this morning doing well on nasal cannula  5. Acute Renal Failure - will discuss with renal need for continued CRRT or switch to intermittent HD  6. Infectious Disease  - continue antibiotics  - Prior paracentesis without evidence of SBP    Prognosis guarded.
Patient seen and examined. Patient critically ill requiring frequent bedside visits with therapy change. Patient with complex history including EtOH abuse and portal hypertension who now is hospitalized with liver and renal failure. He had massive hematemesis 7/29 from gastric varices. He underwent EGD which was unable to localize/stop bleeding and then went to IR for embolization. There was cessation of bleeding post procedure but a blakemore tube was placed in case of re-bleed which has now been removed. He was admitted to MICU for further monitoring.    1. Shock state - likely hemorrhagic/hypovolemic - continue vasopressors as needed to maintain MAP > 65. Patient received 34: 25: 20(5 MTP Platelets): 10 (cryo) reportedly 7/29-7/30 with improvement in hemoglobin.  - continue vasopressors with goal MAP > 65  - continue antibiotics  2. Acute Blood loss anemia - from upper GI bleed now s/p IR intervention. Continue serial CBC and transfuse as needed. Per GI, no plan to repeat EGD at this time.  - Blakemore removed by GI on 7/30  3. Alcoholic liver disease and portal hypertension - GI/Hepatology followup. Continue albumin, octreotide, and vasopressor support.  - Hold Nadolol at this time  - NGT placed - for meds and feeds  - Patient had 1.5 L ascites removed 7/29 in setting of respiratory failure - studies not sent. small ascites noted on bedside POCUS  4. Acute Hypoxemic Respiratory Failure - in setting of massive hematemesis  - Maintain O2 sat > 90%  - Monitor blood gas and adjust ventilator settings as able - now PEEP 5 and FiO2 40% - will attempt to wean  5. 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 Infectious Disease  - continue antibiotics  - Prior paracentesis without evidence of SBP    Prognosis guarded.
Hepatology Staff: Yesenia Funez MD    I saw and examined the patient along with  Dr. [id number] @ 16:58.    Patient Medical Record, hosptial course was reviewed and summarized as below:    Vitals: Vital Signs Last 24 Hrs  T(C): 36.7 (28 Jul 2020 14:17), Max: 36.7 (28 Jul 2020 14:17)  T(F): 98.1 (28 Jul 2020 14:17), Max: 98.1 (28 Jul 2020 14:17)  HR: 73 (28 Jul 2020 14:17) (73 - 82)  BP: 102/64 (28 Jul 2020 14:17) (102/64 - 105/68)    RR: 18 (28 Jul 2020 14:17) (18 - 18)  SpO2: 96% (28 Jul 2020 14:17) (95% - 96%)  Medications:  IV Fluids: albumin human 25% IVPB 100 milliLiter(s) IV Intermittent every 6 hours    Labs:Creatinine, Serum: 11.92 mg/dL (07-28-20 @ 10:11)  Bilirubin Total, Serum: 28.1 mg/dL (07-28-20 @ 10:11)    MELD Score:   Imaging Studies:  I/O: I&O's Summary    27 Jul 2020 07:01  -  28 Jul 2020 07:00  --------------------------------------------------------  IN: 240 mL / OUT: 70 mL / NET: 170 mL    28 Jul 2020 07:01  -  28 Jul 2020 16:58  --------------------------------------------------------  IN: 220 mL / OUT: 0 mL / NET: 220 mL      Nutritional Status:   Albumin, Serum: 3.6 g/dL (07-28-20 @ 10:11)    Last 24 hour events: Patient with worsening MARQUISE    Recommendations: Agree with plan for HD, and possibly plasmapheresis for 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 Epiric antibiotics for now. If Cx remained negative may discontinue.    Plan discussed with Primary team.
Patient seen and examined. Chart reviewed.    39 year old man with ETOH abuse, portal hypertension, liver and renal failure. Massive variceal bleed s/p IR embolization and Blakemore tube placement.     1. Shock state - likely hemorrhagic/hypovolemic improving now off vasopressors   2. Acute Blood loss anemia - Hgb stable   3. Alcoholic liver disease and portal hypertension - GI/Hepatology followup.   4. Acute Hypoxemic Respiratory Failure - remains extubated doing well   5. Acute Renal Failure - CRRT stopped yesterday, remains anuric will need HD today  6. Infectious Disease  - continue antibiotics  - Prior paracentesis without evidence of SBP  7. Pancreatitis - abd pain with nausea and vomiting after eating yesterday found to have Lipase of 500 - abd pain resolved today CT abdomen to assess for pancreatic cyst    Prognosis guarded.
Patient seen and examined. Chart reviewed.    39 year old man with ETOH abuse, portal hypertension, liver and renal failure. Massive variceal bleed s/p IR embolization and Blakemore tube placement.     1. Shock state - likely hemorrhagic/hypovolemic improving - continue to wean vasopressors   2. Acute Blood loss anemia - Continue to monitor CBC and transfuse as needed hgb stable   3. Alcoholic liver disease and portal hypertension - GI/Hepatology followup.   4. Acute Hypoxemic Respiratory Failure - remains extubated doing well   5. Acute Renal Failure - CRRT stopped today will monitor  6. Infectious Disease  - continue antibiotics  - Prior paracentesis without evidence of SBP    Prognosis guarded.
Patient seen and examined. Chart reviewed.    39 year old man with EtOH abuse, portal hypertension now with liver and renal failure. Massive gastric variceal bleed s/p IR embolization and Blakemore tube was placement.     1. Shock state - likely hemorrhagic/hypovolemic improving - continue to wean vasopressors   - continue antibiotics  2. Acute Blood loss anemia - Continue to monitor CBC and transfuse as needed.   3. Alcoholic liver disease and portal hypertension - GI/Hepatology followup. Continue albumin, octreotide, and vasopressor support.  4. Acute Hypoxemic Respiratory Failure - continue ventilator support  - Maintain O2 sat > 90%  5. Acute Renal Failure - continue CRRT  6. Infectious Disease  - continue antibiotics  - Prior paracentesis without evidence of SBP    Prognosis guarded.
I have seen this patient with the fellow and agree with their assessment and plan. In addition, MARQUISE with urine lytes not suggestive of pre renal or HRS.  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 At this point, needs dialysis and likely a renal bx to confirm this as there might be some anecdotal evidence of using plasmapheresis for helping in clearing the high bili load as it is albumin bound.     Plan for non tunnelled cath today and then HD  Please ask radiology for US guided renal bx this week  ddavp 20mcg IV 30 min prior to and platelets pre kidney bx   If bx confirms cast nephropathy, will need blood bank for potential pheresis     Earle Olmos MD  Cell   Pager   Office 
Luisa Baez MD  Off: [phone]  Cell: [phone]
Luisa Baez MD  Off: [phone]  Cell: [phone]
MARQUISE ATN  Extubated  Plan on HD today  UF 1kg - 1.5 kg as tolerated    Luisa Baez MD  Off: [phone]  Cell: [phone]
MARQUISE ATN  Remains dialysis dependent  Denies new complaints  Plan on HD today  Need permacath for outpt HD- which will need outpt monitoring for renal recovery    Luisa Baez MD  Off: [phone]  Cell: [phone]
MARQUISE:   dc CRRT  Weening to extubate   Switch to intermittent HD   Will reassess tomorrow    Luisa Baez MD  Off: [phone]  Cell: [phone]
acute kidney injury- multifactorial- 1) hepatorenal physiology 2) ? tubular injury due to hypotension/ prolonged volume depletion from diarrhea 3) ? bilirubin cast nephropathy. remains dialysis dependent and unfortunately has a bad prognosis. will follow up for transplant evaluation.     discuss with liver team about potentially discharging on midodrine/ octreotide/ albumin.
MARQUISE ATN  Now on floors  Denies new complaints  Plan on HD today  Then expect to hold HD over weekend and assess for renal recovery    Luisa Baez MD  Off: [phone]  Cell: [phone]
I have seen this patient with the fellow and agree with their assessment and plan. In addition, MARQUISE with urine lytes not suggestive of pre renal or HRS.  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 At this point, needs dialysis and likely a renal bx to confirm this as there might be some anecdotal evidence of using plasmapheresis for helping in clearing the high direct bili load as it is albumin bound.     s./p one session of HD and bili is improving( but could be indirect part as that is removed by dialysis)  Please ask radiology for US guided renal bx this week  ddavp 20mcg IV 30 min prior to and platelets pre kidney bx   If bx confirms cast nephropathy, will need blood bank for potential pheresis for removal of direct bili component  but we need to see to what extent as ultimately treatment of the liver disorder is essential for this to improve  Liver team to decide on candidacy for transplant     Earle Olmos MD  Cell   Pager   Office 
I have seen this patient with the fellow and agree with their assessment and plan. 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 On CRRT for now  Dialysate is yellow suggesting some removal of DB via CRRT due to convectional.  Check bili levels in the dialysate, and bili is decreasing, might not require renal bx then.   If needs it, best to do perhaps in ICU setting while he is here  For now, urine output is minimal and cont CRRT  On blakemore and overall very sick    Earle Olmos MD  Cell   Pager   Office 
I have seen this patient with the fellow and agree with their assessment and plan. 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 On CVVHDF for now  Dialysate is yellow suggesting some removal of DB via CRRT due to convectional.  For now, urine output is minimal and cont CRRT  Change to phoxillum today  I think still cast nephropathy and to continue CRRT as perhaps some component of bili being removed. Meanwhile, too sick for a renal bx as not really necessary and . Active bleed, no role for pheresis at this point for bilirubin removal  Overall prognosis will depend on liver failure treatment      Earle Olmos MD  Cell   Pager   Office     For weekend please contact Dr Stephanie Truong( fellow) and Dr. Josette Yeung( Attending)  Dr Luisa Baez to take over consult service as of August 3rd, 2020
MARQUISE ATN   Liver cirrhosis  ETOH use  Plan on HD Sat AM prior to dc for outpt schedule accomodation  Outpt nephrologist at his unit will need to monitor for renal recovery  Tunnel cath in place and had some bleeding at site  IR evaluating     Luisa Baez MD  Off: [phone]  Cell: [phone]
MARQUISE:   Tolerating CRRT  Continue with current prescription    Luisa Baez MD  Off: [phone]  Cell: [phone]
MARQUISE:   Tolerating CRRT  Continue with current prescription  Bilirubin is stable      Josette Yeung MD  O:   C: [phone]
MARQUISE:   Tolerating CRRT  Continue with current prescription  Weening to extubate   Will evaluate about switching to intermittent HD if hemodynamically stable    Luisa Baez MD  Off: [phone]  Cell: [phone]
MARQUISE:   Tolerating CRRT  Continue with current prescription  Drop in bicarb noted: please check beta hydroxy butyrate  Maintain on dextrose  F/U NH3 level  Bilirubin is stable      Josette Yeung MD  O: [phone]  C: [phone]
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 Pt originally presented 7/26 with acute renal failure, jaundice and increasing ascites. 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 S/p diagnostic paracentesis 7/26 negative for SBP s/p empiric course of Zosyn. Pt underwent shiley placement by IR for initiation of dialysis. On 7/29 pt with hematemesis and underwent EGD revealing large gastric variceal bleed. In EGD pt received 12 U PRBC, 10 U FFP, 2 U Plt, 1 Cryo. GI unable to achieve hemostasis and Pt emergently transferred to IR where he underwent embolization (glue injection and PARTO) of bleeding gastric varices and placement of blakemore tube. In total pt received 34 U PRBC, 25 FFP, 5 U Plt, 2 cryo prior to transfer to MICU for further care.   MICU pt was intubated /extubated, vasopressors for hemorrhagic and hypovolemic shock state then later ? component of distributive d/t improvement in hemoglobin s/p MTP. Blakemore was removed by GI on 7/30 and pt had no evidence of further bleeding s/p. Pt started on CVVHDF for acute renal failure. Pt was successfully extubated 8/4 to nasal cannula and returned at baseline mental status. CRRT was discontinued on 8/5 and transitioned to intermittent HD 8/6. Pt weaned off vasopressors on 8/5. All cultures NGTD, pt remained on empiric abx and transitioned to meropenem 8/6 i/s/o worsening leukocytosis and elevated lipase to 504 as well as abdominal pain with nausea and vomiting. Pt underwent CTA/P to r/o pancreatitis unremarkable pancreas and NEw left adrenal nodularity which might represent bleed , team has spoken with Radiology who recommend MR of the abd to determine the nature of the finding on the Left adrenal.  Please f/up MRI of abd and monitor hemodynamic , if pt becomes hypotensive, has change in MS or tachycardia or any bleed , please call MICU for transfer and will need evaluation for acute Bleed
Patient seen and examined. Plan as discussed w/ Dr. Dwyer: continue dispo planning (noting patient will be residing [facility]/in [place], though he is willing to continue f/u w/ [facility] providers for hepatology/nephrology); HD center insisting on PPD placement -- to be read at 5pm today. Plan for inpatient HD tomorrow to adopt planned outpatient schedule of Tu/Th/Sa; monitor tunneled catheter site for bleeding per IR; f/u nutrition re: supplements causing bloating sx per patient.
Patient seen and examined. Plan as discussed w/ Dr. Dwyer: s/p permacath placement today w/ planned HD session later in the day per nephrology; continue dispo planning (noting patient will be residing [facility]/in [place], though he is willing to continue f/u w/ [facility] providers for hepatology/nephrology).
Patient seen and examined. Plan as discussed w/ Dr. Dwyer: continue dispo planning (noting patient will be residing [facility]/in [place], though he is willing to continue f/u w/ [facility] providers for hepatology/nephrology); HD center insisting on PPD placement which has occurred, awaiting read.
Patient signing out against medical advice- he does not wish to remain in the hospital until Monday when outpatient HD can be finalized. Risks and consequences of likely missed HD including significant morbidity and death were explained to patient.
I have seen and examined the patient. Agree with above with the following additions/exceptions:    39M w/ PMhx of EtOH cirrhosis, ascites, traumatic bladder rupture s/p repair, prior admissions for alcoholic hepatitis and withdrawal seizures presented initially with decompensated cirrhosis and intial c/f sbp as well as acute renal failure. His course was further complicated by massive GI variceal bleed requiring emergent egd and blakemore placement. He stabilized after 34u PRBCs and pressors weaned off. He remains HD dependent 2/2 possible ATN vs pigment nephropathy. Per renal less likely HRS. His MELD remains severely elevated and he is high risk for further decompensation. Currently he has a persistent leukocytosis which we are monitoring off abx. Will consider repeat para if localizing symptoms develop. Will monitor closely for renal recovery. 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 Should his hgb drop again, this will need to be considered as a possible source. MRI performed today to further characterize. Appreciate Liver transplant involvement.    Juancarlos Roblero MD  Division of Hospital Medicine  Pager: [phone]  Office: [phone]
Seen and examined on date of service 8/8. Agree with above with the following additions/exceptions:    39M w/ PMhx of EtOH cirrhosis, ascites, traumatic bladder rupture s/p repair, prior admissions for alcoholic hepatitis and withdrawal seizures presented initially with decompensated cirrhosis and intial c/f sbp as well as acute renal failure. His course was further complicated by massive GI variceal bleed requiring emergent egd and blakemore placement. He stabilized after 34u PRBCs and pressors weaned off. He remains HD dependent 2/2 possible ATN vs pigment nephropathy. Per renal less likely HRS. His MELD remains severely elevated and he is high risk for further decompensation. Currently he has a persistent leukocytosis which we are monitoring off abx. Will consider repeat para if localizing symptoms develop. Appreciate ID involvement. Will monitor closely for renal recovery. 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 Should his hgb drop again, this will need to be considered as a possible source. MRI pending today to further characterize. Appreciate Liver transplant involvement.    Juancarlos Roblero MD  Division of Hospital Medicine  Pager: [phone]  Office: [phone]
Patient seen and examined. Plan as discussed w/ Dr. Dwyer: Cr uptrending, plan for HD today per nephrology, IR for permacath placement as pt will likely required continued HD upon discharge; continue to trend WBC, for now holding off on repeat dx paracentesis; SW/CM for dispo planning given need for HD/EtOH rehab/?acute rehab pending evaluation.

## 2020-08-15 NOTE — PROGRESS NOTE ADULT - PROVIDER SPECIALTY LIST ADULT
Hospitalist
Infectious Disease
Internal Medicine
Intervent Radiology
MICU
Nephrology
Transplant Hepatology
Gastroenterology
MICU
Surgery
Transplant Hepatology
Transplant Hepatology
MICU
Transplant Hepatology
Nephrology
Nephrology
Internal Medicine

## 2020-08-15 NOTE — PROGRESS NOTE ADULT - SUBJECTIVE AND OBJECTIVE BOX
[facility] DIVISION OF KIDNEY DISEASES AND HYPERTENSION -- FOLLOW UP NOTE  --------------------------------------------------------------------------------  Joey Singleton   Nephrology Fellow  Pager NS: [phone]/ [facility]: [phone]  (After 5 pm or on weekends please page the on-call fellow)    24 hour events/subjective: Patient seen and examined at the bedside. Seen in HD, tolerating well. No complaints.        PAST HISTORY  --------------------------------------------------------------------------------  No significant changes to PMH, PSH, FHx, SHx, unless otherwise noted    ALLERGIES & MEDICATIONS  --------------------------------------------------------------------------------  Allergies    No Known Allergies    Intolerances      Standing Inpatient Medications  chlorhexidine 4% Liquid 1 Application(s) Topical <User Schedule>  furosemide    Tablet 60 milliGRAM(s) Oral daily  hepatitis A (virus) Vaccine - Adult (HAVRIX) 1 milliLiter(s) IntraMuscular once  melatonin 1 milliGRAM(s) Oral at bedtime  Nephro-vicente 1 Tablet(s) Oral daily  nystatin Powder 1 Application(s) Topical two times a day  pantoprazole    Tablet 40 milliGRAM(s) Oral before breakfast  pneumococcal  13 Vaccine (PREVNAR 13) 0.5 milliLiter(s) IntraMuscular once  polyethylene glycol 3350 17 Gram(s) Oral two times a day  rifAXIMin 550 milliGRAM(s) Oral two times a day  sevelamer carbonate 800 milliGRAM(s) Oral three times a day with meals  thiamine 100 milliGRAM(s) Oral daily    PRN Inpatient Medications  ondansetron   Disintegrating Tablet 4 milliGRAM(s) Oral daily PRN  sodium chloride 0.9% lock flush 10 milliLiter(s) IV Push every 1 hour PRN      REVIEW OF SYSTEMS  --------------------------------------------------------------------------------  Gen: No fevers/chills  Head/Eyes/Ears/Mouth: No headache; Normal hearing; Normal vision    Respiratory: No dyspnea, cough  CV: No chest pain  GI: Distention  : No increased frequency, dysuria  MSK: No joint pain/swelling; no edema    All other systems were reviewed and are negative, except as noted.    >>> <<<    VITALS/PHYSICAL EXAM  --------------------------------------------------------------------------------  T(C): 36.9 (08-15-20 @ 08:00), Max: 37.4 (08-15-20 @ 04:18)  HR: 100 (08-15-20 @ 08:00) (100 - 115)  BP: 123/81 (08-15-20 @ 08:00) (114/77 - 128/82)  RR: 18 (08-15-20 @ 08:00) (18 - 18)  SpO2: 94% (08-15-20 @ 08:00) (93% - 94%)  Wt(kg): --        Physical Exam:    	 Gen: NAD               HEENT: sclera icterus  	Pulm: CTA b/l  	CV: RRR, S1S2, no murmur  	GI: +BS, soft, distended  	: no ashton  	MSK: Warm, no edema              Neuro: AAOx3  	Skin: Warm, no cyanosis, jaundice appearing  	Vascular access: RIJ tunneled hd catheter with clear dressing      LABS/STUDIES  --------------------------------------------------------------------------------              7.7    20.98 >-----------<  147      [08-15-20 @ 07:05]              22.0     129  |  90  |  47  ----------------------------<  129      [08-15-20 @ 07:05]  3.8   |  23  |  8.13        Ca     8.8     [08-15-20 @ 07:05]      Mg     1.9     [08-15-20 @ 07:05]      Phos  5.2     [08-15-20 @ 07:05]    TPro  6.1  /  Alb  3.2  /  TBili  21.0  /  DBili  x   /  AST  144  /  ALT  24  /  AlkPhos  135  [08-15-20 @ 07:05]    PT/INR: PT 22.1 , INR 1.92       [08-14-20 @ 17:51]  PTT: 36.2       [08-14-20 @ 17:51]      Creatinine Trend:  SCr 8.13 [08-15 @ 07:05]  SCr 7.11 [08-14 @ 07:15]  SCr 5.40 [08-13 @ 07:15]  SCr 7.32 [08-12 @ 07:16]  SCr 5.32 [08-11 @ 06:50]    Urinalysis - [08-07-20 @ 04:54]      Color Dark Orange / Appearance Turbid / SG 1.041 / pH 6.0      Gluc Negative / Ketone Negative  / Bili Moderate / Urobili 8 mg/dL       Blood Moderate / Protein 100 mg/dL Test Performed on Urine Supernate. / Leuk Est Negative / Nitrite Negative      RBC 30 / WBC 18 / Hyaline 10 / Gran  / Sq Epi  / Non Sq Epi 7 / Bacteria Negative      Iron 34, TIBC 124, %sat 27      [07-14-20 @ 10:34]  Ferritin 812      [07-12-20 @ 08:58]  PTH -- (Ca 6.2)      [07-29-20 @ 09:18]   223  Lipid: chol --, , HDL --, LDL --      [08-03-20 @ 12:04]    HBsAb 276.9      [08-10-20 @ 23:16]  HBsAb Reactive      [08-12-20 @ 11:42]  HBsAg Nonreact      [08-10-20 @ 23:34]  HBcAb Nonreact      [08-12-20 @ 11:42]  HCV 0.27, Nonreact      [08-10-20 @ 23:16]    Syphilis Screen (Treponema Pallidum Ab) Negative      [08-08-20 @ 09:47]

## 2020-08-15 NOTE — PROGRESS NOTE ADULT - PROBLEM SELECTOR PLAN 3
hyperphosphatemia likely 2/2 MARQUISE ATN, last serum phos 5.2  - Renal diet (low phosphorous diet)  - monitor serum phos daily

## 2020-08-15 NOTE — PROGRESS NOTE ADULT - PROBLEM SELECTOR PLAN 9
patient will need HD and etoh rehab upon discharge. PT rec home w/ outpatient PT. Hep B panel and PPD done. nephro and hepatology okay w/ discharge after 08/15 HD    Transitions of Care Status:  1.  Name of PCP:  2.  PCP Contacted on Admission: [ ] Y    [ ] N    3.  PCP contacted at Discharge: [ ] Y    [ ] N    [ ] N/A  4.  Post-Discharge Appointment Date and Location:  5.  Summary of Handoff given to PCP: nephro and hepatology cleared patient for discharge after HD today. PT rec home w/ outpatient PT. patient will need HD and etoh rehab upon discharge. [facility] dialysis center in [facility] accepted patient pending Hep B panel and PPD (both done). Will discharge patient to HD center on Monday (cannot start over the weekend). Of note, patient needs permacath purse string sutures removed prior to discharge.     Transitions of Care Status:  1.  Name of PCP:  2.  PCP Contacted on Admission: [ ] Y    [ ] N    3.  PCP contacted at Discharge: [ ] Y    [ ] N    [ ] N/A  4.  Post-Discharge Appointment Date and Location:  5.  Summary of Handoff given to PCP:

## 2020-08-15 NOTE — CHART NOTE - NSCHARTNOTEFT_GEN_A_CORE
Pt states he is leaving Against Medical Advice. Risks of leaving including death were explained, pt expressed understanding and has full capacity. He states that he understands that despite the fact that his dialysis is not set up yet, and that he wants to go to a hospital near his parents.     Shaka Uriostegui, Internal Medicine  PGY-3, [phone], [phone]

## 2020-08-31 NOTE — ED PROVIDER NOTE - PHYSICAL EXAMINATION
General: awake, alert, ill appearing, vitals reassuring, mild tachy to , rectally afebrile   Head: atraumatic, normocephalic  Eyes: PERRL, EOMI, scleral icteric  ENT:  normal phonation, airway patent  Neck: full ROM, no midline ttp  CV: borderline tachy, , regular, BP reassuring, distal pulses intact   Pulm: lungs CTA b/l, no wheezing, no respiratory distress  GI: soft, midline surgical scar, distended, not tense RECTAL: no bleeding seen, guaiac sent, chaperone Liz HUTCHINSON RN  Back: normal ROM, no midline ttp, no signs of trauma  Extremities: normal ROM, joints stable, distal pulses intact, no edema  Neuro: alert, oriented x3, moving all extremities, interactive, normal speech/memory, PERRL, NOT encephalopathic   Derm: warm, dry, jaundiced

## 2020-08-31 NOTE — ED PROVIDER NOTE - OBJECTIVE STATEMENT
39 yoM, PMHX of cirrhosis 2/2 to etoh use, alpha antitrypsin deficiency, past renal insufficiency, needed dialysis in the past, prior variceal bleed sent from [facility] from [place] ED due to prior hepatology care at [facility]. Presented to OSH for blood in stool and abd pain. Denies any fever, vomiting, or hematemesis. Received vanc/zosyn and had CTA C/A/P. Vitals stable en route. Here in ED still with some abd pain. No bleeding. 39 yoM, PMHX of cirrhosis 2/2 to etoh use, alpha antitrypsin deficiency, past renal insufficiency, needed dialysis in the past, prior variceal bleed sent from [facility] from [place] [facility] due to prior hepatology care at [facility]. Presented to OSH for blood in stool and abd pain. Denies any fever, vomiting, or hematemesis. Received vanc/zosyn, PRBC x1 and had CTA C/A/P. Vitals stable en route. Here in ED still with some abd pain. No bleeding.

## 2020-08-31 NOTE — ED PROVIDER NOTE - CLINICAL SUMMARY MEDICAL DECISION MAKING FREE TEXT BOX
Hanh, PGY3-  39 yoM, PMHX of cirrhosis 2/2 to etoh use, alpha antitrypsin deficiency, past renal insufficiency, needed dialysis in the past, prior variceal bleed sent from xfer from [place] ED due to prior hepatology care at [facility]. Presented to OSH for blood in stool and abd pain. Denies any fever, vomiting, or hematemesis. Received vanc/zosyn and had CTA C/A/P. Vitals stable en route. Here in ED still with some abd pain. No bleeding.  abd distended, jaundiced, no focal ttp/not tense, tachy, regular, mentating well, BP reassuring, not encephalopathic, no active rectal bleeding  concern for GIB in setting of decompensated cirrhosis, eval renal function/lytes, may require additional PRBC, not consistent with hepatic encephalopathy  not consistent with SBP  in patient team aware of xfer, were requesting ED to ED xfer  stable at this time, re--eval after diagnostics, PRBC, likely not requiring ICU level of care

## 2020-08-31 NOTE — ED PROVIDER NOTE - PROGRESS NOTE DETAILS
Hanh, PGY3- rectally afebrile, no gross blood seen, chaperone Liz HUTCHINSON RN, guaiac sent, PRBC ordered for Hgb < 8. S/p zosyn/vanco at OSH. CTs to be uploaded. Hospitalist paged for admission. Hanh, PGY3- rectally afebrile, no gross blood seen, chaperone Liz HUTCHINSON RN, guaiac sent, PRBC ordered for Hgb < 8. S/p zosyn/vanco, PRBC, at OSH. CTs to be uploaded. Hospitalist paged for admission. Hanh, PGY3-admitted to Dr. MELISSA Carver. CTs taken to MR. JENSEN emailed for consults. Vitlas remain stable.

## 2020-08-31 NOTE — ED PROVIDER NOTE - CARE PLAN
Principal Discharge DX:	GI bleed  Secondary Diagnosis:	End stage renal disease  Secondary Diagnosis:	Liver cirrhosis

## 2020-08-31 NOTE — ED ADULT NURSE NOTE - OBJECTIVE STATEMENT
Patient is a 39 year old male transferred via EMS from [facility] in [facility] for continuation of care. Patient has history of ESRD, HepA, esophageal varices, ETOH abuse, GIB requiring admission & 65u blood 2 weeks ago. Last drink 7/4/2020. Patient is A&O x 4, states he walks independently at baseline. Pt reports he went to ED today while visiting his parents in [place] for rectal bleeding, abd distension/pain and chills. At this time in ED pt c/o +cp, +sob, +abd pain, +dysuria, +dizziness. At OSH, patient noted to have anemia and deep PNA. Received vanco, zosyn, 1u PRBC. Abd is firm ,rounded, +RLQ tenderness. Skin is warm, dry, jaundiced. Pt has R. chest wall PermaCath, last dialysis Friday 8/28, missed session today. VSS/ NAD. Deep 20G IVs in place from OSH. Safety and comfort maintained. No visitors at the bedside. Will continue to monitor.

## 2020-08-31 NOTE — ED PROVIDER NOTE - ATTENDING CONTRIBUTION TO CARE
Hanh, PGY3-  39 yoM, PMHX of cirrhosis 2/2 to etoh use, alpha antitrypsin deficiency, presenting as a transfer from [facility] for evaluation of GI bleed, had workup there including CTA, will require dialysis within 24 hrs of admission here, followed by house hepatology, will repeat labs to see if patient requires another transfusion of prbcs, was already treated with vanc/zosyn given 1u prbc at osh, otherwise stable appearing at this time aside from tachycardia, jaundiced but is baseline per him, will repeat labs, tranfuse if needed, admit for ongoing management with inpatient team as patient was accepted by Dr. Sen for transfer.

## 2020-08-31 NOTE — ED ADULT NURSE NOTE - NSIMPLEMENTINTERV_GEN_ALL_ED
Implemented All Fall Risk Interventions:  [place] to call system. Call bell, personal items and telephone within reach. Instruct patient to call for assistance. Room bathroom lighting operational. Non-slip footwear when patient is off stretcher. Physically safe environment: no spills, clutter or unnecessary equipment. Stretcher in lowest position, wheels locked, appropriate side rails in place. Provide visual cue, wrist band, yellow gown, etc. Monitor gait and stability. Monitor for mental status changes and reorient to person, place, and time. Review medications for side effects contributing to fall risk. Reinforce activity limits and safety measures with patient and family.

## 2020-09-01 NOTE — H&P ADULT - NSHPPHYSICALEXAM_GEN_ALL_CORE
PHYSICAL EXAM:  GENERAL: NAD, Resting in bed  HEENT:  Head atraumatic, EOMI, PERRLA, sclera icteric; Moist mucous membranes, normal oropharynx  NECK: Supple, No JVD  CHEST/LUNG: Clear to auscultation bilaterally; No crackles, rhonchi, wheezing, or rubs. Unlabored respirations on room air  HEART: Regular rhythm, tachycardic; No murmurs, rubs, or gallops  ABDOMEN: Bowel sounds present; Firm, mild epigastric tenderness, markedly distended, (+) fluid wave  EXTREMITIES: 2+ Peripheral Pulses, brisk capillary refill. No clubbing, cyanosis, or edema  NERVOUS SYSTEM:  Alert & Oriented X3, non-focal and spontaneous movements of all extremities  SKIN: No rashes; (+) jaundice PHYSICAL EXAM:  GENERAL: NAD, Resting in bed  HEENT:  Head atraumatic, EOMI, sclera icteric  NECK: Supple, No JVD  CHEST/LUNG: Clear to auscultation bilaterally; unlabored respirations on room air  HEART: Regular rhythm, tachycardic  ABDOMEN: Bowel sounds present; Firm, mild epigastric tenderness, markedly distended, (+) fluid wave  EXTREMITIES: 2+ Peripheral Pulses, brisk capillary refill. No edema  NERVOUS SYSTEM:  Alert & Oriented X3, non-focal and spontaneous movements of all extremities  SKIN: No rashes; (+) jaundice

## 2020-09-01 NOTE — H&P ADULT - NSHPLABSRESULTS_GEN_ALL_CORE
6.2    18.81 )-----------( 83       ( 31 Aug 2020 22:04 )             19.3       08-31    131<L>  |  96  |  33<H>  ----------------------------<  97  4.4   |  21<L>  |  8.38<H>    Ca    7.9<L>      31 Aug 2020 22:04    TPro  5.7<L>  /  Alb  2.5<L>  /  TBili  11.3<H>  /  DBili  x   /  AST  88<H>  /  ALT  26  /  AlkPhos  172<H>  08-31            PT/INR - ( 31 Aug 2020 22:04 )   PT: 25.7 sec;   INR: 2.24 ratio         PTT - ( 31 Aug 2020 22:04 )  PTT:37.7 sec        Culture Results:   No Growth Final (08-07 @ 04:30)  Culture Results:   No Growth Final (08-07 @ 01:16) Labs, imaging and EKG tracing personally reviewed    6.2    18.81 )-----------( 83       ( 31 Aug 2020 22:04 )             19.3       08-31    131<L>  |  96  |  33<H>  ----------------------------<  97  4.4   |  21<L>  |  8.38<H>    Ca    7.9<L>      31 Aug 2020 22:04    TPro  5.7<L>  /  Alb  2.5<L>  /  TBili  11.3<H>  /  DBili  x   /  AST  88<H>  /  ALT  26  /  AlkPhos  172<H>  08-31            PT/INR - ( 31 Aug 2020 22:04 )   PT: 25.7 sec;   INR: 2.24 ratio         PTT - ( 31 Aug 2020 22:04 )  PTT:37.7 sec        Culture Results:   No Growth Final (08-07 @ 04:30)  Culture Results:   No Growth Final (08-07 @ 01:16)

## 2020-09-01 NOTE — PROGRESS NOTE ADULT - PROBLEM SELECTOR PLAN 2
MELD-Na 38 on admission; 65% 90-day mortality  - Daily CBC, CMP, INR  - No ETOH use currently  - In the process of working up for liver/kidney transplant, but needs a hepatology in [facility]  - Hepatology consult email sent  - Not on HE prophylaxis, no signs of HE  - No signs of SBP currently, CTX x 7 days for SBP ppx  - (+) ascites, will get abd US and IR consult for paracentesis in AM

## 2020-09-01 NOTE — CONSULT NOTE ADULT - ASSESSMENT
39M w/ decompensated EtOH cirrhosis, c/b gastric varices, recent admission (7/2020) for hemorrhagic shock from gastric varix requiring 25u pRBC, s/p glue injection and PARTO by IR, acute renal failure requiring new HD, and ETOH withdrawal seizure, traumatic bladder rupture 2/2 fall s/p ex-lap in 2019, presented for new onset of bloody bowel movements, dizziness and fatigue. Found to have hgb 6.2.   hgb improved after 1 unit of blood to 7.0    Impression:  # GI bleed. Unclear if this upper vs lower source ( reported dark red blood with stool). DDx: peptic ulcer disease, gastric varices, angioectasias, Sahra-Salinas tear, esophagitis, erosive gastritis, vs Hemorrhoidal bleed, diverticular bleed.    # Acute blood loss anemia s/p 1 unit of PRBC hgb now 7.0    #Cirrhosis due to EtOH, decompensated by ascites and variceal bleed  - varices: no esophageal varices on EGD 7/29, actively bleeding gastric varix s/p glue injection and PARTO by IR  - ascites: present on exam, neg for SBP 7/26, not on diuretics   - HE: none on exam  - HCC: no imaging, MRI without contrast is limited  - MELD-Na = 38 on 9/1/20  - Transplant evaluation is undergoing, pt has already joined online EtOH rehab and reports commitment to continued attendance    # Anuric MARQUISE 2/2 ATN, currently dialysis dependent   MARQUISE during last admission in the setting of varices bleed/anemia, diarrhea, diuretics and decrease effective arterial blood volume from cirrhosis  initiated on HD on 7/28 for anuric and fluid overload, then transitioned to HD on 8/6. s/p permacath 8/12    Recommendations:  - Check urine PEth ( ethanol metabolite in urine)  - NPO  - Transfuse blood to keep hgb between 7-8  - EGD today  - Protonix 80mg IV x 1, followed by Protonix ggt at   - Octreotide 50mcg IV x 1, followed by Octreotide ggt at 50mcg/hr  - Ceftriaxone 1g IV daily for SBP Prophylaxis  - CBC Q6 and monitor for signs of bleeding  - Supportive care per primary team  - HD as per renal recs  - c/w miralax BID, rifaximin  - trend CMP, INR daily      Pato Forbes   Gastroenterology Fellow  Pager: [phone]  Please call on-call GI fellow after 5pm and before 8am, and on weekends. 39M w/ decompensated EtOH cirrhosis, c/b gastric varices, recent admission (7/2020) for hemorrhagic shock from gastric varix requiring 25u pRBC, s/p glue injection and PARTO by IR, acute renal failure requiring new HD, and ETOH withdrawal seizure, traumatic bladder rupture 2/2 fall s/p ex-lap in 2019, presented for new onset of bloody bowel movements, dizziness and fatigue. Found to have hgb 6.2.   hgb improved after 1 unit of blood to 7.0    Impression:  # GI bleed. Unclear if this upper vs lower source ( reported dark red blood with stool). DDx: peptic ulcer disease, gastric varices, angioectasias, Sahra-Salinas tear, esophagitis, erosive gastritis, vs Hemorrhoidal bleed, diverticular bleed.    # Acute blood loss anemia s/p 1 unit of PRBC hgb now 7.0    #Cirrhosis due to EtOH, decompensated by ascites and variceal bleed  - varices: no esophageal varices on EGD 7/29, actively bleeding gastric varix s/p glue injection and PARTO by IR  - ascites: present on exam, neg for SBP 7/26, not on diuretics   - HE: none on exam  - HCC: no imaging, MRI without contrast is limited  - MELD-Na = 38 on 9/1/20  - Transplant evaluation is undergoing, pt has already joined online EtOH rehab and reports commitment to continued attendance    # Anuric MARQUISE 2/2 ATN, currently dialysis dependent   MARQUISE during last admission in the setting of varices bleed/anemia, diarrhea, diuretics and decrease effective arterial blood volume from cirrhosis  initiated on HD on 7/28 for anuric and fluid overload, then transitioned to HD on 8/6. s/p permacath 8/12    Recommendations:  - Transfuse 1 more unit of PRBC  - Check urine PEth ( ethanol metabolite in urine)  - NPO  - Transfuse blood to keep hgb between 7-8  - EGD today  - Protonix 80mg IV x 1, followed by Protonix ggt at   - Octreotide 50mcg IV x 1, followed by Octreotide ggt at 50mcg/hr  - Ceftriaxone 1g IV daily for SBP Prophylaxis  - CBC Q6 and monitor for signs of bleeding  - HD as per renal recs  - trend CMP, INR daily  - Continue demetri Forbes   Gastroenterology Fellow  Pager: [phone]  Please call on-call GI fellow after 5pm and before 8am, and on weekends.

## 2020-09-01 NOTE — H&P ADULT - PROBLEM SELECTOR PLAN 3
MWF HD w/ [facility] via PermaCath  - PermaCath site dry and clean  - C/w Midodrine, and dose coordination with HD  - Consult nephro in AM for HD  - In the process of evaluation for liver/kidney transplant  - Dose meds per dialysis and avoid nephrotoxic agents

## 2020-09-01 NOTE — CONSULT NOTE ADULT - SUBJECTIVE AND OBJECTIVE BOX
Chief Complaint:  bloody stool    HPI:  39M w/ ETOH cirrhosis c/b gastric varices, recent admission (7/2020) for hemorrhagic shock 2/2 gastric variceal bleeding, acute renal failure requiring new HD, and ETOH withdrawal seizure, traumatic bladder rupture 2/2 fall s/p ex-lap in 2019, presented for new onset of GIB, dizziness and fatigue.     Patient reports feeling well since discharge, and was able to participate in PT home exercise program, and do light activities at home. He has had small rectal bleeding intermittently, and attributed to known hemorrhoids. However, since yesterday, he was feeling more fatigued, SOB, nauseous and chills, and had 1 episode of dark red blood per rectum before bedtime. Woke up with dark blood in the boxer this AM, and was feeling dizzy.     No BM since the AM. Brought self to the hospital.     Reports having ok appetite, but only able to eat small meals given abdominal discomfort. (+) weight loss 25 lbs since discharge.     Abdomen has decreased in girth since last admission, but was more distended since yesterday. Today, he had epigastric pain radiating down to RLQ, dull-achy, and stretchy pain with intermittent sharp sensation.     Denied fever, headache, blurred vision, CP, palpitations, vomiting, hematemesis, dysuria, hematuria, diarrhea/constipation, or BLE pain/edema.    Going on MWF HD schedule via [facility], PermaCath placed last admission. Still making minimal urine. (+) hypotension associated with HD, and taking midodrine.     No ETOH use since 7/4/20, no seizures, hallucination, or tremors since discharge.     He is in the process of evaluation for liver and kidney transplant, but has not found a hepatologist in [facility] yet.   Was told to stop Rifaximin, and no longer on thiamine.     In ED: T 98F; ; /73; RR 18; SaO2 95% on RA; Lab (+) for WBC 18.8, hgb 6.2, plt 83; INR 2.24; Na 131, Cr 8.38; T bili 11.3, , AST 88. S/p 1u PRBC and 4 mg IV morphine.     Allergies:  No Known Allergies    Home Medications:  multivitamin: 1 tab(s) orally once a day (01 Sep 2020 02:22)      Hospital Medications:  cefTRIAXone   IVPB      folic acid 1 milliGRAM(s) Oral daily  midodrine. 10 milliGRAM(s) Oral three times a day  multivitamin 1 Tablet(s) Oral daily  octreotide  Infusion 50 MICROgram(s)/Hr IV Continuous <Continuous>  pantoprazole Infusion 8 mG/Hr IV Continuous <Continuous>      PMHX/PSHX:  End-stage renal disease (ESRD)  Cirrhosis, alcoholic  S/P exploratory laparotomy        Family history:  FH: alpha 1 antitrypsin deficiency        Social History: no smoking    ROS:   General:  No fevers, chills or night sweats  ENT:  No sore throat or dysphagia  CV:  No pain or palpitations  Resp:  No dyspnea, cough or  wheezing  GI:  as above  Skin:  No rash or edema  Neuro: no weakness   Hematologic: no bleeding  Musculoskeletal: no muscle pain or join pain  Psych: no agitation     : no dysuria      PHYSICAL EXAM:   GENERAL:  NAD, Appears stated age  HEENT:  NC/AT,  conjunctivae clear and pink, sclera -anicteric  CHEST:  CTA B/L, Normal effort  HEART:  RRR S1/S2,  ABDOMEN:  Soft, non-tender, ascites-distended,  no masses   EXTREMITIES:  No cyanosis or Edema  SKIN:  Warm & Dry. No rash or erythema  NEURO:  Alert, oriented    Vital Signs:  Vital Signs Last 24 Hrs  T(C): 36.7 (01 Sep 2020 04:20), Max: 37 (31 Aug 2020 22:50)  T(F): 98.1 (01 Sep 2020 04:20), Max: 98.6 (31 Aug 2020 22:50)  HR: 104 (01 Sep 2020 04:20) (100 - 104)  BP: 115/73 (01 Sep 2020 04:20) (110/73 - 119/81)  BP(mean): --  RR: 18 (01 Sep 2020 04:20) (16 - 18)  SpO2: 93% (01 Sep 2020 04:20) (93% - 98%)  Daily Height in cm: 185.4 (01 Sep 2020 09:17)    Daily     LABS:                        7.0    19.07 )-----------( 85       ( 01 Sep 2020 05:45 )             21.4     Mean Cell Volume: 102.4 fl (09-01-20 @ 05:45)    09-01    131<L>  |  97  |  34<H>  ----------------------------<  86  4.6   |  22  |  8.30<H>    Ca    8.0<L>      01 Sep 2020 05:45  Phos  5.8     09-01  Mg     1.9     09-01    TPro  5.6<L>  /  Alb  2.4<L>  /  TBili  11.8<H>  /  DBili  x   /  AST  91<H>  /  ALT  24  /  AlkPhos  163<H>  09-01    LIVER FUNCTIONS - ( 01 Sep 2020 05:45 )  Alb: 2.4 g/dL / Pro: 5.6 g/dL / ALK PHOS: 163 U/L / ALT: 24 U/L / AST: 91 U/L / GGT: x           PT/INR - ( 01 Sep 2020 05:45 )   PT: 24.3 sec;   INR: 2.12 ratio         PTT - ( 31 Aug 2020 22:04 )  PTT:37.7 sec                            7.0    19.07 )-----------( 85       ( 01 Sep 2020 05:45 )             21.4                         6.2    18.81 )-----------( 83       ( 31 Aug 2020 22:04 )             19.3     Imaging:

## 2020-09-01 NOTE — ED ADULT NURSE REASSESSMENT NOTE - NS ED NURSE REASSESS COMMENT FT1
Blood consent in chart and blood paper. Risks, benefits and adverse events explained to patient. Second RN reviewed blood with RN to confirm. Q15 mins pt stable denies any complaints or adverse reaction symptoms. Resting comfortably in bed VSS/NAD.

## 2020-09-01 NOTE — H&P ADULT - ASSESSMENT
39M w/ ETOH cirrhosis c/b gastric varices, recent admission (7/2020) for hemorrhagic shock 2/2 variceal bleeding, acute renal failure requiring new HD, and ETOH withdrawal seizure, traumatic bladder rupture 2/2 fall s/p ex-lap in 2019, presented for new onset of GIB, dizziness and fatigue. Found to have hgb 6.2.

## 2020-09-01 NOTE — PROGRESS NOTE ADULT - PROBLEM SELECTOR PLAN 1
Slow variceal bleed vs. hemorrhoidal bleed  - Hgb 6.2 on admission, baseline 8-9 last admission.   - S/p 2 U PRBC, goal Hgb 7-8 for portal hypertension  - additional uPRBC during HD today  - Hepatology consult emailed, endoscopy this morning.   - Protonix 80 mg IVP x 1, then PPI gtt; start octreotide gtt  - NPO except meds  - Keep active T&S    - S/p 34 u PRBC, 25 u FFP, 5 u PLT and 2 u cryo and s/p EGD and IR embolization, [place] balloon, and MICU admission last admission. Slow variceal bleed vs. hemorrhoidal bleed  - Hgb 6.2 on admission, baseline 8-9 last admission.   - S/p 2 U PRBC, goal Hgb 7-8 for portal hypertension  - additional uPRBC during HD today  - Hepatology consult emailed, endoscopy this morning showed no active bleed. Coloscopy tomorrow.   - Protonix 80 mg IVP x 1, then PPI gtt; start octreotide gtt  - NPO except meds  - Keep active T&S  - S/p 34 u PRBC, 25 u FFP, 5 u PLT and 2 u cryo and s/p EGD and IR embolization, [place] balloon, and MICU admission last admission.

## 2020-09-01 NOTE — H&P ADULT - PROBLEM SELECTOR PLAN 4
- DVT ppx: SCD; no chemo ppx 2/2 bleeding  - Diet: NPO x meds, pending hepatology eval  - Activity: OOB with assistance, fall precaution  - Dispo: Pending

## 2020-09-01 NOTE — H&P ADULT - NSHPSOURCEINFORD_GEN_ALL_CORE
----- Message from Joann Arnold sent at 9/6/2017 10:52 AM CDT -----  Contact: Pt at [phone]  Pt would like to know if Dr. Gil received her all of her medical records. Pt would like to know this before she comes in for her appt scheduled on 9/7.    Chart(s)/Patient

## 2020-09-01 NOTE — H&P ADULT - NSHPSOCIALHISTORY_GEN_ALL_CORE
Lives with parents  Amb short distance without assistive device, but uses a cane if needs to stand or walk for long time  No aides at home, able to perform ADLs by self  Last drink was 7/4/20  Never smoker, no illicit drug use

## 2020-09-01 NOTE — CONSULT NOTE ADULT - SUBJECTIVE AND OBJECTIVE BOX
[facility] DIVISION OF KIDNEY DISEASES AND HYPERTENSION -- [phone]  -- INITIAL CONSULT NOTE  --------------------------------------------------------------------------------  If any questions, please feel free to contact me  NS pager: [phone], [facility]: [id number]  Adan Mnoae M.D.  Nephrology Fellow    (After 5 pm or on weekends please page the on-call fellow)  --------------------------------------------------------------------------------    HPI:  39 y.o. Male w/ PMHx of ESKD on HD MWF,  traumatic bladder rupture 2/2 fall s/p ex-lap in 2019, ETOH cirrhosis c/b gastric varices, recent admission (7/2020) for hemorrhagic shock 2/2 variceal bleeding, acute renal failure requiring HD, and ETOH withdrawal seizure, presented for new onset of GIB, dizziness and fatigue.  -- Patient reports feeling well since discharge, and was able to participate in PT home exercise program, and do light activities at home. He has had small rectal bleeding intermittently, and attributed to known hemorrhoids. However, since yesterday, he was feeling more fatigued, SOB, nauseous and chills, and had 1 episode of dark red blood per rectum before bedtime. Woke up with dark blood in the boxer this AM, and was feeling dizzy.  Denied fever, headache, blurred vision, CP, palpitations, vomiting, hematemesis, dysuria, hematuria, diarrhea/constipation, or BLE pain/edema.    Nephrology consulted for ESKD management.       PAST HISTORY  --------------------------------------------------------------------------------  PAST MEDICAL & SURGICAL HISTORY:  End-stage renal disease (ESRD): On HD MWF  Cirrhosis, alcoholic  S/P exploratory laparotomy: for bladder rupture s/p fall    FAMILY HISTORY:  FH: alpha 1 antitrypsin deficiency    PAST SOCIAL HISTORY:    ALLERGIES & MEDICATIONS  --------------------------------------------------------------------------------  Allergies    No Known Allergies    Intolerances      Standing Inpatient Medications  cefTRIAXone   IVPB      folic acid 1 milliGRAM(s) Oral daily  midodrine. 10 milliGRAM(s) Oral three times a day  multivitamin 1 Tablet(s) Oral daily  pantoprazole    Tablet 40 milliGRAM(s) Oral two times a day    PRN Inpatient Medications      REVIEW OF SYSTEMS  --------------------------------------------------------------------------------  Gen: +ve dizziness, No fevers/chills  Skin: No rashes  Head/Eyes/Ears: Normal hearing,   Respiratory: No dyspnea, cough  CV: No chest pain  GI: +ve abdominal pain and distension   : No dysuria, hematuria  MSK: No  edema  Heme: No easy bruising or bleeding  Psych: No significant depression    All other systems were reviewed and are negative, except as noted.    VITALS/PHYSICAL EXAM  --------------------------------------------------------------------------------  T(C): 36.3 (09-01-20 @ 10:56), Max: 37 (08-31-20 @ 22:50)  HR: 93 (09-01-20 @ 12:02) (93 - 104)  BP: 101/69 (09-01-20 @ 12:02) (96/65 - 119/81)  RR: 17 (09-01-20 @ 12:02) (16 - 18)  SpO2: 94% (09-01-20 @ 12:02) (93% - 102%)  Wt(kg): --  Height (cm): 185.42 (09-01-20 @ 10:34)  Weight (kg): 84.3 (09-01-20 @ 10:34)  BMI (kg/m2): 24.5 (09-01-20 @ 10:34)  BSA (m2): 2.09 (09-01-20 @ 10:34)      08-31-20 @ 07:01  -  09-01-20 @ 07:00  --------------------------------------------------------  IN: 140 mL / OUT: 0 mL / NET: 140 mL    09-01-20 @ 07:01  -  09-01-20 @ 13:34  --------------------------------------------------------  IN: 60 mL / OUT: 0 mL / NET: 60 mL      Physical Exam:  	    LABS/STUDIES  --------------------------------------------------------------------------------              7.0    19.07 >-----------<  85       [09-01-20 @ 05:45]              21.4     131  |  97  |  34  ----------------------------<  86      [09-01-20 @ 05:45]  4.6   |  22  |  8.30        Ca     8.0     [09-01-20 @ 05:45]      Mg     1.9     [09-01-20 @ 05:45]      Phos  5.8     [09-01-20 @ 05:45]    TPro  5.6  /  Alb  2.4  /  TBili  11.8  /  DBili  x   /  AST  91  /  ALT  24  /  AlkPhos  163  [09-01-20 @ 05:45]    PT/INR: PT 24.3 , INR 2.12       [09-01-20 @ 05:45]  PTT: 37.7       [08-31-20 @ 22:04]      Creatinine Trend:  SCr 8.30 [09-01 @ 05:45]  SCr 8.38 [08-31 @ 22:04]  SCr 8.13 [08-15 @ 07:05]  SCr 7.11 [08-14 @ 07:15]  SCr 5.40 [08-13 @ 07:15]    Urinalysis - [08-07-20 @ 04:54]      Color Dark Orange / Appearance Turbid / SG 1.041 / pH 6.0      Gluc Negative / Ketone Negative  / Bili Moderate / Urobili 8 mg/dL       Blood Moderate / Protein 100 mg/dL Test Performed on Urine Supernate. / Leuk Est Negative / Nitrite Negative      RBC 30 / WBC 18 / Hyaline 10 / Gran  / Sq Epi  / Non Sq Epi 7 / Bacteria Negative      Iron 34, TIBC 124, %sat 27      [07-14-20 @ 10:34]  Ferritin 812      [07-12-20 @ 08:58]  PTH -- (Ca 6.2)      [07-29-20 @ 09:18]   223  Lipid: chol --, , HDL --, LDL --      [08-03-20 @ 12:04]    HBsAb 276.9      [08-10-20 @ 23:16]  HBsAb Reactive      [08-12-20 @ 11:42]  HBsAg Nonreact      [08-10-20 @ 23:34]  HBcAb Nonreact      [08-12-20 @ 11:42]  HCV 0.27, Nonreact      [08-10-20 @ 23:16]  HIV Nonreact      [07-15-20 @ 10:47]    SHAUN: titer Negative, pattern --      [07-12-20 @ 09:37]  Syphilis Screen (Treponema Pallidum Ab) Negative      [08-08-20 @ 09:47]  Free Light Chains: kappa 2.35, lambda 2.06, ratio = 1.14      [07-12 @ 08:58] [facility] DIVISION OF KIDNEY DISEASES AND HYPERTENSION -- [phone]  -- INITIAL CONSULT NOTE  --------------------------------------------------------------------------------  If any questions, please feel free to contact me  NS pager: [phone], [facility]: [phone]  Adan Monae M.D.  Nephrology Fellow    (After 5 pm or on weekends please page the on-call fellow)  --------------------------------------------------------------------------------    HPI:  39 y.o. Male w/ PMHx of ESKD on HD MWF,  traumatic bladder rupture 2/2 fall s/p ex-lap in 2019, ETOH cirrhosis c/b gastric varices, recent admission (7/2020) for hemorrhagic shock 2/2 variceal bleeding, acute renal failure requiring HD, and ETOH withdrawal seizure, presented for new onset of GIB, dizziness and fatigue.  -- Patient reports feeling well since discharge, and was able to participate in PT home exercise program, and do light activities at home. He has had small rectal bleeding intermittently, and attributed to known hemorrhoids. However, since yesterday, he was feeling more fatigued, SOB, nauseous and chills, and had 1 episode of dark red blood per rectum before bedtime. Woke up with dark blood in the boxer this AM, and was feeling dizzy.  Denied fever, headache, blurred vision, CP, palpitations, vomiting, hematemesis, dysuria, hematuria, diarrhea/constipation, or BLE pain/edema.    Nephrology consulted for ESKD management.       PAST HISTORY  --------------------------------------------------------------------------------  PAST MEDICAL & SURGICAL HISTORY:  End-stage renal disease (ESRD): On HD MWF  Cirrhosis, alcoholic  S/P exploratory laparotomy: for bladder rupture s/p fall    FAMILY HISTORY:  FH: alpha 1 antitrypsin deficiency    PAST SOCIAL HISTORY:    ALLERGIES & MEDICATIONS  --------------------------------------------------------------------------------  Allergies    No Known Allergies    Intolerances      Standing Inpatient Medications  cefTRIAXone   IVPB      folic acid 1 milliGRAM(s) Oral daily  midodrine. 10 milliGRAM(s) Oral three times a day  multivitamin 1 Tablet(s) Oral daily  pantoprazole    Tablet 40 milliGRAM(s) Oral two times a day    PRN Inpatient Medications      REVIEW OF SYSTEMS  --------------------------------------------------------------------------------  Gen: +ve dizziness, No fevers/chills  Skin: No rashes  Head/Eyes/Ears: Normal hearing,   Respiratory: No dyspnea, cough  CV: No chest pain  GI: +ve abdominal pain and distension   : No dysuria, hematuria  MSK: No  edema  Heme: No easy bruising or bleeding  Psych: No significant depression    All other systems were reviewed and are negative, except as noted.    VITALS/PHYSICAL EXAM  --------------------------------------------------------------------------------  T(C): 36.3 (09-01-20 @ 10:56), Max: 37 (08-31-20 @ 22:50)  HR: 93 (09-01-20 @ 12:02) (93 - 104)  BP: 101/69 (09-01-20 @ 12:02) (96/65 - 119/81)  RR: 17 (09-01-20 @ 12:02) (16 - 18)  SpO2: 94% (09-01-20 @ 12:02) (93% - 102%)  Wt(kg): --  Height (cm): 185.42 (09-01-20 @ 10:34)  Weight (kg): 84.3 (09-01-20 @ 10:34)  BMI (kg/m2): 24.5 (09-01-20 @ 10:34)  BSA (m2): 2.09 (09-01-20 @ 10:34)      08-31-20 @ 07:01  -  09-01-20 @ 07:00  --------------------------------------------------------  IN: 140 mL / OUT: 0 mL / NET: 140 mL    09-01-20 @ 07:01  -  09-01-20 @ 13:34  --------------------------------------------------------  IN: 60 mL / OUT: 0 mL / NET: 60 mL      Physical Exam:  GENERAL:  NAD, eating and seating comfortably   HEENT:  NC/AT, icteric sclera   CHEST:  CTA B/L, No wheezing   HEART:  RRR S1/S2, no murmur  ABDOMEN:  Soft, distended, ascites, NT-  no masses   EXTREMITIES:  No cyanosis or Edema  SKIN: jaundice - no rash   NEURO:  Alert, oriented x3  Vascular access: right permcath     LABS/STUDIES  --------------------------------------------------------------------------------              7.0    19.07 >-----------<  85       [09-01-20 @ 05:45]              21.4     131  |  97  |  34  ----------------------------<  86      [09-01-20 @ 05:45]  4.6   |  22  |  8.30        Ca     8.0     [09-01-20 @ 05:45]      Mg     1.9     [09-01-20 @ 05:45]      Phos  5.8     [09-01-20 @ 05:45]    TPro  5.6  /  Alb  2.4  /  TBili  11.8  /  DBili  x   /  AST  91  /  ALT  24  /  AlkPhos  163  [09-01-20 @ 05:45]    PT/INR: PT 24.3 , INR 2.12       [09-01-20 @ 05:45]  PTT: 37.7       [08-31-20 @ 22:04]      Creatinine Trend:  SCr 8.30 [09-01 @ 05:45]  SCr 8.38 [08-31 @ 22:04]  SCr 8.13 [08-15 @ 07:05]  SCr 7.11 [08-14 @ 07:15]  SCr 5.40 [08-13 @ 07:15]    Urinalysis - [08-07-20 @ 04:54]      Color Dark Orange / Appearance Turbid / SG 1.041 / pH 6.0      Gluc Negative / Ketone Negative  / Bili Moderate / Urobili 8 mg/dL       Blood Moderate / Protein 100 mg/dL Test Performed on Urine Supernate. / Leuk Est Negative / Nitrite Negative      RBC 30 / WBC 18 / Hyaline 10 / Gran  / Sq Epi  / Non Sq Epi 7 / Bacteria Negative      Iron 34, TIBC 124, %sat 27      [07-14-20 @ 10:34]  Ferritin 812      [07-12-20 @ 08:58]  PTH -- (Ca 6.2)      [07-29-20 @ 09:18]   223  Lipid: chol --, , HDL --, LDL --      [08-03-20 @ 12:04]    HBsAb 276.9      [08-10-20 @ 23:16]  HBsAb Reactive      [08-12-20 @ 11:42]  HBsAg Nonreact      [08-10-20 @ 23:34]  HBcAb Nonreact      [08-12-20 @ 11:42]  HCV 0.27, Nonreact      [08-10-20 @ 23:16]  HIV Nonreact      [07-15-20 @ 10:47]    SHAUN: titer Negative, pattern --      [07-12-20 @ 09:37]  Syphilis Screen (Treponema Pallidum Ab) Negative      [08-08-20 @ 09:47]  Free Light Chains: kappa 2.35, lambda 2.06, ratio = 1.14      [07-12 @ 08:58]

## 2020-09-01 NOTE — PROGRESS NOTE ADULT - ATTENDING COMMENTS
Seen, examined the patient this afternoon  Back from EGD for suspected upper GIB, c/o abdominal distension, no fever or diarrhea but tarry stool  /73,   1. UGI bleed, likely variceal/portal gastropathy-    Reviewed labs- H/H 7/21, WBC 19 (chronically elevated), Plt 85    GI consult appreciated    NPO, PPI gtt, octreotide gtt, awaiting on EGD per GI this am    CBC q 6 hrs, PRBC transfusion to Hb >8  2. Decompensated ETOH cirrhosis and ascites-     US abd- moderate to large ascites, low suspicion for SBP     spoke to IR ([id number]), will arrange paracentesis- diagnostic & therapeutic tomorrow     Vit K 5mg po x 1 for INR 2.12     SBP ppx- IV ceftriaxone     Hepatology f/u Seen, examined the patient this afternoon  In GI suit now, for EGD due to GIB, c/o abdominal distension, no fever or diarrhea but tarry stool  /73,   1. UGI bleed, likely variceal/portal gastropathy-    Reviewed labs- H/H 7/21, WBC 19 (chronically elevated), Plt 85    GI consult appreciated    NPO, PPI gtt, octreotide gtt, awaiting on EGD per GI this am    CBC q 6 hrs, PRBC transfusion to Hb >8  2. Decompensated ETOH cirrhosis and ascites-     US abd- moderate to large ascites, low suspicion for SBP     spoke to IR ([id number]), will arrange paracentesis- diagnostic & therapeutic tomorrow     Vit K 5mg po x 1 for INR 2.12     SBP ppx- IV ceftriaxone     Hepatology f/u  3. ESRD- HD per renal

## 2020-09-01 NOTE — CONSULT NOTE ADULT - SUBJECTIVE AND OBJECTIVE BOX
----------------------------------------------------------  Interventional Radiology Brief Consult Note  -----------------------------------------------------------    Reason for Referral:  Paracentesis    Clinical Summary: 39 y.o. Male w/ PMHx of ESKD on HD MWF,  traumatic bladder rupture 2/2 fall s/p ex-lap in 2019, ETOH cirrhosis c/b gastric varices, recent admission (7/2020) for hemorrhagic shock 2/2 variceal bleeding, acute renal failure requiring HD, and ETOH withdrawal seizure, presented for new onset of GIB, dizziness and fatigue.  -- Patient reports feeling well since discharge, and was able to participate in PT home exercise program, and do light activities at home. He has had small rectal bleeding intermittently, and attributed to known hemorrhoids. However, since yesterday, he was feeling more fatigued, SOB, nauseous and chills, and had 1 episode of dark red blood per rectum before bedtime.       Vitals:  T(C): 36.3 (09-01-20 @ 10:56), Max: 37 (08-31-20 @ 22:50)  HR: 93 (09-01-20 @ 12:02) (93 - 104)  BP: 101/69 (09-01-20 @ 12:02) (96/65 - 119/81)  RR: 17 (09-01-20 @ 12:02) (16 - 18)  SpO2: 94% (09-01-20 @ 12:02) (93% - 102%)    Labs:           7.0  19.07)-----(85     (09-01-20 @ 05:45)         21.4     131 | 97 | 34  --------------------< 86     (09-01-20 @ 05:45)  4.6 | 22 | 8.30       PT: 24.3<H> 09-01-20 @ 05:45  aPTT: -- 09-01-20 @ 05:45   INR: 2.12<H> 09-01-20 @ 05:45    Assessment: 39y Male with Alcoholic liver cirrhosis and renal failure on HD.     Recommendations:  - Paracentesis 9/3/2020  - D/w Dr. Cohn

## 2020-09-01 NOTE — H&P ADULT - ATTENDING COMMENTS
EtOH cirrhosis, recent hemorrhagic shock 2/2 variceal bleeding, ARF now on HD p/w recurrent GIB and acute blood loss anemia  - transfuse prn, goal Hb 8  - PPI IV, Octreotide and Ctx  - GI eval in am  - Renal eval for HD in am, missed session with IV dye load

## 2020-09-01 NOTE — H&P ADULT - PROBLEM SELECTOR PLAN 1
Slow variceal bleed vs. hemorrhoidal bleed  - Hgb 6.2 on admission, baseline 8-9 last admission.   - S/p 1 U PRBC, goal Hgb 7-8 for portal hypertension  - Hepatology consult emailed  - Protonix 80 mg IVP x 1, then PPI gtt   - NPO except meds, pending decision for EGD +/- colonoscopy  - Keep active T&S  - S/p 34 u PRBC, 25 u FFP, 5 u PLT and 2 u cryo and s/p EGD and IR embolization, [place] balloon, and MICU admission last admission. Slow variceal bleed vs. hemorrhoidal bleed  - Hgb 6.2 on admission, baseline 8-9 last admission.   - S/p 1 U PRBC, goal Hgb 7-8 for portal hypertension  - Hepatology consult emailed  - Protonix 80 mg IVP x 1, then PPI gtt; start octreotide gtt  - NPO except meds, pending decision for EGD +/- colonoscopy  - Keep active T&S  - S/p 34 u PRBC, 25 u FFP, 5 u PLT and 2 u cryo and s/p EGD and IR embolization, [place] balloon, and MICU admission last admission.

## 2020-09-01 NOTE — H&P ADULT - NSHPREVIEWOFSYSTEMS_GEN_ALL_CORE
REVIEW OF SYSTEMS:    CONSTITUTIONAL: No fevers, (+) chills  EYES/ENT: No visual changes;  No vertigo or throat pain   NECK: No pain or stiffness  RESPIRATORY: No cough, wheezing, hemoptysis; (+) shortness of breath  CARDIOVASCULAR: No chest pain or palpitations, No BLE edema  GASTROINTESTINAL: (+) abdominal pain, nausea; No vomiting, or hematemesis; No diarrhea or constipation. (+) dark red rectal bleeding  GENITOURINARY: No dysuria, frequency or hematuria  NEUROLOGICAL: (+) RLE numbness and weakness since last admission  SKIN: No itching, rashes

## 2020-09-01 NOTE — CONSULT NOTE ADULT - ASSESSMENT
note in progress..    39 y.o. Male w/ PMHx of ESKD on HD MWF,  traumatic bladder rupture 2/2 fall s/p ex-lap in 2019, ETOH cirrhosis c/b gastric varices, recent admission (7/2020) for hemorrhagic shock 2/2 variceal bleeding, acute renal failure requiring HD, and ETOH withdrawal seizure, presented for new onset of GIB, dizziness and fatigue.    Nephrology consulted for ESKD. 39 y.o. Male w/ PMHx of ESKD on HD MWF,  traumatic bladder rupture 2/2 fall s/p ex-lap in 2019, ETOH cirrhosis c/b gastric varices, recent admission (7/2020) for hemorrhagic shock 2/2 variceal bleeding, acute renal failure requiring HD, and ETOH withdrawal seizure, presented for new onset of GIB, dizziness and fatigue.    Nephrology consulted for ESKD.

## 2020-09-01 NOTE — CONSULT NOTE ADULT - PROBLEM SELECTOR RECOMMENDATION 9
Pt. with ESRD on HD treatments three times a week (MWF). Last HD was on Friday 8/28 via Right PermCath. Pt. clinically stable at this time, s/p EGD. Labs reviewed. noted for Anemia s/p PRBC - Will arrange for maintenance HD today. Monitor labs. Adjust medications to ESKD

## 2020-09-01 NOTE — H&P ADULT - HISTORY OF PRESENT ILLNESS
39M w/ ETOH cirrhosis c/b gastric varices, recent admission (7/2020) for hemorrhagic shock 2/2 variceal bleeding, acute renal failure requiring new HD, and ETOH withdrawal seizure, traumatic bladder rupture 2/2 fall s/p ex-lap in 2019, presented for new onset of GIB, dizziness and fatigue.   Patient reports feeling well since discharge, and was able to participate in PT home exercise program, and do light activities at home. He has had small rectal bleeding intermittently, and attributed to known hemorrhoids. However, since yesterday, he was feeling more fatigued, SOB, nauseous and chills, and had 1 episode of dark red blood per rectum before bedtime. Woke up with dark blood in the boxer this AM, and was feeling dizzy. No BM since the AM. Brought self to the hospital.   Reports having ok appetite, but only able to eat small meals given abdominal discomfort. (+) weight loss 25 lbs since discharge. Abdomen has decreased in girth since last admission, but was more distended since yesterday. Today, he had epigastric pain radiating down to RLQ, dull-achy, and stretchy pain with intermittent sharp sensation.   Denied fever, headache, blurred vision, CP, palpitations, vomiting, hematemesis, dysuria, hematuria, diarrhea/constipation, or BLE pain/edema.    Going on MWF HD schedule via [facility], PermaCath placed last admission. Still making minimal urine. (+) hypotension associated with HD, and taking midodrine.   No ETOH use since 7/4/20, no seizures, hallucination, or tremors since discharge. He is in the process of evaluation for liver and kidney transplant, but has not found a hepatologist in [facility] yet. Was told to stop Rifaximin, and no longer on thiamine. 39M w/ ETOH cirrhosis c/b gastric varices, recent admission (7/2020) for hemorrhagic shock 2/2 variceal bleeding, acute renal failure requiring new HD, and ETOH withdrawal seizure, traumatic bladder rupture 2/2 fall s/p ex-lap in 2019, presented for new onset of GIB, dizziness and fatigue.   Patient reports feeling well since discharge, and was able to participate in PT home exercise program, and do light activities at home. He has had small rectal bleeding intermittently, and attributed to known hemorrhoids. However, since yesterday, he was feeling more fatigued, SOB, nauseous and chills, and had 1 episode of dark red blood per rectum before bedtime. Woke up with dark blood in the boxer this AM, and was feeling dizzy. No BM since the AM. Brought self to the hospital.   Reports having ok appetite, but only able to eat small meals given abdominal discomfort. (+) weight loss 25 lbs since discharge. Abdomen has decreased in girth since last admission, but was more distended since yesterday. Today, he had epigastric pain radiating down to RLQ, dull-achy, and stretchy pain with intermittent sharp sensation.   Denied fever, headache, blurred vision, CP, palpitations, vomiting, hematemesis, dysuria, hematuria, diarrhea/constipation, or BLE pain/edema.    Going on MWF HD schedule via [facility], PermaCath placed last admission. Still making minimal urine. (+) hypotension associated with HD, and taking midodrine.   No ETOH use since 7/4/20, no seizures, hallucination, or tremors since discharge. He is in the process of evaluation for liver and kidney transplant, but has not found a hepatologist in [facility] yet. Was told to stop Rifaximin, and no longer on thiamine.     In ED: T 98F; ; /73; RR 18; SaO2 95% on RA; Lab (+) for WBC 18.8, hgb 6.2, plt 83; INR 2.24; Na 131, Cr 8.38; T bili 11.3, , AST 88. S/p 1u PRBC and 4 mg IV morphine.

## 2020-09-01 NOTE — CONSULT NOTE ADULT - PROBLEM SELECTOR RECOMMENDATION 2
Patient with anemia in the setting of ESRD/liver disease/GIB bleed. Hemoglobin below target range. s/p PRBC transfusion.     patient to be transfuse during HD   Monitor hemoglobin

## 2020-09-01 NOTE — PROGRESS NOTE ADULT - SUBJECTIVE AND OBJECTIVE BOX
Yuriy Samayoa, PGY-1    CHIEF COMPLAINT: Patient is a 39y old  Male who presents with a chief complaint of GIB (01 Sep 2020 09:35)      INTERVAL HPI/OVERNIGHT EVENTS: No acute overnight events. Patient states he has been having dark stools that are loose and appear like blood. He says the stool and dark blood appear separately in the bowl. He states his abdomen has been growing consistently over the past few days and he is having some right sided flank pain. Will consult for HD today. He went for endoscopy this morning by hepatology.     MEDICATIONS (STANDING):  cefTRIAXone   IVPB      folic acid 1 milliGRAM(s) Oral daily  midodrine. 10 milliGRAM(s) Oral three times a day  multivitamin 1 Tablet(s) Oral daily  octreotide  Infusion 50 MICROgram(s)/Hr IV Continuous <Continuous>  pantoprazole Infusion 8 mG/Hr IV Continuous <Continuous>    MEDICATIONS  (PRN):      REVIEW OF SYSTEMS:  CONSTITUTIONAL: No fever  EYES: No eye pain  ENMT: No sinus or throat pain  NECK: No pain  RESPIRATORY: No shortness of breath  CARDIOVASCULAR: No chest pain, dizziness  GASTROINTESTINAL: No abdominal or epigastric pain. No diarrhea or constipation. No melena or hematochezia.  GENITOURINARY: No dysuria, hematuria  NEUROLOGICAL: No headaches  SKIN: No itching, burning, rashes, or lesions   MUSCULOSKELETAL: No muscle or joint pain    T(F): 97.3 (09-01-20 @ 10:56), Max: 98.6 (08-31-20 @ 22:50)  HR: 104 (09-01-20 @ 04:20) (100 - 104)  BP: 105/79 (09-01-20 @ 10:56) (105/79 - 119/81)  RR: 18 (09-01-20 @ 04:20) (16 - 18)  SpO2: 102% (09-01-20 @ 10:56) (93% - 102%)  Wt(kg): --  CAPILLARY BLOOD GLUCOSE        I&O's Summary    31 Aug 2020 07:01  -  01 Sep 2020 07:00  --------------------------------------------------------  IN: 140 mL / OUT: 0 mL / NET: 140 mL    01 Sep 2020 07:01  -  01 Sep 2020 11:03  --------------------------------------------------------  IN: 60 mL / OUT: 0 mL / NET: 60 mL        PHYSICAL EXAM:  GENERAL: NAD, well-groomed, well-developed  HEAD:  Atraumatic, Normocephalic  EYES: PERRLA, conjunctiva and sclera clear  ENMT: No tonsillar erythema, exudates, or enlargement; Moist mucous membranes  NECK: Supple  NERVOUS SYSTEM:  Alert & Oriented X3, Good concentration; Motor Strength 5/5 B/L upper and lower extremities  CHEST/LUNG: Clear to auscultation bilaterally; No rales, rhonchi, wheezing, or rubs  HEART: Regular rate and rhythm; No murmurs, rubs, or gallops  ABDOMEN: Soft, Nontender, Nondistended; Bowel sounds present  EXTREMITIES:  2+ Peripheral Pulses, No edema  SKIN: No rashes or lesions    LABS:                        7.0    19.07 )-----------( 85       ( 01 Sep 2020 05:45 )             21.4     09-01    131<L>  |  97  |  34<H>  ----------------------------<  86  4.6   |  22  |  8.30<H>    Ca    8.0<L>      01 Sep 2020 05:45  Phos  5.8     09-01  Mg     1.9     09-01    TPro  5.6<L>  /  Alb  2.4<L>  /  TBili  11.8<H>  /  DBili  x   /  AST  91<H>  /  ALT  24  /  AlkPhos  163<H>  09-01    PT/INR - ( 01 Sep 2020 05:45 )   PT: 24.3 sec;   INR: 2.12 ratio         PTT - ( 31 Aug 2020 22:04 )  PTT:37.7 sec        RADIOLOGY & ADDITIONAL TESTS: Yuriy Samayoa, PGY-1    CHIEF COMPLAINT: Patient is a 39y old  Male who presents with a chief complaint of GIB (01 Sep 2020 09:35)      INTERVAL HPI/OVERNIGHT EVENTS: No acute overnight events. Patient states he has been having dark stools that are loose and appear like blood. He says the stool and dark blood appear separately in the bowl. He states his abdomen has been growing consistently over the past few days and he is having some right sided flank pain. Will consult for HD today. He went for endoscopy this morning by hepatology with no source of bleed identified. Colonoscopy scheduled for tomorrow.     MEDICATIONS (STANDING):  cefTRIAXone   IVPB      folic acid 1 milliGRAM(s) Oral daily  midodrine. 10 milliGRAM(s) Oral three times a day  multivitamin 1 Tablet(s) Oral daily  octreotide  Infusion 50 MICROgram(s)/Hr IV Continuous <Continuous>  pantoprazole Infusion 8 mG/Hr IV Continuous <Continuous>    MEDICATIONS  (PRN):      REVIEW OF SYSTEMS:  CONSTITUTIONAL: No fever  EYES: No eye pain  ENMT: No sinus or throat pain  NECK: No pain  RESPIRATORY: No shortness of breath  CARDIOVASCULAR: No chest pain, dizziness  GASTROINTESTINAL: No abdominal or epigastric pain. No diarrhea or constipation. No melena or hematochezia.  GENITOURINARY: No dysuria, hematuria  NEUROLOGICAL: No headaches  SKIN: No itching, burning, rashes, or lesions   MUSCULOSKELETAL: No muscle or joint pain    T(F): 97.3 (09-01-20 @ 10:56), Max: 98.6 (08-31-20 @ 22:50)  HR: 104 (09-01-20 @ 04:20) (100 - 104)  BP: 105/79 (09-01-20 @ 10:56) (105/79 - 119/81)  RR: 18 (09-01-20 @ 04:20) (16 - 18)  SpO2: 102% (09-01-20 @ 10:56) (93% - 102%)  Wt(kg): --  CAPILLARY BLOOD GLUCOSE        I&O's Summary    31 Aug 2020 07:01  -  01 Sep 2020 07:00  --------------------------------------------------------  IN: 140 mL / OUT: 0 mL / NET: 140 mL    01 Sep 2020 07:01  -  01 Sep 2020 11:03  --------------------------------------------------------  IN: 60 mL / OUT: 0 mL / NET: 60 mL        PHYSICAL EXAM:  GENERAL: NAD, well-groomed, well-developed  HEAD:  Atraumatic, Normocephalic  EYES: PERRLA, conjunctiva and sclera clear  ENMT: No tonsillar erythema, exudates, or enlargement; Moist mucous membranes  NECK: Supple  NERVOUS SYSTEM:  Alert & Oriented X3, Good concentration; Motor Strength 5/5 B/L upper and lower extremities  CHEST/LUNG: Clear to auscultation bilaterally; No rales, rhonchi, wheezing, or rubs  HEART: Regular rate and rhythm; No murmurs, rubs, or gallops  ABDOMEN: Soft, Nontender, Nondistended; Bowel sounds present  EXTREMITIES:  2+ Peripheral Pulses, No edema  SKIN: No rashes or lesions    LABS:                        7.0    19.07 )-----------( 85       ( 01 Sep 2020 05:45 )             21.4     09-01    131<L>  |  97  |  34<H>  ----------------------------<  86  4.6   |  22  |  8.30<H>    Ca    8.0<L>      01 Sep 2020 05:45  Phos  5.8     09-01  Mg     1.9     09-01    TPro  5.6<L>  /  Alb  2.4<L>  /  TBili  11.8<H>  /  DBili  x   /  AST  91<H>  /  ALT  24  /  AlkPhos  163<H>  09-01    PT/INR - ( 01 Sep 2020 05:45 )   PT: 24.3 sec;   INR: 2.12 ratio         PTT - ( 31 Aug 2020 22:04 )  PTT:37.7 sec        RADIOLOGY & ADDITIONAL TESTS:

## 2020-09-01 NOTE — CHART NOTE - NSCHARTNOTEFT_GEN_A_CORE
Pt came back from dialysis at 11pm. Started colonoscopy prep at 11pm. Pt came back from dialysis at 11pm. Started colonoscopy prep at 11pm.    Pt c/o abdominal pain in epigastric region worse on deep breathing, right flank area, and on left lateral side. Pt was given 1 dose tylenol 650mg PO.

## 2020-09-01 NOTE — H&P ADULT - PROBLEM SELECTOR PLAN 5
1. Name of PCP: None  2. PCP contacted on admission: [  ] Y   [  ] N  3. PCP contacted at Discharge: [  ] Y   [  ] N  4. Post-Discharge Appointment Date and Location:   5 Summary of Handoff given to PCP:

## 2020-09-01 NOTE — CONSULT NOTE ADULT - ATTENDING COMMENTS
Patient seen and discussed with the fellow.    Presenting with 2-3 days of melena, last time yesterday around noon.   History of PARTO and glue injection about 1 month ago.  Synthetic function of the liver improving.  Continue octreotide/protonix drips + ceftriaxone.  EGD today.

## 2020-09-02 NOTE — PROVIDER CONTACT NOTE (OTHER) - ASSESSMENT
Pt calm, no c/o SOB, no respiratory distress, Yellow skin , abd distension,  currently prepping for colonoscopy

## 2020-09-02 NOTE — PROGRESS NOTE ADULT - PROBLEM SELECTOR PLAN 5
Flu (High dose) vaccine given as ordered. Two patient identifiers used, allergies reviewed, & vaccine verified. Administered to right deltoid. Pt tolerated injection well; no swelling, redness, or bruising noted at injection site. Pt advised to remain in clinic 15 mins following injection for observation.   1. Name of PCP: None  2. PCP contacted on admission: [  ] Y   [  ] N  3. PCP contacted at Discharge: [  ] Y   [  ] N  4. Post-Discharge Appointment Date and Location:   5 Summary of Handoff given to PCP:

## 2020-09-02 NOTE — PROGRESS NOTE ADULT - SUBJECTIVE AND OBJECTIVE BOX
Yuriy Samayoa, PGY-1    CHIEF COMPLAINT: Patient is a 39y old  Male who presents with a chief complaint of GIB (01 Sep 2020 15:22)      INTERVAL HPI/OVERNIGHT EVENTS: Patient had mild shortness of breath overnight. Saturation decreased to high 80's but returned to baseline with 2L NC.      MEDICATIONS (STANDING):  cefTRIAXone   IVPB      cefTRIAXone   IVPB 1000 milliGRAM(s) IV Intermittent every 24 hours  folic acid 1 milliGRAM(s) Oral daily  midodrine. 10 milliGRAM(s) Oral three times a day  multivitamin 1 Tablet(s) Oral daily  pantoprazole    Tablet 40 milliGRAM(s) Oral two times a day    MEDICATIONS  (PRN):      REVIEW OF SYSTEMS:  CONSTITUTIONAL: No fever  EYES: No eye pain  ENMT: No sinus or throat pain  NECK: No pain  RESPIRATORY: No shortness of breath  CARDIOVASCULAR: No chest pain, dizziness  GASTROINTESTINAL: No abdominal or epigastric pain. No diarrhea or constipation. No melena or hematochezia.  GENITOURINARY: No dysuria, hematuria  NEUROLOGICAL: No headaches  SKIN: No itching, burning, rashes, or lesions   MUSCULOSKELETAL: No muscle or joint pain    T(F): 98.1 (09-02-20 @ 05:13), Max: 98.2 (09-01-20 @ 22:37)  HR: 93 (09-02-20 @ 05:13) (93 - 108)  BP: 101/64 (09-02-20 @ 05:13) (96/65 - 123/66)  RR: 18 (09-02-20 @ 05:13) (16 - 20)  SpO2: 93% (09-02-20 @ 05:13) (93% - 102%)  Wt(kg): --  CAPILLARY BLOOD GLUCOSE        I&O's Summary    01 Sep 2020 07:01  -  02 Sep 2020 07:00  --------------------------------------------------------  IN: 1610 mL / OUT: 2800 mL / NET: -1190 mL        PHYSICAL EXAM:  GENERAL: NAD, well-groomed, well-developed  HEAD:  Atraumatic, Normocephalic  EYES: PERRLA, conjunctiva and sclera clear  ENMT: No tonsillar erythema, exudates, or enlargement; Moist mucous membranes  NECK: Supple  NERVOUS SYSTEM:  Alert & Oriented X3, Good concentration; Motor Strength 5/5 B/L upper and lower extremities  CHEST/LUNG: Clear to auscultation bilaterally; No rales, rhonchi, wheezing, or rubs  HEART: Regular rate and rhythm; No murmurs, rubs, or gallops  ABDOMEN: Soft, Nontender, Nondistended; Bowel sounds present  EXTREMITIES:  2+ Peripheral Pulses, No edema  SKIN: No rashes or lesions    LABS:                        8.2    15.35 )-----------( 76       ( 02 Sep 2020 07:07 )             24.1     09-01    131<L>  |  97  |  34<H>  ----------------------------<  86  4.6   |  22  |  8.30<H>    Ca    8.0<L>      01 Sep 2020 05:45  Phos  5.8     09-01  Mg     1.9     09-01  Pro  5.6<L>  /  Alb  2.4<L>  /  TBili  11.8<H>  /  DBili  x   /  AST  91<H>  /  ALT  24  /  AlkPhos  163<H>  09-01    PT/INR - ( 02 Sep 2020 07:05 )   PT: 26.2 sec;   INR: 2.29 ratio         PTT - ( 02 Sep 2020 07:05 )  PTT:41.6 sec        RADIOLOGY & ADDITIONAL TESTS:  < from: Upper Endoscopy (09.01.20 @ 10:46) >  Impression:          - Non-bleeding grade I esophageal varices.                       - Portal hypertensive gastropathy. Source of bleeding ?       - Normal examined duodenum.                       - No specimens collected.  Recommendation:      - Return patient to [facility] for ongoing care.                       - Clear liquid diet today.                       - Perform a colonoscopy tomorrow.                       - Stop octreotide                       - Switch PPI to 40 mg po daily                       - Continue IV ceftriaxone    < end of copied text > Yuriy Samayoa, PGY-1    CHIEF COMPLAINT: Patient is a 39y old  Male who presents with a chief complaint of GIB (01 Sep 2020 15:22)      INTERVAL HPI/OVERNIGHT EVENTS: Patient had mild shortness of breath overnight. Saturation decreased to high 80's but returned to baseline with 2L NC. He admites to an occasional cough. Lungs CTA, cap refill <2 sec.       MEDICATIONS (STANDING):  cefTRIAXone   IVPB      cefTRIAXone   IVPB 1000 milliGRAM(s) IV Intermittent every 24 hours  folic acid 1 milliGRAM(s) Oral daily  midodrine. 10 milliGRAM(s) Oral three times a day  multivitamin 1 Tablet(s) Oral daily  pantoprazole    Tablet 40 milliGRAM(s) Oral two times a day    MEDICATIONS  (PRN):      REVIEW OF SYSTEMS:  CONSTITUTIONAL: No fever  EYES: No eye pain  ENMT: No sinus or throat pain  NECK: No pain  RESPIRATORY: No shortness of breath currently, mild SOB last night, occasional cough  CARDIOVASCULAR: No chest pain, dizziness  GASTROINTESTINAL: No abdominal or epigastric pain. No diarrhea or constipation. No melena or hematochezia.  GENITOURINARY: No dysuria, hematuria  NEUROLOGICAL: No headaches  SKIN: No itching, burning, rashes, or lesions   MUSCULOSKELETAL: No muscle or joint pain    T(F): 98.1 (09-02-20 @ 05:13), Max: 98.2 (09-01-20 @ 22:37)  HR: 93 (09-02-20 @ 05:13) (93 - 108)  BP: 101/64 (09-02-20 @ 05:13) (96/65 - 123/66)  RR: 18 (09-02-20 @ 05:13) (16 - 20)  SpO2: 93% (09-02-20 @ 05:13) (93% - 102%)  Wt(kg): --  CAPILLARY BLOOD GLUCOSE        I&O's Summary    01 Sep 2020 07:01  -  02 Sep 2020 07:00  --------------------------------------------------------  IN: 1610 mL / OUT: 2800 mL / NET: -1190 mL        PHYSICAL EXAM:  GENERAL: NAD, well-groomed, well-developed  HEAD:  Atraumatic, Normocephalic  EYES: PERRLA, conjunctiva and sclera clear  ENMT: No tonsillar erythema, exudates, or enlargement; Moist mucous membranes  NECK: Supple  NERVOUS SYSTEM:  Alert & Oriented X3, Good concentration; Motor Strength 5/5 B/L upper and lower extremities  CHEST/LUNG: Clear to auscultation bilaterally; No rales, rhonchi, wheezing, or rubs  HEART: Regular rate and rhythm; No murmurs, rubs, or gallops  ABDOMEN: Soft, Nontender, Nondistended; Bowel sounds present  EXTREMITIES:  2+ Peripheral Pulses, No edema  SKIN: No rashes or lesions    LABS:                        8.2    15.35 )-----------( 76       ( 02 Sep 2020 07:07 )             24.1     09-01    131<L>  |  97  |  34<H>  ----------------------------<  86  4.6   |  22  |  8.30<H>    Ca    8.0<L>      01 Sep 2020 05:45  Phos  5.8     09-01  Mg     1.9     09-01  Pro  5.6<L>  /  Alb  2.4<L>  /  TBili  11.8<H>  /  DBili  x   /  AST  91<H>  /  ALT  24  /  AlkPhos  163<H>  09-01    PT/INR - ( 02 Sep 2020 07:05 )   PT: 26.2 sec;   INR: 2.29 ratio         PTT - ( 02 Sep 2020 07:05 )  PTT:41.6 sec        RADIOLOGY & ADDITIONAL TESTS:  < from: Upper Endoscopy (09.01.20 @ 10:46) >  Impression:          - Non-bleeding grade I esophageal varices.                       - Portal hypertensive gastropathy. Source of bleeding ?       - Normal examined duodenum.                       - No specimens collected.  Recommendation:      - Return patient to [facility] for [facility] care.                       - Clear liquid diet today.                       - Perform a colonoscopy tomorrow.                       - Stop octreotide                       - Switch PPI to 40 mg po daily                       - Continue IV ceftriaxone    < end of copied text > Yuriy Samayoa, PGY-1    CHIEF COMPLAINT: Patient is a 39y old  Male who presents with a chief complaint of GIB (01 Sep 2020 15:22)      INTERVAL HPI/OVERNIGHT EVENTS: Patient had mild shortness of breath overnight. Saturation decreased to high 80's but returned to baseline with 2L NC. He admites to an occasional cough. Lungs CTA, cap refill <2 sec. This morning he noticed bright red blood in his stool (aprox 10cc as per patient      MEDICATIONS (STANDING):  cefTRIAXone   IVPB      cefTRIAXone   IVPB 1000 milliGRAM(s) IV Intermittent every 24 hours  folic acid 1 milliGRAM(s) Oral daily  midodrine. 10 milliGRAM(s) Oral three times a day  multivitamin 1 Tablet(s) Oral daily  pantoprazole    Tablet 40 milliGRAM(s) Oral two times a day    MEDICATIONS  (PRN):      REVIEW OF SYSTEMS:  CONSTITUTIONAL: No fever  EYES: No eye pain  ENMT: No sinus or throat pain  NECK: No pain  RESPIRATORY: No shortness of breath currently, mild SOB last night, occasional cough  CARDIOVASCULAR: No chest pain, dizziness  GASTROINTESTINAL: No abdominal or epigastric pain. No diarrhea or constipation. No melena or hematochezia.  GENITOURINARY: No dysuria, hematuria  NEUROLOGICAL: No headaches  SKIN: No itching, burning, rashes, or lesions   MUSCULOSKELETAL: No muscle or joint pain    T(F): 98.1 (09-02-20 @ 05:13), Max: 98.2 (09-01-20 @ 22:37)  HR: 93 (09-02-20 @ 05:13) (93 - 108)  BP: 101/64 (09-02-20 @ 05:13) (96/65 - 123/66)  RR: 18 (09-02-20 @ 05:13) (16 - 20)  SpO2: 93% (09-02-20 @ 05:13) (93% - 102%)  Wt(kg): --  CAPILLARY BLOOD GLUCOSE        I&O's Summary    01 Sep 2020 07:01  -  02 Sep 2020 07:00  --------------------------------------------------------  IN: 1610 mL / OUT: 2800 mL / NET: -1190 mL        PHYSICAL EXAM:  GENERAL: NAD, well-groomed, well-developed  HEAD:  Atraumatic, Normocephalic  EYES: PERRLA, conjunctiva and sclera clear  ENMT: No tonsillar erythema, exudates, or enlargement; Moist mucous membranes  NECK: Supple  NERVOUS SYSTEM:  Alert & Oriented X3, Good concentration; Motor Strength 5/5 B/L upper and lower extremities  CHEST/LUNG: Clear to auscultation bilaterally; No rales, rhonchi, wheezing, or rubs  HEART: Regular rate and rhythm; No murmurs, rubs, or gallops  ABDOMEN: Soft, Nontender, Nondistended; Bowel sounds present  EXTREMITIES:  2+ Peripheral Pulses, No edema  SKIN: No rashes or lesions    LABS:                        8.2    15.35 )-----------( 76       ( 02 Sep 2020 07:07 )             24.1     09-01    131<L>  |  97  |  34<H>  ----------------------------<  86  4.6   |  22  |  8.30<H>    Ca    8.0<L>      01 Sep 2020 05:45  Phos  5.8     09-01  Mg     1.9     09-01  Pro  5.6<L>  /  Alb  2.4<L>  /  TBili  11.8<H>  /  DBili  x   /  AST  91<H>  /  ALT  24  /  AlkPhos  163<H>  09-01    PT/INR - ( 02 Sep 2020 07:05 )   PT: 26.2 sec;   INR: 2.29 ratio         PTT - ( 02 Sep 2020 07:05 )  PTT:41.6 sec        RADIOLOGY & ADDITIONAL TESTS:  < from: Upper Endoscopy (09.01.20 @ 10:46) >  Impression:          - Non-bleeding grade I esophageal varices.                       - Portal hypertensive gastropathy. Source of bleeding ?       - Normal examined duodenum.                       - No specimens collected.  Recommendation:      - Return patient to [facility] for [facility] care.                       - Clear liquid diet today.                       - Perform a colonoscopy tomorrow.                       - Stop octreotide                       - Switch PPI to 40 mg po daily                       - Continue IV ceftriaxone    < end of copied text >

## 2020-09-02 NOTE — PROGRESS NOTE ADULT - PROBLEM SELECTOR PLAN 2
MELD-Na 38 on admission; 65% 90-day mortality  - Daily CBC, CMP, INR  - No ETOH use currently  - In the process of working up for liver/kidney transplant, but needs a hepatology in [facility]  - Not on HE prophylaxis, no signs of HE  - No signs of SBP currently, CTX x 7 days for SBP ppx  - (+) ascites, US shows moderate to large ascites. IR  for paracentesis this AM MELD-Na 38 on admission; 65% 90-day mortality  - Daily CBC, CMP, INR  - No ETOH use currently  - In the process of working up for liver/kidney transplant, but needs a hepatology in [facility]  - Not on HE prophylaxis, no signs of HE  - No signs of SBP currently, CTX x 7 days for SBP ppx  - (+) ascites, US shows moderate to large ascites.  - IR for paracentesis today with culture and stain

## 2020-09-02 NOTE — CONSULT NOTE ADULT - SUBJECTIVE AND OBJECTIVE BOX
GENERAL SURGERY CONSULT NOTE  --------------------------------------------------------------------------------------------    HPI:   Patient is a 39y old  Male who presents with a chief complaint of GIB (02 Sep 2020 07:44)    HPI:  39M w/ ETOH cirrhosis c/b gastric varices, recent admission (7/2020) for hemorrhagic shock 2/2 variceal bleeding, acute renal failure requiring new HD, and ETOH withdrawal seizure, traumatic bladder rupture 2/2 fall s/p ex-lap in 2019, presented for new onset of GIB, dizziness and fatigue.   Patient reports feeling well since discharge, and was able to participate in PT home exercise program, and do light activities at home. He has had small rectal bleeding intermittently, and attributed to known hemorrhoids. However, since yesterday, he was feeling more fatigued, SOB, nauseous and chills, and had 1 episode of dark red blood per rectum before bedtime. Woke up with dark blood in the boxer this AM, and was feeling dizzy. No BM since the AM. Brought self to the hospital.   Reports having ok appetite, but only able to eat small meals given abdominal discomfort. (+) weight loss 25 lbs since discharge. Abdomen has decreased in girth since last admission, but was more distended since yesterday. Today, he had epigastric pain radiating down to RLQ, dull-achy, and stretchy pain with intermittent sharp sensation.   Denied fever, headache, blurred vision, CP, palpitations, vomiting, hematemesis, dysuria, hematuria, diarrhea/constipation, or BLE pain/edema.    Going on MWF HD schedule via [facility], PermaCath placed last admission. Still making minimal urine. (+) hypotension associated with HD, and taking midodrine.   No ETOH use since 7/4/20, no seizures, hallucination, or tremors since discharge. He is in the process of evaluation for liver and kidney transplant, but has not found a hepatologist in [facility] yet. Was told to stop Rifaximin, and no longer on thiamine.     In ED: T 98F; ; /73; RR 18; SaO2 95% on RA; Lab (+) for WBC 18.8, hgb 6.2, plt 83; INR 2.24; Na 131, Cr 8.38; T bili 11.3, , AST 88. S/p 1u PRBC and 4 mg IV morphine. (01 Sep 2020 02:14)    Patient was admitted to medicine, he received 3 u pRBC total. GI consulted for EGD and Colonoscopy and found to have Grade I esophageal varices, protal HTN, gastropathy, internal and external hemorrhoids, diverticulosis 5 mm polyp in sigmoid. Surgery was consulted for possible bleeding hemorrhoids.        PAST MEDICAL & SURGICAL HISTORY:  End-stage renal disease (ESRD): On HD MWF  Cirrhosis, alcoholic  S/P exploratory laparotomy: for bladder rupture s/p fall    FAMILY HISTORY:  FH: alpha 1 antitrypsin deficiency  [] Family history not pertinent as reviewed with the patient and family    SOCIAL HISTORY:   Cirrhosis, alcoholic     End-stage renal disease (ESRD) On HD MWF.     PAST SURGICAL HISTORY:  S/P exploratory laparotomy for bladder rupture s/p fall.     FAMILY HISTORY:  FH: alpha 1 antitrypsin deficiency.    ALLERGIES: No Known Allergies      HOME MEDICATIONS:     CURRENT MEDICATIONS  MEDICATIONS (STANDING): cefTRIAXone   IVPB      cefTRIAXone   IVPB 1000 milliGRAM(s) IV Intermittent every 24 hours  folic acid 1 milliGRAM(s) Oral daily  midodrine. 10 milliGRAM(s) Oral three times a day  multivitamin 1 Tablet(s) Oral daily  pantoprazole    Tablet 40 milliGRAM(s) Oral two times a day  sodium chloride 0.9%. 500 milliLiter(s) IV Continuous <Continuous>    MEDICATIONS (PRN):  --------------------------------------------------------------------------------------------    Vitals:   T(C): 36.4 (09-02-20 @ 17:05), Max: 36.8 (09-01-20 @ 22:37)  HR: 91 (09-02-20 @ 17:05) (80 - 108)  BP: 110/74 (09-02-20 @ 17:05) (92/60 - 123/66)  RR: 20 (09-02-20 @ 17:05) (15 - 20)  SpO2: 96% (09-02-20 @ 17:05) (93% - 99%)  CAPILLARY BLOOD GLUCOSE          09-01 @ 07:01  -  09-02 @ 07:00  --------------------------------------------------------  IN:    IV PiggyBack: 50 mL    octreotide  Infusion: 30 mL    Other: 1500 mL    pantoprazole Infusion: 30 mL  Total IN: 1610 mL    OUT:    Other: 2800 mL  Total OUT: 2800 mL    Total NET: -1190 mL        Height (cm): 185.42 (09-02 @ 14:05)  Weight (kg): 84.3 (09-02 @ 14:05)  BMI (kg/m2): 24.5 (09-02 @ 14:05)  BSA (m2): 2.09 (09-02 @ 14:05)      PHYSICAL EXAM:  General: NAD, Lying in bed comfortably  Neuro: A+Ox3  HEENT: NC/AT, EOMI, Juandice  Neck: Soft, supple  Cardio: RRR, nml S1/S2  Resp: Good effort  GI/Abd: Distended, firm, midline incision, mild tenderness, no rebound or guarding,   rectal exam: No blood in rectal vault, posterior hemorrhoid vs chronic fissure appreciated.   Vascular: All 4 extremities warm.  Skin: Intact, no breakdown  Musculoskeletal: All 4 extremities moving spontaneously, no limitations  --------------------------------------------------------------------------------------------    LABS  CBC (09-02 @ 07:07)                              8.2<L>                         15.35<H>  )----------------(  76<L>      --    % Neutrophils, --    % Lymphocytes, ANC: --                                  24.1<L>  CBC (09-01 @ 23:06)                              10.8<L>                         19.26<H>  )----------------(  87<L>      --    % Neutrophils, --    % Lymphocytes, ANC: --                                  32.2<L>    BMP (09-02 @ 07:04)             135     |  98      |  21    		Ca++ --      Ca 7.9<L>             ---------------------------------( 118<H>		Mg 1.8                4.2     |  26      |  6.12<H>			Ph 5.5<H>  BMP (09-01 @ 05:45)             131<L>  |  97      |  34<H> 		Ca++ --      Ca 8.0<L>             ---------------------------------( 86    		Mg 1.9                4.6     |  22      |  8.30<H>			Ph 5.8<H>    LFTs (09-02 @ 07:04)      TPro 5.3<L> / Alb 2.2<L> / TBili 10.3<H> / DBili -- / <H> / ALT 26 / AlkPhos 148<H>  LFTs (09-01 @ 05:45)      TPro 5.6<L> / Alb 2.4<L> / TBili 11.8<H> / DBili -- / AST 91<H> / ALT 24 / AlkPhos 163<H>    Coags (09-02 @ 07:05)  aPTT 41.6<H> / INR 2.29<H> / PT 26.2<H>  Coags (09-01 @ 05:45)  aPTT -- / INR 2.12<H> / PT 24.3<H>          --------------------------------------------------------------------------------------------    MICROBIOLOGY      --------------------------------------------------------------------------------------------    IMAGING  < from: Upper Endoscopy (09.01.20 @ 10:46) >  Impression:          - Non-bleeding grade I esophageal varices.                       - Portal hypertensive gastropathy. Source of bleeding ?       - Normal examined duodenum.                       - No specimens collected.    < end of copied text >    < from: Colonoscopy (09.02.20 @ 13:57) >  Impression:          - External and internal hemorrhoids. Suspect recent GI bleed was related to                        his hemorrhoids.                       - Diverticulosis in the ascending colon.                - One 5 mm polyp in the sigmoid colon. Not removed due to coagulopathy.                       - No specimens collected.    < end of copied text >      --------------------------------------------------------------------------------------------

## 2020-09-02 NOTE — PRE PROCEDURE NOTE - PRE PROCEDURE EVALUATION
Attending Physician: dr. yamileth kaba                          Procedure:  upper gastrointestinal endoscopy     Indication for Procedure: gib  ________________________________________________________  PAST MEDICAL & SURGICAL HISTORY:  End-stage renal disease (ESRD): On HD MWF  Cirrhosis, alcoholic  S/P exploratory laparotomy: for bladder rupture s/p fall    ALLERGIES:  No Known Allergies    HOME MEDICATIONS:  multivitamin: 1 tab(s) orally once a day    AICD/PPM: [ ] yes   [X] no    PERTINENT LAB DATA:                        7.0    19.07 )-----------( 85       ( 01 Sep 2020 05:45 )             21.4     09-01    131<L>  |  97  |  34<H>  ----------------------------<  86  4.6   |  22  |  8.30<H>    Ca    8.0<L>      01 Sep 2020 05:45  Phos  5.8     09-01  Mg     1.9     09-01    TPro  5.6<L>  /  Alb  2.4<L>  /  TBili  11.8<H>  /  DBili  x   /  AST  91<H>  /  ALT  24  /  AlkPhos  163<H>  09-01    PT/INR - ( 01 Sep 2020 05:45 )   PT: 24.3 sec;   INR: 2.12 ratio       PTT - ( 31 Aug 2020 22:04 )  PTT:37.7 sec        PHYSICAL EXAMINATION:    Height (cm): 185.4  Weight (kg): 84.3  BMI (kg/m2): 24.5  BSA (m2): 2.09T(C): 36.7  HR: 104  BP: 115/73  RR: 18  SpO2: 93%    Constitutional: NAD    Neck:  No JVD  Respiratory: CTAB/L  Cardiovascular: S1 and S2  Gastrointestinal: BS+, soft, NT/ND  Extremities: No peripheral edema  Neurological: A/O x 3      COMMENTS:    The patient is a suitable candidate for the planned procedure unless box checked [ ]  No, explain:
Attending Physician:  dr. jessica blackman                          Procedure: colonoscopy    Indication for Procedure: gib  ________________________________________________________  PAST MEDICAL & SURGICAL HISTORY:  End-stage renal disease (ESRD): On HD MWF  Cirrhosis, alcoholic  S/P exploratory laparotomy: for bladder rupture s/p fall    ALLERGIES:  No Known Allergies    HOME MEDICATIONS:  multivitamin: 1 tab(s) orally once a day    AICD/PPM: [ ] yes   [X] no    PERTINENT LAB DATA:                        8.2    15.35 )-----------( 76       ( 02 Sep 2020 07:07 )             24.1     09-02    135  |  98  |  21  ----------------------------<  118<H>  4.2   |  26  |  6.12<H>    Ca    7.9<L>      02 Sep 2020 07:04  Phos  5.5     09-02  Mg     1.8     09-02    TPro  5.3<L>  /  Alb  2.2<L>  /  TBili  10.3<H>  /  DBili  x   /  AST  133<H>  /  ALT  26  /  AlkPhos  148<H>  09-02    PT/INR - ( 02 Sep 2020 07:05 )   PT: 26.2 sec;   INR: 2.29 ratio      PTT - ( 02 Sep 2020 07:05 )  PTT:41.6 sec          PHYSICAL EXAMINATION:    Height (cm): 185.42  Weight (kg): 84.3  BMI (kg/m2): 24.5  BSA (m2): 2.09T(C): 36.4  HR: 95  BP: 106/72  RR: 20  SpO2: 95%    Constitutional: NAD    Neck:  No JVD  Respiratory: CTAB/L  Cardiovascular: S1 and S2  Gastrointestinal: BS+, soft, NT/ND  Extremities: (+) edema  Neurological: A/O x 3      COMMENTS: S/P HD yesterday 9/1    The patient is a suitable candidate for the planned procedure unless box checked [ ]  No, explain:

## 2020-09-02 NOTE — PROGRESS NOTE ADULT - ATTENDING COMMENTS
Seen, examined the patient this morning with house staff  Resting in bed, had colon prep last night, no c/o abdominal pain, fever, but blood in the prep  Hemodynamically stable  1. GI bleed, likely hemorrhoidal/portal gastropathy-    Reviewed labs- Hb 8-10, WBC 15 (chronically elevated), Plt 76    Colonoscopy showed bleeding hemorrhoids    EGD showed non bleeding esophageal varices, portal gastropathy    will call CRS consult, c/w preparation H, sitz bath    CBC q 8 hrs, PRBC transfusion to Hb >8  2. Decompensated ETOH cirrhosis and ascites-     US abd- moderate to large ascites, low suspicion for SBP ( no abdominal pian, fever)     IR to do paracentesis today- diagnostic & therapeutic tomorrow     Vit K 5mg po x 1 yesterday but for INR 2.2 from liver cirrhosis     SBP ppx- IV ceftriaxone     Hepatology f/u  3. ESRD- HD per renal

## 2020-09-02 NOTE — PROGRESS NOTE ADULT - PROBLEM SELECTOR PLAN 3
-MWF HD w/ [facility] via PermaCath  - PermaCath site dry and clean  - C/w Midodrine, and dose coordination with HD  - In the process of evaluation for liver/kidney transplant  - Dose meds per dialysis and avoid nephrotoxic agents

## 2020-09-02 NOTE — CONSULT NOTE ADULT - ASSESSMENT
ASSESSMENT: Patient is a 39ym pmhx ETOH use disorder with liver cirrhosis, variceal UGIB, acute renal failure, who presents with gi bleed, MARQUISE (Cr. 6.12), coagulopathy (INR 2.29), low fibrinogen (261) colonoscopy showed internal/external hemorrhoids suspicious of recent episode of bleeding.     PLAN:    - No acute surgical intervention given poor healing potential and coagulopathy   - Consider hepatology consult  - Replete FFP/Platelet/pRBC as needed  - Plan discussed with Attending, Dr. Yao Smith MD  General Surgery, PGY 2 ASSESSMENT: Patient is a 39ym pmhx ETOH use disorder with liver cirrhosis, variceal UGIB, acute renal failure, who presents with gi bleed, MARQUISE (Cr. 6.12), coagulopathy (INR 2.29), low fibrinogen (261) colonoscopy showed internal/external hemorrhoids suspicious of recent episode of bleeding.     PLAN:    - No acute surgical intervention given poor healing potential and coagulopathy   - Consider hepatology consult  - Reverse INR, replete FFP/Platelet/pRBC as needed  - Plan discussed with Attending, Dr. Yao Smith MD  General Surgery, PGY 2

## 2020-09-02 NOTE — PROGRESS NOTE ADULT - PROBLEM SELECTOR PLAN 1
Slow variceal bleed vs. hemorrhoidal bleed  - Hgb 6.2 on admission, baseline 8-9 last admission.   - S/p 3 U PRBC, goal Hgb 7-8 for portal hypertension  --Endoscopy showed no active bleed. Coloscopy today.   - S/p Protonix 80 mg IVP x 1, PPI gtt; octreotide gtt, now on PO Protonix 40mg BID  - NPO except meds  - Keep active T&S  - S/p 34 u PRBC, 25 u FFP, 5 u PLT and 2 u cryo and s/p EGD and IR embolization, Blakemore balloon, and MICU admission last admission. Slow variceal bleed vs. hemorrhoidal bleed  - Hgb 6.2 on admission, baseline 8-9 last admission.   - S/p 3 U PRBC, goal Hgb 7-8 for portal hypertension  -Endoscopy showed no active bleed. Coloscopy today.   - S/p Protonix 80 mg IVP x 1, PPI gtt; octreotide gtt, now on PO Protonix 40mg BID  - NPO except meds  - Keep active T&S  - S/p 34 u PRBC, 25 u FFP, 5 u PLT and 2 u cryo and s/p EGD and IR embolization, Blakemore balloon, and MICU admission last admission.

## 2020-09-02 NOTE — PRE-ANESTHESIA EVALUATION ADULT - NSANTHOSAYNRD_GEN_A_CORE
No. SHANEKA screening performed.  STOP BANG Legend: 0-2 = LOW Risk; 3-4 = INTERMEDIATE Risk; 5-8 = HIGH Risk
No. SHANEKA screening performed.  STOP BANG Legend: 0-2 = LOW Risk; 3-4 = INTERMEDIATE Risk; 5-8 = HIGH Risk

## 2020-09-03 NOTE — CHART NOTE - NSCHARTNOTEFT_GEN_A_CORE
Interventional Radiology  Procedure Note    Procedure: Paracentesis  Pre- Procedure Diagnosis: Ascites   Physician: Dr. Candy MD   PA: JOSH Taveras  Needle: 5Fr 9cm   Ascites Drained:  Color: clear isaac   Specimens: culture and gram stain sent   Albumin:  Estimated Blood Loss: minimal  Complications:  Preliminary Report:  Under sterile conditions, abdomen prepped and drapped, lidocaine 1% local given, using ultrasound guidance to left lower quadrant a 5Fr drainage needle drained ____L of ascites. Dry sterile dressing applied to insertion site. No immediate complications. Patient tolerated procedure well, VSS. ( Official report to follow).     Darcy Benavides PA-C  [phone] Interventional Radiology  Procedure Note    Procedure: Paracentesis  Pre- Procedure Diagnosis: Ascites   Physician: Dr. Candy MD   PA: JOSH Taveras  Needle: 5Fr 9cm   Ascites Drained: 4.7L  Color: clear isaac   Specimens: culture and gram stain  and cell count sent   Albumin: 25% 50cc x1  Estimated Blood Loss: minimal  Complications:  Preliminary Report:  Under sterile conditions, abdomen prepped and drapped, lidocaine 1% local given, using ultrasound guidance to left lower quadrant a 5Fr drainage needle drained 4.7L of ascites. Dry sterile dressing applied to insertion site. No immediate complications. Patient tolerated procedure well, VSS. ( Official report to follow).     Darcy Benavides PA-C  [phone]
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
## 2020-09-03 NOTE — DISCHARGE NOTE PROVIDER - CARE PROVIDER_API CALL
Yesenia Funez)  Gastroenterology; Transplant Hepatology  [address] [place] [address]  Phone: [phone]  Fax: [phone]  Follow Up Time: 1 week

## 2020-09-03 NOTE — PROGRESS NOTE ADULT - SUBJECTIVE AND OBJECTIVE BOX
GENERAL SURGERY PROGRESS NOTE    39yMale    SUBJECTIVE:  Patient seen and examined at bedside.  Denies fevers, chills, nausea, vomiting, chest pain, and shortness of breath.     --------------------------------------------------------------------------------------------------  OBJECTIVE:     Vital Signs:  Vital Signs Last 24 Hrs  T(C): 36.6 (03 Sep 2020 05:06), Max: 36.7 (02 Sep 2020 20:36)  T(F): 97.9 (03 Sep 2020 05:06), Max: 98 (02 Sep 2020 20:36)  HR: 89 (03 Sep 2020 05:06) (80 - 94)  BP: 106/71 (03 Sep 2020 05:06) (92/60 - 110/74)  BP(mean): --  RR: 18 (03 Sep 2020 05:06) (15 - 20)  SpO2: 93% (03 Sep 2020 05:06) (93% - 99%)    --------------------------------------------------------------------------------------------------  Inputs/Outputs:    02 Sep 2020 07:01  -  03 Sep 2020 07:00  --------------------------------------------------------  IN:    Oral Fluid: 500 mL  Total IN: 500 mL    OUT:  Total OUT: 0 mL    Total NET: 500 mL        --------------------------------------------------------------------------------------------------  Laboratories:                        8.4    17.00 )-----------( 97       ( 03 Sep 2020 07:17 )             25.1     LIVER FUNCTIONS - ( 03 Sep 2020 07:17 )  Alb: 2.2 g/dL / Pro: 5.7 g/dL / ALK PHOS: 162 U/L / ALT: 28 U/L / AST: 114 U/L / GGT: x             03 Sep 2020 07:17    135    |  97     |  29     ----------------------------<  114    4.1     |  24     |  7.97     Ca    8.1        03 Sep 2020 07:17  Phos  5.6       03 Sep 2020 07:17  Mg     1.9       03 Sep 2020 07:17    TPro  5.7    /  Alb  2.2    /  TBili  8.6    /  DBili  x      /  AST  114    /  ALT  28     /  AlkPhos  162    03 Sep 2020 07:17    PT/INR - ( 03 Sep 2020 07:18 )   PT: 22.5 sec;   INR: 1.95 ratio         PTT - ( 03 Sep 2020 07:18 )  PTT:38.8 sec    --------------------------------------------------------------------------------------------------  Physical Exam:  General: AAOx3, NAD, resting comfortably   HEENT: NC/AT  Respiratory: no increased work of breathing   Abd: softly distended, mild tenderness, no rebound or guarding,   Extremities: warm and well perfused  --------------------------------------------------------------------------------------------------  Medications:  MEDICATIONS  (STANDING):  folic acid 1 milliGRAM(s) Oral daily  hemorrhoidal Ointment 1 Application(s) Rectal three times a day  midodrine. 10 milliGRAM(s) Oral three times a day  multivitamin 1 Tablet(s) Oral daily  sodium chloride 0.9%. 500 milliLiter(s) (30 mL/Hr) IV Continuous <Continuous>    MEDICATIONS  (PRN):

## 2020-09-03 NOTE — PROGRESS NOTE ADULT - PROBLEM SELECTOR PLAN 2
Patient with anemia in the setting of ESRD/liver disease/GIB bleed. Hemoglobin improved.  s/p PRBC transfusion.     patient to be transfuse during HD   Monitor hemoglobin. Patient with anemia in the setting of ESRD/liver disease/GIB bleed. Hemoglobin improved.  s/p PRBC transfusion.     Monitor hemoglobin.

## 2020-09-03 NOTE — DISCHARGE NOTE PROVIDER - NSDCCPCAREPLAN_GEN_ALL_CORE_FT
PRINCIPAL DISCHARGE DIAGNOSIS  Diagnosis: GI bleed  Assessment and Plan of Treatment: You presented to the hospital with complaint of bleeding from the rectum. You recived 3 blood tranfsuions to bring your blood volume back to normal. You underwent an upper and lower endoscopy. Your lower endoscopy suggested the source of the bleed to be from hemorrhoids. We prescribed you Preperation H and Sitz Baths (which may be obtained over the conuter as well). This will help reduce the hemorrhoids.      SECONDARY DISCHARGE DIAGNOSES  Diagnosis: Liver cirrhosis  Assessment and Plan of Treatment: You presented to the hospital with a distended abdomen. This was due to a build of ascitic fluid from your liver cirrhosis. We drained 4.7L of fluid from your abdomen. We took a small sample to test the fluid. you should follow this up with the hepatalogits once discharged. If you notice signs of dizzness, weakness, bleeding, or abdominal pain you should return to the hospital.    Diagnosis: End stage renal disease  Assessment and Plan of Treatment:

## 2020-09-03 NOTE — DISCHARGE NOTE PROVIDER - NSDCMRMEDTOKEN_GEN_ALL_CORE_FT
folic acid 1 mg oral tablet: 1 tab(s) orally once a day  midodrine 10 mg oral tablet: 1 tab(s) orally 3 times a day   multivitamin: 1 tab(s) orally once a day  pantoprazole 40 mg oral delayed release tablet: 1 tab(s) orally once a day (before a meal)

## 2020-09-03 NOTE — PROGRESS NOTE ADULT - PROBLEM SELECTOR PLAN 1
Pt. with ESRD on HD treatments three times a week (MWF). Last HD was on Friday 8/28 via Right PermCath. Pt. clinically stable at this time, s/p EGD and colonoscopy. Labs reviewed. noted for Anemia s/p PRBC - Will arrange for maintenance HD today. Monitor labs. Adjust medications to ESKD.

## 2020-09-03 NOTE — PROGRESS NOTE ADULT - SUBJECTIVE AND OBJECTIVE BOX
Yuriy Samayoa, PGY-1    CHIEF COMPLAINT: Patient is a 39y old  Male who presents with a chief complaint of GIB (03 Sep 2020 06:55)      INTERVAL HPI/OVERNIGHT EVENTS: Patient complained of abdominal pain overnight relived by Tyelonol. Colonoscopy yesterday indicated Hemorrhoids as primary source of bleeds. Colorectal surgery stated no utility for surgical intervention at this time. Scheduled for HD today.     MEDICATIONS (STANDING):  cefTRIAXone   IVPB      cefTRIAXone   IVPB 1000 milliGRAM(s) IV Intermittent every 24 hours  folic acid 1 milliGRAM(s) Oral daily  hemorrhoidal Ointment 1 Application(s) Rectal three times a day  midodrine. 10 milliGRAM(s) Oral three times a day  multivitamin 1 Tablet(s) Oral daily  pantoprazole    Tablet 40 milliGRAM(s) Oral two times a day  sodium chloride 0.9%. 500 milliLiter(s) IV Continuous <Continuous>    MEDICATIONS  (PRN):      REVIEW OF SYSTEMS:  CONSTITUTIONAL: No fever  EYES: No eye pain  ENMT: No sinus or throat pain  NECK: No pain  RESPIRATORY: No shortness of breath  CARDIOVASCULAR: No chest pain, dizziness  GASTROINTESTINAL: No abdominal or epigastric pain. No diarrhea or constipation. No melena or hematochezia.  GENITOURINARY: No dysuria, hematuria  NEUROLOGICAL: No headaches  SKIN: No itching, burning, rashes, or lesions   MUSCULOSKELETAL: No muscle or joint pain    T(F): 97.9 (09-03-20 @ 05:06), Max: 98 (09-02-20 @ 20:36)  HR: 89 (09-03-20 @ 05:06) (80 - 95)  BP: 106/71 (09-03-20 @ 05:06) (92/60 - 110/74)  RR: 18 (09-03-20 @ 05:06) (15 - 20)  SpO2: 93% (09-03-20 @ 05:06) (93% - 99%)  Wt(kg): --  CAPILLARY BLOOD GLUCOSE        I&O's Summary    02 Sep 2020 07:01  -  03 Sep 2020 07:00  --------------------------------------------------------  IN: 500 mL / OUT: 0 mL / NET: 500 mL        PHYSICAL EXAM:  GENERAL: NAD, well-groomed, well-developed  HEAD:  Atraumatic, Normocephalic  EYES: PERRLA, conjunctiva and sclera clear  ENMT: No tonsillar erythema, exudates, or enlargement; Moist mucous membranes  NECK: Supple  NERVOUS SYSTEM:  Alert & Oriented X3, Good concentration; Motor Strength 5/5 B/L upper and lower extremities  CHEST/LUNG: Clear to auscultation bilaterally; No rales, rhonchi, wheezing, or rubs  HEART: Regular rate and rhythm; No murmurs, rubs, or gallops  ABDOMEN: Soft, Nontender, Nondistended; Bowel sounds present  EXTREMITIES:  2+ Peripheral Pulses, No edema  SKIN: No rashes or lesions    LABS:                        8.2    15.35 )-----------( 76       ( 02 Sep 2020 07:07 )             24.1     09-02    135  |  98  |  21  ----------------------------<  118<H>  4.2   |  26  |  6.12<H>    Ca    7.9<L>      02 Sep 2020 07:04  Phos  5.5     09-02  Mg     1.8     09-02    TPro  5.3<L>  /  Alb  2.2<L>  /  TBili  10.3<H>  /  DBili  x   /  AST  133<H>  /  ALT  26  /  AlkPhos  148<H>  09-02    PT/INR - ( 02 Sep 2020 07:05 )   PT: 26.2 sec;   INR: 2.29 ratio         PTT - ( 02 Sep 2020 07:05 )  PTT:41.6 sec        RADIOLOGY & ADDITIONAL TESTS:

## 2020-09-03 NOTE — PROGRESS NOTE ADULT - ASSESSMENT
ASSESSMENT:   39M PMH ETOH use disorder with liver cirrhosis, variceal UGIB, acute renal failure, who presents with LGIB, MARQUISE, coagulopathy w/ low fibrinogen, and colonoscopic findings of internal/external hemorrhoids suspicious of recent episode of bleeding.     PLAN:    - Patient is poor surgical candidate - no acute surgical intervention given poor healing potential and coagulopathy   - Consider hepatology consult  - Trend labs, correct coagulopathy/goal-directed resuscitation  Please reconsult PRN     Please contact [facility] (p. [phone]) with any questions.     Marcelina Rivas, PGY2  Surgery, Red Team   Pager [phone]  [facility] ASSESSMENT:   39M PMH ETOH use disorder with liver cirrhosis, variceal UGIB, acute renal failure, who presents with LGIB, MARQUISE, coagulopathy w/ low fibrinogen, and colonoscopic findings of internal/external hemorrhoids suspicious of recent episode of bleeding.     PLAN:    - Patient is poor surgical candidate - no acute surgical intervention given poor healing potential and coagulopathy   - Trend labs, correct coagulopathy/goal-directed resuscitation  - Care per primary/hepatology  Please reconsult PRN     Please contact [facility] (p. [phone]) with any questions.     Marcelina Rivas, PGY2  Surgery, Red Team   Pager [phone]  [facility]

## 2020-09-03 NOTE — CHART NOTE - NSCHARTNOTEFT_GEN_A_CORE
Interventional Radiology Pre-Procedure Note      This is a 39 y.o. Male w/ PMHx of ESKD on HD MWF,  traumatic bladder rupture 2/2 fall s/p ex-lap in 2019, ETOH cirrhosis c/b gastric varices, recent admission (7/2020) for hemorrhagic shock 2/2 variceal bleeding, acute renal failure requiring HD, and ETOH withdrawal seizure, presented for new onset of GIB, dizziness and fatigue. Patient with large volume ascites, presents now to IR for paracentesis.     PAST MEDICAL & SURGICAL HISTORY:  End-stage renal disease (ESRD): On HD MWF  Cirrhosis, alcoholic  S/P exploratory laparotomy: for bladder rupture s/p fall    Allergies:   No Known Allergies    LABS:                        8.4    17.00 )-----------( 97       ( 03 Sep 2020 07:17 )             25.1     09-03    135  |  97  |  29<H>  ----------------------------<  114<H>  4.1   |  24  |  7.97<H>    Ca    8.1<L>      03 Sep 2020 07:17  Phos  5.6     09-03  Mg     1.9     09-03    TPro  5.7<L>  /  Alb  2.2<L>  /  TBili  8.6<H>  /  DBili  x   /  AST  114<H>  /  ALT  28  /  AlkPhos  162<H>  09-03    PT/INR - ( 03 Sep 2020 07:18 )   PT: 22.5 sec;   INR: 1.95 ratio         PTT - ( 03 Sep 2020 07:18 )  PTT:38.8 sec    Procedure: Paracentesis     Procedure/ risks/ benefits were explained, informed consent obtained from patient, verbalizes understanding.    Darcy Benavides PA-C  [phone]

## 2020-09-03 NOTE — PROGRESS NOTE ADULT - ASSESSMENT
39 y.o. Male w/ PMHx of ESKD on HD MWF,  traumatic bladder rupture 2/2 fall s/p ex-lap in 2019, ETOH cirrhosis c/b gastric varices, recent admission (7/2020) for hemorrhagic shock 2/2 variceal bleeding, acute renal failure requiring HD, and ETOH withdrawal seizure, presented for new onset of GIB, dizziness and fatigue.    Nephrology consulted for ESKD.

## 2020-09-03 NOTE — PROGRESS NOTE ADULT - SUBJECTIVE AND OBJECTIVE BOX
Interval Events:   No abdominal pain  No nausea /vomiting / diarrhea   No melena / bloody bm     Hospital Medications:  cefTRIAXone   IVPB      cefTRIAXone   IVPB 1000 milliGRAM(s) IV Intermittent every 24 hours  folic acid 1 milliGRAM(s) Oral daily  hemorrhoidal Ointment 1 Application(s) Rectal three times a day  midodrine. 10 milliGRAM(s) Oral three times a day  multivitamin 1 Tablet(s) Oral daily  pantoprazole    Tablet 40 milliGRAM(s) Oral two times a day  sodium chloride 0.9%. 500 milliLiter(s) IV Continuous <Continuous>        ROS:   General:  No fevers, chills or night sweats  ENT:  No sore throat or dysphagia  CV:  No pain or palpitations  Resp:  No dyspnea, cough or  wheezing  GI:  as above  Skin:  No rash or edema  Neuro: no weakness   Hematologic: no bleeding  Musculoskeletal: no muscle pain or join pain  Psych: no agitation      PHYSICAL EXAM:   Vital Signs:  Vital Signs Last 24 Hrs  T(C): 36.6 (03 Sep 2020 05:06), Max: 36.7 (02 Sep 2020 20:36)  T(F): 97.9 (03 Sep 2020 05:06), Max: 98 (02 Sep 2020 20:36)  HR: 89 (03 Sep 2020 05:06) (80 - 95)  BP: 106/71 (03 Sep 2020 05:06) (92/60 - 110/74)  BP(mean): --  RR: 18 (03 Sep 2020 05:06) (15 - 20)  SpO2: 93% (03 Sep 2020 05:06) (93% - 99%)  Daily Height in cm: 185.42 (02 Sep 2020 14:05)    Daily Weight in k.6 (02 Sep 2020 09:24)    PHYSICAL EXAM:   GENERAL:  NAD, Appears stated age  HEENT:  NC/AT,  conjunctivae clear and pink, sclera -anicteric  CHEST:  CTA B/L, Normal effort  HEART:  RRR S1/S2,  ABDOMEN:  Soft, non-tender, ascites-distended,  no masses   EXTREMITIES:  No cyanosis or Edema  SKIN:  Warm & Dry. No rash or erythema  NEURO:  Alert, oriented      LABS:                        8.2    15.35 )-----------( 76       ( 02 Sep 2020 07:07 )             24.1       09-02    135  |  98  |  21  ----------------------------<  118<H>  4.2   |  26  |  6.12<H>    Ca    7.9<L>      02 Sep 2020 07:04  Phos  5.5       Mg     1.8         TPro  5.3<L>  /  Alb  2.2<L>  /  TBili  10.3<H>  /  DBili  x   /  AST  133<H>  /  ALT  26  /  AlkPhos  148<H>      LIVER FUNCTIONS - ( 02 Sep 2020 07:04 )  Alb: 2.2 g/dL / Pro: 5.3 g/dL / ALK PHOS: 148 U/L / ALT: 26 U/L / AST: 133 U/L / GGT: x           PT/INR - ( 03 Sep 2020 07:18 )   PT: 22.5 sec;   INR: 1.95 ratio         PTT - ( 03 Sep 2020 07:18 )  PTT:38.8 sec                            8.2    15.35 )-----------( 76       ( 02 Sep 2020 07:07 )             24.1                         10.8   19.26 )-----------( 87       ( 01 Sep 2020 23:06 )             32.2                         7.0    19.07 )-----------( 85       ( 01 Sep 2020 05:45 )             21.4                         6.2    18.81 )-----------( 83       ( 31 Aug 2020 22:04 )             19.3       Imaging:

## 2020-09-03 NOTE — PROGRESS NOTE ADULT - SUBJECTIVE AND OBJECTIVE BOX
[facility] DIVISION OF KIDNEY DISEASES AND HYPERTENSION -- FOLLOW UP NOTE  --------------------------------------------------------------------------------  If any questions, please feel free to contact me  NS pager: [phone], [facility]: [phone]  Adan Monae M.D.  Nephrology Fellow    (After 5 pm or on weekends please page the on-call fellow)  --------------------------------------------------------------------------------    Chief Complaint:  Patient is a 39y old  Male who presents with a chief complaint of GIB (02 Sep 2020 17:44)    24 hour events/subjective:  Patient seen and examined at bedside, in NAD  had colonoscopy yesterday which showed internal/external hemorrhoids.   BP has remained stable - for Hd today.   vitals/labs/imaging reviewed.       PAST HISTORY  --------------------------------------------------------------------------------  No significant changes to PMH, PSH, FHx, SHx, unless otherwise noted    ALLERGIES & MEDICATIONS  --------------------------------------------------------------------------------  Allergies    No Known Allergies    Intolerances      Standing Inpatient Medications  cefTRIAXone   IVPB      cefTRIAXone   IVPB 1000 milliGRAM(s) IV Intermittent every 24 hours  folic acid 1 milliGRAM(s) Oral daily  hemorrhoidal Ointment 1 Application(s) Rectal three times a day  midodrine. 10 milliGRAM(s) Oral three times a day  multivitamin 1 Tablet(s) Oral daily  pantoprazole    Tablet 40 milliGRAM(s) Oral two times a day  sodium chloride 0.9%. 500 milliLiter(s) IV Continuous <Continuous>    PRN Inpatient Medications      REVIEW OF SYSTEMS  --------------------------------------------------------------------------------  Gen: No fevers/chills  Skin: +ve jaundice - No rashes  Head/Eyes/Ears: Normal hearing,   Respiratory: No dyspnea, cough  CV: No chest pain  GI: No abdominal pain, diarrhea  : No dysuria, hematuria  MSK: No  edema  Heme: No easy bruising or bleeding  Psych: No significant depression      All other systems were reviewed and are negative, except as noted.    VITALS/PHYSICAL EXAM  --------------------------------------------------------------------------------  T(C): 36.6 (09-03-20 @ 05:06), Max: 36.7 (09-02-20 @ 20:36)  HR: 89 (09-03-20 @ 05:06) (80 - 95)  BP: 106/71 (09-03-20 @ 05:06) (92/60 - 110/74)  RR: 18 (09-03-20 @ 05:06) (15 - 20)  SpO2: 93% (09-03-20 @ 05:06) (93% - 99%)  Wt(kg): --  Height (cm): 185.42 (09-02-20 @ 14:05)  Weight (kg): 84.3 (09-02-20 @ 14:05)  BMI (kg/m2): 24.5 (09-02-20 @ 14:05)  BSA (m2): 2.09 (09-02-20 @ 14:05)      09-01-20 @ 07:01  -  09-02-20 @ 07:00  --------------------------------------------------------  IN: 1610 mL / OUT: 2800 mL / NET: -1190 mL    09-02-20 @ 07:01  -  09-03-20 @ 06:55  --------------------------------------------------------  IN: 500 mL / OUT: 0 mL / NET: 500 mL        Physical Exam:  	GENERAL:  NAD, eating and seating comfortably   HEENT:  NC/AT, icteric sclera   CHEST:  CTA B/L, No wheezing   HEART:  RRR S1/S2, no murmur  ABDOMEN:  Soft, distended, ascites, NT-  no masses   EXTREMITIES:  No cyanosis or Edema  SKIN: jaundice - no rash   NEURO:  Alert, oriented x3  Vascular access: right permcath       LABS/STUDIES  --------------------------------------------------------------------------------              8.2    15.35 >-----------<  76       [09-02-20 @ 07:07]              24.1     135  |  98  |  21  ----------------------------<  118      [09-02-20 @ 07:04]  4.2   |  26  |  6.12        Ca     7.9     [09-02-20 @ 07:04]      Mg     1.8     [09-02-20 @ 07:04]      Phos  5.5     [09-02-20 @ 07:04]    TPro  5.3  /  Alb  2.2  /  TBili  10.3  /  DBili  x   /  AST  133  /  ALT  26  /  AlkPhos  148  [09-02-20 @ 07:04]    PT/INR: PT 26.2 , INR 2.29       [09-02-20 @ 07:05]  PTT: 41.6       [09-02-20 @ 07:05]      Creatinine Trend:  SCr 6.12 [09-02 @ 07:04]  SCr 8.30 [09-01 @ 05:45]  SCr 8.38 [08-31 @ 22:04]  SCr 8.13 [08-15 @ 07:05]  SCr 7.11 [08-14 @ 07:15]    Urinalysis - [08-07-20 @ 04:54]      Color Dark Orange / Appearance Turbid / SG 1.041 / pH 6.0      Gluc Negative / Ketone Negative  / Bili Moderate / Urobili 8 mg/dL       Blood Moderate / Protein 100 mg/dL Test Performed on Urine Supernate. / Leuk Est Negative / Nitrite Negative      RBC 30 / WBC 18 / Hyaline 10 / Gran  / Sq Epi  / Non Sq Epi 7 / Bacteria Negative      Iron 34, TIBC 124, %sat 27      [07-14-20 @ 10:34]  Ferritin 812      [07-12-20 @ 08:58]  PTH -- (Ca 6.2)      [07-29-20 @ 09:18]   223  Lipid: chol --, , HDL --, LDL --      [08-03-20 @ 12:04]    HBsAb 276.9      [08-10-20 @ 23:16]  HBsAb Reactive      [08-12-20 @ 11:42]  HBsAg Nonreact      [08-10-20 @ 23:34]  HBcAb Nonreact      [08-12-20 @ 11:42]  HCV 0.27, Nonreact      [08-10-20 @ 23:16]    Syphilis Screen (Treponema Pallidum Ab) Negative      [08-08-20 @ 09:47]

## 2020-09-03 NOTE — DISCHARGE NOTE PROVIDER - HOSPITAL COURSE
39yr man with pmhx  of ETOH cirrhosis c/b bleeding gastric varices, leading to hemorrhagic shock (7/2020) requiring 34uPRBC and Blakemore tube further complicated by acute renal failure requiring new HD, and ETOH withdrawal seizure, and traumatic bladder rupture 2/2 fall s/p ex-lap in 2019.  Currently, on MWF HD schedule and on Midodrine w/ no ETOH use since 7/4/20. He presented this admission with new onset of GIB (3 BM with BRBPR), dizziness, SOB, fatigue and distended abdomen. Hg was 6.2 on addmission, he recived 3 Uprbc total. EGD showed non-bleeding minimal varices. Colonoscopy revealed bleeding hemorrhoids. He received a diagnostic and therapeutic paracentesis draining 4.7L from his abdomen. Surgical intervention was not advised and he was treated with Sitz bath and Prep-H. His discharge Hg was ____. 39yr man with pmhx  of ETOH cirrhosis c/b bleeding gastric varices, leading to hemorrhagic shock (7/2020) requiring 34uPRBC and Blakemore tube further complicated by acute renal failure requiring new HD, and ETOH withdrawal seizure, and traumatic bladder rupture 2/2 fall s/p ex-lap in 2019.  Currently, on MWF HD schedule and on Midodrine w/ no ETOH use since 7/4/20. He presented this admission with new onset of GIB (3 BM with BRBPR), dizziness, SOB, fatigue and distended abdomen. Hg was 6.2 on addmission, he recived 3 Uprbc total. EGD showed non-bleeding minimal varices. Colonoscopy revealed bleeding hemorrhoids. He received a diagnostic and therapeutic paracentesis draining 4.7L from his abdomen. Surgical intervention was not advised and he was treated with Sitz bath and Prep-H. His discharge Hg was 8.4.

## 2020-09-03 NOTE — PROGRESS NOTE ADULT - ATTENDING COMMENTS
Seen, examined the patient this morning with house staff  Resting in bed, had BM with red blood this am. no c/o abdominal pain but discomfort from distension, no fever  Hemodynamically stable  1. GI bleed, likely hemorrhoidal/portal gastropathy-    Reviewed labs- Hb 8.4, WBC 17 (chronically elevated), Plt 97    Colonoscopy showed bleeding hemorrhoids    EGD showed non bleeding esophageal varices, portal gastropathy    Team reached out to CRS, no indicayion for surgical intervention, c/w preparation H, sitz bath    CBC q 12 hrs, PRBC transfusion to Hb >8  2. Decompensated ETOH cirrhosis and ascites-     US abd- moderate to large ascites, low suspicion for SBP ( no abdominal pian, fever)     IR to do paracentesis today- diagnostic & therapeutic tomorrow     Vit K 5mg po x 1 yesterday but for INR 1.9 from liver cirrhosis     SBP ppx- IV ceftriaxone     Hepatology f/u  3. ESRD- HD per renal Seen, examined the patient this morning with house staff  Resting in bed, had BM with red blood this am. no c/o abdominal pain but discomfort from distension, no fever  Hemodynamically stable  1. GI bleed, likely hemorrhoidal/portal gastropathy-    Reviewed labs- Hb 8.4, WBC 17 (chronically elevated), Plt 97    Colonoscopy showed bleeding hemorrhoids    EGD showed non bleeding esophageal varices, portal gastropathy    Team reached out to CRS, no indicayion for surgical intervention, c/w preparation H, sitz bath    CBC q 12 hrs, PRBC transfusion to Hb >8  2. Decompensated ETOH cirrhosis and ascites-     US abd- moderate to large ascites, low suspicion for SBP ( no abdominal pian, fever)     IR did paracentesis today- 4.7L ascitic fluid taken out, Cell count 27->no SBP     will give 2 units of Albumin 50%, he wants to go home today, BP ha sbeen stable     Hepatology f/u noted, Evaluated by transplant surgery today, still deemed too high risk at this time    Patient has outpatient hepatology f/u where he lives  3. ESRD- HD per renal, outpatient HD  - d/c time 45 min Seen, examined the patient this morning with house staff  Resting in bed, had BM with red blood this am. no c/o abdominal pain but discomfort from distension, no fever  Hemodynamically stable  1. GI bleed, likely hemorrhoidal/portal gastropathy-    Reviewed labs- Hb 8.4, WBC 17 (chronically elevated), Plt 97    Colonoscopy showed bleeding hemorrhoids    EGD showed non bleeding esophageal varices, portal gastropathy    Team reached out to CRS, no indication for surgical intervention, c/w preparation H, sitz bath    CBC q 12 hrs, PRBC transfusion to Hb >8  2. Decompensated ETOH cirrhosis and ascites-     US abd- moderate to large ascites, low suspicion for SBP ( no abdominal pian, fever)     IR did paracentesis today- 4.7L ascitic fluid taken out, Cell count 27->no SBP     will give 2 units of Albumin 50%, he wants to go home today, BP ha sbeen stable     Hepatology f/u noted, Evaluated by transplant surgery today, still deemed too high risk at this time    Patient has outpatient hepatology f/u where he lives  3. ESRD- HD per renal, outpatient HD  - d/c time 45 min

## 2020-09-03 NOTE — DISCHARGE NOTE NURSING/CASE MANAGEMENT/SOCIAL WORK - PATIENT PORTAL LINK FT
You can access the FollowMyHealth Patient Portal offered by [facility] by registering at the following website: [email or web address]. By joining [facility]’s FollowMyHealth portal, you will also be able to view your health information using other applications (apps) compatible with our system.

## 2020-09-03 NOTE — PROGRESS NOTE ADULT - PROBLEM SELECTOR PLAN 3
-MWF HD w/ [facility] via PermaCath  - PermaCath site dry and clean  - C/w Midodrine, and dose coordination with HD  - In the process of evaluation for liver/kidney transplant  - Dose meds per dialysis and avoid nephrotoxic agents  -HD today

## 2020-09-03 NOTE — PROGRESS NOTE ADULT - ASSESSMENT
39M w/ ETOH cirrhosis c/b gastric varices, recent admission (7/2020) for hemorrhagic shock 2/2 variceal bleeding, acute renal failure requiring new HD, and ETOH withdrawal seizure, traumatic bladder rupture 2/2 fall s/p ex-lap in 2019, presented for new onset of GIB, dizziness and fatigue. Found to have hgb 6.2.   EGD was negative for actively bleeding varices Colonoscopy showed hemorrhoids to be the primary source of bleed. Colorectal surgery stated no utility in surgical intervention at this time. Will manage expectantly with Prep H, Sitz Bath, and transfuse PRN. HD to be done today.

## 2020-09-03 NOTE — PROGRESS NOTE ADULT - ASSESSMENT
39M w/ decompensated EtOH cirrhosis, c/b gastric varices, recent admission (7/2020) for hemorrhagic shock from gastric varix requiring 25u pRBC, s/p glue injection and PARTO by IR, acute renal failure requiring new HD, and ETOH withdrawal seizure, traumatic bladder rupture 2/2 fall s/p ex-lap in 2019, presented for 2 days of recurrent episodes of BRBPR, dizziness and fatigue. Found to have hgb 6.2.     Impression:  # Hematochezia s/p EGD 09/01 severe portal hypertensive gastropathy , non bleeding small EV and colonoscopy 09/02 bleeding external hemorrhoids, small colonic polyp and diverticulosis.        # Acute blood loss anemia s/p 3 unit of PRBCs, awaiting cbc from today     # Cirrhosis due to EtOH, decompensated by ascites and variceal bleed - MELD-Na = 36 on 9/3/20  - varices: small esophageal varices on EGD 9/1 ( resolved gastric varices)  - Ascites: present on exam, not on diuretics   - HE: none on exam  - HCC: no lesions as of 08/2020 on MRI    # Anuric MARQUISE 2/2 ATN, currently dialysis dependent   MARQUISE during last admission in the setting of varices bleed/anemia, diarrhea, diuretics and decrease effective arterial blood volume from cirrhosis  initiated on HD on 7/28 for anuric and fluid overload, then transitioned to HD on 8/6.     Recommendations:  - Follow up serum PEth level result ( in lab)  - Change diet to Low sodium/ renal diet   - Can d/c abx  - Can d/c PPI  - Awaiting diagnostic and therapeutic paracentesis   - trend CMP, INR daily  - Continue midodrine   - Follow up with Hepatology as an outpatient [phone]    Pato Forbes   Gastroenterology Fellow  Pager: [phone]  Please call on-call GI fellow after 5pm and before 8am, and on weekends. 39M w/ decompensated EtOH cirrhosis, c/b gastric varices, recent admission (7/2020) for hemorrhagic shock from gastric varix requiring 25u pRBC, s/p glue injection and PARTO by IR, acute renal failure requiring new HD, and ETOH withdrawal seizure, traumatic bladder rupture 2/2 fall s/p ex-lap in 2019, presented for 2 days of recurrent episodes of BRBPR, dizziness and fatigue. Found to have hgb 6.2.     Impression:  # Hematochezia s/p EGD 09/01 severe portal hypertensive gastropathy , non bleeding small EV and colonoscopy 09/02 bleeding external hemorrhoids, small colonic polyp and diverticulosis.        # Acute blood loss anemia s/p 3 unit of PRBCs, awaiting cbc from today     # Cirrhosis due to EtOH, decompensated by ascites and variceal bleed - MELD-Na = 36 on 9/3/20  - varices: small esophageal varices on EGD 9/1 ( resolved gastric varices)  - Ascites: present on exam, not on diuretics   - HE: none on exam  - HCC: no lesions as of 08/2020 on MRI    # Anuric MARQUISE 2/2 ATN, currently dialysis dependent   MARQUISE during last admission in the setting of varices bleed/anemia, diarrhea, diuretics and decrease effective arterial blood volume from cirrhosis  initiated on HD on 7/28 for anuric and fluid overload, then transitioned to HD on 8/6.     Recommendations:  - Follow up serum PEth level result ( in lab)  - Change diet to Low sodium/ renal diet   - Can d/c abx  - Can d/c PPI  - Awaiting therapeutic paracentesis ( can be discharged after that)  - Continue midodrine   - Outpatient f/u w/ Dr. Funez on discharge within 1-2 weeks ([phone])    Pato Forbes   Gastroenterology Fellow  Pager: [phone]  Please call on-call GI fellow after 5pm and before 8am, and on weekends.

## 2020-09-03 NOTE — PROGRESS NOTE ADULT - PROBLEM SELECTOR PLAN 1
Slow variceal bleed vs. hemorrhoidal bleed  - Hgb 6.2 on admission, baseline 8-9 last admission.   - S/p 3 U PRBC, goal Hgb 7-8 for portal hypertension  -Endoscopy showed no active bleed. Coloscopy showed bleeding hemorrhoids    - S/p Protonix 80 mg IVP x 1, PPI gtt; octreotide gtt, now on PO Protonix 40mg BID  - NPO except meds  - Prep H, Sitz Bath, and tranfuse PRN   - Keep active T&S  - S/p 34 u PRBC, 25 u FFP, 5 u PLT and 2 u cryo and s/p EGD and IR embolization, Blakemore balloon, and MICU admission last admission.

## 2020-09-03 NOTE — PROGRESS NOTE ADULT - PROBLEM SELECTOR PLAN 2
MELD-Na 38 on admission; 65% 90-day mortality  - Daily CBC, CMP, INR  - No ETOH use currently  - In the process of working up for liver/kidney transplant, but needs a hepatology in [facility]  - Not on HE prophylaxis, no signs of HE  - No signs of SBP currently, CTX x 7 days for SBP ppx  - (+) ascites, US shows moderate to large ascites.  - IR for paracentesis today with culture and stain

## 2020-09-04 NOTE — PROGRESS NOTE ADULT - ASSESSMENT
39M w/ ETOH cirrhosis c/b gastric varices, recent admission (7/2020) for hemorrhagic shock 2/2 variceal bleeding, acute renal failure requiring new HD, and ETOH withdrawal seizure, traumatic bladder rupture 2/2 fall s/p ex-lap in 2019, presented for new onset of GIB, dizziness and fatigue. Found to have hgb 6.2.   EGD was negative for actively bleeding varices Colonoscopy showed hemorrhoids to be the primary source of bleed. Colorectal surgery stated no utility in surgical intervention at this time. Will manage expectantly with Prep H, Sitz Bath, and transfuse PRN. HD to be done yesterdat. IR drained 4.7 litters of ascitic fluid yesterday. Patient to be discharged today to follow up with hepatology and with HD center today at 1:30pm to put him back on his HD schedule.

## 2020-09-04 NOTE — PROGRESS NOTE ADULT - ATTENDING COMMENTS
Seen, examined the patient this morning   Resting in bed, no rectal bleed, no c/o abdominal pain, has less discomfort from distension s/p paracentesis 9/3, no fever  Hemodynamically stable  1. GI bleed, likely hemorrhoidal/portal gastropathy-    Reviewed labs- Hb 8.2, WBC 12 (chronically elevated), Plt 73    Colonoscopy showed bleeding hemorrhoids    EGD showed non bleeding esophageal varices, portal gastropathy    CRS consult and rec appreciated- Patient is poor surgical candidate, no acute surgical intervention given poor healing potential and coagulopathy     c/w preparation H, sitz bath  2. Decompensated ETOH cirrhosis and ascites-     US abd- moderate to large ascites, s/p IR did paracentesis 9/3- 4.7L ascitic fluid taken out, Cell count 27->no SBP     got 2 units of Albumin 50%, he wants to go home today, BP has been stable     Hepatology f/u noted, Evaluated by transplant surgery today, still deemed too high risk at this time     Patient has outpatient hepatology f/u where he lives at [facility]  3. ESRD- HD per renal, outpatient HD  - d/c home today  - d/c time 40 min

## 2020-09-04 NOTE — PROGRESS NOTE ADULT - SUBJECTIVE AND OBJECTIVE BOX
Yuriy Samayoa, PGY-1    CHIEF COMPLAINT: Patient is a 39y old  Male who presents with a chief complaint of GIB (03 Sep 2020 13:31)      INTERVAL HPI/OVERNIGHT EVENTS: No acute events overnight. Patient had HD and IR procedure yesterday to drain ascitic fluid from abdomen. Today he reports feeling much relived. He reports less blood in his stool after 4 applications of Prep H. He is set to be dc'd today with f/u with his HD center at 1:30pm and hepatology/transplant outpatient     MEDICATIONS (STANDING):  folic acid 1 milliGRAM(s) Oral daily  hemorrhoidal Ointment 1 Application(s) Rectal three times a day  midodrine. 10 milliGRAM(s) Oral three times a day  multivitamin 1 Tablet(s) Oral daily  sodium chloride 0.9%. 500 milliLiter(s) IV Continuous <Continuous>    MEDICATIONS  (PRN):  HYDROmorphone  Injectable 0.2 milliGRAM(s) IV Push every 6 hours PRN  lidocaine   Patch 2 Patch Transdermal once PRN  lidocaine 2% Gel 1 Application(s) Topical four times a day PRN      REVIEW OF SYSTEMS:  CONSTITUTIONAL: No fever  EYES: No eye pain  ENMT: No sinus or throat pain  NECK: No pain  RESPIRATORY: No shortness of breath  CARDIOVASCULAR: No chest pain, dizziness  GASTROINTESTINAL: No abdominal or epigastric pain. No diarrhea or constipation. No melena or hematochezia.  GENITOURINARY: No dysuria, hematuria  NEUROLOGICAL: No headaches  SKIN: No itching, burning, rashes, or lesions   MUSCULOSKELETAL: No muscle or joint pain    T(F): 98.1 (09-04-20 @ 04:54), Max: 98.5 (09-03-20 @ 11:29)  HR: 114 (09-04-20 @ 04:54) (92 - 114)  BP: 103/60 (09-04-20 @ 04:54) (96/60 - 117/81)  RR: 18 (09-04-20 @ 04:54) (16 - 20)  SpO2: 96% (09-04-20 @ 04:54) (91% - 96%)  Wt(kg): --  CAPILLARY BLOOD GLUCOSE        I&O's Summary    03 Sep 2020 07:01  -  04 Sep 2020 07:00  --------------------------------------------------------  IN: 1320 mL / OUT: 850 mL / NET: 470 mL        PHYSICAL EXAM:  GENERAL: NAD, well-groomed, well-developed  HEAD:  Atraumatic, Normocephalic  EYES: PERRLA, conjunctiva and sclera clear  ENMT: No tonsillar erythema, exudates, or enlargement; Moist mucous membranes  NECK: Supple  NERVOUS SYSTEM:  Alert & Oriented X3, Good concentration; Motor Strength 5/5 B/L upper and lower extremities  CHEST/LUNG: Clear to auscultation bilaterally; No rales, rhonchi, wheezing, or rubs  HEART: Regular rate and rhythm; No murmurs, rubs, or gallops  ABDOMEN: Soft, Nontender, Nondistended; Bowel sounds present  EXTREMITIES:  2+ Peripheral Pulses, No edema  SKIN: No rashes or lesions    LABS:                        8.2    12.75 )-----------( 73       ( 04 Sep 2020 06:56 )             24.5     09-04    136  |  96  |  14  ----------------------------<  99  3.3<L>   |  27  |  5.37<H>    Ca    7.8<L>      04 Sep 2020 06:56  Phos  3.8     09-04  Mg     1.8     09-04    TPro  5.6<L>  /  Alb  2.4<L>  /  TBili  8.0<H>  /  DBili  x   /  AST  98<H>  /  ALT  25  /  AlkPhos  160<H>  09-04    PT/INR - ( 04 Sep 2020 06:56 )   PT: 21.6 sec;   INR: 1.87 ratio         PTT - ( 04 Sep 2020 06:56 )  PTT:41.7 sec        RADIOLOGY & ADDITIONAL TESTS:  4.7 litters of clear/yellow ascitic fluid removed from abdomen.

## 2020-09-04 NOTE — PROGRESS NOTE ADULT - PROBLEM SELECTOR PLAN 3
-MWF HD w/ [facility] via PermaCath  - PermaCath site dry and clean  - C/w Midodrine, and dose coordination with HD  - In the process of evaluation for liver/kidney transplant  - Dose meds per dialysis and avoid nephrotoxic agents  -HD yesterday. Will f/u with home HD center

## 2020-09-04 NOTE — PROGRESS NOTE ADULT - PROBLEM SELECTOR PLAN 2
MELD-Na 38 on admission; 65% 90-day mortality  - Daily CBC, CMP, INR  - No ETOH use currently  - In the process of working up for liver/kidney transplant, but needs a hepatology in [facility]  - Not on HE prophylaxis, no signs of HE  - No signs of SBP currently, CTX x 7 days for SBP ppx  - (+) ascites, US shows moderate to large ascites.  - IR for paracentesis yesterday drained 4.7L. Cell count not indicative of SBP   - f/u with outpatient hepatology/transplant team .

## 2020-09-19 PROBLEM — N18.6 END STAGE RENAL DISEASE: Chronic | Status: ACTIVE | Noted: 2020-01-01

## 2020-09-19 PROBLEM — K70.30 ALCOHOLIC CIRRHOSIS OF LIVER WITHOUT ASCITES: Chronic | Status: ACTIVE | Noted: 2020-01-01

## 2020-09-19 NOTE — ED PROVIDER NOTE - PROGRESS NOTE DETAILS
Mac Paulino MD. ativan given for anxiety prior to paracentesis. diagnostic tap performed. admitted to hospitalist.

## 2020-09-19 NOTE — H&P ADULT - NSHPSOCIALHISTORY_GEN_ALL_CORE
Denies ever smoking tobacco, former EtOH use (last drink was 7/4/20), denies illicit/recreational drug use. Lives at home with parents, independent in ADLs/iADLs, ambulates by self at baseline but sometimes uses a cane for long distances.

## 2020-09-19 NOTE — ED PROVIDER NOTE - PHYSICAL EXAMINATION
PHYSICAL EXAM:  GENERAL: non-toxic appearing; in no respiratory distress  HEAD Atraumatic, Normocephalic  NECK: No JVD; FROM  EYES: PERRL, EOMs intact b/l w/out deficits; +scleral icterus  CHEST/LUNG: decreased BS RLL and LLL (R>L)  HEART: tachycardia; no murmur/gallops/rubs  ABDOMEN: +BS, soft, non tender, severe distended +ascites   EXTREMITIES: No LE edema, +2 radial pulses b/l, +2 DP/PT pulses b/l  MUSCULOSKELETAL: FROM of all 4 extremities;  NERVOUS SYSTEM:  A&Ox3, No motor deficits or sensory deficits; CNII-XII intact; no focal neurologic deficits  SKIN:  jaundice; permacath on R chest wall; no signs of infxn

## 2020-09-19 NOTE — ED ADULT NURSE NOTE - OBJECTIVE STATEMENT
39 yr old M arrived to the ED from home c/o chest pressure and SOB. HX ESRD dialysis through R chest wall port M,W,F (last dialysis last night, completed full session).per pt he was scheduled for his second paracentesis on Thursday but was told the clinic did not take his insurance. pt then went to [facility] and was told they also cant tap him due to being a [facility] hospital with no liver or kidney team. pt endorses being sob with a cough and feeling dizzy with palpitations upon standing. upon assessment pt is aox4, speaking clearly, answering questions and following commands. pt is tachypneic and is having trouble speaking in full sentences. pt is noted to be tachycardic into 120s on cardiac monitor. abd is firm and largely distended. pt appears jaundice in color. pt denies fever, chills, nausea, or vomting.

## 2020-09-19 NOTE — H&P ADULT - HISTORY OF PRESENT ILLNESS
38 yo M w/ hx of EtOH cirrhosis c/b gastric variceal bleeding in July 2020 s/p 34u pRBC, IR embolization and blakemore ballon, ESRD on HD (MWF), bladder rupture 2/2 fall s/p ex-lap in 2019, recent admission in earlier in Sept 2020 for hemorrhoidal bleed, now presenting with worsening abdominal distention and pain x3 days. Reports the abdominal pain is in epigastric area as well as lower abdominal area wrapping around to the flanks, is constant, up to 8/10 in severity, and similar in nature to his previous abdominal pains that were subsequently relieved with therapeutic paracenteses. Pt was supposed to get an outpatient therapeutic tap on 9/17, but was unable to because of an insurance issue and the outpatient clinic's inability to handle potential complications, and was suggested to come to the hospital to get it done. Pt also reports a dry cough, SOB, and mid-right sided CP that started a couple of days ago, worsens when he sits up or leans forward. Reports getting a session of HD yesterday without complication. Notes nausea without vomiting. Reports decreased urination from baseline, last time was previous night. Denies fevers, chills, headache, dizziness, dysuria, hematuria, hematochezia, melena, constipation, diarrhea, hematemesis.    In ED, pt afebrile Tmax 98F, -132/62-80, -123, RR 16-22, initially sating 96% on RA but then reportedly desated to 91% and was put on 3L O2NC. Labs significant for WBC 16.8, Hgb 7.3, Plt 122, INR 1.89, SCr 4.93, Alk Phos 245, , ALT 27, Serum BNP 2726, VBG lactate 3.4. Trop x2 stable at indeterminate value of 38. EKG sinus tach. CXR showing b/l pleural effusions R>L. s/p diagnostic tap, CTX 1g x1, and Lorazepam 1mg PO x1. Admitted to Medicine.

## 2020-09-19 NOTE — ED ADULT NURSE REASSESSMENT NOTE - NS ED NURSE REASSESS COMMENT FT1
MD deshpande and MD Dunne at bedside to preform bedside paracentesis. Consent signed and placed on chart. Pt pre meditated for anxiety.

## 2020-09-19 NOTE — ED ADULT NURSE NOTE - NSIMPLEMENTINTERV_GEN_ALL_ED
Implemented All Fall Risk Interventions:  [place] to call system. Call bell, personal items and telephone within reach. Instruct patient to call for assistance. Room bathroom lighting operational. Non-slip footwear when patient is off stretcher. Physically safe environment: no spills, clutter or unnecessary equipment. Stretcher in lowest position, wheels locked, appropriate side rails in place. Provide visual cue, wrist band, yellow gown, etc. Monitor gait and stability. Monitor for mental status changes and reorient to person, place, and time. Review medications for side effects contributing to fall risk. Reinforce activity limits and safety measures with patient and family.

## 2020-09-19 NOTE — ED PROVIDER NOTE - ATTENDING CONTRIBUTION TO CARE
39 M w/ hx of etoh cirrhosis c/b varices, hemorrhagic shock requiring over 30 U of prbc, presents to the ER w/ cp that is intermittent, SOB and cough. Pt states that he has cp that is worse w/ exertion, w/ associated nonproductive cough. He states that he has been having worsening abdominal distention, he states that he was supposed to have a paracentesis on Thursday however, due to insurance issues he was unable to obtain the procedure. Pt reports he is compliant w his home meds, folic acid, midodrine, pantoprazole. he states that he is compliant w/ dialysis MWF, through a R permacath and his last session was on Friday. He sees Dr. Greer (nephrology). Pt states that he has no fevers, no chills, + nausea w/ no emesis, On exam, pt is awake and alert w/ mild tachypnea, he has diminished R sided breath sounds to the mid lung field, he has mild lower limb edema 1+ bilaterally, he has a distended abdomen, w/ fluid wave, Suspect, a symptomatic pleural effusion, w/ abdominal distention.

## 2020-09-19 NOTE — H&P ADULT - PROBLEM SELECTOR PLAN 7
Transitions of Care Status:  1.  Name of PCP: None  2.  PCP Contacted on Admission: [ ] Y    [ ] N  [X] N/A  3.  PCP contacted at Discharge: [ ] Y    [ ] N    [ ] N/A  4.  Post-Discharge Appointment Date and Location:  5.  Summary of Handoff given to PCP: Transitions of Care Status:  1.  Name of PCP: None- Needs to establish care with PMD and hepatologist  2.  PCP Contacted on Admission: [ ] Y    [ ] N  [X] N/A  3.  PCP contacted at Discharge: [ ] Y    [ ] N    [ ] N/A  4.  Post-Discharge Appointment Date and Location:  5.  Summary of Handoff given to PCP:

## 2020-09-19 NOTE — H&P ADULT - NSHPREVIEWOFSYSTEMS_GEN_ALL_CORE
REVIEW OF SYSTEMS:    CONSTITUTIONAL: No fevers or chills  EYES/ENT: No visual changes; no throat pain  NECK: No pain or stiffness  RESPIRATORY: +dry cough, +SOB; no wheezing or hemoptysis  CARDIOVASCULAR: +mid-right CP, worsens by sitting up/leaning forward  GASTROINTESTINAL: +epigastric and lower abd pain; +nausea; No hematemesis; No diarrhea or constipation. No melena or hematochezia.  GENITOURINARY: +decreased urination; No dysuria or hematuria  NEUROLOGICAL: No numbness or weakness  SKIN: No itching, burning, rashes, or lesions   All other review of systems is negative unless indicated above.

## 2020-09-19 NOTE — CHART NOTE - NSCHARTNOTEFT_GEN_A_CORE
Mark Hellerman PGY-3  MAR  Dept. Internal Medicine    TO BE COMPLETED WITHIN 6 HOURS OF INITIAL ASSESSMENT:    For use in patients that have 2 sepsis criteria and new organ dysfunction   •	New or increased oxygen requirement  •	Creatinine >2mg/dL  •	Bilirubin>2mg/dL  •	Platelet <100,00/mm3  •	INR >1.5, PTT>60  •	Lactate >2    If patient persistent hypotension (SBP<90) or any lactate >4 then provider evaluation (Physician/PA/NP) within 30 minutes of bolus completion is required.    Vital Signs Last 24 Hrs  T(C): 36.4 (19 Sep 2020 20:52), Max: 36.7 (19 Sep 2020 14:45)  T(F): 97.6 (19 Sep 2020 20:52), Max: 98 (19 Sep 2020 14:45)  HR: 117 (19 Sep 2020 20:52) (117 - 123)  BP: 107/81 (19 Sep 2020 20:52) (107/81 - 132/62)  RR: 19 (19 Sep 2020 20:52) (16 - 22)  SpO2: 99% (19 Sep 2020 20:52) (96% - 99%)    LUNGS:  [  ]Clear bilaterally [  ] Wheeze [  ] Rhonchi [  ] Rales [X] Crackles; Other:  HEART: [  ]RRR [  ] No murmur[  ]  Normal S1S2[X] Tachycardia;  Other:  CAPILLARY REFILL:  	  Fingers: [X] less than 2 seconds [  ] more than 2 seconds  Toes: [X]  less than 2 seconds [  ] more than 2 seconds   PERIPHERAL PULSES:  Radial: [X] Palpable  [  ]  non-palpable  Dorsalis Pedis: [X] Palpable  [  ] non-palpable  Posterior Tibial: [X] Palpable  [  ] non-palpable  Other:  SKIN:   [  ]  Diaphoretic  [  ]  mottling  [  ]  Cold extremities  [X]  Warm [X]  Dry    Labs:  19 Sep 2020 15:56    135    |  99     |  15     ----------------------------<  106    4.6     |  27     |  4.93     Ca    8.6        19 Sep 2020 15:56  Phos  3.6       19 Sep 2020 15:56  Mg     1.8       19 Sep 2020 15:56    TPro  7.0    /  Alb  2.3    /  TBili  6.8    /  DBili  x      /  AST  102    /  ALT  27     /  AlkPhos  245    19 Sep 2020 15:56                          7.3    16.83 )-----------( 122      ( 19 Sep 2020 15:56 )             22.5     PT/INR - ( 19 Sep 2020 15:56 )   PT: 21.8 sec;   INR: 1.89 ratio         PTT - ( 19 Sep 2020 15:56 )  PTT:40.0 sec  Lactate: 3.4    Plan (orders must be placed in EMR):     [X] Check Repeat Lactate   [X] F/u diagnostic paracenteses labs and ascitic fluid Cx, BCx  [X] Obtain culture from R permacath to r/o potential source  [X] check UA to r/o urinary source given decreased UOP    Care Discussed with Consultants/Other Providers [X] YES  [ ] NO

## 2020-09-19 NOTE — H&P ADULT - PROBLEM SELECTOR PLAN 3
At presentation, dry cough and SOB for past couple of days. Desatted to 91% on RA in ED and was placed on 3L O2NC.  - CXR (9/18/20): showing b/l pleural effusions R>L, significantly worsened compared to CXR from 8/31/20  - likely 2/2 pleural effusions 2/2 fluid overload due to cirrhosis  - continue to monitor respiratory status and O2 sat  - wean off supplemental O2 as tolerated  - farrukh PRN  - plan for therapeutic paracentesis tomorrow At presentation, dry cough and SOB for past couple of days. Desatted to 91% on RA in ED and was placed on 3L O2NC.  - CXR (9/18/20): showing b/l pleural effusions R>L, significantly worsened compared to CXR from 8/31/20  - likely 2/2 pleural effusions 2/2 fluid overload due to cirrhosis vs possibly c/b PNA  - continue to monitor respiratory status and O2 sat  - wean off supplemental O2 as tolerated  - farrukh PRN  - plan for therapeutic paracentesis tomorrow At presentation, dry cough and SOB for past couple of days. Desatted to 91% on RA in ED and was placed on 3L O2NC.  - CXR (9/18/20): showing b/l pleural effusions R>L, significantly worsened compared to CXR from 8/31/20  - likely 2/2 pleural effusions 2/2 fluid overload due to cirrhosis vs possibly c/b PNA  - continue to monitor respiratory status and O2 sat  - wean off supplemental O2 as tolerated  - farrukh PRN  - plan for therapeutic paracentesis tomorrow   - consult IR in AM for therapeutic paracentesis, may need albumin marilynn-tap

## 2020-09-19 NOTE — H&P ADULT - NSHPOUTPATIENTPROVIDERS_GEN_ALL_CORE
PCP: None  Hepatology/Transplant: discharged previously to see Dr. Yesenia Funez but has not seen  Nephrology: Dr. Stanford Womack ([phone])

## 2020-09-19 NOTE — ED PROVIDER NOTE - SEVERE SEPSIS ALERT DETAILS
Attending MD Sharp:  The patient is fluid overloaded therefore 30cc/kg of IV fluid is contraindicated at this time.  Patient given appropriate volume of fluid for their clinical status and I will continue to monitor status.

## 2020-09-19 NOTE — H&P ADULT - PROBLEM SELECTOR PLAN 6
- DVT ppx: SCDs for now given recent hx of massive GIB  - Diet: regular, RENAL  - Dispo: likely home once medically optimized - DVT ppx: SCDs for now given recent hx of massive GIB  - Diet: RENAL  - Dispo: likely home once medically optimized

## 2020-09-19 NOTE — H&P ADULT - ASSESSMENT
38 yo M w/ hx of EtOH cirrhosis c/b gastric variceal bleeding in July 2020 s/p 34u pRBC, IR embolization and blakemore ballon, ESRD on HD (MWF), bladder rupture 2/2 fall s/p ex-lap in 2019, recent admission in earlier in Sept 2020 for hemorrhoidal bleed, now presenting with worsening abdominal pain/distention associated with cough and SOB x3 days.

## 2020-09-19 NOTE — H&P ADULT - PROBLEM SELECTOR PLAN 4
At presentation, reports mid-right sided CP x 2-3 days, worsening when sitting up or leaning forward  - possibly 2/2 pericarditis vs costochondritis vs PNA vs less likely ACS  - EKG (9/19) sinus tachy w/o signs of pericarditis  - Trops stable x2 at indeterminate level of 38  - continue to monitor, pain control At presentation, reports mid-right sided CP x 2-3 days, worsening when sitting up or leaning forward  - possibly 2/2 costochondritis vs PNA vs less likely ACS  - EKG (9/19) sinus tachy w/o signs of ischemia  - Trops stable x2 at indeterminate level of 38  - continue to monitor, pain control

## 2020-09-19 NOTE — H&P ADULT - PROBLEM SELECTOR PLAN 1
At presentation, tachy to 120s, WBC up to 16.83 (neutrophil pre-dominant) w/ lactate to 3.4. Currently afebrile. CXR (9/18/20): showing b/l pleural effusions R>L, significantly worsened compared to CXR from 8/31/20. Pt also with abdominal pain/distention in setting of cirrhosis.  - concern for SBP vs PNA  - s/p diagnostic paracentesis on 9/19  - s/p CTX 1g IV x1 in ED, c/w with CTX for now  - f/u diagnostic paracenteses labs and ascitic fluid Cx  - hold IVF for now given fluid overloaded state  - continue to monitor for fevers, WBC, lactate At presentation, tachy to 120s, WBC up to 16.83 (neutrophil pre-dominant) w/ lactate to 3.4. Currently afebrile. CXR (9/18/20): showing b/l pleural effusions R>L, significantly worsened compared to CXR from 8/31/20. Pt also with abdominal pain/distention in setting of cirrhosis.  - concern for SBP vs PNA  - s/p diagnostic paracentesis on 9/19  - s/p CTX 1g IV x1 in ED, c/w with CTX for now  - f/u diagnostic paracenteses labs and ascitic fluid Cx, BCx  - hold IVF for now given fluid overloaded state  - continue to monitor for fevers, WBC, lactate At presentation, tachy to 120s, WBC up to 16.83 (neutrophil pre-dominant) w/ lactate to 3.4. Currently afebrile. CXR (9/18/20): showing b/l pleural effusions R>L, significantly worsened compared to CXR from 8/31/20. Pt also with abdominal pain/distention in setting of cirrhosis.  - concern for SBP vs PNA  - s/p diagnostic paracentesis on 9/19  - s/p CTX 1g IV x1 in ED  - would keep with Vanco x1 and Zosyn renally dosed  - f/u diagnostic paracenteses labs and ascitic fluid Cx, BCx  - hold IVF for now given fluid overloaded state  - continue to monitor for fevers, WBC, lactate At presentation, afebrile but tachy to 120s, WBC up to 16.83 (neutrophil pre-dominant) w/ lactate to 3.4. Unclear source at this time. CXR (9/18/20): showing b/l pleural effusions unable to exclude consolidation. Pt also with abdominal pain/distention in setting of cirrhosis.   - differential includes PNA vs urinary source vs R permacath infection vs less likely SBP (given only 28 nucleated cell count on 9/19 diagnostic paracentesis)  - s/p CTX 1g IV x1 in ED  - broaden abx coverage for now with Vanco x1 and Zosyn renally dosed  - f/u diagnostic paracenteses labs and ascitic fluid Cx, BCx  - obtain culture from R permacath to r/o potential source  - check CT chest non contrast to assess for PNA  - check UA to r/o urinary source given decreased UOP  - hold IVF for now given fluid overloaded state  - continue to monitor for fevers, WBC, lactate Hx of EtOH cirrhosis c/b ascites and gastric variceal bleed in July 2020  - continue to monitor Hgb, Plt level, signs of bleeding  - maintain active T+S, transfuse to goal Hgb >7 (baseline Hgb ~8)   - continue to monitor CMP/LFTs, currently stable  - Reports being unable to get planned therapeutic paracentesis on 9/17/20 due to insurance issue; plan for therapeutic tap tomorrow  - consult IR in AM for therapeutic paracentesis, may need albumin marilynn-tap  - Hepatology consulted

## 2020-09-19 NOTE — ED PROVIDER NOTE - OBJECTIVE STATEMENT
38 yo M PMHx EtOH cirrhosis c/b bleeding gastric varices in July 2020 where he required 34upRBC, blakemore tube, acute RF requiring HD (MWF, last dialyzed friday) and on Midodrine, last EtOH use 7/4/20, presents to the ED for worsening abd distention, abd pain, and cough x3 days; pt was supposed to have a therapeutic about 3 days ago but was unable to because of insurance issues. Pt also c/o SOB. Denies CP, fever, chills, dysuria, dark stools, hematemesis. 40 yo M PMHx EtOH cirrhosis c/b bleeding gastric varices in July 2020 where he required 34upRBC, blakemore tube, acute RF requiring HD (MWF, last dialyzed friday) and on Midodrine, last EtOH use 7/4/20, presents to the ED for worsening abd distention, abd pain, CP, dizziness, and cough x2 days; pt was supposed to have a therapeutic about 3 days ago but was unable to because of insurance issues. Pt also c/o SOB, worse w/ exertion. Denies fever, chills, dysuria, dark stools, hematemesis.

## 2020-09-19 NOTE — H&P ADULT - ATTENDING COMMENTS
Patient assigned to me by night hospitalist in charge for management and care for patient for this evening only. Care to be resumed by day hospitalist in the morning and thereafter.   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 The patient had initially planned for outpatient paracentesis, however his insurance did not accept the provider. The patient has had progressive abdominal distension, with tightness and band-like discomfort radiating to the back, limiting deep inhalation. He endorses nausea and occasional red blood in his stool. He has been continuing hemodialysis sessions without complication, and his last session was on Friday. He denies any fever or chills. His last drink was on 7/4/2020. VS notable for tachycardia, and pt is on 3LNC currently. On exam, pt is A&O x 3, EOMI, mucous membranes moist, with icteric sclera. Lungs CTAB, cardiac exam with regular tachycardiac but no murmurs. Abdomen is distended with caput medusae, and positive fluid wave. Mild tenderness to palpation noted at lower quadrants. No asterixis on exam. No pedal edema.   Labs found leukcoytosis, anemia and thrombocytopenia. Pt has evidence of poor hepatic synthetic function- abnormal coags, low albumin. Tbili still elevated at 6.8. Liver enzmyes also elevated. Troponins without significant rise. UA negative. CXR with pleural effusions on preliminary read. Paracentesis fluid found <250 nucleated cells.     Agree with plan noted above.  IR consult in AM for possible therapeutic paracentesis. Will require albumin with paracentesis. Keep NPO for now.  F/u BCx to assess for infection driving tachycardia and leukocytosis. R permacath is a potential source of infection. Continue zosyn. Continue vancomycin by level due to ESRD.Pt  has audible cough- will check CT chest noncontrast to r/o concurrent PNA. Pt has dyspnea, may be partially due to known pleural effusions.   Consult nephrology to resume inpatient HD. Monitor phosphorus.  Continue midodrine for hypotension.  Consult hepatology team. Pt is currently arranging to start AA sessions to meet criteria for transplant list. MELD score is 34. Pt is not on nadolol, diuretics due to hypotension.   Avoid nephrotoxic or hepatotoxic agents.

## 2020-09-19 NOTE — H&P ADULT - PROBLEM SELECTOR PLAN 2
Hx of EtOH cirrhosis c/b ascites and gastric variceal bleed in July 2020  - continue to monitor Hgb (baseline Hgb ~8) and signs of bleeding  - Reports being unable to get planned therapeutic paracentesis on 9/17/20 due to insurance issue; plan for therapeutic tap tomorrow  - Consider Hepatology consult Hx of EtOH cirrhosis c/b ascites and gastric variceal bleed in July 2020  - continue to monitor Hgb (baseline Hgb ~8) and signs of bleeding  - maintain active T+S  - Reports being unable to get planned therapeutic paracentesis on 9/17/20 due to insurance issue; plan for therapeutic tap tomorrow  - Consider Hepatology consult Hx of EtOH cirrhosis c/b ascites and gastric variceal bleed in July 2020  - continue to monitor Hgb, Plt level, signs of bleeding  - maintain active T+S, transfuse to goal Hgb >7 (baseline Hgb ~8)   - Reports being unable to get planned therapeutic paracentesis on 9/17/20 due to insurance issue; plan for therapeutic tap tomorrow  - Hepatology consulted Hx of EtOH cirrhosis c/b ascites and gastric variceal bleed in July 2020  - continue to monitor Hgb, Plt level, signs of bleeding  - maintain active T+S, transfuse to goal Hgb >7 (baseline Hgb ~8)   - Reports being unable to get planned therapeutic paracentesis on 9/17/20 due to insurance issue; plan for therapeutic tap tomorrow  - consult IR in AM for therapeutic paracentesis, may need albumin marilynn-tap  - Hepatology consulted Hx of EtOH cirrhosis c/b ascites and gastric variceal bleed in July 2020  - continue to monitor Hgb, Plt level, signs of bleeding  - maintain active T+S, transfuse to goal Hgb >7 (baseline Hgb ~8)   - continue to monitor CMP/LFTs, currently stable  - Reports being unable to get planned therapeutic paracentesis on 9/17/20 due to insurance issue; plan for therapeutic tap tomorrow  - consult IR in AM for therapeutic paracentesis, may need albumin marilynn-tap  - Hepatology consulted At presentation, afebrile but tachy to 120s, WBC up to 16.83 (neutrophil pre-dominant) w/ lactate to 3.4. Unclear source at this time. CXR (9/18/20): showing b/l pleural effusions unable to exclude consolidation. Pt also with abdominal pain/distention in setting of cirrhosis.   - differential includes PNA vs urinary source vs R permacath infection vs less likely SBP (given only 28 nucleated cell count on 9/19 diagnostic paracentesis)  - s/p CTX 1g IV x1 in ED  - broaden abx coverage for now with Vanco x1 and Zosyn renally dosed  - f/u diagnostic paracenteses labs and ascitic fluid Cx, BCx  - obtain culture from R permacath to r/o potential source  - check CT chest non contrast to assess for PNA  - check UA to r/o urinary source given decreased UOP  - hold IVF for now given fluid overloaded state  - continue to monitor for fevers, WBC, lactate

## 2020-09-19 NOTE — H&P ADULT - NSHPLABSRESULTS_GEN_ALL_CORE
LABS:                        7.3    16.83 )-----------( 122      ( 19 Sep 2020 15:56 )             22.5     09-19    135  |  99  |  15  ----------------------------<  106<H>  4.6   |  27  |  4.93<H>    Ca    8.6      19 Sep 2020 15:56  Phos  3.6     09-19  Mg     1.8     09-19    TPro  7.0  /  Alb  2.3<L>  /  TBili  6.8<H>  /  DBili  x   /  AST  102<H>  /  ALT  27  /  AlkPhos  245<H>  09-19    PT/INR - ( 19 Sep 2020 15:56 )   PT: 21.8 sec;   INR: 1.89 ratio         PTT - ( 19 Sep 2020 15:56 )  PTT:40.0 sec        RADIOLOGY & ADDITIONAL TESTS:  Results Reviewed: Yes  Imaging Personally Reviewed:   < from: Xray Chest 1 View- PORTABLE-Urgent (Xray Chest 1 View- PORTABLE-Urgent .) (09.19.20 @ 15:50) >  Bilateral, right greater than left, pleural effusions with associated atelectasis. Overlying infection is not excluded.  < end of copied text >    Electrocardiogram Personally Reviewed:   EKG (9/19): sinus tachy    COORDINATION OF CARE:  Care Discussed with Consultants/Other Providers [Y/N]:  Prior or Outpatient Records Reviewed [Y/N]: LABS and CXR personally reviewed by me.                         7.3    16.83 )-----------( 122      ( 19 Sep 2020 15:56 )             22.5     09-19    135  |  99  |  15  ----------------------------<  106<H>  4.6   |  27  |  4.93<H>    Ca    8.6      19 Sep 2020 15:56  Phos  3.6     09-19  Mg     1.8     09-19    TPro  7.0  /  Alb  2.3<L>  /  TBili  6.8<H>  /  DBili  x   /  AST  102<H>  /  ALT  27  /  AlkPhos  245<H>  09-19    PT/INR - ( 19 Sep 2020 15:56 )   PT: 21.8 sec;   INR: 1.89 ratio         PTT - ( 19 Sep 2020 15:56 )  PTT:40.0 sec        RADIOLOGY & ADDITIONAL TESTS:  Results Reviewed: Yes  Imaging Personally Reviewed:   < from: Xray Chest 1 View- PORTABLE-Urgent (Xray Chest 1 View- PORTABLE-Urgent .) (09.19.20 @ 15:50) >  Bilateral, right greater than left, pleural effusions with associated atelectasis. Overlying infection is not excluded.  < end of copied text >    Electrocardiogram Personally Reviewed:   EKG (9/19): sinus tachy    COORDINATION OF CARE:  Care Discussed with Consultants/Other Providers [Y/N]:  Prior or Outpatient Records Reviewed [Y/N]:

## 2020-09-19 NOTE — H&P ADULT - NSHPPHYSICALEXAM_GEN_ALL_CORE
GENERAL: NAD, well-developed  HEENT: EOMI, PERRLA, conjunctiva and sclera clear  NECK: Supple, No JVD  CHEST/LUNG: Crackles and decreased breath sounds in R lung field extending from base to mid-lung; mild L basilar crackles; No wheezes or rales; R permacath c/d/i  HEART: Tachycardic, regular rhythm; No murmurs, rubs, or gallops appreciated  ABDOMEN: +BS, soft, distended, mild diffuse TTP, +fluid wave  EXTREMITIES:  2+ Peripheral Pulses, No clubbing or cyanosis; trace b/l LE edema  PSYCH: AAOx3  NEUROLOGY: non-focal  SKIN: No rashes or lesions GENERAL: NAD, well-developed  HEENT: EOMI, PERRLA, icteric sclera  NECK: Supple, No JVD  CHEST/LUNG: Crackles and decreased breath sounds in R lung field extending from base to mid-lung; mild L basilar crackles; No wheezes or rales; R permacath c/d/i  HEART: Tachycardic, regular rhythm; No murmurs, rubs, or gallops appreciated  ABDOMEN: +BS, soft, distended, mild diffuse TTP, +fluid wave  EXTREMITIES:  2+ Peripheral Pulses, No clubbing or cyanosis; trace b/l LE edema  PSYCH: AAOx3  NEUROLOGY: non-focal  SKIN: jaundiced; no rashes or lesions Vital Signs Last 24 Hrs  T(C): 36.6 (20 Sep 2020 05:36), Max: 36.7 (19 Sep 2020 14:45)  T(F): 97.8 (20 Sep 2020 05:36), Max: 98 (19 Sep 2020 14:45)  HR: 112 (20 Sep 2020 05:36) (111 - 123)  BP: 97/50 (20 Sep 2020 05:36) (97/50 - 132/62)  BP(mean): --  RR: 14 (20 Sep 2020 05:36) (14 - 22)  SpO2: 96% (20 Sep 2020 05:36) (96% - 99%)    GENERAL: NAD, well-developed  HEENT: EOMI, PERRLA, icteric sclera  NECK: Supple, No JVD  CHEST/LUNG: Crackles and decreased breath sounds in R lung field extending from base to mid-lung; mild L basilar crackles; No wheezes or rales; R permacath c/d/i  HEART: Tachycardic, regular rhythm; No murmurs, rubs, or gallops appreciated  ABDOMEN: +BS, soft, distended, mild diffuse TTP, +fluid wave  EXTREMITIES:  2+ Peripheral Pulses, No clubbing or cyanosis; trace b/l LE edema  PSYCH: AAOx3, calm, logical thought process  NEUROLOGY: conversant, CN II -XII intact, moves extremities voluntarily, follows commands  SKIN: jaundiced; no rashes or lesions

## 2020-09-20 NOTE — PROGRESS NOTE ADULT - PROBLEM SELECTOR PLAN 2
At presentation, afebrile but tachy to 120s, WBC up to 16.83 (neutrophil pre-dominant) w/ lactate to 3.4. Unclear source at this time. CXR (9/18/20): showing b/l pleural effusions unable to exclude consolidation. Pt also with abdominal pain/distention in setting of cirrhosis.   - differential includes PNA vs urinary source vs R permacath infection vs less likely SBP (given only 28 nucleated cell count on 9/19 diagnostic paracentesis)  - s/p CTX 1g IV x1 in ED  - broaden abx coverage for now with Vanco x1 and Zosyn renally dosed  - f/u diagnostic paracenteses labs and ascitic fluid Cx, BCx  - obtain culture from R permacath to r/o potential source  - check CT chest non contrast to assess for PNA  - check UA to r/o urinary source given decreased UOP  - hold IVF for now given fluid overloaded state  - continue to monitor for fevers, WBC, lactate At presentation, afebrile but tachy to 120s, WBC up to 16.83 (neutrophil pre-dominant) w/ lactate to 3.4. Unclear source at this time. CXR (9/18/20): showing b/l pleural effusions unable to exclude consolidation. Pt also with abdominal pain/distention in setting of cirrhosis.   - differential includes PNA vs urinary source vs R permacath infection vs less likely SBP (given only 28 nucleated cell count on 9/19 diagnostic paracentesis)  - s/p CTX 1g IV x1 in ED  - broaden abx coverage for now with Zosyn renally dosed  - f/u diagnostic paracenteses labs and ascitic fluid Cx, BCx  - obtain culture from R permacath to r/o potential source  - check UA to r/o urinary source given decreased UOP  - f/u RVP  - hold IVF for now given fluid overloaded state  - continue to monitor for fevers, WBC, lactate

## 2020-09-20 NOTE — CONSULT NOTE ADULT - ASSESSMENT
39M w/ decompensated EtOH cirrhosis, c/b gastric varices, recent admission (7/2020) for hemorrhagic shock from gastric varix requiring 25u pRBC, s/p glue injection and PARTO by IR, acute renal failure requiring new HD, and ETOH withdrawal seizure, traumatic bladder rupture 2/2 fall s/p ex-lap in 2019, presented for worsening abdominal girth.    Impression:  # Abdominal pain - similar to prior in the settings of distention and ascites accumulation, low suspicion for SBP.  #Cirrhosis due to EtOH, decompensated by ascites and variceal bleed  - varices: Grade 1 esophageal varices 9/20, actively bleeding gastric varix s/p glue injection and PARTO by IR  - ascites: present on exam, neg for SBP 7/26, not on diuretics   - HE: none on exam  - HCC: no imaging, MRI without contrast is limited  - MELD-Na = ***  - Transplant evaluation is undergoing, pt has already joined online EtOH rehab and reports commitment to continued attendance    # Anuric MARQUISE 2/2 ATN, currently dialysis dependent - continue with dialysis    Recommendations:  - Please perform therapeutic paracentesis, send fluid cell count, protein, gram stain  - Check urine PEth ( ethanol metabolite in urine)  - Transfuse blood to keep hgb between 7-8  - HD as per renal recs  - trend CMP, INR daily    Thank you for involving us in the care of this patient. Please reach out if any further questions.    Erik Perea, PGY-4  Hepatology Fellow    Available on [employer] Teams  Pager [phone] ([facility]) or [phone] ([facility])  After 5PM/Weekends, please contact the on-call GI fellow: [phone]   39M w/ decompensated EtOH cirrhosis, c/b gastric varices, recent admission (7/2020) for hemorrhagic shock from gastric varix requiring 25u pRBC, s/p glue injection and PARTO by IR, acute renal failure requiring new HD, and ETOH withdrawal seizure, traumatic bladder rupture 2/2 fall s/p ex-lap in 2019, presented for worsening abdominal girth.    Impression:  # Abdominal pain - similar to prior in the settings of distention and ascites accumulation, low suspicion for SBP.  # Cirrhosis due to EtOH, decompensated by ascites and variceal bleed  - varices: Grade 1 esophageal varices 9/20, actively bleeding gastric varix s/p glue injection and PARTO by IR  - ascites: present on exam, neg for SBP 7/26, not on diuretics   - HE: none on exam  - HCC: no imaging, MRI without contrast is limited  - MELD-Na = 36  - Transplant evaluation is undergoing, pt has already joined online EtOH rehab and reports commitment to continued attendance    # Anuric MARQUISE 2/2 ATN, currently dialysis dependent - continue with dialysis    Recommendations:  - Please perform therapeutic paracentesis  - IR consult for TIPS  - Check urine PEth ( ethanol metabolite in urine)  - Transfuse blood to keep hgb between 7-8  - HD as per renal recs  - trend CMP, INR daily    Thank you for involving us in the care of this patient. Please reach out if any further questions.    Erik Perea, PGY-4  Hepatology Fellow    Available on Microsoft Teams  Pager [phone] ([facility]) or [phone] ([facility])  After 5PM/Weekends, please contact the on-call GI fellow: [phone]

## 2020-09-20 NOTE — PROVIDER CONTACT NOTE (CRITICAL VALUE NOTIFICATION) - BACKGROUND
admitted for SOB & abd pain, b/l pleural effusions & abd ascites admitted for SOB & abd pain, b/l pleural effusions & abd ascites, pending IR for paracentesis

## 2020-09-20 NOTE — PROGRESS NOTE ADULT - PROBLEM SELECTOR PLAN 4
At presentation, reports mid-right sided CP x 2-3 days, worsening when sitting up or leaning forward  - possibly 2/2 costochondritis vs PNA vs less likely ACS  - EKG (9/19) sinus tachy w/o signs of ischemia  - Trops stable x2 at indeterminate level of 38  - continue to monitor, pain control

## 2020-09-20 NOTE — PROGRESS NOTE ADULT - PROBLEM SELECTOR PLAN 1
Hx of EtOH cirrhosis c/b ascites and gastric variceal bleed in July 2020  - continue to monitor Hgb, Plt level, signs of bleeding  - maintain active T+S, transfuse to goal Hgb >7 (baseline Hgb ~8)   - continue to monitor CMP/LFTs, currently stable  - Reports being unable to get planned therapeutic paracentesis on 9/17/20 due to insurance issue; plan for therapeutic tap tomorrow  - consult IR in AM for therapeutic paracentesis, may need albumin marilynn-tap  - Hepatology consulted Hx of EtOH cirrhosis c/b ascites and gastric variceal bleed in July 2020  - continue to monitor Hgb, Plt level, signs of bleeding  - maintain active T+S, transfuse to goal Hgb >7 (baseline Hgb ~8)   - continue to monitor CMP/LFTs, currently stable  - Reports being unable to get planned therapeutic paracentesis on 9/17/20 due to insurance issue; plan for therapeutic tap tomorrow.  - consult IR in AM for therapeutic paracentesis, may need albumin marilynn-tap  - Hepatology consulted - pending full note

## 2020-09-20 NOTE — PROGRESS NOTE ADULT - PROBLEM SELECTOR PLAN 7
Transitions of Care Status:  1.  Name of PCP: None- Needs to establish care with PMD and hepatologist  2.  PCP Contacted on Admission: [ ] Y    [ ] N  [X] N/A  3.  PCP contacted at Discharge: [ ] Y    [ ] N    [ ] N/A  4.  Post-Discharge Appointment Date and Location:  5.  Summary of Handoff given to PCP:

## 2020-09-20 NOTE — CONSULT NOTE ADULT - SUBJECTIVE AND OBJECTIVE BOX
Chief Complaint:  Patient is a 39y old  Male who presents with a chief complaint of Abdominal pain/distention (19 Sep 2020 20:12)    HPI:  40 yo M w/ hx of EtOH cirrhosis c/b gastric variceal bleeding in July 2020 s/p 34u pRBC, IR embolization and blakemore ballon, ESRD on HD (MWF), bladder rupture 2/2 fall s/p ex-lap in 2019, recent admission in earlier in Sept 2020 for hemorrhoidal bleed, now presenting with worsening abdominal distention and pain x3 days.     Patient reports worsening abdominal distention and increased girth, similar in nature to his previous abdominal pains that were subsequently relieved with therapeutic paracenteses. Pt was supposed to get an outpatient therapeutic tap on 9/17, but was unable to because of an insurance issue and the outpatient clinic's inability to handle potential complications, and was suggested to come to the hospital to get it done. Denies fever, chills, SOB, chest pain, rash. No melena, hematochezia, hematemesis. No constipation or diarrhea. Endorses some dy cough intermittently, no rhinorrhea. No sick contacts.      In ED, pt afebrile Tmax 98F, -132/62-80, -123, RR 16-22, initially sating 96% on RA but then reportedly desated to 91% and was put on 3L O2NC. Labs significant for WBC 16.8, Hgb 7.3, Plt 122, INR 1.89, SCr 4.93, Alk Phos 245, , ALT 27, Serum BNP 2726, VBG lactate 3.4. Trop x2 stable at indeterminate value of 38. EKG sinus tach. CXR showing b/l pleural effusions R>L. s/p diagnostic tap, CTX 1g x1, and Lorazepam 1mg PO x1.     Allergies:  No Known Allergies    Home Medications:  multivitamin: 1 tab(s) orally once a day (19 Sep 2020 23:38)    Hospital Medications:  folic acid 1 milliGRAM(s) Oral daily  influenza   Vaccine 0.5 milliLiter(s) IntraMuscular once  midodrine 10 milliGRAM(s) Oral three times a day  multivitamin 1 Tablet(s) Oral daily  pantoprazole    Tablet 40 milliGRAM(s) Oral before breakfast  piperacillin/tazobactam IVPB.. 3.375 Gram(s) IV Intermittent every 12 hours      PMHX/PSHX:  End-stage renal disease (ESRD)    Cirrhosis, alcoholic    No pertinent past medical history    S/P exploratory laparotomy    No significant past surgical history        Family history:  FH: alpha 1 antitrypsin deficiency    No pertinent family history in first degree relatives        Denies family history of colon cancer/polyps, stomach cancer/polyps, pancreatic cancer/masses, liver cancer/disease, ovarian cancer and endometrial cancer.    Social History:     Tob: Denies  EtOH: Denies  Illicit Drugs: Denies    ROS:   ***  General:  No wt loss, fevers, chills, night sweats, fatigue  Eyes:  Good vision, no reported pain  ENT:  No sore throat, pain, runny nose, dysphagia  CV:  No pain, palpitations, hypo/hypertension  Pulm:  No dyspnea, cough, tachypnea, wheezing  GI:  No pain, No nausea, No vomiting, No diarrhea, No constipation, No weight loss, No fever, No pruritis, No rectal bleeding, No tarry stools, No dysphagia,  :  No pain, bleeding, incontinence, nocturia  Muscle:  No pain, weakness  Neuro:  No weakness, tingling, memory problems  Psych:  No fatigue, insomnia, mood problems, depression  Endocrine:  No polyuria, polydipsia, cold/heat intolerance  Heme:  No petechiae, ecchymosis, easy bruisability  Skin:  No rash, tattoos, scars, edema    PHYSICAL EXAM:   ***  GENERAL:  No acute distress  HEENT:  Normocephalic/atraumatic, no scleral icterus  CHEST:  Clear to auscultation bilaterally, no wheezes/rales/ronchi, no accessory muscle use  HEART:  Regular rate and rhythm, no murmurs/rubs/gallops  ABDOMEN:  Soft, non-tender, non-distended, normoactive bowel sounds,  no masses, no hepato-splenomegaly, no signs of chronic liver disease  EXTREMITIES: No cyanosis, clubbing, or edema  SKIN:  No rash/erythema/ecchymoses/petechiae/wounds/abscess/warm/dry  NEURO:  Alert and oriented x 3, no asterixis    Vital Signs:  Vital Signs Last 24 Hrs  T(C): 36.6 (20 Sep 2020 05:36), Max: 36.7 (19 Sep 2020 14:45)  T(F): 97.8 (20 Sep 2020 05:36), Max: 98 (19 Sep 2020 14:45)  HR: 112 (20 Sep 2020 05:36) (111 - 123)  BP: 97/50 (20 Sep 2020 05:36) (97/50 - 132/62)  BP(mean): --  RR: 14 (20 Sep 2020 05:36) (14 - 22)  SpO2: 96% (20 Sep 2020 05:36) (96% - 99%)  Daily Height in cm: 185.42 (19 Sep 2020 14:45)    Daily     LABS:                        7.3    16.83 )-----------( 122      ( 19 Sep 2020 15:56 )             22.5     Mean Cell Volume: 104.2 fl (09-19-20 @ 15:56)    09-19    135  |  99  |  15  ----------------------------<  106<H>  4.6   |  27  |  4.93<H>    Ca    8.6      19 Sep 2020 15:56  Phos  3.6     09-19  Mg     1.8     09-19    TPro  7.0  /  Alb  2.3<L>  /  TBili  6.8<H>  /  DBili  x   /  AST  102<H>  /  ALT  27  /  AlkPhos  245<H>  09-19    LIVER FUNCTIONS - ( 19 Sep 2020 15:56 )  Alb: 2.3 g/dL / Pro: 7.0 g/dL / ALK PHOS: 245 U/L / ALT: 27 U/L / AST: 102 U/L / GGT: x           PT/INR - ( 20 Sep 2020 06:34 )   PT: 24.5 sec;   INR: 2.13 ratio         PTT - ( 20 Sep 2020 06:34 )  PTT:42.7 sec    Amylase Serum--      Lipase serum--       [id number]                          7.3    16.83 )-----------( 122      ( 19 Sep 2020 15:56 )             22.5       Imaging:           Chief Complaint:  Patient is a 39y old  Male who presents with a chief complaint of Abdominal pain/distention (19 Sep 2020 20:12)    HPI:  38 yo M w/ hx of EtOH cirrhosis c/b gastric variceal bleeding in July 2020 s/p 34u pRBC, IR embolization and blakemore ballon, ESRD on HD (MWF), bladder rupture 2/2 fall s/p ex-lap in 2019, recent admission in earlier in Sept 2020 for hemorrhoidal bleed, now presenting with worsening abdominal distention and pain x3 days.     Patient reports worsening abdominal distention and increased girth, similar in nature to his previous abdominal pains that were subsequently relieved with therapeutic paracenteses. Pt was supposed to get an outpatient therapeutic tap on 9/17, but was unable to because of an insurance issue and the outpatient clinic's inability to handle potential complications, and was suggested to come to the hospital to get it done. Denies fever, chills, SOB, chest pain, rash. No melena, hematochezia, hematemesis. No constipation or diarrhea. Endorses some dy cough intermittently, no rhinorrhea. No sick contacts.      In ED, pt afebrile Tmax 98F, -132/62-80, -123, RR 16-22, initially sating 96% on RA but then reportedly desated to 91% and was put on 3L O2NC. Labs significant for WBC 16.8, Hgb 7.3, Plt 122, INR 1.89, SCr 4.93, Alk Phos 245, , ALT 27, Serum BNP 2726, VBG lactate 3.4. Trop x2 stable at indeterminate value of 38. EKG sinus tach. CXR showing b/l pleural effusions R>L. s/p diagnostic tap, CTX 1g x1, and Lorazepam 1mg PO x1.     Allergies:  No Known Allergies    Home Medications:  multivitamin: 1 tab(s) orally once a day (19 Sep 2020 23:38)    Hospital Medications:  folic acid 1 milliGRAM(s) Oral daily  influenza   Vaccine 0.5 milliLiter(s) IntraMuscular once  midodrine 10 milliGRAM(s) Oral three times a day  multivitamin 1 Tablet(s) Oral daily  pantoprazole    Tablet 40 milliGRAM(s) Oral before breakfast  piperacillin/tazobactam IVPB.. 3.375 Gram(s) IV Intermittent every 12 hours      PMHX/PSHX:  End-stage renal disease (ESRD)    Cirrhosis, alcoholic    No pertinent past medical history    S/P exploratory laparotomy    No significant past surgical history        Family history:  FH: alpha 1 antitrypsin deficiency    No pertinent family history in first degree relatives        Denies family history of colon cancer/polyps, stomach cancer/polyps, pancreatic cancer/masses, liver cancer/disease, ovarian cancer and endometrial cancer.    Social History:     Tob: Denies  EtOH: Denies  Illicit Drugs: Denies    ROS:   General:  No wt loss, fevers, chills, night sweats, fatigue  Eyes:  Good vision, no reported pain  ENT:  No sore throat, pain, runny nose, dysphagia  CV:  No pain, palpitations, hypo/hypertension  Pulm:  No dyspnea, cough, tachypnea, wheezing  GI:  As above  :  No pain, bleeding, incontinence, nocturia  Muscle:  No pain, weakness  Neuro:  No weakness, tingling, memory problems  Psych:  No fatigue, insomnia, mood problems, depression  Endocrine:  No polyuria, polydipsia, cold/heat intolerance  Heme:  No petechiae, ecchymosis, easy bruisability  Skin:  No rash, tattoos, scars, edema    PHYSICAL EXAM:   GENERAL:  No acute distress  HEENT:  Normocephalic/atraumatic, +scleral icterus  CHEST:  Clear to auscultation bilaterally  HEART:  Regular rate and rhythm, no murmurs/rubs/gallops  ABDOMEN:  Soft, distended, non tender, normoactive bowel sounds,  no masses, no hepato-splenomegaly,  EXTREMITIES: No cyanosis, clubbing, or edema  SKIN:  No rash/erythema/ecchymoses/petechiae/wounds/abscess/warm/dry  NEURO:  Alert and oriented x 3, no asterixis    Vital Signs:  Vital Signs Last 24 Hrs  T(C): 36.6 (20 Sep 2020 05:36), Max: 36.7 (19 Sep 2020 14:45)  T(F): 97.8 (20 Sep 2020 05:36), Max: 98 (19 Sep 2020 14:45)  HR: 112 (20 Sep 2020 05:36) (111 - 123)  BP: 97/50 (20 Sep 2020 05:36) (97/50 - 132/62)  BP(mean): --  RR: 14 (20 Sep 2020 05:36) (14 - 22)  SpO2: 96% (20 Sep 2020 05:36) (96% - 99%)  Daily Height in cm: 185.42 (19 Sep 2020 14:45)    Daily     LABS:                        7.3    16.83 )-----------( 122      ( 19 Sep 2020 15:56 )             22.5     Mean Cell Volume: 104.2 fl (09-19-20 @ 15:56)    09-19    135  |  99  |  15  ----------------------------<  106<H>  4.6   |  27  |  4.93<H>    Ca    8.6      19 Sep 2020 15:56  Phos  3.6     09-19  Mg     1.8     09-19    TPro  7.0  /  Alb  2.3<L>  /  TBili  6.8<H>  /  DBili  x   /  AST  102<H>  /  ALT  27  /  AlkPhos  245<H>  09-19    LIVER FUNCTIONS - ( 19 Sep 2020 15:56 )  Alb: 2.3 g/dL / Pro: 7.0 g/dL / ALK PHOS: 245 U/L / ALT: 27 U/L / AST: 102 U/L / GGT: x           PT/INR - ( 20 Sep 2020 06:34 )   PT: 24.5 sec;   INR: 2.13 ratio         PTT - ( 20 Sep 2020 06:34 )  PTT:42.7 sec    Amylase Serum--      Lipase serum--       [id number]                          7.3    16.83 )-----------( 122      ( 19 Sep 2020 15:56 )             22.5

## 2020-09-20 NOTE — PROGRESS NOTE ADULT - PROBLEM SELECTOR PLAN 3
At presentation, dry cough and SOB for past couple of days. Desatted to 91% on RA in ED and was placed on 3L O2NC.  - CXR (9/18/20): showing b/l pleural effusions R>L, significantly worsened compared to CXR from 8/31/20  - likely 2/2 pleural effusions 2/2 fluid overload due to cirrhosis vs possibly c/b PNA  - continue to monitor respiratory status and O2 sat  - wean off supplemental O2 as tolerated  - farrukh PRN  - plan for therapeutic paracentesis tomorrow   - consult IR in AM for therapeutic paracentesis, may need albumin marilynn-tap

## 2020-09-20 NOTE — PROGRESS NOTE ADULT - PROBLEM SELECTOR PLAN 6
- DVT ppx: SCDs for now given recent hx of massive GIB  - Diet: RENAL  - Dispo: likely home once medically optimized - DVT ppx: SCDs for now given recent hx of massive GIB  - Diet: RENAL. NPO past midnight for tap tomorrow.  - Dispo: likely home once medically optimized

## 2020-09-20 NOTE — PROGRESS NOTE ADULT - PROBLEM SELECTOR PLAN 5
- on MWF HD via R permacath, makes urine at baseline  - last session on 9/18 without complication  - continue to monitor UOP, SCr, and eGFR  - pt reporting decreased UOP from baseline, will check bladder scan  - Nephrology consult in AM - on MWF HD via R permacath, makes urine at baseline  - last session on Fri 9/18 without complication  - continue to monitor UOP, SCr, and eGFR  - nephrology aware, to see pt tomorrow 9/21 for HD  - pt reporting decreased UOP from baseline, will check bladder scan  - Nephrology consult in AM

## 2020-09-20 NOTE — PROGRESS NOTE ADULT - SUBJECTIVE AND OBJECTIVE BOX
INTERNAL MEDICINE PROGRESS NOTE    Patient is a 39y old  Male who presents with a chief complaint of Abdominal pain/distention (20 Sep 2020 07:20)      Overnight events:  Subjective:    Medications:  MEDICATIONS  (STANDING):  folic acid 1 milliGRAM(s) Oral daily  influenza   Vaccine 0.5 milliLiter(s) IntraMuscular once  midodrine 10 milliGRAM(s) Oral three times a day  multivitamin 1 Tablet(s) Oral daily  pantoprazole    Tablet 40 milliGRAM(s) Oral before breakfast  piperacillin/tazobactam IVPB.. 3.375 Gram(s) IV Intermittent every 12 hours    MEDICATIONS  (PRN):      Objective:    Vitals: Vital Signs Last 24 Hrs  T(C): 36.6 (09-20-20 @ 05:36), Max: 36.7 (09-19-20 @ 14:45)  T(F): 97.8 (09-20-20 @ 05:36), Max: 98 (09-19-20 @ 14:45)  HR: 112 (09-20-20 @ 05:36) (111 - 123)  BP: 97/50 (09-20-20 @ 05:36) (97/50 - 132/62)  BP(mean): --  RR: 14 (09-20-20 @ 05:36) (14 - 22)  SpO2: 96% (09-20-20 @ 05:36) (96% - 99%)              I&O's Summary    19 Sep 2020 07:01  -  20 Sep 2020 07:00  --------------------------------------------------------  IN: 350 mL / OUT: 0 mL / NET: 350 mL        PHYSICAL EXAM:  GENERAL: NAD, conversant  CHEST/LUNG: Clear to auscultation bilaterally; No crackles, rhonchi, wheezing, or rubs  HEART: Regular rate and rhythm; No murmurs, rubs, or gallops  ABDOMEN: Soft, Nontender, Nondistended; Bowel sounds present  EXTREMITIES:  2+ Peripheral Pulses, No clubbing, cyanosis, or edema  SKIN: No rashes or lesions  NERVOUS SYSTEM:  Alert & Oriented, Good concentration                                                                                    LABS:  09-20    136  |  99  |  17  ----------------------------<  93  4.6   |  27  |  5.72<H>  09-19    135  |  99  |  15  ----------------------------<  106<H>  4.6   |  27  |  4.93<H>    Ca    8.6      20 Sep 2020 06:33  Ca    8.6      19 Sep 2020 15:56  Phos  4.7     09-20  Mg     1.8     09-20    TPro  6.1  /  Alb  2.1<L>  /  TBili  6.5<H>  /  DBili  x   /  AST  95<H>  /  ALT  25  /  AlkPhos  211<H>  09-20  TPro  7.0  /  Alb  2.3<L>  /  TBili  6.8<H>  /  DBili  x   /  AST  102<H>  /  ALT  27  /  AlkPhos  245<H>  09-19      PT/INR - ( 20 Sep 2020 06:34 )   PT: 24.5 sec;   INR: 2.13 ratio         PTT - ( 20 Sep 2020 06:34 )  PTT:42.7 sec                                              7.0    12.89 )-----------( 118      ( 20 Sep 2020 06:34 )             22.1                         7.3    16.83 )-----------( 122      ( 19 Sep 2020 15:56 )             22.5     CAPILLARY BLOOD GLUCOSE            RECENT CULTURES:  09-20 @ 00:51  .Body Fluid Peritoneal Fluid  --  --  --  --  --     RADIOLOGY & ADDITIONAL TESTS: N  Consultants involved in case: N         INTERNAL MEDICINE PROGRESS NOTE    Patient is a 39y old  Male who presents with a chief complaint of Abdominal pain/distention (20 Sep 2020 07:20)      Overnight events:  Subjective:    Medications:  MEDICATIONS  (STANDING):  folic acid 1 milliGRAM(s) Oral daily  influenza   Vaccine 0.5 milliLiter(s) IntraMuscular once  midodrine 10 milliGRAM(s) Oral three times a day  multivitamin 1 Tablet(s) Oral daily  pantoprazole    Tablet 40 milliGRAM(s) Oral before breakfast  piperacillin/tazobactam IVPB.. 3.375 Gram(s) IV Intermittent every 12 hours    MEDICATIONS  (PRN):      Objective:    Vitals: Vital Signs Last 24 Hrs  T(C): 36.6 (09-20-20 @ 05:36), Max: 36.7 (09-19-20 @ 14:45)  T(F): 97.8 (09-20-20 @ 05:36), Max: 98 (09-19-20 @ 14:45)  HR: 112 (09-20-20 @ 05:36) (111 - 123)  BP: 97/50 (09-20-20 @ 05:36) (97/50 - 132/62)  BP(mean): --  RR: 14 (09-20-20 @ 05:36) (14 - 22)  SpO2: 96% (09-20-20 @ 05:36) (96% - 99%)              I&O's Summary    19 Sep 2020 07:01  -  20 Sep 2020 07:00  --------------------------------------------------------  IN: 350 mL / OUT: 0 mL / NET: 350 mL        PHYSICAL EXAM:  GENERAL: NAD, conversant  CHEST/LUNG: Clear to auscultation bilaterally; No crackles, rhonchi, wheezing, or rubs  HEART: Regular rate and rhythm; No murmurs, rubs, or gallops  ABDOMEN: Soft, Nontender, Nondistended; Bowel sounds present  EXTREMITIES:  2+ Peripheral Pulses, No clubbing, cyanosis, or edema  SKIN: No rashes or lesions  NERVOUS SYSTEM:  Alert & Oriented, Good concentration                                                                                    LABS:  09-20    136  |  99  |  17  ----------------------------<  93  4.6   |  27  |  5.72<H>  09-19    135  |  99  |  15  ----------------------------<  106<H>  4.6   |  27  |  4.93<H>    Ca    8.6      20 Sep 2020 06:33  Ca    8.6      19 Sep 2020 15:56  Phos  4.7     09-20  Mg     1.8     09-20    TPro  6.1  /  Alb  2.1<L>  /  TBili  6.5<H>  /  DBili  x   /  AST  95<H>  /  ALT  25  /  AlkPhos  211<H>  09-20  TPro  7.0  /  Alb  2.3<L>  /  TBili  6.8<H>  /  DBili  x   /  AST  102<H>  /  ALT  27  /  AlkPhos  245<H>  09-19      PT/INR - ( 20 Sep 2020 06:34 )   PT: 24.5 sec;   INR: 2.13 ratio         PTT - ( 20 Sep 2020 06:34 )  PTT:42.7 sec                                              7.0    12.89 )-----------( 118      ( 20 Sep 2020 06:34 )             22.1                         7.3    16.83 )-----------( 122      ( 19 Sep 2020 15:56 )             22.5     CAPILLARY BLOOD GLUCOSE            RECENT CULTURES:  09-20 @ 00:51  .Body Fluid Peritoneal Fluid  --  --  --  --  --     RADIOLOGY & ADDITIONAL TESTS:    EXAM:  CT CHEST                        PROCEDURE DATE:  09/19/2020      FINDINGS:    LUNGS AND AIRWAYS: Patent central airways.  Complete atelectasis of the right lower lobe and partial atelectasis of the right upper and middle lobes, new since the prior study. Linear atelectasis in the bilateral upper, right middle, and left lower lobes. A 5 mm groundglass nodule in the medial left upper lobe (3:86) is new since 8/31/2020. New small focus of groundglass in the anterior left upper lobe is nonspecific, but likely reflects a small focus of atelectasis.  PLEURA: Increased bilateral pleural effusions, now large on the right and small on the left.    MEDIASTINUM AND TESHA: No lymphadenopathy.  VESSELS: Right IJ central venous catheter terminates in the SVC.  HEART: Heart size is normal. No pericardial effusion.  CHEST WALL AND LOWER NECK: Within normal limits.  VISUALIZED UPPER ABDOMEN: Left gastro-renal shunt occlusion. Cirrhosis. Splenomegaly and numerous intra-abdominal collateral vessels consistent with portal hypertension. Increased ascites, now moderate in volume. Left adrenal hemorrhage.  BONES: Chronic left-sided rib fractures. Mild degenerative changes of the spine.    IMPRESSION:  Increased bilateral pleural effusions, now large on the right and small on the left. Complete atelectasis of the right lower lobe with partial atelectasis of the right upper and middle lobes.    Nonspecific 5 mm groundglass nodule in the medial left upper lobe is new since 8/31/2020, and likely reflects a small focus of atelectasis. Follow-up CT chest in 6-12 months can be considered if clinically indicated.    Please see the visualized abdominal findings above.    Consultants involved in case: hepatology, nephrology         INTERNAL MEDICINE PROGRESS NOTE    Patient is a 39y old  Male who presents with a chief complaint of Abdominal pain/distention (20 Sep 2020 07:20)    Overnight events: Admitted overnight, was given 1 round of Vanc and Zosyn. Diagnostically tapped for ascites fluid analysis.  Received Dilaudid 0.5mg IV at ~3am for abdominal pain.  Subjective: Reports abdominal pain is better controlled this AM after dilaudid. Reports pain is diffuse at lower abdomen, radiates to bilateral flanks, and sharp pain in epigastric region. Reports ongoing cough that started 2 days ago and chest pain, sharp, with getting up or leaning forward. Reports getting R IJ permacath 1 month ago and only minor pruritis at the site but no erythema, swelling, or unusual discharge (has consistent, ongoing serosanguinous discharge only). Denies decrease in urinary outpt.    Medications:  MEDICATIONS  (STANDING):  folic acid 1 milliGRAM(s) Oral daily  influenza   Vaccine 0.5 milliLiter(s) IntraMuscular once  midodrine 10 milliGRAM(s) Oral three times a day  multivitamin 1 Tablet(s) Oral daily  pantoprazole    Tablet 40 milliGRAM(s) Oral before breakfast  piperacillin/tazobactam IVPB.. 3.375 Gram(s) IV Intermittent every 12 hours    MEDICATIONS  (PRN):      Objective:    Vitals: Vital Signs Last 24 Hrs  T(C): 36.6 (09-20-20 @ 05:36), Max: 36.7 (09-19-20 @ 14:45)  T(F): 97.8 (09-20-20 @ 05:36), Max: 98 (09-19-20 @ 14:45)  HR: 112 (09-20-20 @ 05:36) (111 - 123)  BP: 97/50 (09-20-20 @ 05:36) (97/50 - 132/62)  BP(mean): --  RR: 14 (09-20-20 @ 05:36) (14 - 22)  SpO2: 96% (09-20-20 @ 05:36) (96% - 99%)              I&O's Summary    19 Sep 2020 07:01  -  20 Sep 2020 07:00  --------------------------------------------------------  IN: 350 mL / OUT: 0 mL / NET: 350 mL        PHYSICAL EXAM:  GENERAL: NAD, conversant  CHEST/LUNG: Clear to auscultation bilaterally; No crackles, rhonchi, wheezing, or rubs. Dry cough.  HEART: Regular rate and rhythm; No murmurs, rubs, or gallops  ABDOMEN: Distended, fluid wave present; Bowel sounds distant.  EXTREMITIES:  2+ Peripheral Pulses, No clubbing, cyanosis, or edema  SKIN: No rashes or lesions. No erythema or swelling at R IJ permacath site; wound notable for dry serosanguinous discharge. Jaundiced.  NERVOUS SYSTEM:  Alert & Oriented, Good concentration                                                                                   LABS:  09-20    136  |  99  |  17  ----------------------------<  93  4.6   |  27  |  5.72<H>  09-19    135  |  99  |  15  ----------------------------<  106<H>  4.6   |  27  |  4.93<H>    Ca    8.6      20 Sep 2020 06:33  Ca    8.6      19 Sep 2020 15:56  Phos  4.7     09-20  Mg     1.8     09-20    TPro  6.1  /  Alb  2.1<L>  /  TBili  6.5<H>  /  DBili  x   /  AST  95<H>  /  ALT  25  /  AlkPhos  211<H>  09-20  TPro  7.0  /  Alb  2.3<L>  /  TBili  6.8<H>  /  DBili  x   /  AST  102<H>  /  ALT  27  /  AlkPhos  245<H>  09-19      PT/INR - ( 20 Sep 2020 06:34 )   PT: 24.5 sec;   INR: 2.13 ratio         PTT - ( 20 Sep 2020 06:34 )  PTT:42.7 sec                                              7.0    12.89 )-----------( 118      ( 20 Sep 2020 06:34 )             22.1                         7.3    16.83 )-----------( 122      ( 19 Sep 2020 15:56 )             22.5     CAPILLARY BLOOD GLUCOSE            RECENT CULTURES:  09-20 @ 00:51  .Body Fluid Peritoneal Fluid  Nucleated cells: 28     RADIOLOGY & ADDITIONAL TESTS:    EXAM:  CT CHEST                        PROCEDURE DATE:  09/19/2020      FINDINGS:    LUNGS AND AIRWAYS: Patent central airways.  Complete atelectasis of the right lower lobe and partial atelectasis of the right upper and middle lobes, new since the prior study. Linear atelectasis in the bilateral upper, right middle, and left lower lobes. A 5 mm groundglass nodule in the medial left upper lobe (3:86) is new since 8/31/2020. New small focus of groundglass in the anterior left upper lobe is nonspecific, but likely reflects a small focus of atelectasis.  PLEURA: Increased bilateral pleural effusions, now large on the right and small on the left.    MEDIASTINUM AND TESHA: No lymphadenopathy.  VESSELS: Right IJ central venous catheter terminates in the SVC.  HEART: Heart size is normal. No pericardial effusion.  CHEST WALL AND LOWER NECK: Within normal limits.  VISUALIZED UPPER ABDOMEN: Left gastro-renal shunt occlusion. Cirrhosis. Splenomegaly and numerous intra-abdominal collateral vessels consistent with portal hypertension. Increased ascites, now moderate in volume. Left adrenal hemorrhage.  BONES: Chronic left-sided rib fractures. Mild degenerative changes of the spine.    IMPRESSION:  Increased bilateral pleural effusions, now large on the right and small on the left. Complete atelectasis of the right lower lobe with partial atelectasis of the right upper and middle lobes.    Nonspecific 5 mm groundglass nodule in the medial left upper lobe is new since 8/31/2020, and likely reflects a small focus of atelectasis. Follow-up CT chest in 6-12 months can be considered if clinically indicated.    Please see the visualized abdominal findings above.    Consultants involved in case: hepatology, nephrology

## 2020-09-21 NOTE — DISCHARGE NOTE PROVIDER - CARE PROVIDERS DIRECT ADDRESSES
[email or web address]

## 2020-09-21 NOTE — CHART NOTE - NSCHARTNOTEFT_GEN_A_CORE
PRE-INTERVENTIONAL RADIOLOGY PROCEDURE NOTE    Patient Age: 39    Patient Gender: Male    Procedure: Therapeutic paracentesis    Diagnosis/Indication: Ascites due to alcoholic cirrhosis    Interventional Radiology Attending Physician: Dr. JOSEY Cohn    Ordering Attending Physician: Dr. A. Reyes    Pertinent Medical History:  40 yo M w/ hx of EtOH cirrhosis c/b gastric variceal bleeding in July 2020 s/p 34u pRBC, IR embolization and blak[place]ore ballon, ESRD on HD (MWF), bladder rupture 2/2 fall s/p ex-lap in 2019, recent admission in earlier in Sept 2020 for hemorrhoidal bleed, now presenting with worsening abdominal pain/distention associated with cough and SOB x3 days, s/p diag tap in ED, no e/o SBP, still abd pain from large volume ascites.    Pertinent labs:                      7.1    14.24 )-----------( 134      ( 21 Sep 2020 06:29 )             22.6       09-21    135  |  99  |  22  ----------------------------<  83  5.0   |  27  |  7.25<H>    Ca    8.8      21 Sep 2020 06:25  Phos  5.5     09-21  Mg     1.9     09-21    TPro  6.3  /  Alb  2.1<L>  /  TBili  6.7<H>  /  DBili  x   /  AST  94<H>  /  ALT  24  /  AlkPhos  216<H>  09-21      PT/INR - ( 21 Sep 2020 06:29 )   PT: 24.0 sec;   INR: 2.09 ratio         PTT - ( 21 Sep 2020 06:29 )  PTT:46.0 sec        Patient and Family Aware ? Yes

## 2020-09-21 NOTE — DISCHARGE NOTE PROVIDER - PROVIDER TOKENS
PROVIDER:[TOKEN:[[id number]],FOLLOWUP:[1 week]] PROVIDER:[TOKEN:[[id number]],FOLLOWUP:[1 week],ESTABLISHEDPATIENT:[T]],PROVIDER:[TOKEN:[[id number]],ESTABLISHEDPATIENT:[T]]

## 2020-09-21 NOTE — PROGRESS NOTE ADULT - PROBLEM SELECTOR PLAN 3
At presentation, dry cough and SOB for past couple of days. Desatted to 91% on RA in ED and was placed on 3L O2NC.  - CXR (9/18/20): showing b/l pleural effusions R>L, significantly worsened compared to CXR from 8/31/20  - likely 2/2 pleural effusions 2/2 fluid overload due to cirrhosis vs possibly c/b PNA  - continue to monitor respiratory status and O2 sat  - wean off supplemental O2 as tolerated  - farrukh PRN  - plan for therapeutic paracentesis tomorrow   - consult IR in AM for therapeutic paracentesis, may need albumin marilynn-tap At presentation, dry cough and SOB for past couple of days associated with positional chest pain. Likely due to atelectasis in setting of severe ascites. Not concerning for PNA per CT Chest.  - continue to monitor respiratory status and O2 sat  - wean off supplemental O2 as tolerated  - incentive spirometry  - therapeutic paracentesis done

## 2020-09-21 NOTE — PROGRESS NOTE ADULT - SUBJECTIVE AND OBJECTIVE BOX
Aditya Lyle, PGY1  Internal Medicine  Pager #: [phone]    Patient is a 39y old  Male who presents with a chief complaint of Abdominal pain/distention (20 Sep 2020 07:42)      SUBJECTIVE / OVERNIGHT EVENTS:  ADDITIONAL REVIEW OF SYSTEMS: 10 point ROS negative except as stated per HPI.    MEDICATIONS  (STANDING):  folic acid 1 milliGRAM(s) Oral daily  influenza   Vaccine 0.5 milliLiter(s) IntraMuscular once  melatonin 3 milliGRAM(s) Oral at bedtime  midodrine 10 milliGRAM(s) Oral three times a day  multivitamin 1 Tablet(s) Oral daily  pantoprazole    Tablet 40 milliGRAM(s) Oral before breakfast  piperacillin/tazobactam IVPB.. 3.375 Gram(s) IV Intermittent every 12 hours    MEDICATIONS  (PRN):  HYDROmorphone  Injectable 0.5 milliGRAM(s) IV Push every 6 hours PRN Severe Pain (7 - 10)  LORazepam     Tablet 0.5 milliGRAM(s) Oral two times a day PRN Anxiety  ondansetron Injectable 4 milliGRAM(s) IV Push two times a day PRN Nausea and/or Vomiting      CAPILLARY BLOOD GLUCOSE        I&O's Summary    20 Sep 2020 07:01  -  21 Sep 2020 07:00  --------------------------------------------------------  IN: 940 mL / OUT: 0 mL / NET: 940 mL    21 Sep 2020 07:01  -  21 Sep 2020 09:36  --------------------------------------------------------  IN: 0 mL / OUT: 0 mL / NET: 0 mL        PHYSICAL EXAM:  Vital Signs Last 24 Hrs  T(C): 36.6 (21 Sep 2020 09:24), Max: 36.7 (20 Sep 2020 17:30)  T(F): 97.9 (21 Sep 2020 09:24), Max: 98.1 (20 Sep 2020 17:30)  HR: 111 (21 Sep 2020 09:24) (107 - 112)  BP: 104/62 (21 Sep 2020 09:24) (99/69 - 116/77)  BP(mean): --  RR: 18 (21 Sep 2020 09:24) (17 - 19)  SpO2: 92% (21 Sep 2020 09:24) (92% - 98%)  CONSTITUTIONAL: NAD, lying in bed comfortably  EYES: EOMI, PERRLA; conjunctiva and sclera clear  ENMT: Moist oral mucosa; normal dentition  NECK: Supple, no palpable masses  RESPIRATORY: Lungs clear to ascultation b/l; No rales, ronchi, or wheezing; Unlabored respirations  CARDIOVASCULAR: Regular rate and rhythm, normal S1 and S2, no murmurs, rubs, or gallops  ABDOMEN: Soft, nontender, nondistended, normal bowel sounds  MUSCULOSKELETAL: No joint swelling or tenderness to palpation  PSYCH: Affect appropriate  NEUROLOGY: AAOx3, CNs grossly intact  SKIN: No rashes; no palpable lesions    LABS:                        7.1    14.24 )-----------( 134      ( 21 Sep 2020 06:29 )             22.6     09-21    135  |  99  |  22  ----------------------------<  83  5.0   |  27  |  7.25<H>    Ca    8.8      21 Sep 2020 06:25  Phos  5.5     09-21  Mg     1.9     09-21    TPro  6.3  /  Alb  2.1<L>  /  TBili  6.7<H>  /  DBili  x   /  AST  94<H>  /  ALT  24  /  AlkPhos  216<H>  09-21    PT/INR - ( 21 Sep 2020 06:29 )   PT: 24.0 sec;   INR: 2.09 ratio         PTT - ( 21 Sep 2020 06:29 )  PTT:46.0 sec          Culture - Body Fluid with Gram Stain (collected 20 Sep 2020 00:51)  Source: .Body Fluid Peritoneal Fluid  Gram Stain (20 Sep 2020 01:47):    No polymorphonuclear leukocytes seen    No organisms seen    by cytocentrifuge  Preliminary Report (20 Sep 2020 15:29):    No growth    Culture - Blood (collected 19 Sep 2020 23:05)  Source: .Blood Blood-Peripheral  Preliminary Report (21 Sep 2020 01:01):    No growth to date.    Culture - Blood (collected 19 Sep 2020 23:05)  Source: .Blood Blood-Venous  Preliminary Report (21 Sep 2020 01:01):    No growth to date.        RADIOLOGY & ADDITIONAL TESTS: Aditya Lyle, PGY1  Internal Medicine  Pager #: [phone]    Patient is a 39y old  Male who presents with a chief complaint of Abdominal pain/distention (20 Sep 2020 07:42)    SUBJECTIVE / OVERNIGHT EVENTS: NAEON. Pt noted to be anxious overnight, required Ativan 0.5mg x 2. This morning pt reports ongoing abdominal pain, cough, SOB, positional chest pain without significant change in quality or severity of symptoms. He reports waking up due to pain and requiring dilaudid; discussed getting pain PRN medication prior to sleep. Reports otherwise sleeping more through the night. Pt reports anxiety with dialysis and paracentesis; reports anxiety is new. Also reports death of his father last week. Discussed seeking outpt psychology/psychiatry after discharge; pt found Ativan 0.5mg effective overnight, agreeable to PRNs for only prior to HD or procedures while in hospital.    ADDITIONAL REVIEW OF SYSTEMS: 10 point ROS negative except as stated per HPI.    MEDICATIONS  (STANDING):  folic acid 1 milliGRAM(s) Oral daily  influenza   Vaccine 0.5 milliLiter(s) IntraMuscular once  melatonin 3 milliGRAM(s) Oral at bedtime  midodrine 10 milliGRAM(s) Oral three times a day  multivitamin 1 Tablet(s) Oral daily  pantoprazole    Tablet 40 milliGRAM(s) Oral before breakfast  piperacillin/tazobactam IVPB.. 3.375 Gram(s) IV Intermittent every 12 hours    MEDICATIONS  (PRN):  HYDROmorphone  Injectable 0.5 milliGRAM(s) IV Push every 6 hours PRN Severe Pain (7 - 10)  LORazepam     Tablet 0.5 milliGRAM(s) Oral two times a day PRN Anxiety  ondansetron Injectable 4 milliGRAM(s) IV Push two times a day PRN Nausea and/or Vomiting      CAPILLARY BLOOD GLUCOSE        I&O's Summary    20 Sep 2020 07:01  -  21 Sep 2020 07:00  --------------------------------------------------------  IN: 940 mL / OUT: 0 mL / NET: 940 mL    21 Sep 2020 07:01  -  21 Sep 2020 09:36  --------------------------------------------------------  IN: 0 mL / OUT: 0 mL / NET: 0 mL        PHYSICAL EXAM:  Vital Signs Last 24 Hrs  T(C): 36.6 (21 Sep 2020 09:24), Max: 36.7 (20 Sep 2020 17:30)  T(F): 97.9 (21 Sep 2020 09:24), Max: 98.1 (20 Sep 2020 17:30)  HR: 111 (21 Sep 2020 09:24) (107 - 112)  BP: 104/62 (21 Sep 2020 09:24) (99/69 - 116/77)  BP(mean): --  RR: 18 (21 Sep 2020 09:24) (17 - 19)  SpO2: 92% (21 Sep 2020 09:24) (92% - 98%)  CONSTITUTIONAL: NAD, lying in bed comfortably  EYES: EOMI, PERRLA; conjunctiva and sclera clear  ENMT: Moist oral mucosa; normal dentition  NECK: Supple, no palpable masses  RESPIRATORY: Lungs clear to ascultation b/l; No rales, ronchi, or wheezing; Unlabored respirations  CARDIOVASCULAR: Regular rate and rhythm, normal S1 and S2, no murmurs, rubs, or gallops  ABDOMEN: Soft, nontender, nondistended, normal bowel sounds  MUSCULOSKELETAL: No joint swelling or tenderness to palpation  PSYCH: Affect appropriate  NEUROLOGY: AAOx3, CNs grossly intact  SKIN: No rashes; no palpable lesions    LABS:                        7.1    14.24 )-----------( 134      ( 21 Sep 2020 06:29 )             22.6     09-21    135  |  99  |  22  ----------------------------<  83  5.0   |  27  |  7.25<H>    Ca    8.8      21 Sep 2020 06:25  Phos  5.5     09-21  Mg     1.9     09-21    TPro  6.3  /  Alb  2.1<L>  /  TBili  6.7<H>  /  DBili  x   /  AST  94<H>  /  ALT  24  /  AlkPhos  216<H>  09-21    PT/INR - ( 21 Sep 2020 06:29 )   PT: 24.0 sec;   INR: 2.09 ratio         PTT - ( 21 Sep 2020 06:29 )  PTT:46.0 sec          Culture - Body Fluid with Gram Stain (collected 20 Sep 2020 00:51)  Source: .Body Fluid Peritoneal Fluid  Gram Stain (20 Sep 2020 01:47):    No polymorphonuclear leukocytes seen    No organisms seen    by cytocentrifuge  Preliminary Report (20 Sep 2020 15:29):    No growth    Culture - Blood (collected 19 Sep 2020 23:05)  Source: .Blood Blood-Peripheral  Preliminary Report (21 Sep 2020 01:01):    No growth to date.    Culture - Blood (collected 19 Sep 2020 23:05)  Source: .Blood Blood-Venous  Preliminary Report (21 Sep 2020 01:01):    No growth to date.        RADIOLOGY & ADDITIONAL TESTS:    c< from: CT Chest No Cont (09.19.20 @ 23:33) >  IMPRESSION:  Increased bilateral pleural effusions, now large on the right and small on the left. Complete atelectasis of the right lower lobe with partial atelectasis of the right upper and middle lobes.    Nonspecific 5 mm groundglass nodule in the medial left upper lobe is new since 8/31/2020, and likely reflects a small focus of atelectasis. Follow-up CT chest in 6-12 months can be considered if clinically indicated.

## 2020-09-21 NOTE — CONSULT NOTE ADULT - SUBJECTIVE AND OBJECTIVE BOX
Reason for Consult: 39y Male with EtOH cirrhosis for therapeutic paracentesis. Additional question regarding TIPS appropriateness.    History of Present Illness:     40 yo M w/ hx of EtOH cirrhosis c/b gastric variceal bleeding in July 2020 s/p 34u pRBC, IR embolization and blakemore ballon, ESRD on HD (MWF), bladder rupture 2/2 fall s/p ex-lap in 2019, recent admission in earlier in Sept 2020 for hemorrhoidal bleed, now presenting with worsening abdominal distention and pain x3 days. Reports the abdominal pain is in epigastric area as well as lower abdominal area wrapping around to the flanks, is constant, up to 8/10 in severity, and similar in nature to his previous abdominal pains that were subsequently relieved with therapeutic paracenteses. Pt was supposed to get an outpatient therapeutic tap on 9/17, but was unable to because of an insurance issue and the outpatient clinic's inability to handle potential complications, and was suggested to come to the hospital to get it done. Pt also reports a dry cough, SOB, and mid-right sided CP that started a couple of days ago, worsens when he sits up or leans forward. Reports getting a session of HD yesterday without complication. Notes nausea without vomiting. Reports decreased urination from baseline, last time was previous night. Denies fevers, chills, headache, dizziness, dysuria, hematuria, hematochezia, melena, constipation, diarrhea, hematemesis.    In ED, pt afebrile Tmax 98F, -132/62-80, -123, RR 16-22, initially sating 96% on RA but then reportedly desated to 91% and was put on 3L O2NC. Labs significant for WBC 16.8, Hgb 7.3, Plt 122, INR 1.89, SCr 4.93, Alk Phos 245, , ALT 27, Serum BNP 2726, VBG lactate 3.4. Trop x2 stable at indeterminate value of 38. EKG sinus tach. CXR showing b/l pleural effusions R>L. s/p diagnostic tap, CTX 1g x1, and Lorazepam 1mg PO x1. Admitted to Medicine. (19 Sep 2020 20:12)      Pertinent PMH/PSH:   End-stage renal disease (ESRD)  On HD MWF  Cirrhosis, alcoholic  S/P exploratory laparotomy  for bladder rupture s/p fall        Allergies:   No Known Allergies      Medications:   ondansetron Injectable  LORazepam     Tablet  melatonin  midodrine  HYDROmorphone  Injectable  influenza   Vaccine  piperacillin/tazobactam IVPB..  multivitamin  folic acid  pantoprazole    Tablet      Vital Signs:  T(C): 36.6 (09-21-20 @ 09:24), Max: 36.7 (09-20-20 @ 17:30)  HR: 111 (09-21-20 @ 09:24) (107 - 112)  BP: 104/62 (09-21-20 @ 09:24) (99/69 - 116/77)  RR: 18 (09-21-20 @ 09:24) (17 - 19)  SpO2: 92% (09-21-20 @ 09:24) (92% - 98%)    Relevant Lab Results:            7.1  14.24)-----(134     (09-21-20 @ 06:29)         22.6     135 | 99 | 22  --------------------< 83     (09-21-20 @ 06:25)  5.0 | 27 | 7.25       PT: 24.0<H> 09-21-20 @ 06:29  aPTT: 46.0<H> 09-21-20 @ 06:29   INR: 2.09<H> 09-21-20 @ 06:29

## 2020-09-21 NOTE — PROGRESS NOTE ADULT - PROBLEM SELECTOR PLAN 1
Hx of EtOH cirrhosis c/b ascites and gastric variceal bleed in July 2020  - continue to monitor Hgb, Plt level, signs of bleeding  - maintain active T+S, transfuse to goal Hgb >7 (baseline Hgb ~8)   - continue to monitor CMP/LFTs, currently stable  - Reports being unable to get planned therapeutic paracentesis on 9/17/20 due to insurance issue; plan for therapeutic tap tomorrow.  - consult IR in AM for therapeutic paracentesis, may need albumin marilynn-tap  - Hepatology consulted - pending full note Hx of EtOH cirrhosis c/b ascites and gastric variceal bleed in July 2020. Not concerning for SBP. Per IR, not a candidate for TIPS due to high MELD score.  LABS:  - continue to monitor Hgb, Plt level, signs of bleeding  - maintain active T+S, transfuse to goal Hgb >7 (baseline Hgb ~8)   - continue to monitor CMP/LFTs, currently stable  - discontinue trending lactate - will be elevated due to hepatic dysfunction  - iron studies, PTH per nephrology  PROCEDURE  - Therapeutic paracentesis done 9/21/20     - consult IR in AM for therapeutic paracentesis, may need albumin marilynn-tap  - Hepatology consulted - pending full note Hx of EtOH cirrhosis c/b ascites and gastric variceal bleed in July 2020. Not concerning for SBP. Per IR, not a candidate for TIPS due to high MELD score.  LABS:  - continue to monitor Hgb, Plt level, signs of bleeding  - maintain active T+S, transfuse to goal Hgb >7 (baseline Hgb ~8)   - continue to monitor CMP/LFTs, currently stable  - discontinue trending lactate - will be elevated due to hepatic dysfunction    - Therapeutic paracentesis done 9/21/20

## 2020-09-21 NOTE — PROCEDURE NOTE - NSPROCDETAILS_GEN_ALL_CORE
location identified, sterile technique used, catheter introduced, fluid drained/sterile dressing applied/Seldinger technique/ultrasound utilization

## 2020-09-21 NOTE — DISCHARGE NOTE PROVIDER - HOSPITAL COURSE
40 yo M w/ hx of EtOH cirrhosis c/b gastric variceal bleeding in July 2020 s/p 34u pRBC, IR embolization and blakemore ballon, ESRD on HD (MWF), bladder rupture 2/2 fall s/p ex-lap in 2019, recent admission in earlier in Sept 2020 for hemorrhoidal bleed, now presenting with worsening abdominal pain/distention associated with cough and SOB x3 days.    38 yo M w/ hx of EtOH cirrhosis c/b gastric variceal bleeding in July 2020 s/p 34u pRBC, IR embolization and blakemore ballon, ESRD on HD (MWF), bladder rupture 2/2 fall s/p ex-lap in 2019, recent admission in earlier in Sept 2020 for hemorrhoidal bleed, now presenting with worsening abdominal pain/distention associated with cough and SOB x3 days.    #Cirrhosis, ascites  Pt had tense ascites upon presentation which was diagnostically tapped; results showed no growth, nucleated cell count of 28/mm^3, and gross *** appearance. He had a therapeutic tap that drained 2300mL.    #SIRS      #Anxiety, hx of alcohol use disorder  Pt reports new onset of anxious affect, worse prior to hemodialysis and paracentesis (and other procedural events). He was managed with PRN Ativan 0.5mg during admission, but is encouraged that pt addresses this with CBT or other therapy modalities in outpatient setting. Pt is already enrolled with substance use counseling. 38 yo M w/ hx of EtOH cirrhosis c/b gastric variceal bleeding in July 2020 s/p 34u pRBC, IR embolization and blakemore ballon, ESRD on HD (M/W/F), bladder rupture 2/2 fall s/p ex-lap in 2019, recent admission in earlier in Sept 2020 for hemorrhoidal bleed, now presenting with worsening abdominal pain/distention associated with cough and SOB x3 days.    #Cirrhosis, ascites  Pt had tense ascites upon presentation associated with abdominal pain and positional cough. Ascitic fluid results showed no growth on culture, nucleated cell count of 28/mm^3, SAAG < 1.1. He had a therapeutic tap on 9/21 that drained 2300mL. Abdominal pain however did not improve and there was ongoing suprapubic pain, which was concerning for a possible cystitis. We managed his pain with Dilaudid 0.5mg q6hrs PRN and oxycodone 5mg q4h PRN.    #SIRS  Pt had tachycardia, leukocytosis, and possible source of infection upon presentation. We treated empirically with Zosyn. Cultures from ascitic fluid and blood were negative. Urine culture could not be performed due to oliguria. We considered a possible infection from the R IJ permacath, but pt reported no erythema, swelling, or change in discharge (baseline some serosanguinous discharge); this was less concerning and we did not go onto culture the permacath. Pt is likely at baseline tachycardic with lower BP ranges due to hemodynamic status from the cirrhotic ascites and ESRD. His past results showed elevated WBC at baseline. With these findings, we became less concerned for sepsis and discontinued Zosyn. Pt remained stable.    #Cough, chest pain  CT Chest 9/19 showed bilateral pleural effusion, R > L, which was likely hydrothorax from ascites. He also complained of chest pain and a cough that worsened with getting up or leaning forward, which we attributed to atelectasis from the effusions that shifted with positional change. EKG and imaging were not concerning for ACS or pneumonia.    #New lung nodule  CT Chest 9/19 showed a new lung nodule in the L middle lobe.  This finding was not discussed during admission. Please follow up in outpatient.    #ESRD  Pt received dialysis on the M/W/F schedule via R IJ permacath without issue.    #Anemia  Pt required pRBC upon admission and towards the end of admission for Hgb < 7.0.    #Anxiety, hx of alcohol use disorder  Pt reports new onset of anxious affect, worse prior to hemodialysis and paracentesis (and other procedural events). He was managed with PRN Ativan 0.5mg during admission, but is encouraged that pt addresses this with CBT or other therapy modalities in outpatient setting. Pt is already enrolled with substance use counseling. 38 yo M w/ hx of EtOH cirrhosis c/b gastric variceal bleeding in July 2020 s/p 34u pRBC, IR embolization and blakemore ballon, ESRD on HD (M/W/F), bladder rupture 2/2 fall s/p ex-lap in 2019, recent admission in earlier in Sept 2020 for hemorrhoidal bleed, now presenting with worsening abdominal pain/distention associated with cough and SOB x3 days.    #Cirrhosis, ascites  Pt had tense ascites upon presentation associated with abdominal pain and positional cough. Ascitic fluid results showed no growth on culture, nucleated cell count of 28/mm^3, SAAG < 1.1. He had a therapeutic tap on 9/21 that drained 2300mL. Abdominal pain however did not improve and there was ongoing suprapubic pain, which was concerning for a possible cystitis. We managed his pain with Dilaudid 0.5mg q6hrs PRN and oxycodone 5mg q4h PRN.    #SIRS  Pt had tachycardia, leukocytosis, and possible source of infection upon presentation. We treated empirically with Zosyn. Cultures from ascitic fluid and blood were negative. Urine culture could not be performed due to oliguria. We considered a possible infection from the R IJ permacath, but pt reported no erythema, swelling, or change in discharge (baseline some serosanguinous discharge); this was less concerning and we did not go onto culture the permacath. Pt is likely at baseline tachycardic with lower BP ranges due to hemodynamic status from the cirrhotic ascites and ESRD. His past results showed elevated WBC at baseline. With these findings, we became less concerned for sepsis and discontinued Zosyn. Pt remained stable.    #Cough, chest pain  CT Chest 9/19 showed bilateral pleural effusion, R > L, which was likely hydrothorax from ascites. He also complained of chest pain and a cough that worsened with getting up or leaning forward, which we attributed to atelectasis from the effusions that shifted with positional change. EKG and imaging were not concerning for ACS or pneumonia.    #New lung nodule  CT Chest 9/19 showed a new lung nodule in the L middle lobe.  This finding was not discussed during admission. Please follow up in outpatient.    #ESRD  Pt received dialysis on the M/W/F schedule via R IJ permacath without issue.    #Anemia  Pt required pRBC upon admission and towards the end of admission for Hgb < 7.0.    #Anxiety, hx of alcohol use disorder  Pt reports new onset of anxious affect, worse prior to hemodialysis and paracentesis (and other procedural events). He was managed with PRN Ativan 0.5mg during admission, but is encouraged that pt addresses this with CBT or other therapy modalities in outpatient setting. Pt is already enrolled with substance use rehabilitation. 38 yo M w/ hx of EtOH cirrhosis c/b gastric variceal bleeding in July 2020 s/p 34u pRBC, IR embolization and blakemore ballon, ESRD on HD (M/W/F), bladder rupture 2/2 fall s/p ex-lap in 2019, recent admission in earlier in Sept 2020 for hemorrhoidal bleed, now presenting with worsening abdominal pain/distention associated with cough and SOB x3 days.    #Cirrhosis, ascites  Pt had tense ascites upon presentation associated with abdominal pain and positional cough. Ascitic fluid results showed no growth on culture, nucleated cell count of 28/mm^3, SAAG > 1.1. He had a therapeutic tap on 9/21 that drained 2300mL. Abdominal pain however did not improve and there was ongoing suprapubic pain, which was concerning for a possible cystitis. We managed his pain with Dilaudid 0.5mg q6hrs PRN and oxycodone 5mg q4h PRN.    #SIRS  Pt had tachycardia, leukocytosis, and possible source of infection upon presentation. We treated empirically with Zosyn. Cultures from ascitic fluid and blood were negative. Urine culture could not be performed due to oliguria. We considered a possible infection from the R IJ permacath, but pt reported no erythema, swelling, or change in discharge (baseline some serosanguinous discharge); this was less concerning and we did not go onto culture the permacath. Pt is likely tachycardic with lower BP ranges at baseline due to hemodynamic status from the cirrhotic ascites and ESRD. His past results showed elevated WBC at baseline as well. With these findings, we became less concerned for sepsis and discontinued Zosyn. Pt remained stable.    #Cough, chest pain  CT Chest 9/19 showed bilateral pleural effusion, R > L, which was likely hydrothorax from ascites. He also complained of chest pain and a cough that worsened with getting up or leaning forward, which we attributed to atelectasis from the effusions that shifted with positional change. EKG and imaging were not concerning for ACS or pneumonia.    #New lung nodule  CT Chest 9/19 showed a nonspecific 5 mm groundglass nodule in the medial left upper lobe that is new since 8/31/2020, and likely reflects a small focus of atelectasis. Follow-up CT chest in 6-12 months can be considered if clinically indicated. This finding was not discussed during admission. Please follow up in outpatient.    #ESRD  Pt received dialysis on the M/W/F schedule via R IJ permacath without issue.    #Anemia  Pt required pRBC upon admission and towards the end of admission for Hgb < 7.0.    #Anxiety, hx of alcohol use disorder  Pt reports new onset of anxious affect, worse prior to hemodialysis and paracentesis (and other procedural events). He was managed with PRN Ativan 0.5mg during admission, but is encouraged that pt addresses this with CBT or other therapy modalities in outpatient setting. Pt is already enrolled with substance use rehabilitation. 38 yo M w/ hx of EtOH cirrhosis c/b gastric variceal bleeding in July 2020 s/p 34u pRBC, IR embolization and blakemore ballon, ESRD on HD (M/W/F), bladder rupture 2/2 fall s/p ex-lap in 2019, recent admission in earlier in Sept 2020 for hemorrhoidal bleed, now presenting with worsening abdominal pain/distention associated with cough and SOB x3 days.    #Cirrhosis, ascites  Pt had tense ascites upon presentation associated with abdominal pain and positional cough. Ascitic fluid results showed no growth on culture, nucleated cell count of 28/mm^3, SAAG > 1.1. He had a therapeutic tap on 9/21 that drained 2300mL. Abdominal pain however did not improve and there was ongoing suprapubic pain, which was concerning for a possible cystitis. We managed his pain with Dilaudid 0.5mg q6hrs PRN and oxycodone 5mg q4h PRN.    #SIRS  Pt had tachycardia, leukocytosis, and possible source of infection upon presentation. We treated empirically with Zosyn. Cultures from ascitic fluid and blood were negative. Urine culture could not be performed due to oliguria. We considered a possible infection from the R IJ permacath, but pt reported no erythema, swelling, or change in discharge (baseline some serosanguinous discharge); this was less concerning and we did not go onto culture the permacath. Pt is likely tachycardic with lower BP ranges at baseline due to hemodynamic status from the cirrhotic ascites and ESRD. His past results showed elevated WBC at baseline as well. With these findings, we became less concerned for sepsis and discontinued Zosyn. Pt remained stable. He likely did not have active infections during admission.    #Cough, chest pain  CT Chest 9/19 showed bilateral pleural effusion, R > L, which was likely hydrothorax from ascites. He also complained of chest pain and a cough that worsened with getting up or leaning forward, which we attributed to atelectasis from the effusions that shifted with positional change. EKG and imaging were not concerning for ACS or pneumonia.    #New lung nodule  CT Chest 9/19 showed a nonspecific 5 mm groundglass nodule in the medial left upper lobe that is new since 8/31/2020, and likely reflects a small focus of atelectasis. Follow-up CT chest in 6-12 months can be considered if clinically indicated. This finding was not discussed during admission. Please follow up in outpatient.    #ESRD  Pt received dialysis on the M/W/F schedule via R IJ permacath without issue.    #Anemia  Pt required 1U of pRBC towards the end of admission for Hgb < 7.0 ; there were no concerns for active bleeding.    #Anxiety, hx of alcohol use disorder  Pt reports new onset of anxious affect, worse prior to hemodialysis and paracentesis (and other procedural events). He was managed with PRN Ativan 0.5mg during admission, but is encouraged that pt addresses this with CBT or other therapy modalities in outpatient setting. Pt is already enrolled with substance use rehabilitation. 38 yo M w/ hx of EtOH cirrhosis c/b gastric variceal bleeding in July 2020 s/p 34u pRBC, IR embolization and blakemore ballon, ESRD on HD (M/W/F), bladder rupture 2/2 fall s/p ex-lap in 2019, recent admission in earlier in Sept 2020 for hemorrhoidal bleed, now presenting with worsening abdominal pain/distention associated with cough and SOB x3 days.    #Cirrhosis, ascites  Pt had tense ascites upon presentation associated with abdominal pain and positional cough. Ascitic fluid results showed no growth on culture, nucleated cell count of 28/mm^3, SAAG > 1.1. He had a therapeutic tap on 9/21 that drained 2300mL. Abdominal pain however did not improve and there was ongoing suprapubic pain, which was concerning for a possible cystitis. We managed his pain with Dilaudid 0.5mg q6hrs PRN and oxycodone 5mg q4h PRN.  He reported improved pain on day of discharge.    #SIRS  Pt had tachycardia, leukocytosis, and possible source of infection upon presentation. We treated empirically with Zosyn. Cultures from ascitic fluid and blood were negative. Urine culture could not be performed due to oliguria. We considered a possible infection from the R IJ permacath, but pt reported no erythema, swelling, or change in discharge (baseline some serosanguinous discharge); this was less concerning and we did not go onto culture the permacath. Pt is likely tachycardic with lower BP ranges at baseline due to hemodynamic status from the cirrhotic ascites and ESRD. His past results showed elevated WBC at baseline as well. With these findings, we became less concerned for sepsis and discontinued Zosyn. Pt remained stable. He likely did not have active infections during admission.    #Cough, chest pain  CT Chest 9/19 showed bilateral pleural effusion, R > L, which was likely hydrothorax from ascites. He also complained of chest pain and a cough that worsened with getting up or leaning forward, which we attributed to atelectasis from the effusions that shifted with positional change. EKG and imaging were not concerning for ACS or pneumonia. Pt had 91-92% O2 sat on RA and reported feeling winded when ambulating, which is his baseline.    #New lung nodule  CT Chest 9/19 showed a nonspecific 5 mm groundglass nodule in the medial left upper lobe that is new since 8/31/2020, and likely reflects a small focus of atelectasis. Follow-up CT chest in 6-12 months can be considered if clinically indicated. This finding was not discussed during admission. Please follow up in outpatient.    #ESRD  Pt received dialysis on the M/W/F schedule via R IJ permacath without issue.    #Anemia  Pt required 1U of pRBC towards the end of admission for Hgb < 7.0 ; there were no concerns for active bleeding.    #Anxiety, hx of alcohol use disorder  Pt reports new onset of anxious affect, worse prior to hemodialysis and paracentesis (and other procedural events). He was managed with PRN Ativan 0.5mg during admission, but is encouraged that pt addresses this with CBT or other therapy modalities in outpatient setting. Pt is already enrolled with substance use rehabilitation. 40 yo M w/ hx of EtOH cirrhosis c/b gastric variceal bleeding in July 2020 s/p 34u pRBC, IR embolization and blakemore ballon, ESRD on HD (M/W/F), bladder rupture 2/2 fall s/p ex-lap in 2019, recent admission in earlier in Sept 2020 for hemorrhoidal bleed, now presenting with worsening abdominal pain/distention associated with cough and SOB x3 days.    #Cirrhosis, ascites  Pt had tense ascites upon presentation associated with abdominal pain and positional cough. He was not concerning for SBP. Therapeutic tap  was done on 9/21. Abdominal pain however did not improve and there was ongoing suprapubic pain, which was concerning for a possible cystitis. We managed his pain. which improved pain prior to discharge.    #SIRS  Pt had tachycardia, leukocytosis, and possible source of infection upon presentation. We treated empirically with Zosyn. With further work up, we became less concerned for sepsis and discontinued Zosyn. Pt remained stable.    #Cough, chest pain  CT Chest 9/19 showed bilateral pleural effusion, R > L, which was likely hydrothorax from ascites. He also complained of chest pain and a cough that worsened with getting up or leaning forward, which we attributed to atelectasis from the effusions that shifted with positional change. EKG and imaging were not concerning for ACS or pneumonia. Pt had 91-92% O2 sat on RA and reported feeling winded when ambulating, which is his baseline.    #New lung nodule  CT Chest 9/19 showed a nonspecific 5 mm groundglass nodule in the medial left upper lobe that is new since 8/31/2020, and likely reflects a small focus of atelectasis. Follow-up CT chest in 6-12 months can be considered if clinically indicated. This finding was not discussed during admission. Please follow up in outpatient.    #ESRD  Pt received dialysis on the M/W/F schedule via R IJ permacath without issue.    #Anemia  Pt required 1U of pRBC towards the end of admission for Hgb < 7.0 ; there were no concerns for active bleeding.    #Anxiety, hx of alcohol use disorder  Pt reports new onset of anxious affect, worse prior to hemodialysis and paracentesis (and other procedural events). He was managed with PRN Ativan 0.5mg during admission, but is encouraged that pt addresses this with CBT or other therapy modalities in outpatient setting. Pt is already enrolled with substance use rehabilitation.

## 2020-09-21 NOTE — PROGRESS NOTE ADULT - PROBLEM SELECTOR PLAN 4
At presentation, reports mid-right sided CP x 2-3 days, worsening when sitting up or leaning forward  - possibly 2/2 costochondritis vs PNA vs less likely ACS  - EKG (9/19) sinus tachy w/o signs of ischemia  - Trops stable x2 at indeterminate level of 38  - continue to monitor, pain control At presentation, reports mid-right sided CP x 2-3 days, worsening when sitting up or leaning forward. Likely atelectasis with severe ascites. Less likely PNA or ACS.

## 2020-09-21 NOTE — PROGRESS NOTE ADULT - PROBLEM SELECTOR PLAN 5
- on MWF HD via R permacath, makes urine at baseline  - last session on Fri 9/18 without complication  - continue to monitor UOP, SCr, and eGFR  - nephrology aware, to see pt tomorrow 9/21 for HD  - pt reporting decreased UOP from baseline, will check bladder scan  - Nephrology consult in AM ESRD on HD MWF via R IJ permacath. Makes urine at baseline. Last session  Fri 9/18 without complication.  - continue to monitor UOP, SCr, and eGFR  - nephrology/HD on board, appreciate recs  - HD today ESRD on HD MWF via R IJ permacath. Makes urine at baseline. Last session  Fri 9/18 without complication.  - continue to monitor UOP, SCr, and eGFR  - iron studies, PTH to be drawn tomorrow  - nephrology/HD on board, appreciate recs  - HD today  - Start sevelamer 800mg TID with meals

## 2020-09-21 NOTE — PROGRESS NOTE ADULT - PROBLEM SELECTOR PLAN 6
- DVT ppx: SCDs for now given recent hx of massive GIB  - Diet: RENAL. NPO past midnight for tap tomorrow.  - Dispo: likely home once medically optimized - DVT ppx: SCDs for now given recent hx of massive GIB  - Diet: RENAL. NPO past midnight for tap tomorrow.  - Dispo: likely discharge tomorrow

## 2020-09-21 NOTE — DISCHARGE NOTE PROVIDER - NSDCCPCAREPLAN_GEN_ALL_CORE_FT
PRINCIPAL DISCHARGE DIAGNOSIS  Diagnosis: Alcoholic cirrhosis of liver with ascites  Assessment and Plan of Treatment:       SECONDARY DISCHARGE DIAGNOSES  Diagnosis: Dyspnea  Assessment and Plan of Treatment:      PRINCIPAL DISCHARGE DIAGNOSIS  Diagnosis: Alcoholic cirrhosis of liver with ascites  Assessment and Plan of Treatment: We initially analyzed the ascities fluid and saw that it was not concerning for infection.      SECONDARY DISCHARGE DIAGNOSES  Diagnosis: Dyspnea  Assessment and Plan of Treatment:      PRINCIPAL DISCHARGE DIAGNOSIS  Diagnosis: Alcoholic cirrhosis of liver with ascites  Assessment and Plan of Treatment: We initially analyzed the ascites fluid and saw that it was not concerning for infection. We performed a therapeutic paracentesis on 9/21 and removed 2.3 liters of ascites fluid. Your abdominal pain did not improve however and we continued to manage your pain with strong medications. Your pain became more manageable with oral medications and we felt it was safe to discharge you.      SECONDARY DISCHARGE DIAGNOSES  Diagnosis: Dyspnea  Assessment and Plan of Treatment: You were likely having shortness of breath and coughing with chest pain because of ascites fluid transferring into your lungs and also causing pressure on the diaphragms that pushed up against your lungs. We were not concerned about pneumonia, other respiratory infections, or heart problems causing these symptoms.     PRINCIPAL DISCHARGE DIAGNOSIS  Diagnosis: Alcoholic cirrhosis of liver with ascites  Assessment and Plan of Treatment: We initially analyzed the ascites fluid and saw that it was not concerning for infection. We performed a therapeutic paracentesis on 9/21 and removed 2.3 liters of ascites fluid. Your abdominal pain did not improve however and we continued to manage your pain with strong medications. Your pain became more manageable with oral medications and we felt it was safe to discharge you.      SECONDARY DISCHARGE DIAGNOSES  Diagnosis: Dyspnea  Assessment and Plan of Treatment: You were likely having shortness of breath and coughing with chest pain because of ascites fluid transferring into your lungs and also causing pressure on the diaphragms that pushed up against your lungs. We were not concerned about pneumonia, other respiratory infections, or heart problems causing these symptoms.    Diagnosis: SIRS (systemic inflammatory response syndrome)  Assessment and Plan of Treatment: SIRS is a criteria that helps us to identify patients concerning for systemic infection (sepsis). Your high heart rate, high white blood count, and possible source of infection were concerning for meeting the SIRS criteria and suggested that you may have sepsis. We put you on antibiotics to cover for the possibility of sepsis but this became less concerning as we observed your vital signs and lab work. We stopped the antibiotics and do not think that you had an active infection.     PRINCIPAL DISCHARGE DIAGNOSIS  Diagnosis: Alcoholic cirrhosis of liver with ascites  Assessment and Plan of Treatment: We initially analyzed the ascites fluid and saw that it was not concerning for infection. We performed a therapeutic paracentesis on 9/21 and removed 2.3 liters of ascites fluid. Your abdominal pain did not improve however and we continued to manage your pain with strong medications. Your pain became more manageable with oral medications and we felt it was safe to discharge you.      SECONDARY DISCHARGE DIAGNOSES  Diagnosis: Lung nodule  Assessment and Plan of Treatment: You were found to have an incidental lung nodule on imaging. Please follow up with your PCP to have a repeat CT of your chest in 6 - 12months to reevaluate this nodule for resolution.    Diagnosis: SIRS (systemic inflammatory response syndrome)  Assessment and Plan of Treatment: SIRS is a criteria that helps us to identify patients concerning for systemic infection (sepsis). Your high heart rate, high white blood count, and possible source of infection were concerning for meeting the SIRS criteria and suggested that you may have sepsis. We put you on antibiotics to cover for the possibility of sepsis but this became less concerning as we observed your vital signs and lab work. We stopped the antibiotics and do not think that you had an active infection.    Diagnosis: Dyspnea  Assessment and Plan of Treatment: You were likely having shortness of breath and coughing with chest pain because of ascites fluid transferring into your lungs and also causing pressure on the diaphragms that pushed up against your lungs. We were not concerned about pneumonia, other respiratory infections, or heart problems causing these symptoms.

## 2020-09-21 NOTE — DISCHARGE NOTE PROVIDER - NSFOLLOWUPCLINICS_GEN_ALL_ED_FT
[facility] - Primary Care  Primary Care  [address] [place] [address]  Phone: [phone]  Fax:   Follow Up Time:

## 2020-09-21 NOTE — DISCHARGE NOTE PROVIDER - NSDCMRMEDTOKEN_GEN_ALL_CORE_FT
folic acid 1 mg oral tablet: 1 tab(s) orally once a day  midodrine 10 mg oral tablet: 1 tab(s) orally 3 times a day   multivitamin: 1 tab(s) orally once a day  pantoprazole 40 mg oral delayed release tablet: 1 tab(s) orally once a day (before a meal)   folic acid 1 mg oral tablet: 1 tab(s) orally once a day  midodrine 10 mg oral tablet: 1 tab(s) orally 3 times a day   multivitamin: 1 tab(s) orally once a day  oxyCODONE 5 mg oral tablet: 1 tab(s) orally every 6 hours, As Needed -Moderate Pain (4 - 6) MDD:20 mg  pantoprazole 40 mg oral delayed release tablet: 1 tab(s) orally once a day (before a meal)

## 2020-09-21 NOTE — CONSULT NOTE ADULT - ASSESSMENT
Assessment: 39y Male with EtOH cirrhosis. Request for therapeutic paracentesis. Additional question regarding appropriateness of TIPS.    Plan:  - Please place order for IR Procedure - therapeutic paracentesis, approving attending Dr. Cohn  - hold any therapeutic and prophylactic anticoagulants, maintain active type and screen x 2  - With regards to a TIPS procedure, patients calculated MELD score is 36, making is post-procedure mortality rate greater than 60%. Therefore, he is not a candidate for a TIPS procedure.

## 2020-09-21 NOTE — PROGRESS NOTE ADULT - PROBLEM SELECTOR PLAN 2
At presentation, afebrile but tachy to 120s, WBC up to 16.83 (neutrophil pre-dominant) w/ lactate to 3.4. Unclear source at this time. CXR (9/18/20): showing b/l pleural effusions unable to exclude consolidation. Pt also with abdominal pain/distention in setting of cirrhosis.   - differential includes PNA vs urinary source vs R permacath infection vs less likely SBP (given only 28 nucleated cell count on 9/19 diagnostic paracentesis)  - s/p CTX 1g IV x1 in ED  - broaden abx coverage for now with Zosyn renally dosed  - f/u diagnostic paracenteses labs and ascitic fluid Cx, BCx  - obtain culture from R permacath to r/o potential source  - check UA to r/o urinary source given decreased UOP  - f/u RVP  - hold IVF for now given fluid overloaded state  - continue to monitor for fevers, WBC, lactate At presentation, afebrile but tachy to 120s, WBC up to 16.83 (neutrophil pre-dominant) w/ lactate to 3.4 (though likely elevated with hepatic dysfunction). PNA unlikely per CT chest. Preliminary BCx negative. RVP negative. UCx unable to perform due to oliguria. No concerning signs of infection at R IJ permacath. SBP unlikely given only 28 nucleated cell count on 9/19 diagnostic paracentesis. Will await 48hr BCx result to re-evaluate antibiotics.  - will discontinue UCx & R IJ permacath cx orders  - s/p CTX 1g IV x1 in ED  - On Zosyn renally dosed  - hold IVF for now given fluid overloaded state  - continue to monitor for fevers, WBC, lactate

## 2020-09-21 NOTE — PROGRESS NOTE ADULT - SUBJECTIVE AND OBJECTIVE BOX
Chief Complaint:  Patient is a 39y old  Male who presents with a chief complaint of Abdominal pain/distention (21 Sep 2020 10:24)      Interval Events: no acute events overnight, diagnostic paracentesis negative for SBP, cultures remain negative, patient still on zosyn. No new complaints today    Allergies:  No Known Allergies      Hospital Medications:  folic acid 1 milliGRAM(s) Oral daily  HYDROmorphone  Injectable 0.5 milliGRAM(s) IV Push every 6 hours PRN  influenza   Vaccine 0.5 milliLiter(s) IntraMuscular once  LORazepam     Tablet 0.5 milliGRAM(s) Oral two times a day PRN  melatonin 3 milliGRAM(s) Oral at bedtime  midodrine 10 milliGRAM(s) Oral three times a day  multivitamin 1 Tablet(s) Oral daily  ondansetron Injectable 4 milliGRAM(s) IV Push two times a day PRN  pantoprazole    Tablet 40 milliGRAM(s) Oral before breakfast  piperacillin/tazobactam IVPB.. 3.375 Gram(s) IV Intermittent every 12 hours        PHYSICAL EXAM:   Vital Signs:  Vital Signs Last 24 Hrs  T(C): 36.6 (21 Sep 2020 09:24), Max: 36.7 (20 Sep 2020 17:30)  T(F): 97.9 (21 Sep 2020 09:24), Max: 98.1 (20 Sep 2020 17:30)  HR: 106 (21 Sep 2020 12:33) (106 - 112)  BP: 107/70 (21 Sep 2020 12:33) (99/69 - 116/77)  BP(mean): --  RR: 18 (21 Sep 2020 12:33) (17 - 19)  SpO2: 93% (21 Sep 2020 12:33) (92% - 98%)  Daily     Daily     GENERAL:  No acute distress  HEENT:  Normocephalic/atraumtic,  no scleral icterus  CHEST:  Clear to auscultation bilaterally, no wheezes/rales/ronchi, no accessory muscle use  HEART:  Regular rate and rhythm, no murmurs/rubs/gallops  ABDOMEN:  Soft, non-tender, distended, normoactive bowel sounds, no masses, no hepato-splenomegaly, no signs of chronic liver disease  EXTREMITIES:  No cyanosis, clubbing, or edema  SKIN:  No rash/erythema/ecchymoses/petechiae/wounds/abscess/warm/dry  NEURO:  Alert and oriented x 3, no asterixis, no tremor    LABS:                        7.1    14.24 )-----------( 134      ( 21 Sep 2020 06:29 )             22.6     Mean Cell Volume: 105.6 fl (09-21-20 @ 06:29)    09-21    135  |  99  |  22  ----------------------------<  83  5.0   |  27  |  7.25<H>    Ca    8.8      21 Sep 2020 06:25  Phos  5.5     09-21  Mg     1.9     09-21    TPro  6.3  /  Alb  2.1<L>  /  TBili  6.7<H>  /  DBili  x   /  AST  94<H>  /  ALT  24  /  AlkPhos  216<H>  09-21    LIVER FUNCTIONS - ( 21 Sep 2020 06:25 )  Alb: 2.1 g/dL / Pro: 6.3 g/dL / ALK PHOS: 216 U/L / ALT: 24 U/L / AST: 94 U/L / GGT: x           PT/INR - ( 21 Sep 2020 06:29 )   PT: 24.0 sec;   INR: 2.09 ratio         PTT - ( 21 Sep 2020 06:29 )  PTT:46.0 sec          Imaging:

## 2020-09-21 NOTE — CONSULT NOTE ADULT - SUBJECTIVE AND OBJECTIVE BOX
[facility] DIVISION OF KIDNEY DISEASES AND HYPERTENSION -- [phone]  -- INITIAL CONSULT NOTE  --------------------------------------------------------------------------------  If any questions, please feel free to contact me  NS pager: [phone], [facility]: [phone]  Adan Monae M.D.  Nephrology Fellow    (After 5 pm or on weekends please page the on-call fellow)  --------------------------------------------------------------------------------    HPI:  39 y.o. 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 Patient now presenting with worsening abdominal distention and pain x3 days, is constant, up to 8/10 in severity, and similar in nature to his previous abdominal pains that were subsequently relieved with therapeutic paracenteses. Pt was supposed to get an outpatient therapeutic tap on 9/17, but was unable to because of an insurance issue and the outpatient clinic's inability to handle potential complications, and was suggested to come to the hospital to get it done. Pt also reports a dry cough, SOB, and mid-right sided CP that started a couple of days ago, worsens when he sits up or leans forward. Denies fevers, chills, headache, dizziness, dysuria, hematuria, hematochezia, melena, constipation, diarrhea, hematemesis. -- last HD on 9/18 as outpatient.     Nephrology consulted for ESKD management         PAST HISTORY  --------------------------------------------------------------------------------  PAST MEDICAL & SURGICAL HISTORY:  End-stage renal disease (ESRD)  On HD MWF    Cirrhosis, alcoholic    S/P exploratory laparotomy  for bladder rupture s/p fall      FAMILY HISTORY:  FH: alpha 1 antitrypsin deficiency      PAST SOCIAL HISTORY:  +ve alcohol use - no drugs     ALLERGIES & MEDICATIONS  --------------------------------------------------------------------------------  Allergies    No Known Allergies    Intolerances      Standing Inpatient Medications  folic acid 1 milliGRAM(s) Oral daily  influenza   Vaccine 0.5 milliLiter(s) IntraMuscular once  melatonin 3 milliGRAM(s) Oral at bedtime  midodrine 10 milliGRAM(s) Oral three times a day  multivitamin 1 Tablet(s) Oral daily  pantoprazole    Tablet 40 milliGRAM(s) Oral before breakfast  piperacillin/tazobactam IVPB.. 3.375 Gram(s) IV Intermittent every 12 hours    PRN Inpatient Medications  HYDROmorphone  Injectable 0.5 milliGRAM(s) IV Push every 6 hours PRN  LORazepam     Tablet 0.5 milliGRAM(s) Oral two times a day PRN      REVIEW OF SYSTEMS  --------------------------------------------------------------------------------  Gen: No fevers/chills  Skin: No rashes  Head/Eyes/Ears: Normal hearing,   Respiratory: +Ve dyspnea and cough  CV: +ve chest pain  GI: +ve abdominal pain, and distension   : No dysuria, hematuria  MSK: No  edema  Heme: No easy bruising or bleeding  Psych: No significant depression    All other systems were reviewed and are negative, except as noted.    VITALS/PHYSICAL EXAM  --------------------------------------------------------------------------------  T(C): 36.5 (09-21-20 @ 05:03), Max: 36.7 (09-20-20 @ 17:30)  HR: 111 (09-21-20 @ 05:03) (107 - 112)  BP: 116/77 (09-21-20 @ 05:03) (99/69 - 116/77)  RR: 18 (09-21-20 @ 05:03) (17 - 19)  SpO2: 93% (09-21-20 @ 05:03) (93% - 98%)  Wt(kg): --  Height (cm): 185.4 (09-19-20 @ 14:45)  Weight (kg): 83.7 (09-19-20 @ 14:45)  BMI (kg/m2): 24.4 (09-19-20 @ 14:45)  BSA (m2): 2.08 (09-19-20 @ 14:45)      09-20-20 @ 07:01  -  09-21-20 @ 07:00  --------------------------------------------------------  IN: 940 mL / OUT: 0 mL / NET: 940 mL      Physical Exam:  	Gen: NAD, cooperative   	HEENT: MMM  	Pulm: CTA B/L, no wheezing  	CV: S1S2, RRR  	Abd: Soft, +BS, distended, mildly tender to palpation    	Ext: No LE edema B/L  	Neuro: Awake, A&O x3  	Skin: Warm and dry             : no tenderness to palpation              Psych: normal affect and mood  	Vascular access: Right Permcath     LABS/STUDIES  --------------------------------------------------------------------------------              7.1    14.24 >-----------<  134      [09-21-20 @ 06:29]              22.6     135  |  99  |  22  ----------------------------<  83      [09-21-20 @ 06:25]  5.0   |  27  |  7.25        Ca     8.8     [09-21-20 @ 06:25]      Mg     1.9     [09-21-20 @ 06:25]      Phos  5.5     [09-21-20 @ 06:25]    TPro  6.3  /  Alb  2.1  /  TBili  6.7  /  DBili  x   /  AST  94  /  ALT  24  /  AlkPhos  216  [09-21-20 @ 06:25]    PT/INR: PT 24.0 , INR 2.09       [09-21-20 @ 06:29]  PTT: 46.0       [09-21-20 @ 06:29]      Creatinine Trend:  SCr 7.25 [09-21 @ 06:25]  SCr 5.72 [09-20 @ 06:33]  SCr 4.93 [09-19 @ 15:56]  SCr 5.37 [09-04 @ 06:56]  SCr 7.97 [09-03 @ 07:17]    Urinalysis - [08-07-20 @ 04:54]      Color Dark Orange / Appearance Turbid / SG 1.041 / pH 6.0      Gluc Negative / Ketone Negative  / Bili Moderate / Urobili 8 mg/dL       Blood Moderate / Protein 100 mg/dL Test Performed on Urine Supernate. / Leuk Est Negative / Nitrite Negative      RBC 30 / WBC 18 / Hyaline 10 / Gran  / Sq Epi  / Non Sq Epi 7 / Bacteria Negative      Iron 34, TIBC 124, %sat 27      [07-14-20 @ 10:34]  Ferritin 812      [07-12-20 @ 08:58]  PTH -- (Ca 6.2)      [07-29-20 @ 09:18]   223  Lipid: chol --, , HDL --, LDL --      [08-03-20 @ 12:04]    HBsAb 276.9      [08-10-20 @ 23:16]  HBsAb Reactive      [08-12-20 @ 11:42]  HBsAg Nonreact      [08-10-20 @ 23:34]  HBcAb Nonreact      [08-12-20 @ 11:42]  HCV 0.27, Nonreact      [08-10-20 @ 23:16]  HIV Nonreact      [07-15-20 @ 10:47]    SHAUN: titer Negative, pattern --      [07-12-20 @ 09:37]  Syphilis Screen (Treponema Pallidum Ab) Negative      [08-08-20 @ 09:47]  Free Light Chains: kappa 2.35, lambda 2.06, ratio = 1.14      [07-12 @ 08:58]

## 2020-09-21 NOTE — CONSULT NOTE ADULT - ASSESSMENT
39 y.o. 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 Patient now presenting with worsening abdominal distention and pain x3 days + leukocytosis on IV antibiotics         #ESKD  Pt. with ESKD on HD three times a week (MW). Last HD was on 9/18 via Right permCath.   Pt. Currently undergoing sepsis work up - on IV zosyn, also pending paracentesis r/o SBP  Labs reviewed. Will arrange for maintenance HD today. Monitor labs  Patient consented and consent given to HD nurse     #anemia  Patient with anemia in the setting of ESKD. Hemoglobin below target range.  hx of GI bleed, currently denies any overt GI bleeding   Please check iron studies  Monitor hemoglobin.  continue JOJO    #BMD  Patient with hyperphosphatemia   please check PTH   Please start on phosphate binders with meals.   monitor serum phosphorus level.   Low phosphorus diet.

## 2020-09-21 NOTE — DISCHARGE NOTE PROVIDER - NSDCCPTREATMENT_GEN_ALL_CORE_FT
PRINCIPAL PROCEDURE  Procedure: Complete ultrasound of abdomen  Findings and Treatment:        PRINCIPAL PROCEDURE  Procedure: Paracentesis, with US guidance  Findings and Treatment: PROCEDURE:   · Procedure Name  Paracentesis  · TIME OUT  Patient's first and last name, , procedure, and correct site confirmed prior to the start of procedure.  · Procedure Date/Time  21-Sep-2020 14:59  · Informed Consent  Benefits, risks, and possible complications of procedure explained to patient/caregiver who verbalized understanding and gave written consent.  · Procedure Performed By  Myself  · Procedure Assisted By  PA  · Indications  ascites  · Procedure was  Therapeutic  · Site Prep  chlorhexidine  · Anatomic Location  Peritoneal cavity  · Local Anesthesia  1% lidocaine  · Procedure Details  location identified, sterile technique used, catheter introduced, fluid drained; Seldinger technique; sterile dressing applied; ultrasound utilization  · Medication(s) Administered  Albumin  · Needle Gauge  5Fr. 9cm  · Specification laterality  Right  · Drainage device  RLQ  · Amount Of Fluid Obtained (mL)  2300 milliLiter(s)  · Findings  clear yellow  · Specimen Obtained and Sent to Lab  peritoneal fluid  · Tolerance  Patient tolerated procedure well.  · Complications  no complications  · Estimated Blood Loss  Minimal  · Post-Procedure Care Guidelines  Verbal/written post procedure instructions were given to patient/caregiver      SECONDARY PROCEDURE  Procedure: Chest CT without contrast  Findings and Treatment: PROCEDURE DATE:  2020    COMPARISON: CT chest 2020. MRI 2020.  FINDINGS:  LUNGS AND AIRWAYS: Patent central airways.  Complete atelectasis of the right lower lobe and partial atelectasis of the right upper and middle lobes, new since the prior study. Linear atelectasis in the bilateral upper, right middle, and left lower lobes. A 5 mm groundglass nodule in the medial left upper lobe (3:86) is new since 2020. New small focus of groundglass in the anterior left upper lobe is nonspecific, but likely reflects a small focus of atelectasis.  PLEURA: Increased bilateral pleural effusions, now large on the right and small on the left.  MEDIASTINUM AND TESHA: No lymphadenopathy.  VESSELS: Right IJ central venous catheter terminates in the SVC.  HEART: Heart size is normal. No pericardial effusion.  CHEST WALL AND LOWER NECK: Within normal limits.  VISUALIZED UPPER ABDOMEN: Left gastro-renal shunt occlusion. Cirrhosis. Splenomegaly and numerous intra-abdominal collateral vessels consistent with portal hypertension. Increased ascites, now moderate in volume. Left adrenal hemorrhage.  BONES: Chronic left-sided rib fractures. Mild degenerative changes of the spine.  IMPRESSION:  Increased bilateral pleural effusions, now large on the right and small on the left. Complete atelectasis of the right lower lobe with partial atelectasis of the right upper and middle lobes.  Nonspecific 5 mm groundglass nodule in the medial left upper lobe is new since 2020, and likely reflects a small focus of atelectasis. Follow-up CT chest in 6-12 months can be considered if clinically indicated.    Procedure: Complete ultrasound of abdomen  Findings and Treatment: PROCEDURE DATE:  2020    FINDINGS:  Mild ascites with suspended debris, decreased in volume from  Bilateral pleural effusions.  IMPRESSION:  Mild ascites, largest in the right lower quadrant decreased from  Bilateral pleural effusions.       PRINCIPAL PROCEDURE  Procedure: Paracentesis, with US guidance  Findings and Treatment: Paracentesis:   Amount Of Fluid Obtained (mL)  2300 milliLiter(s)        SECONDARY PROCEDURE  Procedure: Complete ultrasound of abdomen  Findings and Treatment: PROCEDURE DATE:  09/21/2020    FINDINGS:  Mild ascites with suspended debris, decreased in volume from  Bilateral pleural effusions.  IMPRESSION:  Mild ascites, largest in the right lower quadrant decreased from  Bilateral pleural effusions.      Procedure: Chest CT without contrast  Findings and Treatment: PROCEDURE DATE:  09/19/2020    COMPARISON: CT chest 8/31/2020. MRI 8/9/2020.  FINDINGS:  LUNGS AND AIRWAYS: Patent central airways.  Complete atelectasis of the right lower lobe and partial atelectasis of the right upper and middle lobes, new since the prior study. Linear atelectasis in the bilateral upper, right middle, and left lower lobes. A 5 mm groundglass nodule in the medial left upper lobe (3:86) is new since 8/31/2020. New small focus of groundglass in the anterior left upper lobe is nonspecific, but likely reflects a small focus of atelectasis.  PLEURA: Increased bilateral pleural effusions, now large on the right and small on the left.  MEDIASTINUM AND TESHA: No lymphadenopathy.  VESSELS: Right IJ central venous catheter terminates in the SVC.  HEART: Heart size is normal. No pericardial effusion.  CHEST WALL AND LOWER NECK: Within normal limits.  VISUALIZED UPPER ABDOMEN: Left gastro-renal shunt occlusion. Cirrhosis. Splenomegaly and numerous intra-abdominal collateral vessels consistent with portal hypertension. Increased ascites, now moderate in volume. Left adrenal hemorrhage.  BONES: Chronic left-sided rib fractures. Mild degenerative changes of the spine.  IMPRESSION:  Increased bilateral pleural effusions, now large on the right and small on the left. Complete atelectasis of the right lower lobe with partial atelectasis of the right upper and middle lobes.  Nonspecific 5 mm groundglass nodule in the medial left upper lobe is new since 8/31/2020, and likely reflects a small focus of atelectasis. Follow-up CT chest in 6-12 months can be considered if clinically indicated.

## 2020-09-21 NOTE — DISCHARGE NOTE PROVIDER - CARE PROVIDER_API CALL
Yesenia Funez)  Gastroenterology; Transplant Hepatology  [address] [place] [address]  Phone: [phone]  Fax: [phone]  Follow Up Time: 1 week   Yesenia Funez)  Gastroenterology; Transplant Hepatology  [address] [place] [address]  Phone: [phone]  Fax: [phone]  Established Patient  Follow Up Time: 1 week    [facility]  MEDICINE  [address] [place] [address]  Phone: [phone]  Fax: [phone]  Established Patient  Follow Up Time:

## 2020-09-21 NOTE — CONSULT NOTE ADULT - ATTENDING COMMENTS
Patient with alcoholic cirrhosis and history of portal hypertensive GI bleeding .  On dialysis and admitted with abdominal distention and abdominal pain.  Patient also with cough. Suggest abdominal ultrasound and diagnostic/therapeutic paracentesis with albumin support.  Suggest IR consult to assess patient for TIPS placement.  Patient currently also in alcohol rehab and should proceed with liver/kidney transplant evaluation.
39 y.o. 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 Patient now presenting with worsening abdominal distention and pain x3 days + leukocytosis on IV antibiotics   Plan for HD today  Paracentesis today as well, since ESKD, no concern for maximum number of liters to be removed by paracentesis    Earle Olmos MD  Cell   Pager   Office

## 2020-09-21 NOTE — DISCHARGE NOTE PROVIDER - NSDCFUADDAPPT_GEN_ALL_CORE_FT
Please follow up with Dr. Funez, transplant hepatology specialist, for follow up within 1 week after discharge. Please call [phone] to set up an appointment.    Please find a primary care doctor with     Please follow up with your kidney specialist. Please follow up with Dr. Funez, transplant hepatology specialist, for follow up within 1 week after discharge. Please call [phone] to set up an appointment.    Please find a primary care doctor. The [facility] - Primary Care clinic is an option but it is far from your home. Feel free to give the clinic a call at [phone] for an appointment but you can also call your insurance to find nearby primary care doctors you may see closer to home.    Please follow up with your kidney specialist. Please schedule an appointment with Dr. Funez, transplant hepatology specialist, for follow up within 1 week after discharge. Please call [phone] to set up an appointment.    Please find a primary care doctor. The [facility] - Primary Care clinic is an option but it is far from your home. Feel free to give the clinic a call at [phone]. But you can also call your insurance to find nearby primary care doctors you may see closer to home.    Please follow up with your kidney specialist. Please schedule an appointment with Dr. Funez, transplant hepatology specialist, for follow up within 1 week after discharge. Please call [phone] to set up an appointment.    Please find a primary care doctor. The [facility] - Primary Care clinic is an option but it is far from your home. Feel free to give the clinic a call at [phone]. But you can also call your insurance to find nearby primary care doctors you may see closer to home.    Please follow up with Dr. Stanford Womack, your kidney specialist in 1-2 weeks after discharge. Call [phone] to set up an appointment. Please continue to go to your dialysis appointments on Mon/Wed/Fri as well.

## 2020-09-22 NOTE — PROGRESS NOTE ADULT - PROBLEM SELECTOR PLAN 4
At presentation, reports mid-right sided CP x 2-3 days, worsening when sitting up or leaning forward. Likely atelectasis with severe ascites. Less likely PNA or ACS.

## 2020-09-22 NOTE — PROGRESS NOTE ADULT - PROBLEM SELECTOR PLAN 3
At presentation, dry cough and SOB for past couple of days associated with positional chest pain. Likely due to atelectasis in setting of severe ascites. Not concerning for PNA per CT Chest.  - continue to monitor respiratory status and O2 sat  - wean off supplemental O2 as tolerated  - incentive spirometry  - therapeutic paracentesis done At presentation, dry cough and SOB for past couple of days associated with positional chest pain. Likely due to atelectasis in setting of severe ascites. Not concerning for PNA per CT Chest.  - continue to monitor respiratory status and O2 sat  - wean off supplemental O2 as tolerated  - incentive spirometry  - ambulate

## 2020-09-22 NOTE — PROVIDER CONTACT NOTE (CRITICAL VALUE NOTIFICATION) - ACTION/TREATMENT ORDERED:
Dano Gauthier made aware, will wait for CBC results to compare h&h, no further intervention
1 unit prbc ordered.
Dano Gauthier made aware, pt fluid overloaded, as per hemoc wants to keep pts hemoglobin between 7-8, no interventions at this time

## 2020-09-22 NOTE — PROGRESS NOTE ADULT - PROBLEM SELECTOR PLAN 5
ESRD on HD MWF via R IJ permacath. Makes urine at baseline. Last session  Fri 9/18 without complication.  - continue to monitor UOP, SCr, and eGFR  - iron studies, PTH to be drawn tomorrow  - nephrology/HD on board, appreciate recs  - HD today  - Start sevelamer 800mg TID with meals ESRD on HD MWF via R IJ permacath. Iron studies inconclusive today due to missing results, though most likely anemia of chronic disease. Does not appear to be iron deficiency anemia as iron serum level is wnl. PTH pending.   - continue to monitor UOP, SCr, and eGFR  - nephrology/HD on board, appreciate recs  - HD tomorrow

## 2020-09-22 NOTE — PROGRESS NOTE ADULT - PROBLEM SELECTOR PLAN 6
- DVT ppx: SCDs for now given recent hx of massive GIB  - Diet: RENAL. NPO past midnight for tap tomorrow.  - Dispo: likely discharge tomorrow - DVT ppx: SCDs for now given recent hx of massive GIB  - Diet: RENAL. NPO past midnight for tap tomorrow.  - Dispo: likely discharge tomorrow with adequate pain management, weaning off O2

## 2020-09-22 NOTE — PROGRESS NOTE ADULT - SUBJECTIVE AND OBJECTIVE BOX
Aditya Lyle, PGY1  Internal Medicine  Pager #: [phone]    Patient is a 39y old  Male who presents with a chief complaint of Abdominal pain/distention (21 Sep 2020 16:37)      SUBJECTIVE / OVERNIGHT EVENTS: NAEON.    ADDITIONAL REVIEW OF SYSTEMS: 10 point ROS negative except as stated per HPI.    MEDICATIONS  (STANDING):  epoetin chance-epbx (RETACRIT) Injectable 4000 Unit(s) IV Push <User Schedule>  folic acid 1 milliGRAM(s) Oral daily  influenza   Vaccine 0.5 milliLiter(s) IntraMuscular once  melatonin 3 milliGRAM(s) Oral at bedtime  midodrine 10 milliGRAM(s) Oral three times a day  multivitamin 1 Tablet(s) Oral daily  pantoprazole    Tablet 40 milliGRAM(s) Oral before breakfast  piperacillin/tazobactam IVPB.. 3.375 Gram(s) IV Intermittent every 12 hours  sevelamer carbonate 800 milliGRAM(s) Oral three times a day    MEDICATIONS  (PRN):  HYDROmorphone  Injectable 0.5 milliGRAM(s) IV Push every 6 hours PRN Severe Pain (7 - 10)  LORazepam     Tablet 0.5 milliGRAM(s) Oral two times a day PRN Anxiety  ondansetron Injectable 4 milliGRAM(s) IV Push two times a day PRN Nausea and/or Vomiting      CAPILLARY BLOOD GLUCOSE        I&O's Summary    21 Sep 2020 07:01  -  22 Sep 2020 07:00  --------------------------------------------------------  IN: 1140 mL / OUT: 1800 mL / NET: -660 mL        PHYSICAL EXAM:  Vital Signs Last 24 Hrs  T(C): 36.7 (22 Sep 2020 05:00), Max: 36.8 (21 Sep 2020 20:20)  T(F): 98 (22 Sep 2020 05:00), Max: 98.2 (21 Sep 2020 20:20)  HR: 102 (22 Sep 2020 05:00) (102 - 118)  BP: 100/67 (22 Sep 2020 05:00) (90/50 - 114/70)  BP(mean): --  RR: 18 (22 Sep 2020 05:00) (17 - 20)  SpO2: 96% (22 Sep 2020 05:00) (92% - 99%)  CONSTITUTIONAL: NAD, lying in bed comfortably  EYES: EOMI, PERRLA; conjunctiva and sclera clear  ENMT: Moist oral mucosa; normal dentition  NECK: Supple, no palpable masses  RESPIRATORY: Lungs clear to ascultation b/l; No rales, ronchi, or wheezing; Unlabored respirations  CARDIOVASCULAR: Regular rate and rhythm, normal S1 and S2, no murmurs, rubs, or gallops  ABDOMEN: Soft, nontender, nondistended, normal bowel sounds  MUSCULOSKELETAL: No joint swelling or tenderness to palpation  PSYCH: Affect appropriate  NEUROLOGY: AAOx3, CNs grossly intact  SKIN: No rashes; no palpable lesions    LABS:                        7.1    14.24 )-----------( 134      ( 21 Sep 2020 06:29 )             22.6     09-21    135  |  99  |  22  ----------------------------<  83  5.0   |  27  |  7.25<H>    Ca    8.8      21 Sep 2020 06:25  Phos  5.5     09-21  Mg     1.9     09-21    TPro  6.3  /  Alb  2.1<L>  /  TBili  6.7<H>  /  DBili  x   /  AST  94<H>  /  ALT  24  /  AlkPhos  216<H>  09-21    PT/INR - ( 21 Sep 2020 06:29 )   PT: 24.0 sec;   INR: 2.09 ratio         PTT - ( 21 Sep 2020 06:29 )  PTT:46.0 sec          Culture - Body Fluid with Gram Stain (collected 20 Sep 2020 00:51)  Source: .Body Fluid Peritoneal Fluid  Gram Stain (20 Sep 2020 01:47):    No polymorphonuclear leukocytes seen    No organisms seen    by cytocentrifuge  Preliminary Report (20 Sep 2020 15:29):    No growth    Culture - Blood (collected 19 Sep 2020 23:05)  Source: .Blood Blood-Peripheral  Preliminary Report (21 Sep 2020 01:01):    No growth to date.    Culture - Blood (collected 19 Sep 2020 23:05)  Source: .Blood Blood-Venous  Preliminary Report (21 Sep 2020 01:01):    No growth to date.        RADIOLOGY & ADDITIONAL TESTS: Aditya Lyle, PGY1  Internal Medicine  Pager #: [phone]    Patient is a 39y old  Male who presents with a chief complaint of Abdominal pain/distention (21 Sep 2020 16:37)      SUBJECTIVE / OVERNIGHT EVENTS: NAEON. Pt reports ongoing abdominal pain despite therapeutic paracentesis, though SOB, cough, and positional chest pain improving. He denies dysuria with urination yesterday. He reports regular and well-formed BM.    ADDITIONAL REVIEW OF SYSTEMS: 10 point ROS negative except as stated per HPI.    MEDICATIONS  (STANDING):  epoetin chance-epbx (RETACRIT) Injectable 4000 Unit(s) IV Push <User Schedule>  folic acid 1 milliGRAM(s) Oral daily  influenza   Vaccine 0.5 milliLiter(s) IntraMuscular once  melatonin 3 milliGRAM(s) Oral at bedtime  midodrine 10 milliGRAM(s) Oral three times a day  multivitamin 1 Tablet(s) Oral daily  pantoprazole    Tablet 40 milliGRAM(s) Oral before breakfast  piperacillin/tazobactam IVPB.. 3.375 Gram(s) IV Intermittent every 12 hours  sevelamer carbonate 800 milliGRAM(s) Oral three times a day    MEDICATIONS  (PRN):  HYDROmorphone  Injectable 0.5 milliGRAM(s) IV Push every 6 hours PRN Severe Pain (7 - 10)  LORazepam     Tablet 0.5 milliGRAM(s) Oral two times a day PRN Anxiety  ondansetron Injectable 4 milliGRAM(s) IV Push two times a day PRN Nausea and/or Vomiting      CAPILLARY BLOOD GLUCOSE        I&O's Summary    21 Sep 2020 07:01  -  22 Sep 2020 07:00  --------------------------------------------------------  IN: 1140 mL / OUT: 1800 mL / NET: -660 mL        PHYSICAL EXAM:  Vital Signs Last 24 Hrs  T(C): 36.7 (22 Sep 2020 05:00), Max: 36.8 (21 Sep 2020 20:20)  T(F): 98 (22 Sep 2020 05:00), Max: 98.2 (21 Sep 2020 20:20)  HR: 102 (22 Sep 2020 05:00) (102 - 118)  BP: 100/67 (22 Sep 2020 05:00) (90/50 - 114/70)  BP(mean): --  RR: 18 (22 Sep 2020 05:00) (17 - 20)  SpO2: 96% (22 Sep 2020 05:00) (92% - 99%)  CONSTITUTIONAL: NAD, lying in bed comfortably  EYES: EOMI, PERRLA; conjunctiva and sclera clear  ENMT: Moist oral mucosa; normal dentition  NECK: Supple, no palpable masses  RESPIRATORY: Lungs clear to ascultation b/l; No rales, ronchi, or wheezing; Unlabored respirations  CARDIOVASCULAR: Regular rate and rhythm, normal S1 and S2, no murmurs, rubs, or gallops  ABDOMEN: Soft, nontender, nondistended, normal bowel sounds  MUSCULOSKELETAL: No joint swelling or tenderness to palpation  PSYCH: Affect appropriate  NEUROLOGY: AAOx3, CNs grossly intact  SKIN: No rashes; no palpable lesions    LABS:                          6.7    12.66 )-----------( 102      ( 22 Sep 2020 07:15 )             20.9     09-22    137  |  99  |  13  ----------------------------<  96  4.5   |  27  |  5.31<H>    Ca    8.3<L>      22 Sep 2020 07:15  Phos  4.4     09-22  Mg     1.9     09-22    TPro  5.9<L>  /  Alb  2.1<L>  /  TBili  6.9<H>  /  DBili  x   /  AST  92<H>  /  ALT  22  /  AlkPhos  190<H>  09-22    PT/INR - ( 22 Sep 2020 07:15 )   PT: 25.8 sec;   INR: 2.25 ratio         PTT - ( 22 Sep 2020 07:15 )  PTT:48.3 sec          RADIOLOGY & ADDITIONAL TESTS: Aditya Lyle, PGY1  Internal Medicine  Pager #: [phone]    Patient is a 39y old  Male who presents with a chief complaint of Abdominal pain/distention (21 Sep 2020 16:37)      SUBJECTIVE / OVERNIGHT EVENTS: NAEON. Pt reports 8/10 abdominal pain despite therapeutic paracentesis, though SOB, cough, and positional chest pain improving. He denies dysuria with urination yesterday. He reports regular and well-formed BM.     ADDITIONAL REVIEW OF SYSTEMS: 10 point ROS negative except as stated per HPI.    MEDICATIONS  (STANDING):  epoetin chance-epbx (RETACRIT) Injectable 4000 Unit(s) IV Push <User Schedule>  folic acid 1 milliGRAM(s) Oral daily  influenza   Vaccine 0.5 milliLiter(s) IntraMuscular once  melatonin 3 milliGRAM(s) Oral at bedtime  midodrine 10 milliGRAM(s) Oral three times a day  multivitamin 1 Tablet(s) Oral daily  pantoprazole    Tablet 40 milliGRAM(s) Oral before breakfast  piperacillin/tazobactam IVPB.. 3.375 Gram(s) IV Intermittent every 12 hours  sevelamer carbonate 800 milliGRAM(s) Oral three times a day    MEDICATIONS  (PRN):  HYDROmorphone  Injectable 0.5 milliGRAM(s) IV Push every 6 hours PRN Severe Pain (7 - 10)  LORazepam     Tablet 0.5 milliGRAM(s) Oral two times a day PRN Anxiety  ondansetron Injectable 4 milliGRAM(s) IV Push two times a day PRN Nausea and/or Vomiting      CAPILLARY BLOOD GLUCOSE        I&O's Summary    21 Sep 2020 07:01  -  22 Sep 2020 07:00  --------------------------------------------------------  IN: 1140 mL / OUT: 1800 mL / NET: -660 mL        PHYSICAL EXAM:  Vital Signs Last 24 Hrs  T(C): 36.7 (22 Sep 2020 05:00), Max: 36.8 (21 Sep 2020 20:20)  T(F): 98 (22 Sep 2020 05:00), Max: 98.2 (21 Sep 2020 20:20)  HR: 102 (22 Sep 2020 05:00) (102 - 118)  BP: 100/67 (22 Sep 2020 05:00) (90/50 - 114/70)  BP(mean): --  RR: 18 (22 Sep 2020 05:00) (17 - 20)  SpO2: 96% (22 Sep 2020 05:00) (92% - 99%)  CONSTITUTIONAL: NAD, lying in bed comfortably  EYES: EOMI, PERRLA; conjunctiva and sclera clear  ENMT: Moist oral mucosa; normal dentition  NECK: Supple, no palpable masses  RESPIRATORY: Lungs clear to ascultation b/l; No rales, ronchi, or wheezing; Unlabored respirations; mildly decreased breath sound in R lower lung field  CARDIOVASCULAR: Tachycardic rate and sinus rhythm, normal S1 and S2, no murmurs, rubs, or gallops  ABDOMEN: Distended, TTP; ex lap scar visible; normal bowel sounds; paracentesis wound dressing c/d/i  MUSCULOSKELETAL: No joint swelling or tenderness to palpation  PSYCH: Affect appropriate  NEUROLOGY: AAOx3, CNs grossly intact  SKIN: No rashes; no palpable lesions; icteric overall    LABS:                          6.7    12.66 )-----------( 102      ( 22 Sep 2020 07:15 )             20.9     09-22    137  |  99  |  13  ----------------------------<  96  4.5   |  27  |  5.31<H>    Ca    8.3<L>      22 Sep 2020 07:15  Phos  4.4     09-22  Mg     1.9     09-22    TPro  5.9<L>  /  Alb  2.1<L>  /  TBili  6.9<H>  /  DBili  x   /  AST  92<H>  /  ALT  22  /  AlkPhos  190<H>  09-22    PT/INR - ( 22 Sep 2020 07:15 )   PT: 25.8 sec;   INR: 2.25 ratio         PTT - ( 22 Sep 2020 07:15 )  PTT:48.3 sec      Iron studies  Iron with Total Binding Capacity in AM (09.22.20 @ 09:54)   Iron - Total Binding Capacity.: Unable to calculate Test Repeated ug/dL   % Saturation, Iron: Unable to calculate Test Repeated %   Iron Total, Serum: 62 ug/dL   Unsaturated Iron Binding Capacity: <20: Test Repeated ug/dL     Ferritin, Serum in AM (09.22.20 @ 09:53)   Ferritin, Serum: 515 ng/mL    Transferrin, Serum (09.22.20 @ 09:53)   Transferrin, Serum: 49 mg/dL     RADIOLOGY & ADDITIONAL TESTS:

## 2020-09-22 NOTE — PROGRESS NOTE ADULT - PROBLEM SELECTOR PLAN 2
At presentation, afebrile but tachy to 120s, WBC up to 16.83 (neutrophil pre-dominant) w/ lactate to 3.4 (though likely elevated with hepatic dysfunction). PNA unlikely per CT chest. Preliminary BCx negative. RVP negative. UCx unable to perform due to oliguria. No concerning signs of infection at R IJ permacath. SBP unlikely given only 28 nucleated cell count on 9/19 diagnostic paracentesis. Will await 48hr BCx result to re-evaluate antibiotics.  - s/p CTX 1g IV x1 in ED  - On Zosyn renally dosed  - continue to monitor for fevers, WBC, lactate

## 2020-09-23 NOTE — DISCHARGE NOTE NURSING/CASE MANAGEMENT/SOCIAL WORK - PATIENT PORTAL LINK FT
You can access the FollowMyHealth Patient Portal offered by [facility] by registering at the following website: [email or web address]. By joining [facility]’s FollowMyHealth portal, you will also be able to view your health information using other applications (apps) compatible with our system.

## 2020-09-23 NOTE — PROGRESS NOTE ADULT - SUBJECTIVE AND OBJECTIVE BOX
[facility] Division of Hospital Medicine  Gabriele EVERETTBrian Reyes  Pager:[phone]    Patient is a 39y old  Male who presents with a chief complaint of Abdominal pain/distention (23 Sep 2020 08:03)      SUBJECTIVE / OVERNIGHT EVENTS:    pt states that his abd pain is improving.      ADDITIONAL REVIEW OF SYSTEMS:    MEDICATIONS  (STANDING):  chlorhexidine 2% Cloths 1 Application(s) Topical daily  epoetin chance-epbx (RETACRIT) Injectable 4000 Unit(s) IV Push <User Schedule>  folic acid 1 milliGRAM(s) Oral daily  influenza   Vaccine 0.5 milliLiter(s) IntraMuscular once  melatonin 3 milliGRAM(s) Oral at bedtime  midodrine 10 milliGRAM(s) Oral three times a day  multivitamin 1 Tablet(s) Oral daily  pantoprazole    Tablet 40 milliGRAM(s) Oral before breakfast  sevelamer carbonate 800 milliGRAM(s) Oral three times a day    MEDICATIONS  (PRN):  LORazepam     Tablet 0.5 milliGRAM(s) Oral two times a day PRN Anxiety  ondansetron Injectable 4 milliGRAM(s) IV Push two times a day PRN Nausea and/or Vomiting  oxyCODONE    IR 5 milliGRAM(s) Oral every 4 hours PRN Moderate Pain (4 - 6)      T(C): 36.8 (09-23 @ 13:30), Max: 36.8 (09-23 @ 13:30)   HR: 106   BP: 104/58   RR: 18   SpO2: 93%    PHYSICAL EXAM:    CONSTITUTIONAL: NAD, lying in bed comfortably  EYES: EOMI, PERRLA; conjunctiva and sclera clear  RESPIRATORY: Lungs clear to ascultation b/l; No rales, ronchi, or wheezing; Unlabored respirations; mildly decreased breath sound in R lower lung field  CARDIOVASCULAR: Tachycardic rate and sinus rhythm, normal S1 and S2, no murmurs, rubs, or gallops  ABDOMEN: mildly Distended, mild suprapubic tenderness to palpation; ex lap scar visible; normal bowel sounds; paracentesis dressing c/d/i  MUSCULOSKELETAL: No joint swelling or tenderness to palpation  PSYCH: Affect appropriate  NEUROLOGY: AAOx3, CNs grossly intact  SKIN: No rashes;  jaundice throughout    I&O's Summary    22 Sep 2020 07:01  -  23 Sep 2020 07:00  --------------------------------------------------------  IN: 660 mL / OUT: 0 mL / NET: 660 mL    23 Sep 2020 07:01  -  23 Sep 2020 14:03  --------------------------------------------------------  IN: 500 mL / OUT: 0 mL / NET: 500 mL        LABS:                        10.8   12.06 )-----------( 125      ( 23 Sep 2020 07:21 )             36.4     09-23    140  |  99  |  16  ----------------------------<  78  4.0   |  29  |  6.67<H>    Ca    8.9      23 Sep 2020 07:20  Phos  4.5     09-23  Mg     2.0     09-23    TPro  7.3  /  Alb  2.5<L>  /  TBili  8.5<H>  /  DBili  x   /  AST  108<H>  /  ALT  29  /  AlkPhos  245<H>  09-23    PT/INR - ( 23 Sep 2020 07:21 )   PT: 21.6 sec;   INR: 1.87 ratio         PTT - ( 23 Sep 2020 07:21 )  PTT:42.5 sec          CAPILLARY BLOOD GLUCOSE          RADIOLOGY & ADDITIONAL TESTS:  Results Reviewed:   Imaging Personally Reviewed:  Electrocardiogram Personally Reviewed:    COORDINATION OF CARE:  Care Discussed with Consultants/Other Providers [Y/N]:  Prior or Outpatient Records Reviewed [Y/N]:

## 2020-09-23 NOTE — PROGRESS NOTE ADULT - ATTENDING COMMENTS
Admitted for ascites needing LVP.  Still on HD.  Still has not been seen in hepatology clinic but did say he has signed up for outpatient alcohol rehab.  Please send serum PETH level
I have seen this patient with the fellow and agree with their assessment and plan. In addition, plan for HD today and dc planning per primary team    Earle Olmos MD  Cell   Pager   Office 
stable for discharge today.  Discharge time spent: 35 min
pt seen and examined.  above plan discussed on rounds today.    In addition,    39M pmh EtOH cirrhosis and ESRD on HD (M/W/F) a/w abd pain from ascites from decompensated cirrhosis and SIRS present on admission  - SBP was ruled out with diagnostic para showing 28 nucleated cells. Cultures remain negative. Will stop abx  - Leukocytosis --> down trending. will stop abx because no infectious source was identified.  - CT chest showing atelectasis.--> pt encouraged to use the incentive spirometer.   - therapeutic paracentesis done yesterday but patient continues to have suprapubic pain.  - Suprapubic pain - ?stricture or sequela for bladder rupture. pt denies any dysuria(he makes a little urine), pt denies any constipation.  - Anemia - no evidence of concern. presumed to be anemia of chronic disease causing decreased production. will give 1 u prbcs today.  - Hepatology eval appreciated, pt not a candidate for  TIPS procedure at this time. Pt to follow up with the outpt hepatology office for ongoing management of his liver failure.  if abd pain improves today he can be discharged, otherwise will continue to monitor him.  Discharge time spent: 40 min
pt seen and examined.  above plan discussed on rounds today.  In addition,    39M pmh EtOH cirrhosis and ESRD on HD (M/W/F) a/w abd pain from ascites from decompensated cirrhosis and SIRS present on admission  - SBP was ruled out with diagnostic para showing 28 nucleated cells  - Will get CT chest to eval for pulmonary process as a cause of his SIRS  - Will get abd U/S for a therapeutic paracentesis.  - Hepatology eval
pt seen and examined.  above plan discussed on rounds today.    In addition,    39M pmh EtOH cirrhosis and ESRD on HD (M/W/F) a/w abd pain from ascites from decompensated cirrhosis and SIRS present on admission  - SBP was ruled out with diagnostic para showing 28 nucleated cells. Cultures remain negative.  - Leukocytosis --> pt is noted to have a persistent leukocytosis in the past with the lowest level being a 10. will continuos to monitor for infectious etiology but none has been identified at this time.   - CT chest showing atelectasis.--> pt encouraged to use the incentive spirometer.   - therapeutic paracentesis planned for today    - Hepatology eval appreciated, pt not a candidate for  TIPS procedure at this time. Pt to follow up with the outpt hepatology office for ongoing management of his liver failure.

## 2020-09-23 NOTE — DISCHARGE NOTE NURSING/CASE MANAGEMENT/SOCIAL WORK - NSDCFUADDAPPT_GEN_ALL_CORE_FT
Please schedule an appointment with Dr. Funez, transplant hepatology specialist, for follow up within 1 week after discharge. Please call [phone] to set up an appointment.    Please find a primary care doctor. The [facility] - Primary Care clinic is an option but it is far from your home. Feel free to give the clinic a call at [phone]. But you can also call your insurance to find nearby primary care doctors you may see closer to home.    Please follow up with Dr. Stanford Womack, your kidney specialist in 1-2 weeks after discharge. Call [phone] to set up an appointment. Please continue to go to your dialysis appointments on Mon/Wed/Fri as well.

## 2020-09-23 NOTE — PROGRESS NOTE ADULT - ASSESSMENT
38 yo M w/ hx of EtOH cirrhosis c/b gastric variceal bleeding in July 2020 s/p 34u pRBC, IR embolization and blakemore ballon, ESRD on HD (MWF), bladder rupture 2/2 fall s/p ex-lap in 2019, recent admission in earlier in Sept 2020 for hemorrhoidal bleed, now presenting with worsening abdominal pain/distention associated with cough and SOB x3 days.     
39 y.o. 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 Patient now presenting with worsening abdominal distention and pain x3 days + leukocytosis on IV antibiotics         #ESKD  Pt. with ESKD on HD three times a week (MW). Last HD was on 9/18 via Right permCath.   now off Abx, s/p paracentesis 2.3L removed -Labs reviewed.   Will arrange for maintenance HD today. Monitor labs      #anemia  Patient with anemia in the setting of ESKD. Hemoglobin below target range.  hx of GI bleed, currently denies any overt GI bleeding   Please check iron studies  Monitor hemoglobin.  continue JOJO  transfuse as needed    #BMD  Patient with hyperphosphatemia   PTH wnl, no sensipar indicated   c/w phosphate binders with meals.   monitor serum phosphorus level.   Low phosphorus diet.           
38 yo M w/ hx of EtOH cirrhosis c/b gastric variceal bleeding in July 2020 s/p 34u pRBC, IR embolization and blakemore ballon, ESRD on HD (MWF), bladder rupture 2/2 fall s/p ex-lap in 2019, recent admission in earlier in Sept 2020 for hemorrhoidal bleed, now presenting with worsening abdominal pain/distention associated with cough and SOB x3 days.     
38 yo M w/ hx of EtOH cirrhosis c/b gastric variceal bleeding in July 2020 s/p 34u pRBC, IR embolization and blakemore ballon, ESRD on HD (MWF), bladder rupture 2/2 fall s/p ex-lap in 2019, recent admission in earlier in Sept 2020 for hemorrhoidal bleed, now presenting with worsening abdominal pain/distention associated with cough and SOB x3 days.     
40 yo M w/ hx of EtOH cirrhosis c/b gastric variceal bleeding in July 2020 s/p 34u pRBC, IR embolization and blakemore ballon, ESRD on HD (MWF), bladder rupture 2/2 fall s/p ex-lap in 2019, recent admission in earlier in Sept 2020 for hemorrhoidal bleed, now presenting with worsening abdominal pain/distention associated with cough and SOB x3 days.     
Impression:  39M w/ decompensated EtOH cirrhosis, c/b gastric varices, recent admission (7/2020) for hemorrhagic shock from gastric varix requiring 25u pRBC, s/p glue injection and PARTO by IR, acute renal failure requiring new HD, and ETOH withdrawal seizure, traumatic bladder rupture 2/2 fall s/p ex-lap in 2019, presented for worsening abdominal girth after missing outpatient LVP    Impression:  # Abdominal pain - likely ascites accumulation, diagnostic paracentesis negative for SBP. Patient anuric, and will require fluid removal w/ HD with intermittent LVP's for ascites management. not candidate for TIPS because MELD-Na too high  # Cirrhosis due to EtOH, decompensated by ascites and variceal bleed  - varices: Grade 1 esophageal varices 9/20, actively bleeding gastric varix s/p glue injection and PARTO by IR  - ascites: present on exam, neg for SBP, not on diuretics because anuric   - HE: none on exam  - HCC: no imaging, MRI without contrast is limited  - MELD-Na = 36  - Transplant evaluation is undergoing, pt has already joined online EtOH rehab and reports commitment to continued attendance    # Anuric MARQUISE 2/2 ATN, currently dialysis dependent - continue with dialysis    Recommendations:  - Please perform therapeutic paracentesis  - Check urine PEth ( ethanol metabolite in urine) to assess for ongoing alcohol use  - Transfuse blood to keep hgb between 7-8  - HD as per renal recs  - trend CMP, INR daily  - can discontinue antibiotics as cultures have remained negative  - f/u outpatient with Dr. Funez, hepatology office has attempted multiple times to schedule outpatient follow up, will try again ([phone])    Thank you for this interesting consult. Hepatology will sign off.     Flaquito Paz, PGY4  Gastroenterology Fellow  Available on [employer] Teams  [phone] ([employer] Short Range Pager)  [phone] (Long Range Pager)    After 5pm, please contact the on-call GI fellow. [phone]

## 2020-09-23 NOTE — PROGRESS NOTE ADULT - PROBLEM SELECTOR PLAN 3
At presentation, dry cough and SOB for past couple of days associated with positional chest pain. Likely due to atelectasis in setting of severe ascites. Not concerning for PNA per CT Chest.  - pt has been weaned off of supplemental O2   - incentive spirometry  - therapeutic paracentesis completed.

## 2020-09-23 NOTE — PROGRESS NOTE ADULT - REASON FOR ADMISSION
Abdominal pain/distention

## 2020-09-23 NOTE — PROGRESS NOTE ADULT - PROBLEM SELECTOR PROBLEM 1
Alcoholic cirrhosis of liver with ascites
End-stage renal disease (ESRD)
Alcoholic cirrhosis of liver with ascites

## 2020-09-23 NOTE — PROGRESS NOTE ADULT - SUBJECTIVE AND OBJECTIVE BOX
[facility] DIVISION OF KIDNEY DISEASES AND HYPERTENSION -- FOLLOW UP NOTE  --------------------------------------------------------------------------------  If any questions, please feel free to contact me  NS pager: [phone], [facility]: [phone]  Adan Monae M.D.  Nephrology Fellow    (After 5 pm or on weekends please page the on-call fellow)  --------------------------------------------------------------------------------    Chief Complaint:  Patient is a 39y old  Male who presents with a chief complaint of Abdominal pain/distention (22 Sep 2020 07:33)    24 hour events/subjective:  Patient seen and examined at bedside, in NAD  drop in Hgb yesterday to 6.7 - no other acute events  will schedule for HD today - Vitals/labs/imaging reviewed.       PAST HISTORY  --------------------------------------------------------------------------------  No significant changes to PMH, PSH, FHx, SHx, unless otherwise noted    ALLERGIES & MEDICATIONS  --------------------------------------------------------------------------------  Allergies    No Known Allergies    Intolerances      Standing Inpatient Medications  epoetin chance-epbx (RETACRIT) Injectable 4000 Unit(s) IV Push <User Schedule>  folic acid 1 milliGRAM(s) Oral daily  influenza   Vaccine 0.5 milliLiter(s) IntraMuscular once  melatonin 3 milliGRAM(s) Oral at bedtime  midodrine 10 milliGRAM(s) Oral three times a day  multivitamin 1 Tablet(s) Oral daily  pantoprazole    Tablet 40 milliGRAM(s) Oral before breakfast  sevelamer carbonate 800 milliGRAM(s) Oral three times a day    PRN Inpatient Medications  HYDROmorphone  Injectable 0.5 milliGRAM(s) IV Push every 6 hours PRN  LORazepam     Tablet 0.5 milliGRAM(s) Oral two times a day PRN  ondansetron Injectable 4 milliGRAM(s) IV Push two times a day PRN  oxyCODONE    IR 5 milliGRAM(s) Oral every 4 hours PRN      REVIEW OF SYSTEMS  --------------------------------------------------------------------------------  Gen: No fevers/chills  Skin: No rashes  Head/Eyes/Ears: Normal hearing,   Respiratory: No dyspnea, cough  CV: No chest pain  GI: +ve abdominal pain  : No dysuria, hematuria  MSK: No  edema  Heme: No easy bruising or bleeding  Psych: No significant depression      All other systems were reviewed and are negative, except as noted.    VITALS/PHYSICAL EXAM  --------------------------------------------------------------------------------  T(C): 36.4 (09-23-20 @ 05:38), Max: 36.7 (09-23-20 @ 01:15)  HR: 103 (09-23-20 @ 05:38) (100 - 110)  BP: 107/69 (09-23-20 @ 05:38) (93/58 - 107/69)  RR: 18 (09-23-20 @ 05:38) (18 - 18)  SpO2: 91% (09-23-20 @ 05:38) (91% - 94%)  Wt(kg): --        09-22-20 @ 07:01  -  09-23-20 @ 07:00  --------------------------------------------------------  IN: 660 mL / OUT: 0 mL / NET: 660 mL        Physical Exam:  	Gen: NAD, cooperative   	HEENT: MMM  	Pulm: CTA B/L, no wheezing  	CV: S1S2, RRR  	Abd: Soft, +BS, distended, mildly tender to palpation    	Ext: No LE edema B/L  	Neuro: Awake, A&O x3  	Skin: Warm and dry             : no tenderness to palpation              Psych: normal affect and mood  	Vascular access: Right Permcath       LABS/STUDIES  --------------------------------------------------------------------------------              7.5    11.88 >-----------<  101      [09-22-20 @ 16:42]              23.9     140  |  99  |  16  ----------------------------<  78      [09-23-20 @ 07:20]  4.0   |  29  |  6.67        Ca     8.9     [09-23-20 @ 07:20]      Mg     2.0     [09-23-20 @ 07:20]      Phos  4.5     [09-23-20 @ 07:20]    TPro  7.3  /  Alb  2.5  /  TBili  8.5  /  DBili  x   /  AST  108  /  ALT  29  /  AlkPhos  245  [09-23-20 @ 07:20]    PT/INR: PT 21.6 , INR 1.87       [09-23-20 @ 07:21]  PTT: 42.5       [09-23-20 @ 07:21]      Creatinine Trend:  SCr 6.67 [09-23 @ 07:20]  SCr 5.31 [09-22 @ 07:15]  SCr 7.25 [09-21 @ 06:25]  SCr 5.72 [09-20 @ 06:33]  SCr 4.93 [09-19 @ 15:56]        Iron 62, TIBC Unable to calculate Test Repeated, %sat Unable to calculate Test Repeated      [09-22-20 @ 09:54]  Ferritin 515      [09-22-20 @ 09:53]  PTH -- (Ca 8.0)      [09-22-20 @ 09:53]   54  PTH -- (Ca 6.2)      [07-29-20 @ 09:18]   223  Lipid: chol --, , HDL --, LDL --      [08-03-20 @ 12:04]

## 2020-09-23 NOTE — PROGRESS NOTE ADULT - PROBLEM SELECTOR PLAN 6
- DVT ppx: SCDs for now given recent hx of massive GIB  - Diet: RENAL.   - Dispo: likely discharge tomorrow

## 2020-09-23 NOTE — PROGRESS NOTE ADULT - PROBLEM SELECTOR PROBLEM 2
Anemia
SIRS (systemic inflammatory response syndrome)

## 2020-10-31 NOTE — H&P ADULT - PROBLEM SELECTOR PLAN 1
- Previous paracentesis last 1 month ago (total 3) w/ no signs of SBP at that time  - s/p CTX 1g x1, 1L IVF, and Zofran at OSH  - Maintain active T+S  - Transfuse w/ goal Hgb >7   - Monitor daily CMP, INR  - CT Chest/Abd/Pelvis ordered to assess for pleural effusions and ascites  - Consult hepatology in AM   - Consult IR in AM for paracentesis pending CT Decompensated w/ EV and gastric varices and ascites  - p/w abd pain and increased abd girth likely in the setting of ascites accumulation  - Previous paracentesis last 1 month ago (total 3) w/ no signs of SBP at that time  - s/p CTX 1g x1, 1L IVF, and Zofran at OSH  - No diuretics in setting of ESRD w/ anuria  - Will hold Abx for now as no S/S of infection  - c/w Midodrine 10mg TID  - CT Chest/Abd/Pelvis ordered to assess for pleural effusions and ascites  - Maintain active T+S  - Monitor daily CMP, INR  - Transfuse w/ goal Hgb >7   - Consult hepatology in AM   - Consult IR in AM for paracentesis pending CT Decompensated w/ EV and gastric varices and ascites  - p/w abd pain and increased abd girth likely in the setting of ascites accumulation  - Previous paracentesis last 1 month ago (total 3) w/ no signs of SBP at that time  - s/p CTX 1g x1, 1L IVF, and Zofran at OSH  - No diuretics in setting of ESRD w/ anuria  - Will continue w/ ppx abx CTX 1g QD w/ next dose in PM 11/01/20  - c/w Midodrine 10mg TID  - CT Chest/Abd/Pelvis ordered to assess for pleural effusions and ascites  - Maintain active T+S  - Monitor daily CMP, INR  - Transfuse w/ goal Hgb >7   - Consult hepatology in AM   - Consult IR in AM for paracentesis pending CT Decompensated w/ EV and gastric varices and ascites  - p/w abd pain and increased abd girth likely in the setting of ascites accumulation  - Previous paracentesis last 1 month ago (total 3) w/ no signs of SBP at that time  - s/p CTX 1g x1, 1L IVF, and Zofran at OSH  - No diuretics in setting of ESRD w/ anuria  - Will continue w/ ppx abx CTX 1g QD w/ next dose in PM 11/01/20  - c/w Midodrine 10mg TID  - Upload CT Chest/Abd/Pelvis from OSH via radiology in AM  - Maintain active T+S  - Monitor daily CMP, INR  - Transfuse w/ goal Hgb >7   - Consult hepatology in AM   - Consult IR in AM for paracentesis pending CT informal read in AM by radiology Decompensated w/ EV and gastric varices and ascites  - p/w abd pain and increased abd girth likely in the setting of ascites accumulation  - Previous paracentesis last 1 month ago (total 3) w/ no signs of SBP at that time  - s/p CTX 1g x1, 1L IVF, and Zofran at OSH  - No diuretics in setting of ESRD w/ anuria  - will continue Cefotaxime 2g q8hrs w/ initial dose now (23:00) as pt w/ abd pain  - c/w Midodrine 10mg TID  - Upload CT Chest/Abd/Pelvis from OSH via radiology in AM  - Maintain active T+S  - Monitor daily CMP, INR  - Transfuse w/ goal Hgb >7   - Consult hepatology in AM   - Consult IR in AM for paracentesis pending CT informal read in AM by radiology Decompensated w/ EV and gastric varices and ascites  - p/w abd pain and increased abd girth likely in the setting of ascites accumulation  - Previous paracentesis last 1 month ago (total 3) w/ no signs of SBP at that time  - s/p CTX 1g x1, 1L IVF, and Zofran at OSH  - No diuretics in setting of ESRD w/ anuria  - Will c/w Cefotaxime 2g q8hrs w/ initial dose now (23:00) as pt w/ abd pain and already had 1x dose of CTX at OSH  - c/w Midodrine 10mg TID  - Upload CT Chest/Abd/Pelvis from OSH via radiology in AM  - Maintain active T+S  - Monitor daily CMP, INR  - Transfuse w/ goal Hgb >7   - Consult hepatology in AM   - Consult IR in AM for paracentesis pending CT informal read in AM by radiology Decompensated w/ EV and gastric varices and ascites  - p/w abd pain and increased abd girth likely in the setting of ascites accumulation  - Previous paracentesis last 1 month ago (total 3) w/ no signs of SBP at that time  - s/p CTX 1g x1, 1L IVF, and Zofran at OSH  - No diuretics in setting of ESRD w/ anuria  - Will c/w CTX 1g QD, next dose 11/1 at 18:00 as pt w/ abd pain and already had 1x dose of CTX at OSH  - c/w Midodrine 10mg TID  - Upload CT Chest/Abd/Pelvis from OSH via radiology in AM to assess ascites and pleural effusions if present  - Maintain active T+S  - Monitor daily CMP, INR  - Transfuse w/ goal Hgb >7   - Consult hepatology in AM   - Consult IR in AM for paracentesis pending CT informal read in AM by radiology

## 2020-10-31 NOTE — H&P ADULT - ATTENDING COMMENTS
Pt here with acute decompensated liver cirrhosis w/abdominal pain, will cover with antibiotics for possible SBP pending paracentesis for both diagnostic and therapeutic reasons. Will need upload and review of imaging in AM. Pt here with acute decompensated liver cirrhosis w/abdominal pain, will cover with antibiotics for possible SBP pending paracentesis for both diagnostic and therapeutic reasons. CXR from outside hospital reviewed by myself and concerning for hepatic hydrothorax with very large R sided pleural effusion and near complete collapse of R lung. Will need pulmonary evaluation for thoracentesis in AM though currently respiratory status is grossly stable. Will also need upload and review of imaging with radiology in AM as patient also had CT A/P at outside hospital.

## 2020-10-31 NOTE — H&P ADULT - PROBLEM SELECTOR PLAN 3
DVT ppx: SCDs in setting of recent GIB  Diet: Regular diet  Dispo: Pending clinical improvement DVT ppx: SCDs in setting of recent GIB  Diet: Regular diet. NPO @ MN for ?para  Dispo: Pending clinical improvement - Tachycardia to 130s noted upon admission  - ECG notable for sinus tachycardia likely in the setting of pain  - Will administer Dilaudid x1 and reassess w/ vitals q4hrs

## 2020-10-31 NOTE — H&P ADULT - NSHPPHYSICALEXAM_GEN_ALL_CORE
VITALS:   T(C): 36.6 (10-31-20 @ 22:13), Max: 36.6 (10-31-20 @ 22:13)  HR: 136 (10-31-20 @ 22:13) (136 - 136)  BP: 91/60 (10-31-20 @ 22:13) (91/60 - 91/60)  RR: 19 (10-31-20 @ 22:13) (19 - 19)  SpO2: 94% (10-31-20 @ 22:13) (94% - 94%)    GENERAL: Jaundiced. NAD, lying in bed comfortably  HEAD:  Atraumatic, Normocephalic  EYES: EOMI, PERRLA, scleral icterus  ENT: Moist mucous membranes  NECK: Supple, No JVD  CHEST/LUNG: CTABL; No rales, rhonchi, wheezing, or rubs. Unlabored respirations  HEART: Tachycardic. Otherwise regular rhythm; No murmurs, rubs, or gallops  ABDOMEN: Mildly tense and distended w/ fluid wave. Hypoactive BS. Midline ex-lap scar w/ proper healing  EXTREMITIES:  2+ Peripheral Pulses, brisk capillary refill. No clubbing, cyanosis, or edema  NERVOUS SYSTEM:  Alert & Oriented X3, speech clear. No deficits   MSK: FROM all 4 extremities, full and equal strength  SKIN: Jaundiced

## 2020-10-31 NOTE — H&P ADULT - PROBLEM SELECTOR PLAN 4
Transitions of Care Status:  1.  Name of PCP: None  2.  PCP Contacted on Admission: [ ] Y    [X] N    3.  PCP contacted at Discharge: [ ] Y    [ ] N    [ ] N/A  4.  Post-Discharge Appointment Date and Location:  5.  Summary of Handoff given to PCP: DVT ppx: SCDs in setting of recent GIB  Diet: Regular diet. NPO @ MN for ?para  Dispo: Pending clinical improvement

## 2020-10-31 NOTE — CHART NOTE - NSCHARTNOTEFT_GEN_A_CORE
This report was requested by: Jewel Marin | Reference #: [id number]    Others' Prescriptions  Patient Name: Haroon AdamesBirth Date: 1980  Address: [address] [place], [place] [address]Sex: Male  Rx Written	Rx Dispensed	Drug	Quantity	Days Supply	Prescriber Name	Payment Method	Dispenser  09/30/2020	10/02/2020	oxycodone hcl 5 mg tablet	60	20	Stanford Womack Y, (MD)	Medicaid	[id number]  09/23/2020	09/24/2020	oxycodone hcl 5 mg tablet	28	7	[facility]	Medicaid	[id number]

## 2020-10-31 NOTE — H&P ADULT - HISTORY OF PRESENT ILLNESS
Thee Daily is a 42 y.o. male follow up for lung cancer. 38 yo M w/ PMH of EtOH cirrhosis c/b gastric variceal bleeding in July 2020 s/p 34u pRBC, IR embolization and blakemore ballon, ESRD on HD (M/W/F), bladder rupture 2/2 fall s/p ex-lap in 2019, hemorrhoids, presented to OSH for increased abdominal distention and abdominal pain x3 days, most significant this AM. Abd pain noted to be diffuse in nature initially rated as a constant stabbing 15/10 w/ associated rib pain and wrapping around the flanks.     At OSH pt was noted to be sinus tachycardic to 130s. /80, RR 24 and satting 99% on RA. Labs were notable for H/H 11.2/32.9, WBC 10.8, PLTs 116. Chemistry Na 130, K 5.5, BUN/Cr 14/4.87. AST//42, Alk phos 151 and Tbilli 10.2.    He was administered Ceftriaxone 1g x1, Zofran, 1L IVF, and Dilaudid 1mg IVP which alleviated his pain. Upon encounter he now c/o the pain being positional, and intermittent rating it a 7/10    Of note, pt last underwent paracentesis 1 month ago and dialysis yesterday 10/30/20.

## 2020-10-31 NOTE — H&P ADULT - NSHPLABSRESULTS_GEN_ALL_CORE
LABS:  - Pending    Imaging:  - Pending LABS:  - Labs were notable for H/H 11.2/32.9, WBC 10.8, PLTs 116. Chemistry Na 130, K 5.5, BUN/Cr 14/4.87. AST//42, Alk phos 151 and Tbilli 10.2.    Imaging:  - Pending LABS:  Labs at OSH notable for H/H 11.2/32.9, WBC 10.8, PLTs 116. Chemistry Na 130, K 5.5, BUN/Cr 14/4.87. AST//42, Alk phos 151 and Tbilli 10.2.    Imaging:  - Pending upload of OSH CT scan in AM

## 2020-10-31 NOTE — H&P ADULT - NSHPOUTPATIENTPROVIDERS_GEN_ALL_CORE
PCP: None  No PCP  Hepatology/Transplant: discharged previously to see Dr. Yesenia Funez but has not seen- planned for later this month  Nephrology: Dr. Stanford Womack ([phone])

## 2020-10-31 NOTE — H&P ADULT - PROBLEM SELECTOR PLAN 2
- Follows w/ Dr. Womack outpt    - PermaCath in R chest wall  - Pending repeat labs upon transfer will assess need/urgency of dialysis  - c/w Sevelamir 800mg TID  - Consult Nephrology in AM - Follows w/ Dr. Womack outpt    - PermCath in R chest wall  - Pending repeat labs upon transfer will assess need/urgency of dialysis  - c/w Sevelamir 800mg TID  - Low phos diet  - Consult Nephrology in AM

## 2020-10-31 NOTE — H&P ADULT - NSHPREVIEWOFSYSTEMS_GEN_ALL_CORE
REVIEW OF SYSTEMS:    CONSTITUTIONAL: No weakness fevers or chills  EYES/ENT: No visual changes;  No vertigo or throat pain   NECK: No pain or stiffness  RESPIRATORY: Mild SOB w/ movement. No cough, wheezing, hemoptysis  CARDIOVASCULAR: No chest pain or palpitations  GASTROINTESTINAL: Abd pain and distention. Nausea and vomiting x1 prior to presentation. No hematemesis; No diarrhea or constipation. No melena. Noted BRBPR upon wiping  GENITOURINARY: No dysuria, frequency or hematuria  NEUROLOGICAL: No numbness or weakness  All other review of systems is negative unless indicated above.

## 2020-10-31 NOTE — H&P ADULT - ASSESSMENT
40 yo M w/ PMH of EtOH cirrhosis c/b gastric variceal bleeding in July 2020 s/p 34u pRBC, IR embolization and blakemore ballon, ESRD on HD (M/W/F), bladder rupture 2/2 fall s/p ex-lap in 2019, hemorrhoids, presented to OSH for increased abdominal distention and abdominal pain x3 days

## 2020-11-01 NOTE — CONSULT NOTE ADULT - ASSESSMENT
40 year old male with  ESRD on HD MWF,  traumatic bladder rupture 2/2 fall s/p ex-lap in 2019, ETOH cirrhosis c/b gastric varices, recent admission (7/2020) for hemorrhagic shock 2/2 variceal bleeding, acute renal failure requiring HD, and ETOH withdrawal seizure, recently admitted in 9/2020 for GIB who presents today for worsening abdominal pain. Nephrology consulted for management of ESRD and HD.

## 2020-11-01 NOTE — PROGRESS NOTE ADULT - ASSESSMENT
39M with PMH of EtOH cirrhosis c/b gastric variceal bleeding in July 2020 s/p 34u pRBC, IR embolization and blakemore ballon, ESRD on HD (M/W/F), bladder rupture 2/2 fall s/p ex-lap in 2019, hemorrhoids, presented to OSH for increased abdominal distention and abdominal pain x3 days     Problem/Plan - 1:  ·  Problem: Alcoholic cirrhosis of liver with ascites.  Plan: Decompensated w/ EV and gastric varices and ascites; rule out SBP  - MELD score 41  - F/u CT abd read by me, with significant ascites; f/u official read  - IR consult for paracentesis, diagnostic and therapeutic  - Hepatology recs reviewed  - dilaudid 0.2mg PRN; avoid oversedation  - Trend LFTs, анна, INR daily     Problem/Plan - 2:   ·  Problem: Severe sepsis with no known source.  Plan: Pt meets criteria based on tachycardia, hypotension, leukocytosis, marked lactic acidosis, elevated анна concerning for end organ damage  - MICU consulted; deemed not a candidate  - Lactic acidosis likely due to poor hepatic clearance  - Rule out SBP as above  - cont CTX 1g QD for now  - Obtain ID consult  - F/u blood cultures  - trend lactate  - obtain procal, ESR/CRP  - start lactulose     Problem/Plan - 3:   ·  Problem: Large R sided pleural effusion.  Plan: Seen on CT from OSH  - Pending official radiology read  - Pulm consult for eval with thoracentesis  - supplemental O2 as needed     Problem/Plan - 4:  ·  Problem: End-stage renal disease (ESRD).  Plan: On HD MWF  - PermCath in R chest wall  - Nephro recs reviewed: planned for HD tomorrow  - obtain PTH level  - c/w Sevelamir 800mg TID  - Low phos diet     Problem/Plan - 5:  ·  Problem: Hyperkalemia  Plan: Electrolyte abnormalities due to ESRD  - ECG done 11/1 with no peaked T waves, sinus tach  - planned for HD as above  - Lowkelma, Repeat K in AM     Problem/Plan - 6:  ·  Problem: Hyponatremia   Plan: Hypervolemic given marked ascites  - obtain ECHO  - obtain UA, urine lytes, serum and urine osms       Problem/Plan - 7:  ·  Problem: Anemia.  Recommendation: in the setting of ESRD  - f/u iron panel  - transfuse if Hgb < 7     Problem/Plan - 8:  ·  Problem: Hypoglycemia.  Recommendation: in setting of liver failure  - avoid insulin products  - FS Ac & HS     Problem/Plan - 9  ·  Problem: Prophylactic measure.  Plan: DVT ppx: SCDs in setting of recent GIB  Diet: Renal diet 39M with PMH of EtOH cirrhosis c/b gastric variceal bleeding in July 2020 s/p 34u pRBC, IR embolization and blakemore ballon, ESRD on HD (M/W/F), bladder rupture 2/2 fall s/p ex-lap in 2019, hemorrhoids, presented to OSH for increased abdominal distention and abdominal pain x3 days     Problem/Plan - 1:  ·  Problem: Alcoholic cirrhosis of liver with ascites.  Plan: Decompensated w/ EV and gastric varices and ascites; rule out SBP  - MELD score 41  - F/u CT abd read by me, with significant ascites; f/u official read  - IR consult for paracentesis, diagnostic and therapeutic  - Hepatology recs reviewed  - dilaudid 0.2mg PRN; avoid oversedation  - Trend LFTs, анна, INR daily     Problem/Plan - 2:   ·  Problem: Severe sepsis with no known source.  Plan: Pt meets criteria based on tachycardia, hypotension, leukocytosis, marked lactic acidosis, elevated анна concerning for end organ damage  - MICU consulted; deemed not a candidate  - Lactic acidosis likely due to poor hepatic clearance  - Rule out SBP as above  - cont CTX 1g QD for now  - Obtain ID consult  - F/u blood cultures  - trend lactate  - obtain procal, ESR/CRP  - start lactulose     Problem/Plan - 3:   ·  Problem: Large R sided pleural effusion.  Plan: Seen on CT from OSH, likely hepatic hydrothorax  - Pending official radiology read  - Pulm consult for eval with thoracentesis  - supplemental O2 as needed     Problem/Plan - 4:  ·  Problem: End-stage renal disease (ESRD).  Plan: On HD MWF  - PermCath in R chest wall  - Nephro recs reviewed: planned for HD tomorrow  - obtain PTH level  - c/w Sevelamir 800mg TID  - Low phos diet     Problem/Plan - 5:  ·  Problem: Hyperkalemia  Plan: Electrolyte abnormalities due to ESRD  - ECG done 11/1 with no peaked T waves, sinus tach  - planned for HD as above  - Lowkelma, Repeat K in AM     Problem/Plan - 6:  ·  Problem: Hyponatremia   Plan: Hypervolemic given marked ascites  - obtain ECHO  - obtain UA, urine lytes, serum and urine osms       Problem/Plan - 7:  ·  Problem: Anemia.  Recommendation: in the setting of ESRD  - f/u iron panel  - transfuse if Hgb < 7     Problem/Plan - 8:  ·  Problem: Hypoglycemia.  Recommendation: in setting of liver failure  - avoid insulin products  - FS Ac & HS     Problem/Plan - 9  ·  Problem: Prophylactic measure.  Plan: DVT ppx: SCDs in setting of recent GIB  Diet: Renal diet

## 2020-11-01 NOTE — CONSULT NOTE ADULT - ASSESSMENT
40 year old man with hx of decompensated EtOH cirrhosis (Dx'd 7/2020) complicated by gastric variceal bleeding (7/2020) s/p 34u pRBC, IR embolization and blakemore balloon, ESRD on HD (M/W/F), bladder rupture 2/2 fall s/p ex-lap in 2019, hemorrhoids, presented to OSH for increased abdominal distention and abdominal pain x3 days    # Abdominal Pain: Likely related to ascites; SBP must be ruled out  # Decompensated EtOH Cirrhosis - MELD-Na: 41  HE: None  Ascites: Large on Exam  HCC: No lesions on MRI 8/2020  Varices: Hx of gastric varices s/p IR embolization, Grade I esophageal varices seen 9/2020  # ESRD on hemodialysis     Recommendation:  - Check PETH level  - Diagnostic paracentesis  - Blood cultures, urine cultures, UA  - Trend Coags, CMP, CBC  - Supportive care per primary team    Cha Salomon MD  Gastroenterology Fellow  [phone]  [phone]  Available on [employer] Teams  Please page on call fellow on weekends and after 5pm on weekdays: [phone] 40 year old man with hx of decompensated EtOH cirrhosis (Dx'd 7/2020) complicated by gastric variceal bleeding (7/2020) s/p 34u pRBC, IR embolization and blakemore balloon, ESRD on HD (M/W/F), bladder rupture 2/2 fall s/p ex-lap in 2019, hemorrhoids, presented to OSH for increased abdominal distention and abdominal pain x3 days    # Abdominal Pain: Likely related to ascites; SBP must be ruled out  # Decompensated EtOH Cirrhosis - MELD-Na: 41  HE: None  Ascites: Large on Exam  HCC: No lesions on MRI 8/2020  Varices: Hx of gastric varices s/p IR embolization, Grade I esophageal varices seen 9/2020  # ESRD on hemodialysis     Recommendation:  - Board spectrum Abx  - Check PETH level  - Diagnostic paracentesis  - Blood cultures, urine cultures, UA  - Trend Coags, CMP, CBC  - Supportive care per primary team    Cha Salomon MD  Gastroenterology Fellow  [phone]  Available on [employer] Teams  Please page on call fellow on weekends and after 5pm on weekdays: [phone]

## 2020-11-01 NOTE — CHART NOTE - NSCHARTNOTEFT_GEN_A_CORE
Pulmonology consult called to assess pt's right pleural effusion. POCUS performed confirming large R simple effusion, likely hepatic hydrothorax in the setting of ascites. He is currently on midodrine 20 q8h with BP of 100 systolic. 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 Pt also needs paracentesis to rule out SBP, and his pleural effusion will reaccumulate without drainage of ascites.    Recommendations  - Please give vitamin K 5 mg IV in anticipation of possible thoracentesis tomorrow  - Please obtain CBC, coags, and type and screen tomorrow morning   - Would give 1.5 amps dextrose (75 cc) followed by insulin 5u IV for hyperkalemia to 5.8, then recheck BMP, follow up nephrology  - Would add albumin 25% 100 ml q6h for volume resuscitation given hypotension, continue midodrine  - Continue empiric coverage for SBP with ceftriaxone, agree with calling IR for paracentesis tomorrow      Mac Dickey MD  Fellow, Pulmonary and Critical Care Medicine  [facility]  Pager: [phone], [phone] ([facility])  Email: [email or web address]      - Pulmonology consult called to assess pt's right pleural effusion. POCUS performed confirming large R simple effusion, likely hepatic hydrothorax in the setting of ascites. He is currently on midodrine 20 q8h with BP of 100 systolic. 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 Pt also needs paracentesis to rule out SBP, and his pleural effusion will reaccumulate without drainage of ascites.    Recommendations  - Please give vitamin K 5 mg IV in anticipation of possible thoracentesis tomorrow  - Obtain CBC, coags, and type and screen tomorrow morning   - Would give 1.5 amps dextrose (75 cc) followed by insulin 5u IV for hyperkalemia to 5.8, then recheck BMP, follow up nephrology  - Would add albumin 25% 100 ml q6h for volume resuscitation given hypotension, continue midodrine  - Continue empiric coverage for SBP with ceftriaxone, agree with calling IR for paracentesis tomorrow      Mac Dickey MD  Fellow, Pulmonary and Critical Care Medicine  [facility]  Pager: [phone], [phone] ([facility])  Email: [email or web address]      - Pulmonology consult called to assess pt's right pleural effusion. POCUS performed confirming large R simple effusion, likely hepatic hydrothorax in the setting of ascites. He is currently on midodrine 20 q8h with BP of 100 systolic. 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 Pt also needs paracentesis to rule out SBP, and his pleural effusion will reaccumulate without drainage of ascites.    Recommendations  - Please give vitamin K 5 mg IV in anticipation of possible thoracentesis tomorrow  - Obtain CBC, coags, and type and screen tomorrow morning   - Would give 1.5 amps dextrose (75 cc) followed by insulin 5u IV for hyperkalemia to 5.8, then recheck BMP, follow up nephrology  - Would add albumin 25% 100 ml q6h for volume resuscitation given hypotension, continue midodrine  - Continue empiric coverage for SBP with ceftriaxone, agree with calling IR for paracentesis tomorrow  - If remains hyperkalemic or develops worsening hypotension or hypoxemia, please reconsult ANTOINE Dickey MD  Fellow, Pulmonary and Critical Care Medicine  [facility]  Pager: [phone], [phone] ([facility])  Email: [email or web address]      -

## 2020-11-01 NOTE — PROGRESS NOTE ADULT - ASSESSMENT
38 yo M w/ PMH of ETOH cirrhosis, last paracentesis 1 month ago, gastric variceal bleeding in July 2020, ESRD on HD (M/W/F), last HD 10/30 transferred from OSH for increased abdominal distention and abdominal pain x 3 days,  At OSH pt was noted to be sinus tachycardic to 130s. /80, RR 24 with O2 sat's of 99% on RA. Labs were notable for H/H 11.2/32.9, WBC 10.8, PLTs 116. Chemistry Na 130, K 5.5, BUN/Cr 14/4.87. AST//42, Alk phos 151 and T bili 10.2.  He was administered Ceftriaxone 1g x1, Zofran, 1L IVF, and Dilaudid 1mg IVP which alleviated abdominal pain.     patient signed out to ACP team this morning. Patient c/o SOB and Abd pain.   MICU consult called for Right side complete pleural effusion, dyspnea, Tachycardia 130'3, HRS with increasing T. Bili,  lactate 9.4, INR rising

## 2020-11-01 NOTE — CONSULT NOTE ADULT - PROBLEM SELECTOR RECOMMENDATION 2
Patient with anemia in the setting of ESKD. Hemoglobin below target range.  hx of GI bleed, currently denies any overt GI bleeding   Please check iron studies  Monitor hemoglobin.  continue JOJO, will need to obtain outpatient JOJO dosing tomorrow with HD.  transfuse as needed

## 2020-11-01 NOTE — CONSULT NOTE ADULT - SUBJECTIVE AND OBJECTIVE BOX
Patient is a 40y old  Male who presents with a chief complaint of HRS (01 Nov 2020 12:01)    HPI:  40 yo M w/ PMH of EtOH cirrhosis c/b gastric variceal bleeding in July 2020 s/p 34u pRBC, IR embolization, ESRD on HD (M/W/F), bladder rupture 2/2 fall s/p ex-lap in 2019 was tranferred from OSH for worsening abdominal distention and pain. Pt states that over the last week his abdominal distention has been worsening with increasing pain, acutely worsened over last day.     prior hospital charts reviewed [  ]  primary team notes reviewed [  ]  other consultant notes reviewed [  ]  PAST MEDICAL & SURGICAL HISTORY:  End-stage renal disease (ESRD)  On HD MWF    Cirrhosis, alcoholic    S/P exploratory laparotomy  for bladder rupture s/p fall      Allergies  No Known Allergies    ANTIMICROBIALS (past 90 days)  MEDICATIONS  (STANDING):    cefTRIAXone   IVPB   100 mL/Hr IV Intermittent (11-01-20 @ 03:25)      ANTIMICROBIALS:    cefTRIAXone   IVPB 2000 every 24 hours    OTHER MEDS: MEDICATIONS  (STANDING):  albuterol/ipratropium for Nebulization. 3 once PRN  influenza   Vaccine 0.5 once  lactulose Syrup 20 every 6 hours  midodrine 20 every 8 hours  pantoprazole    Tablet 40 before breakfast    SOCIAL HISTORY:   hx smoking  non-smoker    FAMILY HISTORY:  FH: alpha 1 antitrypsin deficiency      REVIEW OF SYSTEMS  [  ] ROS unobtainable because:    [  ] All other systems negative except as noted below:	    Constitutional:  [ ] fever [ ] chills  [ ] weight loss  [ ] weakness  Skin:  [ ] rash [ ] phlebitis	  Eyes: [ ] icterus [ ] pain  [ ] discharge	  ENMT: [ ] sore throat  [ ] thrush [ ] ulcers [ ] exudates  Respiratory: [ ] dyspnea [ ] hemoptysis [ ] cough [ ] sputum	  Cardiovascular:  [ ] chest pain [ ] palpitations [ ] edema	  Gastrointestinal:  [ ] nausea [ ] vomiting [ ] diarrhea [ ] constipation [ ] pain	  Genitourinary:  [ ] dysuria [ ] frequency [ ] hematuria [ ] discharge [ ] flank pain  [ ] incontinence  Musculoskeletal:  [ ] myalgias [ ] arthralgias [ ] arthritis  [ ] back pain  Neurological:  [ ] headache [ ] seizures  [ ] confusion/altered mental status  Psychiatric:  [ ] anxiety [ ] depression	  Hematology/Lymphatics:  [ ] lymphadenopathy  Endocrine:  [ ] adrenal [ ] thyroid  Allergic/Immunologic:	 [ ] transplant [ ] seasonal    Vital Signs Last 24 Hrs  T(F): 97.9 (11-01-20 @ 11:48), Max: 98.3 (11-01-20 @ 03:55)  Vital Signs Last 24 Hrs  HR: 118 (11-01-20 @ 11:48) (118 - 136)  BP: 94/59 (11-01-20 @ 11:48) (86/53 - 98/58)  RR: 18 (11-01-20 @ 11:48)  SpO2: 99% (11-01-20 @ 11:48) (92% - 99%)  Wt(kg): --    PHYSICAL EXAM:  Constitutional: non-toxic, no distress  HEAD/EYES: anicteric, no conjunctival injection  ENT:  supple, no thrush  Cardiovascular:   normal S1, S2, no murmur, no edema  Respiratory:  clear BS bilaterally, no wheezes, no rales  GI:  soft, non-tender, normal bowel sounds  :  no ashton, no CVA tenderness  Musculoskeletal:  no synovitis, normal ROM  Neurologic: awake and alert, normal strength, no focal findings  Skin:  no rash, no erythema, no phlebitis  Heme/Onc: no lymphadenopathy   Psychiatric:  awake, alert, appropriate mood                            9.0    16.28 )-----------( 83       ( 01 Nov 2020 07:06 )             27.8     11-01    131<L>  |  96  |  16  ----------------------------<  74  5.8<H>   |  15<L>  |  5.70<H>    Ca    8.9      01 Nov 2020 13:17  Phos  5.1     11-01  Mg     1.6     11-01    TPro  6.6  /  Alb  2.1<L>  /  TBili  9.9<H>  /  DBili  x   /  AST  127<H>  /  ALT  37  /  AlkPhos  119  11-01    MICROBIOLOGY:        CMV IgG Antibody: 0.51 U/mL (08-08-20 @ 09:47)  Toxoplasma IgG Screen: <3.0 IU/mL (08-08-20 @ 09:47)        RADIOLOGY:  imaging below personally reviewed     Patient is a 40y old  Male who presents with a chief complaint of HRS (01 Nov 2020 12:01)    HPI:  38 yo M w/ PMH of EtOH cirrhosis c/b gastric variceal bleeding in July 2020 s/p 34u pRBC, IR embolization, ESRD on HD (M/W/F), bladder rupture 2/2 fall s/p ex-lap in 2019 was tranferred from OSH for worsening abdominal distention and pain. Pt states that over the last week his abdominal distention has been worsening with increasing pain, acutely worsened over last day. Denies fever, admits to questionable episodes of rigors day PTA, denies cough, dysuria, diarrhea, skin changes.    In our hospital, pt has been afebrile with WBC 15.3-16.28. LFTs elevated but similar to improved from patients recent labs. Pt was started on CTX 2g daily.    Of note, pt noted to have persistent leukocytosis on previous admission in similar range to now. Unclear etiology.     prior hospital charts reviewed [ x ]  primary team notes reviewed [ x ]  other consultant notes reviewed [ x ]  PAST MEDICAL & SURGICAL HISTORY:  End-stage renal disease (ESRD)  On HD MWF    Cirrhosis, alcoholic    S/P exploratory laparotomy  for bladder rupture s/p fall      Allergies  No Known Allergies    ANTIMICROBIALS (past 90 days)  MEDICATIONS  (STANDING):    cefTRIAXone   IVPB   100 mL/Hr IV Intermittent (11-01-20 @ 03:25)      ANTIMICROBIALS:    cefTRIAXone   IVPB 2000 every 24 hours    OTHER MEDS: MEDICATIONS  (STANDING):  albuterol/ipratropium for Nebulization. 3 once PRN  influenza   Vaccine 0.5 once  lactulose Syrup 20 every 6 hours  midodrine 20 every 8 hours  pantoprazole    Tablet 40 before breakfast    SOCIAL HISTORY:   former significant alcohol use stopped in July, non-smoker  FAMILY HISTORY:  FH: alpha 1 antitrypsin deficiency      REVIEW OF SYSTEMS  [  ] ROS unobtainable because:    [ x ] All other systems negative except as noted below:	    Constitutional:  [ ] fever [ ] chills  [ ] weight loss  [ ] weakness  Skin:  [ ] rash [ ] phlebitis	  Eyes: [ ] icterus [ ] pain  [ ] discharge	  ENMT: [ ] sore throat  [ ] thrush [ ] ulcers [ ] exudates  Respiratory: [ ] dyspnea [ ] hemoptysis [ ] cough [ ] sputum	  Cardiovascular:  [ ] chest pain [ ] palpitations [ ] edema	  Gastrointestinal:  [ ] nausea [ ] vomiting [ ] diarrhea [ ] constipation [x ] pain	  Genitourinary:  [ ] dysuria [ ] frequency [ ] hematuria [ ] discharge [ ] flank pain  [ ] incontinence  Musculoskeletal:  [ ] myalgias [ ] arthralgias [ ] arthritis  [ ] back pain  Neurological:  [ ] headache [ ] seizures  [ ] confusion/altered mental status  Psychiatric:  [ ] anxiety [ ] depression	  Hematology/Lymphatics:  [ ] lymphadenopathy  Endocrine:  [ ] adrenal [ ] thyroid  Allergic/Immunologic:	 [ ] transplant [ ] seasonal    Vital Signs Last 24 Hrs  T(F): 97.9 (11-01-20 @ 11:48), Max: 98.3 (11-01-20 @ 03:55)  Vital Signs Last 24 Hrs  HR: 118 (11-01-20 @ 11:48) (118 - 136)  BP: 94/59 (11-01-20 @ 11:48) (86/53 - 98/58)  RR: 18 (11-01-20 @ 11:48)  SpO2: 99% (11-01-20 @ 11:48) (92% - 99%)  Wt(kg): --    PHYSICAL EXAM:  Constitutional: non-toxic, no distress  Vascular: Tunneled catheter no signs of infection  HEAD/EYES: icteric, no conjunctival injection  ENT:  supple, no thrush  Cardiovascular:   normal S1, S2, no murmur, no edema  Respiratory:  clear BS bilaterally, no wheezes, no rales  GI:  distended, tender to palpation but no peritoneal signs  :  no ashton, no CVA tenderness  Musculoskeletal:  no synovitis, normal ROM  Neurologic: awake and alert, normal strength, no focal findings  Skin:  no rash, no erythema, no phlebitis  Heme/Onc: no lymphadenopathy   Psychiatric:  awake, alert, appropriate mood                            9.0    16.28 )-----------( 83       ( 01 Nov 2020 07:06 )             27.8     11-01    131<L>  |  96  |  16  ----------------------------<  74  5.8<H>   |  15<L>  |  5.70<H>    Ca    8.9      01 Nov 2020 13:17  Phos  5.1     11-01  Mg     1.6     11-01    TPro  6.6  /  Alb  2.1<L>  /  TBili  9.9<H>  /  DBili  x   /  AST  127<H>  /  ALT  37  /  AlkPhos  119  11-01    MICROBIOLOGY:        CMV IgG Antibody: 0.51 U/mL (08-08-20 @ 09:47)  Toxoplasma IgG Screen: <3.0 IU/mL (08-08-20 @ 09:47)        RADIOLOGY:  < from: US Abdomen Limited (09.21.20 @ 11:44) >  IMPRESSION:    Mild ascites, largest in the right lower quadrant decreased from prior.    Bilateral pleural effusions.    < from: CT Chest No Cont (09.19.20 @ 23:33) >  IMPRESSION:  Increased bilateral pleural effusions, now large on the right and small on the left. Complete atelectasis of the right lower lobe with partial atelectasis of the right upper and middle lobes.    Nonspecific 5 mm groundglass nodule in the medial left upper lobe is new since 8/31/2020, and likely reflects a small focus of atelectasis. Follow-up CT chest in 6-12 months can be considered if clinically indicated.    Please see the visualized abdominal findings above.               Patient is a 40y old  Male who presents with a chief complaint of HRS (01 Nov 2020 12:01)    HPI: 38 yo M w/ PMH of EtOH cirrhosis c/b gastric variceal bleeding in July 2020 s/p 34u pRBC, IR embolization, ESRD on HD (M/W/F), bladder rupture 2/2 fall s/p ex-lap in 2019 was tranferred from OSH for worsening abdominal distention and pain. Pt states that over the last week his abdominal distention has been worsening with increasing pain, acutely worsened over last day. Denies fever, admits to questionable episodes of rigors day PTA, denies cough, dysuria, diarrhea, skin changes.    In our hospital, pt has been afebrile with WBC 15.3-16.28. LFTs elevated but similar to improved from patients recent labs. Pt was started on CTX 2g daily.    Of note, pt noted to have persistent leukocytosis on previous admission in similar range to now. Unclear etiology.     prior hospital charts reviewed [ x ]  primary team notes reviewed [ x ]  other consultant notes reviewed [ x ]    PAST MEDICAL & SURGICAL HISTORY:  End-stage renal disease (ESRD)  On HD MWF    Cirrhosis, alcoholic    S/P exploratory laparotomy  for bladder rupture s/p fall    Allergies  No Known Allergies    ANTIMICROBIALS (past 90 days)  MEDICATIONS  (STANDING):    cefTRIAXone   IVPB   100 mL/Hr IV Intermittent (11-01-20 @ 03:25)      ANTIMICROBIALS:    cefTRIAXone   IVPB 2000 every 24 hours    OTHER MEDS: MEDICATIONS  (STANDING):  albuterol/ipratropium for Nebulization. 3 once PRN  influenza   Vaccine 0.5 once  lactulose Syrup 20 every 6 hours  midodrine 20 every 8 hours  pantoprazole    Tablet 40 before breakfast    SOCIAL HISTORY:   former significant alcohol use stopped in July, non-smoker  FAMILY HISTORY:  FH: alpha 1 antitrypsin deficiency    REVIEW OF SYSTEMS  [  ] ROS unobtainable because:    [ x ] All other systems negative except as noted below:	    Constitutional:  [ ] fever [ ] chills  [ ] weight loss  [ ] weakness  Skin:  [ ] rash [ ] phlebitis	  Eyes: [ ] icterus [ ] pain  [ ] discharge	  ENMT: [ ] sore throat  [ ] thrush [ ] ulcers [ ] exudates  Respiratory: [ ] dyspnea [ ] hemoptysis [ ] cough [ ] sputum	  Cardiovascular:  [ ] chest pain [ ] palpitations [ ] edema	  Gastrointestinal:  [ ] nausea [ ] vomiting [ ] diarrhea [ ] constipation [x ] pain	  Genitourinary:  [ ] dysuria [ ] frequency [ ] hematuria [ ] discharge [ ] flank pain  [ ] incontinence  Musculoskeletal:  [ ] myalgias [ ] arthralgias [ ] arthritis  [ ] back pain  Neurological:  [ ] headache [ ] seizures  [ ] confusion/altered mental status  Psychiatric:  [ ] anxiety [ ] depression	  Hematology/Lymphatics:  [ ] lymphadenopathy  Endocrine:  [ ] adrenal [ ] thyroid  Allergic/Immunologic:	 [ ] transplant [ ] seasonal    Vital Signs Last 24 Hrs  T(F): 97.9 (11-01-20 @ 11:48), Max: 98.3 (11-01-20 @ 03:55)  Vital Signs Last 24 Hrs  HR: 118 (11-01-20 @ 11:48) (118 - 136)  BP: 94/59 (11-01-20 @ 11:48) (86/53 - 98/58)  RR: 18 (11-01-20 @ 11:48)  SpO2: 99% (11-01-20 @ 11:48) (92% - 99%)    PHYSICAL EXAM:  Constitutional: non-toxic, no distress  Vascular: Tunneled catheter no signs of infection  HEAD/EYES: icteric, no conjunctival injection  ENT:  supple, no thrush  Cardiovascular:   normal S1, S2, no murmur, no edema  Respiratory:  clear BS bilaterally, no wheezes, no rales  GI:  distended, tender to palpation but no peritoneal signs  :  no ashton, no CVA tenderness  Musculoskeletal:  no synovitis, normal ROM  Neurologic: awake and alert, normal strength, no focal findings  Skin:  no rash, no erythema, no phlebitis  Heme/Onc: no lymphadenopathy   Psychiatric:  awake, alert, appropriate mood    Labs:                        9.0    16.28 )-----------( 83       ( 01 Nov 2020 07:06 )             27.8       11-01    131<L>  |  96  |  16  ----------------------------<  74  5.8<H>   |  15<L>  |  5.70<H>    Ca    8.9      01 Nov 2020 13:17  Phos  5.1     11-01  Mg     1.6     11-01    TPro  6.6  /  Alb  2.1<L>  /  TBili  9.9<H>  /  DBili  x   /  AST  127<H>  /  ALT  37  /  AlkPhos  119  11-01    MICROBIOLOGY:    CMV IgG Antibody: 0.51 U/mL (08-08-20 @ 09:47)  Toxoplasma IgG Screen: <3.0 IU/mL (08-08-20 @ 09:47)    RADIOLOGY:  < from: US Abdomen Limited (09.21.20 @ 11:44) >  IMPRESSION:    Mild ascites, largest in the right lower quadrant decreased from prior.    Bilateral pleural effusions.    < from: CT Chest No Cont (09.19.20 @ 23:33) >  IMPRESSION:  Increased bilateral pleural effusions, now large on the right and small on the left. Complete atelectasis of the right lower lobe with partial atelectasis of the right upper and middle lobes.    Nonspecific 5 mm groundglass nodule in the medial left upper lobe is new since 8/31/2020, and likely reflects a small focus of atelectasis. Follow-up CT chest in 6-12 months can be considered if clinically indicated.    Please see the visualized abdominal findings above.

## 2020-11-01 NOTE — CONSULT NOTE ADULT - PROBLEM SELECTOR RECOMMENDATION 3
Patient with hyperphosphatemia   Obtain PTH.  c/w phosphate binders with meals.   monitor serum phosphorus level.   Low phosphorus diet.

## 2020-11-01 NOTE — PROGRESS NOTE ADULT - SUBJECTIVE AND OBJECTIVE BOX
[facility] Division of Hospital Medicine  Talisha Tran MD  Pager (M-F, 8A-5P): [phone]  Other Times:  [phone]    Patient is a 40y old  Male who presents with a chief complaint of HRS (01 Nov 2020 15:57)      SUBJECTIVE / OVERNIGHT EVENTS: This Am, pt is borderline hypotensive, tachycardic, but afebrile. C/o diffuse abdominal pain. MICU consulted due to concern for septic shock given markedly elevated lactate.    ADDITIONAL REVIEW OF SYSTEMS:  GI: no nausea or vomiting; + abd pain; constipation last BM yesterday  CVS: No chest pain or palpitations  PULM: Improved SOB    MEDICATIONS  (STANDING):  cefTRIAXone   IVPB 2000 milliGRAM(s) IV Intermittent every 24 hours  folic acid 1 milliGRAM(s) Oral daily  influenza   Vaccine 0.5 milliLiter(s) IntraMuscular once  lactulose Syrup 20 Gram(s) Oral every 6 hours  midodrine 20 milliGRAM(s) Oral every 8 hours  multivitamin 1 Tablet(s) Oral daily  pantoprazole    Tablet 40 milliGRAM(s) Oral before breakfast  sevelamer carbonate 800 milliGRAM(s) Oral three times a day    MEDICATIONS  (PRN):  albuterol/ipratropium for Nebulization. 3 milliLiter(s) Nebulizer once PRN Wheezing      CAPILLARY BLOOD GLUCOSE        I&O's Summary    31 Oct 2020 08:01  -  01 Nov 2020 07:00  --------------------------------------------------------  IN: 250 mL / OUT: 0 mL / NET: 250 mL        PHYSICAL EXAM:  Vital Signs Last 24 Hrs  T(C): 36.6 (01 Nov 2020 16:35), Max: 36.8 (01 Nov 2020 03:55)  T(F): 97.9 (01 Nov 2020 16:35), Max: 98.3 (01 Nov 2020 03:55)  HR: 118 (01 Nov 2020 16:35) (118 - 136)  BP: 100/69 (01 Nov 2020 16:35) (86/53 - 100/69)  BP(mean): --  RR: 18 (01 Nov 2020 16:35) (18 - 20)  SpO2: 95% (01 Nov 2020 16:35) (92% - 99%)    CONSTITUTIONAL: NAD, well appearing elderly man  EYES: PERRLA; conjunctiva icteric  ENMT: Moist oral mucosa, no pharyngeal erythema  NECK: Supple, no JVD  RESPIRATORY: Decreased BS throughout R lung field  CARDIOVASCULAR: Regular rate and rhythm, normal S1 and S2, no murmur, 2+ lower extremity edema; Peripheral pulses are 2+ bilaterally  ABDOMEN: diffusely tender to palpation, markedly distended, tense ascites, normoactive bowel sounds, no rebound/guarding  MUSCULOSKELETAL:  No joint swelling or tenderness to palpation  PSYCH: A+O to person, place, and time; affect appropriate  NEUROLOGY: CN 2-12 are intact and symmetric; no gross sensory deficits, no asterexis  SKIN: No rashes; no palpable lesions    LABS:                        9.0    16.28 )-----------( 83       ( 01 Nov 2020 07:06 )             27.8     11-01    131<L>  |  96  |  16  ----------------------------<  74  5.8<H>   |  15<L>  |  5.70<H>    Ca    8.9      01 Nov 2020 13:17  Phos  5.6     11-01  Mg     1.6     11-01    TPro  6.6  /  Alb  2.1<L>  /  TBili  9.9<H>  /  DBili  x   /  AST  127<H>  /  ALT  37  /  AlkPhos  119  11-01    PT/INR - ( 01 Nov 2020 09:03 )   PT: 33.9 sec;   INR: 3.03 ratio         PTT - ( 01 Nov 2020 09:03 )  PTT:59.0 sec            RADIOLOGY & ADDITIONAL TESTS:    Imaging Personally Reviewed: CT chest from outside hospital reviewed  Electrocardiogram Personally Reviewed: EKG from 10/31, 11/1   [facility] Division of Hospital Medicine  Talisha Tran MD  Pager (M-F, 8A-5P): [phone]  Other Times:  [phone]    Patient is a 40y old  Male who presents with a chief complaint of HRS (01 Nov 2020 15:57)      SUBJECTIVE / OVERNIGHT EVENTS: This Am, pt is borderline hypotensive, tachycardic, but afebrile. C/o diffuse abdominal pain. MICU consulted due to concern for septic shock given markedly elevated lactate.    ADDITIONAL REVIEW OF SYSTEMS:  GI: no nausea or vomiting; + abd pain; constipation last BM yesterday  CVS: No chest pain or palpitations  PULM: Improved SOB    MEDICATIONS  (STANDING):  cefTRIAXone   IVPB 2000 milliGRAM(s) IV Intermittent every 24 hours  folic acid 1 milliGRAM(s) Oral daily  influenza   Vaccine 0.5 milliLiter(s) IntraMuscular once  lactulose Syrup 20 Gram(s) Oral every 6 hours  midodrine 20 milliGRAM(s) Oral every 8 hours  multivitamin 1 Tablet(s) Oral daily  pantoprazole    Tablet 40 milliGRAM(s) Oral before breakfast  sevelamer carbonate 800 milliGRAM(s) Oral three times a day    MEDICATIONS  (PRN):  albuterol/ipratropium for Nebulization. 3 milliLiter(s) Nebulizer once PRN Wheezing      CAPILLARY BLOOD GLUCOSE        I&O's Summary    31 Oct 2020 08:01  -  01 Nov 2020 07:00  --------------------------------------------------------  IN: 250 mL / OUT: 0 mL / NET: 250 mL        PHYSICAL EXAM:  Vital Signs Last 24 Hrs  T(C): 36.6 (01 Nov 2020 16:35), Max: 36.8 (01 Nov 2020 03:55)  T(F): 97.9 (01 Nov 2020 16:35), Max: 98.3 (01 Nov 2020 03:55)  HR: 118 (01 Nov 2020 16:35) (118 - 136)  BP: 100/69 (01 Nov 2020 16:35) (86/53 - 100/69)  BP(mean): --  RR: 18 (01 Nov 2020 16:35) (18 - 20)  SpO2: 95% (01 Nov 2020 16:35) (92% - 99%)    CONSTITUTIONAL: NAD, well appearing man, anarsarcic  EYES: PERRLA; conjunctiva icteric  ENMT: Moist oral mucosa, no pharyngeal erythema  NECK: Supple, no JVD  RESPIRATORY: Decreased BS throughout R lung field  CARDIOVASCULAR: Regular rate and rhythm, normal S1 and S2, no murmur, 2+ lower extremity edema; Peripheral pulses are 2+ bilaterally  ABDOMEN: diffusely tender to palpation, markedly distended, tense ascites, normoactive bowel sounds, no rebound/guarding  MUSCULOSKELETAL:  No joint swelling or tenderness to palpation  PSYCH: A+O to person, place, and time; affect appropriate  NEUROLOGY: CN 2-12 are intact and symmetric; no gross sensory deficits, no asterexis  SKIN: No rashes; no palpable lesions    LABS:                        9.0    16.28 )-----------( 83       ( 01 Nov 2020 07:06 )             27.8     11-01    131<L>  |  96  |  16  ----------------------------<  74  5.8<H>   |  15<L>  |  5.70<H>    Ca    8.9      01 Nov 2020 13:17  Phos  5.6     11-01  Mg     1.6     11-01    TPro  6.6  /  Alb  2.1<L>  /  TBili  9.9<H>  /  DBili  x   /  AST  127<H>  /  ALT  37  /  AlkPhos  119  11-01    PT/INR - ( 01 Nov 2020 09:03 )   PT: 33.9 sec;   INR: 3.03 ratio         PTT - ( 01 Nov 2020 09:03 )  PTT:59.0 sec            RADIOLOGY & ADDITIONAL TESTS:    Imaging Personally Reviewed: CT chest from outside hospital reviewed  Electrocardiogram Personally Reviewed: EKG from 10/31, 11/1

## 2020-11-01 NOTE — PROGRESS NOTE ADULT - SUBJECTIVE AND OBJECTIVE BOX
CHIEF COMPLAINT:  Patient is a 40y old  Male who presents with a chief complaint of HRS (31 Oct 2020 22:49)    HPI:  40 yo M w/ PMH of EtOH cirrhosis c/b gastric variceal bleeding in July 2020 s/p 34u pRBC, IR embolization and blakemore ballon, ESRD on HD (M/W/F), bladder rupture 2/2 fall s/p ex-lap in 2019, hemorrhoids, presented to OSH for increased abdominal distention and abdominal pain x3 days, most significant this AM. Abd pain noted to be diffuse in nature initially rated as a constant stabbing 15/10 w/ associated rib pain and wrapping around the flanks.     At OSH pt was noted to be sinus tachycardic to 130s. /80, RR 24 and satting 99% on RA. Labs were notable for H/H 11.2/32.9, WBC 10.8, PLTs 116. Chemistry Na 130, K 5.5, BUN/Cr 14/4.87. AST//42, Alk phos 151 and Tbilli 10.2.    He was administered Ceftriaxone 1g x1, Zofran, 1L IVF, and Dilaudid 1mg IVP which alleviated his pain. Upon encounter he now c/o the pain being positional, and intermittent rating it a 7/10    Of note, pt last underwent paracentesis 1 month ago and dialysis yesterday 10/30/20. (31 Oct 2020 22:49)      Interval Events:      REVIEW OF SYSTEMS:          OBJECTIVE:  ICU Vital Signs Last 24 Hrs  T(C): 36.4 (01 Nov 2020 07:38), Max: 36.8 (01 Nov 2020 03:55)  T(F): 97.6 (01 Nov 2020 07:38), Max: 98.3 (01 Nov 2020 03:55)  HR: 122 (01 Nov 2020 07:38) (122 - 136)  BP: 92/54 (01 Nov 2020 07:38) (86/53 - 98/58)  BP(mean): --  ABP: --  ABP(mean): --  RR: 20 (01 Nov 2020 07:38) (19 - 20)  SpO2: 95% (01 Nov 2020 07:38) (92% - 95%)        10-31 @ 08:01  -  11-01 @ 07:00  --------------------------------------------------------  IN: 250 mL / OUT: 0 mL / NET: 250 mL      CAPILLARY BLOOD GLUCOSE              PHYSICAL EXAM:          HOSPITAL MEDICATIONS:  MEDICATIONS  (STANDING):  albumin human 25% IVPB 50 milliLiter(s) IV Intermittent every 6 hours  cefTRIAXone   IVPB 2000 milliGRAM(s) IV Intermittent every 24 hours  folic acid 1 milliGRAM(s) Oral daily  influenza   Vaccine 0.5 milliLiter(s) IntraMuscular once  midodrine 10 milliGRAM(s) Oral every 8 hours  multivitamin 1 Tablet(s) Oral daily  pantoprazole    Tablet 40 milliGRAM(s) Oral before breakfast  sevelamer carbonate 800 milliGRAM(s) Oral three times a day    MEDICATIONS  (PRN):      LABS:                        9.0    16.28 )-----------( 83       ( 01 Nov 2020 07:06 )             27.8     Hgb Trend: 9.0<--, 9.6<--  11-01    132<L>  |  97  |  14  ----------------------------<  71  5.3   |  16<L>  |  5.46<H>    Ca    8.6      01 Nov 2020 07:02  Phos  5.1     11-01  Mg     1.6     11-01    TPro  6.6  /  Alb  2.1<L>  /  TBili  9.9<H>  /  DBili  x   /  AST  127<H>  /  ALT  37  /  AlkPhos  119  11-01    LIVER FUNCTIONS - ( 01 Nov 2020 07:02 )  Alb: 2.1 g/dL / Pro: 6.6 g/dL / ALK PHOS: 119 U/L / ALT: 37 U/L / AST: 127 U/L / GGT: x           Creatinine Trend: 5.46<--, 5.17<--  PT/INR - ( 31 Oct 2020 23:40 )   PT: 31.8 sec;   INR: 2.78 ratio         PTT - ( 31 Oct 2020 23:40 )  PTT:52.3 sec      Venous Blood Gas:  11-01 @ 07:10  7.30/40/37/19/56  VBG Lactate: 9.4      MICROBIOLOGY:     RADIOLOGY:  [ ] Reviewed and interpreted by me    EKG:   CHIEF COMPLAINT:  Patient is a 40y old  Male who presents with a chief complaint of HRS (31 Oct 2020 22:49)    HPI:  38 yo M w/ PMH of EtOH cirrhosis c/b gastric variceal bleeding in July 2020 s/p 34u pRBC, IR embolization and blakemore ballon, ESRD on HD (M/W/F), bladder rupture 2/2 fall s/p ex-lap in 2019, hemorrhoids, presented to OSH for increased abdominal distention and abdominal pain x3 days, most significant this AM. Abd pain noted to be diffuse in nature initially rated as a constant stabbing 15/10 w/ associated rib pain and wrapping around the flanks.     At OSH pt was noted to be sinus tachycardic to 130s. /80, RR 24 and satting 99% on RA. Labs were notable for H/H 11.2/32.9, WBC 10.8, PLTs 116. Chemistry Na 130, K 5.5, BUN/Cr 14/4.87. AST//42, Alk phos 151 and Tbilli 10.2.    He was administered Ceftriaxone 1g x1, Zofran, 1L IVF, and Dilaudid 1mg IVP which alleviated his pain. Upon encounter he now c/o the pain being positional, and intermittent rating it a 7/10    Of note, pt last underwent paracentesis 1 month ago and dialysis yesterday 10/30/20. (31 Oct 2020 22:49)      Interval Events:      REVIEW OF SYSTEMS: abd pain, liver cirrhosis, Dyspnea          OBJECTIVE:  ICU Vital Signs Last 24 Hrs  T(C): 36.4 (01 Nov 2020 07:38), Max: 36.8 (01 Nov 2020 03:55)  T(F): 97.6 (01 Nov 2020 07:38), Max: 98.3 (01 Nov 2020 03:55)  HR: 122 (01 Nov 2020 07:38) (122 - 136)  BP: 92/54 (01 Nov 2020 07:38) (86/53 - 98/58)  BP(mean): --  ABP: --  ABP(mean): --  RR: 20 (01 Nov 2020 07:38) (19 - 20)  SpO2: 95% (01 Nov 2020 07:38) (92% - 95%)        10-31 @ 08:01  -  11-01 @ 07:00  --------------------------------------------------------  IN: 250 mL / OUT: 0 mL / NET: 250 mL      CAPILLARY BLOOD GLUCOSE              PHYSICAL EXAM:  A&O x 3   neck No JVD  chest Dec BS right  Heart RR tachycardia, no murmur  abd soft, distended, nontender, not ridid, no rebound or guarding  ext - pedal edema 2+  neurovasculary [facility] MEDICATIONS:  MEDICATIONS  (STANDING):  albumin human 25% IVPB 50 milliLiter(s) IV Intermittent every 6 hours  cefTRIAXone   IVPB 2000 milliGRAM(s) IV Intermittent every 24 hours  folic acid 1 milliGRAM(s) Oral daily  influenza   Vaccine 0.5 milliLiter(s) IntraMuscular once  midodrine 10 milliGRAM(s) Oral every 8 hours  multivitamin 1 Tablet(s) Oral daily  pantoprazole    Tablet 40 milliGRAM(s) Oral before breakfast  sevelamer carbonate 800 milliGRAM(s) Oral three times a day    MEDICATIONS  (PRN):      LABS:                        9.0    16.28 )-----------( 83       ( 01 Nov 2020 07:06 )             27.8     Hgb Trend: 9.0<--, 9.6<--  11-01    132<L>  |  97  |  14  ----------------------------<  71  5.3   |  16<L>  |  5.46<H>    Ca    8.6      01 Nov 2020 07:02  Phos  5.1     11-01  Mg     1.6     11-01    TPro  6.6  /  Alb  2.1<L>  /  TBili  9.9<H>  /  DBili  x   /  AST  127<H>  /  ALT  37  /  AlkPhos  119  11-01    LIVER FUNCTIONS - ( 01 Nov 2020 07:02 )  Alb: 2.1 g/dL / Pro: 6.6 g/dL / ALK PHOS: 119 U/L / ALT: 37 U/L / AST: 127 U/L / GGT: x           Creatinine Trend: 5.46<--, 5.17<--  PT/INR - ( 31 Oct 2020 23:40 )   PT: 31.8 sec;   INR: 2.78 ratio         PTT - ( 31 Oct 2020 23:40 )  PTT:52.3 sec      Venous Blood Gas:  11-01 @ 07:10  7.30/40/37/19/56  VBG Lactate: 9.4      MICROBIOLOGY:     RADIOLOGY:  [x ] outside hosp CD sent to radialogy    EKG: sinus tachy @125 no acute ST-T changes    case discussed with hospitalist

## 2020-11-01 NOTE — CONSULT NOTE ADULT - SUBJECTIVE AND OBJECTIVE BOX
Chief Complaint:  Patient is a 40y old  Male who presents with a chief complaint of HRS (01 Nov 2020 09:38)    HPI:  40 year old man with hx of decompensated EtOH cirrhosis (Dx'd 7/2020) complicated by gastric variceal bleeding (7/2020) s/p 34u pRBC, IR embolization and blakemore balloon, ESRD on HD (M/W/F), bladder rupture 2/2 fall s/p ex-lap in 2019, hemorrhoids, presented to OSH for increased abdominal distention and abdominal pain x3 days. Patient reports diffuse abdominal pain, but worse in the epigastric area. Describes the pain as 15/10, stabbing, constant and progressively worsening. He denies fevers, chills, chest pain, shortness of breath, nausea, vomiting, diarrhea, melena, hematochezia, hematemesis and cough. Reports his last paracentesis was one month ago. Does not take diuretics. Last drink was 7/2020. Last hemodialysis was two days ago (10/30/20)    Allergies:  No Known Allergies    Home Medications:  Reviewed    Hospital Medications:  albumin human 25% IVPB 50 milliLiter(s) IV Intermittent every 6 hours  cefTRIAXone   IVPB 2000 milliGRAM(s) IV Intermittent every 24 hours  folic acid 1 milliGRAM(s) Oral daily  influenza   Vaccine 0.5 milliLiter(s) IntraMuscular once  midodrine 20 milliGRAM(s) Oral every 8 hours  multivitamin 1 Tablet(s) Oral daily  pantoprazole    Tablet 40 milliGRAM(s) Oral before breakfast  sevelamer carbonate 800 milliGRAM(s) Oral three times a day    PMHX/PSHX:  End-stage renal disease (ESRD)    Cirrhosis, alcoholic    No pertinent past medical history    S/P exploratory laparotomy    No significant past surgical history    Family history:  FH: alpha 1 antitrypsin deficiency    No pertinent family history in first degree relatives    Social History:   Former EtOH abuse  No drug use  No tobacco use    ROS:   General:  No fevers, chills or night sweats.  ENT:  No sore throat or dysphagia  CV:  No pain or palpitations  Resp:  No dyspnea, cough, wheezing  GI:  See H&P  Skin:  No rash or edema      PHYSICAL EXAM:   GENERAL:  NAD, Appears stated age  HEENT:  NC/AT,  conjunctivae clear and pink, scleral icteris  CHEST:  CTA B/L, Normal effort  HEART:  RRR S1/S2, No murmurs  ABDOMEN:  Soft, non-tender, + distended, BS+  EXTEREMITIES:  No cyanosis or Edema  SKIN:  Warm & Dry. No rash or erythema + Jaundice  NEURO:  Alert, oriented x 3    Vital Signs:  Vital Signs Last 24 Hrs  T(C): 36.4 (01 Nov 2020 07:38), Max: 36.8 (01 Nov 2020 03:55)  T(F): 97.6 (01 Nov 2020 07:38), Max: 98.3 (01 Nov 2020 03:55)  HR: 122 (01 Nov 2020 10:18) (122 - 136)  BP: 92/57 (01 Nov 2020 10:18) (86/53 - 98/58)  BP(mean): --  RR: 20 (01 Nov 2020 07:38) (19 - 20)  SpO2: 95% (01 Nov 2020 07:38) (92% - 95%)  Daily Height in cm: 180.34 (31 Oct 2020 22:13)    Daily     LABS:                        9.0    16.28 )-----------( 83       ( 01 Nov 2020 07:06 )             27.8     Mean Cell Volume: 113.5 fl (11-01-20 @ 07:06)    11-01    132<L>  |  97  |  14  ----------------------------<  71  5.3   |  16<L>  |  5.46<H>    Ca    8.6      01 Nov 2020 07:02  Phos  5.1     11-01  Mg     1.6     11-01    TPro  6.6  /  Alb  2.1<L>  /  TBili  9.9<H>  /  DBili  x   /  AST  127<H>  /  ALT  37  /  AlkPhos  119  11-01    LIVER FUNCTIONS - ( 01 Nov 2020 07:02 )  Alb: 2.1 g/dL / Pro: 6.6 g/dL / ALK PHOS: 119 U/L / ALT: 37 U/L / AST: 127 U/L / GGT: x           PT/INR - ( 01 Nov 2020 09:03 )   PT: 33.9 sec;   INR: 3.03 ratio         PTT - ( 01 Nov 2020 09:03 )  PTT:59.0 sec                            9.0    16.28 )-----------( 83       ( 01 Nov 2020 07:06 )             27.8                         9.6    15.30 )-----------( 92       ( 31 Oct 2020 23:40 )             29.0     Imaging:

## 2020-11-01 NOTE — CONSULT NOTE ADULT - SUBJECTIVE AND OBJECTIVE BOX
[facility] Division of Kidney Diseases & Hypertension  INITIAL CONSULT NOTE  [phone]--------------------------------------------------------------------------------  HPI: 40 year old male with  ESRD on HD MWF,  traumatic bladder rupture 2/2 fall s/p ex-lap in 2019, ETOH cirrhosis c/b gastric varices, recent admission (7/2020) for hemorrhagic shock 2/2 variceal bleeding, acute renal failure requiring HD, and ETOH withdrawal seizure, recently admitted in 9/2020 for GIB who presents today for worsening abdominal pain. Patient states he has been constipated and has been having a progressively more distended abdomen. He states he has not been taking lactulose. He had been set to have appointments as outpatient however his insurance was not accepted at those providers and was not able to follow up and the pain started to get worse so he came to [facility] for further management. Nephrology consulted for management of ESRD and HD.    PAST HISTORY  --------------------------------------------------------------------------------  PAST MEDICAL & SURGICAL HISTORY:  End-stage renal disease (ESRD)  On HD MWF    Cirrhosis, alcoholic    S/P exploratory laparotomy  for bladder rupture s/p fall      FAMILY HISTORY:  FH: alpha 1 antitrypsin deficiency      PAST SOCIAL HISTORY:    ALLERGIES & MEDICATIONS  --------------------------------------------------------------------------------  Allergies    No Known Allergies    Intolerances      Standing Inpatient Medications  cefTRIAXone   IVPB 2000 milliGRAM(s) IV Intermittent every 24 hours  folic acid 1 milliGRAM(s) Oral daily  influenza   Vaccine 0.5 milliLiter(s) IntraMuscular once  midodrine 20 milliGRAM(s) Oral every 8 hours  multivitamin 1 Tablet(s) Oral daily  pantoprazole    Tablet 40 milliGRAM(s) Oral before breakfast  sevelamer carbonate 800 milliGRAM(s) Oral three times a day    PRN Inpatient Medications      REVIEW OF SYSTEMS  --------------------------------------------------------------------------------  Gen: No  fevers/chills, weakness  Skin: No rashes  Head/Eyes/Ears/Mouth: No headache; Normal hearing; Normal vision w/o blurriness;   Respiratory: No dyspnea, cough, wheezing, hemoptysis  CV: No chest pain,   GI: No abdominal pain, diarrhea, constipation, nausea, vomiting  : No increased frequency, dysuria, hematuria, nocturia  MSK: No joint pain/swelling;   Neuro: No dizziness/lightheadedness, weakness, seizures  Psych: No issues with affect    All other systems were reviewed and are negative, except as noted.    VITALS/PHYSICAL EXAM  --------------------------------------------------------------------------------  T(C): 36.6 (11-01-20 @ 11:48), Max: 36.8 (11-01-20 @ 03:55)  HR: 118 (11-01-20 @ 11:48) (118 - 136)  BP: 94/59 (11-01-20 @ 11:48) (86/53 - 98/58)  RR: 18 (11-01-20 @ 11:48) (18 - 20)  SpO2: 99% (11-01-20 @ 11:48) (92% - 99%)  Wt(kg): --  Height (cm): 180.3 (10-31-20 @ 22:13)  Weight (kg): 82 (10-31-20 @ 22:13)  BMI (kg/m2): 25.2 (10-31-20 @ 22:13)  BSA (m2): 2.02 (10-31-20 @ 22:13)      10-31-20 @ 08:01  -  11-01-20 @ 07:00  --------------------------------------------------------  IN: 250 mL / OUT: 0 mL / NET: 250 mL      Physical Exam:  	Gen: NAD  	HEENT: Icteric sclera              Lymph: No palpable lymph nodes.  	Pulm: CTA B/L  	CV:  S1S2  	Abd: Swelling of abdomen, distended   	Ext: Edema of LE  	Neuro: No focal deficits. Some asterixis  	Skin: Warm              Psych: Non-focal  	Vascular: No cyanosis    LABS/STUDIES  --------------------------------------------------------------------------------              9.0    16.28 >-----------<  83       [11-01-20 @ 07:06]              27.8     132  |  97  |  14  ----------------------------<  71      [11-01-20 @ 07:02]  5.3   |  16  |  5.46        Ca     8.6     [11-01-20 @ 07:02]      Mg     1.6     [11-01-20 @ 07:02]      Phos  5.1     [11-01-20 @ 07:02]    TPro  6.6  /  Alb  2.1  /  TBili  9.9  /  DBili  x   /  AST  127  /  ALT  37  /  AlkPhos  119  [11-01-20 @ 07:02]    PT/INR: PT 33.9 , INR 3.03       [11-01-20 @ 09:03]  PTT: 59.0       [11-01-20 @ 09:03]      Creatinine Trend:  SCr 5.46 [11-01 @ 07:02]  SCr 5.17 [10-31 @ 23:40]

## 2020-11-01 NOTE — CHART NOTE - NSCHARTNOTEFT_GEN_A_CORE
Medicine PA Note     LEATHA MACIAS  MRN-[id number]  Allergies    No Known Allergies    Intolerances       Called to evaluate patient for new BRBPR. Of note, patient has history of GI bleed during previous admission. Patient seen and evaluated at bedside. Patient states that he felt constipated so he tried to have a BM on the bedside commode, but was unable to. Bedside commode examined and found to have martin red blood present, no stool. Patient states he does have a history of hemorrhoids. Patient's only complaint is feeling "a little dizzy" and abdominal pain. Patient has been having abdominal pain since admission, likely 2/2 distension. Patient denies any hematemesis, worsening abdominal pain, shortness of breath, chest pain, N/V/D. Of note, patient was seen by MICU earlier today for possible transition in level of care as patient had sustained BPs in the 80s. Patient states his baseline SBP is in the 90s.    Vital Signs Last 24 Hrs  T(C): 36.4 (11-01-20 @ 20:41), Max: 36.8 (11-01-20 @ 03:55)  T(F): 97.5 (11-01-20 @ 20:41), Max: 98.3 (11-01-20 @ 03:55)  HR: 122 (11-01-20 @ 21:17) (118 - 132)  BP: 91/50 (11-01-20 @ 21:17) (84/54 - 100/69)  BP(mean): --  RR: 18 (11-01-20 @ 20:41) (18 - 20)  SpO2: 94% (11-01-20 @ 20:41) (92% - 99%)  Daily     Daily   I&O's Summary    31 Oct 2020 08:01  -  01 Nov 2020 07:00  --------------------------------------------------------  IN: 250 mL / OUT: 0 mL / NET: 250 mL    01 Nov 2020 07:01  -  01 Nov 2020 23:35  --------------------------------------------------------  IN: 150 mL / OUT: 0 mL / NET: 150 mL      CAPILLARY BLOOD GLUCOSE                          8.5    18.04 )-----------( 94       ( 01 Nov 2020 21:53 )             27.0     11-01    131<L>  |  96  |  16  ----------------------------<  74  5.8<H>   |  15<L>  |  5.70<H>    Ca    8.9      01 Nov 2020 13:17  Phos  5.6     11-01  Mg     1.6     11-01    TPro  6.6  /  Alb  2.1<L>  /  TBili  9.9<H>  /  DBili  x   /  AST  127<H>  /  ALT  37  /  AlkPhos  119  11-01    PT/INR - ( 01 Nov 2020 09:03 )   PT: 33.9 sec;   INR: 3.03 ratio         PTT - ( 01 Nov 2020 09:03 )  PTT:59.0 sec        Radiology:      PHYSICAL EXAM:  GENERAL: NAD, well-developed, jaundiced  HEAD:  Atraumatic, Normocephalic  EYES: EOMI, PERRLA, conjunctiva and sclera clear  NECK: Supple, No JVD  CHEST/LUNG: Clear to auscultation bilaterally; No wheeze  HEART: Regular rate and rhythm; No murmurs, rubs, or gallops  ABDOMEN: Distended; Bowel sounds present  EXTREMITIES:  2+ Peripheral Pulses, No clubbing, cyanosis, or edema    Assessment/Plan: HPI:    39M with PMH of EtOH cirrhosis c/b gastric variceal bleeding in July 2020 s/p 34u pRBC, IR embolization and blakemore ballon, ESRD on HD (M/W/F), bladder rupture 2/2 fall s/p ex-lap in 2019, hemorrhoids, presented to OSH for increased abdominal distention and abdominal pain x3 days. Now with BRBPR.      #GI Bleed,BRPBPR  -CBC stat  -IV Protonix 80mg stat  -Patient's SBP fluctuate from 80s-90s from the time of event. MICU rec'd albumin 25% 100 ml q6h during the day.   -Dilaudid 0.2mg IVP for pain.  -Above discussed with [facility], in agreement with plan.  -MICU updated with patient's current clinical status. Will f/u any additional recs.  -Consider increasing midodrine if pressures do not improve.  -Day team and attending to follow    -Shayne Funez PA-C, [id number], Dept of Medicine

## 2020-11-01 NOTE — CONSULT NOTE ADULT - PROBLEM SELECTOR RECOMMENDATION 9
Pt. with ESKD on HD three times a week (MWF). Last HD was on Friday 10/30 via right tunneled dialysis catheter. Will arrange for maintenance HD tomorrow. Patient having further workup currently due to rising lactate and abdominal pain. Continue to monitor labs. Pt. with ESKD on HD three times a week (MWF). Last HD was on Friday 10/30 via right tunneled dialysis catheter. Will arrange for maintenance HD tomorrow. Patient having further workup currently due to rising lactate and abdominal pain. Continue to monitor labs.    Patient consented for HD, consent in chart.

## 2020-11-01 NOTE — CONSULT NOTE ADULT - ASSESSMENT
38 yo M w/ PMH of EtOH cirrhosis c/b gastric variceal bleeding in July 2020 s/p 34u pRBC, IR embolization, ESRD on HD (M/W/F), bladder rupture 2/2 fall s/p ex-lap in 2019 was transferred from OSH for worsening abdominal distention and pain.    Abdominal Pain, distention, leukocytosis  -Must rule out SBP with paracentesis, however pt afebrile and extremely non-toxic appearing. Abdomen is tender  -c/w CTX 2g daily for now pending paracentesis  -f/u Bcx  -though patient has leukocytosis, this appears to be within his recent baseline. This does not definitively point towards infection. If infectious work up is negative, would consider eventual hematology evaluation to evaluate reasons for chronic leukocytosis

## 2020-11-02 NOTE — PROGRESS NOTE ADULT - PROBLEM SELECTOR PLAN 3
Pt meets criteria based on tachycardia, hypotension, leukocytosis, marked lactic acidosis, elevated анна concerning for end organ damage  - MICU consulted; deemed not a candidate  - Lactic acidosis likely due to poor hepatic clearance, type B  - Rule out SBP as above  - cont CTX 1g QD for now  - Obtain ID consult  - F/u blood cultures  - trend lactate

## 2020-11-02 NOTE — PROGRESS NOTE ADULT - PROBLEM SELECTOR PLAN 3
Pt. with hyperphosphatemia in setting of advanced CKD/ESRD. Serum phosphorus in target range (5.0). Continue Renvela 800 mg TID with meals. Low phosphorus diet advised. Monitor serum phosphorus    If any questions, please feel free to contact me  Kehinde Coles   Nephrology Fellow  [phone]  (After 5 pm or on weekends please page the on-call fellow)

## 2020-11-02 NOTE — PROGRESS NOTE ADULT - ASSESSMENT
38 yo M w/ PMH of EtOH cirrhosis c/b gastric variceal bleeding in July 2020 s/p 34 pRBC, IR embolization, ESRD on HD (M/W/F), bladder rupture 2/2 fall s/p ex-lap in 2019 was transferred from OSH for worsening abdominal distention and pain  No fever, has leukocytosis  Distended abd from ascites  Covid Ab neg  Does not appear toxic, does have discomfort when palpating abd, but difficult to tell if is inflammatory vs due to distension  Low general suspicion for SBP, but cover for now while awaiting para  Overall,  1) Leukocytosis  - Seems to have longstanding WBC of unclear etiology  - Would check paracentesis (both for diagnosis and therapeutic)  - Ceftriaxone 2g q 24  - F/U BCXs  - Check CXR  - If does not improve would warrant workup for explanation of longstanding leukocytosis  2) Ascites  - Would pursue para as above (send out diagnostic markers--culture, cells...)  - F/U GI/hepatology  3) Elevated Lactate  - Trend to normal, also unclear significance considering lack of signs sepsis (aside from WBC)  - Further workup per primary team    Calin Thomas MD  Pager [phone]  After 5pm and on weekends call [phone]

## 2020-11-02 NOTE — PROGRESS NOTE ADULT - ASSESSMENT
39M with PMH of EtOH cirrhosis c/b gastric variceal bleeding in July 2020 s/p 34u pRBC, IR embolization and blakemore ballon, ESRD on HD (M/W/F), bladder rupture 2/2 fall s/p ex-lap in 2019, hemorrhoids, presented to OSH for increased abdominal distention and abdominal pain x3 days

## 2020-11-02 NOTE — PROGRESS NOTE ADULT - PROBLEM SELECTOR PLAN 1
Plan: Decompensated w/ EV and gastric varices and ascites; rule out SBP  - MELD score 41  - abd US doppler 11/2 no PVT, mod to large ascites  - IR consult for paracentesis, diagnostic and therapeutic, concern for SBP  - Hepatology recs reviewed  - Trend LFTs, анна, INR daily- INr elevated to 3.41- s/p vitamin K 10mg IV  -HE: None  Ascites: Large on Exam  HCC: No lesions on MRI 8/2020  Varices: Hx of gastric varices s/p IR embolization, Grade I esophageal varices seen 9/2020  # ESRD on hemodialysis   # Anemia: possible secondary to ESRD +/- slow GI bleed. Has hx of varices

## 2020-11-02 NOTE — CHART NOTE - NSCHARTNOTEFT_GEN_A_CORE
: William Nick, MS3    INDICATION: Respiratory failure 2/2 hepatic hydrothorax    PROCEDURE:  [ ] LIMITED ECHO  [X] LIMITED CHEST  [ ] LIMITED RETROPERITONEAL  [ ] LIMITED ABDOMINAL  [ ] LIMITED DVT  [ ] NEEDLE GUIDANCE VASCULAR  [ ] NEEDLE GUIDANCE THORACENTESIS  [ ] NEEDLE GUIDANCE PARACENTESIS  [ ] NEEDLE GUIDANCE PERICARDIOCENTESIS  [ ] OTHER    FINDINGS: Abundant anechoic fluid in right lung dependent areas with apparent atelectatic lung cephalad to the diaphragm. Perihepatic anechoic fluid surrounding liver caudal to diaphragm.       INTERPRETATION: Moderate right sided pleural effusion in the setting of ascites. : William Nick, MS3    INDICATION: Respiratory failure 2/2 hepatic hydrothorax    PROCEDURE:  [ ] LIMITED ECHO  [X] LIMITED CHEST  [ ] LIMITED RETROPERITONEAL  [ ] LIMITED ABDOMINAL  [ ] LIMITED DVT  [ ] NEEDLE GUIDANCE VASCULAR  [ ] NEEDLE GUIDANCE THORACENTESIS  [ ] NEEDLE GUIDANCE PARACENTESIS  [ ] NEEDLE GUIDANCE PERICARDIOCENTESIS  [ ] OTHER    FINDINGS: Abundant anechoic fluid in right lung dependent areas with apparent atelectatic lung cephalad to the diaphragm. Perihepatic anechoic fluid surrounding liver caudal to diaphragm.       INTERPRETATION: Large right sided pleural effusion in the setting of ascites.    Pulmonary Attending    I was present throughout the entire procedure and agree with findings and interpretation as outlined above.

## 2020-11-02 NOTE — PROGRESS NOTE ADULT - PROBLEM SELECTOR PLAN 2
downtrending HB and BRBPR  PPI IV bid, SBP ppx with Ceftriaxone IV, octeotride gtt 11/2  NPO at midnight for colonscopy in AM  CLD for now

## 2020-11-02 NOTE — PROGRESS NOTE ADULT - ASSESSMENT
40 year old man with hx of decompensated EtOH cirrhosis (Dx'd 7/2020) complicated by gastric variceal bleeding (7/2020) s/p 34u pRBC, IR embolization and blakemore balloon, ESRD on HD (M/W/F), bladder rupture 2/2 fall s/p ex-lap in 2019, hemorrhoids, presented to OSH for increased abdominal distention and abdominal pain x3 days, SOB, found to have large R pleural effusion.     Problem: SOB/DU  Assessment: Likely 2/2 to large R Pleff. Patient resting comfortably in bed no respiratory distress. Differential for effusion includes hydrothorax vs infection. Unable to have thoracentesis over the weekend 2/2 to lab derangements. Today his INR is 3.41 which is up from 2.78 upon admission. He received vitamin K on 11/1.   Plan: 40 year old man with hx of decompensated EtOH cirrhosis (Dx'd 7/2020) complicated by gastric variceal bleeding (7/2020) s/p 34u pRBC, IR embolization and blakemore balloon, ESRD on HD (M/W/F), bladder rupture 2/2 fall s/p ex-lap in 2019, hemorrhoids, presented to OSH for increased abdominal distention and abdominal pain x3 days, SOB, found to have large R pleural effusion.     Problem: SOB/DU  Assessment: Likely 2/2 to large R Pleff. Patient resting comfortably in bed no respiratory distress. Differential for effusion includes hydrothorax vs infection. Unable to have thoracentesis over the weekend 2/2 to lab derangements. Today his INR is 3.41 which is up from 2.78 upon admission. He received vitamin K on 11/1. Bedside US today shows large R pleff however diaphragm not flattened. The patient appears uncomfortable but does not report SOB. His discomfort appears to be related to his distended abdomen. Patient is ordered for IV Vit K x 3 days. He will likely undergo IR guided paracentesis on 11/4. He may benefit from having thoracentesis performed at the same time with IR.  Plan: Will not perform thoracentesis today given elevated INR and clinical picture. Please contact IR team to see if it is possible to arrange a combined thoracentesis and paracentesis on 11/4.

## 2020-11-02 NOTE — PROGRESS NOTE ADULT - PROBLEM SELECTOR PLAN 2
Patient with anemia in the setting of ESRD with history of GI bleed. Hemoglobin below target range. Please check iron studies. Monitor hemoglobin.

## 2020-11-02 NOTE — PROGRESS NOTE ADULT - SUBJECTIVE AND OBJECTIVE BOX
Patient is a 40y old  Male who presents with a chief complaint of HRS (01 Nov 2020 16:54)      SUBJECTIVE / OVERNIGHT EVENTS:    Patient seen and examined at bedside. No acute events overnight.    T(F): 97.7 (11-02 @ 03:58), Max: 97.9 (11-01 @ 11:48)  HR: 112 (11-02 @ 03:58) (112 - 122)  BP: 90/51 (11-02 @ 03:58) (84/54 - 100/69)  RR: 18 (11-02 @ 03:58) (18 - 20)  SpO2: 95% (11-02 @ 03:58) (94% - 99%)    I&O's Summary    01 Nov 2020 07:01  -  02 Nov 2020 07:00  --------------------------------------------------------  IN: 410 mL / OUT: 0 mL / NET: 410 mL        MEDICATIONS  (STANDING):  albumin human 25% IVPB 100 milliLiter(s) IV Intermittent every 6 hours  cefTRIAXone   IVPB 2000 milliGRAM(s) IV Intermittent every 24 hours  folic acid 1 milliGRAM(s) Oral daily  influenza   Vaccine 0.5 milliLiter(s) IntraMuscular once  lactulose Syrup 20 Gram(s) Oral every 6 hours  midodrine 20 milliGRAM(s) Oral every 8 hours  multivitamin 1 Tablet(s) Oral daily  pantoprazole  Injectable 40 milliGRAM(s) IV Push two times a day  sevelamer carbonate 800 milliGRAM(s) Oral three times a day    MEDICATIONS  (PRN):  albuterol/ipratropium for Nebulization. 3 milliLiter(s) Nebulizer once PRN Wheezing      PHYSICAL EXAM:   GEN: Age appropriate, resting comfortably in bed, no acute distress, non toxic appearing, speaking in complete sentences.   HEENT: Conjunctiva and sclera normal  PULM: Lungs CTAB, no wheezes, rales, rhonchi  CV: RRR, S1S2, no MRG  MSK: no stiffness or joint effusions  Abdominal: Soft, nontender to palpation, non-distended, +BS  Extremities: No edema or cyanosis  NEURO: AAOx3  Psych: normal affect, normal behavior  Skin: No rashes, lesions    LABS:  Labs personally reviewed.                        8.4    16.02 )-----------( 86       ( 02 Nov 2020 05:12 )             26.0     Hgb Trend: 8.4<--, 8.5<--, 9.0<--, 9.6<--  11-02    132<L>  |  98  |  21  ----------------------------<  98  5.2   |  21<L>  |  6.05<H>    Ca    8.8      02 Nov 2020 05:12  Phos  5.0     11-02  Mg     1.7     11-02    TPro  5.8<L>  /  Alb  2.2<L>  /  TBili  9.4<H>  /  DBili  x   /  AST  95<H>  /  ALT  35  /  AlkPhos  96  11-02    Creatinine Trend: 6.05<--, 6.18<--, 5.70<--, 5.46<--, 5.17<--  PT/INR - ( 01 Nov 2020 09:03 )   PT: 33.9 sec;   INR: 3.03 ratio         PTT - ( 01 Nov 2020 09:03 )  PTT:59.0 sec        [facility]  Pulmonary and Critical Care Fellow    PGY-4 Pager: [phone]  [phone]  Night Float: Patient is a 40y old  Male who presents with a chief complaint of HRS (01 Nov 2020 16:54)      SUBJECTIVE / OVERNIGHT EVENTS:    Patient seen and examined at bedside. BRBPR overnight.    T(F): 97.7 (11-02 @ 03:58), Max: 97.9 (11-01 @ 11:48)  HR: 112 (11-02 @ 03:58) (112 - 122)  BP: 90/51 (11-02 @ 03:58) (84/54 - 100/69)  RR: 18 (11-02 @ 03:58) (18 - 20)  SpO2: 95% (11-02 @ 03:58) (94% - 99%)    I&O's Summary    01 Nov 2020 07:01  -  02 Nov 2020 07:00  --------------------------------------------------------  IN: 410 mL / OUT: 0 mL / NET: 410 mL        MEDICATIONS  (STANDING):  albumin human 25% IVPB 100 milliLiter(s) IV Intermittent every 6 hours  cefTRIAXone   IVPB 2000 milliGRAM(s) IV Intermittent every 24 hours  folic acid 1 milliGRAM(s) Oral daily  influenza   Vaccine 0.5 milliLiter(s) IntraMuscular once  lactulose Syrup 20 Gram(s) Oral every 6 hours  midodrine 20 milliGRAM(s) Oral every 8 hours  multivitamin 1 Tablet(s) Oral daily  pantoprazole  Injectable 40 milliGRAM(s) IV Push two times a day  sevelamer carbonate 800 milliGRAM(s) Oral three times a day    MEDICATIONS  (PRN):  albuterol/ipratropium for Nebulization. 3 milliLiter(s) Nebulizer once PRN Wheezing      PHYSICAL EXAM:   GEN: Age appropriate, resting comfortably in bed, no acute distress, non toxic appearing, speaking in complete sentences. Jaundice+  HEENT: scleral icterus  PULM: decreased breath sounds right lung fields. Left lung CTA  CV: RRR, S1S2, no MRG  MSK: no stiffness or joint effusions  Abdominal: Soft, nontender to palpation, non-distended, +BS  Extremities: No edema or cyanosis  NEURO: AAOx3  Psych: normal affect, normal behavior  Skin: No rashes, lesions    LABS:  Labs personally reviewed.                        8.4    16.02 )-----------( 86       ( 02 Nov 2020 05:12 )             26.0     Hgb Trend: 8.4<--, 8.5<--, 9.0<--, 9.6<--  11-02    132<L>  |  98  |  21  ----------------------------<  98  5.2   |  21<L>  |  6.05<H>    Ca    8.8      02 Nov 2020 05:12  Phos  5.0     11-02  Mg     1.7     11-02    TPro  5.8<L>  /  Alb  2.2<L>  /  TBili  9.4<H>  /  DBili  x   /  AST  95<H>  /  ALT  35  /  AlkPhos  96  11-02    Creatinine Trend: 6.05<--, 6.18<--, 5.70<--, 5.46<--, 5.17<--  PT/INR - ( 01 Nov 2020 09:03 )   PT: 33.9 sec;   INR: 3.03 ratio         PTT - ( 01 Nov 2020 09:03 )  PTT:59.0 sec        [facility]  Pulmonary and Critical Care Fellow    PGY-4 Pager: [phone]  [phone]  Night Float:

## 2020-11-02 NOTE — DISCHARGE NOTE NURSING/CASE MANAGEMENT/SOCIAL WORK - PATIENT PORTAL LINK FT
You can access the FollowMyHealth Patient Portal offered by [facility] by registering at the following website: [email or web address]. By joining [employer]’s FollowMyHealth portal, you will also be able to view your health information using other applications (apps) compatible with our system.

## 2020-11-02 NOTE — PROGRESS NOTE ADULT - SUBJECTIVE AND OBJECTIVE BOX
Chief Complaint:  Patient is a 40y old  Male who presents with a chief complaint of HRS (02 Nov 2020 07:25)      Interval Events: No new events. Had episode of dark stool, no further melena or hematochezia.    Allergies:  No Known Allergies      Hospital Medications:  albumin human 25% IVPB 100 milliLiter(s) IV Intermittent every 6 hours  albuterol/ipratropium for Nebulization. 3 milliLiter(s) Nebulizer once PRN  cefTRIAXone   IVPB 2000 milliGRAM(s) IV Intermittent every 24 hours  folic acid 1 milliGRAM(s) Oral daily  influenza   Vaccine 0.5 milliLiter(s) IntraMuscular once  lactulose Syrup 20 Gram(s) Oral every 6 hours  midodrine 20 milliGRAM(s) Oral every 8 hours  multivitamin 1 Tablet(s) Oral daily  pantoprazole  Injectable 40 milliGRAM(s) IV Push two times a day  phytonadione  IVPB 10 milliGRAM(s) IV Intermittent once  sevelamer carbonate 800 milliGRAM(s) Oral three times a day      PMHX/PSHX:  End-stage renal disease (ESRD)    Cirrhosis, alcoholic    No pertinent past medical history    S/P exploratory laparotomy    No significant past surgical history        Family history:  FH: alpha 1 antitrypsin deficiency    No pertinent family history in first degree relatives        ROS: As per HPI, 14-point ROS negative otherwise.        PHYSICAL EXAM:     Vital Signs:  Vital Signs Last 24 Hrs  T(C): 36.5 (02 Nov 2020 03:58), Max: 36.6 (01 Nov 2020 11:48)  T(F): 97.7 (02 Nov 2020 03:58), Max: 97.9 (01 Nov 2020 11:48)  HR: 112 (02 Nov 2020 03:58) (112 - 122)  BP: 90/51 (02 Nov 2020 03:58) (84/54 - 100/69)  BP(mean): --  RR: 18 (02 Nov 2020 03:58) (18 - 18)  SpO2: 95% (02 Nov 2020 03:58) (94% - 99%)  Daily     Daily     GENERAL:  appears comfortable, no acute distress  HEENT:  NC/AT,  +scleral icterus  CHEST:  no increased effort  HEART:  Regular rate and rhythm  ABDOMEN:  Soft, non-tender, +distended  EXTREMITIES:  no cyanosis, clubbing or edema  SKIN:  No rash/erythema/ecchymoses/petechiae/wounds  NEURO:  Alert, orientedx3, no asterixis    LABS:                        8.4    16.02 )-----------( 86       ( 02 Nov 2020 05:12 )             26.0     11-02    132<L>  |  98  |  21  ----------------------------<  98  5.2   |  21<L>  |  6.05<H>    Ca    8.8      02 Nov 2020 05:12  Phos  5.0     11-02  Mg     1.7     11-02    TPro  5.8<L>  /  Alb  2.2<L>  /  TBili  9.4<H>  /  DBili  x   /  AST  95<H>  /  ALT  35  /  AlkPhos  96  11-02    LIVER FUNCTIONS - ( 02 Nov 2020 05:12 )  Alb: 2.2 g/dL / Pro: 5.8 g/dL / ALK PHOS: 96 U/L / ALT: 35 U/L / AST: 95 U/L / GGT: x           PT/INR - ( 02 Nov 2020 08:34 )   PT: 38.0 sec;   INR: 3.41 ratio         PTT - ( 02 Nov 2020 08:34 )  PTT:67.9 sec    Amylase Serum--      Lipase serum--       [id number]      Imaging:

## 2020-11-02 NOTE — PROGRESS NOTE ADULT - PROBLEM SELECTOR PLAN 4
Seen on CT from OSH, likely hepatic hydrothorax. CXR pending  - Pulm consult for eval with thoracentesis  - supplemental O2 as needed

## 2020-11-02 NOTE — PROGRESS NOTE ADULT - PROBLEM SELECTOR PLAN 5
On HD MWF  - R IJ tunneled cath  - Nephro recs reviewed: planned for HD today  - c/w Sevelamir 800mg TID  - Low phos diet  -renal diet

## 2020-11-02 NOTE — PROGRESS NOTE ADULT - SUBJECTIVE AND OBJECTIVE BOX
[facility] Division of Hospital Medicine  Rachna Mirza DO  Pager (JASKARAN, [phone]-4K): [phone]  Other Times:  [phone]    Patient is a 40y old  Male who presents with a chief complaint of HRS (02 Nov 2020 13:44)      SUBJECTIVE / OVERNIGHT EVENTS:    patient seen and examined at bedside.  feels ok. having some abdominal pain, no fevers, chills.      MEDICATIONS  (STANDING):  albumin human 25% IVPB 100 milliLiter(s) IV Intermittent every 6 hours  cefTRIAXone   IVPB 2000 milliGRAM(s) IV Intermittent every 24 hours  folic acid 1 milliGRAM(s) Oral daily  influenza   Vaccine 0.5 milliLiter(s) IntraMuscular once  lactulose Syrup 20 Gram(s) Oral every 6 hours  midodrine 20 milliGRAM(s) Oral every 8 hours  multivitamin 1 Tablet(s) Oral daily  octreotide  Infusion 50 MICROgram(s)/Hr (10 mL/Hr) IV Continuous <Continuous>  pantoprazole  Injectable 40 milliGRAM(s) IV Push two times a day  sevelamer carbonate 800 milliGRAM(s) Oral three times a day    MEDICATIONS  (PRN):  albuterol/ipratropium for Nebulization. 3 milliLiter(s) Nebulizer once PRN Wheezing      CAPILLARY BLOOD GLUCOSE      POCT Blood Glucose.: 86 mg/dL (02 Nov 2020 12:46)  POCT Blood Glucose.: 93 mg/dL (02 Nov 2020 08:46)  POCT Blood Glucose.: 102 mg/dL (02 Nov 2020 02:57)  POCT Blood Glucose.: 113 mg/dL (02 Nov 2020 00:47)  POCT Blood Glucose.: 133 mg/dL (01 Nov 2020 23:00)  POCT Blood Glucose.: 78 mg/dL (01 Nov 2020 20:26)  POCT Blood Glucose.: 83 mg/dL (01 Nov 2020 17:20)    I&O's Summary    01 Nov 2020 07:01  -  02 Nov 2020 07:00  --------------------------------------------------------  IN: 410 mL / OUT: 0 mL / NET: 410 mL        PHYSICAL EXAM:  Vital Signs Last 24 Hrs  T(C): 36.4 (02 Nov 2020 14:37), Max: 36.6 (01 Nov 2020 16:35)  T(F): 97.5 (02 Nov 2020 14:37), Max: 97.9 (01 Nov 2020 16:35)  HR: 111 (02 Nov 2020 14:37) (111 - 122)  BP: 85/50 (02 Nov 2020 14:37) (84/54 - 100/69)  BP(mean): --  RR: 17 (02 Nov 2020 14:37) (17 - 18)  SpO2: 95% (02 Nov 2020 14:37) (94% - 95%)    CONSTITUTIONAL: NAD, well appearing man, anasarca   EYES: PERRLA; conjunctiva icteric  ENMT: Moist oral mucosa, no pharyngeal erythema  RESPIRATORY: Decreased BS throughout R lung field  CARDIOVASCULAR: Regular rate and rhythm, normal S1 and S2, no murmur, 2+ lower extremity edema; Peripheral pulses are 2+ bilaterally  ABDOMEN: diffusely tender to palpation, markedly distended, tense ascites, normoactive bowel sounds, no rebound/guarding  MUSCULOSKELETAL:  No joint swelling or tenderness to palpation  PSYCH: A+O to person, place, and time; affect appropriate  NEUROLOGY: CN 2-12 are intact and symmetric; no gross sensory deficits, no asterixes  SKIN: No rashes; no palpable lesions    LABS:                        8.4    16.02 )-----------( 86       ( 02 Nov 2020 05:12 )             26.0     11-02    132<L>  |  98  |  21  ----------------------------<  98  5.2   |  21<L>  |  6.05<H>    Ca    8.8      02 Nov 2020 05:12  Phos  5.0     11-02  Mg     1.7     11-02    TPro  5.8<L>  /  Alb  2.2<L>  /  TBili  9.4<H>  /  DBili  x   /  AST  95<H>  /  ALT  35  /  AlkPhos  96  11-02    PT/INR - ( 02 Nov 2020 08:34 )   PT: 38.0 sec;   INR: 3.41 ratio         PTT - ( 02 Nov 2020 08:34 )  PTT:67.9 sec              RADIOLOGY & ADDITIONAL TESTS:  Results Reviewed:   Imaging Personally Reviewed:  Electrocardiogram Personally Reviewed:    COORDINATION OF CARE:  Care Discussed with Consultants/Other Providers [Y/N]:  Prior or Outpatient Records Reviewed [Y/N]:  yevgeniy Landa

## 2020-11-02 NOTE — PROGRESS NOTE ADULT - PROBLEM SELECTOR PLAN 1
Pt. with ESRD on HD three times a week (MWF). Last HD was on 10/30/20 via RIJ tunneled HD catheter. Pt. clinically stable. Labs reviewed. Will arrange for maintenance HD today. Monitor labs

## 2020-11-02 NOTE — PROGRESS NOTE ADULT - SUBJECTIVE AND OBJECTIVE BOX
CC: F/U for Abd pain    Saw/spoke to patient. No fevers, no chills. No new complaints. Unchanged. Still significant abd pain.    Allergies  No Known Allergies    ANTIMICROBIALS:  cefTRIAXone   IVPB 2000 every 24 hours    PE:    Vital Signs Last 24 Hrs  T(C): 36.5 (02 Nov 2020 03:58), Max: 36.6 (01 Nov 2020 16:35)  T(F): 97.7 (02 Nov 2020 03:58), Max: 97.9 (01 Nov 2020 16:35)  HR: 112 (02 Nov 2020 03:58) (112 - 122)  BP: 90/51 (02 Nov 2020 03:58) (84/54 - 100/69)  RR: 18 (02 Nov 2020 03:58) (18 - 18)  SpO2: 95% (02 Nov 2020 03:58) (94% - 95%)    Gen: AOx3, NAD, calm but uncomfortable  CV: S1+S2 normal, nontachycardic  Resp: Clear bilat, no resp distress, no crackles/wheezes  Abd: Soft, distended, mild pain to palpation  Ext: No LE edema, no wounds    LABS:                        8.4    16.02 )-----------( 86       ( 02 Nov 2020 05:12 )             26.0     11-02    132<L>  |  98  |  21  ----------------------------<  98  5.2   |  21<L>  |  6.05<H>    Ca    8.8      02 Nov 2020 05:12  Phos  5.0     11-02  Mg     1.7     11-02    TPro  5.8<L>  /  Alb  2.2<L>  /  TBili  9.4<H>  /  DBili  x   /  AST  95<H>  /  ALT  35  /  AlkPhos  96  11-02    MICROBIOLOGY:    CMV IgG Antibody: 0.51 U/mL (08-08-20 @ 09:47)  Toxoplasma IgG Screen: <3.0 IU/mL (08-08-20 @ 09:47)    RADIOLOGY:    11/2 USG:    IMPRESSION:    Redemonstration of reversal of flow in the portal veins, consistent with findings of portal hypertension. No evidence of portal vein thrombus.    Moderate to large volume ascites.

## 2020-11-02 NOTE — PROGRESS NOTE ADULT - SUBJECTIVE AND OBJECTIVE BOX
[facility] DIVISION OF KIDNEY DISEASES AND HYPERTENSION -- FOLLOW UP NOTE  --------------------------------------------------------------------------------  HPI: 40-year-old male with  ESRD on HD MWF,  traumatic bladder rupture 2/2 fall s/p ex-lap in 2019, ETOH cirrhosis c/b gastric varices, recent admission (7/2020) for hemorrhagic shock 2/2 variceal bleeding, acute renal failure requiring HD, and ETOH withdrawal seizure, recently admitted in 9/2020 for GIB who presented to [facility] on 11/1/20 for worsening abdominal pain. Nephrology consulted for management of ESRD and HD. Last HD on 10/30/20 via RIJ tunneled HD catheter.     Pt. evaluated at bedside, in no acute distress. Planned for paracentesis today.    PAST HISTORY  --------------------------------------------------------------------------------  No significant changes to PMH, PSH, FHx, SHx, unless otherwise noted    ALLERGIES & MEDICATIONS  --------------------------------------------------------------------------------  Allergies    No Known Allergies    Intolerances      Standing Inpatient Medications  albumin human 25% IVPB 100 milliLiter(s) IV Intermittent every 6 hours  cefTRIAXone   IVPB 2000 milliGRAM(s) IV Intermittent every 24 hours  folic acid 1 milliGRAM(s) Oral daily  influenza   Vaccine 0.5 milliLiter(s) IntraMuscular once  lactulose Syrup 20 Gram(s) Oral every 6 hours  midodrine 20 milliGRAM(s) Oral every 8 hours  multivitamin 1 Tablet(s) Oral daily  octreotide  Infusion 50 MICROgram(s)/Hr IV Continuous <Continuous>  pantoprazole  Injectable 40 milliGRAM(s) IV Push two times a day  sevelamer carbonate 800 milliGRAM(s) Oral three times a day    REVIEW OF SYSTEMS  --------------------------------------------------------------------------------  Gen: + fatigue  Respiratory: No dyspnea  CV: No chest pain  GI: + abdominal pain  MSK: no LE edema  Neuro: No dizziness  Heme: No bleeding    All other systems were reviewed and are negative, except as noted.    VITALS/PHYSICAL EXAM  --------------------------------------------------------------------------------  T(C): 36.5 (11-02-20 @ 03:58), Max: 36.6 (11-01-20 @ 16:35)  HR: 112 (11-02-20 @ 03:58) (112 - 122)  BP: 90/51 (11-02-20 @ 03:58) (84/54 - 100/69)  RR: 18 (11-02-20 @ 03:58) (18 - 18)  SpO2: 95% (11-02-20 @ 03:58) (94% - 95%)  Wt(kg): --  Height (cm): 180.3 (10-31-20 @ 22:13)  Weight (kg): 82 (10-31-20 @ 22:13)  BMI (kg/m2): 25.2 (10-31-20 @ 22:13)  BSA (m2): 2.02 (10-31-20 @ 22:13)    11-01-20 @ 07:01  -  11-02-20 @ 07:00  --------------------------------------------------------  IN: 410 mL / OUT: 0 mL / NET: 410 mL    Physical Exam:  	Gen: NAD  	HEENT: icterus  	Pulm: CTA B/L  	CV: S1S2  	Abd: Soft, tense, ascites+   	Ext: trace LE edema B/L  	Neuro: Awake  	Skin: Warm and dry  	Vascular access: RIJ tunneled HD catheter +. no bleeding    LABS/STUDIES  --------------------------------------------------------------------------------              8.4    16.02 >-----------<  86       [11-02-20 @ 05:12]              26.0     132  |  98  |  21  ----------------------------<  98      [11-02-20 @ 05:12]  5.2   |  21  |  6.05        Ca     8.8     [11-02-20 @ 05:12]      Mg     1.7     [11-02-20 @ 01:16]      Phos  5.0     [11-02-20 @ 01:16]    Creatinine Trend:  SCr 6.05 [11-02 @ 05:12]  SCr 6.18 [11-02 @ 00:33]  SCr 5.70 [11-01 @ 13:17]  SCr 5.46 [11-01 @ 07:02]  SCr 5.17 [10-31 @ 23:40]    Iron 62, TIBC Unable to calculate Test Repeated, %sat Unable to calculate Test Repeated      [09-22-20 @ 09:54]  Ferritin 515      [09-22-20 @ 09:53]  PTH -- (Ca 8.9)      [11-01-20 @ 20:21]   35  PTH -- (Ca 8.0)      [09-22-20 @ 09:53]   54  PTH -- (Ca 6.2)      [07-29-20 @ 09:18]   223  Lipid: chol --, , HDL --, LDL --      [08-03-20 @ 12:04]

## 2020-11-02 NOTE — PROGRESS NOTE ADULT - ASSESSMENT
40 year old man with hx of decompensated EtOH cirrhosis (Dx'd 7/2020) complicated by gastric variceal bleeding (7/2020) s/p 34u pRBC, IR embolization and blakemore balloon, ESRD on HD (M/W/F), bladder rupture 2/2 fall s/p ex-lap in 2019, hemorrhoids, presented to OSH for increased abdominal distention and abdominal pain x3 days    # Abdominal Pain: Likely related to ascites; SBP must be ruled out  # Decompensated EtOH Cirrhosis - MELD-Na: 41  HE: None  Ascites: Large on Exam  HCC: No lesions on MRI 8/2020  Varices: Hx of gastric varices s/p IR embolization, Grade I esophageal varices seen 9/2020  # ESRD on hemodialysis   # Anemia: possible secondary to ESRD +/- slow GI bleed. Has hx of varices    Recommendation:  - continue with Abx  - await paracentesis both diagnostic and therapeutic  - check ascitic cell count, gram stain, culture, protein and albumin  - give IV Vitamin K x3 days (Today D1/3)  - start octreotide gtt and PPI 40mg IV BID  - keep NPO at midnight for EGD tmro  - Check PETH level  - Trend Coags, CMP, CBC  - Supportive care per primary team

## 2020-11-03 NOTE — PROGRESS NOTE ADULT - PROBLEM SELECTOR PLAN 4
Seen on CT from OSH, likely hepatic hydrothorax. CXR pending  - Pulm consult for eval with thoracentesis cannot safely do, will consult IR for thoracentesis, however given low BP and fluid shifts cannot be done on same day as paracentesis   - supplemental O2 as needed large R pleural effusion-likely hepatic hydrothorax.   - Pulm consult for eval with thoracentesis cannot safely do, will consult IR for thoracentesis, however given low BP and fluid shifts cannot be done on same day as paracentesis   - supplemental O2 as needed

## 2020-11-03 NOTE — PROGRESS NOTE ADULT - PROBLEM SELECTOR PLAN 3
Pt meets criteria based on tachycardia, hypotension, leukocytosis, marked lactic acidosis, elevated анна concerning for end organ damage  - MICU consulted; deemed not a candidate  - Lactic acidosis likely due to poor hepatic clearance, type B  - Rule out SBP as above  - cont CTX 1g QD for now  - Obtain ID consult  - F/u blood cultures  - trend lactate improving Pt meets criteria based on tachycardia, hypotension, leukocytosis, marked lactic acidosis, elevated анна concerning for end organ damage  - Lactic acidosis likely due to poor hepatic clearance, type B, improving  - Rule out SBP as above  - cont CTX 1g QD for now  - appreciate ID recs  - BCX NGTD  - trend lactate improving

## 2020-11-03 NOTE — CONSULT NOTE ADULT - SUBJECTIVE AND OBJECTIVE BOX
Vascular & Interventional Radiology Consult Note    Evaluate for Procedure: Paracentesis/Thoracentesis.     HPI: 40 year old decompensated cirrhosis, shortness of breath. Patient found to have large right pleural effusion and large ascites.     Allergies:   Medications (Abx/Cardiac/Anticoagulation/Blood Products)    cefTRIAXone   IVPB: 100 mL/Hr IV Intermittent (11-02 @ 21:54)  midodrine: 20 milliGRAM(s) Oral (11-03 @ 08:27)    Data:    T(C): 36.4  HR: 105  BP: 83/40  RR: 15  SpO2: 91%    -WBC 12.37 / HgB 8.1 / Hct 24.5 / Plt 71  -Na 131 / Cl 94 / BUN 22 / Glucose 84  -K 4.6 / CO2 23 / Cr 5.44  -ALT 22 / Alk Phos 74 / T.Bili 10.3  -INR3.41    Imaging:  CT Chest Abd Pelvis - Large right pleural effusion with atelectasis of the right upper, middle, and lower lobes. Large volume ascits

## 2020-11-03 NOTE — PROGRESS NOTE ADULT - SUBJECTIVE AND OBJECTIVE BOX
Patient is a 40y old  Male who presents with a chief complaint of HRS (02 Nov 2020 14:39)      SUBJECTIVE / OVERNIGHT EVENTS:    Patient seen and examined at bedside. No acute events overnight.    T(F): 97.5 (11-03 @ 04:53), Max: 98.5 (11-02 @ 17:45)  HR: 105 (11-03 @ 04:53) (70 - 111)  BP: 83/40 (11-03 @ 04:53) (79/45 - 88/50)  RR: 15 (11-03 @ 04:53) (15 - 20)  SpO2: 91% (11-03 @ 04:53) (91% - 99%)    I&O's Summary    02 Nov 2020 07:01  -  03 Nov 2020 07:00  --------------------------------------------------------  IN: 560 mL / OUT: 0 mL / NET: 560 mL        MEDICATIONS  (STANDING):  albumin human 25% IVPB 100 milliLiter(s) IV Intermittent every 6 hours  cefTRIAXone   IVPB 2000 milliGRAM(s) IV Intermittent every 24 hours  folic acid 1 milliGRAM(s) Oral daily  influenza   Vaccine 0.5 milliLiter(s) IntraMuscular once  lactulose Syrup 20 Gram(s) Oral every 6 hours  midodrine 20 milliGRAM(s) Oral every 8 hours  multivitamin 1 Tablet(s) Oral daily  octreotide  Infusion 50 MICROgram(s)/Hr (10 mL/Hr) IV Continuous <Continuous>  pantoprazole  Injectable 40 milliGRAM(s) IV Push two times a day  sevelamer carbonate 800 milliGRAM(s) Oral three times a day    MEDICATIONS  (PRN):  albuterol/ipratropium for Nebulization. 3 milliLiter(s) Nebulizer once PRN Wheezing      PHYSICAL EXAM:   GEN: Age appropriate, resting comfortably in bed, no acute distress, non toxic appearing, speaking in complete sentences. Jaundiced  HEENT: scleral icterus  PULM: Lungs decreased breath sounds on the right.   CV: RRR, S1S2, no MRG  MSK: no stiffness or joint effusions  Abdominal: distended  Extremities: No edema or cyanosis  NEURO: AAOx3  Psych: normal affect, normal behavior  Skin: No rashes, lesions    LABS:  Labs personally reviewed.                        9.2    14.03 )-----------( 99       ( 02 Nov 2020 15:51 )             28.7     Hgb Trend: 9.2<--, 8.4<--, 8.5<--, 9.0<--, 9.6<--  11-02    132<L>  |  98  |  21  ----------------------------<  98  5.2   |  21<L>  |  6.05<H>    Ca    8.8      02 Nov 2020 05:12  Phos  5.0     11-02  Mg     1.7     11-02    TPro  5.8<L>  /  Alb  2.2<L>  /  TBili  9.4<H>  /  DBili  x   /  AST  95<H>  /  ALT  35  /  AlkPhos  96  11-02    Creatinine Trend: 6.05<--, 6.18<--, 5.70<--, 5.46<--, 5.17<--  PT/INR - ( 02 Nov 2020 08:34 )   PT: 38.0 sec;   INR: 3.41 ratio         PTT - ( 02 Nov 2020 08:34 )  PTT:67.9 sec        [facility]akesh [facility]  Pulmonary and Critical Care Fellow    PGY-4 Pager: [phone]  [phone]  Night Float:

## 2020-11-03 NOTE — PROGRESS NOTE ADULT - SUBJECTIVE AND OBJECTIVE BOX
[facility] Division of Hospital Medicine  Rachna Mirza DO  Pager (JASKARAN, 7V-5B): [phone]  Other Times:  [phone]    Patient is a 40y old  Male who presents with a chief complaint of HRS (03 Nov 2020 12:12)      SUBJECTIVE / OVERNIGHT EVENTS:    patient seen and examined at bedside.  states that he is tired. offering no complaints.    MEDICATIONS  (STANDING):  albumin human 25% IVPB 100 milliLiter(s) IV Intermittent every 6 hours  cefTRIAXone   IVPB 2000 milliGRAM(s) IV Intermittent every 24 hours  folic acid 1 milliGRAM(s) Oral daily  influenza   Vaccine 0.5 milliLiter(s) IntraMuscular once  lactulose Syrup 20 Gram(s) Oral every 6 hours  midodrine 20 milliGRAM(s) Oral every 8 hours  multivitamin 1 Tablet(s) Oral daily  octreotide  Infusion 50 MICROgram(s)/Hr (10 mL/Hr) IV Continuous <Continuous>  pantoprazole  Injectable 40 milliGRAM(s) IV Push two times a day  sevelamer carbonate 800 milliGRAM(s) Oral three times a day    MEDICATIONS  (PRN):  albuterol/ipratropium for Nebulization. 3 milliLiter(s) Nebulizer once PRN Wheezing      CAPILLARY BLOOD GLUCOSE      POCT Blood Glucose.: 102 mg/dL (03 Nov 2020 13:15)  POCT Blood Glucose.: 112 mg/dL (03 Nov 2020 08:58)  POCT Blood Glucose.: 123 mg/dL (03 Nov 2020 08:36)  POCT Blood Glucose.: 85 mg/dL (03 Nov 2020 07:52)  POCT Blood Glucose.: 99 mg/dL (03 Nov 2020 07:32)  POCT Blood Glucose.: 81 mg/dL (03 Nov 2020 07:12)  POCT Blood Glucose.: 88 mg/dL (03 Nov 2020 06:47)  POCT Blood Glucose.: 59 mg/dL (03 Nov 2020 06:22)  POCT Blood Glucose.: 59 mg/dL (03 Nov 2020 06:20)    I&O's Summary    02 Nov 2020 07:01  -  03 Nov 2020 07:00  --------------------------------------------------------  IN: 560 mL / OUT: 0 mL / NET: 560 mL    03 Nov 2020 07:01  -  03 Nov 2020 14:32  --------------------------------------------------------  IN: 0 mL / OUT: 0 mL / NET: 0 mL        PHYSICAL EXAM:  Vital Signs Last 24 Hrs  T(C): 36.4 (03 Nov 2020 13:53), Max: 36.9 (02 Nov 2020 17:45)  T(F): 97.5 (03 Nov 2020 13:53), Max: 98.5 (02 Nov 2020 17:45)  HR: 108 (03 Nov 2020 13:53) (70 - 111)  BP: 94/57 (03 Nov 2020 13:53) (79/45 - 96/60)  BP(mean): --  RR: 16 (03 Nov 2020 13:53) (15 - 20)  SpO2: 92% (03 Nov 2020 13:53) (91% - 99%)    CONSTITUTIONAL: NAD, well appearing man, anasarca   EYES: PERRLA; conjunctiva icteric  RESPIRATORY: Decreased BS throughout R lung field  CARDIOVASCULAR: Regular rate and rhythm, normal S1 and S2, no murmur, 2+ lower extremity edema; Peripheral pulses are 2+ bilaterally  ABDOMEN: diffusely tender to palpation, markedly distended, tense ascites, normoactive bowel sounds, no rebound/guarding  MUSCULOSKELETAL:  No joint swelling or tenderness to palpation  PSYCH: A+O to person, place, and time; affect appropriate  NEUROLOGY: CN 2-12 are intact and symmetric; no gross sensory deficits, no asterixes  SKIN: No rashes; no palpable lesions    LABS:                        8.1    12.37 )-----------( 71       ( 03 Nov 2020 08:41 )             24.5     11-03    131<L>  |  94<L>  |  22  ----------------------------<  84  4.6   |  23  |  5.44<H>    Ca    8.5      03 Nov 2020 08:41  Phos  5.0     11-02  Mg     1.7     11-02    TPro  5.5<L>  /  Alb  2.7<L>  /  TBili  10.3<H>  /  DBili  x   /  AST  60<H>  /  ALT  22  /  AlkPhos  74  11-03    PT/INR - ( 03 Nov 2020 12:57 )   PT: 33.2 sec;   INR: 2.96 ratio         PTT - ( 02 Nov 2020 08:34 )  PTT:67.9 sec          Culture - Blood (collected 02 Nov 2020 09:27)  Source: .Blood Blood  Preliminary Report (03 Nov 2020 10:01):    No growth to date.    Culture - Blood (collected 02 Nov 2020 00:29)  Source: .Blood Blood  Preliminary Report (03 Nov 2020 01:02):    No growth to date.        RADIOLOGY & ADDITIONAL TESTS:  Results Reviewed:   Imaging Personally Reviewed:  Electrocardiogram Personally Reviewed:    COORDINATION OF CARE:  Care Discussed with Consultants/Other Providers [Y/N]:  Prior or Outpatient Records Reviewed [Y/N]:  med NP [place]

## 2020-11-03 NOTE — CHART NOTE - NSCHARTNOTEFT_GEN_A_CORE
Notified by day team that urgent CT A/P w/ IV ordered and to f/u study. Call placed to radiology for protocol, patient is ESRD on dialysis MWF(11/4 being next dialysis day). Radiology tech called to expedite.     Krishna Parmar PA-C  Department of Medicine Notified by day team that urgent CT A/P w/ IV ordered and to f/u study. Call placed to radiology for protocol, patient is ESRD on dialysis MWF(11/4 being next dialysis day). Radiology tech called to expedite.     addendum: radiology called #[phone] for preliminary read, awaiting callback. Most recent CBC stable.     Krishna Parmar PA-C  Department of Medicine Notified by day team that urgent CT A/P w/ IV ordered and to f/u study. Call placed to radiology for protocol, patient is ESRD on dialysis MWF(11/4 being next dialysis day). Radiology tech called to expedite.     addendum: radiology called #[phone] for preliminary read, awaiting callback. Most recent CBC stable.                         8.1    14.04 )-----------( 82       ( 04 Nov 2020 01:39 )             24.5       Krishna Parmar PA-C  Department of Medicine Notified by day team that urgent CT A/P w/ IV ordered and to f/u study. Call placed to radiology for protocol, patient is ESRD on dialysis MWF(11/4 being next dialysis day). Radiology tech called to expedite.     addendum: radiology called #[phone] for preliminary read, awaiting callback. Most recent CBC stable.     addendum #2: CT prelim no perforation or emergent findings. Known right side effusion/ascites present                         8.1    14.04 )-----------( 82       ( 04 Nov 2020 01:39 )             24.5       Krishna Parmar PA-C  Department of Medicine

## 2020-11-03 NOTE — PROGRESS NOTE ADULT - PROBLEM SELECTOR PLAN 1
Plan: Decompensated w/ EV and gastric varices and ascites; rule out SBP  - MELD score 41  - abd US doppler 11/2 no PVT, mod to large ascites  - IR consult for paracentesis, diagnostic and therapeutic, concern for SBP- will also check abd xray to eval for free air if negative can order CT abd pelvis   - Hepatology recs reviewed  - Trend LFTs, анна, INR daily- INR improved today s/p vitamin K 10mg IV, will dose again  -HE: None  Ascites: Large on Exam  HCC: No lesions on MRI 8/2020  Varices: Hx of gastric varices s/p IR embolization, Grade I esophageal varices seen 9/2020  # ESRD on hemodialysis   # Anemia: possible secondary to ESRD +/- slow GI bleed. Has hx of varices

## 2020-11-03 NOTE — PROGRESS NOTE ADULT - SUBJECTIVE AND OBJECTIVE BOX
CC: F/U for Leukocytosis    Saw/spoke to patient. No new complaints, unchanged. Doing well.    Allergies  No Known Allergies    ANTIMICROBIALS:  cefTRIAXone   IVPB 2000 every 24 hours    PE:    Vital Signs Last 24 Hrs  T(C): 36.4 (03 Nov 2020 04:53), Max: 36.9 (02 Nov 2020 17:45)  T(F): 97.5 (03 Nov 2020 04:53), Max: 98.5 (02 Nov 2020 17:45)  HR: 105 (03 Nov 2020 04:53) (70 - 111)  BP: 83/40 (03 Nov 2020 04:53) (79/45 - 88/50)  RR: 15 (03 Nov 2020 04:53) (15 - 20)  SpO2: 91% (03 Nov 2020 04:53) (91% - 99%)    Gen: AOx3, NAD, non-toxic  CV: S1+S2 normal, tachycardic  Resp: Clear bilat, no resp distress, no crackles/wheezes  Abd: Soft, nontender, +BS, distended  Ext: No LE edema, no wounds    LABS:                        8.1    12.37 )-----------( 71       ( 03 Nov 2020 08:41 )             24.5     11-03    131<L>  |  94<L>  |  22  ----------------------------<  84  4.6   |  23  |  5.44<H>    Ca    8.5      03 Nov 2020 08:41  Phos  5.0     11-02  Mg     1.7     11-02    TPro  5.5<L>  /  Alb  2.7<L>  /  TBili  10.3<H>  /  DBili  x   /  AST  60<H>  /  ALT  22  /  AlkPhos  74  11-03    MICROBIOLOGY:    .Blood Blood  11-02-20   No growth to date.     .Blood Blood  11-02-20   No growth to date.    CMV IgG Antibody: 0.51 U/mL (08-08-20 @ 09:47)  Toxoplasma IgG Screen: <3.0 IU/mL (08-08-20 @ 09:47)    (otherwise reviewed)    RADIOLOGY:    11/2 CXR:    Impression:    The heart is normal in size. Big right pleural effusion which remain unchanged when compared to previous study done October 31, 2020. A double-lumen catheter is seen on the right and the tip is in superior vena cava. The left lung appears to be clear.

## 2020-11-03 NOTE — PROGRESS NOTE ADULT - ASSESSMENT
40 year old man with hx of decompensated EtOH cirrhosis (Dx'd 7/2020) complicated by gastric variceal bleeding (7/2020) s/p 34u pRBC, IR embolization and blakemore balloon, ESRD on HD (M/W/F), bladder rupture 2/2 fall s/p ex-lap in 2019, hemorrhoids, presented to OSH for increased abdominal distention and abdominal pain x3 days    # Abdominal Pain: Likely related to ascites vs possible SBP. Less likely perforation although in DDx given severity of symptoms.  # Decompensated EtOH Cirrhosis - MELD-Na: 41  HE: Grade 2 currently  Ascites: Large on Exam  HCC: No lesions on MRI 8/2020  Varices: Hx of gastric varices s/p IR embolization, Grade I esophageal varices seen 9/2020  # ESRD on hemodialysis   # Anemia: possible secondary to ESRD +/- slow GI bleed. Has hx of varices    Recommendation:  - continue with Abx  - await paracentesis both diagnostic and therapeutic  - check ascitic cell count, gram stain, culture, protein and albumin  - check CT A/P to rule out anyt GI pathology given pain and tender on exam  - will hold EGD given confusions and encepholpathy  - please titrate lactulose to ensure 3-4 BMs daily  - give IV Vitamin K x3 days (Today D2/3)  - continue with octreotide gtt and PPI 40mg IV BID for 1 more days while monitoring blood counts and BMs  - place on CLD  - Check PETH level  - Trend Coags, CMP, CBC  - Supportive care per primary team     40 year old man with hx of decompensated EtOH cirrhosis (Dx'd 7/2020) complicated by gastric variceal bleeding (7/2020) s/p 34u pRBC, IR embolization and blakemore balloon, ESRD on HD (M/W/F), bladder rupture 2/2 fall s/p ex-lap in 2019, hemorrhoids, presented to OSH for increased abdominal distention and abdominal pain x3 days    # Abdominal Pain: Likely related to ascites vs possible SBP given elevated lactate and HE. Less likely perforation although in DDx given severity of symptoms.  # Decompensated EtOH Cirrhosis - MELD-Na: 41  HE: Grade 2 currently  Ascites: Large on Exam  HCC: No lesions on MRI 8/2020  Varices: Hx of gastric varices s/p IR embolization, Grade I esophageal varices seen 9/2020  # ESRD on hemodialysis   # Anemia: possible secondary to ESRD +/- slow GI bleed. Has hx of varices    Recommendation:  - continue with Abx  - await paracentesis both diagnostic and therapeutic  - check ascitic cell count, gram stain, culture, protein and albumin  - check CT A/P to rule out anyt GI pathology given pain and tender on exam  - will hold EGD given confusions and encepholpathy  - please titrate lactulose to ensure 3-4 BMs daily  - give IV Vitamin K x3 days (Today D2/3)  - continue with octreotide gtt and PPI 40mg IV BID for 1 more days while monitoring blood counts and BMs  - place on CLD  - Check PETH level  - Trend Coags, CMP, CBC  - Supportive care per primary team

## 2020-11-03 NOTE — PROGRESS NOTE ADULT - SUBJECTIVE AND OBJECTIVE BOX
Chief Complaint:  Patient is a 40y old  Male who presents with a chief complaint of HRS (2020 11:37)      Interval Events: Patient more confused, having considerable abdominal pain.    Allergies:  No Known Allergies      Hospital Medications:  albumin human 25% IVPB 100 milliLiter(s) IV Intermittent every 6 hours  albuterol/ipratropium for Nebulization. 3 milliLiter(s) Nebulizer once PRN  cefTRIAXone   IVPB 2000 milliGRAM(s) IV Intermittent every 24 hours  folic acid 1 milliGRAM(s) Oral daily  influenza   Vaccine 0.5 milliLiter(s) IntraMuscular once  lactulose Syrup 20 Gram(s) Oral every 6 hours  midodrine 20 milliGRAM(s) Oral every 8 hours  multivitamin 1 Tablet(s) Oral daily  octreotide  Infusion 50 MICROgram(s)/Hr IV Continuous <Continuous>  pantoprazole  Injectable 40 milliGRAM(s) IV Push two times a day  sevelamer carbonate 800 milliGRAM(s) Oral three times a day      PMHX/PSHX:  End-stage renal disease (ESRD)    Cirrhosis, alcoholic    No pertinent past medical history    S/P exploratory laparotomy    No significant past surgical history        Family history:  FH: alpha 1 antitrypsin deficiency    No pertinent family history in first degree relatives        ROS: As per HPI, 14-point ROS negative otherwise.        PHYSICAL EXAM:     Vital Signs:  Vital Signs Last 24 Hrs  T(C): 36.4 (2020 04:53), Max: 36.9 (2020 17:45)  T(F): 97.5 (2020 04:53), Max: 98.5 (2020 17:45)  HR: 105 (2020 04:53) (70 - 111)  BP: 83/40 (2020 04:53) (79/45 - 88/50)  BP(mean): --  RR: 15 (2020 04:53) (15 - 20)  SpO2: 91% (2020 04:53) (91% - 99%)  Daily     Daily Weight in k.8 (2020 07:22)    GENERAL:  appears comfortable, no acute distress  HEENT:  NC/AT,  +scleral icterus  CHEST:  no increased effort  HEART:  Regular rate and rhythm  ABDOMEN:  Soft, non-tender, +distended  EXTREMITIES:  no cyanosis, clubbing or edema  SKIN:  No rash/erythema/ecchymoses/petechiae/wounds  NEURO:  Alert, orientedx3, no asterixis    LABS:                        8.1    12.37 )-----------( 71       ( 2020 08:41 )             24.5     11-03    131<L>  |  94<L>  |  22  ----------------------------<  84  4.6   |  23  |  5.44<H>    Ca    8.5      2020 08:41  Phos  5.0     11-02  Mg     1.7     11-    TPro  5.5<L>  /  Alb  2.7<L>  /  TBili  10.3<H>  /  DBili  x   /  AST  60<H>  /  ALT  22  /  AlkPhos  74  11-03    LIVER FUNCTIONS - ( 2020 08:41 )  Alb: 2.7 g/dL / Pro: 5.5 g/dL / ALK PHOS: 74 U/L / ALT: 22 U/L / AST: 60 U/L / GGT: x           PT/INR - ( 2020 08:34 )   PT: 38.0 sec;   INR: 3.41 ratio         PTT - ( 2020 08:34 )  PTT:67.9 sec        Imaging:             Chief Complaint:  Patient is a 40y old  Male who presents with a chief complaint of HRS (2020 11:37)      Interval Events: Patient more confused, having considerable abdominal pain.    Allergies:  No Known Allergies      Hospital Medications:  albumin human 25% IVPB 100 milliLiter(s) IV Intermittent every 6 hours  albuterol/ipratropium for Nebulization. 3 milliLiter(s) Nebulizer once PRN  cefTRIAXone   IVPB 2000 milliGRAM(s) IV Intermittent every 24 hours  folic acid 1 milliGRAM(s) Oral daily  influenza   Vaccine 0.5 milliLiter(s) IntraMuscular once  lactulose Syrup 20 Gram(s) Oral every 6 hours  midodrine 20 milliGRAM(s) Oral every 8 hours  multivitamin 1 Tablet(s) Oral daily  octreotide  Infusion 50 MICROgram(s)/Hr IV Continuous <Continuous>  pantoprazole  Injectable 40 milliGRAM(s) IV Push two times a day  sevelamer carbonate 800 milliGRAM(s) Oral three times a day      PMHX/PSHX:  End-stage renal disease (ESRD)    Cirrhosis, alcoholic    No pertinent past medical history    S/P exploratory laparotomy    No significant past surgical history        Family history:  FH: alpha 1 antitrypsin deficiency    No pertinent family history in first degree relatives        ROS: As per HPI, 14-point ROS negative otherwise.        PHYSICAL EXAM:     Vital Signs:  Vital Signs Last 24 Hrs  T(C): 36.4 (2020 04:53), Max: 36.9 (2020 17:45)  T(F): 97.5 (2020 04:53), Max: 98.5 (2020 17:45)  HR: 105 (2020 04:53) (70 - 111)  BP: 83/40 (2020 04:53) (79/45 - 88/50)  BP(mean): --  RR: 15 (2020 04:53) (15 - 20)  SpO2: 91% (2020 04:53) (91% - 99%)  Daily     Daily Weight in k.8 (2020 07:22)    GENERAL: does not appear comfortable  HEENT:  NC/AT,  +scleral icterus  CHEST:  no increased effort  HEART:  Regular rate and rhythm  ABDOMEN:  Soft, distended. + rebound and guarding.  EXTREMITIES:  no cyanosis, clubbing or edema  SKIN:  No rash/erythema/ecchymoses/petechiae/wounds  NEURO:  confused. oriented x 0-1    LABS:                        8.1    12.37 )-----------( 71       ( 2020 08:41 )             24.5     11-03    131<L>  |  94<L>  |  22  ----------------------------<  84  4.6   |  23  |  5.44<H>    Ca    8.5      2020 08:41  Phos  5.0     11-02  Mg     1.7     11-    TPro  5.5<L>  /  Alb  2.7<L>  /  TBili  10.3<H>  /  DBili  x   /  AST  60<H>  /  ALT  22  /  AlkPhos  74  11-03    LIVER FUNCTIONS - ( 2020 08:41 )  Alb: 2.7 g/dL / Pro: 5.5 g/dL / ALK PHOS: 74 U/L / ALT: 22 U/L / AST: 60 U/L / GGT: x           PT/INR - ( 2020 08:34 )   PT: 38.0 sec;   INR: 3.41 ratio         PTT - ( 2020 08:34 )  PTT:67.9 sec        Imaging:

## 2020-11-03 NOTE — PROGRESS NOTE ADULT - PROBLEM SELECTOR PLAN 5
On HD MWF  - R IJ tunneled cath  - Nephro recs reviewed  - c/w Sevelamir 800mg TID  - Low phos diet  -renal diet

## 2020-11-03 NOTE — PROGRESS NOTE ADULT - ASSESSMENT
40 year old man with hx of decompensated EtOH cirrhosis (Dx'd 7/2020) complicated by gastric variceal bleeding (7/2020) s/p 34u pRBC, IR embolization and blakemore balloon, ESRD on HD (M/W/F), bladder rupture 2/2 fall s/p ex-lap in 2019, hemorrhoids, presented to OSH for increased abdominal distention and abdominal pain x3 days, SOB, found to have large R pleural effusion.     Problem: SOB/DU  Assessment: Likely 2/2 to large R Pleff. Patient resting comfortably in bed no respiratory distress. Differential for effusion includes hydrothorax vs infection. Unable to have thoracentesis over the weekend 2/2 to lab derangements. Today his INR is _____ which is up from 2.78 upon admission. He received vitamin K on 11/1. Bedside US today shows large R pleff however diaphragm not flattened. The patient appears uncomfortable but does not report SOB. His discomfort appears to be related to his distended abdomen. Patient is ordered for IV Vit K x 3 days. He will likely undergo IR guided paracentesis on 11/4. He may benefit from having thoracentesis performed at the same time with IR.  Plan: Will not perform thoracentesis today given elevated INR and clinical picture. Please contact IR team to see if it is possible to arrange a combined thoracentesis and paracentesis on 11/4. 40 year old man with hx of decompensated EtOH cirrhosis (Dx'd 7/2020) complicated by gastric variceal bleeding (7/2020) s/p 34u pRBC, IR embolization and blakemore balloon, ESRD on HD (M/W/F), bladder rupture 2/2 fall s/p ex-lap in 2019, hemorrhoids, presented to OSH for increased abdominal distention and abdominal pain x3 days, SOB, found to have large R pleural effusion.     Problem: SOB/DU  Assessment: Likely 2/2 to large R Pleff. Patient resting comfortably in bed no respiratory distress. Differential for effusion includes hydrothorax vs infection. Unable to have thoracentesis over the weekend 2/2 to lab derangements. Today his INR is 2.96 which is up from 2.78 upon admission. He is receiving IV vitamin K daily. Bedside US 11/2 showed large R pleff. The patient appears uncomfortable however is not dyspneic. His discomfort appears to be related to his distended abdomen. Patient is ordered for IV Vit K x 3 days. He will likely undergo IR guided paracentesis once INR corrected.   Plan: Will not perform thoracentesis today given elevated INR and clinical picture. Will reassess on 11/4. Goal INR is below 2 prior to thora but will have to proceed earlier if patient decompensates or develops significant discomfort.

## 2020-11-03 NOTE — PROGRESS NOTE ADULT - PROBLEM SELECTOR PLAN 2
downtrending HB and BRBPR  PPI IV bid, SBP ppx with Ceftriaxone IV, octeotride gtt 11/2  colonoscopy on hold for now, Hb stable   CLD

## 2020-11-03 NOTE — CONSULT NOTE ADULT - ASSESSMENT
Assessment/Plan:     40y Male with decompensated EtOH cirrhosis and shortness of breath. Patient's INR is elevated yesterday to 3.4, needs to be managed prior to paracentesis. Agree with giving Vit K. Patient with large right effusion, currently being evaluated by pulmonary service. Would not recommend both thoracentesis and paracentesis on the same day due to excessive fluid shifts with drainage from both site.   - will continue to follow for timing of paracentesis.  - would defer to pulmonology for thoracentesis.   - case reviewed with Dr. Syed  - patient would not need to be NPO for our procedure.   - please continue to manage and follow INR, Assessment/Plan:     40y Male with decompensated EtOH cirrhosis and shortness of breath. Patient's INR is elevated yesterday to 3.4, needs to be managed prior to paracentesis. Agree with giving Vit K. Patient with large right effusion, currently being evaluated by pulmonary service. Would not recommend both thoracentesis and paracentesis on the same day due to potential excessive fluid shifts with drainage from both sites.   - will continue to follow for timing of paracentesis.  - would defer to pulmonology for thoracentesis.   - case reviewed with Dr. Syed  - patient would not need to be NPO for our procedure.   - please continue to manage and follow INR,

## 2020-11-04 NOTE — PROGRESS NOTE ADULT - SUBJECTIVE AND OBJECTIVE BOX
Chief Complaint:  Patient is a 40y old  Male who presents with a chief complaint of HRS (2020 07:54)      Interval Events: No new events. Continues to have abdominal pain.    Allergies:  No Known Allergies      Hospital Medications:  albumin human 25% IVPB 100 milliLiter(s) IV Intermittent every 6 hours  albuterol/ipratropium for Nebulization. 3 milliLiter(s) Nebulizer once PRN  cefTRIAXone   IVPB 2000 milliGRAM(s) IV Intermittent every 24 hours  folic acid 1 milliGRAM(s) Oral daily  influenza   Vaccine 0.5 milliLiter(s) IntraMuscular once  lactulose Syrup 20 Gram(s) Oral every 6 hours  midodrine 20 milliGRAM(s) Oral every 8 hours  multivitamin 1 Tablet(s) Oral daily  octreotide  Infusion 50 MICROgram(s)/Hr IV Continuous <Continuous>  pantoprazole  Injectable 40 milliGRAM(s) IV Push two times a day  sevelamer carbonate 800 milliGRAM(s) Oral three times a day      PMHX/PSHX:  End-stage renal disease (ESRD)    Cirrhosis, alcoholic    No pertinent past medical history    S/P exploratory laparotomy    No significant past surgical history        Family history:  FH: alpha 1 antitrypsin deficiency    No pertinent family history in first degree relatives        ROS: As per HPI, 14-point ROS negative otherwise.    PHYSICAL EXAM:     Vital Signs:  Vital Signs Last 24 Hrs  T(C): 36.4 (2020 08:47), Max: 37 (2020 19:10)  T(F): 97.5 (2020 08:47), Max: 98.6 (2020 19:10)  HR: 104 (2020 08:47) (104 - 111)  BP: 80/38 (2020 08:47) (80/38 - 96/60)  BP(mean): --  RR: 18 (2020 08:47) (16 - 18)  SpO2: 94% (2020 08:47) (92% - 98%)  Daily     Daily Weight in k (2020 07:08)    GENERAL: does not appear comfortable  HEENT:  NC/AT,  +scleral icterus  CHEST:  no increased effort  HEART:  Regular rate and rhythm  ABDOMEN:  Soft, distended, TTP with guarding  EXTREMITIES:  no cyanosis, clubbing or edema  SKIN:  No rash/erythema/ecchymoses/petechiae/wounds  NEURO: nonfocal but confused, AAOx1    LABS:                        7.3    11.68 )-----------( 72       ( 2020 07:00 )             22.6     11-04    133<L>  |  96  |  30<H>  ----------------------------<  80  4.7   |  24  |  6.31<H>    Ca    8.8      2020 06:57    TPro  5.3<L>  /  Alb  2.9<L>  /  TBili  10.4<H>  /  DBili  x   /  AST  48<H>  /  ALT  16  /  AlkPhos  59  11-04    LIVER FUNCTIONS - ( 2020 06:57 )  Alb: 2.9 g/dL / Pro: 5.3 g/dL / ALK PHOS: 59 U/L / ALT: 16 U/L / AST: 48 U/L / GGT: x           PT/INR - ( 2020 07:56 )   PT: 35.6 sec;   INR: 3.19 ratio                 Imaging:    `< from: CT Abdomen and Pelvis w/ IV Cont (20 @ 02:54) >    IMPRESSION:  Cirrhosis with evidence of portal hypertension. Large volume ascites.    Patchy opacities in the left lung may be due to infection.    Redemonstration of a large right pleural effusion with complete atelectasis of the right lower lobe and partial atelectasis of the right upper and middle lobes.    < end of copied text >

## 2020-11-04 NOTE — PROCEDURE NOTE - ADDITIONAL PROCEDURE DETAILS
s/p diagnostic paracentesis via RLQ abdomen.  Aspirated approximately 70 ml of cloudy isaac fluid.  Since the patient's BP was low and systolic started at 80 it was decided to only do a diagnostic paracentesis.  The pt tolerated the procedure well.  Hemostasis secure and VSS.   BP at the end of the procedure was with the systolic at 80.  Case d/w primary team SULAIMAN Carroll.    Melecio Beasley, RYDER-C  [facility] [id number]  Ext [phone]

## 2020-11-04 NOTE — DIETITIAN INITIAL EVALUATION ADULT. - PROBLEM SELECTOR PLAN 2
- Follows w/ Dr. Womack outpt    - PermCath in R chest wall  - Pending repeat labs upon transfer will assess need/urgency of dialysis  - c/w Sevelamir 800mg TID  - Low phos diet  - Consult Nephrology in AM

## 2020-11-04 NOTE — PROGRESS NOTE ADULT - PROBLEM SELECTOR PLAN 1
Pt. with ESRD on HD three times a week (MWF). Last HD was on 11/2/20 via RIJ tunneled HD catheter. Pt. clinically stable. Labs reviewed. Will arrange for maintenance HD today. Monitor labs Pt. with ESRD on HD three times a week (MWF). Last HD was on 11/2/20 via RIJ tunneled HD catheter. Pt. clinically stable. Labs reviewed.   Will arrange for maintenance HD today. Monitor labs

## 2020-11-04 NOTE — DIETITIAN INITIAL EVALUATION ADULT. - OTHER INFO
Pt NPO/clears x 4 days due to inability to tolerate PO with ascites (confirmed with NP) - previously on renal diet. Noted consumed 50% on (11/01) as per flow sheets. RN denies pt with nausea or vomiting. Last BM unknown - will monitor; pt on Miralax.

## 2020-11-04 NOTE — DIETITIAN INITIAL EVALUATION ADULT. - PROBLEM SELECTOR PLAN 1
Decompensated w/ EV and gastric varices and ascites  - p/w abd pain and increased abd girth likely in the setting of ascites accumulation  - Previous paracentesis last 1 month ago (total 3) w/ no signs of SBP at that time  - s/p CTX 1g x1, 1L IVF, and Zofran at OSH  - No diuretics in setting of ESRD w/ anuria  - Will c/w CTX 1g QD, next dose 11/1 at 18:00 as pt w/ abd pain and already had 1x dose of CTX at OSH  - c/w Midodrine 10mg TID  - Upload CT Chest/Abd/Pelvis from OSH via radiology in AM to assess ascites and pleural effusions if present  - Maintain active T+S  - Monitor daily CMP, INR  - Transfuse w/ goal Hgb >7   - Consult hepatology in AM   - Consult IR in AM for paracentesis pending CT informal read in AM by radiology

## 2020-11-04 NOTE — DIETITIAN INITIAL EVALUATION ADULT. - SIGNS/SYMPTOMS
PO intake<50% x5 days, 4.8% wt loss x3 months, fluid accumulation/ascites, visual sign ofmuscle loss

## 2020-11-04 NOTE — PROGRESS NOTE ADULT - PROBLEM SELECTOR PLAN 3
Pt. with hyperphosphatemia in setting of advanced CKD/ESRD. Serum phosphorus in target range (5.0 on 11/2/20). Continue Renvela 800 mg TID with meals. Low phosphorus diet advised. Monitor serum phosphorus    If any questions, please feel free to contact me  Kehinde Coles   Nephrology Fellow  [phone]  (After 5 pm or on weekends please page the on-call fellow)

## 2020-11-04 NOTE — DIETITIAN INITIAL EVALUATION ADULT. - PHYSCIAL ASSESSMENT
Skin: pressure ulcer in sacrum stage 1 as per documentation.   Unable to obtain pt's consent to perform nutrition focused physical exam at this time.  Noted visual sign of moderate-severe muscle loss in temporales; unable to visually assess other areas as pt covered in blankets.

## 2020-11-04 NOTE — PROGRESS NOTE ADULT - SUBJECTIVE AND OBJECTIVE BOX
[facility] DIVISION OF KIDNEY DISEASES AND HYPERTENSION -- FOLLOW UP NOTE  --------------------------------------------------------------------------------  HPI: 40-year-old male with  ESRD on HD MWF,  traumatic bladder rupture 2/2 fall s/p ex-lap in 2019, ETOH cirrhosis c/b gastric varices, recent admission (7/2020) for hemorrhagic shock 2/2 variceal bleeding, acute renal failure requiring HD, and ETOH withdrawal seizure, recently admitted in 9/2020 for GIB who presented to [facility] on 11/1/20 for worsening abdominal pain. Nephrology consulted for management of ESRD and HD. Last HD on 11/2/20 via RIJ tunneled HD catheter.     Pt. evaluated at bedside, in no acute distress. Planned for paracentesis today.    PAST HISTORY  --------------------------------------------------------------------------------  No significant changes to PMH, PSH, FHx, SHx, unless otherwise noted    ALLERGIES & MEDICATIONS  --------------------------------------------------------------------------------  Allergies    No Known Allergies    Intolerances    Standing Inpatient Medications  albumin human 25% IVPB 100 milliLiter(s) IV Intermittent every 6 hours  cefTRIAXone   IVPB 2000 milliGRAM(s) IV Intermittent every 24 hours  folic acid 1 milliGRAM(s) Oral daily  influenza   Vaccine 0.5 milliLiter(s) IntraMuscular once  midodrine 20 milliGRAM(s) Oral every 8 hours  multivitamin 1 Tablet(s) Oral daily  pantoprazole  Injectable 40 milliGRAM(s) IV Push two times a day  polyethylene glycol 3350 17 Gram(s) Oral two times a day  sevelamer carbonate 800 milliGRAM(s) Oral three times a day    REVIEW OF SYSTEMS  --------------------------------------------------------------------------------  Gen: + fatigue  Respiratory: No dyspnea  CV: No chest pain  GI: + abdominal pain  MSK: no LE edema  Neuro: No dizziness  Heme: No bleeding    All other systems were reviewed and are negative, except as noted.    VITALS/PHYSICAL EXAM  --------------------------------------------------------------------------------  T(C): 36.4 (11-04-20 @ 11:25), Max: 37 (11-03-20 @ 19:10)  HR: 104 (11-04-20 @ 11:25) (104 - 111)  BP: 91/45 (11-04-20 @ 11:25) (80/38 - 96/60)  RR: 17 (11-04-20 @ 11:25) (16 - 18)  SpO2: 98% (11-04-20 @ 11:25) (92% - 98%)  Wt(kg): --    11-03-20 @ 07:01  -  11-04-20 @ 07:00  --------------------------------------------------------  IN: 740 mL / OUT: 0 mL / NET: 740 mL    11-04-20 @ 07:01  -  11-04-20 @ 12:06  --------------------------------------------------------  IN: 50 mL / OUT: 0 mL / NET: 50 mL    Physical Exam:  	Gen: NAD  	HEENT: icterus  	Pulm: CTA B/L  	CV: S1S2  	Abd: Soft, tense, ascites+   	Ext: trace LE edema B/L  	Neuro: Awake  	Skin: Warm and dry  	Vascular access: RIJ tunneled HD catheter +. no bleeding    LABS/STUDIES  --------------------------------------------------------------------------------              7.3    11.68 >-----------<  72       [11-04-20 @ 07:00]              22.6     133  |  96  |  30  ----------------------------<  80      [11-04-20 @ 06:57]  4.7   |  24  |  6.31        Ca     8.8     [11-04-20 @ 06:57]    Creatinine Trend:  SCr 6.31 [11-04 @ 06:57]  SCr 5.44 [11-03 @ 08:41]  SCr 6.05 [11-02 @ 05:12]  SCr 6.18 [11-02 @ 00:33]  SCr 5.70 [11-01 @ 13:17]

## 2020-11-04 NOTE — PROGRESS NOTE ADULT - SUBJECTIVE AND OBJECTIVE BOX
[facility] Division of Hospital Medicine  Rachna Mirza DO  Pager (JASKARAN, 0J-0Z): [phone]  Other Times:  [phone]    Patient is a 40y old  Male who presents with a chief complaint of HRS (04 Nov 2020 12:02)      SUBJECTIVE / OVERNIGHT EVENTS:    patient seen and examined. states belly hurts, feels tired and "lousy" + BM    MEDICATIONS  (STANDING):  albumin human 25% IVPB 100 milliLiter(s) IV Intermittent every 6 hours  cefTRIAXone   IVPB 2000 milliGRAM(s) IV Intermittent every 24 hours  folic acid 1 milliGRAM(s) Oral daily  influenza   Vaccine 0.5 milliLiter(s) IntraMuscular once  midodrine 20 milliGRAM(s) Oral every 8 hours  multivitamin 1 Tablet(s) Oral daily  pantoprazole  Injectable 40 milliGRAM(s) IV Push two times a day  polyethylene glycol 3350 17 Gram(s) Oral two times a day  polyethylene glycol 3350 17 Gram(s) Oral once  sevelamer carbonate 800 milliGRAM(s) Oral three times a day    MEDICATIONS  (PRN):  albuterol/ipratropium for Nebulization. 3 milliLiter(s) Nebulizer once PRN Wheezing      CAPILLARY BLOOD GLUCOSE      POCT Blood Glucose.: 100 mg/dL (04 Nov 2020 09:49)  POCT Blood Glucose.: 92 mg/dL (04 Nov 2020 05:26)  POCT Blood Glucose.: 92 mg/dL (04 Nov 2020 01:05)  POCT Blood Glucose.: 100 mg/dL (03 Nov 2020 21:29)  POCT Blood Glucose.: 93 mg/dL (03 Nov 2020 17:43)  POCT Blood Glucose.: 102 mg/dL (03 Nov 2020 13:15)    I&O's Summary    03 Nov 2020 07:01  -  04 Nov 2020 07:00  --------------------------------------------------------  IN: 740 mL / OUT: 0 mL / NET: 740 mL    04 Nov 2020 07:01  -  04 Nov 2020 12:27  --------------------------------------------------------  IN: 50 mL / OUT: 0 mL / NET: 50 mL        PHYSICAL EXAM:  Vital Signs Last 24 Hrs  T(C): 36.4 (04 Nov 2020 11:25), Max: 37 (03 Nov 2020 19:10)  T(F): 97.5 (04 Nov 2020 11:25), Max: 98.6 (03 Nov 2020 19:10)  HR: 104 (04 Nov 2020 11:25) (104 - 111)  BP: 91/45 (04 Nov 2020 11:25) (80/38 - 95/58)  BP(mean): --  RR: 17 (04 Nov 2020 11:25) (16 - 18)  SpO2: 98% (04 Nov 2020 11:25) (92% - 98%)    CONSTITUTIONAL: NAD, well appearing man, anasarca   EYES: PERRL; conjunctiva icteric  RESPIRATORY: Decreased BS throughout R lung field  CARDIOVASCULAR: Regular rate and rhythm, normal S1 and S2, no murmur, 2+ lower extremity edema; Peripheral pulses are 2+ bilaterally  ABDOMEN: diffusely tender to palpation, markedly distended, tense ascites, normoactive bowel sounds, no rebound/guarding  MUSCULOSKELETAL:  No joint swelling or tenderness to palpation  PSYCH: A+O to person, place, and time; affect appropriate  NEUROLOGY: CN 2-12 are intact and symmetric; no gross sensory deficits, no asterixes  SKIN: No rashes; no palpable lesions  LABS:                        7.3    11.68 )-----------( 72       ( 04 Nov 2020 07:00 )             22.6     11-04    133<L>  |  96  |  30<H>  ----------------------------<  80  4.7   |  24  |  6.31<H>    Ca    8.8      04 Nov 2020 06:57    TPro  5.3<L>  /  Alb  2.9<L>  /  TBili  10.4<H>  /  DBili  x   /  AST  48<H>  /  ALT  16  /  AlkPhos  59  11-04    PT/INR - ( 04 Nov 2020 07:56 )   PT: 35.6 sec;   INR: 3.19 ratio                   Culture - Blood (collected 02 Nov 2020 09:27)  Source: .Blood Blood  Preliminary Report (03 Nov 2020 10:01):    No growth to date.    Culture - Blood (collected 02 Nov 2020 00:29)  Source: .Blood Blood  Preliminary Report (03 Nov 2020 01:02):    No growth to date.        RADIOLOGY & ADDITIONAL TESTS:  Results Reviewed:   Imaging Personally Reviewed:  Electrocardiogram Personally Reviewed:    COORDINATION OF CARE:  Care Discussed with Consultants/Other Providers [Y/N]:  Prior or Outpatient Records Reviewed [Y/N]:  med NP [place]

## 2020-11-04 NOTE — CONSULT NOTE ADULT - ASSESSMENT
41 y/o M with EtOH cirrhosis c/b bleeding gastric varices in July 2020 s/p 34u pRBC, IR embolization and blakemore placement, ESRD on HD (MWF), bladder rupture 2/2 fall s/p ex-lap in 2019, hemorrhoids admitted for decompensated liver cirrhosis c/b SBP.     # Decompensated liver failure  - recommend correction of coagulopathy overnight. please give vitamin k 10 mg po x 1 and 1u FFP  - recommend continuing midodrine 20 mg q8 hrs and albumin 25% 100cc q6hrs  - recommend additional albumin 5% 250cc x 1 now    # R/o SBP  - f/u diagnostic paracentesis results  - c/w CTX 1g / daily     Patient does not meet MICU criteria presently. Please reconsult after coagulopathy is corrected. 39 y/o M with EtOH cirrhosis c/b bleeding gastric varices in July 2020 s/p 3u pRBC, IR embolization and blakemore placement, ESRD on HD (MWF), bladder rupture 2/2 fall s/p ex-lap in 2019, hemorrhoids admitted for decompensated liver cirrhosis c/b SBP.     # Decompensated liver failure  - recommend correction of coagulopathy overnight. please give vitamin k 10 mg po x 1 and 1u FFP  - recommend continuing midodrine 20 mg q8 hrs and albumin 25% 100cc q6hrs  - recommend additional albumin 5% 250cc x 1 now    # R/o SBP  - f/u diagnostic paracentesis results  - c/w CTX 1g / daily     Patient does not meet MICU criteria presently. Please reconsult after coagulopathy is corrected.

## 2020-11-04 NOTE — PROGRESS NOTE ADULT - ASSESSMENT
40 year old man with hx of decompensated EtOH cirrhosis (Dx'd 7/2020) complicated by gastric variceal bleeding (7/2020) s/p 34u pRBC, IR embolization and blakemore balloon, ESRD on HD (M/W/F), bladder rupture 2/2 fall s/p ex-lap in 2019, hemorrhoids, presented to OSH for increased abdominal distention and abdominal pain x3 days    # Abdominal Pain: Likely related to ascites and possibly some level of significant bloating. CT negative without perforation. Patient with possible SBP given elevated lactate and HE.   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 Has hx of varices    Recommendation:  - continue with Abx  - await paracentesis both diagnostic and therapeutic  - repleace ascites in a 1:1 ratio  - check ascitic cell count, gram stain, culture, protein and albumin  - will hold EGD given confusion and encepholpathy and respiratory compromise with effusion  - switch lactulose to miralax BID  - discontinue octreotide  - continue with ppi daily  - if unable to take po, would place NGT for med administration   - diet as tolerated  - await PET level  - Trend Coags, CMP, CBC  - Supportive care per primary team

## 2020-11-04 NOTE — PROGRESS NOTE ADULT - PROBLEM SELECTOR PLAN 4
large R pleural effusion-likely hepatic hydrothorax.   - Pulm consult for eval with thoracentesis cannot safely do, will consult IR for thoracentesis, however given low BP and fluid shifts cannot be done on same day as paracentesis   - supplemental O2 as needed

## 2020-11-04 NOTE — CONSULT NOTE ADULT - CONSULT REQUESTED DATE/TIME
01-Nov-2020 10:34
01-Nov-2020 12:01
01-Nov-2020 15:57
03-Nov-2020 11:37
04-Nov-2020 11:59
04-Nov-2020 18:48
01-Nov-2020 09:35

## 2020-11-04 NOTE — DIETITIAN INITIAL EVALUATION ADULT. - REASON INDICATOR FOR ASSESSMENT
Pt seen for NPO/clears x 4 days.  Information obtained from: medical record/rounds, previous RD notes, RN, and NP.   Pt confused (confirmed with RN), responding "oh yeah" to everything. Unable to reach reference contact - limited information obtained.

## 2020-11-04 NOTE — DIETITIAN INITIAL EVALUATION ADULT. - ADD RECOMMEND
1. Will continue to monitor weight, labs, skin, GI status, feeding plans. 2. Change Multivitamin to Nephro-Beto - optimize nutrient intake and help with pressure ulcer healing. 3. If/when applicable, encourage PO intake and obtain food preferences. 4. Malnutrition notification placed in chart - spoke to NP.

## 2020-11-04 NOTE — PROGRESS NOTE ADULT - ASSESSMENT
40 year old man with hx of decompensated EtOH cirrhosis (Dx'd 7/2020) complicated by gastric variceal bleeding (7/2020) s/p 34u pRBC, IR embolization and blakemore balloon, ESRD on HD (M/W/F), bladder rupture 2/2 fall s/p ex-lap in 2019, hemorrhoids, presented to OSH for increased abdominal distention and abdominal pain x3 days, SOB, found to have large R pleural effusion.     Problem: SOB/DU  Assessment: Likely 2/2 to large R Pleff. Patient resting comfortably in bed no respiratory distress. Differential for effusion includes hydrothorax vs infection. Unable to have thoracentesis over the weekend 2/2 to lab derangements. Today his INR is 2.96 which is up from 2.78 upon admission. He is receiving IV vitamin K daily. Bedside US 11/2 showed large R pleff. The patient appears uncomfortable however is not dyspneic. His discomfort appears to be related to his distended abdomen. Patient is ordered for IV Vit K x 3 days. He will likely undergo IR guided paracentesis once INR corrected.   Plan: Will not perform thoracentesis today given elevated INR and clinical picture. Will reassess on 11/4. Goal INR is below 2 prior to thora but will have to proceed earlier if patient decompensates or develops significant discomfort.

## 2020-11-04 NOTE — DIETITIAN INITIAL EVALUATION ADULT. - ORAL INTAKE PTA/DIET HISTORY
As per documentation: pt taking Multivitamin and Folic Acid PTA; with NKFA. Unable to obtain PO intake or diet history at home.

## 2020-11-04 NOTE — CONSULT NOTE ADULT - SUBJECTIVE AND OBJECTIVE BOX
Vascular & Interventional Radiology Pre-Procedure Note    Procedure Name: large volume paracentesis    HPI: 40 year old man with hx of decompensated EtOH cirrhosis (Dx'd 7/2020) complicated by gastric variceal bleeding (7/2020) s/p 34u pRBC, IR embolization and blakemore balloon, ESRD on HD (M/W/F), bladder rupture 2/2 fall s/p ex-lap in 2019, hemorrhoids, presented to OSH for increased abdominal distention and abdominal pain x3 days.     Allergies:     Medications:    cefTRIAXone   IVPB: 100 mL/Hr IV Intermittent (11-03 @ 17:38)  midodrine: 20 milliGRAM(s) Oral (11-04 @ 06:41)      Data:  Vital Signs Last 24 Hrs  T(C): 36.4 (04 Nov 2020 11:25), Max: 37 (03 Nov 2020 19:10)  T(F): 97.5 (04 Nov 2020 11:25), Max: 98.6 (03 Nov 2020 19:10)  HR: 104 (04 Nov 2020 11:25) (104 - 111)  BP: 91/45 (04 Nov 2020 11:25) (80/38 - 96/60)  BP(mean): --  RR: 17 (04 Nov 2020 11:25) (16 - 18)  SpO2: 98% (04 Nov 2020 11:25) (92% - 98%)    LABS:                        7.3    11.68 )-----------( 72       ( 04 Nov 2020 07:00 )             22.6     11-04    133<L>  |  96  |  30<H>  ----------------------------<  80  4.7   |  24  |  6.31<H>    Ca    8.8      04 Nov 2020 06:57    TPro  5.3<L>  /  Alb  2.9<L>  /  TBili  10.4<H>  /  DBili  x   /  AST  48<H>  /  ALT  16  /  AlkPhos  59  11-04    PT/INR - ( 04 Nov 2020 07:56 )   PT: 35.6 sec;   INR: 3.19 ratio         < from: CT Abdomen and Pelvis w/ IV Cont (11.04.20 @ 02:54) >  IMPRESSION:  Cirrhosis with evidence of portal hypertension. Large volume ascites.    Patchy opacities in the left lung may be due to infection.    Redemonstration of a large right pleural effusion with complete atelectasis of the right lower lobe and partial atelectasis of the right upper and middle lobes.    < end of copied text >

## 2020-11-04 NOTE — DIETITIAN INITIAL EVALUATION ADULT. - PROBLEM SELECTOR PLAN 4
DVT ppx: SCDs in setting of recent GIB  Diet: Regular diet. NPO @ MN for ?para  Dispo: Pending clinical improvement

## 2020-11-04 NOTE — PROCEDURE NOTE - NSPROCDETAILS_GEN_ALL_CORE
Seldinger technique/sterile dressing applied/location identified, sterile technique used, catheter introduced, fluid drained/ultrasound utilization

## 2020-11-04 NOTE — CHART NOTE - NSCHARTNOTEFT_GEN_A_CORE
Patient ordered for PRBC, consent obtained over the phone from patients emergency contact, Fanniecarley Adames, Patient has had blood transfusions in the past, and has had no prior reactions. Fannie consents to blood transfusion at this time, and understands risks involved. D/W hospitalist overnight, patient with hgb 6.8, trend hgb stable, will give 1/2 unit PRBC at this time, monitor hgb closely.    Krishna Parmar PA-C  Department of Medicine Patient ordered for PRBC, consent obtained over the phone from patients emergency contact, Fannie Zacarias, Patient has had blood transfusions in the past, and has had no prior reactions. Fannie consents to blood transfusion at this time, and understands risks involved. D/W hospitalist overnight, patient with hgb 6.8, trend hgb stable, will give 1/2 unit PRBC at this time, monitor hgb closely. MICU note reviewed, VIT K/ FFP ordered, RN aware.     Krishna Parmar PA-C  Department of Medicine

## 2020-11-04 NOTE — DIETITIAN INITIAL EVALUATION ADULT. - PROBLEM SELECTOR PLAN 3
- Tachycardia to 130s noted upon admission  - ECG notable for sinus tachycardia likely in the setting of pain  - Will administer Dilaudid x1 and reassess w/ vitals q4hrs

## 2020-11-04 NOTE — CONSULT NOTE ADULT - PROVIDER SPECIALTY LIST ADULT
MICU
Infectious Disease
Intervent Radiology
Intervent Radiology
MICU
Transplant Hepatology
Nephrology

## 2020-11-04 NOTE — CONSULT NOTE ADULT - CONSULT REASON
Decompensated Cirrhosis
ESRD on HD
Paracentesis.
Rule out SBP, leukocytosis
large volume paracentesis - diagnostic and therapeutic
paracentesis
hypotension

## 2020-11-04 NOTE — CONSULT NOTE ADULT - SUBJECTIVE AND OBJECTIVE BOX
CHIEF COMPLAINT:    HPI:           Upon exam of patient, he appears encephalopathic, continually groaning. He is unable to respond meaningfully to questions.     PAST MEDICAL & SURGICAL HISTORY:  End-stage renal disease (ESRD)  On HD MWF    Cirrhosis, alcoholic    S/P exploratory laparotomy  for bladder rupture s/p fall        FAMILY HISTORY:  FH: alpha 1 antitrypsin deficiency    SOCIAL HISTORY:  Smoking: __ packs x ___ years  EtOH Use:  Marital Status:  Occupation:  Recent Travel:  Country of Birth:  Advance Directives:    Allergies    No Known Allergies    Intolerances        HOME MEDICATIONS:    REVIEW OF SYSTEMS:  [X] Unable to assess ROS because encephalopathy    OBJECTIVE:  ICU Vital Signs Last 24 Hrs  T(C): 36.5 (04 Nov 2020 16:33), Max: 37 (03 Nov 2020 19:10)  T(F): 97.7 (04 Nov 2020 16:33), Max: 98.6 (03 Nov 2020 19:10)  HR: 108 (04 Nov 2020 16:33) (102 - 111)  BP: 87/47 (04 Nov 2020 16:33) (80/38 - 95/58)  BP(mean): --  ABP: --  ABP(mean): --  RR: 18 (04 Nov 2020 16:33) (16 - 18)  SpO2: 97% (04 Nov 2020 16:33) (92% - 98%)        11-03 @ 07:01 - 11-04 @ 07:00  --------------------------------------------------------  IN: 740 mL / OUT: 0 mL / NET: 740 mL    11-04 @ 07:01 - 11-04 @ 18:52  --------------------------------------------------------  IN: 200 mL / OUT: 0 mL / NET: 200 mL      CAPILLARY BLOOD GLUCOSE      POCT Blood Glucose.: 93 mg/dL (04 Nov 2020 17:20)      PHYSICAL EXAM:  General:   HEENT:   Lymph Nodes:  Neck:   Respiratory:   Cardiovascular:   Abdomen:   Extremities:   Skin:   Neurological:  Psychiatry:    HOSPITAL MEDICATIONS:  MEDICATIONS  (STANDING):  albumin human 25% IVPB 100 milliLiter(s) IV Intermittent every 6 hours  cefTRIAXone   IVPB 2000 milliGRAM(s) IV Intermittent every 24 hours  folic acid 1 milliGRAM(s) Oral daily  influenza   Vaccine 0.5 milliLiter(s) IntraMuscular once  midodrine 20 milliGRAM(s) Oral every 8 hours  multivitamin 1 Tablet(s) Oral daily  pantoprazole  Injectable 40 milliGRAM(s) IV Push two times a day  polyethylene glycol 3350 17 Gram(s) Oral two times a day  sevelamer carbonate 800 milliGRAM(s) Oral three times a day    MEDICATIONS  (PRN):  albuterol/ipratropium for Nebulization. 3 milliLiter(s) Nebulizer once PRN Wheezing      LABS:                        6.8    9.59  )-----------( 53       ( 04 Nov 2020 14:51 )             20.6     11-04    133<L>  |  96  |  30<H>  ----------------------------<  80  4.7   |  24  |  6.31<H>    Ca    8.8      04 Nov 2020 06:57    TPro  5.3<L>  /  Alb  2.9<L>  /  TBili  10.4<H>  /  DBili  x   /  AST  48<H>  /  ALT  16  /  AlkPhos  59  11-04    PT/INR - ( 04 Nov 2020 07:56 )   PT: 35.6 sec;   INR: 3.19 ratio         MICROBIOLOGY:     RADIOLOGY:  [ ] Reviewed and interpreted by me    EKG: CHIEF COMPLAINT: abdominal pain    HPI: 41 yo M w/ PMH of EtOH cirrhosis c/b gastric variceal bleeding in July 2020 s/p 34u pRBC, IR embolization and blakemore ballon, ESRD on HD (M/W/F), bladder rupture 2/2 fall s/p ex-lap in 2019, hemorrhoids, presented to OSH for increased abdominal distention and abdominal pain x3 days, admitted for acute decompensated liver cirrhosis with concern for SBP.      At OSH pt was noted to be sinus tachycardic to 130s. /80, RR 24 and satting 99% on RA. Labs were notable for H/H 11.2/32.9, WBC 10.8, PLTs 116. Chemistry Na 130, K 5.5, BUN/Cr 14/4.87. AST//42, Alk phos 151 and Tbilli 10.2.    He was administered Ceftriaxone 1g x1, Zofran, 1L IVF, and Dilaudid 1mg IVP which alleviated his pain. Upon encounter he now c/o the pain being positional, and intermittent rating it a 7/10    Of note, pt last underwent paracentesis 1 month ago and dialysis yesterday 10/30/20.    Interval events: Pt was empirically treated for SBP and started on CTX. Pt was also started on midodrine 10 mg tid. He has a large R pleural effusion w/ near complete collapse of R lung c/f hepatic hydrothorax.     IR was consulted for paracentesis but because of his low blood pressure, IR deferred large volume paracentesis and only performed a diagnostic. MICU was consulted for pressure supported paracentesis.      PAST MEDICAL & SURGICAL HISTORY:  End-stage renal disease (ESRD)  On HD MWF    Cirrhosis, alcoholic    S/P exploratory laparotomy  for bladder rupture s/p fall    FAMILY HISTORY:  FH: alpha 1 antitrypsin deficiency    SOCIAL HISTORY:  Smoking: __ packs x ___ years  EtOH Use:  Marital Status:  Occupation:  Recent Travel:  Country of Birth:  Advance Directives:    Allergies    No Known Allergies    Intolerances        HOME MEDICATIONS:    REVIEW OF SYSTEMS:  [X] Unable to assess ROS because encephalopathy    OBJECTIVE:  ICU Vital Signs Last 24 Hrs  T(C): 36.5 (04 Nov 2020 16:33), Max: 37 (03 Nov 2020 19:10)  T(F): 97.7 (04 Nov 2020 16:33), Max: 98.6 (03 Nov 2020 19:10)  HR: 108 (04 Nov 2020 16:33) (102 - 111)  BP: 87/47 (04 Nov 2020 16:33) (80/38 - 95/58)  RR: 18 (04 Nov 2020 16:33) (16 - 18)  SpO2: 97% (04 Nov 2020 16:33) (92% - 98%)        11-03 @ 07:01 - 11-04 @ 07:00  --------------------------------------------------------  IN: 740 mL / OUT: 0 mL / NET: 740 mL    11-04 @ 07:01 - 11-04 @ 18:52  --------------------------------------------------------  IN: 200 mL / OUT: 0 mL / NET: 200 mL      CAPILLARY BLOOD GLUCOSE      POCT Blood Glucose.: 93 mg/dL (04 Nov 2020 17:20)      PHYSICAL EXAM:  General: arousable, groaning in pain  HEENT: NCAT, EOMI  Respiratory: 2L NC satting well, breath sounds diminished in R lung  Cardiovascular: tachycardic regular, s1, s2, no MRG  Abdomen: large volume ascites  Extremities: no lower extremity edema  Skin: wwp  Neurological: encephalopathic, not following commands, intermittently answering questions    HOSPITAL MEDICATIONS:  MEDICATIONS  (STANDING):  albumin human 25% IVPB 100 milliLiter(s) IV Intermittent every 6 hours  cefTRIAXone   IVPB 2000 milliGRAM(s) IV Intermittent every 24 hours  folic acid 1 milliGRAM(s) Oral daily  influenza   Vaccine 0.5 milliLiter(s) IntraMuscular once  midodrine 20 milliGRAM(s) Oral every 8 hours  multivitamin 1 Tablet(s) Oral daily  pantoprazole  Injectable 40 milliGRAM(s) IV Push two times a day  polyethylene glycol 3350 17 Gram(s) Oral two times a day  sevelamer carbonate 800 milliGRAM(s) Oral three times a day    MEDICATIONS  (PRN):  albuterol/ipratropium for Nebulization. 3 milliLiter(s) Nebulizer once PRN Wheezing      LABS:                        6.8    9.59  )-----------( 53       ( 04 Nov 2020 14:51 )             20.6     11-04    133<L>  |  96  |  30<H>  ----------------------------<  80  4.7   |  24  |  6.31<H>    Ca    8.8      04 Nov 2020 06:57    TPro  5.3<L>  /  Alb  2.9<L>  /  TBili  10.4<H>  /  DBili  x   /  AST  48<H>  /  ALT  16  /  AlkPhos  59  11-04    PT/INR - ( 04 Nov 2020 07:56 )   PT: 35.6 sec;   INR: 3.19 ratio         MICROBIOLOGY:     RADIOLOGY:  [ ] Reviewed and interpreted by me    EKG:

## 2020-11-04 NOTE — PROGRESS NOTE ADULT - PROBLEM SELECTOR PLAN 3
Pt meets criteria based on tachycardia, hypotension, leukocytosis, marked lactic acidosis, elevated анна concerning for end organ damage  - Lactic acidosis likely due to poor hepatic clearance, type B, improving, would not further trend  - Rule out SBP as above  - cont CTX 1g QD for now  - appreciate ID recs  - BCX NGTD  - trend lactate improving

## 2020-11-04 NOTE — PROGRESS NOTE ADULT - ASSESSMENT
38 yo M w/ PMH of EtOH cirrhosis c/b gastric variceal bleeding in July 2020 s/p 34 pRBC, IR embolization, ESRD on HD (M/W/F), bladder rupture 2/2 fall s/p ex-lap in 2019 was transferred from OSH for worsening abdominal distention and pain  No fever, has leukocytosis  Distended abd from ascites  Covid Ab neg  Does not appear toxic, does have discomfort when palpating abd, but difficult to tell if is inflammatory vs due to distension  Low general suspicion for SBP, but cover for now while awaiting para  CXR R pleural effusion    CT A/P (11/4) with large volume ascites and large right pleural effusion. Noted to have some patchy infiltrates in the left lung. Unclear if this represents fluid or infection.     Leukocytosis appears to be normalizing. Wound watch clinical status / respiratory status. If deterioration can broaden Ceftriaxone to Zosyn but can continue it for now.     Overall,  1) Leukocytosis  - Seems to have longstanding WBC of unclear etiology  - Would check paracentesis (both for diagnosis and therapeutic) - planned for this today  - Ceftriaxone 2g q 24 (if clinical deterioration would broaden to Zosyn)  - F/U BCXs  - If does not improve would warrant workup for explanation of longstanding leukocytosis  2) Ascites  - Would pursue para as above (send out diagnostic markers--culture, cells...)  - F/U GI/hepatology  3) Elevated Lactate  - Trend to normal, also unclear significance considering lack of signs sepsis (aside from WBC)  - Further workup per primary team  4) R pleural effusion  - Monitor resp status  - F/U Pulm if any indication for drainage    I will continue to follow. Please feel free to contact me with any further questions.    Dick Cardenas M.D.  [facility] Division of Infectious Disease  8AM-5PM: Pager Number [phone]  After Hours (or if no response): Please contact the Infectious Diseases Office at [phone] 40 yo M w/ PMH of EtOH cirrhosis c/b gastric variceal bleeding in July 2020 s/p 34 pRBC, IR embolization, ESRD on HD (M/W/F), bladder rupture 2/2 fall s/p ex-lap in 2019 was transferred from OSH for worsening abdominal distention and pain  No fever, has leukocytosis  Distended abd from ascites  Covid Ab neg  Does not appear toxic, does have discomfort when palpating abd, but difficult to tell if is inflammatory vs due to distension  Low general suspicion for SBP, but cover for now while awaiting para  CXR R pleural effusion    CT A/P (11/4) with large volume ascites and large right pleural effusion. Noted to have some patchy infiltrates in the left lung. Unclear if this represents fluid or infection.     Leukocytosis appears to be normalizing. WouLd watch clinical status / respiratory status. If deterioration can broaden Ceftriaxone to Zosyn but can continue CTX for now.     Overall,  1) Leukocytosis  - Seems to have longstanding WBC of unclear etiology  - Would check paracentesis (both for diagnosis and therapeutic) - planned for this today  - Ceftriaxone 2g q 24 (if clinical deterioration would broaden to Zosyn)  - F/U BCXs  - If does not improve would warrant workup for explanation of longstanding leukocytosis  2) Ascites  - Would pursue para as above (send out diagnostic markers--culture, cells...)  - F/U GI/hepatology  3) Elevated Lactate  - Trend to normal, also unclear significance considering lack of signs sepsis (aside from WBC)  - Further workup per primary team  4) R pleural effusion  - Monitor resp status  - F/U Pulm if any indication for drainage    I will continue to follow. Please feel free to contact me with any further questions.    Dick Cardenas M.D.  [facility] Division of Infectious Disease  8AM-5PM: Pager Number [phone]  After Hours (or if no response): Please contact the Infectious Diseases Office at [phone]

## 2020-11-04 NOTE — PROGRESS NOTE ADULT - SUBJECTIVE AND OBJECTIVE BOX
Follow Up:  Leukocytosis     Interval History:    REVIEW OF SYSTEMS  [  ] ROS unobtainable because:    [  ] All other systems negative except as noted below    Constitutional:  [ ] fever [ ] chills  [ ] weight loss  [ ] weakness  Skin:  [ ] rash [ ] phlebitis	  Eyes: [ ] icterus [ ] pain  [ ] discharge	  ENMT: [ ] sore throat  [ ] thrush [ ] ulcers [ ] exudates  Respiratory: [ ] dyspnea [ ] hemoptysis [ ] cough [ ] sputum	  Cardiovascular:  [ ] chest pain [ ] palpitations [ ] edema	  Gastrointestinal:  [ ] nausea [ ] vomiting [ ] diarrhea [ ] constipation [ ] pain	  Genitourinary:  [ ] dysuria [ ] frequency [ ] hematuria [ ] discharge [ ] flank pain  [ ] incontinence  Musculoskeletal:  [ ] myalgias [ ] arthralgias [ ] arthritis  [ ] back pain  Neurological:  [ ] headache [ ] seizures  [ ] confusion/altered mental status    Allergies  No Known Allergies        ANTIMICROBIALS:  cefTRIAXone   IVPB 2000 every 24 hours      OTHER MEDS:  MEDICATIONS  (STANDING):  albuterol/ipratropium for Nebulization. 3 once PRN  influenza   Vaccine 0.5 once  midodrine 20 every 8 hours  pantoprazole  Injectable 40 two times a day  polyethylene glycol 3350 17 two times a day  polyethylene glycol 3350 17 once      Vital Signs Last 24 Hrs  T(C): 36.8 (04 Nov 2020 14:30), Max: 37 (03 Nov 2020 19:10)  T(F): 98.2 (04 Nov 2020 14:30), Max: 98.6 (03 Nov 2020 19:10)  HR: 109 (04 Nov 2020 15:47) (102 - 111)  BP: 87/44 (04 Nov 2020 15:47) (80/38 - 95/58)  BP(mean): --  RR: 18 (04 Nov 2020 15:47) (16 - 18)  SpO2: 98% (04 Nov 2020 15:47) (92% - 98%)    PHYSICAL EXAMINATION:  General: Alert and Awake, NAD  HEENT: PERRL, EOMI  Neck: Supple  Cardiac: RRR, No M/R/G  Resp: CTAB, No Wh/Rh/Ra  Abdomen: NBS, NT/ND, No HSM, No rigidity or guarding  MSK: No LE edema. No Calf tenderness  : No ashton  Skin: No rashes or lesions. Skin is warm and dry to the touch.   Neuro: Alert and Awake. CN 2-12 Grossly intact. Moves all four extremities spontaneously.  Psych: Calm, Pleasant, Cooperative                          6.8    9.59  )-----------( 53       ( 04 Nov 2020 14:51 )             20.6       11-04    133<L>  |  96  |  30<H>  ----------------------------<  80  4.7   |  24  |  6.31<H>    Ca    8.8      04 Nov 2020 06:57    TPro  5.3<L>  /  Alb  2.9<L>  /  TBili  10.4<H>  /  DBili  x   /  AST  48<H>  /  ALT  16  /  AlkPhos  59  11-04          MICROBIOLOGY:  v  .Blood Blood  11-02-20   No growth to date.  --  --      .Blood Blood  11-02-20   No growth to date.  --  --        CMV IgG Antibody: 0.51 U/mL (08-08-20 @ 09:47)  Toxoplasma IgG Screen: <3.0 IU/mL (08-08-20 @ 09:47)          RADIOLOGY:    <The imaging below has been reviewed and visualized by me independently. Findings as detailed in report below> Follow Up:  Leukocytosis     Interval History: seen while on HD today. noted to be somewhat confused. not complaining of SOB or cough. denies pain. notes significant abdominal distension.     REVIEW OF SYSTEMS  [  ] ROS unobtainable because:    [ x ] All other systems negative except as noted below    Constitutional:  [ ] fever [ ] chills  [ ] weight loss  [ ] weakness  Skin:  [ ] rash [ ] phlebitis	  Eyes: [ ] icterus [ ] pain  [ ] discharge	  ENMT: [ ] sore throat  [ ] thrush [ ] ulcers [ ] exudates  Respiratory: [x ] dyspnea [ ] hemoptysis [ ] cough [ ] sputum	  Cardiovascular:  [ ] chest pain [ ] palpitations [ ] edema	  Gastrointestinal:  [ ] nausea [ ] vomiting [ ] diarrhea [ ] constipation [ ] pain	  Genitourinary:  [ ] dysuria [ ] frequency [ ] hematuria [ ] discharge [ ] flank pain  [ ] incontinence  Musculoskeletal:  [ ] myalgias [ ] arthralgias [ ] arthritis  [ ] back pain  Neurological:  [ ] headache [ ] seizures  [x ] confusion/altered mental status    Allergies  No Known Allergies        ANTIMICROBIALS:  cefTRIAXone   IVPB 2000 every 24 hours      OTHER MEDS:  MEDICATIONS  (STANDING):  albuterol/ipratropium for Nebulization. 3 once PRN  influenza   Vaccine 0.5 once  midodrine 20 every 8 hours  pantoprazole  Injectable 40 two times a day  polyethylene glycol 3350 17 two times a day  polyethylene glycol 3350 17 once      Vital Signs Last 24 Hrs  T(C): 36.8 (04 Nov 2020 14:30), Max: 37 (03 Nov 2020 19:10)  T(F): 98.2 (04 Nov 2020 14:30), Max: 98.6 (03 Nov 2020 19:10)  HR: 109 (04 Nov 2020 15:47) (102 - 111)  BP: 87/44 (04 Nov 2020 15:47) (80/38 - 95/58)  BP(mean): --  RR: 18 (04 Nov 2020 15:47) (16 - 18)  SpO2: 98% (04 Nov 2020 15:47) (92% - 98%)    PHYSICAL EXAMINATION:  General: Alert and Awake, Jaundice  HEENT: PERRL, EOMI, scleral icterus  Neck: Supple  Cardiac: RRR, No M/R/G  Resp: CTAB, No Wh/Rh/Ra  Abdomen: Mod-Severe distension. nontender, No HSM, No rigidity or guarding  MSK: No LE edema. No Calf tenderness  : No ashton  Skin: No rashes or lesions. Skin is warm and dry to the touch.   Neuro: Alert and Awake. CN 2-12 Grossly intact. Moves all four extremities spontaneously.  Psych: Calm, Pleasant, Cooperative                          6.8    9.59  )-----------( 53       ( 04 Nov 2020 14:51 )             20.6       11-04    133<L>  |  96  |  30<H>  ----------------------------<  80  4.7   |  24  |  6.31<H>    Ca    8.8      04 Nov 2020 06:57    TPro  5.3<L>  /  Alb  2.9<L>  /  TBili  10.4<H>  /  DBili  x   /  AST  48<H>  /  ALT  16  /  AlkPhos  59  11-04          MICROBIOLOGY:  v  .Blood Blood  11-02-20   No growth to date.  --  --      .Blood Blood  11-02-20   No growth to date.  --  --        CMV IgG Antibody: 0.51 U/mL (08-08-20 @ 09:47)  Toxoplasma IgG Screen: <3.0 IU/mL (08-08-20 @ 09:47)    RADIOLOGY:    <The imaging below has been reviewed and visualized by me independently. Findings as detailed in report below>    EXAM:  CT ABDOMEN AND PELVIS IC                        PROCEDURE DATE:  11/04/2020    Cirrhosis with evidence of portal hypertension. Large volume ascites.  Patchy opacities in the left lung may be due to infection.  Redemonstration of a large right pleural effusion with complete atelectasis of the right lower lobe and partial atelectasis of the right upper and middle lobes.

## 2020-11-04 NOTE — DIETITIAN INITIAL EVALUATION ADULT. - DIET TYPE
Defer feeding plans and diet/fluid textures to medical team - suggest advance to renal diet when medically feasible. Will continue to monitor and adjust as needed; will monitor needs for nutritional supplements.

## 2020-11-04 NOTE — PROGRESS NOTE ADULT - SUBJECTIVE AND OBJECTIVE BOX
Patient is a 40y old  Male who presents with a chief complaint of HRS (03 Nov 2020 14:32)      SUBJECTIVE / OVERNIGHT EVENTS:    Patient seen and examined at bedside. No acute events overnight.    T(F): 98.1 (11-04 @ 04:10), Max: 98.6 (11-03 @ 19:10)  HR: 107 (11-04 @ 04:10) (107 - 111)  BP: 95/58 (11-04 @ 04:10) (86/50 - 96/60)  RR: 18 (11-04 @ 04:10) (16 - 18)  SpO2: 95% (11-04 @ 04:10) (92% - 98%)    I&O's Summary    03 Nov 2020 07:01  -  04 Nov 2020 07:00  --------------------------------------------------------  IN: 740 mL / OUT: 0 mL / NET: 740 mL        MEDICATIONS  (STANDING):  albumin human 25% IVPB 100 milliLiter(s) IV Intermittent every 6 hours  cefTRIAXone   IVPB 2000 milliGRAM(s) IV Intermittent every 24 hours  folic acid 1 milliGRAM(s) Oral daily  influenza   Vaccine 0.5 milliLiter(s) IntraMuscular once  lactulose Syrup 20 Gram(s) Oral every 6 hours  midodrine 20 milliGRAM(s) Oral every 8 hours  multivitamin 1 Tablet(s) Oral daily  octreotide  Infusion 50 MICROgram(s)/Hr (10 mL/Hr) IV Continuous <Continuous>  pantoprazole  Injectable 40 milliGRAM(s) IV Push two times a day  sevelamer carbonate 800 milliGRAM(s) Oral three times a day    MEDICATIONS  (PRN):  albuterol/ipratropium for Nebulization. 3 milliLiter(s) Nebulizer once PRN Wheezing      PHYSICAL EXAM:   GEN: Age appropriate, resting comfortably in bed, no acute distress, non toxic appearing, speaking in complete sentences.   HEENT: scleral icterus, jaundice  PULM: decrease breath sounds right side  CV: RRR, S1S2, no MRG  MSK: no stiffness or joint effusions  Abdominal: +distended  Extremities: No edema or cyanosis  NEURO: AAOx3  Psych: normal affect, normal behavior  Skin: No rashes, lesions    LABS:  Labs personally reviewed.                        7.3    11.68 )-----------( 72       ( 04 Nov 2020 07:00 )             22.6     Hgb Trend: 7.3<--, 8.1<--, 7.5<--, 8.1<--, 9.2<--  11-04    133<L>  |  96  |  30<H>  ----------------------------<  80  4.7   |  24  |  6.31<H>    Ca    8.8      04 Nov 2020 06:57    TPro  5.3<L>  /  Alb  2.9<L>  /  TBili  10.4<H>  /  DBili  x   /  AST  48<H>  /  ALT  16  /  AlkPhos  59  11-04    Creatinine Trend: 6.31<--, 5.44<--, 6.05<--, 6.18<--, 5.70<--, 5.46<--  PT/INR - ( 03 Nov 2020 12:57 )   PT: 33.2 sec;   INR: 2.96 ratio         PTT - ( 02 Nov 2020 08:34 )  PTT:67.9 sec        [facility]  Pulmonary and Critical Care Fellow    PGY-4 Pager: [phone]  [phone]  Night Float:

## 2020-11-04 NOTE — DIETITIAN INITIAL EVALUATION ADULT. - REASON FOR ADMISSION
Pt 39 y/o M with PMH: EtOH cirrhosis c/b gastric variceal bleeding in July (2020) S/P 34u pRBC, IR embolization and blakemore ballon, ESRD on HD (M/W/F), bladder rupture 2/2 fall S/P ex-lap in (2019), hemorrhoids, presented to OSH for increased abdominal distention and abdominal pain x3 days, found with alcoholic cirrhosis of liver with large ascites, await paracentesis both diagnostic and therapeutic; advance diet when tolerated, no plans for EN at this time per NP; poor prognosis.

## 2020-11-04 NOTE — CONSULT NOTE ADULT - ASSESSMENT
Assessment: 40 year old man with hx of decompensated EtOH cirrhosis (Dx'd 7/2020) complicated by gastric variceal bleeding (7/2020) s/p 34u pRBC, IR embolization and blakemore balloon, ESRD on HD (M/W/F), bladder rupture 2/2 fall s/p ex-lap in 2019, hemorrhoids, presented to OSH for increased abdominal distention and abdominal pain x3 days.     Plan:  -large volume paracentesis 11/4  -Please place order for IR Procedure, approving attending Dr. Cohn  -maintain active type and screen x 2    Please call IR at extension [phone] with any questions, concerns, or issues regarding above.      Rebeka Christopher, PGY3  Spectra # [phone]

## 2020-11-04 NOTE — DIETITIAN INITIAL EVALUATION ADULT. - NS FNS WEIGHT CHANGE REASON
Weight as per previous RD notes (07/13/2020) 192.2 pounds, pt reported -190 pounds at that time -> (07/30/2020) 196 pounds with edema. Weight as per flow sheets (10/30) 180 pounds -> (11/04) 176.3 pounds -?accuracy due to fluid accumulation as pt with edema and ascites. Suggests 8.7 pounds weight loss x approximately 3 months based on lowest  pounds.

## 2020-11-04 NOTE — PROGRESS NOTE ADULT - PROBLEM SELECTOR PLAN 1
Plan: Decompensated w/ EV and gastric varices and ascites; rule out SBP  - MELD score 41  - abd US doppler 11/2 no PVT, mod to large ascites  - IR consult for paracentesis, diagnostic and therapeutic, concern for SBP  - abd XRay and Ct abd pelvis no free air/ ? PNA at lower lobes, will touch base w ID if we need to add MRSA coverage   - Hepatology recs reviewed  - Trend LFTs, анна, INR daily- I s/p vitamin K 10mg IV day 3/3  -HE: None, holding lactulose and starting miralax bid  Ascites: Large on Exam  HCC: No lesions on MRI 8/2020  Varices: Hx of gastric varices s/p IR embolization, Grade I esophageal varices seen 9/2020  # ESRD on hemodialysis   # Anemia: possible secondary to ESRD +/- slow GI bleed. Has hx of varices  awaiting PETH level

## 2020-11-05 NOTE — RAPID RESPONSE TEAM SUMMARY - NSSITUATIONBACKGROUNDRRT_GEN_ALL_CORE
40 yo M w/ PMH of EtOH cirrhosis c/b gastric variceal bleeding in July 2020 s/p 34u pRBC, IR embolization and blakemore ballon, ESRD on HD (M/W/F), bladder rupture 2/2 fall s/p ex-lap in 2019, hemorrhoids, presented to OSH for increased abdominal distention and abdominal pain. RRT called for hypotension. Patient hypotensive to SBP 78. mm Hg. FSG >100. Patient mentating at baseline of A&Ox1-2. Patient was given midodrine 10 mg x1 and started on albumin. Patient with abdominal distension. Afebrile. Recent diagnostic paracentesis negative for SBP. MICU was consulted. Patient will be taken to MICU for therapeutic paracentesis.

## 2020-11-05 NOTE — CHART NOTE - NSCHARTNOTEFT_GEN_A_CORE
Received phone call from SANYA Joyner [phone] at [facility].  Patient was transferred from [facility] on 10/31/2020.  Blood cultures were drawn at [facility] on 10/31/2020 and he was found to have ecoli in the aerobic bottle (sensitivies in the patient's chart).  Notified Dr. Thomas (ID) and Attending Dr. Mirza.  Patient currently on Meropenem and Fluconazole (as per attending).  Sign out given to ICU fellow upon transfer to ICU for hypotension.

## 2020-11-05 NOTE — PROVIDER CONTACT NOTE (CRITICAL VALUE NOTIFICATION) - BACKGROUND
ESRD, Alcoholic Cirrhosis
Pt has history of hepatorenal syndrome
patient admitted with GI HEmorrhage, ESRD, cirrhosis with ascites
pt admitted with gi bleed and hepatorenal syndrome
pt admitted with hepatorenal syndrome
pt admitted with hepatorenal syndrome and gi bleed

## 2020-11-05 NOTE — PROCEDURE NOTE - ADDITIONAL PROCEDURE DETAILS
Patient did not have episode of hypotension during procedure. Was given albumin and FFP during procedure.

## 2020-11-05 NOTE — CHART NOTE - NSCHARTNOTEFT_GEN_A_CORE
Urology called for assistance to place ashton after unsuccessful attempt. Pt is s/p paracentesis of 7L and is ESRD. Reports he makes very little urine at home normally. Pt is adamantly refusing ashton at this time, RN present during the conversation. May consider condom catheter if needed.

## 2020-11-05 NOTE — PROVIDER CONTACT NOTE (CRITICAL VALUE NOTIFICATION) - ASSESSMENT
Axox3
Pt responsive to verbal stimuli with confusion. Mild dyspnea on exertion. O2 NC at 2L/min in place.
patient is confused, vital signs stable
pt A&Ox4 remains lethargic. no acute distress noted.
pt A&Ox4, no signs of bleeding, low BP 500ml bolus given as ordered, high  Dr Marin made aware
pt A&Ox4, no signs of bleeding, low BP, high HR  118 PA Idania made aware
pt A&Ox4, no signs of bleeding, low BP, high HR  122 PA Idania made aware
pt A&Ox4, no signs of bleeding, low BP, high HR  122 PA Idania made aware

## 2020-11-05 NOTE — PROGRESS NOTE ADULT - PROBLEM SELECTOR PLAN 4
Pt meets criteria based on tachycardia, hypotension, leukocytosis, marked lactic acidosis, elevated анна concerning for end organ damage  - Lactic acidosis likely due to poor hepatic clearance, type B, improving, would not further trend  - Rule out SBP as above  - cont CTX 1g QD for now  - appreciate ID recs  - BCX [id number]

## 2020-11-05 NOTE — PROGRESS NOTE ADULT - ASSESSMENT
40 year old man with hx of decompensated EtOH cirrhosis (Dx'd 7/2020) complicated by gastric variceal bleeding (7/2020) s/p 34u pRBC, IR embolization and blakemore balloon, ESRD on HD (M/W/F), bladder rupture 2/2 fall s/p ex-lap in 2019, hemorrhoids, presented to OSH for increased abdominal distention and abdominal pain x3 days    # Abdominal Pain: Likely related to ascites and possibly some level of significant bloating. Now switched to miralax. CT negative without perforation. Patient with possible SBP given elevated lactate and HE.   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 Has hx of varices    Recommendation:  - continue with Abx  - okay to continue with midodrine  - await LVP to help with pain  - replace ascites in a 1:1 ratio to prevent MARQUISE  - switch lactulose to miralax BID  - continue with ppi daily  - diet as tolerated  - await PETH level  - Trend Coags, CMP, CBC  - Supportive care per primary team

## 2020-11-05 NOTE — PROVIDER CONTACT NOTE (CRITICAL VALUE NOTIFICATION) - ACTION/TREATMENT ORDERED:
None
will continue to monitor
As per NP Carly will follow up.
awaiting recs
awaiting recs
awaiting recs, will continue to monitor

## 2020-11-05 NOTE — PROGRESS NOTE ADULT - SUBJECTIVE AND OBJECTIVE BOX
CC: F/U for Leukocytosis    Saw/spoke to patient. No fevers, no chills. No new complaints. Lethargic.    Allergies  No Known Allergies    ANTIMICROBIALS:  cefTRIAXone   IVPB 2000 every 24 hours    PE:    Vital Signs Last 24 Hrs  T(C): 36.6 (05 Nov 2020 12:17), Max: 36.8 (04 Nov 2020 14:30)  T(F): 97.9 (05 Nov 2020 12:17), Max: 98.2 (04 Nov 2020 14:30)  HR: 91 (05 Nov 2020 12:17) (91 - 109)  BP: 91/49 (05 Nov 2020 12:17) (85/44 - 94/58)  RR: 18 (05 Nov 2020 12:17) (18 - 189)  SpO2: 98% (05 Nov 2020 12:17) (93% - 100%)    Gen: AOx3, NAD, non-toxic  CV: S1+S2 normal, nontachycardic  Resp: Clear bilat, no resp distress, no crackles/wheezes  Abd: Soft, nontender, +BS  Ext: No LE edema, no wounds    LABS:                        7.8    12.34 )-----------( 64       ( 05 Nov 2020 06:55 )             23.9     11-05    135  |  94<L>  |  21  ----------------------------<  96  4.0   |  24  |  4.73<H>    Ca    9.4      05 Nov 2020 06:52    TPro  5.8<L>  /  Alb  3.7  /  TBili  12.1<H>  /  DBili  x   /  AST  53<H>  /  ALT  16  /  AlkPhos  63  11-05    MICROBIOLOGY:    .Body Fluid Peritoneal Fluid  11-05-20   Testing in progress  --    polymorphonuclear leukocytes seen  No organisms seen  by cytocentrifuge    .Blood Blood  11-02-20   No growth to date.     .Blood Blood  11-02-20   No growth to date.     CMV IgG Antibody: 0.51 U/mL (08-08-20 @ 09:47)  Toxoplasma IgG Screen: <3.0 IU/mL (08-08-20 @ 09:47)    (otherwise reviewed)    RADIOLOGY:    11/4 CT:    IMPRESSION:  Cirrhosis with evidence of portal hypertension. Large volume ascites.    Patchy opacities in the left lung may be due to infection.    Redemonstration of a large right pleural effusion with complete atelectasis of the right lower lobe and partial atelectasis of the right upper and middle lobes.

## 2020-11-05 NOTE — PROGRESS NOTE ADULT - PROBLEM SELECTOR PLAN 1
Plan: Decompensated w/ EV and gastric varices and ascites; rule out SBP  - MELD score 41  - abd US doppler 11/2 no PVT, mod to large ascites  - IR consult for paracentesis, diagnostic no evidence of SBP, f/u cultures, plan for LVP  - abd XRay and Ct abd pelvis no free air  - Hepatology recs reviewed  - Trend LFTs, анна, INR daily- s/p day 3 vitamin K 10mg IV day  -HE: None, holding lactulose and starting miralax bid  Ascites: Large on Exam  HCC: No lesions on MRI 8/2020  Varices: Hx of gastric varices s/p IR embolization, Grade I esophageal varices seen 9/2020  # ESRD on hemodialysis   # Anemia: possible secondary to ESRD +/- slow GI bleed. Has hx of varices  awaiting PETH level

## 2020-11-05 NOTE — CHART NOTE - NSCHARTNOTEFT_GEN_A_CORE
Interventional Radiology Pre-Procedure Note    Procedure: Therapeutic paracentesis.     Diagnosis/Indication: Patient is a 40y old male with decompensated EtOH cirrhosis presents with abdominal pain. Patient in IR yesterday for paracentesis however only diagnostic paracentesis performed due to hypotension. Patient's blood pressure improved today, will attempt a therapeutic paracentesis today.       PAST MEDICAL & SURGICAL HISTORY:  End-stage renal disease (ESRD)  On HD MWF    Cirrhosis, alcoholic    S/P exploratory laparotomy  for bladder rupture s/p fall         Allergies: No Known Allergies      LABS:                        7.8    12.34 )-----------( 64       ( 05 Nov 2020 06:55 )             23.9     11-05    135  |  94<L>  |  21  ----------------------------<  96  4.0   |  24  |  4.73<H>    Ca    9.4      05 Nov 2020 06:52    TPro  5.8<L>  /  Alb  3.7  /  TBili  12.1<H>  /  DBili  x   /  AST  53<H>  /  ALT  16  /  AlkPhos  63  11-05    PT/INR - ( 05 Nov 2020 08:31 )   PT: 29.6 sec;   INR: 2.60 ratio           Consent to be obtained upon arrival to IR.

## 2020-11-05 NOTE — PROGRESS NOTE ADULT - SUBJECTIVE AND OBJECTIVE BOX
[facility] Division of Hospital Medicine  Rachna Mirza DO  Pager (JASKARAN, 5F-7H): [phone]  Other Times:  [phone]    Patient is a 40y old  Male who presents with a chief complaint of HRS (05 Nov 2020 11:00)      SUBJECTIVE / OVERNIGHT EVENTS:    patient seen and examined at bedside.   patient sleepy this AM  states having abdominal pain.    MEDICATIONS  (STANDING):  albumin human 25% IVPB 100 milliLiter(s) IV Intermittent every 6 hours  cefTRIAXone   IVPB 2000 milliGRAM(s) IV Intermittent every 24 hours  folic acid 1 milliGRAM(s) Oral daily  influenza   Vaccine 0.5 milliLiter(s) IntraMuscular once  midodrine 20 milliGRAM(s) Oral every 8 hours  multivitamin 1 Tablet(s) Oral daily  pantoprazole  Injectable 40 milliGRAM(s) IV Push two times a day  polyethylene glycol 3350 17 Gram(s) Oral <User Schedule>  sevelamer carbonate 800 milliGRAM(s) Oral three times a day    MEDICATIONS  (PRN):  albuterol/ipratropium for Nebulization. 3 milliLiter(s) Nebulizer once PRN Wheezing      CAPILLARY BLOOD GLUCOSE      POCT Blood Glucose.: 121 mg/dL (05 Nov 2020 12:40)  POCT Blood Glucose.: 118 mg/dL (05 Nov 2020 08:55)  POCT Blood Glucose.: 125 mg/dL (05 Nov 2020 05:38)  POCT Blood Glucose.: 95 mg/dL (05 Nov 2020 01:01)  POCT Blood Glucose.: 108 mg/dL (04 Nov 2020 21:37)  POCT Blood Glucose.: 93 mg/dL (04 Nov 2020 17:20)    I&O's Summary    04 Nov 2020 07:01  -  05 Nov 2020 07:00  --------------------------------------------------------  IN: 200 mL / OUT: 0 mL / NET: 200 mL    05 Nov 2020 07:01  -  05 Nov 2020 13:51  --------------------------------------------------------  IN: 100 mL / OUT: 0 mL / NET: 100 mL        PHYSICAL EXAM:  Vital Signs Last 24 Hrs  T(C): 36.6 (05 Nov 2020 12:17), Max: 36.8 (04 Nov 2020 14:30)  T(F): 97.9 (05 Nov 2020 12:17), Max: 98.2 (04 Nov 2020 14:30)  HR: 91 (05 Nov 2020 12:17) (91 - 109)  BP: 91/49 (05 Nov 2020 12:17) (85/44 - 94/58)  BP(mean): --  RR: 18 (05 Nov 2020 12:17) (18 - 189)  SpO2: 98% (05 Nov 2020 12:17) (93% - 100%)      CONSTITUTIONAL: NAD, well appearing man, anasarca   EYES: PERRL; conjunctiva icteric  RESPIRATORY: Decreased BS throughout R lung field  CARDIOVASCULAR: Regular rate and rhythm, normal S1 and S2, no murmur, 2+ lower extremity edema; Peripheral pulses are 2+ bilaterally  ABDOMEN: diffusely tender to palpation, markedly distended, tense ascites, normoactive bowel sounds, no rebound/guarding  MUSCULOSKELETAL:  No joint swelling or tenderness to palpation  PSYCH: A+O x 1-2   SKIN: No rashes; no palpable lesions    LABS:                        7.8    12.34 )-----------( 64       ( 05 Nov 2020 06:55 )             23.9     11-05    135  |  94<L>  |  21  ----------------------------<  96  4.0   |  24  |  4.73<H>    Ca    9.4      05 Nov 2020 06:52    TPro  5.8<L>  /  Alb  3.7  /  TBili  12.1<H>  /  DBili  x   /  AST  53<H>  /  ALT  16  /  AlkPhos  63  11-05    PT/INR - ( 05 Nov 2020 08:31 )   PT: 29.6 sec;   INR: 2.60 ratio                   Culture - Acid Fast - Body Fluid w/Smear (collected 05 Nov 2020 00:39)  Source: .Body Fluid Peritoneal Fluid    Culture - Fungal, Body Fluid (collected 05 Nov 2020 00:39)  Source: .Body Fluid Peritoneal Fluid  Preliminary Report (05 Nov 2020 09:14):    Testing in progress    Culture - Body Fluid with Gram Stain (collected 05 Nov 2020 00:39)  Source: .Body Fluid Peritoneal Fluid  Gram Stain (05 Nov 2020 03:47):    polymorphonuclear leukocytes seen    No organisms seen    by cytocentrifuge        RADIOLOGY & ADDITIONAL TESTS:  Results Reviewed:   Imaging Personally Reviewed:  Electrocardiogram Personally Reviewed:    COORDINATION OF CARE:  Care Discussed with Consultants/Other Providers [Y/N]:  Prior or Outpatient Records Reviewed [Y/N]:  med SULAIMAN Lu

## 2020-11-05 NOTE — PROGRESS NOTE ADULT - ASSESSMENT
40 year old man with hx of decompensated EtOH cirrhosis (Dx'd 7/2020) complicated by gastric variceal bleeding (7/2020) s/p 34u pRBC, IR embolization and blakemore balloon, ESRD on HD (M/W/F), bladder rupture 2/2 fall s/p ex-lap in 2019, hemorrhoids, presented to OSH for increased abdominal distention and abdominal pain x3 days, SOB, found to have large R pleural effusion.     Problem: SOB/DU  Assessment: Likely multifactorial 2/2 to large ascites and large R Pleff.  Differential for pleural effusion includes hydrothorax vs infection. Unable to have thoracentesis performed due to elevated INR. In addition, patient unable to sit up due to abdominal distension. Blood pressures ranging from 80s to 90s. systolic. He has received several doses of IV vit K. He received FFP 11/5 AM. He had diagnostic para performed 11/4 c/w SBP.   Plan: Goal INR is below 2 prior to thora but will have to proceed earlier if patient decompensates or develops significant discomfort.   *****NOTE INCOMPLETE*****

## 2020-11-05 NOTE — PROGRESS NOTE ADULT - ASSESSMENT
38 yo M w/ PMH of EtOH cirrhosis c/b gastric variceal bleeding in July 2020 s/p 34 pRBC, IR embolization, ESRD on HD (M/W/F), bladder rupture 2/2 fall s/p ex-lap in 2019 was transferred from OSH for worsening abdominal distention and pain  No fever, has leukocytosis  Distended abd from ascites  Covid Ab neg    CT A/P (11/4) with large volume ascites and large right pleural effusion. Noted to have some patchy infiltrates in the left lung. Unclear if this represents fluid or infection.     Today seems worsening, more lethargic; had para last night (could only tolerate diagnostic) elevated cells compared to prior but not at SBP range, however consider could be partially treated  Note that OSH called stating that on 10/31 patient had E coli in blood (S not available)  Seems somewhat improving with WBC decreasing despite E coli in blood, continue CTX for now    Overall,  1) Leukocytosis/New E coli bacteremia?  - Ceftriaxone 2g q 24  - Low threshold to change Ceftriaxone to Ertapenem if clinically worsening  - F/U pending BCXs here and ascites culture  - Would obtain S from E coli from OSH  - Trend WBC to normal  2) Ascites  - Therapeutic paracentesis as can be tolerated, F/U GI/hepatology  3) Elevated Lactate  - Trend to normal, also unclear significance considering lack of signs sepsis (aside from WBC)  - Further workup per primary team  4) R pleural effusion  - Monitor resp status  - F/U Pulm if any indication for drainage    Guarded, low threshold MICU rocco Thomas MD  Pager [phone]  After 5pm and on weekends call [phone]

## 2020-11-05 NOTE — PROVIDER CONTACT NOTE (CRITICAL VALUE NOTIFICATION) - SITUATION
Lactate- 3.5
Lactate 4.5
Lactate 8.4
Lactate 9.3
Lactate 9.4
SULAIMAN Carroll notified of hgb 6.8 hct 20.6
VBG Lactate 9.7
lactate 7.9 from Venous Blood Gas
lactate is 4.6

## 2020-11-05 NOTE — PROGRESS NOTE ADULT - SUBJECTIVE AND OBJECTIVE BOX
Patient is a 40y old  Male who presents with a chief complaint of HRS (04 Nov 2020 18:48)      SUBJECTIVE / OVERNIGHT EVENTS:    Patient seen and examined at bedside. No acute events overnight.    T(F): 98 (11-05 @ 03:43), Max: 98.2 (11-04 @ 14:30)  HR: 107 (11-05 @ 03:43) (102 - 109)  BP: 93/55 (11-05 @ 03:43) (80/38 - 94/58)  RR: 18 (11-05 @ 03:43) (17 - 189)  SpO2: 97% (11-05 @ 03:43) (93% - 100%)    I&O's Summary    04 Nov 2020 07:01  -  05 Nov 2020 07:00  --------------------------------------------------------  IN: 200 mL / OUT: 0 mL / NET: 200 mL        MEDICATIONS  (STANDING):  albumin human 25% IVPB 100 milliLiter(s) IV Intermittent every 6 hours  cefTRIAXone   IVPB 2000 milliGRAM(s) IV Intermittent every 24 hours  folic acid 1 milliGRAM(s) Oral daily  influenza   Vaccine 0.5 milliLiter(s) IntraMuscular once  midodrine 20 milliGRAM(s) Oral every 8 hours  multivitamin 1 Tablet(s) Oral daily  pantoprazole  Injectable 40 milliGRAM(s) IV Push two times a day  polyethylene glycol 3350 17 Gram(s) Oral two times a day  sevelamer carbonate 800 milliGRAM(s) Oral three times a day    MEDICATIONS  (PRN):  albuterol/ipratropium for Nebulization. 3 milliLiter(s) Nebulizer once PRN Wheezing      PHYSICAL EXAM:   GEN: Age appropriate, resting comfortably in bed, no acute distress, non toxic appearing  HEENT: Conjunctiva and sclera normal  PULM: decreased breath sounds R side  CV: RRR, S1S2, no MRG  MSK: no stiffness or joint effusions  Abdominal: distended abdomen  Extremities: No edema or cyanosis  NEURO: AAOx3  Psych: normal affect, normal behavior  Skin: No rashes, lesions    LABS:  Labs personally reviewed.                        7.8    12.34 )-----------( 64       ( 05 Nov 2020 06:55 )             23.9     Hgb Trend: 7.8<--, 6.8<--, 7.3<--, 8.1<--, 7.5<--  11-05    135  |  94<L>  |  21  ----------------------------<  96  4.0   |  24  |  4.73<H>    Ca    9.4      05 Nov 2020 06:52    TPro  5.8<L>  /  Alb  3.7  /  TBili  12.1<H>  /  DBili  x   /  AST  53<H>  /  ALT  16  /  AlkPhos  63  11-05    Creatinine Trend: 4.73<--, 6.31<--, 5.44<--, 6.05<--, 6.18<--, 5.70<--  PT/INR - ( 04 Nov 2020 07:56 )   PT: 35.6 sec;   INR: 3.19 ratio                 [facility]  Pulmonary and Critical Care Fellow    PGY-4 Pager: [phone]  [phone]  Night Float:

## 2020-11-05 NOTE — PROVIDER CONTACT NOTE (CRITICAL VALUE NOTIFICATION) - NAME OF MD/NP/PA/DO NOTIFIED:
SULAIMAN Castaneda
Dr Jewel Marin
Edel Carroll NP
SANYA Bella
SANYA Emerson
SULAIMAN Carroll
SULAIMAN Domingo

## 2020-11-05 NOTE — CHART NOTE - NSCHARTNOTEFT_GEN_A_CORE
Patient presented to IR for attempted paracentesis. On arrival Pt hypotensive with SBP in the 80's and MAP 57. Discussed findings with Dr. Cortez Sharma (hepatology) will defer procedure at this time until hemodynamically. Discussed findings with Medicine and MICU team who will evaluate patient at bedside. Will transport back to unit.

## 2020-11-05 NOTE — CHART NOTE - NSCHARTNOTEFT_GEN_A_CORE
MICU Transfer Accept Note    Transfer from:   Transfer to:  MICU  Accepting physician: Dr. Mayfield      [facility] COURSE:   41 yo M w/ PMH of EtOH cirrhosis c/b gastric variceal bleeding in July 2020 s/p 34u pRBC, IR embolization, and blakemore balloon, ESRD on HD (M/W/F), bladder rupture 2/2 fall s/p ex-lap in 2019, EtOH withdrawal seizures, hemorrhoids, presented to OSH for increased abdominal distention and abdominal pain x 3 days. In the ED, VS: HR 130s, /80, RR 24, and SpO2 99% on RA. He received ceftriaxone 1g x 1, 1L IVF, zofran, and dilaudid 1mg IVP which alleviated his pain.    Pt was admitted to the floor for acute decompensated liver cirrhosis and started on ceftriaxone 2g daily for empiric treatment of SBP. CT abd/pelvis 11/04 demonstrated large volume ascites and large R pleural effusion with near complete collapse of R lung c/f hepatic hydrothorax. He was continuously hypotensive to the 80s and started on midodrine 10mg tid. Most recent dialysis yesterday 11/04. IR was consulted for paracentesis but deferred large volume paracentesis due to hypotension with SBPs in 70s and instead performed a diagnostic paracentesis on 11/04. Therapeutic paracentesis was planned for 11/05 but was ultimately deferred due to hypotension with SBP in 80s and MAP 57. MICU now reconsulted for pressure supported paracentesis.           OBJECTIVE:    Vital Signs Last 24 Hrs  T(C): 36.8 (05 Nov 2020 16:42), Max: 36.8 (05 Nov 2020 16:42)  T(F): 98.2 (05 Nov 2020 16:42), Max: 98.2 (05 Nov 2020 16:42)  HR: 102 (05 Nov 2020 16:42) (91 - 109)  BP: 78/42 (05 Nov 2020 16:42) (78/42 - 94/59)  BP(mean): --  RR: 18 (05 Nov 2020 16:42) (18 - 189)  SpO2: 98% (05 Nov 2020 16:42) (93% - 100%)  I&O's Summary    04 Nov 2020 07:01  -  05 Nov 2020 07:00  --------------------------------------------------------  IN: 200 mL / OUT: 0 mL / NET: 200 mL    05 Nov 2020 07:01  -  05 Nov 2020 17:42  --------------------------------------------------------  IN: 220 mL / OUT: 0 mL / NET: 220 mL        PHYSICAL EXAM:  General: NAD, resting comfortably  HEENT: NC/AT, PERRLA, EOMI, oropharnyx clear, no LAD  CV: RRR, normal S1,S2; no murmurs; no peripheral edema   Pulm: lungs CTAB, no crackles, rhonchi, or wheezes  Abd: soft, non-tender, non-distended, normal bowel sounds  Psych/Neuro: A&Ox3, nonfocal, strength grossly intact, normal affect  Skin: warm, dry    MEDICATIONS  (STANDING):  albumin human 25% IVPB 100 milliLiter(s) IV Intermittent every 6 hours  fluconAZOLE IVPB      fluconAZOLE IVPB 200 milliGRAM(s) IV Intermittent once  folic acid 1 milliGRAM(s) Oral daily  influenza   Vaccine 0.5 milliLiter(s) IntraMuscular once  meropenem  IVPB 500 milliGRAM(s) IV Intermittent once  meropenem  IVPB      midodrine 20 milliGRAM(s) Oral every 8 hours  multivitamin 1 Tablet(s) Oral daily  pantoprazole  Injectable 40 milliGRAM(s) IV Push two times a day  polyethylene glycol 3350 17 Gram(s) Oral <User Schedule>  sevelamer carbonate 800 milliGRAM(s) Oral three times a day    MEDICATIONS  (PRN):  albuterol/ipratropium for Nebulization. 3 milliLiter(s) Nebulizer once PRN Wheezing        LABS                                            7.8                   Neurophils% (auto):   x      (11-05 @ 06:55):    12.34)-----------(64           Lymphocytes% (auto):  x                                             23.9                   Eosinphils% (auto):   x        Manual%: Neutrophils x    ; Lymphocytes x    ; Eosinophils x    ; Bands%: x    ; Blasts x                                    135    |  94     |  21                  Calcium: 9.4   / iCa: x      (11-05 @ 06:52)    ----------------------------<  96        Magnesium: x                                4.0     |  24     |  4.73             Phosphorous: x        TPro  5.8    /  Alb  3.7    /  TBili  12.1   /  DBili  x      /  AST  53     /  ALT  16     /  AlkPhos  63     05 Nov 2020 06:52    ( 11-05 @ 08:31 )   PT: 29.6 sec;   INR: 2.60 ratio  aPTT: x              ASSESSMENT & PLAN: MICU Transfer Accept Note    Transfer from:   Transfer to:  MICU  Accepting physician: Dr. Mayfield      [facility] COURSE:   39 yo M w/ PMH of EtOH cirrhosis c/b gastric variceal bleeding in July 2020 s/p 34u pRBC, IR embolization, and blakemore balloon, ESRD on HD (M/W/F), bladder rupture 2/2 fall s/p ex-lap in 2019, EtOH withdrawal seizures, hemorrhoids, presented to OSH for increased abdominal distention and abdominal pain x 3 days. In OSH ED, VS: HR 130s, /80, RR 24, and SpO2 99% on RA. He received ceftriaxone 1g x 1, 1L IVF, zofran, and dilaudid 1mg IVP which alleviated his pain. Upon arrival to this hospital, VS: T 98, , BP 90/52, and SpO2 96% on RA.    Pt was admitted to the floor and started on ceftriaxone 2g daily for empiric treatment of SBP. CT abd/pelvis 11/04 demonstrated large volume ascites and large R pleural effusion with near complete collapse of R lung c/f hepatic hydrothorax. He was continuously hypotensive to the 80s and started on midodrine 10mg tid. Most recent dialysis yesterday 11/04. IR was consulted for paracentesis but deferred large volume paracentesis due to hypotension with SBPs in 70s and instead performed a diagnostic paracentesis on 11/04. Therapeutic paracentesis was planned for 11/05 but was ultimately deferred due to hypotension with SBP in 80s and MAP 57. MICU now reconsulted for pressure supported paracentesis.           OBJECTIVE:    Vital Signs Last 24 Hrs  T(C): 36.8 (05 Nov 2020 16:42), Max: 36.8 (05 Nov 2020 16:42)  T(F): 98.2 (05 Nov 2020 16:42), Max: 98.2 (05 Nov 2020 16:42)  HR: 102 (05 Nov 2020 16:42) (91 - 109)  BP: 78/42 (05 Nov 2020 16:42) (78/42 - 94/59)  BP(mean): --  RR: 18 (05 Nov 2020 16:42) (18 - 189)  SpO2: 98% (05 Nov 2020 16:42) (93% - 100%)  I&O's Summary    04 Nov 2020Nov 2020 07:01 - 05 Nov 2020 07:00  --------------------------------------------------------  IN: 200 mL / OUT: 0 mL / NET: 200 mL    05 Nov 2020 07:01 - 05 Nov 2020 17:42  --------------------------------------------------------  IN: 220 mL / OUT: 0 mL / NET: 220 mL        PHYSICAL EXAM:  General: NAD, resting comfortably  HEENT: NC/AT, PERRLA, EOMI, oropharnyx clear, no LAD  CV: RRR, normal S1,S2; no murmurs; no peripheral edema   Pulm: lungs CTAB, no crackles, rhonchi, or wheezes  Abd: soft, non-tender, non-distended, normal bowel sounds  Psych/Neuro: A&Ox3, nonfocal, strength grossly intact, normal affect  Skin: warm, dry    MEDICATIONS  (STANDING):  albumin human 25% IVPB 100 milliLiter(s) IV Intermittent every 6 hours  fluconAZOLE IVPB      fluconAZOLE IVPB 200 milliGRAM(s) IV Intermittent once  folic acid 1 milliGRAM(s) Oral daily  influenza   Vaccine 0.5 milliLiter(s) IntraMuscular once  meropenem  IVPB 500 milliGRAM(s) IV Intermittent once  meropenem  IVPB      midodrine 20 milliGRAM(s) Oral every 8 hours  multivitamin 1 Tablet(s) Oral daily  pantoprazole  Injectable 40 milliGRAM(s) IV Push two times a day  polyethylene glycol 3350 17 Gram(s) Oral <User Schedule>  sevelamer carbonate 800 milliGRAM(s) Oral three times a day    MEDICATIONS  (PRN):  albuterol/ipratropium for Nebulization. 3 milliLiter(s) Nebulizer once PRN Wheezing        LABS                                            7.8                   Neurophils% (auto):   x      (11-05 @ 06:55):    12.34)-----------(64           Lymphocytes% (auto):  x                                             23.9                   Eosinphils% (auto):   x        Manual%: Neutrophils x    ; Lymphocytes x    ; Eosinophils x    ; Bands%: x    ; Blasts x                                    135    |  94     |  21                  Calcium: 9.4   / iCa: x      (11-05 @ 06:52)    ----------------------------<  96        Magnesium: x                                4.0     |  24     |  4.73             Phosphorous: x        TPro  5.8    /  Alb  3.7    /  TBili  12.1   /  DBili  x      /  AST  53     /  ALT  16     /  AlkPhos  63     05 Nov 2020 06:52    ( 11-05 @ 08:31 )   PT: 29.6 sec;   INR: 2.60 ratio  aPTT: x              ASSESSMENT & PLAN: MICU Transfer Accept Note    Transfer from:   Transfer to:  MICU  Accepting physician: Dr. Mayfield      [facility] COURSE:   39 yo M w/ PMH of EtOH cirrhosis c/b gastric variceal bleeding in July 2020 s/p 34u pRBC, IR embolization, and blakemore balloon, ESRD on HD (M/W/F), bladder rupture 2/2 fall s/p ex-lap in 2019, EtOH withdrawal seizures, and hemorrhoids, presented to OSH for increased abdominal distention and abdominal pain x 3 days. In OSH ED, VS: HR 130s, /80, RR 24, and SpO2 99% on RA. He received ceftriaxone 1g x 1, 1L IVF, zofran, and dilaudid 1mg IVP which alleviated his pain. Upon arrival to this hospital, VS: T 98, , BP 90/52, and SpO2 96% on RA.    Pt was admitted to the floor and started on ceftriaxone 2g daily for empiric treatment of SBP. CT abd/pelvis 11/04 demonstrated large volume ascites and large R pleural effusion with near complete collapse of R lung c/f hepatic hydrothorax. He was continuously hypotensive to the 80s and started on midodrine 10mg tid. Most recent dialysis yesterday 11/04. IR was consulted for paracentesis but deferred large volume paracentesis due to hypotension with SBPs in 70s and instead performed a diagnostic paracentesis on 11/04. Therapeutic paracentesis was planned for 11/05 but was ultimately deferred due to hypotension with SBP in 80s and MAP 57. MICU now reconsulted for pressure supported paracentesis.         OBJECTIVE:    Vital Signs Last 24 Hrs  T(C): 36.8 (05 Nov 2020 16:42), Max: 36.8 (05 Nov 2020 16:42)  T(F): 98.2 (05 Nov 2020 16:42), Max: 98.2 (05 Nov 2020 16:42)  HR: 102 (05 Nov 2020 16:42) (91 - 109)  BP: 78/42 (05 Nov 2020 16:42) (78/42 - 94/59)  BP(mean): --  RR: 18 (05 Nov 2020 16:42) (18 - 189)  SpO2: 98% (05 Nov 2020 16:42) (93% - 100%)  I&O's Summary    04 Nov 2020Nov 2020 07:01 - 05 Nov 2020 07:00  --------------------------------------------------------  IN: 200 mL / OUT: 0 mL / NET: 200 mL    05 Nov 2020 07:01 - 05 Nov 2020 17:42  --------------------------------------------------------  IN: 220 mL / OUT: 0 mL / NET: 220 mL        PHYSICAL EXAM:  General: NAD, resting comfortably  HEENT: NC/AT, PERRLA, EOMI, oropharnyx clear, no LAD  CV: RRR, normal S1,S2; no murmurs; no peripheral edema   Pulm: lungs CTAB, no crackles, rhonchi, or wheezes  Abd: soft, non-tender, non-distended, normal bowel sounds  Psych/Neuro: A&Ox3, nonfocal, strength grossly intact, normal affect  Skin: +jaundice, warm, dry    MEDICATIONS  (STANDING):  albumin human 25% IVPB 100 milliLiter(s) IV Intermittent every 6 hours  fluconAZOLE IVPB 200 milliGRAM(s) IV Intermittent once  folic acid 1 milliGRAM(s) Oral daily  influenza   Vaccine 0.5 milliLiter(s) IntraMuscular once  meropenem  IVPB 500 milliGRAM(s) IV Intermittent once  meropenem  IVPB      midodrine 20 milliGRAM(s) Oral every 8 hours  multivitamin 1 Tablet(s) Oral daily  pantoprazole  Injectable 40 milliGRAM(s) IV Push two times a day  polyethylene glycol 3350 17 Gram(s) Oral <User Schedule>  sevelamer carbonate 800 milliGRAM(s) Oral three times a day    MEDICATIONS  (PRN):  albuterol/ipratropium for Nebulization. 3 milliLiter(s) Nebulizer once PRN Wheezing        LABS                                            7.8                   Neurophils% (auto):   x      (11-05 @ 06:55):    12.34)-----------(64           Lymphocytes% (auto):  x                                             23.9                   Eosinphils% (auto):   x        Manual%: Neutrophils x    ; Lymphocytes x    ; Eosinophils x    ; Bands%: x    ; Blasts x                                    135    |  94     |  21                  Calcium: 9.4   / iCa: x      (11-05 @ 06:52)    ----------------------------<  96        Magnesium: x                                4.0     |  24     |  4.73             Phosphorous: x        TPro  5.8    /  Alb  3.7    /  TBili  12.1   /  DBili  x      /  AST  53     /  ALT  16     /  AlkPhos  63     05 Nov 2020 06:52    ( 11-05 @ 08:31 )   PT: 29.6 sec;   INR: 2.60 ratio  aPTT: x              ASSESSMENT & PLAN:  39 yo M w/ PMH of EtOH cirrhosis c/b gastric variceal bleeding in July 2020 s/p 34u pRBC, IR embolization, and blakemore balloon, ESRD on HD (M/W/F), bladder rupture 2/2 fall s/p ex-lap in 2019, EtOH withdrawal seizures, and hemorrhoids admitted for decompensated liver cirrhosis and large volume ascites with concern for SBP, currently on midodrine. Transferred to MICU for pressor supported paracentesis.      Plan:  #Neuro  - F/u ammonia level    #Respiratory  - No acute issues  - Currently satting well on room air    #CV  - Hypotension secondary to ESRD/HD vs possible GI bleed  - Pt has history of hemorrhoids, was found to have BRBPR on 11/01 MICU Transfer Accept Note      HOSPITAL COURSE:   41 yo M w/ PMH of EtOH cirrhosis c/b gastric variceal bleeding in July 2020 s/p 34u pRBC, IR embolization, and blakemore balloon, ESRD on HD (M/W/F), bladder rupture 2/2 fall s/p ex-lap in 2019, EtOH withdrawal seizures, and hemorrhoids, presented to OSH for increased abdominal distention and abdominal pain x 3 days. In OSH ED, VS: HR 130s, /80, RR 24, and SpO2 99% on RA. He received ceftriaxone 1g x 1, 1L IVF, zofran, and dilaudid 1mg IVP which alleviated his pain. Upon arrival to this hospital, VS: T 98, , BP 90/52, and SpO2 96% on RA.    Pt was admitted to the floor and started on ceftriaxone 2g daily for empiric treatment of SBP. CT abd/pelvis 11/04 demonstrated large volume ascites and large R pleural effusion with near complete collapse of R lung c/f hepatic hydrothorax. He was continuously hypotensive to the 80s and started on midodrine 10mg tid. Most recent dialysis yesterday 11/04. IR was consulted for paracentesis but deferred large volume paracentesis due to hypotension with SBPs in 70s and instead performed a diagnostic paracentesis on 11/04. Therapeutic paracentesis was planned for 11/05 but was ultimately deferred due to hypotension with SBP in 80s and MAP 57. MICU now reconsulted for pressure supported paracentesis.         OBJECTIVE:    Vital Signs Last 24 Hrs  T(C): 36.8 (05 Nov 2020 16:42), Max: 36.8 (05 Nov 2020 16:42)  T(F): 98.2 (05 Nov 2020 16:42), Max: 98.2 (05 Nov 2020 16:42)  HR: 102 (05 Nov 2020 16:42) (91 - 109)  BP: 78/42 (05 Nov 2020 16:42) (78/42 - 94/59)  BP(mean): --  RR: 18 (05 Nov 2020 16:42) (18 - 189)  SpO2: 98% (05 Nov 2020 16:42) (93% - 100%)  I&O's Summary    04 Nov 2020 07:01  -  05 Nov 2020 07:00  --------------------------------------------------------  IN: 200 mL / OUT: 0 mL / NET: 200 mL    05 Nov 2020 07:01  -  05 Nov 2020 17:42  --------------------------------------------------------  IN: 220 mL / OUT: 0 mL / NET: 220 mL        PHYSICAL EXAM:  General: lying in bed in NAD, occasional groaning  HEENT: NC/AT, +icteric sclera, PERRLA, EOMI  CV: RRR, normal S1,S2; no murmurs; no peripheral edema   Pulm: lungs CTAB, no crackles, rhonchi, or wheezes  Abd: severely distended, painful to palpation, +bowel sounds  Psych/Neuro: A&Ox3, nonfocal, normal affect  Skin: +jaundice, warm, dry    MEDICATIONS  (STANDING):  albumin human 25% IVPB 100 milliLiter(s) IV Intermittent every 6 hours  fluconAZOLE IVPB 200 milliGRAM(s) IV Intermittent once  folic acid 1 milliGRAM(s) Oral daily  influenza   Vaccine 0.5 milliLiter(s) IntraMuscular once  meropenem  IVPB 500 milliGRAM(s) IV Intermittent once  meropenem  IVPB      midodrine 20 milliGRAM(s) Oral every 8 hours  multivitamin 1 Tablet(s) Oral daily  pantoprazole  Injectable 40 milliGRAM(s) IV Push two times a day  polyethylene glycol 3350 17 Gram(s) Oral <User Schedule>  sevelamer carbonate 800 milliGRAM(s) Oral three times a day    MEDICATIONS  (PRN):  albuterol/ipratropium for Nebulization. 3 milliLiter(s) Nebulizer once PRN Wheezing        LABS                                            7.8                   Neurophils% (auto):   x      (11-05 @ 06:55):    12.34)-----------(64           Lymphocytes% (auto):  x                                             23.9                   Eosinphils% (auto):   x        Manual%: Neutrophils x    ; Lymphocytes x    ; Eosinophils x    ; Bands%: x    ; Blasts x                                    135    |  94     |  21                  Calcium: 9.4   / iCa: x      (11-05 @ 06:52)    ----------------------------<  96        Magnesium: x                                4.0     |  24     |  4.73             Phosphorous: x        TPro  5.8    /  Alb  3.7    /  TBili  12.1   /  DBili  x      /  AST  53     /  ALT  16     /  AlkPhos  63     05 Nov 2020 06:52    ( 11-05 @ 08:31 )   PT: 29.6 sec;   INR: 2.60 ratio  aPTT: x              ASSESSMENT & PLAN:  41 yo M w/ PMH of EtOH cirrhosis c/b gastric variceal bleeding in July 2020 s/p 34u pRBC, IR embolization, and blakemore balloon, ESRD on HD (M/W/F), bladder rupture 2/2 fall s/p ex-lap in 2019, EtOH withdrawal seizures, and hemorrhoids admitted for decompensated liver cirrhosis and large volume ascites with concern for SBP, currently on midodrine. Transferred to MICU for pressor supported paracentesis.      Plan:  #Neuro  - Currently A&Ox3 with delayed responses  - F/u ammonia level    #Respiratory  - No acute issues  - Currently satting well on room air    #CV  - Hypotension secondary to ESRD/HD vs possible GI bleed  - Pt has history of hemorrhoids, was found to have BRBPR on 11/01 MICU Transfer Accept Note      HOSPITAL COURSE:   39 yo M w/ PMH of EtOH cirrhosis c/b gastric variceal bleeding in July 2020 s/p 34u pRBC, IR embolization, and blakemore balloon, ESRD on HD (M/W/F), bladder rupture 2/2 fall s/p ex-lap in 2019, EtOH withdrawal seizures, and hemorrhoids, presented to OSH for increased abdominal distention and abdominal pain x 3 days. In OSH ED, VS: HR 130s, /80, RR 24, and SpO2 99% on RA. He received ceftriaxone 1g x 1, 1L IVF, zofran, and dilaudid 1mg IVP which alleviated his pain. Upon arrival to this hospital, VS: T 98, , BP 90/52, and SpO2 96% on RA.    Pt was admitted to the floor and started on ceftriaxone 2g daily for empiric treatment of SBP. CT abd/pelvis 11/04 demonstrated large volume ascites and large R pleural effusion with near complete collapse of R lung c/f hepatic hydrothorax. He was continuously hypotensive to the 80s and started on midodrine 10mg tid. Most recent dialysis yesterday 11/04. IR was consulted for paracentesis but deferred large volume paracentesis due to hypotension with SBPs in 70s and instead performed a diagnostic paracentesis on 11/04. Therapeutic paracentesis was planned for 11/05 but was ultimately deferred due to hypotension with SBP in 80s and MAP 57. MICU now reconsulted for pressure supported paracentesis.         OBJECTIVE:    Vital Signs Last 24 Hrs  T(C): 36.8 (05 Nov 2020 16:42), Max: 36.8 (05 Nov 2020 16:42)  T(F): 98.2 (05 Nov 2020 16:42), Max: 98.2 (05 Nov 2020 16:42)  HR: 102 (05 Nov 2020 16:42) (91 - 109)  BP: 78/42 (05 Nov 2020 16:42) (78/42 - 94/59)  BP(mean): --  RR: 18 (05 Nov 2020 16:42) (18 - 189)  SpO2: 98% (05 Nov 2020 16:42) (93% - 100%)  I&O's Summary    04 Nov 2020 07:01  -  05 Nov 2020 07:00  --------------------------------------------------------  IN: 200 mL / OUT: 0 mL / NET: 200 mL    05 Nov 2020 07:01  -  05 Nov 2020 17:42  --------------------------------------------------------  IN: 220 mL / OUT: 0 mL / NET: 220 mL        PHYSICAL EXAM:  General: lying in bed in NAD, occasional groaning  HEENT: NC/AT, +icteric sclera, PERRLA, EOMI  CV: RRR, normal S1,S2; no murmurs; no peripheral edema   Pulm: lungs CTAB, no crackles, rhonchi, or wheezes  Abd: severely distended, painful to palpation, +bowel sounds  Psych/Neuro: A&Ox3, nonfocal, normal affect  Skin: +jaundice, warm, dry    MEDICATIONS  (STANDING):  albumin human 25% IVPB 100 milliLiter(s) IV Intermittent every 6 hours  fluconAZOLE IVPB 200 milliGRAM(s) IV Intermittent once  folic acid 1 milliGRAM(s) Oral daily  influenza   Vaccine 0.5 milliLiter(s) IntraMuscular once  meropenem  IVPB 500 milliGRAM(s) IV Intermittent once  meropenem  IVPB      midodrine 20 milliGRAM(s) Oral every 8 hours  multivitamin 1 Tablet(s) Oral daily  pantoprazole  Injectable 40 milliGRAM(s) IV Push two times a day  polyethylene glycol 3350 17 Gram(s) Oral <User Schedule>  sevelamer carbonate 800 milliGRAM(s) Oral three times a day    MEDICATIONS  (PRN):  albuterol/ipratropium for Nebulization. 3 milliLiter(s) Nebulizer once PRN Wheezing        LABS                                            7.8                   Neurophils% (auto):   x      (11-05 @ 06:55):    12.34)-----------(64           Lymphocytes% (auto):  x                                             23.9                   Eosinphils% (auto):   x        Manual%: Neutrophils x    ; Lymphocytes x    ; Eosinophils x    ; Bands%: x    ; Blasts x                                    135    |  94     |  21                  Calcium: 9.4   / iCa: x      (11-05 @ 06:52)    ----------------------------<  96        Magnesium: x                                4.0     |  24     |  4.73             Phosphorous: x        TPro  5.8    /  Alb  3.7    /  TBili  12.1   /  DBili  x      /  AST  53     /  ALT  16     /  AlkPhos  63     05 Nov 2020 06:52    ( 11-05 @ 08:31 )   PT: 29.6 sec;   INR: 2.60 ratio  aPTT: x              ASSESSMENT & PLAN:  39 yo M w/ PMH of EtOH cirrhosis c/b gastric variceal bleeding in July 2020 s/p 34u pRBC, IR embolization, and blakemore balloon, ESRD on HD (M/W/F), bladder rupture 2/2 fall s/p ex-lap in 2019, EtOH withdrawal seizures, and hemorrhoids admitted for decompensated liver cirrhosis and large volume ascites with concern for SBP, currently on midodrine. Transferred to MICU for pressor supported paracentesis.      Plan:  #Neuro  - Currently A&Ox3 with delayed responses  - Pt w/ history of hepatic encephalopathy, will f/u ammonia level     #Respiratory  - Currently satting well on room air  - CT 11/04 demonstrating large R pleural effusion with near complete collapse of R lung likely 2/2 hepatic hydrothorax  - Will eventually require thoracentesis, awaiting stabilization of INR    #CV  - Hypotension 2/2 ESRD/HD vs possible GI bleed  - C/w midodrine 10mg TID  - Pt has history of hemorrhoids, was found to have BRBPR on 11/01  - Will continue to monitor in case pt needs pressor support    #GI  - Decompensated alcoholic cirrhosis with ascites + possible GI bleed  - c/w ceftriaxone 2g QD  - c/w protonix BID  - Diagnostic paracentesis on 11/04 with 160 PMNs, not meeting criteria for SBP  - Will give FFP x1 before performing large volume paracentesis   - Will obtain TEG study    #Renal  - ESRD on HD  - Last HD 11/04 via RIJ tunneled HD catheter  - c/w Renvela 800 mg TID for hyperphosphatemia    #ID  - Blood cx drawn in OSH   - F/u blood cx    #Heme  - s/p 1u pRBC and FFP 11/04 MICU Transfer Accept Note      HOSPITAL COURSE:   41 yo M w/ PMH of EtOH cirrhosis c/b gastric variceal bleeding in July 2020 s/p 34u pRBC, IR embolization, and blakemore balloon, ESRD on HD (M/W/F), bladder rupture 2/2 fall s/p ex-lap in 2019, EtOH withdrawal seizures, and hemorrhoids, presented to OSH for increased abdominal distention and abdominal pain x 3 days. In OSH ED, VS: HR 130s, /80, RR 24, and SpO2 99% on RA. He received ceftriaxone 1g x 1, 1L IVF, zofran, and dilaudid 1mg IVP which alleviated his pain. Upon arrival to this hospital, VS: T 98, , BP 90/52, and SpO2 96% on RA.    Pt was admitted to the floor and started on ceftriaxone 2g daily for empiric treatment of SBP. CT abd/pelvis 11/04 demonstrated large volume ascites and large R pleural effusion with near complete collapse of R lung c/f hepatic hydrothorax. He was continuously hypotensive to the 80s and started on midodrine 10mg tid. Most recent dialysis yesterday 11/04. IR was consulted for paracentesis but deferred large volume paracentesis due to hypotension with SBPs in 70s and instead performed a diagnostic paracentesis on 11/04. Therapeutic paracentesis was planned for 11/05 but was ultimately deferred due to hypotension with SBP in 80s and MAP 57. MICU now reconsulted for pressure supported paracentesis.         OBJECTIVE:    Vital Signs Last 24 Hrs  T(C): 36.8 (05 Nov 2020 16:42), Max: 36.8 (05 Nov 2020 16:42)  T(F): 98.2 (05 Nov 2020 16:42), Max: 98.2 (05 Nov 2020 16:42)  HR: 102 (05 Nov 2020 16:42) (91 - 109)  BP: 78/42 (05 Nov 2020 16:42) (78/42 - 94/59)  BP(mean): --  RR: 18 (05 Nov 2020 16:42) (18 - 189)  SpO2: 98% (05 Nov 2020 16:42) (93% - 100%)  I&O's Summary    04 Nov 2020 07:01  -  05 Nov 2020 07:00  --------------------------------------------------------  IN: 200 mL / OUT: 0 mL / NET: 200 mL    05 Nov 2020 07:01  -  05 Nov 2020 17:42  --------------------------------------------------------  IN: 220 mL / OUT: 0 mL / NET: 220 mL        PHYSICAL EXAM:  General: lying in bed in NAD, occasional groaning  HEENT: NC/AT, +icteric sclera, PERRLA, EOMI  CV: RRR, normal S1,S2; no murmurs; no peripheral edema   Pulm: lungs CTAB, no crackles, rhonchi, or wheezes  Abd: severely distended, painful to palpation, +bowel sounds  Psych/Neuro: A&Ox3, nonfocal, normal affect  Skin: +jaundice, warm, dry    MEDICATIONS  (STANDING):  albumin human 25% IVPB 100 milliLiter(s) IV Intermittent every 6 hours  fluconAZOLE IVPB 200 milliGRAM(s) IV Intermittent once  folic acid 1 milliGRAM(s) Oral daily  influenza   Vaccine 0.5 milliLiter(s) IntraMuscular once  meropenem  IVPB 500 milliGRAM(s) IV Intermittent once  meropenem  IVPB      midodrine 20 milliGRAM(s) Oral every 8 hours  multivitamin 1 Tablet(s) Oral daily  pantoprazole  Injectable 40 milliGRAM(s) IV Push two times a day  polyethylene glycol 3350 17 Gram(s) Oral <User Schedule>  sevelamer carbonate 800 milliGRAM(s) Oral three times a day    MEDICATIONS  (PRN):  albuterol/ipratropium for Nebulization. 3 milliLiter(s) Nebulizer once PRN Wheezing        LABS                                            7.8                   Neurophils% (auto):   x      (11-05 @ 06:55):    12.34)-----------(64           Lymphocytes% (auto):  x                                             23.9                   Eosinphils% (auto):   x        Manual%: Neutrophils x    ; Lymphocytes x    ; Eosinophils x    ; Bands%: x    ; Blasts x                                    135    |  94     |  21                  Calcium: 9.4   / iCa: x      (11-05 @ 06:52)    ----------------------------<  96        Magnesium: x                                4.0     |  24     |  4.73             Phosphorous: x        TPro  5.8    /  Alb  3.7    /  TBili  12.1   /  DBili  x      /  AST  53     /  ALT  16     /  AlkPhos  63     05 Nov 2020 06:52    ( 11-05 @ 08:31 )   PT: 29.6 sec;   INR: 2.60 ratio  aPTT: x              ASSESSMENT & PLAN:  41 yo M w/ PMH of EtOH cirrhosis c/b gastric variceal bleeding in July 2020 s/p 34u pRBC, IR embolization, and blakemore balloon, ESRD on HD (M/W/F), bladder rupture 2/2 fall s/p ex-lap in 2019, EtOH withdrawal seizures, and hemorrhoids admitted for decompensated liver cirrhosis and large volume ascites with concern for SBP, currently on midodrine. Transferred to MICU for pressor supported paracentesis.      Plan:  #Neuro  - Currently A&Ox3 with delayed responses  - Pt w/ history of hepatic encephalopathy, will f/u ammonia level and c/w miralax q6hr    #Respiratory  - Currently satting well on room air  - CT 11/04 demonstrating large R pleural effusion with near complete collapse of R lung likely 2/2 hepatic hydrothorax  - Will eventually require thoracentesis, awaiting stabilization of INR    #CV  - Hypotension 2/2 ESRD/HD vs possible GI bleed  - C/w midodrine 20mg TID  - Pt has history of hemorrhoids, was found to have BRBPR on 11/01  - Will continue to monitor in case of need for pressor support    #GI  - Decompensated alcoholic cirrhosis with ascites. hx gastric varices s/p IR embolization and known esophageal varices  - Diagnostic paracentesis on 11/04 with 160 PMNs, not meeting criteria for SBP  - s/p ceftriaxone for SBP prophylaxis (11/01 - 11/04)  - Will give FFP x1 before performing large volume paracentesis   - Will obtain TEG study  - c/w IV albumin 25%  - c/w protonix BID    #Renal  - ESRD on HD  - Last HD 11/04 via RIJ tunneled HD catheter  - c/w Renvela 800 mg TID, low phosphate diet    #ID  - Leukocytosis with   - Blood cx drawn in OSH   - Blood cx 11/02 with NGTD    #Heme  - Hepatic coagulopathy + ?acute blood loss anemia  - s/p 1u pRBC and FFP 11/04  - s/p vitamin K 10mg IV x 3  - DVT ppx: SCDs    #GOC  - Full code MICU Transfer Accept Note      HOSPITAL COURSE:   41 yo M w/ PMH of EtOH cirrhosis c/b gastric variceal bleeding in July 2020 s/p 34u pRBC, IR embolization, and blakemore balloon, ESRD on HD (M/W/F), bladder rupture 2/2 fall s/p ex-lap in 2019, EtOH withdrawal seizures, and hemorrhoids, presented to OSH for increased abdominal distention and abdominal pain x 3 days. In OSH ED, VS: HR 130s, /80, RR 24, and SpO2 99% on RA. He received ceftriaxone 1g x 1, 1L IVF, zofran, and dilaudid 1mg IVP which alleviated his pain. Upon arrival to this hospital, VS: T 98, , BP 90/52, and SpO2 96% on RA.    Pt was admitted to the floor and started on ceftriaxone 2g daily for empiric treatment of SBP. CT abd/pelvis 11/04 demonstrated large volume ascites and large R pleural effusion with near complete collapse of R lung c/f hepatic hydrothorax. He was continuously hypotensive to the 80s and started on midodrine 10mg tid. Most recent dialysis yesterday 11/04. IR was consulted for paracentesis but deferred large volume paracentesis due to hypotension with SBPs in 70s and instead performed a diagnostic paracentesis on 11/04. Therapeutic paracentesis was planned for 11/05 but was ultimately deferred due to hypotension with SBP in 80s and MAP 57. MICU now reconsulted for pressure supported paracentesis.         OBJECTIVE:    Vital Signs Last 24 Hrs  T(C): 36.8 (05 Nov 2020 16:42), Max: 36.8 (05 Nov 2020 16:42)  T(F): 98.2 (05 Nov 2020 16:42), Max: 98.2 (05 Nov 2020 16:42)  HR: 102 (05 Nov 2020 16:42) (91 - 109)  BP: 78/42 (05 Nov 2020 16:42) (78/42 - 94/59)  BP(mean): --  RR: 18 (05 Nov 2020 16:42) (18 - 189)  SpO2: 98% (05 Nov 2020 16:42) (93% - 100%)  I&O's Summary    04 Nov 2020 07:01  -  05 Nov 2020 07:00  --------------------------------------------------------  IN: 200 mL / OUT: 0 mL / NET: 200 mL    05 Nov 2020 07:01  -  05 Nov 2020 17:42  --------------------------------------------------------  IN: 220 mL / OUT: 0 mL / NET: 220 mL        PHYSICAL EXAM:  General: lying in bed in NAD, occasional groaning  HEENT: NC/AT, +icteric sclera, PERRLA, EOMI  CV: RRR, normal S1,S2; no murmurs; no peripheral edema   Pulm: lungs CTAB, no crackles, rhonchi, or wheezes  Abd: severely distended, painful to palpation, +bowel sounds  Psych/Neuro: A&Ox3, nonfocal, normal affect  Skin: +jaundice, warm, dry    MEDICATIONS  (STANDING):  albumin human 25% IVPB 100 milliLiter(s) IV Intermittent every 6 hours  fluconAZOLE IVPB 200 milliGRAM(s) IV Intermittent once  folic acid 1 milliGRAM(s) Oral daily  influenza   Vaccine 0.5 milliLiter(s) IntraMuscular once  meropenem  IVPB 500 milliGRAM(s) IV Intermittent once  meropenem  IVPB      midodrine 20 milliGRAM(s) Oral every 8 hours  multivitamin 1 Tablet(s) Oral daily  pantoprazole  Injectable 40 milliGRAM(s) IV Push two times a day  polyethylene glycol 3350 17 Gram(s) Oral <User Schedule>  sevelamer carbonate 800 milliGRAM(s) Oral three times a day    MEDICATIONS  (PRN):  albuterol/ipratropium for Nebulization. 3 milliLiter(s) Nebulizer once PRN Wheezing        LABS                                            7.8                   Neurophils% (auto):   x      (11-05 @ 06:55):    12.34)-----------(64           Lymphocytes% (auto):  x                                             23.9                   Eosinphils% (auto):   x        Manual%: Neutrophils x    ; Lymphocytes x    ; Eosinophils x    ; Bands%: x    ; Blasts x                                    135    |  94     |  21                  Calcium: 9.4   / iCa: x      (11-05 @ 06:52)    ----------------------------<  96        Magnesium: x                                4.0     |  24     |  4.73             Phosphorous: x        TPro  5.8    /  Alb  3.7    /  TBili  12.1   /  DBili  x      /  AST  53     /  ALT  16     /  AlkPhos  63     05 Nov 2020 06:52    ( 11-05 @ 08:31 )   PT: 29.6 sec;   INR: 2.60 ratio  aPTT: x              ASSESSMENT & PLAN:  41 yo M w/ PMH of EtOH cirrhosis c/b gastric variceal bleeding in July 2020 s/p 34u pRBC, IR embolization, and blakemore balloon, ESRD on HD (M/W/F), bladder rupture 2/2 fall s/p ex-lap in 2019, EtOH withdrawal seizures, and hemorrhoids admitted for decompensated liver cirrhosis and large volume ascites with concern for SBP, currently on midodrine. Transferred to MICU for pressor supported paracentesis.      Plan:  #Neuro  - Currently A&Ox3 with delayed responses, lethargic  - Pt w/ history of hepatic encephalopathy, will f/u ammonia level and c/w miralax q6hr    #Respiratory  - Currently satting well on room air  - CT 11/04 demonstrating large R pleural effusion with near complete collapse of R lung likely 2/2 hepatic hydrothorax  - Will eventually require thoracentesis, awaiting stabilization of INR    #CV  - Hypotension 2/2 ESRD/HD vs possible GI bleed  - C/w midodrine 20mg TID  - Pt has history of hemorrhoids, was found to have BRBPR on 11/01  - Will continue to monitor in case of need for pressor support    #GI  - Decompensated alcoholic cirrhosis with ascites. hx gastric varices s/p IR embolization and known esophageal varices  - Diagnostic paracentesis on 11/04 with 160 PMNs, not meeting criteria for SBP  - s/p ceftriaxone for SBP prophylaxis (11/01 - 11/04)  - Will give FFP x1 before performing large volume paracentesis   - Will obtain TEG study  - c/w IV albumin 25%  - c/w protonix BID    #Renal  - ESRD on HD  - Last HD 11/04 via RIJ tunneled HD catheter  - c/w Renvela 800 mg TID, low phosphate diet    #ID  - Afebrile with leukocytosis  - Blood cx drawn in OSH ([facility]) on 10/31 grew E. coli in aerobic bottle, sensitivities not available  - Blood cx 11/02 with NGTD  - Lactate 4.6 likely 2/2 impaired hepatic clearance  - c/w fluconazole + meropenem on HD days (starting 11/06)  - s/p ceftriaxone (11/01 - 11/04)    #Heme  - Hepatic coagulopathy + ?acute blood loss anemia  - Hb 9.6 on admission, now 7.4  - s/p 1u pRBC and FFP 11/04, will give FFP x1 11/05 before paracentesis  - s/p vitamin K 10mg IV x 3  - DVT ppx: SCDs    #GOC  - Full code MICU Transfer Accept Note      HOSPITAL COURSE:   41 yo M w/ PMH of EtOH cirrhosis c/b gastric variceal bleeding in July 2020 s/p 34u pRBC, IR embolization, and blakemore balloon, ESRD on HD (M/W/F), bladder rupture 2/2 fall s/p ex-lap in 2019, EtOH withdrawal seizures, and hemorrhoids, presented to OSH for increased abdominal distention and abdominal pain x 3 days. In OSH ED, VS: HR 130s, /80, RR 24, and SpO2 99% on RA. He received ceftriaxone 1g x 1, 1L IVF, zofran, and dilaudid 1mg IVP which alleviated his pain. Upon arrival to this hospital, VS: T 98, , BP 90/52, and SpO2 96% on RA.    Pt was admitted to the floor and started on ceftriaxone 2g daily for empiric treatment of SBP. CT abd/pelvis 11/04 demonstrated large volume ascites and large R pleural effusion with near complete collapse of R lung c/f hepatic hydrothorax. He was continuously hypotensive to the 80s and started on midodrine 10mg tid. Most recent dialysis yesterday 11/04. IR was consulted for paracentesis but deferred large volume paracentesis due to hypotension with SBPs in 70s and instead performed a diagnostic paracentesis on 11/04. Therapeutic paracentesis was planned for 11/05 but was ultimately deferred due to hypotension with SBP in 80s and MAP 57. MICU now reconsulted for pressure supported paracentesis.         OBJECTIVE:    Vital Signs Last 24 Hrs  T(C): 36.8 (05 Nov 2020 16:42), Max: 36.8 (05 Nov 2020 16:42)  T(F): 98.2 (05 Nov 2020 16:42), Max: 98.2 (05 Nov 2020 16:42)  HR: 102 (05 Nov 2020 16:42) (91 - 109)  BP: 78/42 (05 Nov 2020 16:42) (78/42 - 94/59)  BP(mean): --  RR: 18 (05 Nov 2020 16:42) (18 - 189)  SpO2: 98% (05 Nov 2020 16:42) (93% - 100%)  I&O's Summary    04 Nov 2020 07:01  -  05 Nov 2020 07:00  --------------------------------------------------------  IN: 200 mL / OUT: 0 mL / NET: 200 mL    05 Nov 2020 07:01  -  05 Nov 2020 17:42  --------------------------------------------------------  IN: 220 mL / OUT: 0 mL / NET: 220 mL        PHYSICAL EXAM:  General: lying in bed in NAD, occasional groaning  HEENT: NC/AT, +icteric sclera, PERRLA, EOMI  CV: RRR, normal S1,S2; no murmurs; no peripheral edema   Pulm: lungs CTAB, no crackles, rhonchi, or wheezes  Abd: severely distended, painful to palpation, +bowel sounds  Psych/Neuro: A&Ox3, nonfocal, normal affect  Skin: +jaundice, warm, dry    MEDICATIONS  (STANDING):  albumin human 25% IVPB 100 milliLiter(s) IV Intermittent every 6 hours  fluconAZOLE IVPB 200 milliGRAM(s) IV Intermittent once  folic acid 1 milliGRAM(s) Oral daily  influenza   Vaccine 0.5 milliLiter(s) IntraMuscular once  meropenem  IVPB 500 milliGRAM(s) IV Intermittent once  meropenem  IVPB      midodrine 20 milliGRAM(s) Oral every 8 hours  multivitamin 1 Tablet(s) Oral daily  pantoprazole  Injectable 40 milliGRAM(s) IV Push two times a day  polyethylene glycol 3350 17 Gram(s) Oral <User Schedule>  sevelamer carbonate 800 milliGRAM(s) Oral three times a day    MEDICATIONS  (PRN):  albuterol/ipratropium for Nebulization. 3 milliLiter(s) Nebulizer once PRN Wheezing        LABS                                            7.8                   Neurophils% (auto):   x      (11-05 @ 06:55):    12.34)-----------(64           Lymphocytes% (auto):  x                                             23.9                   Eosinphils% (auto):   x        Manual%: Neutrophils x    ; Lymphocytes x    ; Eosinophils x    ; Bands%: x    ; Blasts x                                    135    |  94     |  21                  Calcium: 9.4   / iCa: x      (11-05 @ 06:52)    ----------------------------<  96        Magnesium: x                                4.0     |  24     |  4.73             Phosphorous: x        TPro  5.8    /  Alb  3.7    /  TBili  12.1   /  DBili  x      /  AST  53     /  ALT  16     /  AlkPhos  63     05 Nov 2020 06:52    ( 11-05 @ 08:31 )   PT: 29.6 sec;   INR: 2.60 ratio  aPTT: x              ASSESSMENT & PLAN:  41 yo M w/ PMH of EtOH cirrhosis c/b gastric variceal bleeding in July 2020 s/p 34u pRBC, IR embolization, and blakemore balloon, ESRD on HD (M/W/F), bladder rupture 2/2 fall s/p ex-lap in 2019, EtOH withdrawal seizures, and hemorrhoids admitted for decompensated liver cirrhosis and large volume ascites with concern for SBP, currently on midodrine. Transferred to MICU for pressor supported paracentesis.      Plan:  #Neuro  - Currently A&Ox3 with delayed responses, lethargic  - Pt w/ history of hepatic encephalopathy, will f/u ammonia level and c/w miralax q6hr    #Respiratory  - Currently satting well on room air  - CT 11/04 demonstrating large R pleural effusion with near complete collapse of R lung likely 2/2 hepatic hydrothorax  - will preform thora if still causing compromise after para    #CV  - Hypotension 2/2 chronic multiorgan failure.   - C/w midodrine 20mg TID map goal 60-65   - Will continue to monitor in case of need for pressor support    #GI  - Decompensated alcoholic cirrhosis with ascites. hx gastric varices s/p IR embolization and known esophageal varices  - needs transplant and will likely need TIPS while waiting.   - Diagnostic paracentesis on 11/04 with 160 PMNs, not meeting criteria for SBP  - s/p ceftriaxone for SBP prophylaxis (11/01 - 11/04)  - Will give FFP x1 before performing large volume paracentesis   - Will obtain TEG study  - c/w IV albumin 25%  - c/w protonix BID    #Renal  - ESRD on HD  - Last HD 11/04 via RIJ tunneled HD catheter  - c/w Renvela 800 mg TID, low phosphate diet    #ID  - Afebrile with leukocytosis  - Blood cx drawn in OSH ([facility]) on 10/31 grew E. coli in aerobic bottle, pan sensitive  - Blood cx 11/02 with NGTD  - Lactate 4.6 likely 2/2 impaired hepatic clearance  - c/w fluconazole + meropenem on HD days (starting 11/06) will change to ceftriaxone 2g      #Heme  - Hepatic coagulopathy + ?acute blood loss anemia  - No obvious source of loss but will monitor consider patient had hx of lifethreatening bleed.   - s/p 1u pRBC and FFP 11/04, will give FFP x1 11/05 before paracentesis, given Cry x 5 u as well.   - s/p vitamin K 10mg IV x 3  - DVT ppx: SCDs    #GOC  - Full code

## 2020-11-05 NOTE — PROGRESS NOTE ADULT - SUBJECTIVE AND OBJECTIVE BOX
Chief Complaint:  Patient is a 40y old  Male who presents with a chief complaint of HRS (2020 08:14)      Interval Events: Patient unable to get LVP given c/f hypotension.  Allergies:  No Known Allergies      Hospital Medications:  albumin human 25% IVPB 100 milliLiter(s) IV Intermittent every 6 hours  albuterol/ipratropium for Nebulization. 3 milliLiter(s) Nebulizer once PRN  cefTRIAXone   IVPB 2000 milliGRAM(s) IV Intermittent every 24 hours  folic acid 1 milliGRAM(s) Oral daily  influenza   Vaccine 0.5 milliLiter(s) IntraMuscular once  midodrine 20 milliGRAM(s) Oral every 8 hours  multivitamin 1 Tablet(s) Oral daily  pantoprazole  Injectable 40 milliGRAM(s) IV Push two times a day  polyethylene glycol 3350 17 Gram(s) Oral <User Schedule>  sevelamer carbonate 800 milliGRAM(s) Oral three times a day      PMHX/PSHX:  End-stage renal disease (ESRD)    Cirrhosis, alcoholic    No pertinent past medical history    S/P exploratory laparotomy    No significant past surgical history        Family history:  FH: alpha 1 antitrypsin deficiency    No pertinent family history in first degree relatives        ROS: As per HPI, 14-point ROS negative otherwise.        PHYSICAL EXAM:     Vital Signs:  Vital Signs Last 24 Hrs  T(C): 36.7 (2020 03:43), Max: 36.8 (2020 14:30)  T(F): 98 (2020 03:43), Max: 98.2 (2020 14:30)  HR: 107 (2020 03:43) (102 - 109)  BP: 93/55 (2020 03:43) (85/44 - 94/58)  BP(mean): --  RR: 18 (2020 03:43) (17 - 189)  SpO2: 97% (2020 03:43) (93% - 100%)  Daily     Daily Weight in k (2020 14:30)    GENERAL: ill appearing, fatigued  HEENT:  NC/AT,  +scleral icterus  CHEST:  no increased effort  HEART:  Regular rate and rhythm  ABDOMEN:  Soft, distended, TTP  EXTREMITIES:  no cyanosis, clubbing or edema  SKIN:  jaundice  NEURO: nonfocal but confused, AAOx1    LABS:                        7.8    12.34 )-----------( 64       ( 2020 06:55 )             23.9     11-05    135  |  94<L>  |  21  ----------------------------<  96  4.0   |  24  |  4.73<H>    Ca    9.4      2020 06:52    TPro  5.8<L>  /  Alb  3.7  /  TBili  12.1<H>  /  DBili  x   /  AST  53<H>  /  ALT  16  /  AlkPhos  63  11-05    LIVER FUNCTIONS - ( 2020 06:52 )  Alb: 3.7 g/dL / Pro: 5.8 g/dL / ALK PHOS: 63 U/L / ALT: 16 U/L / AST: 53 U/L / GGT: x           PT/INR - ( 2020 08:31 )   PT: 29.6 sec;   INR: 2.60 ratio                 Imaging:

## 2020-11-06 NOTE — CHART NOTE - NSCHARTNOTEFT_GEN_A_CORE
: Maria Bass MD, Ayo Teixeira MD    INDICATION: Pleural effusion    PROCEDURE:  [ ] LIMITED ECHO  [X] LIMITED CHEST  [ ] LIMITED RETROPERITONEAL  [ ] LIMITED ABDOMINAL  [ ] LIMITED DVT  [ ] NEEDLE GUIDANCE VASCULAR  [ ] NEEDLE GUIDANCE THORACENTESIS  [ ] NEEDLE GUIDANCE PARACENTESIS  [ ] NEEDLE GUIDANCE PERICARDIOCENTESIS  [ ] OTHER    FINDINGS: Large right pleural effusion. No left pleural effusion. Intra-abdominal ascites noted bilaterally.    INTERPRETATION: Large right pleural effusion. Ascites.

## 2020-11-06 NOTE — PROGRESS NOTE ADULT - SUBJECTIVE AND OBJECTIVE BOX
[facility] DIVISION OF KIDNEY DISEASES AND HYPERTENSION -- FOLLOW UP NOTE  --------------------------------------------------------------------------------  HPI: 40-year-old male with  ESRD on HD MWF,  traumatic bladder rupture 2/2 fall s/p ex-lap in 2019, ETOH cirrhosis c/b gastric varices, recent admission (7/2020) for hemorrhagic shock 2/2 variceal bleeding, acute renal failure requiring HD, and ETOH withdrawal seizure, recently admitted in 9/2020 for GIB who presented to [facility] on [facility]20 for worsening abdominal pain. Pt. had RRT yesterday and was transferred to MICU. Pt. underwent last paracentesis yesterday with 7L UF. Nephrology consulted for management of ESRD and HD. Last HD on 11/4/20 via RIJ tunneled HD catheter.     Pt. evaluated at bedside, in no acute distress. Pt. remains hypotensive but off IV pressor supports. Will arrange for maintenance HD today.    PAST HISTORY  --------------------------------------------------------------------------------  No significant changes to PMH, PSH, FHx, SHx, unless otherwise noted    ALLERGIES & MEDICATIONS  --------------------------------------------------------------------------------  Allergies    No Known Allergies    Intolerances    Standing Inpatient Medications  albumin human 25% IVPB 100 milliLiter(s) IV Intermittent every 6 hours  cefTRIAXone   IVPB 2000 milliGRAM(s) IV Intermittent every 24 hours  chlorhexidine 4% Liquid 1 Application(s) Topical <User Schedule>  chlorhexidine 4% Liquid 1 Application(s) Topical <User Schedule>  fluconAZOLE IVPB      fluconAZOLE IVPB 200 milliGRAM(s) IV Intermittent every 24 hours  folic acid 1 milliGRAM(s) Oral daily  influenza   Vaccine 0.5 milliLiter(s) IntraMuscular once  midodrine 20 milliGRAM(s) Oral every 8 hours  multivitamin 1 Tablet(s) Oral daily  pantoprazole  Injectable 40 milliGRAM(s) IV Push two times a day  polyethylene glycol 3350 17 Gram(s) Oral <User Schedule>  sevelamer carbonate 800 milliGRAM(s) Oral three times a day    REVIEW OF SYSTEMS  --------------------------------------------------------------------------------  Gen: + fatigue  Respiratory: No dyspnea  CV: No chest pain  GI: no abdominal pain  MSK: no LE edema  Neuro: No dizziness  Heme: No bleeding    All other systems were reviewed and are negative, except as noted.    VITALS/PHYSICAL EXAM  --------------------------------------------------------------------------------  T(C): 36.7 (11-06-20 @ 06:45), Max: 36.9 (11-05-20 @ 23:00)  HR: 102 (11-06-20 @ 07:00) (91 - 108)  BP: 88/52 (11-06-20 @ 07:00) (78/42 - 106/55)  RR: 20 (11-06-20 @ 07:00) (18 - 27)  SpO2: 95% (11-06-20 @ 07:00) (94% - 99%)  Wt(kg): --  Height (cm): 180.3 (11-05-20 @ 17:41)  Weight (kg): 82.5 (11-05-20 @ 17:41)  BMI (kg/m2): 25.4 (11-05-20 @ 17:41)  BSA (m2): 2.03 (11-05-20 @ 17:41)    11-05-20 @ 07:01  -  11-06-20 @ 07:00  --------------------------------------------------------  IN: 1476 mL / OUT: 7000 mL / NET: -5524 mL    Physical Exam:  	Gen: NAD  	HEENT: icterus  	Pulm: CTA B/L  	CV: S1S2  	Abd: Soft, tense, ascites+   	Ext: trace LE edema B/L  	Neuro: Awake  	Skin: Warm and dry  	Vascular access: RIJ tunneled HD catheter +. no bleeding    LABS/STUDIES  --------------------------------------------------------------------------------              8.1    9.18  >-----------<  40       [11-06-20 @ 07:09]              24.1     136  |  97  |  30  ----------------------------<  120      [11-06-20 @ 07:09]  4.3   |  26  |  5.97        Ca     9.2     [11-06-20 @ 07:09]      Mg     2.0     [11-06-20 @ 00:34]      Phos  3.9     [11-06-20 @ 00:34]    Creatinine Trend:  SCr 5.97 [11-06 @ 07:09]  SCr 5.56 [11-06 @ 00:34]  SCr 5.02 [11-05 @ 19:27]  SCr 4.73 [11-05 @ 06:52]  SCr 6.31 [11-04 @ 06:57]

## 2020-11-06 NOTE — PROGRESS NOTE ADULT - SUBJECTIVE AND OBJECTIVE BOX
CC: F/U for Bacteremia    Saw/spoke to patient. In ICU for hypotension with para. Improved but still generally feels fatigued, ill appearing.    Allergies  No Known Allergies    ANTIMICROBIALS:  cefTRIAXone   IVPB 2000 every 24 hours  fluconAZOLE IVPB    fluconAZOLE IVPB 200 every 24 hours    PE:    Vital Signs Last 24 Hrs  T(C): 36.4 (06 Nov 2020 08:00), Max: 36.9 (05 Nov 2020 23:00)  T(F): 97.5 (06 Nov 2020 08:00), Max: 98.5 (05 Nov 2020 23:00)  HR: 100 (06 Nov 2020 08:00) (91 - 108)  BP: 93/51 (06 Nov 2020 08:00) (78/42 - 106/55)  BP(mean): 69 (06 Nov 2020 08:00) (58 - 76)  RR: 17 (06 Nov 2020 08:00) (17 - 27)  SpO2: 97% (06 Nov 2020 08:00) (94% - 99%)    Gen: AOx3, NAD, non-toxic  CV: S1+S2 normal, nontachycardic  Resp: Clear bilat, no resp distress, no crackles/wheezes  Abd: Soft, nontender, +BS, distended  Ext: No LE edema, no wounds    LABS:                        8.1    9.18  )-----------( 40       ( 06 Nov 2020 07:09 )             24.1     11-06    136  |  97  |  30<H>  ----------------------------<  120<H>  4.3   |  26  |  5.97<H>    Ca    9.2      06 Nov 2020 07:09  Phos  3.9     11-06  Mg     2.0     11-06    TPro  5.1<L>  /  Alb  3.3  /  TBili  9.2<H>  /  DBili  x   /  AST  41<H>  /  ALT  12  /  AlkPhos  47  11-06    MICROBIOLOGY:    .Body Fluid Peritoneal Fluid  11-06-20   Testing in progress    .Body Fluid PERITONEAL  11-05-20   No growth    .Body Fluid Peritoneal Fluid  11-05-20   No growth  --    polymorphonuclear leukocytes seen  No organisms seen  by cytocentrifuge    CMV IgG Antibody: 0.51 U/mL (08-08-20 @ 09:47)  Toxoplasma IgG Screen: <3.0 IU/mL (08-08-20 @ 09:47)    (otherwise reviewed)    RADIOLOGY:    11/4 CT:    IMPRESSION:  Cirrhosis with evidence of portal hypertension. Large volume ascites.    Patchy opacities in the left lung may be due to infection.    Redemonstration of a large right pleural effusion with complete atelectasis of the right lower lobe and partial atelectasis of the right upper and middle lobes.

## 2020-11-06 NOTE — PROGRESS NOTE ADULT - SUBJECTIVE AND OBJECTIVE BOX
CHIEF COMPLAINT:    Interval Events:    REVIEW OF SYSTEMS:  CONSTITUTIONAL: No weakness, fevers or chills  EYES/ENT: No visual changes;  No vertigo or throat pain   NECK: No pain or stiffness  RESPIRATORY: No cough, wheezing, hemoptysis; No shortness of breath  CARDIOVASCULAR: No chest pain or palpitations  GASTROINTESTINAL: No abdominal or epigastric pain. No nausea, vomiting, or hematemesis; No diarrhea or constipation. No melena or hematochezia.  GENITOURINARY: No dysuria, frequency or hematuria  NEUROLOGICAL: No numbness or weakness  SKIN: No itching, burning, rashes, or lesions   All other review of systems is negative unless indicated above.    OBJECTIVE:  ICU Vital Signs Last 24 Hrs  T(C): 36.4 (06 Nov 2020 08:00), Max: 36.9 (05 Nov 2020 23:00)  T(F): 97.5 (06 Nov 2020 08:00), Max: 98.5 (05 Nov 2020 23:00)  HR: 100 (06 Nov 2020 08:00) (91 - 108)  BP: 93/51 (06 Nov 2020 08:00) (78/42 - 106/55)  BP(mean): 69 (06 Nov 2020 08:00) (58 - 76)  ABP: --  ABP(mean): --  RR: 17 (06 Nov 2020 08:00) (17 - 27)  SpO2: 97% (06 Nov 2020 08:00) (94% - 99%)        11-05 @ 07:01  -  11-06 @ 07:00  --------------------------------------------------------  IN: 1476 mL / OUT: 7000 mL / NET: -5524 mL      CAPILLARY BLOOD GLUCOSE  120 (06 Nov 2020 07:00)      POCT Blood Glucose.: 120 mg/dL (06 Nov 2020 07:03)      PHYSICAL EXAM:  General: WN/WD NAD  Neurology: A&Ox3, nonfocal, LEWIS x 4  Eyes: PERRLA/ EOMI, Gross vision intact  ENT/Neck: Neck supple, trachea midline, No JVD, Gross hearing intact  Respiratory: CTA B/L, No wheezing, rales, rhonchi  CV: RRR, +S1/S2, -S3/S4, no murmurs, rubs or gallops  Abdominal: Soft, NT, ND +BS, No HSM  MSK: 5/5 strength UE/LE bilaterally  Extremities: No edema, 2+ peripheral pulses  Skin: No Rashes, Hematoma, Ecchymosis  Incisions:   Tubes:    HOSPITAL MEDICATIONS:  MEDICATIONS  (STANDING):  albumin human 25% IVPB 100 milliLiter(s) IV Intermittent every 6 hours  cefTRIAXone   IVPB 2000 milliGRAM(s) IV Intermittent every 24 hours  chlorhexidine 4% Liquid 1 Application(s) Topical <User Schedule>  chlorhexidine 4% Liquid 1 Application(s) Topical <User Schedule>  epoetin chance-epbx (RETACRIT) Injectable 25383 Unit(s) IV Push <User Schedule>  fluconAZOLE IVPB      fluconAZOLE IVPB 200 milliGRAM(s) IV Intermittent every 24 hours  folic acid 1 milliGRAM(s) Oral daily  influenza   Vaccine 0.5 milliLiter(s) IntraMuscular once  midodrine 20 milliGRAM(s) Oral every 8 hours  multivitamin 1 Tablet(s) Oral daily  pantoprazole  Injectable 40 milliGRAM(s) IV Push two times a day  polyethylene glycol 3350 17 Gram(s) Oral <User Schedule>  sevelamer carbonate 800 milliGRAM(s) Oral three times a day    MEDICATIONS  (PRN):  albuterol/ipratropium for Nebulization. 3 milliLiter(s) Nebulizer once PRN Wheezing      LABS:                        8.1    9.18  )-----------( 40       ( 06 Nov 2020 07:09 )             24.1     Hgb Trend: 8.1<--, 6.6<--, 7.4<--, 7.8<--, 6.8<--  11-06    136  |  97  |  30<H>  ----------------------------<  120<H>  4.3   |  26  |  5.97<H>    Ca    9.2      06 Nov 2020 07:09  Phos  3.9     11-06  Mg     2.0     11-06    TPro  5.1<L>  /  Alb  3.3  /  TBili  9.2<H>  /  DBili  x   /  AST  41<H>  /  ALT  12  /  AlkPhos  47  11-06    Creatinine Trend: 5.97<--, 5.56<--, 5.02<--, 4.73<--, 6.31<--, 5.44<--  PT/INR - ( 06 Nov 2020 00:34 )   PT: 31.8 sec;   INR: 2.78 ratio         PTT - ( 05 Nov 2020 19:27 )  PTT:69.1 sec      Venous Blood Gas:  11-06 @ 00:32  7.44/42/66/28/94  VBG Lactate: 2.6      MICROBIOLOGY:     Culture - Fungal, Body Fluid (collected 06 Nov 2020 02:13)  Source: .Body Fluid Peritoneal Fluid  Preliminary Report (06 Nov 2020 06:33):    Testing in progress    Culture - Body Fluid with Gram Stain (collected 05 Nov 2020 00:46)  Source: .Body Fluid PERITONEAL  Preliminary Report (05 Nov 2020 19:14):    No growth    Culture - Acid Fast - Body Fluid w/Smear (collected 05 Nov 2020 00:39)  Source: .Body Fluid Peritoneal Fluid    Culture - Fungal, Body Fluid (collected 05 Nov 2020 00:39)  Source: .Body Fluid Peritoneal Fluid  Preliminary Report (05 Nov 2020 09:14):    Testing in progress    Culture - Body Fluid with Gram Stain (collected 05 Nov 2020 00:39)  Source: .Body Fluid Peritoneal Fluid  Gram Stain (05 Nov 2020 03:47):    polymorphonuclear leukocytes seen    No organisms seen    by cytocentrifuge  Preliminary Report (05 Nov 2020 18:55):    No growth        RADIOLOGY:  [ ] Reviewed by me    PULMONARY FUNCTION TESTS:    EKG:

## 2020-11-06 NOTE — PROGRESS NOTE ADULT - PROBLEM SELECTOR PLAN 1
Pt. with ESRD on HD three times a week (MWF). Last HD was on 11/4/20 via RIJ tunneled HD catheter. Pt. clinically stable. Labs reviewed. Will arrange for maintenance HD today. Monitor labs

## 2020-11-06 NOTE — PROGRESS NOTE ADULT - SUBJECTIVE AND OBJECTIVE BOX
Chief Complaint:  Patient is a 40y old  Male who presents with a chief complaint of HRS (2020 08:31)      Interval Events: Patient s/p paracentesis. Still confused.     Allergies:  No Known Allergies      Hospital Medications:  albumin human 25% IVPB 100 milliLiter(s) IV Intermittent every 6 hours  albuterol/ipratropium for Nebulization. 3 milliLiter(s) Nebulizer once PRN  cefTRIAXone   IVPB 2000 milliGRAM(s) IV Intermittent every 24 hours  chlorhexidine 4% Liquid 1 Application(s) Topical <User Schedule>  chlorhexidine 4% Liquid 1 Application(s) Topical <User Schedule>  epoetin chance-epbx (RETACRIT) Injectable 33425 Unit(s) IV Push <User Schedule>  fluconAZOLE IVPB      fluconAZOLE IVPB 200 milliGRAM(s) IV Intermittent every 24 hours  folic acid 1 milliGRAM(s) Oral daily  influenza   Vaccine 0.5 milliLiter(s) IntraMuscular once  midodrine 20 milliGRAM(s) Oral every 8 hours  multivitamin 1 Tablet(s) Oral daily  pantoprazole  Injectable 40 milliGRAM(s) IV Push two times a day  polyethylene glycol 3350 17 Gram(s) Oral <User Schedule>  sevelamer carbonate 800 milliGRAM(s) Oral three times a day      PMHX/PSHX:  End-stage renal disease (ESRD)    Cirrhosis, alcoholic    No pertinent past medical history    S/P exploratory laparotomy    No significant past surgical history        Family history:  FH: alpha 1 antitrypsin deficiency    No pertinent family history in first degree relatives        ROS: As per HPI, 14-point ROS negative otherwise.    PHYSICAL EXAM:     Vital Signs:  Vital Signs Last 24 Hrs  T(C): 36.4 (2020 08:00), Max: 36.9 (2020 23:00)  T(F): 97.5 (2020 08:00), Max: 98.5 (2020 23:00)  HR: 100 (2020 08:00) (91 - 108)  BP: 93/51 (2020 08:00) (78/42 - 106/55)  BP(mean): 69 (2020 08:00) (58 - 76)  RR: 17 (2020 08:00) (17 - 27)  SpO2: 97% (2020 08:00) (94% - 99%)  Daily Height in cm: 180.34 (2020 17:41)    Daily Weight in k.4 (2020 06:15)    GENERAL: ill appearing, fatigued  HEENT:  NC/AT,  +scleral icterus  CHEST:  no increased effort  HEART:  Regular rate and rhythm  ABDOMEN:  Soft, distended, TTP  EXTREMITIES:  no cyanosis, clubbing or edema  SKIN:  jaundice  NEURO: confused, AAOx1    LABS:                        8.1    9.18  )-----------( 40       ( 2020 07:09 )             24.1     11-06    136  |  97  |  30<H>  ----------------------------<  120<H>  4.3   |  26  |  5.97<H>    Ca    9.2      2020 07:09  Phos  3.9     11-06  Mg     2.0     11-06    TPro  5.1<L>  /  Alb  3.3  /  TBili  9.2<H>  /  DBili  x   /  AST  41<H>  /  ALT  12  /  AlkPhos  47  11-06    LIVER FUNCTIONS - ( 2020 00:34 )  Alb: 3.3 g/dL / Pro: 5.1 g/dL / ALK PHOS: 47 U/L / ALT: 12 U/L / AST: 41 U/L / GGT: x           PT/INR - ( 2020 00:34 )   PT: 31.8 sec;   INR: 2.78 ratio         PTT - ( 2020 19:27 )  PTT:69.1 sec    Amylase Serum--      Lipase serum--       [id number]      Imaging:             Chief Complaint:  Patient is a 40y old  Male who presents with a chief complaint of HRS (2020 08:31)      Interval Events: Patient s/p paracentesis. Still confused.     Allergies:  No Known Allergies      Hospital Medications:  albumin human 25% IVPB 100 milliLiter(s) IV Intermittent every 6 hours  albuterol/ipratropium for Nebulization. 3 milliLiter(s) Nebulizer once PRN  cefTRIAXone   IVPB 2000 milliGRAM(s) IV Intermittent every 24 hours  chlorhexidine 4% Liquid 1 Application(s) Topical <User Schedule>  chlorhexidine 4% Liquid 1 Application(s) Topical <User Schedule>  epoetin chance-epbx (RETACRIT) Injectable 32069 Unit(s) IV Push <User Schedule>  fluconAZOLE IVPB      fluconAZOLE IVPB 200 milliGRAM(s) IV Intermittent every 24 hours  folic acid 1 milliGRAM(s) Oral daily  influenza   Vaccine 0.5 milliLiter(s) IntraMuscular once  midodrine 20 milliGRAM(s) Oral every 8 hours  multivitamin 1 Tablet(s) Oral daily  pantoprazole  Injectable 40 milliGRAM(s) IV Push two times a day  polyethylene glycol 3350 17 Gram(s) Oral <User Schedule>  sevelamer carbonate 800 milliGRAM(s) Oral three times a day      PMHX/PSHX:  End-stage renal disease (ESRD)    Cirrhosis, alcoholic    No pertinent past medical history    S/P exploratory laparotomy    No significant past surgical history        Family history:  FH: alpha 1 antitrypsin deficiency    No pertinent family history in first degree relatives        ROS: As per HPI, 14-point ROS negative otherwise.    PHYSICAL EXAM:     Vital Signs:  Vital Signs Last 24 Hrs  T(C): 36.4 (2020 08:00), Max: 36.9 (2020 23:00)  T(F): 97.5 (2020 08:00), Max: 98.5 (2020 23:00)  HR: 100 (2020 08:00) (91 - 108)  BP: 93/51 (2020 08:00) (78/42 - 106/55)  BP(mean): 69 (2020 08:00) (58 - 76)  RR: 17 (2020 08:00) (17 - 27)  SpO2: 97% (2020 08:00) (94% - 99%)  Daily Height in cm: 180.34 (2020 17:41)    Daily Weight in k.4 (2020 06:15)    GENERAL: ill appearing, fatigued  HEENT:  NC/AT,  +scleral icterus  CHEST:  no increased effort  HEART:  Regular rate and rhythm  ABDOMEN:  Soft, distended but improved, mildly TTP  EXTREMITIES:  no cyanosis, clubbing or edema  SKIN:  jaundice  NEURO: confused, AAOx0 unable to answer questions    LABS:                        8.1    9.18  )-----------( 40       ( 2020 07:09 )             24.1     11-06    136  |  97  |  30<H>  ----------------------------<  120<H>  4.3   |  26  |  5.97<H>    Ca    9.2      2020 07:09  Phos  3.9     11-06  Mg     2.0     11-06    TPro  5.1<L>  /  Alb  3.3  /  TBili  9.2<H>  /  DBili  x   /  AST  41<H>  /  ALT  12  /  AlkPhos  47  11-06    LIVER FUNCTIONS - ( 2020 00:34 )  Alb: 3.3 g/dL / Pro: 5.1 g/dL / ALK PHOS: 47 U/L / ALT: 12 U/L / AST: 41 U/L / GGT: x           PT/INR - ( 2020 00:34 )   PT: 31.8 sec;   INR: 2.78 ratio         PTT - ( 2020 19:27 )  PTT:69.1 sec    Amylase Serum--      Lipase serum--       [id number]      Imaging:

## 2020-11-06 NOTE — PROGRESS NOTE ADULT - ASSESSMENT
39 yo M w/ PMH of EtOH cirrhosis c/b gastric variceal bleeding in July 2020 s/p 34u pRBC, IR embolization, and blakemore balloon, ESRD on HD (M/W/F), bladder rupture 2/2 fall s/p ex-lap in 2019, EtOH withdrawal seizures, and hemorrhoids admitted for decompensated liver cirrhosis and large volume ascites with concern for SBP, currently on midodrine. Transferred to MICU for pressor supported paracentesis.      Plan:  #Neuro  - Currently A&Ox2 with delayed responses, lethargic  - Pt w/ history of hepatic encephalopathy, will f/u ammonia level and c/w miralax q2h  - CT head negative for acute pathology    #Respiratory  - Currently satting well on room air  - CT 11/04 demonstrating large R pleural effusion with near complete collapse of R lung likely 2/2 hepatic hydrothorax  -no plan for thora    #CV  - Hypotension 2/2 chronic multiorgan failure.   - C/w midodrine 20mg TID map goal 55-60  - Will continue to monitor in case of need for pressor support    #GI  - Decompensated alcoholic cirrhosis with ascites. hx gastric varices s/p IR embolization and known esophageal varice  - Diagnostic paracentesis on 11/04 with 160 PMNs, not meeting criteria for SBP  - s/p ceftriaxone for SBP prophylaxis (11/01 - 11/04)  - s/p large volume paracentesis 11/5, ~7L removed  - c/w protonix BID    #Renal  - ESRD on HD  - Last HD 11/04 via RIJ tunneled HD catheter  - c/w Renvela 800 mg TID, low phosphate diet    #ID  - Afebrile with leukocytosis  - Blood cx drawn in OSH ([facility]) on 10/31 grew E. coli in aerobic bottle, pan sensitive  - Blood cx 11/02 with NGTD  - Lactate 4.6 likely 2/2 impaired hepatic clearance  - c/w fluconazole + ceftriaxone 2g    #Heme  - Hepatic coagulopathy + ?acute blood loss anemia  - No obvious source of loss but will monitor consider patient had hx of lifethreatening bleed.   - s/p 1u pRBC and FFP 11/04, FFP x1 11/05 before paracentesis, given Cry x 5 u as well.   - s/p vitamin K 10mg IV x 3  - Possible transfer to [facility] for transplant eval, pending confirmation    - DVT ppx: SCDs    #GOC  - Full code.    Milton Mejia MD, PGY-2 Resident  Department of Internal Medicine  Pager: [phone] (NS) / [phone] (YAEL)  Email: [email or web address]

## 2020-11-06 NOTE — PROGRESS NOTE ADULT - ASSESSMENT
40 year old man with hx of decompensated EtOH cirrhosis (Dx'd 7/2020) complicated by gastric variceal bleeding (7/2020) s/p 34u pRBC, IR embolization and blakemore balloon, ESRD on HD (M/W/F), bladder rupture 2/2 fall s/p ex-lap in 2019, hemorrhoids, presented to OSH for increased abdominal distention and abdominal pain x3 days    # Abdominal Pain: improved slightly on exam but unable to take hx given HE. Likely related to ascites and possibly some level of significant bloating. CT negative without perforation. Patient with possible SBP given elevated lactate and HE.   # Decompensated EtOH Cirrhosis - MELD-Na: 40 (11/5/2020)  HE: Grade 2 currently  Ascites: was large on Exam and TTP, no SBP (160 PMNs), s/p para on 11/5 with 7L removed  HCC: No lesions on MRI 8/2020  Varices: Hx of gastric varices s/p IR embolization, Grade I esophageal varices seen 9/2020  # ESRD on hemodialysis   # Pleural effusion likely secondary to hepatic hydrothorax  # Anemia: possible secondary to ESRD +/- slow GI bleed. Has hx of varices    Recommendation:  - please place NGT and begin miralax q2 hours until having adequate BMs  - continue with Abx  - okay to continue with midodrine  - continue with ppi daily  - await PETH level  - Trend Coags, CMP, CBC  - Supportive care per primary team   40 year old man with hx of decompensated EtOH cirrhosis (Dx'd 7/2020) complicated by gastric variceal bleeding (7/2020) s/p 34u pRBC, IR embolization and blakemore balloon, ESRD on HD (M/W/F), bladder rupture 2/2 fall s/p ex-lap in 2019, hemorrhoids, presented to OSH for increased abdominal distention and abdominal pain x3 days    # Abdominal Pain: improved slightly on exam but unable to take hx given HE. Likely related to ascites and possibly some level of significant bloating. CT negative without perforation. Patient with possible SBP given elevated lactate and HE.   # Encepholopathy: ongoing. unclear precipitant. Not having adequate BMs. Unclear if underlying neurologic issue as well. Possible recent alcohol use, PETH level pending.  # Decompensated EtOH Cirrhosis - MELD-Na: 40 (11/5/2020)  HE: Grade 2-3 currently  Ascites: was large on Exam and TTP, no SBP (160 PMNs), s/p para on 11/5 with 7L removed  HCC: No lesions on MRI 8/2020  Varices: Hx of gastric varices s/p IR embolization, Grade I esophageal varices seen 9/2020  # ESRD on hemodialysis   # Pleural effusion likely secondary to hepatic hydrothorax  # Anemia: possible secondary to ESRD +/- slow GI bleed. Has hx of varices    Recommendation:  - check CT Head for other causes of encepholopathy  - please place NGT and begin miralax q2 hours until having adequate BMs  - continue with Abx  - okay to continue with midodrine  - continue with ppi daily  - await PETH level  - Trend Coags, CMP, CBC  - Supportive care per primary team   40 year old man with hx of decompensated EtOH cirrhosis (Dx'd 7/2020) complicated by gastric variceal bleeding (7/2020) s/p 34u pRBC, IR embolization and blakemore balloon, ESRD on HD (M/W/F), bladder rupture 2/2 fall s/p ex-lap in 2019, hemorrhoids, presented to OSH for increased abdominal distention and abdominal pain x3 days    # Abdominal Pain: improved slightly on exam but unable to take hx given HE. Likely related to ascites and possibly some level of significant bloating. CT negative without perforation. Patient with possible SBP given elevated lactate and HE.   # Encepholopathy: ongoing. unclear precipitant. Not having adequate BMs. Unclear if underlying neurologic issue or Wernicke's as well. Possible recent alcohol use, PETH level pending.  # Decompensated EtOH Cirrhosis - MELD-Na: 40 (11/5/2020)  HE: Grade 2-3 currently  Ascites: was large on Exam and TTP, no SBP (160 PMNs), s/p para on 11/5 with 7L removed  HCC: No lesions on MRI 8/2020  Varices: Hx of gastric varices s/p IR embolization, Grade I esophageal varices seen 9/2020  # ESRD on hemodialysis   # Pleural effusion likely secondary to hepatic hydrothorax  # Anemia: possible secondary to ESRD +/- slow GI bleed. Has hx of varices    Recommendation:  - check CT Head for other causes of encepholopathy  - start high dose IV Thiamine  - please place NGT and begin miralax q2 hours until having adequate BMs  - continue with Abx  - okay to continue with midodrine  - continue with ppi daily  - await PETH level  - Trend Coags, CMP, CBC  - Supportive care per primary team

## 2020-11-06 NOTE — CHART NOTE - NSCHARTNOTEFT_GEN_A_CORE
Nutrition Follow Up Note   Patient seen for: 1st malnutrition follow up on  ICU     Source: medical record, communication with team.     Chart reviewed, events noted. Adm dx: HRS, ESRD last HD 11/4. MICU adm for pressure supported paracentesis, 7 L removed 11/5.    Diet Order: Diet, Clear Liquid (11-03-20 @ 14:37)    Nutrition Events:     GI:     Anthropometric Measurements:   Height (cm): 180.3 (11-05-20 @ 17:41), 185.4 (09-19-20 @ 14:45), 185.42 (09-02-20 @ 14:05), 185.42 (08-12-20 @ 08:58)  Weight (kg): 82.5 (11-05-20 @ 17:41), 83.7 (09-19-20 @ 14:45), 84.3 (09-02-20 @ 14:05)  BMI (kg/m2): 25.4 (11-05-20 @ 17:41), 24.4 (09-19-20 @ 14:45), 24.5 (09-02-20 @ 14:05)      Medications: MEDICATIONS  (STANDING):  albumin human 25% IVPB 100 milliLiter(s) IV Intermittent every 6 hours  cefTRIAXone   IVPB 2000 milliGRAM(s) IV Intermittent every 24 hours  chlorhexidine 4% Liquid 1 Application(s) Topical <User Schedule>  chlorhexidine 4% Liquid 1 Application(s) Topical <User Schedule>  fluconAZOLE IVPB      fluconAZOLE IVPB 200 milliGRAM(s) IV Intermittent every 24 hours  folic acid 1 milliGRAM(s) Oral daily  influenza   Vaccine 0.5 milliLiter(s) IntraMuscular once  midodrine 20 milliGRAM(s) Oral every 8 hours  multivitamin 1 Tablet(s) Oral daily  pantoprazole  Injectable 40 milliGRAM(s) IV Push two times a day  polyethylene glycol 3350 17 Gram(s) Oral <User Schedule>  sevelamer carbonate 800 milliGRAM(s) Oral three times a day    MEDICATIONS  (PRN):  albuterol/ipratropium for Nebulization. 3 milliLiter(s) Nebulizer once PRN Wheezing    Labs: 11-06 @ 07:09: Sodium 136, Potassium 4.3, Calcium 9.2, Magnesium --, Phosphorus --, BUN 30<H>, Creatinine 5.97<H>, Glucose 120<H>, Alk Phos --, ALT/SGPT --, AST/SGOT --, Albumin --, Prealbumin --, Total Bilirubin --, Hemoglobin 8.1<L>, Hematocrit 24.1<L>, Ferritin --, C-Reactive Protein --, Creatine Kinase <<27>  11-06 @ 00:34: Sodium 135, Potassium 4.0, Calcium 9.2, Magnesium 2.0, Phosphorus 3.9, BUN 29<H>, Creatinine 5.56<H>, Glucose 120<H>, Alk Phos 47, ALT/SGPT 12, AST/SGOT 41<H>, Albumin 3.3, Prealbumin --, Total Bilirubin 9.2<H>, Hemoglobin 6.6<LL>, Hematocrit 19.4<LL>, Ferritin --, C-Reactive Protein --, Creatine Kinase <<27>  11-05 @ 19:27: Sodium 135, Potassium 4.2, Calcium 9.6, Magnesium 2.0, Phosphorus 3.9, BUN 27<H>, Creatinine 5.02<H>, Glucose 116<H>, Alk Phos 59, ALT/SGPT 14, AST/SGOT 50<H>, Albumin 3.8, Prealbumin --, Total Bilirubin 11.9<H>, Hemoglobin 7.4<L>, Hematocrit 22.4<L>, Ferritin --, C-Reactive Protein --, Creatine Kinase <<27>        POCT Blood Glucose.: 120 mg/dL (11-06-20 @ 07:03)  POCT Blood Glucose.: 126 mg/dL (11-05-20 @ 16:49)  POCT Blood Glucose.: 121 mg/dL (11-05-20 @ 12:40)  POCT Blood Glucose.: 118 mg/dL (11-05-20 @ 08:55)    Skin: stage 2 sacrum  Edema: 1+ B/L ankle    Estimated Needs: based on IBW 78 kg  Energy: 4093-9833 kcals (30 kcals/kg)  Protein:  109-125 gm (1.4-1.6 gm/kg)      Previous Nutrition Diagnosis: severe malnutrition  Nutrition Diagnosis is: care plan ongoing, pt currently on clear liquids    New Nutrition Diagnosis: none    Recommended Interventions:   1.   2. continue folic acid, multivitamin     Monitoring and Evaluation:   Continue to monitor nutrition provision and tolerance, weights, labs, skin integrity.   RD remains available upon request and will follow up per protocol. Nutrition Follow Up Note   Patient seen for: 1st malnutrition follow up on  ICU     Source: medical record, communication with team, pt alert minimally responsive, just saying "ya" to questions.    Chart reviewed, events noted. Adm dx: HRS, ESRD last HD 11/4. MICU adm for pressure supported paracentesis, 7 L removed 11/5.    Diet Order: Diet, Clear Liquid (11-03-20 @ 14:37)    Nutrition Events: tolerating clears at this time. Pt did say he has tried Nepro before.    GI: no N/V, + abd distention   last BM 11/5    Anthropometric Measurements:   Height (cm): 180.3 (11-05-20 @ 17:41), 185.4 (09-19-20 @ 14:45), 185.42 (09-02-20 @ 14:05), 185.42 (08-12-20 @ 08:58)  Weight (kg): 82.5 (11-05-20 @ 17:41), 83.7 (09-19-20 @ 14:45), 84.3 (09-02-20 @ 14:05)  BMI (kg/m2): 25.4 (11-05-20 @ 17:41), 24.4 (09-19-20 @   IBW 78 kg    Medications: MEDICATIONS  (STANDING):  albumin human 25% IVPB 100 milliLiter(s) IV Intermittent every 6 hours  cefTRIAXone   IVPB 2000 milliGRAM(s) IV Intermittent every 24 hours  chlorhexidine 4% Liquid 1 Application(s) Topical <User Schedule>  chlorhexidine 4% Liquid 1 Application(s) Topical <User Schedule>  fluconAZOLE IVPB      fluconAZOLE IVPB 200 milliGRAM(s) IV Intermittent every 24 hours  folic acid 1 milliGRAM(s) Oral daily  influenza   Vaccine 0.5 milliLiter(s) IntraMuscular once  midodrine 20 milliGRAM(s) Oral every 8 hours  multivitamin 1 Tablet(s) Oral daily  pantoprazole  Injectable 40 milliGRAM(s) IV Push two times a day  polyethylene glycol 3350 17 Gram(s) Oral <User Schedule>  sevelamer carbonate 800 milliGRAM(s) Oral three times a day    MEDICATIONS  (PRN):  albuterol/ipratropium for Nebulization. 3 milliLiter(s) Nebulizer once PRN Wheezing    Labs: 11-06 @ 07:09: Sodium 136, Potassium 4.3, Calcium 9.2, Magnesium --, Phosphorus --, BUN 30<H>, Creatinine 5.97<H>, Glucose 120<H>, Alk Phos --, ALT/SGPT --, AST/SGOT --, Albumin --, Prealbumin --, Total Bilirubin --, Hemoglobin 8.1<L>, Hematocrit 24.1<L>, Ferritin --, C-Reactive Protein --, Creatine Kinase <<27>  11-06 @ 00:34: Sodium 135, Potassium 4.0, Calcium 9.2, Magnesium 2.0, Phosphorus 3.9, BUN 29<H>, Creatinine 5.56<H>, Glucose 120<H>, Alk Phos 47, ALT/SGPT 12, AST/SGOT 41<H>, Albumin 3.3, Prealbumin --, Total Bilirubin 9.2<H>, Hemoglobin 6.6<LL>, Hematocrit 19.4<LL>, Ferritin --, C-Reactive Protein --, Creatine Kinase <<27>  11-05 @ 19:27: Sodium 135, Potassium 4.2, Calcium 9.6, Magnesium 2.0, Phosphorus 3.9, BUN 27<H>, Creatinine 5.02<H>, Glucose 116<H>, Alk Phos 59, ALT/SGPT 14, AST/SGOT 50<H>, Albumin 3.8, Prealbumin --, Total Bilirubin 11.9<H>, Hemoglobin 7.4<L>, Hematocrit 22.4<L>, Ferritin --, C-Reactive Protein --, Creatine Kinase <<27>        POCT Blood Glucose.: 120 mg/dL (11-06-20 @ 07:03)  POCT Blood Glucose.: 126 mg/dL (11-05-20 @ 16:49)  POCT Blood Glucose.: 121 mg/dL (11-05-20 @ 12:40)  POCT Blood Glucose.: 118 mg/dL (11-05-20 @ 08:55)    Skin: stage 2 sacrum  Edema: 1+ B/L ankle    Estimated Needs: based on IBW 78 kg  Energy: 2833-0475 kcals (30 kcals/kg)  Protein:  109-125 gm (1.4-1.6 gm/kg)      Previous Nutrition Diagnosis: severe malnutrition  Nutrition Diagnosis is: care plan ongoing, pt currently on clear liquids    New Nutrition Diagnosis: none    Recommended Interventions:   1. advancing diet to renal, Nepro supplement 2 times/day  2. continue folic acid, multivitamin     Monitoring and Evaluation:   Continue to monitor nutrition provision and tolerance, weights, labs, skin integrity.   RD remains available upon request and will follow up per protocol.

## 2020-11-06 NOTE — PROGRESS NOTE ADULT - ASSESSMENT
40 yo M w/ PMH of EtOH cirrhosis c/b gastric variceal bleeding in July 2020 s/p 34 pRBC, IR embolization, ESRD on HD (M/W/F), bladder rupture 2/2 fall s/p ex-lap in 2019 was transferred from OSH for worsening abdominal distention and pain  No fever, has leukocytosis  Distended abd from ascites  Covid Ab neg    CT A/P (11/4) with large volume ascites and large right pleural effusion. Noted to have some patchy infiltrates in the left lung. Unclear if this represents fluid or infection.     Had para (could only tolerate diagnostic) elevated cells compared to prior but not at SBP range, however consider could be partially treated  Note that OSH called stating that on 10/31 patient had E coli in blood S CTX, FQ, is not an ESBL (reviewed S in paper chart)  Improving WBC, mental status improving, still hypotensive, critically ill in ICU    Overall,  1) Leukocytosis/New E coli bacteremia?  - Ceftriaxone 2g q 24  - Stop Fluconazole if diagnostic para culture remains neg  - F/U pending BCXs here and ascites culture  - Trend WBC to normal  2) Ascites  - Therapeutic paracentesis as can be tolerated, F/U GI/hepatology  3) R pleural effusion  - Monitor resp status  - F/U Pulm if any indication for drainage    Guarded    Calin Thomas MD  Pager [phone]  After 5pm and on weekends call [phone]

## 2020-11-06 NOTE — PROGRESS NOTE ADULT - PROBLEM SELECTOR PLAN 3
Pt. with hyperphosphatemia in setting of advanced CKD/ESRD. Serum phosphorus in target range. Continue Renvela 800 mg TID with meals. Low phosphorus diet advised. Monitor serum phosphorus    If any questions, please feel free to contact me  Kehinde Coles   Nephrology Fellow  [phone]  (After 5 pm or on weekends please page the on-call fellow)

## 2020-11-06 NOTE — PROGRESS NOTE ADULT - SUBJECTIVE AND OBJECTIVE BOX
COVERING RESIDENT: Milton Mejia (PGY-2) | NS PAGER [phone]    INTERVAL HPI/OVERNIGHT EVENTS: S/p therepeutic para overnight, ~7L removed. Albumin and FFP given. Hernandez removed as not draining and nurse met resistance. Pt refusing urology to place Hernandez.     SUBJECTIVE: Patient seen and examined at bedside. Complaining of some abdominal pain. Denies cp/f/c/n/v.    OBJECTIVE:    VITAL SIGNS:  ICU Vital Signs Last 24 Hrs  T(C): 36.7 (06 Nov 2020 18:00), Max: 37.1 (06 Nov 2020 15:00)  T(F): 98.1 (06 Nov 2020 18:00), Max: 98.7 (06 Nov 2020 15:00)  HR: 100 (06 Nov 2020 18:00) (97 - 110)  BP: 87/46 (06 Nov 2020 18:00) (80/42 - 97/60)  BP(mean): 64 (06 Nov 2020 18:00) (58 - 71)  ABP: --  ABP(mean): --  RR: 25 (06 Nov 2020 18:00) (17 - 27)  SpO2: 97% (06 Nov 2020 18:00) (94% - 100%)        11-05 @ 07:01  -  11-06 @ 07:00  --------------------------------------------------------  IN: 1476 mL / OUT: 7000 mL / NET: -5524 mL    11-06 @ 07:01  -  11-06 @ 19:36  --------------------------------------------------------  IN: 460 mL / OUT: 0 mL / NET: 460 mL      CAPILLARY BLOOD GLUCOSE  120 (06 Nov 2020 07:00)      POCT Blood Glucose.: 120 mg/dL (06 Nov 2020 07:03)      PHYSICAL EXAM:    General: lying in bed in NAD, occasional groaning  HEENT: NC/AT, +icteric sclera, PERRLA, EOMI  CV: RRR, normal S1,S2; no murmurs; no peripheral edema   Pulm: lungs CTAB, no crackles, rhonchi, or wheezes  Abd: severely distended, painful to palpation, +bowel sounds  Psych/Neuro: A&Ox3, nonfocal, normal affect  Skin: +jaundice, warm, dry      MEDICATIONS:  MEDICATIONS  (STANDING):  cefTRIAXone   IVPB 2000 milliGRAM(s) IV Intermittent every 24 hours  chlorhexidine 4% Liquid 1 Application(s) Topical <User Schedule>  chlorhexidine 4% Liquid 1 Application(s) Topical <User Schedule>  epoetin chance-epbx (RETACRIT) Injectable 10517 Unit(s) IV Push <User Schedule>  fluconAZOLE IVPB      fluconAZOLE IVPB 200 milliGRAM(s) IV Intermittent every 24 hours  folic acid 1 milliGRAM(s) Oral daily  HYDROmorphone  Injectable 0.5 milliGRAM(s) IV Push once  influenza   Vaccine 0.5 milliLiter(s) IntraMuscular once  midodrine 20 milliGRAM(s) Oral every 8 hours  multivitamin 1 Tablet(s) Oral daily  pantoprazole  Injectable 40 milliGRAM(s) IV Push two times a day  polyethylene glycol 3350 17 Gram(s) Oral once  sevelamer carbonate 800 milliGRAM(s) Oral three times a day  thiamine IVPB 500 milliGRAM(s) IV Intermittent daily    MEDICATIONS  (PRN):  albuterol/ipratropium for Nebulization. 3 milliLiter(s) Nebulizer once PRN Wheezing      ALLERGIES:  Allergies    No Known Allergies    Intolerances        LABS:                        8.1    9.18  )-----------( 40       ( 06 Nov 2020 07:09 )             24.1     11-06    136  |  97  |  30<H>  ----------------------------<  120<H>  4.3   |  26  |  5.97<H>    Ca    9.2      06 Nov 2020 07:09  Phos  3.9     11-06  Mg     2.0     11-06    TPro  5.1<L>  /  Alb  3.3  /  TBili  9.2<H>  /  DBili  x   /  AST  41<H>  /  ALT  12  /  AlkPhos  47  11-06    PT/INR - ( 06 Nov 2020 00:34 )   PT: 31.8 sec;   INR: 2.78 ratio         PTT - ( 05 Nov 2020 19:27 )  PTT:69.1 sec      RADIOLOGY & ADDITIONAL TESTS: Reviewed.

## 2020-11-06 NOTE — PROGRESS NOTE ADULT - PROBLEM SELECTOR PLAN 2
Patient with anemia in the setting of ESRD with history of GI bleed. Hemoglobin below target range. Will start Retacrit 10K units with HD. Monitor hemoglobin.

## 2020-11-07 NOTE — DISCHARGE NOTE PROVIDER - PROVIDER TOKENS
FREE:[LAST:[A hepatologist],PHONE:[(   )    -],FAX:[(   )    -]] FREE:[LAST:[A hepatologist],PHONE:[(   )    -],FAX:[(   )    -]],PROVIDER:[TOKEN:[[id number]]]

## 2020-11-07 NOTE — PROGRESS NOTE ADULT - SUBJECTIVE AND OBJECTIVE BOX
Interval Events:   Mental status improved.  3 bms yesterday  No abdominal pain  Mild asterixes     Hospital Medications:  albuterol/ipratropium for Nebulization. 3 milliLiter(s) Nebulizer once PRN  cefTRIAXone   IVPB 2000 milliGRAM(s) IV Intermittent every 24 hours  chlorhexidine 4% Liquid 1 Application(s) Topical <User Schedule>  chlorhexidine 4% Liquid 1 Application(s) Topical <User Schedule>  epoetin chance-epbx (RETACRIT) Injectable 56214 Unit(s) IV Push <User Schedule>  fluconAZOLE IVPB      fluconAZOLE IVPB 200 milliGRAM(s) IV Intermittent every 24 hours  folic acid 1 milliGRAM(s) Oral daily  HYDROmorphone   Tablet 2 milliGRAM(s) Oral once  influenza   Vaccine 0.5 milliLiter(s) IntraMuscular once  midodrine 20 milliGRAM(s) Oral every 8 hours  multivitamin 1 Tablet(s) Oral daily  pantoprazole  Injectable 40 milliGRAM(s) IV Push two times a day  polyethylene glycol 3350 17 Gram(s) Oral once  sevelamer carbonate 800 milliGRAM(s) Oral three times a day  thiamine IVPB 500 milliGRAM(s) IV Intermittent daily        ROS:   General:  No fevers, chills or night sweats  ENT:  No sore throat or dysphagia  CV:  No pain or palpitations  Resp:  + dyspnea, no cough or  wheezing  GI:  as above  Skin:  No rash or edema  Neuro: no weakness   Hematologic: no bleeding  Musculoskeletal: no muscle pain or join pain  Psych: no agitation      PHYSICAL EXAM:   Vital Signs:  Vital Signs Last 24 Hrs  T(C): 37 (2020 12:00), Max: 37.3 (2020 23:00)  T(F): 98.6 (2020 12:00), Max: 99.2 (2020 03:00)  HR: 102 (2020 12:00) (96 - 104)  BP: 81/48 (2020 12:00) (81/45 - 95/47)  BP(mean): 60 (2020 12:00) (57 - 70)  RR: 22 (2020 12:00) (18 - 30)  SpO2: 96% (2020 12:00) (95% - 100%)  Daily     Daily Weight in k.2 (2020 18:00)    GENERAL: ill appearing, fatigued  HEENT:  NC/AT,  +scleral icterus  CHEST:  no increased effort  HEART:  Regular rate and rhythm  ABDOMEN:  Soft, distended but improved, no tenderness  EXTREMITIES:  no cyanosis, clubbing or edema  SKIN:  jaundice  NEURO: mild+ asterixis , Alert and oriented - following commands and answering questions appropriately     LABS:                        7.7    8.83  )-----------( 37       ( :24 )             23.6     Mean Cell Volume: 104.9 fl (20 @ 01:24)        137  |  99  |  21  ----------------------------<  101<H>  3.9   |  27  |  4.14<H>    Ca    8.6      :24  Phos  3.1       Mg     2.0         TPro  5.1<L>  /  Alb  3.0<L>  /  TBili  9.7<H>  /  DBili  x   /  AST  43<H>  /  ALT  13  /  AlkPhos  54  11-07    LIVER FUNCTIONS - ( :24 )  Alb: 3.0 g/dL / Pro: 5.1 g/dL / ALK PHOS: 54 U/L / ALT: 13 U/L / AST: 43 U/L / GGT: x           PT/INR - ( :24 )   PT: 36.3 sec;   INR: 3.20 ratio         PTT - ( : )  PTT:73.4 sec                            7.7    8.83  )-----------( 37       ( 2020 01:24 )             23.6                         8.1    9.18  )-----------( 40       ( 2020 07:09 )             24.1                         6.6    9.65  )-----------( 43       ( 2020 00:34 )             19.4                         7.4    12.47 )-----------( 63       ( 2020 19:27 )             22.4                         7.8    12.34 )-----------( 64       ( 2020 06:55 )             23.9       Imaging:

## 2020-11-07 NOTE — DISCHARGE NOTE PROVIDER - NSDCCPCAREPLAN_GEN_ALL_CORE_FT
PRINCIPAL DISCHARGE DIAGNOSIS  Diagnosis: Alcoholic cirrhosis of liver with ascites  Assessment and Plan of Treatment: You have end stage liver disease due to alcoholic cirrhosis. This caused your ascites which needed to be tapped here in the hospital. You are being transferred to [facility] for possible liver transplantation. It is essential that you abstain from alcohol for the rest of your life.      SECONDARY DISCHARGE DIAGNOSES  Diagnosis: Chronic kidney disease-mineral and bone disorder  Assessment and Plan of Treatment: You have end stage kidney disease requiring dialysis. This is also related to your liver disease, as the scarring in your liver caused your kidneys to become congested. We gave you a medication called midodrine to help with blood flow to your kidney. You must continue taking midodrine and attending hemodialysis.

## 2020-11-07 NOTE — CHART NOTE - NSCHARTNOTEFT_GEN_A_CORE
40M w/ PMH of EtOH cirrhosis c/b gastric variceal bleeding in July 2020 s/p 34u pRBC, IR embolization, and blakemore balloon, ESRD on HD (M/W/F), bladder rupture 2/2 fall s/p ex-lap in 2019, EtOH withdrawal seizures, and hemorrhoids admitted for decompensated liver cirrhosis and large volume ascites with concern for SBP, currently on midodrine. RRT was called for hypotension and patient was transferred to MICU for a large volume paracentesis. Patient did not require pressor support throughout MICU course.    Assessed patient. Patient is at baseline.    [ ] f/u fluid cultures   [ ] c/w ceftriaxone for 7 days  [ ] F/u ascites fungal culture - stop fluconazole if negative  [ ] Thoracentesis for hepatic hydrothorax  [ ] Arrange for transfer to [facility] for liver transplant  [ ] Monitor ammonia level.  [ ] treat back pain    Vero Shaikh MD PGY-3  MAR

## 2020-11-07 NOTE — DISCHARGE NOTE PROVIDER - CARE PROVIDER_API CALL
A hepatologist,   Phone: (   )    -  Fax: (   )    -  Follow Up Time:    A hepatologist,   Phone: (   )    -  Fax: (   )    -  Follow Up Time:     Yesenia Funez)  Gastroenterology; Transplant Hepatology  [address] [place] [address]  Phone: [phone]  Fax: [phone]  Follow Up Time:

## 2020-11-07 NOTE — PROGRESS NOTE ADULT - PROBLEM SELECTOR PLAN 2
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   - F/u pulm for possible thoracentesis.   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   - F/u pulm for possible thoracentesis.   - monitor respiratory status  - duonebs prn  - c/w O2 NC

## 2020-11-07 NOTE — PROGRESS NOTE ADULT - ASSESSMENT
39 yo M w/ PMH of EtOH cirrhosis c/b gastric variceal bleeding in July 2020 s/p 34u pRBC, IR embolization, and blakemore balloon, ESRD on HD (M/W/F), bladder rupture 2/2 fall s/p ex-lap in 2019, EtOH withdrawal seizures, and hemorrhoids admitted for decompensated liver cirrhosis and large volume ascites; hospital course complicated by hypotension requiring MICU course for monitoring of hemodynamics.

## 2020-11-07 NOTE — PROGRESS NOTE ADULT - PROBLEM SELECTOR PLAN 4
Pt w/ anemia and thrombocytopenia in setting of ESRD and advanced liver disease.   - trend CBC  - Monitor for signs and symptoms of acute bleeding.

## 2020-11-07 NOTE — PROGRESS NOTE ADULT - SUBJECTIVE AND OBJECTIVE BOX
Medicine Accept Note    Mick Etienne MD  PGY 3 Department of Internal Medicine  Pager: [phone]/ [phone]    Patient is a 40y old  Male who presents with a chief complaint of HRS (07 Nov 2020 17:26)    HPI/Interval Hx:    41 yo M w/ PMH of EtOH cirrhosis c/b gastric variceal bleeding in July 2020 s/p 34u pRBC, IR embolization, and blakemore balloon, ESRD on HD (M/W/F), bladder rupture 2/2 fall s/p ex-lap in 2019, EtOH withdrawal seizures, and hemorrhoids admitted for decompensated liver cirrhosis and large volume ascites; hospital course complicated by hypotension requiring MICU course for monitoring of hemodynamics.     Pt was admitted to the floor and started on ceftriaxone 2g daily for empiric treatment of SBP. CT abd/pelvis 11/04 demonstrated large volume ascites and large R pleural effusion with near complete collapse of R lung c/f hepatic hydrothorax. He was continuously hypotensive to the 80s and started on midodrine 10mg tid. IR was consulted for paracentesis but deferred large volume paracentesis due to hypotension with SBPs in 70s and instead performed a diagnostic paracentesis on 11/04. Therapeutic paracentesis was planned for 11/05 but was ultimately deferred due to hypotension with SBP in 80s and MAP 57.     In the MICU, Received large volume paracentesis 11/5, ~7L removed. Pt was placed on ceftriaxone and fluconazole as ppx for SBP. He was continued on midodrine, protonix, thiamine. Midodrine was increased from 20mg to 30mg TID on day of transfer. He was refusing lactulose so was started on miralax q2H. He had 3 BM per day. His mental status improved with stable ammonia level. He received MWF HD. Received Retacrit 10K units with HD. No episodes of bleeding in MICU. 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 Thoracentesis was deferred given pt's clinical status. He was anemic with hepatic coagulopathy, recevied 1u pRBC and FFP 11/04, FFP x1 11/05 before paracentesis, given Cry x 5 u as well. Received vitamin K 10mg IV x 3 days. He was accepted to [facility] for liver transplantation.      MEDICATIONS  (STANDING):  cefTRIAXone   IVPB 2000 milliGRAM(s) IV Intermittent every 24 hours  epoetin chance-epbx (RETACRIT) Injectable 06893 Unit(s) IV Push <User Schedule>  fluconAZOLE IVPB      fluconAZOLE IVPB 200 milliGRAM(s) IV Intermittent every 24 hours  folic acid 1 milliGRAM(s) Oral daily  influenza   Vaccine 0.5 milliLiter(s) IntraMuscular once  midodrine 30 milliGRAM(s) Oral every 8 hours  multivitamin 1 Tablet(s) Oral daily  pantoprazole  Injectable 40 milliGRAM(s) IV Push two times a day  polyethylene glycol 3350 17 Gram(s) Oral once  sevelamer carbonate 800 milliGRAM(s) Oral three times a day  thiamine IVPB 500 milliGRAM(s) IV Intermittent daily    MEDICATIONS  (PRN):  albuterol/ipratropium for Nebulization. 3 milliLiter(s) Nebulizer once PRN Wheezing      I&O's Summary    06 Nov 2020 07:01  -  07 Nov 2020 07:00  --------------------------------------------------------  IN: 1035 mL / OUT: 0 mL / NET: 1035 mL    07 Nov 2020 07:01  -  07 Nov 2020 23:43  --------------------------------------------------------  IN: 895 mL / OUT: 0 mL / NET: 895 mL        Vital Signs Last 24 Hrs  T(C): 36.8 (07 Nov 2020 21:58), Max: 37.3 (07 Nov 2020 03:00)  T(F): 98.2 (07 Nov 2020 21:58), Max: 99.2 (07 Nov 2020 03:00)  HR: 101 (07 Nov 2020 21:58) (96 - 108)  BP: 91/52 (07 Nov 2020 21:58) (81/45 - 95/47)  BP(mean): 66 (07 Nov 2020 20:00) (57 - 69)  RR: 20 (07 Nov 2020 21:58) (18 - 39)  SpO2: 94% (07 Nov 2020 21:58) (93% - 98%)    CAPILLARY BLOOD GLUCOSE          PHYSICAL EXAM:  GENERAL: NAD,   HEAD:  Atraumatic, Normocephalic  EYES: EOMI, PERRL, + scleral icterus  NECK: No JVD  CHEST/LUNG: Clear to auscultation bilaterally; No wheeze  HEART: Regular rate and rhythm; No murmurs, rubs, or gallops  ABDOMEN: Soft, distended, tender to palpation, +bowel sounds  EXTREMITIES:  2+ Peripheral Pulses, No clubbing, cyanosis, or edema  PSYCH: AAOx3  NEUROLOGY: non-focal  SKIN: +jaundice, warm, dry  LABS:                        7.7    8.83  )-----------( 37       ( 07 Nov 2020 01:24 )             23.6     Auto Eosinophil # x     / Auto Eosinophil % x     / Auto Neutrophil # x     / Auto Neutrophil % x     / BANDS % x                            8.1    9.18  )-----------( 40       ( 06 Nov 2020 07:09 )             24.1     Auto Eosinophil # 0.11  / Auto Eosinophil % 1.2   / Auto Neutrophil # 6.97  / Auto Neutrophil % 75.9  / BANDS % x                            6.6    9.65  )-----------( 43       ( 06 Nov 2020 00:34 )             19.4     Auto Eosinophil # 0.09  / Auto Eosinophil % 0.9   / Auto Neutrophil # 7.26  / Auto Neutrophil % 75.3  / BANDS % x        11-07    137  |  99  |  21  ----------------------------<  101<H>  3.9   |  27  |  4.14<H>  11-06    136  |  97  |  30<H>  ----------------------------<  120<H>  4.3   |  26  |  5.97<H>  11-06    135  |  96  |  29<H>  ----------------------------<  120<H>  4.0   |  26  |  5.56<H>    Ca    8.6      07 Nov 2020 01:24  Mg     2.0     11-07  Phos  3.1     11-07  TPro  5.1<L>  /  Alb  3.0<L>  /  TBili  9.7<H>  /  DBili  x   /  AST  43<H>  /  ALT  13  /  AlkPhos  54  11-07  TPro  5.1<L>  /  Alb  3.3  /  TBili  9.2<H>  /  DBili  x   /  AST  41<H>  /  ALT  12  /  AlkPhos  47  11-06    PT/INR - ( 07 Nov 2020 01:24 )   PT: 36.3 sec;   INR: 3.20 ratio         PTT - ( 07 Nov 2020 01:24 )  PTT:73.4 sec        Lactate, Blood: 4.6 mmol/L (11-05 @ 06:55)  Lactate, Blood: 3.3 mmol/L (11-03 @ 17:41)  Lactate, Blood: 3.6 mmol/L (11-03 @ 08:41)        RADIOLOGY & ADDITIONAL TESTS:    Imaging Personally Reviewed:    Consultant(s) Notes Reviewed:      Care Discussed with Consultants/Other Providers:

## 2020-11-07 NOTE — PROGRESS NOTE ADULT - ASSESSMENT
41 yo M w/ PMH of EtOH cirrhosis c/b gastric variceal bleeding in July 2020 s/p 34u pRBC, IR embolization, and blakemore balloon, ESRD on HD (M/W/F), bladder rupture 2/2 fall s/p ex-lap in 2019, EtOH withdrawal seizures, and hemorrhoids admitted for decompensated liver cirrhosis and large volume ascites with concern for SBP, currently on midodrine. Transferred to MICU for pressor supported paracentesis.      Plan:  #Neuro  - Currently A&Ox3, responding appropriately  - Pt w/ history of hepatic encephalopathy, ammonia level acceptable, c/w miralax q2h  - CT head negative for acute pathology    #Respiratory  - Currently satting well on room air  - CT 11/04 demonstrating large R pleural effusion with near complete collapse of R lung likely 2/2 hepatic hydrothorax  - can do thoracentesis on floor    #CV  - Hypotension 2/2 chronic multiorgan failure.   - C/w midodrine 20mg TID map goal 55-60  - Will continue to monitor in case of need for pressor support    #GI  - Decompensated alcoholic cirrhosis with ascites. hx gastric varices s/p IR embolization and known esophageal varices  - Diagnostic paracentesis on 11/04 with 160 PMNs, not meeting criteria for SBP  - c/w ceftriaxone, fluconazole for SBP prophylaxis, stop fluconazole if fungal culture negative  - s/p large volume paracentesis 11/5, ~7L removed  - c/w protonix BID  - C/w 500mg thiamine IV daily    #Renal  - ESRD on HD  - Last HD 11/06 via RIJ tunneled HD catheter  - c/w Renvela 800 mg TID    #ID  - Afebrile with leukocytosis, now resolved  - Blood cx drawn in OSH ([facility]) on 10/31 grew E. coli in aerobic bottle, pan sensitive  - Blood cx 11/02 with NGTD  - 1 ascites culture negative, second still testing  - Ascites fungal cultures pending  - Lactate 2.0, downtrending likely 2/2 impaired hepatic clearance  - c/w fluconazole + ceftriaxone 2g, stop fluconazole if fungal ascites culture negative    #Heme  - Hepatic coagulopathy + ?acute blood loss anemia  - No obvious source of loss but will monitor consider patient had hx of lifethreatening bleed.   - s/p 1u pRBC and FFP 11/04, FFP x1 11/05 before paracentesis, given Cry x 5 u as well.   - s/p vitamin K 10mg IV x 3  - Possible transfer to [facility] for transplant eval  - C/w Retacrit 10K units with HD    - DVT ppx: SCDs    #GOC  - Full code.

## 2020-11-07 NOTE — DISCHARGE NOTE PROVIDER - CARE PROVIDERS DIRECT ADDRESSES
,DirectAddress_Unknown ,DirectAddress_Unknown,[email or web address]

## 2020-11-07 NOTE — CHART NOTE - NSCHARTNOTEFT_GEN_A_CORE
MICU Transfer Note  ---------------------------    Transfer from: MICU  Transfer to:  (X ) Medicine    (  ) Telemetry    (  ) RCU    (  ) Palliative    (  ) Stroke Unit    (  ) _______________  Accepting Physician:      MICU COURSE  Pt did not require any pressors in MICU.  Received diagnostic paracentesis on 11/04 with 160 PMNs, not meeting criteria for SBP.  Received large volume paracentesis 11/5, ~7L removed. Pt was placed on ceftriaxone and fluconazole as ppx for SBP. He was continued on midodrine, protonix, thiamine.       OBJECTIVE --  Vital Signs Last 24 Hrs  T(C): 37 (07 Nov 2020 12:00), Max: 37.3 (06 Nov 2020 23:00)  T(F): 98.6 (07 Nov 2020 12:00), Max: 99.2 (07 Nov 2020 03:00)  HR: 102 (07 Nov 2020 12:00) (96 - 104)  BP: 81/48 (07 Nov 2020 12:00) (81/45 - 95/47)  BP(mean): 60 (07 Nov 2020 12:00) (57 - 70)  RR: 22 (07 Nov 2020 12:00) (18 - 30)  SpO2: 96% (07 Nov 2020 12:00) (95% - 100%)    I&O's Summary    06 Nov 2020 07:01  -  07 Nov 2020 07:00  --------------------------------------------------------  IN: 1035 mL / OUT: 0 mL / NET: 1035 mL    07 Nov 2020 07:01  -  07 Nov 2020 13:54  --------------------------------------------------------  IN: 575 mL / OUT: 0 mL / NET: 575 mL        MEDICATIONS  (STANDING):  cefTRIAXone   IVPB 2000 milliGRAM(s) IV Intermittent every 24 hours  chlorhexidine 4% Liquid 1 Application(s) Topical <User Schedule>  chlorhexidine 4% Liquid 1 Application(s) Topical <User Schedule>  epoetin chance-epbx (RETACRIT) Injectable 56510 Unit(s) IV Push <User Schedule>  fluconAZOLE IVPB      fluconAZOLE IVPB 200 milliGRAM(s) IV Intermittent every 24 hours  folic acid 1 milliGRAM(s) Oral daily  influenza   Vaccine 0.5 milliLiter(s) IntraMuscular once  midodrine 20 milliGRAM(s) Oral every 8 hours  multivitamin 1 Tablet(s) Oral daily  pantoprazole  Injectable 40 milliGRAM(s) IV Push two times a day  polyethylene glycol 3350 17 Gram(s) Oral once  sevelamer carbonate 800 milliGRAM(s) Oral three times a day  thiamine IVPB 500 milliGRAM(s) IV Intermittent daily    MEDICATIONS  (PRN):  albuterol/ipratropium for Nebulization. 3 milliLiter(s) Nebulizer once PRN Wheezing        LABS                                            7.7                   Neurophils% (auto):   x      (11-07 @ 01:24):    8.83 )-----------(37           Lymphocytes% (auto):  x                                             23.6                   Eosinphils% (auto):   x        Manual%: Neutrophils x    ; Lymphocytes x    ; Eosinophils x    ; Bands%: x    ; Blasts x                                    137    |  99     |  21                  Calcium: 8.6   / iCa: x      (11-07 @ 01:24)    ----------------------------<  101       Magnesium: 2.0                              3.9     |  27     |  4.14             Phosphorous: 3.1      TPro  5.1    /  Alb  3.0    /  TBili  9.7    /  DBili  x      /  AST  43     /  ALT  13     /  AlkPhos  54     07 Nov 2020 01:24    ( 11-07 @ 01:24 )   PT: 36.3 sec;   INR: 3.20 ratio  aPTT: 73.4 sec          ASSESSMENT & PLAN:         For Follow-Up:  [ ]   [ ] MICU Transfer Note  ---------------------------    Transfer from: MICU  Transfer to:  (X ) Medicine    (  ) Telemetry    (  ) RCU    (  ) Palliative    (  ) Stroke Unit    (  ) _______________  Accepting Physician:      MICU COURSE  Pt did not require any pressors in MICU.  Received diagnostic paracentesis on 11/04 with 160 PMNs, not meeting criteria for SBP.  Received large volume paracentesis 11/5, ~7L removed. Pt was placed on ceftriaxone and fluconazole as ppx for SBP. He was continued on midodrine, protonix, thiamine. He was refusing lactulose so was started on miralax       OBJECTIVE --  Vital Signs Last 24 Hrs  T(C): 37 (07 Nov 2020 12:00), Max: 37.3 (06 Nov 2020 23:00)  T(F): 98.6 (07 Nov 2020 12:00), Max: 99.2 (07 Nov 2020 03:00)  HR: 102 (07 Nov 2020 12:00) (96 - 104)  BP: 81/48 (07 Nov 2020 12:00) (81/45 - 95/47)  BP(mean): 60 (07 Nov 2020 12:00) (57 - 70)  RR: 22 (07 Nov 2020 12:00) (18 - 30)  SpO2: 96% (07 Nov 2020 12:00) (95% - 100%)    I&O's Summary    06 Nov 2020 07:01  -  07 Nov 2020 07:00  --------------------------------------------------------  IN: 1035 mL / OUT: 0 mL / NET: 1035 mL    07 Nov 2020 07:01  -  07 Nov 2020 13:54  --------------------------------------------------------  IN: 575 mL / OUT: 0 mL / NET: 575 mL        MEDICATIONS  (STANDING):  cefTRIAXone   IVPB 2000 milliGRAM(s) IV Intermittent every 24 hours  chlorhexidine 4% Liquid 1 Application(s) Topical <User Schedule>  chlorhexidine 4% Liquid 1 Application(s) Topical <User Schedule>  epoetin chance-epbx (RETACRIT) Injectable 21523 Unit(s) IV Push <User Schedule>  fluconAZOLE IVPB      fluconAZOLE IVPB 200 milliGRAM(s) IV Intermittent every 24 hours  folic acid 1 milliGRAM(s) Oral daily  influenza   Vaccine 0.5 milliLiter(s) IntraMuscular once  midodrine 20 milliGRAM(s) Oral every 8 hours  multivitamin 1 Tablet(s) Oral daily  pantoprazole  Injectable 40 milliGRAM(s) IV Push two times a day  polyethylene glycol 3350 17 Gram(s) Oral once  sevelamer carbonate 800 milliGRAM(s) Oral three times a day  thiamine IVPB 500 milliGRAM(s) IV Intermittent daily    MEDICATIONS  (PRN):  albuterol/ipratropium for Nebulization. 3 milliLiter(s) Nebulizer once PRN Wheezing        LABS                                            7.7                   Neurophils% (auto):   x      (11-07 @ 01:24):    8.83 )-----------(37           Lymphocytes% (auto):  x                                             23.6                   Eosinphils% (auto):   x        Manual%: Neutrophils x    ; Lymphocytes x    ; Eosinophils x    ; Bands%: x    ; Blasts x                                    137    |  99     |  21                  Calcium: 8.6   / iCa: x      (11-07 @ 01:24)    ----------------------------<  101       Magnesium: 2.0                              3.9     |  27     |  4.14             Phosphorous: 3.1      TPro  5.1    /  Alb  3.0    /  TBili  9.7    /  DBili  x      /  AST  43     /  ALT  13     /  AlkPhos  54     07 Nov 2020 01:24    ( 11-07 @ 01:24 )   PT: 36.3 sec;   INR: 3.20 ratio  aPTT: 73.4 sec          ASSESSMENT & PLAN:         For Follow-Up:  [ ]   [ ] MICU Transfer Note  ---------------------------    Transfer from: MICU  Transfer to:  (X ) Medicine    (  ) Telemetry    (  ) RCU    (  ) Palliative    (  ) Stroke Unit    (  ) _______________  Accepting Physician:      MICU COURSE  Pt did not require any pressors in MICU. He was saturating well on room air. Received diagnostic paracentesis on 11/04 with 160 PMNs, not meeting criteria for SBP.  Received large volume paracentesis 11/5, ~7L removed. Pt was placed on ceftriaxone and fluconazole as ppx for SBP. He was continued on midodrine, protonix, thiamine. Midodrine was increased from 20mg to 30mg TID on day of transfer. He was refusing lactulose so was started on miralax q2H. He had 3 BM per day. His mental status improved with stable ammonia level. He received MWF HD. Received Retacrit 10K units with HD. No episodes of bleeding in MICU. 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 Thoracentesis was deferred given pt's clinical status. He was anemic with hepatic coagulopathy, recevied 1u pRBC and FFP 11/04, FFP x1 11/05 before paracentesis, given Cry x 5 u as well. Received vitamin K 10mg IV x 3 days. He was accepted to [facility] for liver transplantation.       OBJECTIVE --  Vital Signs Last 24 Hrs  T(C): 37 (07 Nov 2020 12:00), Max: 37.3 (06 Nov 2020 23:00)  T(F): 98.6 (07 Nov 2020 12:00), Max: 99.2 (07 Nov 2020 03:00)  HR: 102 (07 Nov 2020 12:00) (96 - 104)  BP: 81/48 (07 Nov 2020 12:00) (81/45 - 95/47)  BP(mean): 60 (07 Nov 2020 12:00) (57 - 70)  RR: 22 (07 Nov 2020 12:00) (18 - 30)  SpO2: 96% (07 Nov 2020 12:00) (95% - 100%)    I&O's Summary    06 Nov 2020 07:01 - 07 Nov 2020 07:00  --------------------------------------------------------  IN: 1035 mL / OUT: 0 mL / NET: 1035 mL    07 Nov 2020 07:01 - 07 Nov 2020 13:54  --------------------------------------------------------  IN: 575 mL / OUT: 0 mL / NET: 575 mL        MEDICATIONS  (STANDING):  cefTRIAXone   IVPB 2000 milliGRAM(s) IV Intermittent every 24 hours  chlorhexidine 4% Liquid 1 Application(s) Topical <User Schedule>  chlorhexidine 4% Liquid 1 Application(s) Topical <User Schedule>  epoetin chance-epbx (RETACRIT) Injectable 20922 Unit(s) IV Push <User Schedule>  fluconAZOLE IVPB      fluconAZOLE IVPB 200 milliGRAM(s) IV Intermittent every 24 hours  folic acid 1 milliGRAM(s) Oral daily  influenza   Vaccine 0.5 milliLiter(s) IntraMuscular once  midodrine 20 milliGRAM(s) Oral every 8 hours  multivitamin 1 Tablet(s) Oral daily  pantoprazole  Injectable 40 milliGRAM(s) IV Push two times a day  polyethylene glycol 3350 17 Gram(s) Oral once  sevelamer carbonate 800 milliGRAM(s) Oral three times a day  thiamine IVPB 500 milliGRAM(s) IV Intermittent daily    MEDICATIONS  (PRN):  albuterol/ipratropium for Nebulization. 3 milliLiter(s) Nebulizer once PRN Wheezing      LABS:                        7.7    8.83  )-----------( 37       ( 07 Nov 2020 01:24 )             23.6     11-07    137  |  99  |  21  ----------------------------<  101<H>  3.9   |  27  |  4.14<H>    Ca    8.6      07 Nov 2020 01:24  Phos  3.1     11-07  Mg     2.0     11-07    TPro  5.1<L>  /  Alb  3.0<L>  /  TBili  9.7<H>  /  DBili  x   /  AST  43<H>  /  ALT  13  /  AlkPhos  54  11-07    PT/INR - ( 07 Nov 2020 01:24 )   PT: 36.3 sec;   INR: 3.20 ratio         PTT - ( 07 Nov 2020 01:24 )  PTT:73.4 sec    Ammonia, Serum: 33    Blood Gas Profile - Venous (11.07.20 @ 01:21)   pH, Venous: 7.39   pCO2, Venous: 50 mmHg   pO2, Venous: 68 mmHg   HCO3, Venous: 29 mmol/L   Base Excess, Venous: 4.3 mmol/L   Oxygen Saturation, Venous: 93 %   Total CO2, Venous: 31 mmol/L   FIO2, Venous: 30   Blood Gas Source Venous: Arterial     Blood Gas Venous - Lactate: 2.0 mmoL/L (11.07.20 @ 01:21)       Culture - Body Fluid with Gram Stain (11.06.20 @ 02:13)   Specimen Source: .Body Fluid Peritoneal Fluid   Culture Results:   No growth     Culture - Fungal, Body Fluid (11.06.20 @ 02:13)   Specimen Source: .Body Fluid Peritoneal Fluid   Culture Results:   Testing in progress     Culture - Blood (11.05.20 @ 23:22)   Specimen Source: .Blood Blood   Culture Results:   No growth to date.     RADIOLOGY & ADDITIONAL TESTS:       EXAM:  CT BRAIN                            PROCEDURE DATE:  11/06/2020        IMPRESSION:    Volume loss with microvascular disease, no acute hemorrhage or midline shift. MRI is more sensitive for seizure focus. If symptoms persist consider follow-up head CT or MR if no contraindications.       Assessment and Plan:   39 yo M w/ PMH of EtOH cirrhosis c/b gastric variceal bleeding in July 2020 s/p 34u pRBC, IR embolization, and blakemore balloon, ESRD on HD (M/W/F), bladder rupture 2/2 fall s/p ex-lap in 2019, EtOH withdrawal seizures, and hemorrhoids admitted for decompensated liver cirrhosis and large volume ascites with concern for SBP, currently on midodrine. Transferred to MICU for pressor supported paracentesis.      Plan:  #Neuro  - Currently A&Ox3, responding appropriately  - Pt w/ history of hepatic encephalopathy, ammonia level acceptable, c/w miralax q2h  - CT head negative for acute pathology    #Respiratory  - Currently satting well on room air  - CT 11/04 demonstrating large R pleural effusion with near complete collapse of R lung likely 2/2 hepatic hydrothorax  - can do thoracentesis on floor    #CV  - Hypotension 2/2 chronic multiorgan failure.   - C/w midodrine 20mg TID map goal 55-60  - Will continue to monitor in case of need for pressor support    #GI  - Decompensated alcoholic cirrhosis with ascites. hx gastric varices s/p IR embolization and known esophageal varices  - Diagnostic paracentesis on 11/04 with 160 PMNs, not meeting criteria for SBP  - c/w ceftriaxone, fluconazole for SBP prophylaxis, stop fluconazole if fungal culture negative  - s/p large volume paracentesis 11/5, ~7L removed  - c/w protonix BID  - C/w 500mg thiamine IV daily    #Renal  - ESRD on HD  - Last HD 11/06 via RIJ tunneled HD catheter  - c/w Renvela 800 mg TID    #ID  - Afebrile with leukocytosis, now resolved  - Blood cx drawn in OSH ([facility]) on 10/31 grew E. coli in aerobic bottle, pan sensitive  - Blood cx 11/02 with NGTD  - 1 ascites culture negative, second still testing  - Ascites fungal cultures pending  - Lactate 2.0, downtrending likely 2/2 impaired hepatic clearance  - c/w fluconazole + ceftriaxone 2g, stop fluconazole if fungal ascites culture negative    #Heme  - Hepatic coagulopathy + ?acute blood loss anemia  - No obvious source of loss but will monitor consider patient had hx of lifethreatening bleed.   - s/p 1u pRBC and FFP 11/04, FFP x1 11/05 before paracentesis, given Cry x 5 u as well.   - s/p vitamin K 10mg IV x 3  - Possible transfer to [facility] for transplant eval  - C/w Retacrit 10K units with HD    - DVT ppx: SCDs    #GOC  - Full code.        For Follow-Up:    [ ] F/u ascites culture - c/w ceftriaxone for 7 days  [ ] F/u ascites fungal culture - stop fluconazole if negative  [ ] Thoracentesis for hepatic hydrothorax  [ ] Arrange for transfer to [facility] for liver transplant  [ ] Monitor ammonia level MICU Transfer Note  ---------------------------    Transfer from: MICU  Transfer to:  (X ) Medicine    (  ) Telemetry    (  ) RCU    (  ) Palliative    (  ) Stroke Unit    (  ) _______________  Accepting Physician: Dr. Velazquez      MICU COURSE  Pt did not require any pressors in MICU. He was saturating well on room air. Received diagnostic paracentesis on 11/04 with 160 PMNs, not meeting criteria for SBP.  Received large volume paracentesis 11/5, ~7L removed. Pt was placed on ceftriaxone and fluconazole as ppx for SBP. He was continued on midodrine, protonix, thiamine. Midodrine was increased from 20mg to 30mg TID on day of transfer. He was refusing lactulose so was started on miralax q2H. He had 3 BM per day. His mental status improved with stable ammonia level. He received MWF HD. Received Retacrit 10K units with HD. No episodes of bleeding in MICU. 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 Thoracentesis was deferred given pt's clinical status. He was anemic with hepatic coagulopathy, recevied 1u pRBC and FFP 11/04, FFP x1 11/05 before paracentesis, given Cry x 5 u as well. Received vitamin K 10mg IV x 3 days. He was accepted to [facility] for liver transplantation.       OBJECTIVE --  Vital Signs Last 24 Hrs  T(C): 37 (07 Nov 2020 12:00), Max: 37.3 (06 Nov 2020 23:00)  T(F): 98.6 (07 Nov 2020 12:00), Max: 99.2 (07 Nov 2020 03:00)  HR: 102 (07 Nov 2020 12:00) (96 - 104)  BP: 81/48 (07 Nov 2020 12:00) (81/45 - 95/47)  BP(mean): 60 (07 Nov 2020 12:00) (57 - 70)  RR: 22 (07 Nov 2020 12:00) (18 - 30)  SpO2: 96% (07 Nov 2020 12:00) (95% - 100%)    I&O's Summary    06 Nov 2020 07:01  -  07 Nov 2020 07:00  --------------------------------------------------------  IN: 1035 mL / OUT: 0 mL / NET: 1035 mL    07 Nov 2020 07:01  -  07 Nov 2020 13:54  --------------------------------------------------------  IN: 575 mL / OUT: 0 mL / NET: 575 mL        MEDICATIONS  (STANDING):  cefTRIAXone   IVPB 2000 milliGRAM(s) IV Intermittent every 24 hours  chlorhexidine 4% Liquid 1 Application(s) Topical <User Schedule>  chlorhexidine 4% Liquid 1 Application(s) Topical <User Schedule>  epoetin chance-epbx (RETACRIT) Injectable 01989 Unit(s) IV Push <User Schedule>  fluconAZOLE IVPB      fluconAZOLE IVPB 200 milliGRAM(s) IV Intermittent every 24 hours  folic acid 1 milliGRAM(s) Oral daily  influenza   Vaccine 0.5 milliLiter(s) IntraMuscular once  midodrine 20 milliGRAM(s) Oral every 8 hours  multivitamin 1 Tablet(s) Oral daily  pantoprazole  Injectable 40 milliGRAM(s) IV Push two times a day  polyethylene glycol 3350 17 Gram(s) Oral once  sevelamer carbonate 800 milliGRAM(s) Oral three times a day  thiamine IVPB 500 milliGRAM(s) IV Intermittent daily    MEDICATIONS  (PRN):  albuterol/ipratropium for Nebulization. 3 milliLiter(s) Nebulizer once PRN Wheezing      LABS:                        7.7    8.83  )-----------( 37       ( 07 Nov 2020 01:24 )             23.6     11-07    137  |  99  |  21  ----------------------------<  101<H>  3.9   |  27  |  4.14<H>    Ca    8.6      07 Nov 2020 01:24  Phos  3.1     11-07  Mg     2.0     11-07    TPro  5.1<L>  /  Alb  3.0<L>  /  TBili  9.7<H>  /  DBili  x   /  AST  43<H>  /  ALT  13  /  AlkPhos  54  11-07    PT/INR - ( 07 Nov 2020 01:24 )   PT: 36.3 sec;   INR: 3.20 ratio         PTT - ( 07 Nov 2020 01:24 )  PTT:73.4 sec    Ammonia, Serum: 33    Blood Gas Profile - Venous (11.07.20 @ 01:21)   pH, Venous: 7.39   pCO2, Venous: 50 mmHg   pO2, Venous: 68 mmHg   HCO3, Venous: 29 mmol/L   Base Excess, Venous: 4.3 mmol/L   Oxygen Saturation, Venous: 93 %   Total CO2, Venous: 31 mmol/L   FIO2, Venous: 30   Blood Gas Source Venous: Arterial     Blood Gas Venous - Lactate: 2.0 mmoL/L (11.07.20 @ 01:21)       Culture - Body Fluid with Gram Stain (11.06.20 @ 02:13)   Specimen Source: .Body Fluid Peritoneal Fluid   Culture Results:   No growth     Culture - Fungal, Body Fluid (11.06.20 @ 02:13)   Specimen Source: .Body Fluid Peritoneal Fluid   Culture Results:   Testing in progress     Culture - Blood (11.05.20 @ 23:22)   Specimen Source: .Blood Blood   Culture Results:   No growth to date.     RADIOLOGY & ADDITIONAL TESTS:       EXAM:  CT BRAIN                            PROCEDURE DATE:  11/06/2020        IMPRESSION:    Volume loss with microvascular disease, no acute hemorrhage or midline shift. MRI is more sensitive for seizure focus. If symptoms persist consider follow-up head CT or MR if no contraindications.       Assessment and Plan:   41 yo M w/ PMH of EtOH cirrhosis c/b gastric variceal bleeding in July 2020 s/p 34u pRBC, IR embolization, and blakemore balloon, ESRD on HD (M/W/F), bladder rupture 2/2 fall s/p ex-lap in 2019, EtOH withdrawal seizures, and hemorrhoids admitted for decompensated liver cirrhosis and large volume ascites with concern for SBP, currently on midodrine. Transferred to MICU for pressor supported paracentesis.      Plan:  #Neuro  - Currently A&Ox3, responding appropriately  - Pt w/ history of hepatic encephalopathy, ammonia level acceptable, c/w miralax q2h  - CT head negative for acute pathology    #Respiratory  - Currently satting well on room air  - CT 11/04 demonstrating large R pleural effusion with near complete collapse of R lung likely 2/2 hepatic hydrothorax  - can do thoracentesis on floor    #CV  - Hypotension 2/2 chronic multiorgan failure.   - C/w midodrine 20mg TID map goal 55-60  - Will continue to monitor in case of need for pressor support    #GI  - Decompensated alcoholic cirrhosis with ascites. hx gastric varices s/p IR embolization and known esophageal varices  - Diagnostic paracentesis on 11/04 with 160 PMNs, not meeting criteria for SBP  - c/w ceftriaxone, fluconazole for SBP prophylaxis, stop fluconazole if fungal culture negative  - s/p large volume paracentesis 11/5, ~7L removed  - c/w protonix BID  - C/w 500mg thiamine IV daily    #Renal  - ESRD on HD  - Last HD 11/06 via RIJ tunneled HD catheter  - c/w Renvela 800 mg TID    #ID  - Afebrile with leukocytosis, now resolved  - Blood cx drawn in OSH ([facility]) on 10/31 grew E. coli in aerobic bottle, pan sensitive  - Blood cx 11/02 with NGTD  - 1 ascites culture negative, second still testing  - Ascites fungal cultures pending  - Lactate 2.0, downtrending likely 2/2 impaired hepatic clearance  - c/w fluconazole + ceftriaxone 2g, stop fluconazole if fungal ascites culture negative    #Heme  - Hepatic coagulopathy + ?acute blood loss anemia  - No obvious source of loss but will monitor consider patient had hx of lifethreatening bleed.   - s/p 1u pRBC and FFP 11/04, FFP x1 11/05 before paracentesis, given Cry x 5 u as well.   - s/p vitamin K 10mg IV x 3  - Possible transfer to [facility] for transplant eval  - C/w Retacrit 10K units with HD    - DVT ppx: SCDs    #GOC  - Full code.        For Follow-Up:    [ ] F/u ascites culture - c/w ceftriaxone for 7 days  [ ] F/u ascites fungal culture - stop fluconazole if negative  [ ] Thoracentesis for hepatic hydrothorax  [ ] Arrange for transfer to [facility] for liver transplant  [ ] Monitor ammonia level

## 2020-11-07 NOTE — PROGRESS NOTE ADULT - ASSESSMENT
40 year old man with hx of decompensated EtOH cirrhosis (Dx'd 7/2020) complicated by gastric variceal bleeding (7/2020) s/p 34u pRBC, IR embolization and blakemore balloon, ESRD on HD (M/W/F), bladder rupture 2/2 fall s/p ex-lap in 2019, hemorrhoids, presented to OSH for increased abdominal distention and abdominal pain x3 days      # Encephelopathy improved. Possible recent alcohol use, PETH level pending.  # Decompensated EtOH Cirrhosis - MELD-Na: 40 (11/5/2020)  HE: Grade 1-2 currently  Ascites: was large on Exam and TTP, no SBP (160 PMNs), s/p para on 11/5 with 7L removed  HCC: No lesions on MRI 8/2020  Varices: Hx of gastric varices s/p IR embolization, Grade I esophageal varices seen 9/2020  # ESRD on hemodialysis   # Pleural effusion likely secondary to hepatic hydrothorax  # Anemia: possible secondary to ESRD +/- slow GI bleed. Has hx of varices    Recommendation:  - Awaiting transfer to [facility] - accepted for [facility] transplant evaluation    - Continue IV Thiamine  - Continue with Abx  - Continue with midodrine  - Continue with ppi daily  - Await PETH level  - Trend Coags, CMP, CBC    Pato Forbes   Gastroenterology Fellow  Pager: [phone]  Please call answering service [phone] / on-call GI fellow after 5pm and before 8am, and on weekends.

## 2020-11-07 NOTE — DISCHARGE NOTE PROVIDER - NSDCMRMEDTOKEN_GEN_ALL_CORE_FT
folic acid 1 mg oral tablet: 1 tab(s) orally once a day  midodrine 10 mg oral tablet: 1 tab(s) orally 3 times a day   multivitamin: 1 tab(s) orally once a day  oxyCODONE 5 mg oral tablet: 1 tab(s) orally every 6 hours, As Needed -Moderate Pain (4 - 6) MDD:20 mg  pantoprazole 40 mg oral delayed release tablet: 1 tab(s) orally once a day (before a meal)  sevelamer carbonate 800 mg oral tablet: 1 tab(s) orally 3 times a day with meals   cefTRIAXone 2 g intravenous injection: 2 gram(s) intravenous every 24 hours  epoetin chance: 34753 unit(s) intravenous 3 times a week with HD  fluconazole: 200 milligram(s) intravenous every 24 hours  folic acid 1 mg oral tablet: 1 tab(s) orally once a day  ipratropium-albuterol 0.5 mg-2.5 mg/3 mLinhalation solution: 3 milliliter(s) inhaled once, As needed, Wheezing  midodrine 10 mg oral tablet: 3 tab(s) orally every 8 hours  multivitamin: 1 tab(s) orally once a day  oxyCODONE 5 mg oral tablet: 1 tab(s) orally every 6 hours, As Needed -Moderate Pain (4 - 6) MDD:20 mg  pantoprazole 40 mg intravenous injection: 40 milligram(s) intravenous 2 times a day  polyethylene glycol 3350 oral powder for reconstitution: 17 gram(s) orally once  sevelamer carbonate 800 mg oral tablet: 1 tab(s) orally 3 times a day  thiamine: 500 milligram(s) intravenous once a day   cefTRIAXone 2 g intravenous injection: 2 gram(s) intravenous every 24 hours  epoetin chance: 00536 unit(s) intravenous 3 times a week with HD  fluconazole: 200 milligram(s) intravenous every 24 hours  folic acid 1 mg oral tablet: 1 tab(s) orally once a day  ipratropium-albuterol 0.5 mg-2.5 mg/3 mLinhalation solution: 3 milliliter(s) inhaled once, As needed, Wheezing  midodrine 10 mg oral tablet: 3 tab(s) orally every 8 hours  multivitamin: 1 tab(s) orally once a day  oxyCODONE 5 mg oral tablet: 1 tab(s) orally every 6 hours, As Needed -Moderate Pain (4 - 6) MDD:20 mg  pantoprazole 40 mg intravenous injection: 40 milligram(s) intravenous 2 times a day  polyethylene glycol 3350 oral powder for reconstitution: 17 gram(s) orally once  sevelamer carbonate 800 mg oral tablet: 1 tab(s) orally 3 times a day  thiamine: 500 milligram(s) intravenous once a day  traMADol 50 mg oral tablet: 0.5 tab(s) orally every 8 hours, As needed, Severe Pain (7 - 10)

## 2020-11-07 NOTE — DISCHARGE NOTE PROVIDER - HOSPITAL COURSE
38 yo M w/ PMH of EtOH cirrhosis c/b gastric variceal bleeding in July 2020 s/p 34u pRBC, IR embolization and blakemore ballon, ESRD on HD (M/W/F), bladder rupture 2/2 fall s/p ex-lap in 2019, hemorrhoids, presented to OSH for increased abdominal distention and abdominal pain x3 days, most significant this AM. Abd pain noted to be diffuse in nature initially rated as a constant stabbing 15/10 w/ associated rib pain and wrapping around the flanks.     At OSH pt was noted to be sinus tachycardic to 130s. /80, RR 24 and satting 99% on RA. Labs were notable for H/H 11.2/32.9, WBC 10.8, PLTs 116. Chemistry Na 130, K 5.5, BUN/Cr 14/4.87. AST//42, Alk phos 151 and Tbilli 10.2.    He was administered Ceftriaxone 1g x1, Zofran, 1L IVF, and Dilaudid 1mg IVP which alleviated his pain. Upon encounter he now c/o the pain being positional, and intermittent rating it a 7/10    Of note, pt last underwent paracentesis 1 month ago and dialysis yesterday 10/30/20.    Pt was admitted to the floor and started on ceftriaxone 2g daily for empiric treatment of SBP. CT abd/pelvis 11/04 demonstrated large volume ascites and large R pleural effusion with near complete collapse of R lung c/f hepatic hydrothorax. He was continuously hypotensive to the 80s and started on midodrine 10mg tid. Most recent dialysis yesterday 11/04. IR was consulted for paracentesis but deferred large volume paracentesis due to hypotension with SBPs in 70s and instead performed a diagnostic paracentesis on 11/04. Therapeutic paracentesis was planned for 11/05 but was ultimately deferred due to hypotension with SBP in 80s and MAP 57. MICU now reconsulted for pressure supported paracentesis.     Pt did not require any pressors in MICU. He was saturating well on room air. Received diagnostic paracentesis on 11/04 with 160 PMNs, not meeting criteria for SBP.  Received large volume paracentesis 11/5, ~7L removed. Pt was placed on ceftriaxone and fluconazole as ppx for SBP. He was continued on midodrine, protonix, thiamine. Midodrine was increased from 20mg to 30mg TID on day of transfer. He was refusing lactulose so was started on miralax q2H. He had 3 BM per day. His mental status improved with stable ammonia level. He received MWF HD. Received Retacrit 10K units with HD. No episodes of bleeding in MICU. 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 Thoracentesis was deferred given pt's clinical status. He was anemic with hepatic coagulopathy, recevied 1u pRBC and FFP 11/04, FFP x1 11/05 before paracentesis, given Cry x 5 u as well. Received vitamin K 10mg IV x 3 days. He was accepted to [facility] for liver transplantation. 38 yo M w/ PMH of EtOH cirrhosis c/b gastric variceal bleeding in July 2020 s/p 34u pRBC, IR embolization and blakemore ballon, ESRD on HD (M/W/F), bladder rupture 2/2 fall s/p ex-lap in 2019, hemorrhoids, presented to OSH for increased abdominal distention and abdominal pain x3 days. Abd pain noted to be diffuse in nature initially rated as a constant stabbing 15/10 w/ associated rib pain and wrapping around the flanks.     At OSH pt was noted to be sinus tachycardic to 130s. /80, RR 24 and satting 99% on RA. Labs were notable for H/H 11.2/32.9, WBC 10.8, PLTs 116. Chemistry Na 130, K 5.5, BUN/Cr 14/4.87. AST//42, Alk phos 151 and Tbilli 10.2.    He was administered Ceftriaxone 1g x1, Zofran, 1L IVF, and Dilaudid 1mg IVP which alleviated his pain.    Of note, pt last underwent paracentesis 1 month ago and dialysis yesterday 10/30/20.    Pt was admitted to the floor and started on ceftriaxone 2g daily for empiric treatment of SBP. CT abd/pelvis 11/04 demonstrated large volume ascites and large R pleural effusion with near complete collapse of R lung c/f hepatic hydrothorax. He was continuously hypotensive to the 80s and started on midodrine 10mg tid. Most recent dialysis  11/09. IR was consulted for paracentesis but deferred large volume paracentesis due to hypotension with SBPs in 70s and instead performed a diagnostic paracentesis on 11/04. Therapeutic paracentesis was planned for 11/05 but was ultimately deferred due to hypotension with SBP in 80s and MAP 57. MICU took pt for pressors if necessary to do LVP.    Pt did not require any pressors in MICU. He was saturating well on room air- 2L O2. Received diagnostic paracentesis on 11/04 with 160 PMNs, not meeting criteria for SBP.  Received large volume paracentesis 11/5, ~7L removed. Pt was placed on ceftriaxone and fluconazole as ppx for SBP. Fluconazole D/C 11/9 when fungal cultures were negataive.  He was continued on midodrine, protonix, thiamine. Midodrine was increased from 20mg to 30mg TID. He was refusing lactulose so was started on miralax q2H. He had 3 BM per day. His mental status improved with stable ammonia level. He received MWF HD. Received Retacrit 10K units with HD. No episodes of bleeding in MICU. 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 Thoracentesis was deferred given pt's clinical status. He was anemic with hepatic coagulopathy, recevied 1u pRBC and FFP 11/04, FFP x1 11/05 before paracentesis, given Cry x 5 u as well. Received vitamin K 10mg IV x 3 days. He was accepted to [facility] for liver transplantation.

## 2020-11-07 NOTE — PROGRESS NOTE ADULT - SUBJECTIVE AND OBJECTIVE BOX
INTERVAL HPI/OVERNIGHT EVENTS: Received dilaudid 0.5mg for pain. Pt had 3 BM yesterday.     SUBJECTIVE: Patient seen and examined at bedside. Alert and oriented X3, able to respond appropriately to questions. Complaining of some back pain and SOB, which is baseline for him. States that his abdomen is less distended. Denies CP, fevers, chills, blood in stool.      VITAL SIGNS:  ICU Vital Signs Last 24 Hrs  T(C): 37 (07 Nov 2020 12:00), Max: 37.3 (06 Nov 2020 23:00)  T(F): 98.6 (07 Nov 2020 12:00), Max: 99.2 (07 Nov 2020 03:00)  HR: 102 (07 Nov 2020 12:00) (96 - 104)  BP: 81/48 (07 Nov 2020 12:00) (81/45 - 95/47)  BP(mean): 60 (07 Nov 2020 12:00) (57 - 70)  ABP: --  ABP(mean): --  RR: 22 (07 Nov 2020 12:00) (18 - 30)  SpO2: 96% (07 Nov 2020 12:00) (95% - 100%)      Plateau pressure:   P/F ratio:     11-06 @ 07:01  -  11-07 @ 07:00  --------------------------------------------------------  IN: 1035 mL / OUT: 0 mL / NET: 1035 mL    11-07 @ 07:01  -  11-07 @ 13:21  --------------------------------------------------------  IN: 575 mL / OUT: 0 mL / NET: 575 mL      CAPILLARY BLOOD GLUCOSE  120 (06 Nov 2020 07:00)      POCT Blood Glucose.: 120 mg/dL (06 Nov 2020 07:03)    ECG:    PHYSICAL EXAM:    General: lying in bed in NAD  HEENT: NC/AT, +icteric sclera, PERRLA, EOMI  CV: RRR, normal S1,S2; no murmurs; no peripheral edema   Pulm: lungs CTAB, no crackles, rhonchi, or wheezes  Abd: distended, tender to palpation, +bowel sounds  Psych/Neuro: A&Ox3, nonfocal, normal affect  Skin: +jaundice, warm, dry    MEDICATIONS:  MEDICATIONS  (STANDING):  cefTRIAXone   IVPB 2000 milliGRAM(s) IV Intermittent every 24 hours  chlorhexidine 4% Liquid 1 Application(s) Topical <User Schedule>  chlorhexidine 4% Liquid 1 Application(s) Topical <User Schedule>  epoetin chance-epbx (RETACRIT) Injectable 55501 Unit(s) IV Push <User Schedule>  fluconAZOLE IVPB      fluconAZOLE IVPB 200 milliGRAM(s) IV Intermittent every 24 hours  folic acid 1 milliGRAM(s) Oral daily  HYDROmorphone   Tablet 2 milliGRAM(s) Oral once  influenza   Vaccine 0.5 milliLiter(s) IntraMuscular once  midodrine 20 milliGRAM(s) Oral every 8 hours  multivitamin 1 Tablet(s) Oral daily  pantoprazole  Injectable 40 milliGRAM(s) IV Push two times a day  polyethylene glycol 3350 17 Gram(s) Oral once  sevelamer carbonate 800 milliGRAM(s) Oral three times a day  thiamine IVPB 500 milliGRAM(s) IV Intermittent daily    MEDICATIONS  (PRN):  albuterol/ipratropium for Nebulization. 3 milliLiter(s) Nebulizer once PRN Wheezing      ALLERGIES:  Allergies    No Known Allergies    Intolerances        LABS:                        7.7    8.83  )-----------( 37       ( 07 Nov 2020 01:24 )             23.6     11-07    137  |  99  |  21  ----------------------------<  101<H>  3.9   |  27  |  4.14<H>    Ca    8.6      07 Nov 2020 01:24  Phos  3.1     11-07  Mg     2.0     11-07    TPro  5.1<L>  /  Alb  3.0<L>  /  TBili  9.7<H>  /  DBili  x   /  AST  43<H>  /  ALT  13  /  AlkPhos  54  11-07    PT/INR - ( 07 Nov 2020 01:24 )   PT: 36.3 sec;   INR: 3.20 ratio         PTT - ( 07 Nov 2020 01:24 )  PTT:73.4 sec    Ammonia, Serum: 33    Blood Gas Profile - Venous (11.07.20 @ 01:21)   pH, Venous: 7.39   pCO2, Venous: 50 mmHg   pO2, Venous: 68 mmHg   HCO3, Venous: 29 mmol/L   Base Excess, Venous: 4.3 mmol/L   Oxygen Saturation, Venous: 93 %   Total CO2, Venous: 31 mmol/L   FIO2, Venous: 30   Blood Gas Source Venous: Arterial     Blood Gas Venous - Lactate: 2.0 mmoL/L (11.07.20 @ 01:21)       Culture - Body Fluid with Gram Stain (11.06.20 @ 02:13)   Specimen Source: .Body Fluid Peritoneal Fluid   Culture Results:   No growth     Culture - Fungal, Body Fluid (11.06.20 @ 02:13)   Specimen Source: .Body Fluid Peritoneal Fluid   Culture Results:   Testing in progress     Culture - Blood (11.05.20 @ 23:22)   Specimen Source: .Blood Blood   Culture Results:   No growth to date.     RADIOLOGY & ADDITIONAL TESTS:       EXAM:  CT BRAIN                            PROCEDURE DATE:  11/06/2020        IMPRESSION:    Volume loss with microvascular disease, no acute hemorrhage or midline shift. MRI is more sensitive for seizure focus. If symptoms persist consider follow-up head CT or MR if no contraindications.

## 2020-11-07 NOTE — PROGRESS NOTE ADULT - PROBLEM SELECTOR PLAN 1
Pt w/ hx of ETOH cirrhosis w/ known gastric varices. MELD- Na: 41. Now pending liver transplantation (recent acceptance to [facility] for liver tx.   - Awaiting transfer to [place] - accepted for [facility] transplant evaluation    - Continue IV Thiamine  - Continue with Abx (CTX and fluconazole)  - Continue with midodrine  - Continue with ppi daily  - Await PETH level  - Trend Coags, CMP, CBC Pt w/ hx of ETOH cirrhosis w/ known gastric varices. MELD- Na: 41. Now pending liver transplantation (recent acceptance to [facility] for liver tx. s/p diagnostic paracentesis on 11/04, negative for SBP (PMNs; 160s)  - c/w CTX for SBP PPX (11/2- ) for 7 day course. of note patient did not receive dose on 11/5.   - c/w fluconazole for fungal ppx  - f/u ascites fluid cxs, if negative, will dc fluconazole.  - Awaiting transfer to [place] - accepted for [facility] transplant evaluation    - Continue IV Thiamine  - Continue with Abx (CTX and fluconazole)  - Continue with midodrine  - Continue with ppi daily  - Await PETH level  - Trend Coags, CMP, CBC

## 2020-11-07 NOTE — PROGRESS NOTE ADULT - PROBLEM SELECTOR PLAN 3
pt w/ ESRD on HD MWF; followed by nephrology. last HD on 11/6/20.   - Continue Renvela 800 mg TID with meals  - Retacrit 10K units with HD  - F/u nephro recs

## 2020-11-08 NOTE — PROGRESS NOTE ADULT - SUBJECTIVE AND OBJECTIVE BOX
[facility] Division of Hospital Medicine  Penelope DO Javier  Pager (M-F, 8C-6K): [phone]  Other Times:  [phone]    Patient is a 40y old  Male who presents with a chief complaint of HRS (08 Nov 2020 07:25)      SUBJECTIVE / OVERNIGHT EVENTS: overnight transferred out of MICU to general medical floors. Patient complains of mild shortness of breath as well as abdominal pain.   ADDITIONAL REVIEW OF SYSTEMS: negative    MEDICATIONS  (STANDING):  cefTRIAXone   IVPB 2000 milliGRAM(s) IV Intermittent every 24 hours  epoetin chance-epbx (RETACRIT) Injectable 53958 Unit(s) IV Push <User Schedule>  fluconAZOLE IVPB      fluconAZOLE IVPB 200 milliGRAM(s) IV Intermittent every 24 hours  folic acid 1 milliGRAM(s) Oral daily  influenza   Vaccine 0.5 milliLiter(s) IntraMuscular once  midodrine 30 milliGRAM(s) Oral every 8 hours  multivitamin 1 Tablet(s) Oral daily  pantoprazole  Injectable 40 milliGRAM(s) IV Push two times a day  polyethylene glycol 3350 17 Gram(s) Oral once  sevelamer carbonate 800 milliGRAM(s) Oral three times a day  thiamine IVPB 500 milliGRAM(s) IV Intermittent daily    MEDICATIONS  (PRN):  albuterol/ipratropium for Nebulization. 3 milliLiter(s) Nebulizer once PRN Wheezing      CAPILLARY BLOOD GLUCOSE        I&O's Summary    07 Nov 2020 07:01  -  08 Nov 2020 07:00  --------------------------------------------------------  IN: 1135 mL / OUT: 0 mL / NET: 1135 mL        PHYSICAL EXAM:  Vital Signs Last 24 Hrs  T(C): 36.7 (08 Nov 2020 12:46), Max: 37.1 (07 Nov 2020 16:00)  T(F): 98 (08 Nov 2020 12:46), Max: 98.7 (07 Nov 2020 16:00)  HR: 103 (08 Nov 2020 12:46) (100 - 108)  BP: 101/60 (08 Nov 2020 12:46) (81/49 - 101/60)  BP(mean): 66 (07 Nov 2020 20:00) (61 - 67)  RR: 18 (08 Nov 2020 12:46) (18 - 39)  SpO2: 97% (08 Nov 2020 12:46) (90% - 97%)    CONSTITUTIONAL: NAD, thin male, cachetic   EYES: PERRLA; conjunctiva and sclera jaundiced  ENMT: Moist oral mucosa, no pharyngeal injection or exudates  NECK: Supple, no palpable mass  RESPIRATORY: Normal respiratory effort; lungs are clear to auscultation bilaterally  CARDIOVASCULAR: Regular rate and rhythm, normal S1 and S2, no murmur/rub/gallop; No lower extremity edema; Peripheral pulses are 2+ bilaterally.   ABDOMEN: mildly tender to palpation throghout, normoactive bowel sounds, no rebound/guarding; abdomen is distended.   MUSCULOSKELETAL: no clubbing or cyanosis of digits; no joint swelling or tenderness to palpation  PSYCH: A+O to person, place, and time; affect appropriate  NEUROLOGY: CN 2-12 are intact and symmetric; no gross sensory deficits   SKIN: No rashes; no palpable lesions    LABS:                        9.1    14.46 )-----------( 42       ( 08 Nov 2020 07:31 )             27.7     11-08    134<L>  |  95<L>  |  34<H>  ----------------------------<  108<H>  4.8   |  25  |  5.82<H>    Ca    9.1      08 Nov 2020 07:31  Phos  4.0     11-08  Mg     2.1     11-08    TPro  5.8<L>  /  Alb  3.2<L>  /  TBili  12.2<H>  /  DBili  x   /  AST  53<H>  /  ALT  14  /  AlkPhos  70  11-08    PT/INR - ( 08 Nov 2020 10:15 )   PT: 32.8 sec;   INR: 2.93 ratio         PTT - ( 07 Nov 2020 01:24 )  PTT:73.4 sec          Culture - Fungal, Body Fluid (collected 06 Nov 2020 02:13)  Source: .Body Fluid Peritoneal Fluid  Preliminary Report (06 Nov 2020 06:33):    Testing in progress    Culture - Body Fluid with Gram Stain (collected 06 Nov 2020 02:13)  Source: .Body Fluid Peritoneal Fluid  Preliminary Report (06 Nov 2020 18:45):    No growth    Culture - Blood (collected 05 Nov 2020 23:22)  Source: .Blood Blood  Preliminary Report (07 Nov 2020 01:02):    No growth to date.

## 2020-11-08 NOTE — PROGRESS NOTE ADULT - SUBJECTIVE AND OBJECTIVE BOX
Follow Up:  Leukocytosis, E coli Bacteremia    Interval History:    REVIEW OF SYSTEMS  [  ] ROS unobtainable because:    [  ] All other systems negative except as noted below    Constitutional:  [ ] fever [ ] chills  [ ] weight loss  [ ] weakness  Skin:  [ ] rash [ ] phlebitis	  Eyes: [ ] icterus [ ] pain  [ ] discharge	  ENMT: [ ] sore throat  [ ] thrush [ ] ulcers [ ] exudates  Respiratory: [ ] dyspnea [ ] hemoptysis [ ] cough [ ] sputum	  Cardiovascular:  [ ] chest pain [ ] palpitations [ ] edema	  Gastrointestinal:  [ ] nausea [ ] vomiting [ ] diarrhea [ ] constipation [ ] pain	  Genitourinary:  [ ] dysuria [ ] frequency [ ] hematuria [ ] discharge [ ] flank pain  [ ] incontinence  Musculoskeletal:  [ ] myalgias [ ] arthralgias [ ] arthritis  [ ] back pain  Neurological:  [ ] headache [ ] seizures  [ ] confusion/altered mental status    Allergies  No Known Allergies        ANTIMICROBIALS:  cefTRIAXone   IVPB 2000 every 24 hours  fluconAZOLE IVPB    fluconAZOLE IVPB 200 every 24 hours      OTHER MEDS:  MEDICATIONS  (STANDING):  albuterol/ipratropium for Nebulization. 3 once PRN  epoetin chance-epbx (RETACRIT) Injectable 88255 <User Schedule>  influenza   Vaccine 0.5 once  midodrine 30 every 8 hours  pantoprazole  Injectable 40 two times a day  polyethylene glycol 3350 17 once  traMADol 25 every 8 hours PRN      Vital Signs Last 24 Hrs  T(C): 36.7 (08 Nov 2020 12:46), Max: 36.8 (07 Nov 2020 21:58)  T(F): 98 (08 Nov 2020 12:46), Max: 98.2 (07 Nov 2020 21:58)  HR: 103 (08 Nov 2020 12:46) (101 - 107)  BP: 101/60 (08 Nov 2020 12:46) (87/54 - 101/60)  BP(mean): 66 (07 Nov 2020 20:00) (66 - 66)  RR: 18 (08 Nov 2020 12:46) (18 - 23)  SpO2: 97% (08 Nov 2020 12:46) (90% - 97%)    PHYSICAL EXAMINATION:  General: Alert and Awake, NAD  HEENT: PERRL, EOMI  Neck: Supple  Cardiac: RRR, No M/R/G  Resp: CTAB, No Wh/Rh/Ra  Abdomen: NBS, NT/ND, No HSM, No rigidity or guarding  MSK: No LE edema. No Calf tenderness  : No ashton  Skin: No rashes or lesions. Skin is warm and dry to the touch.   Neuro: Alert and Awake. CN 2-12 Grossly intact. Moves all four extremities spontaneously.  Psych: Calm, Pleasant, Cooperative                          9.1    14.46 )-----------( 42       ( 08 Nov 2020 07:31 )             27.7       11-08    134<L>  |  95<L>  |  34<H>  ----------------------------<  108<H>  4.8   |  25  |  5.82<H>    Ca    9.1      08 Nov 2020 07:31  Phos  4.0     11-08  Mg     2.1     11-08    TPro  5.8<L>  /  Alb  3.2<L>  /  TBili  12.2<H>  /  DBili  x   /  AST  53<H>  /  ALT  14  /  AlkPhos  70  11-08          MICROBIOLOGY:  v  .Body Fluid Peritoneal Fluid  11-06-20   No growth  --  --      .Blood Blood  11-05-20   No growth to date.  --  --      .Body Fluid PERITONEAL  11-05-20   No growth  --  --      .Body Fluid Peritoneal Fluid  11-05-20   No growth  --    polymorphonuclear leukocytes seen  No organisms seen  by cytocentrifuge      .Blood Blood  11-02-20   No Growth Final  --  --      .Blood Blood  11-02-20   No Growth Final  --  --        CMV IgG Antibody: 0.51 U/mL (08-08-20 @ 09:47)  Toxoplasma IgG Screen: <3.0 IU/mL (08-08-20 @ 09:47)          RADIOLOGY:    <The imaging below has been reviewed and visualized by me independently. Findings as detailed in report below> Follow Up:  Leukocytosis, E coli Bacteremia    Interval History: afebrile. notes some discomfort in abdomen but otherwise has no acute complaints. denies diarrhea.     REVIEW OF SYSTEMS  [  ] ROS unobtainable because:    [ x ] All other systems negative except as noted below    Constitutional:  [ ] fever [ ] chills  [ ] weight loss  [ ] weakness  Skin:  [ ] rash [ ] phlebitis	  Eyes: [ ] icterus [ ] pain  [ ] discharge	  ENMT: [ ] sore throat  [ ] thrush [ ] ulcers [ ] exudates  Respiratory: [ ] dyspnea [ ] hemoptysis [ ] cough [ ] sputum	  Cardiovascular:  [ ] chest pain [ ] palpitations [ ] edema	  Gastrointestinal:  [ ] nausea [ ] vomiting [ ] diarrhea [ ] constipation [ x] pain	  Genitourinary:  [ ] dysuria [ ] frequency [ ] hematuria [ ] discharge [ ] flank pain  [ ] incontinence  Musculoskeletal:  [ ] myalgias [ ] arthralgias [ ] arthritis  [ ] back pain  Neurological:  [ ] headache [ ] seizures  [ ] confusion/altered mental status    Allergies  No Known Allergies        ANTIMICROBIALS:  cefTRIAXone   IVPB 2000 every 24 hours  fluconAZOLE IVPB    fluconAZOLE IVPB 200 every 24 hours      OTHER MEDS:  MEDICATIONS  (STANDING):  albuterol/ipratropium for Nebulization. 3 once PRN  epoetin chance-epbx (RETACRIT) Injectable 49202 <User Schedule>  influenza   Vaccine 0.5 once  midodrine 30 every 8 hours  pantoprazole  Injectable 40 two times a day  polyethylene glycol 3350 17 once  traMADol 25 every 8 hours PRN      Vital Signs Last 24 Hrs  T(C): 36.7 (08 Nov 2020 12:46), Max: 36.8 (07 Nov 2020 21:58)  T(F): 98 (08 Nov 2020 12:46), Max: 98.2 (07 Nov 2020 21:58)  HR: 103 (08 Nov 2020 12:46) (101 - 107)  BP: 101/60 (08 Nov 2020 12:46) (87/54 - 101/60)  BP(mean): 66 (07 Nov 2020 20:00) (66 - 66)  RR: 18 (08 Nov 2020 12:46) (18 - 23)  SpO2: 97% (08 Nov 2020 12:46) (90% - 97%)    PHYSICAL EXAMINATION:  General: Alert and Awake, NAD, jaundice  HEENT: PERRL, EOMI, scleral icterus  Neck: Supple  Cardiac: RRR, No M/R/G  Resp: CTAB, No Wh/Rh/Ra  Abdomen: NBS, Mod Distension, mild diffuse tenderness to palpation, No HSM, No rigidity or guarding  MSK: No LE edema. No Calf tenderness  : No ashton  Skin: No rashes or lesions. Skin is warm and dry to the touch.   Neuro: Alert and Awake. CN 2-12 Grossly intact. Moves all four extremities spontaneously.  Psych: Calm, Pleasant, Cooperative                          9.1    14.46 )-----------( 42       ( 08 Nov 2020 07:31 )             27.7       11-08    134<L>  |  95<L>  |  34<H>  ----------------------------<  108<H>  4.8   |  25  |  5.82<H>    Ca    9.1      08 Nov 2020 07:31  Phos  4.0     11-08  Mg     2.1     11-08    TPro  5.8<L>  /  Alb  3.2<L>  /  TBili  12.2<H>  /  DBili  x   /  AST  53<H>  /  ALT  14  /  AlkPhos  70  11-08          MICROBIOLOGY:  v  .Body Fluid Peritoneal Fluid  11-06-20   No growth  --  --      .Blood Blood  11-05-20   No growth to date.  --  --      .Body Fluid PERITONEAL  11-05-20   No growth  --  --      .Body Fluid Peritoneal Fluid  11-05-20   No growth  --    polymorphonuclear leukocytes seen  No organisms seen  by cytocentrifuge      .Blood Blood  11-02-20   No Growth Final  --  --      .Blood Blood  11-02-20   No Growth Final  --  --        CMV IgG Antibody: 0.51 U/mL (08-08-20 @ 09:47)  Toxoplasma IgG Screen: <3.0 IU/mL (08-08-20 @ 09:47)          RADIOLOGY:    no new imaging

## 2020-11-08 NOTE — PROGRESS NOTE ADULT - ASSESSMENT
40 yo M w/ PMH of EtOH cirrhosis c/b gastric variceal bleeding in July 2020 s/p 34 pRBC, IR embolization, ESRD on HD (M/W/F), bladder rupture 2/2 fall s/p ex-lap in 2019 was transferred from OSH for worsening abdominal distention and pain  No fever, has leukocytosis  Distended abd from ascites  Covid Ab neg    CT A/P (11/4) with large volume ascites and large right pleural effusion. Noted to have some patchy infiltrates in the left lung. Unclear if this represents fluid or infection.     Had para (could only tolerate diagnostic) elevated cells compared to prior but not at SBP range, however consider could be partially treated  Note that OSH called stating that on 10/31 patient had E coli in blood S CTX, FQ, is not an ESBL (reviewed S in paper chart)  Improving WBC, mental status improving,     Pending transfer to [place] for [facility] transplant evaluation    WBC increased today but no other clinical status changes  If any clinical status changes or continued rise in WBC would repeat blood cultures and U/A and UCx    Overall,  1) Leukocytosis (increased) /New E coli bacteremia?  -  If any clinical status changes or continued rise in WBC would repeat blood cultures and U/A and UCx  - Ceftriaxone 2g q 24  - Stop Fluconazole if diagnostic para culture remains neg  - F/U pending BCXs here and ascites culture  - Trend WBC to normal  2) Ascites  - Therapeutic paracentesis as can be tolerated, F/U GI/hepatology  3) R pleural effusion  - Monitor resp status  - F/U Pulm if any indication for drainage    I will continue to follow. Please feel free to contact me with any further questions.    Dick Cardenas M.D.  [facility] Division of Infectious Disease  8AM-5PM: Pager Number [phone]  After Hours (or if no response): Please contact the Infectious Diseases Office at [phone]     The above assessment and plan were discussed with medicine NP

## 2020-11-08 NOTE — PROGRESS NOTE ADULT - ASSESSMENT
40 year old man with hx of decompensated EtOH cirrhosis (Dx'd 7/2020) complicated by gastric variceal bleeding (7/2020) s/p 34u pRBC, IR embolization and blakemore balloon, ESRD on HD (M/W/F), bladder rupture 2/2 fall s/p ex-lap in 2019, hemorrhoids, presented to OSH for increased abdominal distention and abdominal pain x3 days  Pt was admitted to the floor and started on ceftriaxone 2g daily for empiric treatment of SBP. CT abd/pelvis 11/04 demonstrated large volume ascites and large R pleural effusion with near complete collapse of R lung c/f hepatic hydrothorax. He was continuously hypotensive to the 80s and started on midodrine 10mg tid. IR was consulted for paracentesis but deferred large volume paracentesis due to hypotension with SBPs in 70s and instead performed a diagnostic paracentesis on 11/04. Therapeutic paracentesis was planned for 11/05 but was ultimately deferred due to hypotension with SBP in 80s and MAP 57.     In the MICU, Received large volume paracentesis 11/5, ~7L removed. Pt was placed on ceftriaxone and fluconazole as ppx for SBP. He was continued on midodrine, protonix, thiamine. Midodrine was increased from 20mg to 30mg TID on day of transfer. He was refusing lactulose so was started on miralax q2H. He had 3 BM per day. His mental status improved with stable ammonia level. He received MWF HD. Received Retacrit 10K units with HD. No episodes of bleeding in MICU. 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 Thoracentesis was deferred given pt's clinical status. He was anemic with hepatic coagulopathy, recevied 1u pRBC and FFP 11/04, FFP x1 11/05 before paracentesis, given Cry x 5 u as well. Received vitamin K 10mg IV x 3 days.   He was accepted to [facility] for liver transplantation.     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   # Hepatic Encephelopathy improved.  # Decompensated EtOH Cirrhosis - MELD-Na: 40 (11/5/2020)  HE: Grade 1-2 currently  Ascites: was large on Exam and TTP, no SBP (160 PMNs), s/p para on 11/5 with 7L removed  HCC: No lesions on MRI 8/2020  Varices: Hx of gastric varices s/p IR embolization, Grade I esophageal varices seen 9/2020  # ESRD on hemodialysis   # Anemia: possible secondary to ESRD +/- slow GI bleed. Has hx of varices    Recommendation:  - Awaiting transfer to [place] - accepted for [facility] transplant evaluation    - Continue IV Thiamine  - Continue with Abx  - Continue with midodrine  - Continue with ppi daily  - Await [facility] level  - Trend Coags, CMP, CBC    Pato Forbes   Gastroenterology Fellow  Pager: [phone]  Please call answering service [phone] / on-call GI fellow after 5pm and before 8am, and on weekends. 40 year old man with hx of decompensated EtOH cirrhosis (Dx'd 7/2020) complicated by gastric variceal bleeding (7/2020) s/p 34u pRBC, IR embolization and blakemore balloon, ESRD on HD (M/W/F), bladder rupture 2/2 fall s/p ex-lap in 2019, hemorrhoids, presented to OSH for increased abdominal distention and abdominal pain x3 days  Pt was admitted to the floor and started on ceftriaxone 2g daily for empiric treatment of SBP. CT abd/pelvis 11/04 demonstrated large volume ascites and large R pleural effusion with near complete collapse of R lung c/f hepatic hydrothorax. He was continuously hypotensive to the 80s and started on midodrine 10mg tid. IR was consulted for paracentesis but deferred large volume paracentesis due to hypotension with SBPs in 70s and instead performed a diagnostic paracentesis on 11/04. Therapeutic paracentesis was planned for 11/05 but was ultimately deferred due to hypotension with SBP in 80s and MAP 57.     In the MICU, Received large volume paracentesis 11/5, ~7L removed. Pt was placed on ceftriaxone and fluconazole as ppx for SBP. He was continued on midodrine, protonix, thiamine. Midodrine was increased from 20mg to 30mg TID on day of transfer. He was refusing lactulose so was started on miralax q2H. He had 3 BM per day. His mental status improved with stable ammonia level. He received MWF HD. Received Retacrit 10K units with HD. No episodes of bleeding in MICU. 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 Thoracentesis was deferred given pt's clinical status. He was anemic with hepatic coagulopathy, recevied 1u pRBC and FFP 11/04, FFP x1 11/05 before paracentesis, given Cry x 5 u as well. Received vitamin K 10mg IV x 3 days.   He was accepted to [facility] for liver transplantation.     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   # Hepatic Encephelopathy improved.  # Decompensated EtOH Cirrhosis - MELD-Na: 40 (11/5/2020)  HE: Grade 1-2 currently  Ascites: was large on Exam and TTP, no SBP (160 PMNs), s/p para on 11/5 with 7L removed  HCC: No lesions on MRI 8/2020  Varices: Hx of gastric varices s/p IR embolization, Grade I esophageal varices seen 9/2020  # ESRD on hemodialysis   # Anemia: possible secondary to ESRD +/- slow GI bleed. Has hx of varices    Recommendation:  - Awaiting transfer to [place] - accepted for [facility] transplant evaluation    - If he doesn't get transferred today. Then obtain chest x ray and perform therapeutic thoracentesis tomorrow.  - Continue IV Thiamine  - Continue with Abx  - Continue with midodrine  - Continue with ppi daily  - Await PET level  - Trend Coags, CMP, CBC    Pato Forbes   Gastroenterology Fellow  Pager: [phone]  Please call answering service [phone] / on-call GI fellow after 5pm and before 8am, and on weekends.

## 2020-11-08 NOTE — PROGRESS NOTE ADULT - PROBLEM SELECTOR PLAN 1
-Pt w/ hx of ETOH cirrhosis w/ known gastric varices. MELD- Na: 41.   -pending liver transplantation (accepted to [facility] for liver tx, awaiting bed availability)  -s/p diagnostic paracentesis on 11/04, negative for SBP (PMNs 160s)  - c/w CTX for SBP PPX (11/2- ) for 7 day course. of note patient did not receive dose on 11/5.   - c/w fluconazole for fungal ppx  - f/u ascites fluid cxs, if negative, will dc fluconazole.  - Continue IV Thiamine  - Continue with Abx (CTX and fluconazole)  - Continue with midodrine  - Continue with ppi daily  - Await PETH level  - Trend Coags, CMP, CBC

## 2020-11-08 NOTE — PROGRESS NOTE ADULT - PROBLEM SELECTOR PLAN 2
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   - F/u pulm for possible thoracentesis on Monday   - monitor respiratory status  - duonebs prn  - c/w O2 NC

## 2020-11-08 NOTE — PROGRESS NOTE ADULT - SUBJECTIVE AND OBJECTIVE BOX
Interval Events:   Transferred to floor.  No acute events overnight     Hospital Medications:  albuterol/ipratropium for Nebulization. 3 milliLiter(s) Nebulizer once PRN  cefTRIAXone   IVPB 2000 milliGRAM(s) IV Intermittent every 24 hours  epoetin chance-epbx (RETACRIT) Injectable 91532 Unit(s) IV Push <User Schedule>  fluconAZOLE IVPB      fluconAZOLE IVPB 200 milliGRAM(s) IV Intermittent every 24 hours  folic acid 1 milliGRAM(s) Oral daily  influenza   Vaccine 0.5 milliLiter(s) IntraMuscular once  midodrine 30 milliGRAM(s) Oral every 8 hours  multivitamin 1 Tablet(s) Oral daily  pantoprazole  Injectable 40 milliGRAM(s) IV Push two times a day  polyethylene glycol 3350 17 Gram(s) Oral once  sevelamer carbonate 800 milliGRAM(s) Oral three times a day  thiamine IVPB 500 milliGRAM(s) IV Intermittent daily        ROS:   General:  No fevers, chills or night sweats  ENT:  No sore throat or dysphagia  CV:  No pain or palpitations  Resp:  + dyspnea, no cough or  wheezing  GI:  as above  Skin:  No rash or edema  Neuro: no weakness   Hematologic: no bleeding  Musculoskeletal: no muscle pain or join pain  Psych: no agitation        PHYSICAL EXAM:   Vital Signs:  Vital Signs Last 24 Hrs  T(C): 36.7 (08 Nov 2020 04:32), Max: 37.1 (07 Nov 2020 16:00)  T(F): 98 (08 Nov 2020 04:32), Max: 98.7 (07 Nov 2020 16:00)  HR: 107 (08 Nov 2020 04:32) (96 - 108)  BP: 92/57 (08 Nov 2020 04:32) (81/45 - 92/57)  BP(mean): 66 (07 Nov 2020 20:00) (57 - 69)  RR: 20 (08 Nov 2020 04:32) (20 - 39)  SpO2: 91% (08 Nov 2020 04:32) (90% - 97%)  Daily     Daily     GENERAL: ill appearing, fatigued  HEENT:  NC/AT,  +scleral icterus  CHEST:  no increased effort  HEART:  Regular rate and rhythm  ABDOMEN:  Soft, distended but improved, no tenderness  EXTREMITIES:  no cyanosis, clubbing or edema  SKIN:  jaundice  NEURO: mild+ asterixis , Alert and oriented - following commands and answering questions appropriately       LABS:                        7.7    8.83  )-----------( 37       ( 07 Nov 2020 01:24 )             23.6       11-07    137  |  99  |  21  ----------------------------<  101<H>  3.9   |  27  |  4.14<H>    Ca    8.6      07 Nov 2020 01:24  Phos  3.1     11-07  Mg     2.0     11-07    TPro  5.1<L>  /  Alb  3.0<L>  /  TBili  9.7<H>  /  DBili  x   /  AST  43<H>  /  ALT  13  /  AlkPhos  54  11-07    LIVER FUNCTIONS - ( 07 Nov 2020 01:24 )  Alb: 3.0 g/dL / Pro: 5.1 g/dL / ALK PHOS: 54 U/L / ALT: 13 U/L / AST: 43 U/L / GGT: x           PT/INR - ( 07 Nov 2020 01:24 )   PT: 36.3 sec;   INR: 3.20 ratio         PTT - ( 07 Nov 2020 01:24 )  PTT:73.4 sec                            7.7    8.83  )-----------( 37       ( 07 Nov 2020 01:24 )             23.6                         8.1    9.18  )-----------( 40       ( 06 Nov 2020 07:09 )             24.1                         6.6    9.65  )-----------( 43       ( 06 Nov 2020 00:34 )             19.4                         7.4    12.47 )-----------( 63       ( 05 Nov 2020 19:27 )             22.4       Imaging:

## 2020-11-09 NOTE — PROGRESS NOTE ADULT - PROBLEM SELECTOR PLAN 1
-Pt w/ hx of ETOH cirrhosis w/ known gastric varices. MELD- Na: 41.   -pending liver transplantation (accepted to [facility] for liver tx, awaiting bed availability)  -s/p diagnostic paracentesis on 11/04, negative for SBP (PMNs 160s)  - c/w CTX for SBP PPX (11/2- ) for 7 day course. of note patient did not receive dose on 11/5.   -DC  fluconazole for fungal as cultures were negative   - Continue IV Thiamine  - Continue with Abx    - Continue with midodrine  - Continue with ppi daily  - Await PETH level  - Trend Coags, CMP, CBC

## 2020-11-09 NOTE — PROGRESS NOTE ADULT - ASSESSMENT
38 yo M w/ PMH of EtOH cirrhosis c/b gastric variceal bleeding in July 2020 s/p 34 pRBC, IR embolization, ESRD on HD (M/W/F), bladder rupture 2/2 fall s/p ex-lap in 2019 was transferred from OSH for worsening abdominal distention and pain  No fever, has leukocytosis  Distended abd from ascites  Covid Ab neg  CT A/P (11/4) with large volume ascites and large right pleural effusion. Noted to have some patchy infiltrates in the left lung. Unclear if this represents fluid or infection.   Had para (could only tolerate diagnostic) elevated cells compared to prior but not at SBP range, however consider could be partially treated  Note that OSH called stating that on 10/31 patient had E coli in blood S CTX, FQ, is not an ESBL (reviewed S in paper chart)  Improving WBC, mental status improving,     Pending transfer to [place] for [facility] transplant evaluation    WBC remains elevated, but patient appears clinically well    Overall,  1) Leukocytosis (increased) /New E coli bacteremia?  -  If any clinical status changes or continued rise in WBC would repeat blood cultures and U/A and UCx  - Ceftriaxone 2g q 24 through 11/11/20 (10 days from negative BCX here)  - F/U pending cultures  - Trend WBC to normal  2) Ascites  - Therapeutic paracentesis as can be tolerated, F/U GI/hepatology  3) R pleural effusion  - Monitor resp status  - F/U Pulm if any indication for drainage    Calin Thomas MD  Pager [phone]  After 5pm and on weekends call [phone]

## 2020-11-09 NOTE — PROGRESS NOTE ADULT - PROBLEM SELECTOR PROBLEM 6
Discharge planning issues
Pleural effusion on right
End-stage renal disease (ESRD)
Prophylactic measure

## 2020-11-09 NOTE — PROGRESS NOTE ADULT - PROBLEM SELECTOR PLAN 1
Pt. with ESRD on HD three times a week (MWF). Last HD was on 11/6/20 via RIJ tunneled HD catheter. Pt. evaluated in HD center, catheter functioning well. Pt. tolerated 1 hour of HD prior to complaining of feeling uncomfortable and SOB. Monitor labs.

## 2020-11-09 NOTE — PROGRESS NOTE ADULT - PROBLEM SELECTOR PLAN 6
DVT: SCDs  Diet: clear for now  Dispo: awaiting bed at [place] for liver transplant
DVT: SCDs  Diet: clear for now  Dispo: awaiting bed at [place] for liver transplant
On HD MWF  - R IJ tunneled cath  - Nephro recs reviewed  - c/w Sevelamir 800mg TID  - Low phos diet  -renal diet
hold dvt ppx  SCDS
hold dvt ppx  SCDS    prognosis is poor
hold dvt ppx  SCDS    prognosis is poor
MUCU  Pulm eval   O2 sat on RA 95%
Transitions of Care Status:  1.  Name of PCP:  2.  PCP Contacted on Admission: [ ] Y    [ ] N    3.  PCP contacted at Discharge: [ ] Y    [ ] N    [ ] N/A  4.  Post-Discharge Appointment Date and Location:  5.  Summary of Handoff given to PCP:

## 2020-11-09 NOTE — PROGRESS NOTE ADULT - SUBJECTIVE AND OBJECTIVE BOX
CC: F/U for Bacteremia    Saw/spoke to patient. No fevers, no chills. No new complaints. Out of ICU.    Allergies  No Known Allergies    ANTIMICROBIALS:  cefTRIAXone   IVPB 2000 every 24 hours    PE:    Vital Signs Last 24 Hrs  T(C): 36.3 (09 Nov 2020 12:05), Max: 36.8 (09 Nov 2020 00:13)  T(F): 97.3 (09 Nov 2020 12:05), Max: 98.3 (09 Nov 2020 00:13)  HR: 102 (09 Nov 2020 12:05) (100 - 111)  BP: 91/57 (09 Nov 2020 12:05) (76/42 - 112/62)  RR: 17 (09 Nov 2020 10:20) (17 - 18)  SpO2: 93% (09 Nov 2020 10:20) (92% - 95%)    Gen: AOx3, NAD, non-toxic  CV: S1+S2 normal, tachycardic  Resp: Clear bilat, no resp distress, no crackles/wheezes  Abd: Marked distension  Ext: No LE edema, no wounds    LABS:                        9.2    14.17 )-----------( 47       ( 09 Nov 2020 07:11 )             29.1     11-09    135  |  95<L>  |  44<H>  ----------------------------<  57<L>  4.5   |  25  |  6.89<H>    Ca    9.1      09 Nov 2020 07:08  Phos  4.8     11-09  Mg     2.2     11-09    TPro  5.7<L>  /  Alb  3.1<L>  /  TBili  12.1<H>  /  DBili  x   /  AST  55<H>  /  ALT  15  /  AlkPhos  74  11-09    MICROBIOLOGY:    .Body Fluid Peritoneal Fluid  11-06-20   No growth     .Blood Blood  11-05-20   No growth to date.      .Body Fluid PERITONEAL  11-05-20   No growth    .Body Fluid Peritoneal Fluid  11-05-20   No growth  --    polymorphonuclear leukocytes seen  No organisms seen  by cytocentrifuge    CMV IgG Antibody: 0.51 U/mL (08-08-20 @ 09:47)  Toxoplasma IgG Screen: <3.0 IU/mL (08-08-20 @ 09:47)    (otherwise reviewed)    RADIOLOGY:    11/6 CTH:    IMPRESSION:    Volume loss with microvascular disease, no acute hemorrhage or midline shift. MRI is more sensitive for seizure focus. If symptoms persist consider follow-up head CT or MR if no contraindications.

## 2020-11-09 NOTE — PROGRESS NOTE ADULT - SUBJECTIVE AND OBJECTIVE BOX
Patient is a 40y old  Male who presents with a chief complaint of HRS (09 Nov 2020 15:08)      SUBJECTIVE / OVERNIGHT EVENTS:    Patient seen and examined at bedside. No acute events overnight.    T(F): 97.3 (11-09 @ 12:05), Max: 98.3 (11-09 @ 00:13)  HR: 102 (11-09 @ 12:05) (100 - 111)  BP: 91/57 (11-09 @ 12:05) (76/42 - 112/62)  RR: 17 (11-09 @ 10:20) (17 - 18)  SpO2: 93% (11-09 @ 10:20) (92% - 95%)    I&O's Summary    08 Nov 2020 07:01  -  09 Nov 2020 07:00  --------------------------------------------------------  IN: 290 mL / OUT: 0 mL / NET: 290 mL    09 Nov 2020 07:01  -  09 Nov 2020 15:45  --------------------------------------------------------  IN: 240 mL / OUT: 0 mL / NET: 240 mL        MEDICATIONS  (STANDING):  cefTRIAXone   IVPB 2000 milliGRAM(s) IV Intermittent every 24 hours  chlorhexidine 2% Cloths 1 Application(s) Topical daily  epoetin chance-epbx (RETACRIT) Injectable 04212 Unit(s) IV Push <User Schedule>  folic acid 1 milliGRAM(s) Oral daily  influenza   Vaccine 0.5 milliLiter(s) IntraMuscular once  midodrine 30 milliGRAM(s) Oral every 8 hours  multivitamin 1 Tablet(s) Oral daily  pantoprazole  Injectable 40 milliGRAM(s) IV Push two times a day  polyethylene glycol 3350 17 Gram(s) Oral once  sevelamer carbonate 800 milliGRAM(s) Oral three times a day  thiamine IVPB 500 milliGRAM(s) IV Intermittent daily    MEDICATIONS  (PRN):  albuterol/ipratropium for Nebulization. 3 milliLiter(s) Nebulizer once PRN Wheezing  traMADol 25 milliGRAM(s) Oral every 8 hours PRN Severe Pain (7 - 10)      PHYSICAL EXAM:   GEN: Age appropriate, resting comfortably in bed, no acute distress, non toxic appearing, speaking in complete sentences. Jaundiced  HEENT: scleral icterus  PULM: Lungs decreased breath sounds R lung. Left lung CTA  CV: RRR, S1S2, no MRG  MSK: no stiffness or joint effusions  Abdominal: Soft, nontender to palpation, non-distended, +BS  Extremities: No edema or cyanosis  NEURO: AAOx3  Psych: normal affect, normal behavior  Skin: No rashes, lesions    LABS:  Labs personally reviewed.                        9.2    14.17 )-----------( 47       ( 09 Nov 2020 07:11 )             29.1     Hgb Trend: 9.2<--, 9.1<--, 7.7<--, 8.1<--, 6.6<--  11-09    135  |  95<L>  |  44<H>  ----------------------------<  57<L>  4.5   |  25  |  6.89<H>    Ca    9.1      09 Nov 2020 07:08  Phos  4.8     11-09  Mg     2.2     11-09    TPro  5.7<L>  /  Alb  3.1<L>  /  TBili  12.1<H>  /  DBili  x   /  AST  55<H>  /  ALT  15  /  AlkPhos  74  11-09    Creatinine Trend: 6.89<--, 5.82<--, 4.14<--, 5.97<--, 5.56<--, 5.02<--  PT/INR - ( 09 Nov 2020 08:54 )   PT: 35.5 sec;   INR: 3.15 ratio                 Rakesh [facility]  Pulmonary and Critical Care Fellow    PGY-4 Pager: [phone]  [id number]  Night Float:

## 2020-11-09 NOTE — PROGRESS NOTE ADULT - PROBLEM SELECTOR PROBLEM 5
End-stage renal disease (ESRD)
Prophylactic measure
Bicytopenia
Bicytopenia
End-stage renal disease (ESRD)
Pleural effusion on right

## 2020-11-09 NOTE — PROGRESS NOTE ADULT - SUBJECTIVE AND OBJECTIVE BOX
Chief Complaint:  Patient is a 40y old  Male who presents with a chief complaint of HRS (2020 14:33)      Interval Events: transiently hypotensive overnight. No melena or hematochezia.    Allergies:  No Known Allergies      Hospital Medications:  albuterol/ipratropium for Nebulization. 3 milliLiter(s) Nebulizer once PRN  cefTRIAXone   IVPB 2000 milliGRAM(s) IV Intermittent every 24 hours  epoetin chance-epbx (RETACRIT) Injectable 55855 Unit(s) IV Push <User Schedule>  fluconAZOLE IVPB 200 milliGRAM(s) IV Intermittent every 24 hours  fluconAZOLE IVPB      folic acid 1 milliGRAM(s) Oral daily  influenza   Vaccine 0.5 milliLiter(s) IntraMuscular once  midodrine 30 milliGRAM(s) Oral every 8 hours  multivitamin 1 Tablet(s) Oral daily  pantoprazole  Injectable 40 milliGRAM(s) IV Push two times a day  polyethylene glycol 3350 17 Gram(s) Oral once  sevelamer carbonate 800 milliGRAM(s) Oral three times a day  thiamine IVPB 500 milliGRAM(s) IV Intermittent daily  traMADol 25 milliGRAM(s) Oral every 8 hours PRN      PMHX/PSHX:  End-stage renal disease (ESRD)    Cirrhosis, alcoholic    No pertinent past medical history    S/P exploratory laparotomy    No significant past surgical history        Family history:  FH: alpha 1 antitrypsin deficiency    No pertinent family history in first degree relatives        ROS: As per HPI, 14-point ROS negative otherwise.    PHYSICAL EXAM:     Vital Signs:  Vital Signs Last 24 Hrs  T(C): 36.3 (2020 04:24), Max: 36.8 (2020 00:13)  T(F): 97.4 (2020 04:24), Max: 98.3 (2020 00:13)  HR: 109 (2020 04:24) (101 - 109)  BP: 94/64 (2020 04:24) (76/42 - 112/62)  BP(mean): --  RR: 18 (2020 04:24) (18 - 18)  SpO2: 95% (2020 04:24) (92% - 97%)  Daily     Daily Weight in k.6 (2020 04:24)    GENERAL: ill appearing, fatigued  HEENT:  NC/AT,  +scleral icterus  CHEST:  no increased effort  HEART:  Regular rate and rhythm  ABDOMEN:  Soft, distended but improved, no tenderness  EXTREMITIES:  no cyanosis, clubbing or edema  SKIN:  jaundice  NEURO: mild+ asterixis , Alert and oriented but sluggish      LABS:                        9.2    14.17 )-----------( 47       ( 2020 07:11 )             29.1     11-    135  |  95<L>  |  44<H>  ----------------------------<  57<L>  4.5   |  25  |  6.89<H>    Ca    9.1      2020 07:08  Phos  4.8     11-  Mg     2.2         TPro  5.7<L>  /  Alb  3.1<L>  /  TBili  12.1<H>  /  DBili  x   /  AST  55<H>  /  ALT  15  /  AlkPhos  74  11-09    LIVER FUNCTIONS - ( 2020 07:08 )  Alb: 3.1 g/dL / Pro: 5.7 g/dL / ALK PHOS: 74 U/L / ALT: 15 U/L / AST: 55 U/L / GGT: x           PT/INR - ( 2020 10:15 )   PT: 32.8 sec;   INR: 2.93 ratio                 Imaging:

## 2020-11-09 NOTE — PROGRESS NOTE ADULT - SUBJECTIVE AND OBJECTIVE BOX
[facility] DIVISION OF KIDNEY DISEASES AND HYPERTENSION -- FOLLOW UP NOTE  --------------------------------------------------------------------------------  HPI: 40-year-old male with  ESRD on HD MWF,  traumatic bladder rupture 2/2 fall s/p ex-lap in 2019, ETOH cirrhosis c/b gastric varices, recent admission (7/2020) for hemorrhagic shock 2/2 variceal bleeding, acute renal failure requiring HD, and ETOH withdrawal seizure, recently admitted in 9/2020 for GIB who presented to [facility] on 11/1/20 for worsening abdominal pain. Pt. had RRT yesterday and was transferred to MICU. Pt. underwent  paracentesis on 11/5/20 with 7L removed. Nephrology consulted for management of ESRD and HD. Last HD on 11/6/20 via RIJ tunneled HD catheter.     Pt. evaluated in HD center. Pt. tolerated 1 hour of HD prior to complaining of feeling uncomfortable and short of breath.    PAST HISTORY  --------------------------------------------------------------------------------  No significant changes to PMH, PSH, FHx, SHx, unless otherwise noted    ALLERGIES & MEDICATIONS  --------------------------------------------------------------------------------  Allergies    No Known Allergies    Intolerances    Standing Inpatient Medications  cefTRIAXone   IVPB 2000 milliGRAM(s) IV Intermittent every 24 hours  chlorhexidine 2% Cloths 1 Application(s) Topical daily  epoetin chance-epbx (RETACRIT) Injectable 79823 Unit(s) IV Push <User Schedule>  folic acid 1 milliGRAM(s) Oral daily  influenza   Vaccine 0.5 milliLiter(s) IntraMuscular once  midodrine 30 milliGRAM(s) Oral every 8 hours  multivitamin 1 Tablet(s) Oral daily  pantoprazole  Injectable 40 milliGRAM(s) IV Push two times a day  polyethylene glycol 3350 17 Gram(s) Oral once  sevelamer carbonate 800 milliGRAM(s) Oral three times a day  thiamine IVPB 500 milliGRAM(s) IV Intermittent daily    REVIEW OF SYSTEMS  --------------------------------------------------------------------------------  Gen: no lethargy  Respiratory: No dyspnea  CV: No chest pain  GI: + ascites    All other systems were reviewed and are negative, except as noted.    VITALS/PHYSICAL EXAM  --------------------------------------------------------------------------------  T(C): 36.3 (11-09-20 @ 12:05), Max: 36.8 (11-09-20 @ 00:13)  HR: 102 (11-09-20 @ 12:05) (100 - 111)  BP: 91/57 (11-09-20 @ 12:05) (76/42 - 112/62)  RR: 17 (11-09-20 @ 10:20) (17 - 18)  SpO2: 93% (11-09-20 @ 10:20) (92% - 95%)  Wt(kg): --    11-08-20 @ 07:01  -  11-09-20 @ 07:00  --------------------------------------------------------  IN: 290 mL / OUT: 0 mL / NET: 290 mL    11-09-20 @ 07:01  -  11-09-20 @ 13:10  --------------------------------------------------------  IN: 120 mL / OUT: 0 mL / NET: 120 mL    Physical Exam:  	Gen: NAD  	HEENT: icterus  	Pulm: CTA B/L  	CV: S1S2  	Abd: Soft, tense, ascites+   	Ext: trace LE edema B/L  	Neuro: Awake  	Skin: Warm and dry  	Vascular access: RIJ tunneled HD catheter +. no bleeding    LABS/STUDIES  --------------------------------------------------------------------------------              9.2    14.17 >-----------<  47       [11-09-20 @ 07:11]              29.1     135  |  95  |  44  ----------------------------<  57      [11-09-20 @ 07:08]  4.5   |  25  |  6.89        Ca     9.1     [11-09-20 @ 07:08]      Mg     2.2     [11-09-20 @ 07:08]      Phos  4.8     [11-09-20 @ 07:08]    Creatinine Trend:  SCr 6.89 [11-09 @ 07:08]  SCr 5.82 [11-08 @ 07:31]  SCr 4.14 [11-07 @ 01:24]  SCr 5.97 [11-06 @ 07:09]  SCr 5.56 [11-06 @ 00:34]

## 2020-11-09 NOTE — PROGRESS NOTE ADULT - ASSESSMENT
40 year old man with hx of decompensated EtOH cirrhosis (Dx'd 7/2020) complicated by gastric variceal bleeding (7/2020) s/p 34u pRBC, IR embolization and blakemore balloon, ESRD on HD (M/W/F), bladder rupture 2/2 fall s/p ex-lap in 2019, hemorrhoids, presented to OSH for increased abdominal distention and abdominal pain x3 days, SOB, found to have large R pleural effusion and large volume ascites.     Problem: SOB  Assessment: Improved. Likely 2/2 large R Plef and large volume ascites s/p large volume paracentesis with improvement in respirations, mental status, and abdominal discomfort.  Differential for pleural effusion includes hydrothorax. Patient resting comfortably in bed today on nasal cannula, lying almost flat.   Plan: No plan to perform thoracentesis given patients current clinical status. Does not require thoracentesis prior to transfer to [facility].

## 2020-11-09 NOTE — PROGRESS NOTE ADULT - PROBLEM SELECTOR PLAN 4
pt w/ ESRD on HD MWF; followed by nephrology. last HD on 11/9/20.   - Continue Renvela 800 mg TID with meals  - Retacrit 10K units with HD  - F/u nephro recs

## 2020-11-09 NOTE — PROGRESS NOTE ADULT - PROBLEM SELECTOR PLAN 3
Pt. with hyperphosphatemia in setting of advanced CKD/ESRD. Serum phosphorus in target range. Continue Renvela 800 mg TID with meals. Low phosphorus diet advised. Monitor serum phosphorus    If any questions, please feel free to contact me  Kehinde Coles   Nephrology Fellow  [phone]  (After 5 pm or on weekends please page the on-call fellow)

## 2020-11-09 NOTE — PROGRESS NOTE ADULT - ASSESSMENT
h40 year old man with hx of decompensated EtOH cirrhosis (Dx'd 7/2020) complicated by gastric variceal bleeding (7/2020) s/p 34u pRBC, IR embolization and blakemore balloon, ESRD on HD (M/W/F), bladder rupture 2/2 fall s/p ex-lap in 2019, hemorrhoids, presented to OSH for increased abdominal distention and abdominal pain x3 days  Pt was admitted to the floor and started on ceftriaxone 2g daily for empiric treatment of SBP. CT abd/pelvis 11/04 demonstrated large volume ascites and large R pleural effusion with near complete collapse of R lung c/f hepatic hydrothorax. He was continuously hypotensive to the 80s and started on midodrine 10mg tid. IR was consulted for paracentesis but deferred large volume paracentesis due to hypotension with SBPs in 70s and instead performed a diagnostic paracentesis on 11/04. Therapeutic paracentesis was planned for 11/05 but was ultimately deferred due to hypotension with SBP in 80s and MAP 57.     In the MICU, Received large volume paracentesis 11/5, ~7L removed. Pt was placed on ceftriaxone and fluconazole as ppx for SBP. He was continued on midodrine, protonix, thiamine. Midodrine was increased from 20mg to 30mg TID on day of transfer. He was refusing lactulose so was started on miralax q2H. He had 3 BM per day. His mental status improved with stable ammonia level. He received MWF HD. Received Retacrit 10K units with HD. No episodes of bleeding in MICU. 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 Thoracentesis was deferred given pt's clinical status. He was anemic with hepatic coagulopathy, recevied 1u pRBC and FFP 11/04, FFP x1 11/05 before paracentesis, given Cry x 5 u as well. Received vitamin K 10mg IV x 3 days.   He was accepted to [facility] for liver transplantation.     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 Remains stable pulmonary status  # Hepatic Encephelopathy improved now on miralax.  # Decompensated EtOH Cirrhosis - MELD-Na: 40 (11/5/2020)  HE: Grade 1-2 currently  Ascites: was large on Exam and TTP, no SBP (160 PMNs), s/p para on 11/5 with 7L removed  HCC: No lesions on MRI 8/2020  Varices: Hx of gastric varices s/p IR embolization, Grade I esophageal varices seen 9/2020  # ESRD on hemodialysis   # Anemia: possible secondary to ESRD +/- slow GI bleed. Has hx of varices    Recommendation:  - Awaiting transfer to [place], likely today - accepted for [facility] transplant evaluation    - Continue IV Thiamine  - Continue with Abx per ID  - Continue with midodrine given hypotension  - Continue with ppi daily  - Await PETH level  - check Coags, CMP, CBC while inpatient  - supportive care   h40 year old man with hx of decompensated EtOH cirrhosis (Dx'd 7/2020) complicated by gastric variceal bleeding (7/2020) s/p 34u pRBC, IR embolization and blakemore balloon, ESRD on HD (M/W/F), bladder rupture 2/2 fall s/p ex-lap in 2019, hemorrhoids, presented to OSH for increased abdominal distention and abdominal pain x3 days  Pt was admitted to the floor and started on ceftriaxone 2g daily for empiric treatment of SBP. CT abd/pelvis 11/04 demonstrated large volume ascites and large R pleural effusion with near complete collapse of R lung c/f hepatic hydrothorax. He was continuously hypotensive to the 80s and started on midodrine 10mg tid. IR was consulted for paracentesis but deferred large volume paracentesis due to hypotension with SBPs in 70s and instead performed a diagnostic paracentesis on 11/04. Therapeutic paracentesis was planned for 11/05 but was ultimately deferred due to hypotension with SBP in 80s and MAP 57.     In the MICU, Received large volume paracentesis 11/5, ~7L removed. Pt was placed on ceftriaxone and fluconazole as ppx for SBP. He was continued on midodrine, protonix, thiamine. Midodrine was increased from 20mg to 30mg TID on day of transfer. He was refusing lactulose so was started on miralax q2H. He had 3 BM per day. His mental status improved with stable ammonia level. He received MWF HD. Received Retacrit 10K units with HD. No episodes of bleeding in MICU. 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 Thoracentesis was deferred given pt's clinical status. He was anemic with hepatic coagulopathy, recevied 1u pRBC and FFP 11/04, FFP x1 11/05 before paracentesis, given Cry x 5 u as well. Received vitamin K 10mg IV x 3 days.   He was accepted to [facility] for liver transplantation.     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 Remains stable pulmonary status  # Hepatic Encephelopathy improved now on miralax.  # Decompensated EtOH Cirrhosis - MELD-Na: 40 (11/5/2020)  HE: Grade 1-2 currently  Ascites: was large on Exam and TTP, no SBP (160 PMNs), s/p para on 11/5 with 7L removed  HCC: No lesions on MRI 8/2020  Varices: Hx of gastric varices s/p IR embolization, Grade I esophageal varices seen 9/2020  # ESRD on hemodialysis   # Anemia: possible secondary to ESRD +/- slow GI bleed. Has hx of varices  # Hypotension: likely driven by naroctics and dilaudid use    Recommendation:  - Awaiting transfer to [place], likely today - accepted for [facility] transplant evaluation    - Continue IV Thiamine  - Continue with Abx per ID  - Continue with midodrine given hypotension  - Continue with ppi daily  - avoid dilaudid as this may likely cause hypotension  - Await PETH level  - check Coags, CMP, CBC while inpatient  - supportive care

## 2020-11-09 NOTE — PROGRESS NOTE ADULT - PROBLEM SELECTOR PLAN 2
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   - F/u pulm for possible thoracentesis at [place] if necessary.  Currently comfortable respiratory status  - monitor respiratory status  - briceonebs prn  - c/w O2 NC

## 2020-11-09 NOTE — CHART NOTE - NSCHARTNOTEFT_GEN_A_CORE
Medicine NP Episodic note    HPI: 40M w/ PMH of EtOH cirrhosis c/b gastric variceal bleeding in July 2020 s/p 34u pRBC, IR embolization, and blakemore balloon, ESRD on HD (M/W/F), bladder rupture 2/2 fall s/p ex-lap in 2019, EtOH withdrawal seizures, and hemorrhoids admitted for decompensated liver cirrhosis and large volume ascites; hospital course c/b hypotension requiring MICU course for monitoring of hemodynamics.     Event Summary: Pt. for transfer to [facility] for liver transplant.  Upon arrival of EMS, pt's SBP 79.  Pt. asymptomatic, denies c/o dizziness, lightheadedness or any other significant complaints.  Per EMS, pt's B/P is not stable for transfer, transfer aborted at this time.    # Hypotension  > C/w Midodrine, will give Midodrine 30 mg PO x 1 now   > Monitor vital signs closely   > Case discussed w/ Dr. Christensen, hospitalist who agrees w/ aforementioned plan  > [facility] transfer team to make arrangements for  later today once pt's B/P stable     Will endorse to primary team in am.  Attending to follow.    Simran Sethi, P-BC  [phone]

## 2020-11-09 NOTE — PHARMACOTHERAPY INTERVENTION NOTE - COMMENTS
41 yo M currently on ceftriaxone and fluconazole for empiric treatment. Plan to discontinue fluconazole if diagnostic para cultures remains negative. All cultures are negative to date. Would recommend discontinuing fluconazole.    Sincere Lan, PharmD, BCPS  [phone]

## 2020-11-09 NOTE — CHART NOTE - NSCHARTNOTEFT_GEN_A_CORE
Medicine NP Episodic Note    Pt. was scheduled for transfer to [facility], however was hypotensive (SBP 69) when EMS services arrived.  Spoke w/ transfer center and receiving  and NP at [facility], transfer cancelled.  B/P improved (SBP 90's) w/o intervention.  Dr. Wynn, Hospitalist was updated on pt's status.     Pt. using supplemental O2 at 4L via n/c.  Pt. however removing device at times, becoming hypoxic to 80's on RA. O2 up-titrated to 8L w/ SPO2 84%.  RRT called 2/2 hypoxia, please see RRT sheet for full details.  RRT responded and assumed care of the pt.  MICU was consulted by RRT, pt. accepted to MICU, pending bed.  Case discussed w/ Dr. Christensen [facility].

## 2020-11-09 NOTE — PROGRESS NOTE ADULT - SUBJECTIVE AND OBJECTIVE BOX
PROGRESS NOTE:     Patient is a 40y old  Male who presents with a chief complaint of HRS (09 Nov 2020 13:10)      SUBJECTIVE / OVERNIGHT EVENTS: Pt is awake alert now after HD.  He looks comfortable.     ADDITIONAL REVIEW OF SYSTEMS:  No fever or chills  NO n/v/d      MEDICATIONS  (STANDING):  cefTRIAXone   IVPB 2000 milliGRAM(s) IV Intermittent every 24 hours  chlorhexidine 2% Cloths 1 Application(s) Topical daily  epoetin chance-epbx (RETACRIT) Injectable 81745 Unit(s) IV Push <User Schedule>  folic acid 1 milliGRAM(s) Oral daily  influenza   Vaccine 0.5 milliLiter(s) IntraMuscular once  midodrine 30 milliGRAM(s) Oral every 8 hours  multivitamin 1 Tablet(s) Oral daily  pantoprazole  Injectable 40 milliGRAM(s) IV Push two times a day  polyethylene glycol 3350 17 Gram(s) Oral once  sevelamer carbonate 800 milliGRAM(s) Oral three times a day  thiamine IVPB 500 milliGRAM(s) IV Intermittent daily    MEDICATIONS  (PRN):  albuterol/ipratropium for Nebulization. 3 milliLiter(s) Nebulizer once PRN Wheezing  traMADol 25 milliGRAM(s) Oral every 8 hours PRN Severe Pain (7 - 10)      CAPILLARY BLOOD GLUCOSE        I&O's Summary    08 Nov 2020 07:01  -  09 Nov 2020 07:00  --------------------------------------------------------  IN: 290 mL / OUT: 0 mL / NET: 290 mL    09 Nov 2020 07:01  -  09 Nov 2020 15:08  --------------------------------------------------------  IN: 240 mL / OUT: 0 mL / NET: 240 mL        PHYSICAL EXAM:  Vital Signs Last 24 Hrs  T(C): 36.3 (09 Nov 2020 12:05), Max: 36.8 (09 Nov 2020 00:13)  T(F): 97.3 (09 Nov 2020 12:05), Max: 98.3 (09 Nov 2020 00:13)  HR: 102 (09 Nov 2020 12:05) (100 - 111)  BP: 91/57 (09 Nov 2020 12:05) (76/42 - 112/62)  BP(mean): --  RR: 17 (09 Nov 2020 10:20) (17 - 18)  SpO2: 93% (09 Nov 2020 10:20) (92% - 95%)    CONSTITUTIONAL: NAD, thin, cachectic   RESPIRATORY: Normal respiratory effort; lungs are clear to auscultation bilaterally  CARDIOVASCULAR: Regular rate and rhythm, normal S1 and S2, no murmur/rub/gallop; No lower extremity edema; Peripheral pulses are 2+ bilaterally  ABDOMEN: Nontender to palpation, normoactive bowel sounds, no rebound/guarding; very distended no asterxis   MUSCLOSKELETAL: no clubbing or cyanosis of digits; no joint swelling or tenderness to palpation  PSYCH: A+O to person, place, and time; affect appropriate    LABS:                        9.2    14.17 )-----------( 47       ( 09 Nov 2020 07:11 )             29.1     11-09    135  |  95<L>  |  44<H>  ----------------------------<  57<L>  4.5   |  25  |  6.89<H>    Ca    9.1      09 Nov 2020 07:08  Phos  4.8     11-09  Mg     2.2     11-09    TPro  5.7<L>  /  Alb  3.1<L>  /  TBili  12.1<H>  /  DBili  x   /  AST  55<H>  /  ALT  15  /  AlkPhos  74  11-09    PT/INR - ( 09 Nov 2020 08:54 )   PT: 35.5 sec;   INR: 3.15 ratio                     RADIOLOGY & ADDITIONAL TESTS:  Results Reviewed:   Imaging Personally Reviewed:  Electrocardiogram Personally Reviewed:    COORDINATION OF CARE:  Care Discussed with Consultants/Other Providers [Y/N]:  Prior or Outpatient Records Reviewed [Y/N]:

## 2020-11-10 NOTE — PROGRESS NOTE ADULT - PROBLEM SELECTOR PLAN 3
Pt. with hyperphosphatemia in setting of advanced CKD/ESRD. Serum phosphorus above target range. Continue Renvela 800 mg TID with meals. Low phosphorus diet advised. Monitor serum phosphorus    If any questions, please feel free to contact me  Kehinde Coles   Nephrology Fellow  [phone]  (After 5 pm or on weekends please page the on-call fellow)

## 2020-11-10 NOTE — PROGRESS NOTE ADULT - ASSESSMENT
38 yo M w/ PMH of EtOH cirrhosis c/b gastric variceal bleeding in July 2020 s/p 34 pRBC, IR embolization, ESRD on HD (M/W/F), bladder rupture 2/2 fall s/p ex-lap in 2019 was transferred from OSH for worsening abdominal distention and pain  Distended abd from ascites  CT A/P (11/4) with large volume ascites and large right pleural effusion. Noted to have some patchy infiltrates in the left lung. Unclear if this represents fluid or infection.   Had para elevated cells compared to prior but not at SBP range, however consider could be partially treated  Note that OSH called stating that on 10/31 patient had E coli in blood S CTX, FQ, is not an ESBL (reviewed S in paper chart)  RRT yesterday 11/9), was hypotensive when EMS came, had hypoxia, intubated, clinically worsening--Seems less likely overt sepsis, and hypotension more due to volume status in setting of cirrhosis and ascites  Now in ICU, critically ill, guarded, intubated    Overall,  1) Leukocytosis (increased) / E coli bacteremia  - Abx through 11/11/20 for purposes of E coli bacteremia tx  - Presently on Zosyn due to overnight decompensation (empiric coverage for bacterial pneumonia)  - F/U pending cultures  - Trend WBC to normal, F/U fevers  2) Ascites  - Had further therapeutic paracentesis 11/10 AM  - F/U GI/Hepatology  3) R pleural effusion  - Monitor resp status; had thoracocentesis this afternoon  - F/U thoracocentesis results    Discussed with ICU team    Calin Thomas MD  Pager [phone]  After 5pm and on weekends call [phone] 38 yo M w/ PMH of EtOH cirrhosis c/b gastric variceal bleeding in July 2020 s/p 34 pRBC, IR embolization, ESRD on HD (M/W/F), bladder rupture 2/2 fall s/p ex-lap in 2019 was transferred from OSH for worsening abdominal distention and pain  Distended abd from ascites  CT A/P (11/4) with large volume ascites and large right pleural effusion. Noted to have some patchy infiltrates in the left lung. Unclear if this represents fluid or infection.   Had para elevated cells compared to prior but not at SBP range, however consider could be partially treated  Note that OSH called stating that on 10/31 patient had E coli in blood S CTX, FQ, is not an ESBL (reviewed S in paper chart)  RRT yesterday 11/9), was hypotensive when EMS came, had hypoxia, intubated, clinically worsening--Seems less likely overt sepsis, and hypotension more due to volume status in setting of cirrhosis and ascites  Now in ICU, critically ill, guarded, intubated    Overall,  1) Leukocytosis (increased) / E coli bacteremia  - Abx through 11/11/20 for purposes of E coli bacteremia tx  - Presently on Zosyn due to overnight decompensation (empiric coverage for bacterial pneumonia)  - F/U pending cultures  - Trend WBC to normal, F/U fevers  2) Ascites  - Had further therapeutic paracentesis 11/10 AM  - F/U GI/Hepatology  3) R pleural effusion  - Monitor resp status; had thoracocentesis this afternoon  - F/U thoracocentesis results    Discussed with ICU team  My colleagues will be covering this patient starting on 11/11/20, I will return 11/16/20.  Please call [phone] with any questions or change in status.     Calin Thomas MD  Pager [phone]  After 5pm and on weekends call [phone]

## 2020-11-10 NOTE — PROGRESS NOTE ADULT - ASSESSMENT
39 yo M w/ PMH of EtOH cirrhosis c/b gastric variceal bleeding in July 2020 s/p 34u pRBC, IR embolization, and blakemore balloon, ESRD on HD (M/W/F), bladder rupture 2/2 fall s/p ex-lap in 2019, EtOH withdrawal seizures, and hemorrhoids admitted for decompensated liver cirrhosis and large volume ascites with concern for SBP, currently on midodrine. Transferred to MICU for pressor supported paracentesis with transfer back to floor with plan for tx to [facility] for liver tx eval; now admitted back to MICU for hypoxic Respiratory failure likely 2/2 fluid overload.       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 CXR during RRT continues to demonstrate R hemithorax opacification  - bedside pocus reveals large abdominal pockets; will plan for paracentesis (give platelets and vit. k prior)  - pulmonary team was evaluating while on floor with no plan to perform thora prior to tx fo [facility] as patient was comfortable on nasal cannula.     #CV  - Hypotension 2/2 chronic multiorgan failure.   - C/w midodrine 20mg TID map goal 55-60  - started levo for hypotension     #GI  - Decompensated alcoholic cirrhosis with ascites. hx gastric varices s/p IR embolization and known esophageal varices  - Diagnostic paracentesis on 11/04 with 160 PMNs, not meeting criteria for SBP  - c/w ceftriaxone  - s/p large volume paracentesis 11/5, ~7L removed; will plan for para overnight   - c/w protonix BID  - C/w 500mg thiamine IV daily  -Decompensated EtOH Cirrhosis - MELD-Na: 40 (11/5/2020)      #Renal  - ESRD on HD  - Last HD today though cut short because patient became short of breath, c/f fluid overload   - c/w Renvela 800 mg TID    #ID  - Afebrile with leukocytosis, now resolved  - Blood cx drawn in OSH ([facility]) on 10/31 grew E. coli in aerobic bottle, pan sensitive  - Blood cx 11/02 with NGTD  - 1 ascites culture negative, second still testing  - cw ceftriaxone     #Heme  - Hepatic coagulopathy + ?acute blood loss anemia  - No obvious source of loss but will monitor considering patient had hx of lifethreatening bleed.   - s/p 1u pRBC and FFP 11/04, FFP x1 11/05 before paracentesis, given Cry x 5 u as well.   - s/p vitamin K 10mg IV x 3  - Possible transfer to [facility] for transplant eval  - C/w Retacrit 10K units with HD  - DVT ppx: SCDs    #GOC  - Full code. 39 yo M w/ PMH of EtOH cirrhosis c/b gastric variceal bleeding in July 2020 s/p 34u pRBC, IR embolization, and blakemore balloon, ESRD on HD (M/W/F), bladder rupture 2/2 fall s/p ex-lap in 2019, EtOH withdrawal seizures, and hemorrhoids admitted for decompensated liver cirrhosis and large volume ascites with concern for SBP. Transferred to MICU for pressor supported paracentesis with transfer back to floor with plan for tx to [facility] for liver and kidney tx eval; now admitted back to MICU and intubated for hypoxic respiratory failure likely 2/2 fluid overload.       Plan:  #Neuro  - Sedated on propofol, fentanyl, ketamine, and versed; previously A&Ox3  - Pt w/ history of hepatic encephalopathy, ammonia level acceptable, c/w miralax q2h  - CT head negative for acute pathology    #Respiratory  - Currently intubated for hypoxic hypercapnic respiratory failure; ABG demonstrating metabolic and respiratory acidosis  - CT 11/04 demonstrating large R pleural effusion with near complete collapse of R lung likely 2/2 hepatic hydrothorax  - Will give 1u platelets and FFP and plan for thoracentesis today    #CV  - Hypotension 2/2 chronic multiorgan failure  - Increasing pressor requirements this morning; now on levophed, phenylephrine, and vasopressin    #GI  - Decompensated alcoholic cirrhosis with ascites. hx gastric varices s/p IR embolization and known esophageal varices  - Diagnostic paracentesis on 11/04 with 160 PMNs, not meeting criteria for SBP  - c/w ceftriaxone  - s/p large volume paracentesis 11/5, ~7L removed; will plan for para overnight   - c/w protonix BID  - C/w 500mg thiamine IV daily  -Decompensated EtOH Cirrhosis - MELD-Na: 40 (11/5/2020)      #Renal  - ESRD on HD  - Last HD today though cut short because patient became short of breath, c/f fluid overload   - c/w Renvela 800 mg TID    #ID  - Afebrile with leukocytosis, now resolved  - Blood cx drawn in OSH ([facility]) on 10/31 grew E. coli in aerobic bottle, pan sensitive  - Blood cx 11/02 with NGTD  - 1 ascites culture negative, second still testing  - cw ceftriaxone     #Heme  - Hepatic coagulopathy + ?acute blood loss anemia  - No obvious source of loss but will monitor considering patient had hx of lifethreatening bleed.   - s/p 1u pRBC and FFP 11/04, FFP x1 11/05 before paracentesis, given Cry x 5 u as well.   - s/p vitamin K 10mg IV x 3  - Possible transfer to [facility] for transplant eval  - C/w Retacrit 10K units with HD  - DVT ppx: SCDs    #GOC  - Full code. 39 yo M w/ PMH of EtOH cirrhosis c/b gastric variceal bleeding in July 2020 s/p 34u pRBC, IR embolization, and blakemore balloon, ESRD on HD (M/W/F), bladder rupture 2/2 fall s/p ex-lap in 2019, EtOH withdrawal seizures, and hemorrhoids admitted for decompensated liver cirrhosis and large volume ascites with concern for SBP. Transferred to MICU for pressor supported paracentesis with transfer back to floor with plan for tx to [facility] for liver and kidney tx eval; now admitted back to MICU and intubated for hypoxic respiratory failure likely 2/2 fluid overload.       Plan:  #Neuro  - Sedated on propofol, fentanyl, ketamine, and versed; previously A&Ox3  - Pt w/ history of hepatic encephalopathy; ammonia level currently acceptable (33), c/w miralax q2h  - CT head negative for acute pathology    #Respiratory  - Intubated for hypoxic hypercapnic respiratory failure; ABG demonstrating metabolic and respiratory acidosis  - C/w fentanyl and nimbex, f/u repeat ABG  - CT 11/04 demonstrating large R pleural effusion with near complete collapse of R lung likely 2/2 hepatic hydrothorax  - Will give 1u platelets and FFP; plan for thoracentesis later today due to worsening pulmonary status    #CV  - Hypotension 2/2 chronic multiorgan failure  - Increasing pressor requirements this morning; now on levophed, phenylephrine, and vasopressin  - Start 25% albumin q6hr to help maintain oncotic pressure  - Giving stress dose steroids    #GI  - Decompensated alcoholic cirrhosis with ascites and hx gastric varices s/p IR embolization + known esophageal varices  - s/p diagnostic paracentesis 11/04 with 160 PMNs, not meeting criteria for SBP  - s/p large volume paracentesis 11/05 with ~7L removed  - s/p paracentesis 11/09 with ~6L removed  - c/w 500mg thiamine IV qd, protonix bid    #Renal  - ESRD on HD  - Last HD 11/06; unable to tolerate yesterday due to   - Started on CVVHDF today  - Last HD today though cut short because patient became short of breath, c/f fluid overload   - c/w Renvela 800 mg TID    #ID  - Afebrile with new leukocytosis  - Start zosyn + vancomycin today for empiric coverage  - Blood cx drawn in OSH ([facility]) on 10/31 grew E. coli in aerobic bottle, pansensitive  - Blood cx 11/02 with NGTD  - Blood cx 11/09 pending  - 1 ascites culture negative, still pending    #Heme  - Hepatic coagulopathy + ?acute blood loss anemia  - No obvious source of loss but will monitor considering patient had hx of life-threatening bleed  - Received 1u platelets overnight; giving additional 1u platelets + FFP before thoracentesis  - c/w Retacrit 10K units with HD  - DVT ppx: SCDs    #Endo  - Found to be hypoglycemic to 63 during RRT on 11/09, s/p 1x D50  - Will continue to monitor with q4 POCT    #GOC  - Full code 39 yo M w/ PMH of EtOH cirrhosis c/b gastric variceal bleeding in July 2020 s/p 34u pRBC, IR embolization, and blakemore balloon, ESRD on HD (M/W/F), bladder rupture 2/2 fall s/p ex-lap in 2019, EtOH withdrawal seizures, and hemorrhoids admitted for decompensated liver cirrhosis and large volume ascites with concern for SBP. Transferred to MICU for pressor supported paracentesis with transfer back to floor with plan for tx to [facility] for liver and kidney tx eval; now admitted back to MICU and intubated for hypoxic respiratory failure likely 2/2 fluid overload.       Plan:  #Neuro  - Sedated on propofol, fentanyl, ketamine, and versed; previously A&Ox3  - Pt w/ history of hepatic encephalopathy; ammonia level currently acceptable (33), c/w miralax q2h  - CT head negative for acute pathology    #Respiratory  - Intubated for hypoxic hypercapnic respiratory failure; ABG demonstrating metabolic and respiratory acidosis  - C/w fentanyl and nimbex, f/u repeat ABG  - CT 11/04 demonstrating large R pleural effusion with near complete collapse of R lung likely 2/2 hepatic hydrothorax  - Will give 1u platelets and FFP; plan for thoracentesis later today due to worsening pulmonary status    #CV  - Hypotension 2/2 chronic multiorgan failure  - Increasing pressor requirements this morning; now on levophed, phenylephrine, and vasopressin  - Start 25% albumin q6hr to help maintain oncotic pressure  - Giving stress dose steroids    #GI  - Decompensated alcoholic cirrhosis with ascites and hx gastric varices s/p IR embolization + known esophageal varices  - s/p diagnostic paracentesis 11/04 with 160 PMNs, not meeting criteria for SBP  - s/p large volume paracentesis 11/05 with ~7L removed  - s/p therapeutic paracentesis 11/09 with ~6L removed  - c/w 500mg thiamine IV qd, protonix bid    #Renal  - ESRD on HD  - Last HD 11/06; unable to tolerate yesterday due to   - Started on CVVHDF today  - Last HD today though cut short because patient became short of breath, c/f fluid overload   - c/w Renvela 800 mg TID    #ID  - Afebrile with new leukocytosis  - Start zosyn + vancomycin today for empiric coverage  - Blood cx drawn in OSH ([facility]) on 10/31 grew E. coli in aerobic bottle, pansensitive  - Blood cx 11/02 with NGTD  - Blood cx 11/09 pending  - 1 ascites culture negative, still pending    #Heme  - Hepatic coagulopathy + ?acute blood loss anemia  - No obvious source of loss but will monitor considering patient had hx of life-threatening bleed  - Received 1u platelets overnight; giving additional 1u platelets + FFP before thoracentesis  - c/w Retacrit 10K units with HD  - DVT ppx: SCDs    #Endo  - Found to be hypoglycemic to 63 during RRT on 11/09, s/p 1x D50  - Will continue to monitor with q4 POCT    #GOC  - Full code 41 yo M w/ PMH of EtOH cirrhosis c/b gastric variceal bleeding in July 2020 s/p 34u pRBC, IR embolization, and blakemore balloon, ESRD on HD (M/W/F), bladder rupture 2/2 fall s/p ex-lap in 2019, EtOH withdrawal seizures, and hemorrhoids admitted for decompensated liver cirrhosis and large volume ascites with concern for SBP. Transferred to MICU for pressor supported paracentesis with transfer back to floor with plan for tx to [facility] for liver and kidney tx eval; now admitted back to MICU and intubated for hypoxic respiratory failure likely 2/2 fluid overload.       Plan:  #Neuro  - Sedated on propofol, fentanyl, ketamine, and versed; previously A&Ox3  - Pt w/ history of hepatic encephalopathy; ammonia level currently acceptable (33), c/w miralax q2h  - CT head negative for acute pathology    #Respiratory  - Intubated for hypoxic hypercapnic respiratory failure; ABG demonstrating metabolic and respiratory acidosis  - C/w fentanyl and nimbex, f/u repeat ABG  - CT 11/04 demonstrating large R pleural effusion with near complete collapse of R lung likely 2/2 hepatic hydrothorax  - Will give 1u platelets and FFP; plan for thoracentesis later today due to worsening pulmonary status    #CV  - Hypotension 2/2 chronic multiorgan failure  - Increasing pressor requirements this morning; now on levophed, phenylephrine, and vasopressin  - Start 25% albumin q6hr to help maintain oncotic pressure  - Giving stress dose steroids    #GI  - Decompensated alcoholic cirrhosis with ascites and hx gastric varices s/p IR embolization + known esophageal varices  - s/p diagnostic paracentesis 11/04 with 160 PMNs, not meeting criteria for SBP  - s/p large volume paracentesis 11/05 with ~7L removed  - s/p therapeutic paracentesis 11/09 with ~6L removed  - c/w 500mg thiamine IV qd, protonix bid    #Renal  - ESRD on HD  - Last HD 11/06; unable to tolerate yesterday due to   - Started on CVVHDF today  - Last HD today though cut short because patient became short of breath, c/f fluid overload   - c/w Renvela 800 mg TID    #ID  - Afebrile with new leukocytosis  - Start meropenem + vancomycin today for empiric coverage  - Blood cx drawn in OSH ([facility]) on 10/31 grew E. coli in aerobic bottle, pansensitive  - Blood cx 11/02 with NGTD  - Blood cx 11/09 pending  - 1 ascites culture negative, still pending    #Heme  - Hepatic coagulopathy + ?acute blood loss anemia  - No obvious source of loss but will monitor considering patient had hx of life-threatening bleed  - Received 1u platelets overnight; giving additional 1u platelets + FFP before thoracentesis  - c/w Retacrit 10K units with HD  - DVT ppx: SCDs    #Endo  - Found to be hypoglycemic to 63 during RRT on 11/09, s/p 1x D50  - Will continue to monitor with q4 POCT 39 yo M w/ PMH of EtOH cirrhosis c/b gastric variceal bleeding in July 2020 s/p 34u pRBC, IR embolization, and blakemore balloon, ESRD on HD (M/W/F), bladder rupture 2/2 fall s/p ex-lap in 2019, EtOH withdrawal seizures, and hemorrhoids admitted for decompensated liver cirrhosis and large volume ascites with concern for SBP. Transferred to MICU for pressor supported paracentesis with transfer back to floor with plan for tx to [facility] for liver and kidney tx eval; now admitted back to MICU and intubated for hypoxic respiratory failure likely 2/2 fluid overload.       Plan:  #Neuro  - Sedated on propofol, fentanyl, ketamine, and versed; previously A&Ox3  - Pt w/ history of hepatic encephalopathy; ammonia level currently acceptable (33), c/w miralax q2h  - CT head negative for acute pathology    #Respiratory  - Intubated for hypoxic hypercapnic respiratory failure; ABG demonstrating metabolic and respiratory acidosis  - C/w fentanyl and nimbex, f/u repeat ABG  - CT 11/04 demonstrating large R pleural effusion with near complete collapse of R lung likely 2/2 hepatic hydrothorax  - Will give 1u platelets and FFP; plan for thoracentesis later today due to worsening pulmonary status    #CV  - Hypotension 2/2 chronic multiorgan failure  - Increasing pressor requirements this morning; now on levophed, phenylephrine, and vasopressin  - Start 25% albumin q6hr to help maintain oncotic pressure  - Giving stress dose steroids    #GI  - Decompensated alcoholic cirrhosis with ascites and hx gastric varices s/p IR embolization + known esophageal varices  - s/p diagnostic paracentesis 11/04 with 160 PMNs, not meeting criteria for SBP  - s/p large volume paracentesis 11/05 with ~7L removed  - s/p therapeutic paracentesis 11/09 with ~6L removed  - c/w 500mg thiamine IV qd, protonix bid    #Renal  - ESRD on HD  - Last HD 11/06; unable to tolerate on 11/09 due to shortness of breath  - Started on CVVHDF today  - c/w Renvela 800 mg TID    #ID  - Afebrile with new leukocytosis  - Start meropenem + vancomycin today for empiric coverage  - Blood cx drawn in OSH ([email or web address] on 10/31 grew E. coli in aerobic bottle, pansensitive  - Blood cx 11/02 with NGTD  - Blood cx 11/09 pending  - 1 ascites culture negative, still pending    #Heme  - Hepatic coagulopathy + ?acute blood loss anemia  - No obvious source of loss but will monitor considering patient had hx of life-threatening bleed  - Received 1u platelets overnight; giving additional 1u platelets + FFP before thoracentesis  - c/w Retacrit 10K units with HD  - DVT ppx: SCDs    #Endo  - Found to be hypoglycemic to 63 during RRT on 11/09, s/p 1x D50  - Will continue to monitor with q4 POCT

## 2020-11-10 NOTE — PROCEDURE NOTE - NSSITEPREP_SKIN_A_CORE
alcohol
chlorhexidine
Adherence to aseptic technique: hand hygiene prior to donning barriers (gown, gloves), don cap and mask, sterile drape over patient/chlorhexidine
chlorhexidine

## 2020-11-10 NOTE — PROGRESS NOTE ADULT - PROBLEM SELECTOR PLAN 1
Pt. with ESRD on HD three times a week (MWF). Last HD was on 11/6/20 via RIJ tunneled HD catheter. Pt. unable to tolerate HD yesterday due to SOB. Pt. had RRT early this AM, intubated and tranferred to MICU. Labs reviewed, will arrange for CVVHDF (with net negative balance) as per discussion with MICU team. Monitor labs.

## 2020-11-10 NOTE — PROGRESS NOTE ADULT - SUBJECTIVE AND OBJECTIVE BOX
[facility] DIVISION OF KIDNEY DISEASES AND HYPERTENSION -- FOLLOW UP NOTE  --------------------------------------------------------------------------------  HPI: 40-year-old male with  ESRD on HD MWF,  traumatic bladder rupture 2/2 fall s/p ex-lap in 2019, ETOH cirrhosis c/b gastric varices, recent admission (7/2020) for hemorrhagic shock 2/2 variceal bleeding, acute renal failure requiring HD, and ETOH withdrawal seizure, recently admitted in 9/2020 for GIB who presented to [facility] on 11/1/20 for worsening abdominal pain. Pt. went for HD yesterday but only tolerated 1 hour due to complaints of SOB. Pt. had RRT around midnight (11/10/20), was intubated and transferred to MICU. Pt. underwent  paracentesis today with 6.5L removed. Nephrology consulted for management of ESRD and HD. Last full HD on 11/6/20 via RIJ tunneled HD catheter.     Pt. seen and examined this AM. Pt. sedated and intubated, on IV vasopressor support.     PAST HISTORY  --------------------------------------------------------------------------------  No significant changes to PMH, PSH, FHx, SHx, unless otherwise noted    ALLERGIES & MEDICATIONS  --------------------------------------------------------------------------------  Allergies    No Known Allergies    Intolerances    Standing Inpatient Medications  cefTRIAXone   IVPB 2000 milliGRAM(s) IV Intermittent every 24 hours  chlorhexidine 2% Cloths 1 Application(s) Topical daily  chlorhexidine 4% Liquid 1 Application(s) Topical <User Schedule>  CRRT Treatment    <Continuous>  dextrose 10%. 1000 milliLiter(s) IV Continuous <Continuous>  epoetin chance-epbx (RETACRIT) Injectable 84991 Unit(s) IV Push <User Schedule>  folic acid 1 milliGRAM(s) Oral daily  influenza   Vaccine 0.5 milliLiter(s) IntraMuscular once  ketamine Injectable 80 milliGRAM(s) IV Push once  lidocaine   Patch 1 Patch Transdermal daily  midazolam Injectable 6 milliGRAM(s) IV Push once  midodrine 30 milliGRAM(s) Oral every 8 hours  multivitamin 1 Tablet(s) Oral daily  norepinephrine Infusion 0.05 MICROgram(s)/kG/Min IV Continuous <Continuous>  ondansetron Injectable 4 milliGRAM(s) IV Push once  pantoprazole  Injectable 40 milliGRAM(s) IV Push two times a day  polyethylene glycol 3350 17 Gram(s) Oral once  PrismaSATE Dialysate BGK 4 / 2.5 5000 milliLiter(s) CRRT <Continuous>  PrismaSOL Filtration BGK 4 / 2.5 5000 milliLiter(s) CRRT <Continuous>  PrismaSOL Filtration BGK 4 / 2.5 5000 milliLiter(s) CRRT <Continuous>  propofol Infusion 10 MICROgram(s)/kG/Min IV Continuous <Continuous>  sevelamer carbonate 800 milliGRAM(s) Oral three times a day  thiamine IVPB 500 milliGRAM(s) IV Intermittent daily    REVIEW OF SYSTEMS  --------------------------------------------------------------------------------  Unable to obtain due to medical condition    VITALS/PHYSICAL EXAM  --------------------------------------------------------------------------------  T(C): 36.4 (11-10-20 @ 04:00), Max: 36.7 (11-09-20 @ 09:10)  HR: 130 (11-10-20 @ 07:00) (100 - 131)  BP: 97/49 (11-10-20 @ 07:00) (78/36 - 113/59)  RR: 27 (11-10-20 @ 07:00) (17 - 35)  SpO2: 100% (11-10-20 @ 07:00) (89% - 100%)  Wt(kg): --  Height (cm): 185.4 (11-10-20 @ 00:15)  Weight (kg): 74.7 (11-10-20 @ 00:15)  BMI (kg/m2): 21.7 (11-10-20 @ 00:15)  BSA (m2): 1.98 (11-10-20 @ 00:15)    11-09-20 @ 07:01  -  11-10-20 @ 07:00  --------------------------------------------------------  IN: 1471.2 mL / OUT: 6500 mL / NET: -5028.8 mL    Physical Exam:  	Gen: intubated/sedated  	HEENT: icterus  	Pulm: CTA B/L  	CV: S1S2  	Abd: Soft, ascites+   	Ext: trace LE edema B/L  	Neuro: intubated/sedated  	Skin: Warm and dry  	Vascular access: RIJ tunneled HD catheter +. no bleeding    LABS/STUDIES  --------------------------------------------------------------------------------              8.3    19.26 >-----------<  44       [11-10-20 @ 07:27]              26.1     135  |  95  |  39  ----------------------------<  132      [11-10-20 @ 00:35]  4.5   |  17  |  6.10        Ca     9.0     [11-10-20 @ 00:35]      Mg     2.1     [11-10-20 @ 00:35]      Phos  5.6     [11-10-20 @ 00:35]    Creatinine Trend:  SCr 6.10 [11-10 @ 00:35]  SCr 6.89 [11-09 @ 07:08]  SCr 5.82 [11-08 @ 07:31]  SCr 4.14 [11-07 @ 01:24]  SCr 5.97 [11-06 @ 07:09]

## 2020-11-10 NOTE — PROCEDURE NOTE - NSPOSTPRCRAD_GEN_A_CORE
18cm insertion depth, post procedure CXR pending post-procedure radiography performed/18cm insertion depth, post procedure CXR pending/no pneumothorax

## 2020-11-10 NOTE — CHART NOTE - NSCHARTNOTEFT_GEN_A_CORE
MICU Accept Note  ------------------------    CHIEF COMPLAINT:     HPI / INTERVAL HISTORY:  HPI:  40M w/ PMH of EtOH cirrhosis c/b gastric variceal bleeding in July 2020 s/p 34u pRBC, IR embolization, and blakemore balloon, ESRD on HD (M/W/F), bladder rupture 2/2 fall s/p ex-lap in 2019, EtOH withdrawal seizures, and hemorrhoids admitted for decompensated liver cirrhosis and large volume ascites with concern for SBP, currently on midodrine. RRT was called for hypotension and patient was transferred to MICU for a large volume paracentesis. Patient did not require pressor support throughout MICU course. For initial MICU course, patient     Pt did not require any pressors in MICU. He was saturating well on room air. Received diagnostic paracentesis on 11/04 with 160 PMNs, not meeting criteria for SBP.  Received large volume paracentesis 11/5, ~7L removed. Pt was placed on ceftriaxone and fluconazole as ppx for SBP. He was continued on midodrine, protonix, thiamine. Midodrine was increased from 20mg to 30mg TID on day of transfer. He was refusing lactulose so was started on miralax q2H. He had 3 BM per day. His mental status improved with stable ammonia level. He received MWF HD. Received Retacrit 10K units with HD. No episodes of bleeding in MICU. 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 Thoracentesis was deferred given pt's clinical status. He was anemic with hepatic coagulopathy, recevied 1u pRBC and FFP 11/04, FFP x1 11/05 before paracentesis, given Cry x 5 u as well. Received vitamin K 10mg IV x 3 days. He was accepted to [facility] for liver transplantation.     Rapid response called tonight for hypoxia while planned to transport to [facility] for liver transplant evaluation. During rapid response patient had ABG to 70's on HFNC. Transferred to MICU for hypxoic RF.           REVIEW OF SYSTEMS:  Constitutional: No fevers, chills, weight loss  Neurological: No headache, dizziness, weakness, numbness  HEENT: No vision problems, eye pain, nasal congestion, rhinorrhea, sore throat, dysphagia  Resp: No cough, dyspnea, wheezing, hemoptysis  CV: No chest pain, orthopnea, palpitations  GI: No nausea, vomiting, diarrhea, constipation, abdominal pain  : No dysuria, hematuria, urinary frequency or urgency  Musculoskeletal: No back pain, myalgias, arthralgias, or BLE edema  Skin: No new rashes, itching      PMH/PSH:  PAST MEDICAL & SURGICAL HISTORY:  End-stage renal disease (ESRD)  On HD MWF    Cirrhosis, alcoholic    S/P exploratory laparotomy  for bladder rupture s/p fall        FAMILY HISTORY:  FAMILY HISTORY:  FH: alpha 1 antitrypsin deficiency        SOCIAL HISTORY:  Smoking: [  ] Never Smoked  [  ] Former Smoker (# packs x # years), quit   [  ] Current Smoker (# packs x # years)  Substance Use: [  ] None; [   ] Yes:  EtOH Use: [   ] None; [   ] Rare; [   ] Social; [   ] Frequent:   Marital Status: [  ] Single  [  ]   [  ]   [  ]   Dependents:  Occupation:  Barriers to treatment:   Advance Directives:     HOME MEDICATIONS:  Home Medications:  cefTRIAXone 2 g intravenous injection: 2 gram(s) intravenous every 24 hours (07 Nov 2020 17:36)  epoetin chance: 38245 unit(s) intravenous 3 times a week with HD (07 Nov 2020 17:36)  fluconazole: 200 milligram(s) intravenous every 24 hours (07 Nov 2020 17:36)  ipratropium-albuterol 0.5 mg-2.5 mg/3 mLinhalation solution: 3 milliliter(s) inhaled once, As needed, Wheezing (07 Nov 2020 17:36)  midodrine 10 mg oral tablet: 3 tab(s) orally every 8 hours (07 Nov 2020 17:36)  multivitamin: 1 tab(s) orally once a day (31 Oct 2020 22:55)  pantoprazole 40 mg intravenous injection: 40 milligram(s) intravenous 2 times a day (07 Nov 2020 17:36)  polyethylene glycol 3350 oral powder for reconstitution: 17 gram(s) orally once (07 Nov 2020 17:36)  sevelamer carbonate 800 mg oral tablet: 1 tab(s) orally 3 times a day (07 Nov 2020 17:36)  thiamine: 500 milligram(s) intravenous once a day (07 Nov 2020 17:36)  traMADol 50 mg oral tablet: 0.5 tab(s) orally every 8 hours, As needed, Severe Pain (7 - 10) (08 Nov 2020 23:41)      ALLERGIES:  Allergies    No Known Allergies    Intolerances            OBJECTIVE:  ICU Vital Signs Last 24 Hrs  T(C): 36.6 (09 Nov 2020 22:15), Max: 36.7 (09 Nov 2020 02:01)  T(F): 97.9 (09 Nov 2020 22:15), Max: 98 (09 Nov 2020 02:01)  HR: 117 (09 Nov 2020 22:15) (100 - 117)  BP: 92/55 (09 Nov 2020 22:15) (76/42 - 94/64)  BP(mean): --  ABP: --  ABP(mean): --  RR: 17 (09 Nov 2020 10:20) (17 - 18)  SpO2: 93% (09 Nov 2020 10:20) (93% - 95%)        11-08 @ 07:01  -  11-09 @ 07:00  --------------------------------------------------------  IN: 290 mL / OUT: 0 mL / NET: 290 mL    11-09 @ 07:01  -  11-10 @ 00:18  --------------------------------------------------------  IN: 720 mL / OUT: 0 mL / NET: 720 mL      CAPILLARY BLOOD GLUCOSE      POCT Blood Glucose.: 130 mg/dL (10 Nov 2020 00:14)        General: lying in bed in NAD  HEENT: NC/AT, +icteric sclera, PERRLA, EOMI  CV: RRR, normal S1,S2; no murmurs; no peripheral edema   Pulm: lungs CTAB, no crackles, rhonchi, or wheezes  Abd: distended, tender to palpation, +bowel sounds  Psych/Neuro: A&Ox3, nonfocal, normal affect  Skin: +jaundice, warm, dry      LINES:       HOSPITAL MEDICATIONS:  MEDICATIONS  (STANDING):  cefTRIAXone   IVPB 2000 milliGRAM(s) IV Intermittent every 24 hours  chlorhexidine 2% Cloths 1 Application(s) Topical daily  dextrose 10%. 1000 milliLiter(s) (20 mL/Hr) IV Continuous <Continuous>  epoetin chance-epbx (RETACRIT) Injectable 84231 Unit(s) IV Push <User Schedule>  folic acid 1 milliGRAM(s) Oral daily  influenza   Vaccine 0.5 milliLiter(s) IntraMuscular once  lidocaine   Patch 1 Patch Transdermal daily  midodrine 30 milliGRAM(s) Oral every 8 hours  multivitamin 1 Tablet(s) Oral daily  ondansetron Injectable 4 milliGRAM(s) IV Push once  pantoprazole  Injectable 40 milliGRAM(s) IV Push two times a day  polyethylene glycol 3350 17 Gram(s) Oral once  sevelamer carbonate 800 milliGRAM(s) Oral three times a day  thiamine IVPB 500 milliGRAM(s) IV Intermittent daily    MEDICATIONS  (PRN):  traMADol 25 milliGRAM(s) Oral every 8 hours PRN Severe Pain (7 - 10)        LABS:                        9.2    14.17 )-----------( 47       ( 09 Nov 2020 07:11 )             29.1     Hgb Trend: 9.2<--, 9.1<--, 7.7<--, 8.1<--, 6.6<--  11-09    135  |  95<L>  |  44<H>  ----------------------------<  57<L>  4.5   |  25  |  6.89<H>    Ca    9.1      09 Nov 2020 07:08  Phos  4.8     11-09  Mg     2.2     11-09    TPro  5.7<L>  /  Alb  3.1<L>  /  TBili  12.1<H>  /  DBili  x   /  AST  55<H>  /  ALT  15  /  AlkPhos  74  11-09    Creatinine Trend: 6.89<--, 5.82<--, 4.14<--, 5.97<--, 5.56<--, 5.02<--  PT/INR - ( 09 Nov 2020 08:54 )   PT: 35.5 sec;   INR: 3.15 ratio             Arterial Blood Gas:  11-09 @ 23:02  7.34/38/74/20/93/-4.9  ABG lactate: --        MICROBIOLOGY:       RADIOLOGY & ADDITIONAL TESTS: Reviewed.    A/P  39 yo M w/ PMH of EtOH cirrhosis c/b gastric variceal bleeding in July 2020 s/p 34u pRBC, IR embolization, and blakemore balloon, ESRD on HD (M/W/F), bladder rupture 2/2 fall s/p ex-lap in 2019, EtOH withdrawal seizures, and hemorrhoids admitted for decompensated liver cirrhosis and large volume ascites with concern for SBP, currently on midodrine. Transferred to MICU for pressor supported paracentesis with transfer back to floor with plan for tx to [facility] for liver tx eval; now admitted back to MICU for hypoxic Respiratory failure likely 2/2 fluid overload.     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 CXR during RRT continues to demonstrate  - pulmonary team was evaluating while on floor with no plan to perform thora prior to tx fo [facility] as patient was comfortable on nasal cannula.     #CV  - Hypotension 2/2 chronic multiorgan failure.   - C/w midodrine 20mg TID map goal 55-60  - Will continue to monitor in case of need for pressor support    #GI  - Decompensated alcoholic cirrhosis with ascites. hx gastric varices s/p IR embolization and known esophageal varices  - Diagnostic paracentesis on 11/04 with 160 PMNs, not meeting criteria for SBP  - c/w ceftriaxone  - s/p large volume paracentesis 11/5, ~7L removed  - c/w protonix BID  - C/w 500mg thiamine IV daily  -Decompensated EtOH Cirrhosis - MELD-Na: 40 (11/5/2020)      #Renal  - ESRD on HD  - Last HD today though cut short because patient became short of breath, c/f fluid overload   - c/w Renvela 800 mg TID    #ID  - Afebrile with leukocytosis, now resolved  - Blood cx drawn in OSH ([facility]) on 10/31 grew E. coli in aerobic bottle, pan sensitive  - Blood cx 11/02 with NGTD  - 1 ascites culture negative, second still testing  - cw ceftriaxone     #Heme  - Hepatic coagulopathy + ?acute blood loss anemia  - No obvious source of loss but will monitor consider patient had hx of lifethreatening bleed.   - s/p 1u pRBC and FFP 11/04, FFP x1 11/05 before paracentesis, given Cry x 5 u as well.   - s/p vitamin K 10mg IV x 3  - Possible transfer to [facility] for transplant eval  - C/w Retacrit 10K units with HD    - DVT ppx: SCDs    #GOC  - Full code. MICU Accept Note  ------------------------    CHIEF COMPLAINT:     HPI / INTERVAL HISTORY:  HPI:  40M w/ PMH of EtOH cirrhosis c/b gastric variceal bleeding in July 2020 s/p 34u pRBC, IR embolization, and blakemore balloon, ESRD on HD (M/W/F), bladder rupture 2/2 fall s/p ex-lap in 2019, EtOH withdrawal seizures, and hemorrhoids admitted for decompensated liver cirrhosis and large volume ascites with concern for SBP, currently on midodrine. RRT was called for hypotension and patient was transferred to MICU for a large volume paracentesis. Patient did not require pressor support throughout MICU course. For initial MICU course, patient     Pt did not require any pressors in MICU. He was saturating well on room air. Received diagnostic paracentesis on 11/04 with 160 PMNs, not meeting criteria for SBP.  Received large volume paracentesis 11/5, ~7L removed. Pt was placed on ceftriaxone and fluconazole as ppx for SBP. He was continued on midodrine, protonix, thiamine. Midodrine was increased from 20mg to 30mg TID on day of transfer. He was refusing lactulose so was started on miralax q2H. He had 3 BM per day. His mental status improved with stable ammonia level. He received MWF HD. Received Retacrit 10K units with HD. No episodes of bleeding in MICU. 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 Thoracentesis was deferred given pt's clinical status. He was anemic with hepatic coagulopathy, recevied 1u pRBC and FFP 11/04, FFP x1 11/05 before paracentesis, given Cry x 5 u as well. Received vitamin K 10mg IV x 3 days. He was accepted to [facility] for liver transplantation.     Rapid response called tonight for hypoxia while planned to transport to [facility] for liver transplant evaluation. During rapid response patient had ABG to 70's on HFNC. Transferred to MICU for hypoxic RF.           REVIEW OF SYSTEMS:  Constitutional: No fevers, chills, weight loss  Neurological: No headache, dizziness, weakness, numbness  HEENT: No vision problems, eye pain, nasal congestion, rhinorrhea, sore throat, dysphagia  Resp: No cough, dyspnea, wheezing, hemoptysis  CV: No chest pain, orthopnea, palpitations  GI: No nausea, vomiting, diarrhea, constipation, abdominal pain  : No dysuria, hematuria, urinary frequency or urgency  Musculoskeletal: No back pain, myalgias, arthralgias, or BLE edema  Skin: No new rashes, itching      PMH/PSH:  PAST MEDICAL & SURGICAL HISTORY:  End-stage renal disease (ESRD)  On HD MWF    Cirrhosis, alcoholic    S/P exploratory laparotomy  for bladder rupture s/p fall        FAMILY HISTORY:  FAMILY HISTORY:  FH: alpha 1 antitrypsin deficiency        SOCIAL HISTORY:  Smoking: [  ] Never Smoked  [  ] Former Smoker (# packs x # years), quit   [  ] Current Smoker (# packs x # years)  Substance Use: [  ] None; [   ] Yes:  EtOH Use: [   ] None; [   ] Rare; [   ] Social; [   ] Frequent:   Marital Status: [  ] Single  [  ]   [  ]   [  ]   Dependents:  Occupation:  Barriers to treatment:   Advance Directives:     HOME MEDICATIONS:  Home Medications:  cefTRIAXone 2 g intravenous injection: 2 gram(s) intravenous every 24 hours (07 Nov 2020 17:36)  epoetin chance: 57598 unit(s) intravenous 3 times a week with HD (07 Nov 2020 17:36)  fluconazole: 200 milligram(s) intravenous every 24 hours (07 Nov 2020 17:36)  ipratropium-albuterol 0.5 mg-2.5 mg/3 mLinhalation solution: 3 milliliter(s) inhaled once, As needed, Wheezing (07 Nov 2020 17:36)  midodrine 10 mg oral tablet: 3 tab(s) orally every 8 hours (07 Nov 2020 17:36)  multivitamin: 1 tab(s) orally once a day (31 Oct 2020 22:55)  pantoprazole 40 mg intravenous injection: 40 milligram(s) intravenous 2 times a day (07 Nov 2020 17:36)  polyethylene glycol 3350 oral powder for reconstitution: 17 gram(s) orally once (07 Nov 2020 17:36)  sevelamer carbonate 800 mg oral tablet: 1 tab(s) orally 3 times a day (07 Nov 2020 17:36)  thiamine: 500 milligram(s) intravenous once a day (07 Nov 2020 17:36)  traMADol 50 mg oral tablet: 0.5 tab(s) orally every 8 hours, As needed, Severe Pain (7 - 10) (08 Nov 2020 23:41)      ALLERGIES:  Allergies    No Known Allergies    Intolerances            OBJECTIVE:  ICU Vital Signs Last 24 Hrs  T(C): 36.6 (09 Nov 2020 22:15), Max: 36.7 (09 Nov 2020 02:01)  T(F): 97.9 (09 Nov 2020 22:15), Max: 98 (09 Nov 2020 02:01)  HR: 117 (09 Nov 2020 22:15) (100 - 117)  BP: 92/55 (09 Nov 2020 22:15) (76/42 - 94/64)  BP(mean): --  ABP: --  ABP(mean): --  RR: 17 (09 Nov 2020 10:20) (17 - 18)  SpO2: 93% (09 Nov 2020 10:20) (93% - 95%)        11-08 @ 07:01  -  11-09 @ 07:00  --------------------------------------------------------  IN: 290 mL / OUT: 0 mL / NET: 290 mL    11-09 @ 07:01  -  11-10 @ 00:18  --------------------------------------------------------  IN: 720 mL / OUT: 0 mL / NET: 720 mL      CAPILLARY BLOOD GLUCOSE      POCT Blood Glucose.: 130 mg/dL (10 Nov 2020 00:14)        General: lying in bed in NAD  HEENT: NC/AT, +icteric sclera, PERRLA, EOMI  CV: RRR, normal S1,S2; no murmurs; no peripheral edema   Pulm: lungs CTAB, no crackles, rhonchi, or wheezes  Abd: distended, tender to palpation, +bowel sounds  Psych/Neuro: A&Ox3, nonfocal, normal affect  Skin: +jaundice, warm, dry      LINES:       HOSPITAL MEDICATIONS:  MEDICATIONS  (STANDING):  cefTRIAXone   IVPB 2000 milliGRAM(s) IV Intermittent every 24 hours  chlorhexidine 2% Cloths 1 Application(s) Topical daily  dextrose 10%. 1000 milliLiter(s) (20 mL/Hr) IV Continuous <Continuous>  epoetin chance-epbx (RETACRIT) Injectable 11264 Unit(s) IV Push <User Schedule>  folic acid 1 milliGRAM(s) Oral daily  influenza   Vaccine 0.5 milliLiter(s) IntraMuscular once  lidocaine   Patch 1 Patch Transdermal daily  midodrine 30 milliGRAM(s) Oral every 8 hours  multivitamin 1 Tablet(s) Oral daily  ondansetron Injectable 4 milliGRAM(s) IV Push once  pantoprazole  Injectable 40 milliGRAM(s) IV Push two times a day  polyethylene glycol 3350 17 Gram(s) Oral once  sevelamer carbonate 800 milliGRAM(s) Oral three times a day  thiamine IVPB 500 milliGRAM(s) IV Intermittent daily    MEDICATIONS  (PRN):  traMADol 25 milliGRAM(s) Oral every 8 hours PRN Severe Pain (7 - 10)        LABS:                        9.2    14.17 )-----------( 47       ( 09 Nov 2020 07:11 )             29.1     Hgb Trend: 9.2<--, 9.1<--, 7.7<--, 8.1<--, 6.6<--  11-09    135  |  95<L>  |  44<H>  ----------------------------<  57<L>  4.5   |  25  |  6.89<H>    Ca    9.1      09 Nov 2020 07:08  Phos  4.8     11-09  Mg     2.2     11-09    TPro  5.7<L>  /  Alb  3.1<L>  /  TBili  12.1<H>  /  DBili  x   /  AST  55<H>  /  ALT  15  /  AlkPhos  74  11-09    Creatinine Trend: 6.89<--, 5.82<--, 4.14<--, 5.97<--, 5.56<--, 5.02<--  PT/INR - ( 09 Nov 2020 08:54 )   PT: 35.5 sec;   INR: 3.15 ratio             Arterial Blood Gas:  11-09 @ 23:02  7.34/38/74/20/93/-4.9  ABG lactate: --        MICROBIOLOGY:       RADIOLOGY & ADDITIONAL TESTS: Reviewed.    A/P  39 yo M w/ PMH of EtOH cirrhosis c/b gastric variceal bleeding in July 2020 s/p 34u pRBC, IR embolization, and blakemore balloon, ESRD on HD (M/W/F), bladder rupture 2/2 fall s/p ex-lap in 2019, EtOH withdrawal seizures, and hemorrhoids admitted for decompensated liver cirrhosis and large volume ascites with concern for SBP, currently on midodrine. Transferred to MICU for pressor supported paracentesis with transfer back to floor with plan for tx to [facility] for liver tx eval; now admitted back to MICU for hypoxic Respiratory failure likely 2/2 fluid overload.     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 CXR during RRT continues to demonstrate  - bedside pocus reveals large abdominal pockets; will plan for paracentesis (give platelets and vit. k prior)  - pulmonary team was evaluating while on floor with no plan to perform thora prior to tx fo [facility] as patient was comfortable on nasal cannula.     #CV  - Hypotension 2/2 chronic multiorgan failure.   - C/w midodrine 20mg TID map goal 55-60  - started levo for hypotension     #GI  - Decompensated alcoholic cirrhosis with ascites. hx gastric varices s/p IR embolization and known esophageal varices  - Diagnostic paracentesis on 11/04 with 160 PMNs, not meeting criteria for SBP  - c/w ceftriaxone  - s/p large volume paracentesis 11/5, ~7L removed; will plan for para overnight   - c/w protonix BID  - C/w 500mg thiamine IV daily  -Decompensated EtOH Cirrhosis - MELD-Na: 40 (11/5/2020)      #Renal  - ESRD on HD  - Last HD today though cut short because patient became short of breath, c/f fluid overload   - c/w Renvela 800 mg TID    #ID  - Afebrile with leukocytosis, now resolved  - Blood cx drawn in OSH ([facility]) on 10/31 grew E. coli in aerobic bottle, pan sensitive  - Blood cx 11/02 with NGTD  - 1 ascites culture negative, second still testing  - cw ceftriaxone     #Heme  - Hepatic coagulopathy + ?acute blood loss anemia  - No obvious source of loss but will monitor consider patient had hx of lifethreatening bleed.   - s/p 1u pRBC and FFP 11/04, FFP x1 11/05 before paracentesis, given Cry x 5 u as well.   - s/p vitamin K 10mg IV x 3  - Possible transfer to [facility] for transplant eval  - C/w Retacrit 10K units with HD    - DVT ppx: SCDs    #GOC  - Full code. MICU Accept Note  ------------------------    CHIEF COMPLAINT:     HPI / INTERVAL HISTORY:  HPI:  40M w/ PMH of EtOH cirrhosis c/b gastric variceal bleeding in July 2020 s/p 34u pRBC, IR embolization, and blakemore balloon, ESRD on HD (M/W/F), bladder rupture 2/2 fall s/p ex-lap in 2019, EtOH withdrawal seizures, and hemorrhoids admitted for decompensated liver cirrhosis and large volume ascites with concern for SBP, currently on midodrine. RRT was called for hypotension and patient was transferred to MICU for a large volume paracentesis. Patient did not require pressor support throughout MICU course.     Pt did not require any pressors in MICU. He was saturating well on room air. Received diagnostic paracentesis on 11/04 with 160 PMNs, not meeting criteria for SBP.  Received large volume paracentesis 11/5, ~7L removed. Pt was placed on ceftriaxone and fluconazole as ppx for SBP. He was continued on midodrine, protonix, thiamine. Midodrine was increased from 20mg to 30mg TID on day of transfer. He was refusing lactulose so was started on miralax q2H. He had 3 BM per day. His mental status improved with stable ammonia level. He received MWF HD. Received Retacrit 10K units with HD. No episodes of bleeding in MICU. 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 Thoracentesis was deferred given pt's clinical status. He was anemic with hepatic coagulopathy, recevied 1u pRBC and FFP 11/04, FFP x1 11/05 before paracentesis, given Cry x 5 u as well. Received vitamin K 10mg IV x 3 days. He was accepted to [facility] for liver transplantation.     Rapid response called tonight for hypoxia while planned to transport to [facility] for liver transplant evaluation. During rapid response patient had ABG to 70's on HFNC. Transferred to MICU for hypoxic RF.           REVIEW OF SYSTEMS:  Constitutional: No fevers, chills, weight loss  Neurological: No headache, dizziness, weakness, numbness  HEENT: No vision problems, eye pain, nasal congestion, rhinorrhea, sore throat, dysphagia  Resp: No cough, dyspnea, wheezing, hemoptysis  CV: No chest pain, orthopnea, palpitations  GI: No nausea, vomiting, diarrhea, constipation, abdominal pain  : No dysuria, hematuria, urinary frequency or urgency  Musculoskeletal: No back pain, myalgias, arthralgias, or BLE edema  Skin: No new rashes, itching      PMH/PSH:  PAST MEDICAL & SURGICAL HISTORY:  End-stage renal disease (ESRD)  On HD MWF    Cirrhosis, alcoholic    S/P exploratory laparotomy  for bladder rupture s/p fall        FAMILY HISTORY:  FAMILY HISTORY:  FH: alpha 1 antitrypsin deficiency        SOCIAL HISTORY:  Smoking: [  ] Never Smoked  [  ] Former Smoker (# packs x # years), quit   [  ] Current Smoker (# packs x # years)  Substance Use: [  ] None; [   ] Yes:  EtOH Use: [   ] None; [   ] Rare; [   ] Social; [   ] Frequent:   Marital Status: [  ] Single  [  ]   [  ]   [  ]   Dependents:  Occupation:  Barriers to treatment:   Advance Directives:     HOME MEDICATIONS:  Home Medications:  cefTRIAXone 2 g intravenous injection: 2 gram(s) intravenous every 24 hours (07 Nov 2020 17:36)  epoetin chance: 61637 unit(s) intravenous 3 times a week with HD (07 Nov 2020 17:36)  fluconazole: 200 milligram(s) intravenous every 24 hours (07 Nov 2020 17:36)  ipratropium-albuterol 0.5 mg-2.5 mg/3 mLinhalation solution: 3 milliliter(s) inhaled once, As needed, Wheezing (07 Nov 2020 17:36)  midodrine 10 mg oral tablet: 3 tab(s) orally every 8 hours (07 Nov 2020 17:36)  multivitamin: 1 tab(s) orally once a day (31 Oct 2020 22:55)  pantoprazole 40 mg intravenous injection: 40 milligram(s) intravenous 2 times a day (07 Nov 2020 17:36)  polyethylene glycol 3350 oral powder for reconstitution: 17 gram(s) orally once (07 Nov 2020 17:36)  sevelamer carbonate 800 mg oral tablet: 1 tab(s) orally 3 times a day (07 Nov 2020 17:36)  thiamine: 500 milligram(s) intravenous once a day (07 Nov 2020 17:36)  traMADol 50 mg oral tablet: 0.5 tab(s) orally every 8 hours, As needed, Severe Pain (7 - 10) (08 Nov 2020 23:41)      ALLERGIES:  Allergies    No Known Allergies    Intolerances            OBJECTIVE:  ICU Vital Signs Last 24 Hrs  T(C): 36.6 (09 Nov 2020 22:15), Max: 36.7 (09 Nov 2020 02:01)  T(F): 97.9 (09 Nov 2020 22:15), Max: 98 (09 Nov 2020 02:01)  HR: 117 (09 Nov 2020 22:15) (100 - 117)  BP: 92/55 (09 Nov 2020 22:15) (76/42 - 94/64)  BP(mean): --  ABP: --  ABP(mean): --  RR: 17 (09 Nov 2020 10:20) (17 - 18)  SpO2: 93% (09 Nov 2020 10:20) (93% - 95%)        11-08 @ 07:01  -  11-09 @ 07:00  --------------------------------------------------------  IN: 290 mL / OUT: 0 mL / NET: 290 mL    11-09 @ 07:01  -  11-10 @ 00:18  --------------------------------------------------------  IN: 720 mL / OUT: 0 mL / NET: 720 mL      CAPILLARY BLOOD GLUCOSE      POCT Blood Glucose.: 130 mg/dL (10 Nov 2020 00:14)        General: lying in bed in NAD  HEENT: NC/AT, +icteric sclera, PERRLA, EOMI  CV: RRR, normal S1,S2; no murmurs; no peripheral edema   Pulm: lungs CTAB, no crackles, rhonchi, or wheezes  Abd: distended, tender to palpation, +bowel sounds  Psych/Neuro: A&Ox3, nonfocal, normal affect  Skin: +jaundice, warm, dry      LINES:       HOSPITAL MEDICATIONS:  MEDICATIONS  (STANDING):  cefTRIAXone   IVPB 2000 milliGRAM(s) IV Intermittent every 24 hours  chlorhexidine 2% Cloths 1 Application(s) Topical daily  dextrose 10%. 1000 milliLiter(s) (20 mL/Hr) IV Continuous <Continuous>  epoetin chance-epbx (RETACRIT) Injectable 44304 Unit(s) IV Push <User Schedule>  folic acid 1 milliGRAM(s) Oral daily  influenza   Vaccine 0.5 milliLiter(s) IntraMuscular once  lidocaine   Patch 1 Patch Transdermal daily  midodrine 30 milliGRAM(s) Oral every 8 hours  multivitamin 1 Tablet(s) Oral daily  ondansetron Injectable 4 milliGRAM(s) IV Push once  pantoprazole  Injectable 40 milliGRAM(s) IV Push two times a day  polyethylene glycol 3350 17 Gram(s) Oral once  sevelamer carbonate 800 milliGRAM(s) Oral three times a day  thiamine IVPB 500 milliGRAM(s) IV Intermittent daily    MEDICATIONS  (PRN):  traMADol 25 milliGRAM(s) Oral every 8 hours PRN Severe Pain (7 - 10)        LABS:                        9.2    14.17 )-----------( 47       ( 09 Nov 2020 07:11 )             29.1     Hgb Trend: 9.2<--, 9.1<--, 7.7<--, 8.1<--, 6.6<--  11-09    135  |  95<L>  |  44<H>  ----------------------------<  57<L>  4.5   |  25  |  6.89<H>    Ca    9.1      09 Nov 2020 07:08  Phos  4.8     11-09  Mg     2.2     11-09    TPro  5.7<L>  /  Alb  3.1<L>  /  TBili  12.1<H>  /  DBili  x   /  AST  55<H>  /  ALT  15  /  AlkPhos  74  11-09    Creatinine Trend: 6.89<--, 5.82<--, 4.14<--, 5.97<--, 5.56<--, 5.02<--  PT/INR - ( 09 Nov 2020 08:54 )   PT: 35.5 sec;   INR: 3.15 ratio             Arterial Blood Gas:  11-09 @ 23:02  7.34/38/74/20/93/-4.9  ABG lactate: --        MICROBIOLOGY:       RADIOLOGY & ADDITIONAL TESTS: Reviewed.    A/P  41 yo M w/ PMH of EtOH cirrhosis c/b gastric variceal bleeding in July 2020 s/p 34u pRBC, IR embolization, and blakemore balloon, ESRD on HD (M/W/F), bladder rupture 2/2 fall s/p ex-lap in 2019, EtOH withdrawal seizures, and hemorrhoids admitted for decompensated liver cirrhosis and large volume ascites with concern for SBP, currently on midodrine. Transferred to MICU for pressor supported paracentesis with transfer back to floor with plan for tx to [facility] for liver tx eval; now admitted back to MICU for hypoxic Respiratory failure likely 2/2 fluid overload.     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 CXR during RRT continues to demonstrate  - bedside pocus reveals large abdominal pockets; will plan for paracentesis (give platelets and vit. k prior)  - pulmonary team was evaluating while on floor with no plan to perform thora prior to tx fo [facility] as patient was comfortable on nasal cannula.     #CV  - Hypotension 2/2 chronic multiorgan failure.   - C/w midodrine 20mg TID map goal 55-60  - started levo for hypotension     #GI  - Decompensated alcoholic cirrhosis with ascites. hx gastric varices s/p IR embolization and known esophageal varices  - Diagnostic paracentesis on 11/04 with 160 PMNs, not meeting criteria for SBP  - c/w ceftriaxone  - s/p large volume paracentesis 11/5, ~7L removed; will plan for para overnight   - c/w protonix BID  - C/w 500mg thiamine IV daily  -Decompensated EtOH Cirrhosis - MELD-Na: 40 (11/5/2020)      #Renal  - ESRD on HD  - Last HD today though cut short because patient became short of breath, c/f fluid overload   - c/w Renvela 800 mg TID    #ID  - Afebrile with leukocytosis, now resolved  - Blood cx drawn in OSH ([facility]) on 10/31 grew E. coli in aerobic bottle, pan sensitive  - Blood cx 11/02 with NGTD  - 1 ascites culture negative, second still testing  - cw ceftriaxone     #Heme  - Hepatic coagulopathy + ?acute blood loss anemia  - No obvious source of loss but will monitor consider patient had hx of lifethreatening bleed.   - s/p 1u pRBC and FFP 11/04, FFP x1 11/05 before paracentesis, given Cry x 5 u as well.   - s/p vitamin K 10mg IV x 3  - Possible transfer to [facility] for transplant eval  - C/w Retacrit 10K units with HD    - DVT ppx: SCDs    #GOC  - Full code. MICU Accept Note  ------------------------    CHIEF COMPLAINT:     HPI / INTERVAL HISTORY:  HPI:  40M w/ PMH of EtOH cirrhosis c/b gastric variceal bleeding in July 2020 s/p 34u pRBC, IR embolization, and blakemore balloon, ESRD on HD (M/W/F), bladder rupture 2/2 fall s/p ex-lap in 2019, EtOH withdrawal seizures, and hemorrhoids admitted for decompensated liver cirrhosis and large volume ascites with concern for SBP, currently on midodrine. RRT was called for hypotension and patient was transferred to MICU for a large volume paracentesis. Patient did not require pressor support throughout MICU course.     Pt did not require any pressors in MICU. He was saturating well on room air. Received diagnostic paracentesis on 11/04 with 160 PMNs, not meeting criteria for SBP.  Received large volume paracentesis 11/5, ~7L removed. Pt was placed on ceftriaxone and fluconazole as ppx for SBP. He was continued on midodrine, protonix, thiamine. Midodrine was increased from 20mg to 30mg TID on day of transfer. He was refusing lactulose so was started on miralax q2H. He had 3 BM per day. His mental status improved with stable ammonia level. He received MWF HD. Received Retacrit 10K units with HD. No episodes of bleeding in MICU. 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 Thoracentesis was deferred given pt's clinical status. He was anemic with hepatic coagulopathy, recevied 1u pRBC and FFP 11/04, FFP x1 11/05 before paracentesis, given Cry x 5 u as well. Received vitamin K 10mg IV x 3 days. He was accepted to [facility] for liver transplantation.     Rapid response called tonight for hypoxia while planned to transport to [facility] for liver transplant evaluation. During rapid response patient had ABG to 70's on HFNC. Transferred to MICU for hypoxic RF.           REVIEW OF SYSTEMS:  Constitutional: No fevers, chills, weight loss  Neurological: No headache, dizziness, weakness, numbness  HEENT: No vision problems, eye pain, nasal congestion, rhinorrhea, sore throat, dysphagia  Resp: No cough, dyspnea, wheezing, hemoptysis  CV: No chest pain, orthopnea, palpitations  GI: No nausea, vomiting, diarrhea, constipation, abdominal pain  : No dysuria, hematuria, urinary frequency or urgency  Musculoskeletal: No back pain, myalgias, arthralgias, or BLE edema  Skin: No new rashes, itching      PMH/PSH:  PAST MEDICAL & SURGICAL HISTORY:  End-stage renal disease (ESRD)  On HD MWF    Cirrhosis, alcoholic    S/P exploratory laparotomy  for bladder rupture s/p fall        FAMILY HISTORY:  FAMILY HISTORY:  FH: alpha 1 antitrypsin deficiency        SOCIAL HISTORY:  Smoking: [  ] Never Smoked  [  ] Former Smoker (# packs x # years), quit   [  ] Current Smoker (# packs x # years)  Substance Use: [  ] None; [   ] Yes:  EtOH Use: [   ] None; [   ] Rare; [   ] Social; [   ] Frequent:   Marital Status: [  ] Single  [  ]   [  ]   [  ]   Dependents:  Occupation:  Barriers to treatment:   Advance Directives:     HOME MEDICATIONS:  Home Medications:  cefTRIAXone 2 g intravenous injection: 2 gram(s) intravenous every 24 hours (07 Nov 2020 17:36)  epoetin chance: 43457 unit(s) intravenous 3 times a week with HD (07 Nov 2020 17:36)  fluconazole: 200 milligram(s) intravenous every 24 hours (07 Nov 2020 17:36)  ipratropium-albuterol 0.5 mg-2.5 mg/3 mLinhalation solution: 3 milliliter(s) inhaled once, As needed, Wheezing (07 Nov 2020 17:36)  midodrine 10 mg oral tablet: 3 tab(s) orally every 8 hours (07 Nov 2020 17:36)  multivitamin: 1 tab(s) orally once a day (31 Oct 2020 22:55)  pantoprazole 40 mg intravenous injection: 40 milligram(s) intravenous 2 times a day (07 Nov 2020 17:36)  polyethylene glycol 3350 oral powder for reconstitution: 17 gram(s) orally once (07 Nov 2020 17:36)  sevelamer carbonate 800 mg oral tablet: 1 tab(s) orally 3 times a day (07 Nov 2020 17:36)  thiamine: 500 milligram(s) intravenous once a day (07 Nov 2020 17:36)  traMADol 50 mg oral tablet: 0.5 tab(s) orally every 8 hours, As needed, Severe Pain (7 - 10) (08 Nov 2020 23:41)      ALLERGIES:  Allergies    No Known Allergies    Intolerances            OBJECTIVE:  ICU Vital Signs Last 24 Hrs  T(C): 36.6 (09 Nov 2020 22:15), Max: 36.7 (09 Nov 2020 02:01)  T(F): 97.9 (09 Nov 2020 22:15), Max: 98 (09 Nov 2020 02:01)  HR: 117 (09 Nov 2020 22:15) (100 - 117)  BP: 92/55 (09 Nov 2020 22:15) (76/42 - 94/64)  BP(mean): --  ABP: --  ABP(mean): --  RR: 17 (09 Nov 2020 10:20) (17 - 18)  SpO2: 93% (09 Nov 2020 10:20) (93% - 95%)        11-08 @ 07:01  -  11-09 @ 07:00  --------------------------------------------------------  IN: 290 mL / OUT: 0 mL / NET: 290 mL    11-09 @ 07:01  -  11-10 @ 00:18  --------------------------------------------------------  IN: 720 mL / OUT: 0 mL / NET: 720 mL      CAPILLARY BLOOD GLUCOSE      POCT Blood Glucose.: 130 mg/dL (10 Nov 2020 00:14)        General: lying in bed in NAD  HEENT: NC/AT, +icteric sclera, PERRLA, EOMI  CV: RRR, normal S1,S2; no murmurs; no peripheral edema   Pulm: lungs CTAB, no crackles, rhonchi, or wheezes  Abd: distended, tender to palpation, +bowel sounds  Psych/Neuro: A&Ox3, nonfocal, normal affect  Skin: +jaundice, warm, dry      LINES:       HOSPITAL MEDICATIONS:  MEDICATIONS  (STANDING):  cefTRIAXone   IVPB 2000 milliGRAM(s) IV Intermittent every 24 hours  chlorhexidine 2% Cloths 1 Application(s) Topical daily  dextrose 10%. 1000 milliLiter(s) (20 mL/Hr) IV Continuous <Continuous>  epoetin chance-epbx (RETACRIT) Injectable 68360 Unit(s) IV Push <User Schedule>  folic acid 1 milliGRAM(s) Oral daily  influenza   Vaccine 0.5 milliLiter(s) IntraMuscular once  lidocaine   Patch 1 Patch Transdermal daily  midodrine 30 milliGRAM(s) Oral every 8 hours  multivitamin 1 Tablet(s) Oral daily  ondansetron Injectable 4 milliGRAM(s) IV Push once  pantoprazole  Injectable 40 milliGRAM(s) IV Push two times a day  polyethylene glycol 3350 17 Gram(s) Oral once  sevelamer carbonate 800 milliGRAM(s) Oral three times a day  thiamine IVPB 500 milliGRAM(s) IV Intermittent daily    MEDICATIONS  (PRN):  traMADol 25 milliGRAM(s) Oral every 8 hours PRN Severe Pain (7 - 10)        LABS:                        9.2    14.17 )-----------( 47       ( 09 Nov 2020 07:11 )             29.1     Hgb Trend: 9.2<--, 9.1<--, 7.7<--, 8.1<--, 6.6<--  11-09    135  |  95<L>  |  44<H>  ----------------------------<  57<L>  4.5   |  25  |  6.89<H>    Ca    9.1      09 Nov 2020 07:08  Phos  4.8     11-09  Mg     2.2     11-09    TPro  5.7<L>  /  Alb  3.1<L>  /  TBili  12.1<H>  /  DBili  x   /  AST  55<H>  /  ALT  15  /  AlkPhos  74  11-09    Creatinine Trend: 6.89<--, 5.82<--, 4.14<--, 5.97<--, 5.56<--, 5.02<--  PT/INR - ( 09 Nov 2020 08:54 )   PT: 35.5 sec;   INR: 3.15 ratio             Arterial Blood Gas:  11-09 @ 23:02  7.34/38/74/20/93/-4.9  ABG lactate: --        MICROBIOLOGY:       RADIOLOGY & ADDITIONAL TESTS: Reviewed.    A/P  41 yo M w/ PMH of EtOH cirrhosis c/b gastric variceal bleeding in July 2020 s/p 34u pRBC, IR embolization, and blakemore balloon, ESRD on HD (M/W/F), bladder rupture 2/2 fall s/p ex-lap in 2019, EtOH withdrawal seizures, and hemorrhoids admitted for decompensated liver cirrhosis and large volume ascites with concern for SBP, currently on midodrine. Transferred to MICU for pressor supported paracentesis with transfer back to floor with plan for tx to [facility] for liver tx eval; now admitted back to MICU for hypoxic Respiratory failure likely 2/2 fluid overload.     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 CXR during RRT continues to demonstrate R hemithorax opacification  - bedside pocus reveals large abdominal pockets; will plan for paracentesis (give platelets and vit. k prior)  - pulmonary team was evaluating while on floor with no plan to perform thora prior to tx fo [facility] as patient was comfortable on nasal cannula.     #CV  - Hypotension 2/2 chronic multiorgan failure.   - C/w midodrine 20mg TID map goal 55-60  - started levo for hypotension     #GI  - Decompensated alcoholic cirrhosis with ascites. hx gastric varices s/p IR embolization and known esophageal varices  - Diagnostic paracentesis on 11/04 with 160 PMNs, not meeting criteria for SBP  - c/w ceftriaxone  - s/p large volume paracentesis 11/5, ~7L removed; will plan for para overnight   - c/w protonix BID  - C/w 500mg thiamine IV daily  -Decompensated EtOH Cirrhosis - MELD-Na: 40 (11/5/2020)      #Renal  - ESRD on HD  - Last HD today though cut short because patient became short of breath, c/f fluid overload   - c/w Renvela 800 mg TID    #ID  - Afebrile with leukocytosis, now resolved  - Blood cx drawn in OSH ([facility]) on 10/31 grew E. coli in aerobic bottle, pan sensitive  - Blood cx 11/02 with NGTD  - 1 ascites culture negative, second still testing  - cw ceftriaxone     #Heme  - Hepatic coagulopathy + ?acute blood loss anemia  - No obvious source of loss but will monitor considering patient had hx of lifethreatening bleed.   - s/p 1u pRBC and FFP 11/04, FFP x1 11/05 before paracentesis, given Cry x 5 u as well.   - s/p vitamin K 10mg IV x 3  - Possible transfer to [facility] for transplant eval  - C/w Retacrit 10K units with HD    - DVT ppx: SCDs    #GOC  - Full code.

## 2020-11-10 NOTE — PROGRESS NOTE ADULT - PROBLEM SELECTOR PLAN 2
Patient with anemia in the setting of ESRD with history of GI bleed. Hemoglobin below target range. Will start Retacrit 10K units with HD. Transfuse PRBCs PRN. Monitor hemoglobin.

## 2020-11-10 NOTE — CHART NOTE - NSCHARTNOTEFT_GEN_A_CORE
: Maria Bass MD, Agnes Saleem MD    INDICATION: Pleural effusion, shock    PROCEDURE:  [X] LIMITED ECHO  [X] LIMITED CHEST  [ ] LIMITED RETROPERITONEAL  [ ] LIMITED ABDOMINAL  [ ] LIMITED DVT  [ ] NEEDLE GUIDANCE VASCULAR  [ ] NEEDLE GUIDANCE THORACENTESIS  [ ] NEEDLE GUIDANCE PARACENTESIS  [ ] NEEDLE GUIDANCE PERICARDIOCENTESIS  [ ] OTHER    FINDINGS: Large right pleural effusion. B lines bilaterally. Echo shows hypertrophic RV, dilated RA.    INTERPRETATION: Large right pleural effusion. Hypertrophic RV, dilated RA. : Maria Bass MD, Agnes Saleem MD    INDICATION: Pleural effusion, shock    PROCEDURE:  [X] LIMITED ECHO  [X] LIMITED CHEST  [ ] LIMITED RETROPERITONEAL  [ ] LIMITED ABDOMINAL  [ ] LIMITED DVT  [ ] NEEDLE GUIDANCE VASCULAR  [ ] NEEDLE GUIDANCE THORACENTESIS  [ ] NEEDLE GUIDANCE PARACENTESIS  [ ] NEEDLE GUIDANCE PERICARDIOCENTESIS  [ ] OTHER    FINDINGS: Large right pleural effusion. B lines bilaterally, smooth pleura in certain areas  - LVSF grossly normal but IV septum flattened intermittently due to RV overload (not constant "d-ing" of septum)  - has TR present, unable to calculate velocity    INTERPRETATION: Large right pleural effusion. Dilated RV with flattening of IV septum    Images stored in [employer]    Attending Attestation:  I was present during the key portions of the procedure and immediately available during the entire procedure.  Agnes Saleem MD  Attending  Pulmonary & Critical Care Medicine

## 2020-11-10 NOTE — PROGRESS NOTE ADULT - SUBJECTIVE AND OBJECTIVE BOX
CC: F/U for Hypoxia    Saw patient. Patient now intubated. Sedated, poorly responsive.     Allergies  No Known Allergies    ANTIMICROBIALS:  piperacillin/tazobactam IVPB.. 3.375 every 8 hours    PE:    Vital Signs Last 24 Hrs  T(C): 36.4 (10 Nov 2020 04:00), Max: 36.6 (09 Nov 2020 22:15)  T(F): 97.5 (10 Nov 2020 04:00), Max: 97.9 (09 Nov 2020 22:15)  HR: 137 (10 Nov 2020 12:03) (88 - 137)  BP: 90/55 (10 Nov 2020 10:30) (74/39 - 113/59)  BP(mean): 70 (10 Nov 2020 10:30) (36 - 79)  RR: 26 (10 Nov 2020 10:30) (20 - 35)  SpO2: 100% (10 Nov 2020 12:03) (89% - 100%)    Gen: AOx0, sedated, ill appearing  CV: S1+S2 normal, tachycardic  Resp: Intubated  Abd: Soft, nontender, +BS, distended  Ext: Edema    LABS:                        8.3    19.26 )-----------( 44       ( 10 Nov 2020 07:27 )             26.1     11-10    137  |  94<L>  |  43<H>  ----------------------------<  103<H>  4.5   |  17<L>  |  5.98<H>    Ca    9.4      10 Nov 2020 07:27  Phos  6.3     11-10  Mg     2.2     11-10    TPro  6.0  /  Alb  3.3  /  TBili  12.9<H>  /  DBili  x   /  AST  82<H>  /  ALT  18  /  AlkPhos  82  11-10    MICROBIOLOGY:    .Body Fluid Peritoneal Fluid  11-10-20 --  --    polymorphonuclear leukocytes seen  No organisms seen  by cytocentrifuge    .Body Fluid Peritoneal Fluid  11-06-20   No growth     .Blood Blood  11-05-20   No growth to date.    .Body Fluid PERITONEAL  11-05-20   No growth at 5 days  --  --  CMV IgG Antibody: 0.51 U/mL (08-08-20 @ 09:47)  Toxoplasma IgG Screen: <3.0 IU/mL (08-08-20 @ 09:47)    RADIOLOGY:    11/10 XR:    Impression:    The heart is normal in size. Partially reexpanded right lung. Diffuse airspace disease seen throughout both lungs compatible with pulmonary edema. Infection cannot be ruled out. Endotracheal tube is in good position. A central line is seen on the left and the tip is in the superior vena cava. A double-lumen catheter is seen on the right and the tip is in the superior vena cava as well. No pneumothorax.

## 2020-11-10 NOTE — CHART NOTE - NSCHARTNOTEFT_GEN_A_CORE
Nutrition Follow Up Note   Patient seen for: malnutrition follow up on  ICU     Source: medical record, communication with team.     Chart reviewed, events noted.  Adm dx: HRS, ESRD. Transferred to floors 11/7-11/10. Readm to MICU 11/10 for respiratory failure, intubated. S/P paracentesis 11/10 6.5 L removed. Now on 3 pressors and CRRT.       (was accepted to [facility] for liver transplantation eval)      Diet Order: Diet, NPO (11-10-20 @ 09:06)    Nutrition Events: currently NPO    GI:  abd distended, had small BM today    Anthropometric Measurements:   Height (cm): 185.4 (11-10-20 @ 00:15)  Weight (kg): 74.7 (11-10-20 @ 00:15), 83.7 (09-19-20 @ 14:45)  BMI (kg/m2): 21.7 (11-10-20 @ 00:15), 24.4 (09-19-20 @ 14:45)  IBW 78 kg    Medications: MEDICATIONS  (STANDING):  albumin human 25% IVPB 100 milliLiter(s) IV Intermittent every 6 hours  chlorhexidine 0.12% Liquid 15 milliLiter(s) Oral Mucosa every 12 hours  chlorhexidine 2% Cloths 1 Application(s) Topical daily  chlorhexidine 4% Liquid 1 Application(s) Topical <User Schedule>  cisatracurium Infusion 3 MICROgram(s)/kG/Min (13.4 mL/Hr) IV Continuous <Continuous>  CRRT Treatment    <Continuous>  dextrose 50% Injectable 50 milliLiter(s) IV Push once  epoetin chance-epbx (RETACRIT) Injectable 98902 Unit(s) IV Push <User Schedule>  fentaNYL   Infusion... 0.5 MICROgram(s)/kG/Hr (1.87 mL/Hr) IV Continuous <Continuous>  folic acid 1 milliGRAM(s) Oral daily  hydrocortisone sodium succinate Injectable 100 milliGRAM(s) IV Push every 8 hours  influenza   Vaccine 0.5 milliLiter(s) IntraMuscular once  ketamine Infusion. 0.25 mG/kG/Hr (1.87 mL/Hr) IV Continuous <Continuous>  lidocaine   Patch 1 Patch Transdermal daily  midazolam Infusion 0.05 mG/kG/Hr (3.74 mL/Hr) IV Continuous <Continuous>  midodrine 30 milliGRAM(s) Oral every 8 hours  multivitamin 1 Tablet(s) Oral daily  norepinephrine Infusion 0.05 MICROgram(s)/kG/Min (3.5 mL/Hr) IV Continuous <Continuous>  pantoprazole  Injectable 40 milliGRAM(s) IV Push two times a day  phenylephrine    Infusion 0.1 MICROgram(s)/kG/Min (1.4 mL/Hr) IV Continuous <Continuous>  piperacillin/tazobactam IVPB.. 3.375 Gram(s) IV Intermittent every 8 hours  polyethylene glycol 3350 17 Gram(s) Oral once  PrismaSATE Dialysate BGK 4 / 2.5 5000 milliLiter(s) (1200 mL/Hr) CRRT <Continuous>  PrismaSOL Filtration BGK 4 / 2.5 5000 milliLiter(s) (1000 mL/Hr) CRRT <Continuous>  PrismaSOL Filtration BGK 4 / 2.5 5000 milliLiter(s) (200 mL/Hr) CRRT <Continuous>  propofol Infusion 10 MICROgram(s)/kG/Min (4.48 mL/Hr) IV Continuous <Continuous>  sevelamer carbonate 800 milliGRAM(s) Oral three times a day  thiamine IVPB 500 milliGRAM(s) IV Intermittent daily  vasopressin Infusion 0.04 Unit(s)/Min (2.4 mL/Hr) IV Continuous <Continuous>    MEDICATIONS  (PRN):  sodium chloride 0.9% lock flush 10 milliLiter(s) IV Push every 1 hour PRN Pre/post blood products, medications, blood draw, and to maintain line patency  traMADol 25 milliGRAM(s) Oral every 8 hours PRN Severe Pain (7 - 10)    Labs: 11-10 @ 07:27: Sodium 137, Potassium 4.5, Calcium 9.4, Magnesium 2.2, Phosphorus 6.3<H>, BUN 43<H>, Creatinine 5.98<H>, Glucose 103<H>, Alk Phos 82, ALT/SGPT 18, AST/SGOT 82<H>, Albumin 3.3, Prealbumin --, Total Bilirubin 12.9<H>, Hemoglobin 8.3<L>, Hematocrit 26.1<L>, Ferritin --, C-Reactive Protein --, Creatine Kinase <<27>  11-10 @ 00:35: Sodium 135, Potassium 4.5, Calcium 9.0, Magnesium 2.1, Phosphorus 5.6<H>, BUN 39<H>, Creatinine 6.10<H>, Glucose 132<H>, Alk Phos 74, ALT/SGPT 18, AST/SGOT 68<H>, Albumin 2.7<L>, Prealbumin --, Total Bilirubin 13.1<H>, Hemoglobin --, Hematocrit --, Ferritin --, C-Reactive Protein --, Creatine Kinase <<27>  11-10 @ 00:34: Sodium --, Potassium --, Calcium --, Magnesium --, Phosphorus --, BUN --, Creatinine --, Glucose --, Alk Phos --, ALT/SGPT --, AST/SGOT --, Albumin --, Prealbumin --, Total Bilirubin --, Hemoglobin 8.3<L>, Hematocrit 25.0<L>, Ferritin --, C-Reactive Protein --, Creatine Kinase <<27>    POCT Blood Glucose.: 106 mg/dL (11-10-20 @ 05:29)  POCT Blood Glucose.: 115 mg/dL (11-10-20 @ 02:05)  POCT Blood Glucose.: 130 mg/dL (11-10-20 @ 00:14)  POCT Blood Glucose.: 188 mg/dL (11-09-20 @ 22:56)  POCT Blood Glucose.: 63 mg/dL (11-09-20 @ 22:45)    Skin: stage 2 sacrum  Edema: none    Estimated Needs: based on IBW 78 kg w/ consideration for intubation  Energy: 7392-0912 (25 kcals/kg)  Protein:  109-125 gm (1.4-1.6 gm/kg)    Previous Nutrition Diagnosis: severe malnutrition  Nutrition Diagnosis is: care plan ongoing, pt currently NPO    New Nutrition Diagnosis: none    Recommended Interventions:   1. If EN initiated, recommend Nepro 60cc/hr x 18 hrs provides 1944 kcals, 87 gm protein, 785cc free water and 2 packets No Carb Prosource (120 kcals, 30 gm protein), together meets 26 Kcal/Kg, 1.5 Gm protein/kg IBW 78 kg  2. continue multivitamin, thiamine, folic acid    Monitoring and Evaluation:   Continue to monitor nutrition provision and tolerance, weights, labs, skin integrity.   RD remains available upon request and will follow up per protocol.

## 2020-11-10 NOTE — PROCEDURE NOTE - NSINDICATIONS_GEN_A_CORE
arterial puncture to obtain ABG's/monitoring purposes/critical patient
ascites
ascites/respiratory compromise
critical illness/emergency venous access
ascites
pleural effusion/respiratory failure
respiratory failure/respiratory distress

## 2020-11-10 NOTE — PROCEDURE NOTE - NSINFORMCONSENT_GEN_A_CORE
Benefits, risks, and possible complications of procedure explained to patient/caregiver who verbalized understanding and gave written consent.
This was an emergent procedure.
This was an emergent procedure.
Benefits, risks, and possible complications of procedure explained to patient/caregiver who verbalized understanding and gave written consent.
Benefits, risks, and possible complications of procedure explained to patient/caregiver who verbalized understanding and gave written consent.
This was an emergent procedure.
This was an emergent procedure.

## 2020-11-10 NOTE — PROCEDURE NOTE - NSCOMPLICATION_GEN_A_CORE
no complications
CXR ordered/no complications

## 2020-11-10 NOTE — PROCEDURE NOTE - NSPROCDETAILS_GEN_ALL_CORE
ultrasound utilization/sterile dressing applied ultrasound utilization/location identified, sterile technique used, catheter introduced, fluid drained/sterile dressing applied/Seldinger technique

## 2020-11-10 NOTE — PROGRESS NOTE ADULT - SUBJECTIVE AND OBJECTIVE BOX
Chief Complaint:  Patient is a 40y old  Male who presents with a chief complaint of HRS (10 Nov 2020 08:17)      Interval Events: Pt with RRT overnight and hypoxic likely secondary to hepatic hydrothorax. Now intubated in ICU    Allergies:  No Known Allergies      Hospital Medications:  albumin human 25% IVPB 100 milliLiter(s) IV Intermittent every 6 hours  chlorhexidine 0.12% Liquid 15 milliLiter(s) Oral Mucosa every 12 hours  chlorhexidine 2% Cloths 1 Application(s) Topical daily  chlorhexidine 4% Liquid 1 Application(s) Topical <User Schedule>  cisatracurium Infusion 3 MICROgram(s)/kG/Min IV Continuous <Continuous>  CRRT Treatment    <Continuous>  epoetin chance-epbx (RETACRIT) Injectable 16708 Unit(s) IV Push <User Schedule>  fentaNYL   Infusion... 0.5 MICROgram(s)/kG/Hr IV Continuous <Continuous>  folic acid 1 milliGRAM(s) Oral daily  influenza   Vaccine 0.5 milliLiter(s) IntraMuscular once  lidocaine   Patch 1 Patch Transdermal daily  midodrine 30 milliGRAM(s) Oral every 8 hours  multivitamin 1 Tablet(s) Oral daily  norepinephrine Infusion 0.05 MICROgram(s)/kG/Min IV Continuous <Continuous>  pantoprazole  Injectable 40 milliGRAM(s) IV Push two times a day  phenylephrine    Infusion 0.1 MICROgram(s)/kG/Min IV Continuous <Continuous>  piperacillin/tazobactam IVPB. 3.375 Gram(s) IV Intermittent once  piperacillin/tazobactam IVPB.. 3.375 Gram(s) IV Intermittent every 8 hours  polyethylene glycol 3350 17 Gram(s) Oral once  PrismaSATE Dialysate BGK 4 / 2.5 5000 milliLiter(s) CRRT <Continuous>  PrismaSOL Filtration BGK 4 / 2.5 5000 milliLiter(s) CRRT <Continuous>  PrismaSOL Filtration BGK 4 / 2.5 5000 milliLiter(s) CRRT <Continuous>  propofol Infusion 10 MICROgram(s)/kG/Min IV Continuous <Continuous>  sevelamer carbonate 800 milliGRAM(s) Oral three times a day  thiamine IVPB 500 milliGRAM(s) IV Intermittent daily  traMADol 25 milliGRAM(s) Oral every 8 hours PRN  vancomycin  IVPB 1000 milliGRAM(s) IV Intermittent once  vasopressin Infusion 0.02 Unit(s)/Min IV Continuous <Continuous>      PMHX/PSHX:  End-stage renal disease (ESRD)    Cirrhosis, alcoholic    No pertinent past medical history    S/P exploratory laparotomy    No significant past surgical history        Family history:  FH: alpha 1 antitrypsin deficiency    No pertinent family history in first degree relatives        ROS: As per HPI, 14-point ROS negative otherwise.      PHYSICAL EXAM:     Vital Signs:  Vital Signs Last 24 Hrs  T(C): 36.4 (10 Nov 2020 04:00), Max: 36.7 (2020 10:20)  T(F): 97.5 (10 Nov 2020 04:00), Max: 98 (2020 10:20)  HR: 134 (10 Nov 2020 08:45) (88 - 134)  BP: 78/41 (10 Nov 2020 08:45) (74/39 - 113/59)  BP(mean): 57 (10 Nov 2020 08:45) (36 - 79)  RR: 20 (10 Nov 2020 08:45) (17 - 35)  SpO2: 100% (10 Nov 2020 08:45) (89% - 100%)  Daily Height in cm: 185.42 (10 Nov 2020 00:15)    Daily Weight in k.8 (2020 10:20)    GENERAL:  intubated and sedated  HEENT:  NC/AT,  conjunctivae clear, +scleral icterus  CHEST:  no increased effort  HEART:  Regular rate and rhythm  ABDOMEN:  Soft, non-tender, distended  EXTREMITIES:  no cyanosis, clubbing or edema  SKIN: jaundice  NEURO: sedated    LABS:                        8.3    19.26 )-----------( 44       ( 10 Nov 2020 07:27 )             26.1     11-10    137  |  94<L>  |  43<H>  ----------------------------<  103<H>  4.5   |  17<L>  |  5.98<H>    Ca    9.4      10 Nov 2020 07:27  Phos  6.3     11-10  Mg     2.2     11-10    TPro  6.0  /  Alb  3.3  /  TBili  12.9<H>  /  DBili  x   /  AST  82<H>  /  ALT  18  /  AlkPhos  82  11-10    LIVER FUNCTIONS - ( 10 Nov 2020 07:27 )  Alb: 3.3 g/dL / Pro: 6.0 g/dL / ALK PHOS: 82 U/L / ALT: 18 U/L / AST: 82 U/L / GGT: x           PT/INR - ( 10 Nov 2020 07:27 )   PT: 41.2 sec;   INR: 3.65 ratio         PTT - ( 10 Nov 2020 07:27 )  PTT:>200.0 sec        Imaging:

## 2020-11-10 NOTE — RAPID RESPONSE TEAM SUMMARY - NSSITUATIONBACKGROUNDRRT_GEN_ALL_CORE
39 yo M PMH ETOH cirrhosis c/b gastric variceal bleeding s/p IR embolization and blakemore balloon, ESRD on HD (M/W/F), bladder rupture 2/2 fall s/p ex-lap in 2019, EtOH withdrawal seizures, and hemorrhoids admitted for decompensated liver cirrhosis and large volume ascites. RRT called tonight for hypoxia to 70s on 8L nasal cannula. Patient alert in moderate distress.  Pulling NRB off.  Escalated to high flow NC with improvement, however, requiring 70% 50L to maintain sat of 90%. CXR with right lung white out, worsening patchy opacities of left lung--unable to complete HD today or prior session on 11/6 due to low bp.  No fluid removal since admit on 10/31. Abdomen also markedly distended, of note, patient with 7L off on 11/5. Patient complaining of worsening SOB due to pain/difficulty taking deep breath/abdomen.  Full set of labs drawn, lactate noted to be 9.3 up from 2 on 11/7.  As patient with profound hypoxemia and worsening lactate, case discussed with ICU attending Dr Negro. Patient accepted to MICU in setting hypoxemia resp failure.  Of note, patient also hypoglycemic--receiving one amp D50 with plan to start low dose D10 to maintain euglycemia.

## 2020-11-10 NOTE — PROGRESS NOTE ADULT - SUBJECTIVE AND OBJECTIVE BOX
Patient is a 40y old  Male who presents with a chief complaint of hypoxic respiratory failure. (10 Nov 2020 08:17)      SUBJECTIVE/OVERNIGHT EVENTS:  - Patient was transferred to MICU overnight after RRT was called for worsening respiratory failure. He was given 1u platelets and started on levophed before paracentesis which drained approximately 6L of fluid.   - Intubated this morning for worsening hypoxic hypercapnic respiratory failure.   - Seen and examined at bedside this morning. Sedated and intubated.   - Unable to assess ROS due to sedation.  - Plan for HD today.      OBJECTIVE:  ICU Vital Signs Last 24 Hrs  T(C): 36.4 (10 Nov 2020 04:00), Max: 36.7 (09 Nov 2020 10:20)  T(F): 97.5 (10 Nov 2020 04:00), Max: 98 (09 Nov 2020 10:20)  HR: 134 (10 Nov 2020 08:45) (88 - 134)  BP: 78/41 (10 Nov 2020 08:45) (74/39 - 113/59)  BP(mean): 57 (10 Nov 2020 08:45) (36 - 79)  ABP: --  ABP(mean): --  RR: 20 (10 Nov 2020 08:45) (17 - 35)  SpO2: 100% (10 Nov 2020 08:45) (89% - 100%)    Mode: AC/ CMV (Assist Control/ Continuous Mandatory Ventilation), RR (machine): 26, TV (machine): 600, FiO2: 100, PEEP: 8, ITime: 0.6, MAP: 11, PIP: 38    11-09 @ 07:01  -  11-10 @ 07:00  --------------------------------------------------------  IN: 1471.2 mL / OUT: 6500 mL / NET: -5028.8 mL      CAPILLARY BLOOD GLUCOSE  POCT Blood Glucose.: 106 mg/dL (10 Nov 2020 05:29)      PHYSICAL EXAM:  GENERAL: lying in bed, intubated  EYES: +icertic sclera, EOMI  CHEST/LUNG: CTAB; no crackles, rhonchi, or wheezes  HEART: Regular rate and rhythm, normal S1 and S2  ABDOMEN: distended, +bowel sounds  EXTREMITIES:  2+ peripheral pulses b/l, No clubbing, cyanosis, or edema  NEUROLOGY: sedated  SKIN: +jaundice, warm, dry      LINES:  - R IJ      HOSPITAL MEDICATIONS:  piperacillin/tazobactam IVPB. 3.375 Gram(s) IV Intermittent once  piperacillin/tazobactam IVPB.. 3.375 Gram(s) IV Intermittent every 8 hours  vancomycin  IVPB 1000 milliGRAM(s) IV Intermittent once  midodrine 30 milliGRAM(s) Oral every 8 hours  norepinephrine Infusion 0.05 MICROgram(s)/kG/Min IV Continuous <Continuous>  phenylephrine    Infusion 0.1 MICROgram(s)/kG/Min IV Continuous <Continuous>  vasopressin Infusion 0.02 Unit(s)/Min IV Continuous <Continuous>  cisatracurium Infusion 3 MICROgram(s)/kG/Min IV Continuous <Continuous>  fentaNYL   Infusion... 0.5 MICROgram(s)/kG/Hr IV Continuous <Continuous>  propofol Infusion 10 MICROgram(s)/kG/Min IV Continuous <Continuous>  traMADol 25 milliGRAM(s) Oral every 8 hours PRN  pantoprazole  Injectable 40 milliGRAM(s) IV Push two times a day  polyethylene glycol 3350 17 Gram(s) Oral once  albumin human 25% IVPB 100 milliLiter(s) IV Intermittent every 6 hours  folic acid 1 milliGRAM(s) Oral daily  multivitamin 1 Tablet(s) Oral daily  thiamine IVPB 500 milliGRAM(s) IV Intermittent daily  epoetin chance-epbx (RETACRIT) Injectable 00261 Unit(s) IV Push <User Schedule>  influenza   Vaccine 0.5 milliLiter(s) IntraMuscular once    chlorhexidine 0.12% Liquid 15 milliLiter(s) Oral Mucosa every 12 hours  chlorhexidine 2% Cloths 1 Application(s) Topical daily  chlorhexidine 4% Liquid 1 Application(s) Topical <User Schedule>  lidocaine   Patch 1 Patch Transdermal daily    CRRT Treatment    <Continuous>  PrismaSATE Dialysate BGK 4 / 2.5 5000 milliLiter(s) CRRT <Continuous>  PrismaSOL Filtration BGK 4 / 2.5 5000 milliLiter(s) CRRT <Continuous>  PrismaSOL Filtration BGK 4 / 2.5 5000 milliLiter(s) CRRT <Continuous>  sevelamer carbonate 800 milliGRAM(s) Oral three times a day        LABS:                        8.3    19.26 )-----------( 44       ( 10 Nov 2020 07:27 )             26.1     Hgb Trend: 8.3<--, 8.3<--, 9.2<--, 9.1<--, 7.7<--  11-10    137  |  94<L>  |  43<H>  ----------------------------<  103<H>  4.5   |  17<L>  |  5.98<H>    Ca    9.4      10 Nov 2020 07:27  Phos  6.3     11-10  Mg     2.2     11-10    TPro  6.0  /  Alb  3.3  /  TBili  12.9<H>  /  DBili  x   /  AST  82<H>  /  ALT  18  /  AlkPhos  82  11-10    Creatinine Trend: 5.98<--, 6.10<--, 6.89<--, 5.82<--, 4.14<--, 5.97<--  PT/INR - ( 10 Nov 2020 07:27 )   PT: 41.2 sec;   INR: 3.65 ratio         PTT - ( 10 Nov 2020 07:27 )  PTT:>200.0 sec    Arterial Blood Gas:  11-10 @ 08:32  7.11/60/100/18/95/-10.8  ABG lactate: --  Arterial Blood Gas:  11-10 @ 07:24  7.29/41/105/19/98/-6.4  ABG lactate: --  Arterial Blood Gas:  11-10 @ 00:36  7.35/36/65/20/90/-5.0  ABG lactate: --  Arterial Blood Gas:  11-09 @ 23:02  7.34/38/74/20/93/-4.9  ABG lactate: --    Venous Blood Gas:  11-10 @ 08:17  7.12/66/26/21/22  VBG Lactate: 10.8        MICROBIOLOGY:    Patient is a 40y old  Male who presents with a chief complaint of hypoxic respiratory failure. (10 Nov 2020 08:17)      SUBJECTIVE/OVERNIGHT EVENTS:  - Patient was transferred to MICU overnight after RRT was called for worsening respiratory failure. He was given 1u platelets and started on levophed before paracentesis which drained approximately 6L of fluid.   - Intubated this morning for worsening hypoxic hypercapnic respiratory failure.   - Seen and examined at bedside this morning. Sedated and intubated.   - Unable to assess ROS due to sedation.  - Plan for HD today.      OBJECTIVE:  ICU Vital Signs Last 24 Hrs  T(C): 36.4 (10 Nov 2020 04:00), Max: 36.7 (09 Nov 2020 10:20)  T(F): 97.5 (10 Nov 2020 04:00), Max: 98 (09 Nov 2020 10:20)  HR: 134 (10 Nov 2020 08:45) (88 - 134)  BP: 78/41 (10 Nov 2020 08:45) (74/39 - 113/59)  BP(mean): 57 (10 Nov 2020 08:45) (36 - 79)  ABP: --  ABP(mean): --  RR: 20 (10 Nov 2020 08:45) (17 - 35)  SpO2: 100% (10 Nov 2020 08:45) (89% - 100%)    Mode: AC/ CMV (Assist Control/ Continuous Mandatory Ventilation), RR (machine): 26, TV (machine): 600, FiO2: 100, PEEP: 8, ITime: 0.6, MAP: 11, PIP: 38    11-09 @ 07:01  -  11-10 @ 07:00  --------------------------------------------------------  IN: 1471.2 mL / OUT: 6500 mL / NET: -5028.8 mL      CAPILLARY BLOOD GLUCOSE  POCT Blood Glucose.: 106 mg/dL (10 Nov 2020 05:29)      PHYSICAL EXAM:  GENERAL: lying in bed, intubated  EYES: +icertic sclera, EOMI  CHEST/LUNG: CTAB; no crackles, rhonchi, or wheezes  HEART: Regular rate and rhythm, normal S1 and S2  ABDOMEN: distended, +bowel sounds  EXTREMITIES:  2+ peripheral pulses b/l, No clubbing, cyanosis, or edema  NEUROLOGY: sedated  SKIN: +jaundice, warm, dry      LINES:  - R [facility] MEDICATIONS:  piperacillin/tazobactam IVPB. 3.375 Gram(s) IV Intermittent once  piperacillin/tazobactam IVPB.. 3.375 Gram(s) IV Intermittent every 8 hours  vancomycin  IVPB 1000 milliGRAM(s) IV Intermittent once  midodrine 30 milliGRAM(s) Oral every 8 hours  norepinephrine Infusion 0.05 MICROgram(s)/kG/Min IV Continuous <Continuous>  phenylephrine    Infusion 0.1 MICROgram(s)/kG/Min IV Continuous <Continuous>  vasopressin Infusion 0.02 Unit(s)/Min IV Continuous <Continuous>  cisatracurium Infusion 3 MICROgram(s)/kG/Min IV Continuous <Continuous>  fentaNYL   Infusion... 0.5 MICROgram(s)/kG/Hr IV Continuous <Continuous>  propofol Infusion 10 MICROgram(s)/kG/Min IV Continuous <Continuous>  traMADol 25 milliGRAM(s) Oral every 8 hours PRN  pantoprazole  Injectable 40 milliGRAM(s) IV Push two times a day  polyethylene glycol 3350 17 Gram(s) Oral once  albumin human 25% IVPB 100 milliLiter(s) IV Intermittent every 6 hours  folic acid 1 milliGRAM(s) Oral daily  multivitamin 1 Tablet(s) Oral daily  thiamine IVPB 500 milliGRAM(s) IV Intermittent daily  epoetin chance-epbx (RETACRIT) Injectable 61048 Unit(s) IV Push <User Schedule>  influenza   Vaccine 0.5 milliLiter(s) IntraMuscular once    chlorhexidine 0.12% Liquid 15 milliLiter(s) Oral Mucosa every 12 hours  chlorhexidine 2% Cloths 1 Application(s) Topical daily  chlorhexidine 4% Liquid 1 Application(s) Topical <User Schedule>  lidocaine   Patch 1 Patch Transdermal daily    CRRT Treatment    <Continuous>  PrismaSATE Dialysate BGK 4 / 2.5 5000 milliLiter(s) CRRT <Continuous>  PrismaSOL Filtration BGK 4 / 2.5 5000 milliLiter(s) CRRT <Continuous>  PrismaSOL Filtration BGK 4 / 2.5 5000 milliLiter(s) CRRT <Continuous>  sevelamer carbonate 800 milliGRAM(s) Oral three times a day        LABS:                        8.3    19.26 )-----------( 44       ( 10 Nov 2020 07:27 )             26.1     Hgb Trend: 8.3<--, 8.3<--, 9.2<--, 9.1<--, 7.7<--  11-10    137  |  94<L>  |  43<H>  ----------------------------<  103<H>  4.5   |  17<L>  |  5.98<H>    Ca    9.4      10 Nov 2020 07:27  Phos  6.3     11-10  Mg     2.2     11-10    TPro  6.0  /  Alb  3.3  /  TBili  12.9<H>  /  DBili  x   /  AST  82<H>  /  ALT  18  /  AlkPhos  82  11-10    Creatinine Trend: 5.98<--, 6.10<--, 6.89<--, 5.82<--, 4.14<--, 5.97<--  PT/INR - ( 10 Nov 2020 07:27 )   PT: 41.2 sec;   INR: 3.65 ratio         PTT - ( 10 Nov 2020 07:27 )  PTT:>200.0 sec    Arterial Blood Gas:  11-10 @ 08:32  7.11/60/100/18/95/-10.8  ABG lactate: --  Arterial Blood Gas:  11-10 @ 07:24  7.29/41/105/19/98/-6.4  ABG lactate: --  Arterial Blood Gas:  11-10 @ 00:36  7.35/36/65/20/90/-5.0  ABG lactate: --  Arterial Blood Gas:  11-09 @ 23:02  7.34/38/74/20/93/-4.9  ABG lactate: --    Venous Blood Gas:  11-10 @ 08:17  7.12/66/26/21/22  VBG Lactate: 10.8        MICROBIOLOGY:    Patient is a 40y old  Male who presents with a chief complaint of hypoxic respiratory failure. (10 Nov 2020 08:17)      SUBJECTIVE/OVERNIGHT EVENTS:  - Patient was transferred to MICU overnight after RRT was called for worsening respiratory failure. He was given 1u platelets and started on levophed before undergoing paracentesis which drained approximately 6L of fluid.   - Intubated this morning for worsening hypoxic hypercapnic respiratory failure. Worsening pressor requirements.  - Seen and examined at bedside this morning. Sedated and intubated.   - Unable to assess ROS.  - Plan for HD today.      OBJECTIVE:  ICU Vital Signs Last 24 Hrs  T(C): 36.4 (10 Nov 2020 04:00), Max: 36.7 (09 Nov 2020 10:20)  T(F): 97.5 (10 Nov 2020 04:00), Max: 98 (09 Nov 2020 10:20)  HR: 134 (10 Nov 2020 08:45) (88 - 134)  BP: 78/41 (10 Nov 2020 08:45) (74/39 - 113/59)  BP(mean): 57 (10 Nov 2020 08:45) (36 - 79)  ABP: --  ABP(mean): --  RR: 20 (10 Nov 2020 08:45) (17 - 35)  SpO2: 100% (10 Nov 2020 08:45) (89% - 100%)    Mode: AC/ CMV (Assist Control/ Continuous Mandatory Ventilation), RR (machine): 26, TV (machine): 600, FiO2: 100, PEEP: 8, ITime: 0.6, MAP: 11, PIP: 38    11-09 @ 07:01  -  11-10 @ 07:00  --------------------------------------------------------  IN: 1471.2 mL / OUT: 6500 mL / NET: -5028.8 mL      CAPILLARY BLOOD GLUCOSE  POCT Blood Glucose.: 106 mg/dL (10 Nov 2020 05:29)      PHYSICAL EXAM:  GENERAL: lying in bed, intubated  EYES: +icertic sclera, EOMI  CHEST/LUNG: CTAB; no crackles, rhonchi, or wheezes  HEART: Regular rate and rhythm, normal S1 and S2  ABDOMEN: distended, +bowel sounds  EXTREMITIES:  2+ peripheral pulses b/l, No clubbing, cyanosis, or edema  NEUROLOGY: sedated  SKIN: +jaundice, warm, dry      LINES:  - R [facility] MEDICATIONS:  piperacillin/tazobactam IVPB. 3.375 Gram(s) IV Intermittent once  piperacillin/tazobactam IVPB.. 3.375 Gram(s) IV Intermittent every 8 hours  vancomycin  IVPB 1000 milliGRAM(s) IV Intermittent once  midodrine 30 milliGRAM(s) Oral every 8 hours  norepinephrine Infusion 0.05 MICROgram(s)/kG/Min IV Continuous <Continuous>  phenylephrine    Infusion 0.1 MICROgram(s)/kG/Min IV Continuous <Continuous>  vasopressin Infusion 0.02 Unit(s)/Min IV Continuous <Continuous>  cisatracurium Infusion 3 MICROgram(s)/kG/Min IV Continuous <Continuous>  fentaNYL   Infusion... 0.5 MICROgram(s)/kG/Hr IV Continuous <Continuous>  propofol Infusion 10 MICROgram(s)/kG/Min IV Continuous <Continuous>  traMADol 25 milliGRAM(s) Oral every 8 hours PRN  pantoprazole  Injectable 40 milliGRAM(s) IV Push two times a day  polyethylene glycol 3350 17 Gram(s) Oral once  albumin human 25% IVPB 100 milliLiter(s) IV Intermittent every 6 hours  folic acid 1 milliGRAM(s) Oral daily  multivitamin 1 Tablet(s) Oral daily  thiamine IVPB 500 milliGRAM(s) IV Intermittent daily  epoetin chance-epbx (RETACRIT) Injectable 35300 Unit(s) IV Push <User Schedule>  influenza   Vaccine 0.5 milliLiter(s) IntraMuscular once    chlorhexidine 0.12% Liquid 15 milliLiter(s) Oral Mucosa every 12 hours  chlorhexidine 2% Cloths 1 Application(s) Topical daily  chlorhexidine 4% Liquid 1 Application(s) Topical <User Schedule>  lidocaine   Patch 1 Patch Transdermal daily    CRRT Treatment    <Continuous>  PrismaSATE Dialysate BGK 4 / 2.5 5000 milliLiter(s) CRRT <Continuous>  PrismaSOL Filtration BGK 4 / 2.5 5000 milliLiter(s) CRRT <Continuous>  PrismaSOL Filtration BGK 4 / 2.5 5000 milliLiter(s) CRRT <Continuous>  sevelamer carbonate 800 milliGRAM(s) Oral three times a day        LABS:                        8.3    19.26 )-----------( 44       ( 10 Nov 2020 07:27 )             26.1     Hgb Trend: 8.3<--, 8.3<--, 9.2<--, 9.1<--, 7.7<--  11-10    137  |  94<L>  |  43<H>  ----------------------------<  103<H>  4.5   |  17<L>  |  5.98<H>    Ca    9.4      10 Nov 2020 07:27  Phos  6.3     11-10  Mg     2.2     11-10    TPro  6.0  /  Alb  3.3  /  TBili  12.9<H>  /  DBili  x   /  AST  82<H>  /  ALT  18  /  AlkPhos  82  11-10    Creatinine Trend: 5.98<--, 6.10<--, 6.89<--, 5.82<--, 4.14<--, 5.97<--  PT/INR - ( 10 Nov 2020 07:27 )   PT: 41.2 sec;   INR: 3.65 ratio         PTT - ( 10 Nov 2020 07:27 )  PTT:>200.0 sec    Arterial Blood Gas:  11-10 @ 08:32  7.11/60/100/18/95/-10.8  ABG lactate: --  Arterial Blood Gas:  11-10 @ 07:24  7.29/41/105/19/98/-6.4  ABG lactate: --  Arterial Blood Gas:  11-10 @ 00:36  7.35/36/65/20/90/-5.0  ABG lactate: --  Arterial Blood Gas:  11-09 @ 23:02  7.34/38/74/20/93/-4.9  ABG lactate: --    Venous Blood Gas:  11-10 @ 08:17  7.12/66/26/21/22  VBG Lactate: 10.8        MICROBIOLOGY:    Patient is a 40y old  Male who presents with a chief complaint of hypoxic respiratory failure. (10 Nov 2020 08:17)      SUBJECTIVE/OVERNIGHT EVENTS:  - Patient was transferred to MICU overnight after RRT was called for worsening respiratory failure. He was given 1u platelets and started on levophed before undergoing paracentesis which drained approximately 6L of fluid.   - Intubated this morning for worsening hypoxic hypercapnic respiratory failure. Increasing pressor requirements.  - Seen and examined at bedside this morning. Sedated and intubated.   - Unable to assess ROS.  - Plan for HD and thoracentesis later today.      OBJECTIVE:  ICU Vital Signs Last 24 Hrs  T(C): 36.4 (10 Nov 2020 04:00), Max: 36.7 (09 Nov 2020 10:20)  T(F): 97.5 (10 Nov 2020 04:00), Max: 98 (09 Nov 2020 10:20)  HR: 134 (10 Nov 2020 08:45) (88 - 134)  BP: 78/41 (10 Nov 2020 08:45) (74/39 - 113/59)  BP(mean): 57 (10 Nov 2020 08:45) (36 - 79)  ABP: --  ABP(mean): --  RR: 20 (10 Nov 2020 08:45) (17 - 35)  SpO2: 100% (10 Nov 2020 08:45) (89% - 100%)    Mode: AC/ CMV (Assist Control/ Continuous Mandatory Ventilation), RR (machine): 26, TV (machine): 600, FiO2: 100, PEEP: 8, ITime: 0.6, MAP: 11, PIP: 38    11-09 @ 07:01  -  11-10 @ 07:00  --------------------------------------------------------  IN: 1471.2 mL / OUT: 6500 mL / NET: -5028.8 mL      CAPILLARY BLOOD GLUCOSE  POCT Blood Glucose.: 106 mg/dL (10 Nov 2020 05:29)      PHYSICAL EXAM:  GENERAL: lying in bed, intubated  EYES: +icertic sclera, EOMI  CHEST/LUNG: CTAB; no crackles, rhonchi, or wheezes  HEART: Regular rate and rhythm, normal S1 and S2  ABDOMEN: distended, +bowel sounds  EXTREMITIES:  2+ peripheral pulses b/l, No clubbing, cyanosis, or edema  NEUROLOGY: sedated  SKIN: +jaundice, warm, dry      LINES:  - R [facility] MEDICATIONS:  piperacillin/tazobactam IVPB. 3.375 Gram(s) IV Intermittent once  piperacillin/tazobactam IVPB.. 3.375 Gram(s) IV Intermittent every 8 hours  vancomycin  IVPB 1000 milliGRAM(s) IV Intermittent once  midodrine 30 milliGRAM(s) Oral every 8 hours  norepinephrine Infusion 0.05 MICROgram(s)/kG/Min IV Continuous <Continuous>  phenylephrine    Infusion 0.1 MICROgram(s)/kG/Min IV Continuous <Continuous>  vasopressin Infusion 0.02 Unit(s)/Min IV Continuous <Continuous>  cisatracurium Infusion 3 MICROgram(s)/kG/Min IV Continuous <Continuous>  fentaNYL   Infusion... 0.5 MICROgram(s)/kG/Hr IV Continuous <Continuous>  propofol Infusion 10 MICROgram(s)/kG/Min IV Continuous <Continuous>  traMADol 25 milliGRAM(s) Oral every 8 hours PRN  pantoprazole  Injectable 40 milliGRAM(s) IV Push two times a day  polyethylene glycol 3350 17 Gram(s) Oral once  albumin human 25% IVPB 100 milliLiter(s) IV Intermittent every 6 hours  folic acid 1 milliGRAM(s) Oral daily  multivitamin 1 Tablet(s) Oral daily  thiamine IVPB 500 milliGRAM(s) IV Intermittent daily  epoetin chance-epbx (RETACRIT) Injectable 52654 Unit(s) IV Push <User Schedule>  influenza   Vaccine 0.5 milliLiter(s) IntraMuscular once    chlorhexidine 0.12% Liquid 15 milliLiter(s) Oral Mucosa every 12 hours  chlorhexidine 2% Cloths 1 Application(s) Topical daily  chlorhexidine 4% Liquid 1 Application(s) Topical <User Schedule>  lidocaine   Patch 1 Patch Transdermal daily    CRRT Treatment    <Continuous>  PrismaSATE Dialysate BGK 4 / 2.5 5000 milliLiter(s) CRRT <Continuous>  PrismaSOL Filtration BGK 4 / 2.5 5000 milliLiter(s) CRRT <Continuous>  PrismaSOL Filtration BGK 4 / 2.5 5000 milliLiter(s) CRRT <Continuous>  sevelamer carbonate 800 milliGRAM(s) Oral three times a day        LABS:                        8.3    19.26 )-----------( 44       ( 10 Nov 2020 07:27 )             26.1     Hgb Trend: 8.3<--, 8.3<--, 9.2<--, 9.1<--, 7.7<--  11-10    137  |  94<L>  |  43<H>  ----------------------------<  103<H>  4.5   |  17<L>  |  5.98<H>    Ca    9.4      10 Nov 2020 07:27  Phos  6.3     11-10  Mg     2.2     11-10    TPro  6.0  /  Alb  3.3  /  TBili  12.9<H>  /  DBili  x   /  AST  82<H>  /  ALT  18  /  AlkPhos  82  11-10    Creatinine Trend: 5.98<--, 6.10<--, 6.89<--, 5.82<--, 4.14<--, 5.97<--  PT/INR - ( 10 Nov 2020 07:27 )   PT: 41.2 sec;   INR: 3.65 ratio         PTT - ( 10 Nov 2020 07:27 )  PTT:>200.0 sec    Arterial Blood Gas:  11-10 @ 08:32  7.11/60/100/18/95/-10.8  ABG lactate: --  Arterial Blood Gas:  11-10 @ 07:24  7.29/41/105/19/98/-6.4  ABG lactate: --  Arterial Blood Gas:  11-10 @ 00:36  7.35/36/65/20/90/-5.0  ABG lactate: --  Arterial Blood Gas:  11-09 @ 23:02  7.34/38/74/20/93/-4.9  ABG lactate: --    Venous Blood Gas:  11-10 @ 08:17  7.12/66/26/21/22  VBG Lactate: 10.8        MICROBIOLOGY:    Patient is a 40y old  Male who presents with a chief complaint of hypoxic respiratory failure. (10 Nov 2020 08:17)      SUBJECTIVE/OVERNIGHT EVENTS:  - Patient was transferred to MICU overnight after RRT was called for worsening respiratory failure. He was given 1u platelets and started on levophed before undergoing paracentesis which drained approximately 6L of fluid.   - Intubated this morning for worsening hypoxic hypercapnic respiratory failure. Increasing pressor requirements.  - Seen and examined at bedside this morning. Sedated and intubated.   - Unable to assess ROS.  - Plan for CVVH and thoracentesis later today.      OBJECTIVE:  ICU Vital Signs Last 24 Hrs  T(C): 36.4 (10 Nov 2020 04:00), Max: 36.7 (09 Nov 2020 10:20)  T(F): 97.5 (10 Nov 2020 04:00), Max: 98 (09 Nov 2020 10:20)  HR: 134 (10 Nov 2020 08:45) (88 - 134)  BP: 78/41 (10 Nov 2020 08:45) (74/39 - 113/59)  BP(mean): 57 (10 Nov 2020 08:45) (36 - 79)  ABP: --  ABP(mean): --  RR: 20 (10 Nov 2020 08:45) (17 - 35)  SpO2: 100% (10 Nov 2020 08:45) (89% - 100%)    Mode: AC/ CMV (Assist Control/ Continuous Mandatory Ventilation), RR (machine): 26, TV (machine): 600, FiO2: 100, PEEP: 8, ITime: 0.6, MAP: 11, PIP: 38    11-09 @ 07:01  -  11-10 @ 07:00  --------------------------------------------------------  IN: 1471.2 mL / OUT: 6500 mL / NET: -5028.8 mL      CAPILLARY BLOOD GLUCOSE  POCT Blood Glucose.: 106 mg/dL (10 Nov 2020 05:29)      PHYSICAL EXAM:  GENERAL: lying in bed, intubated  EYES: +icertic sclera, EOMI  CHEST/LUNG: CTAB; no crackles, rhonchi, or wheezes  HEART: Regular rate and rhythm, normal S1 and S2  ABDOMEN: distended, +bowel sounds  EXTREMITIES:  2+ peripheral pulses b/l, No clubbing, cyanosis, or edema  NEUROLOGY: sedated  SKIN: +jaundice, warm, dry      LINES:  - R [facility] MEDICATIONS:  piperacillin/tazobactam IVPB. 3.375 Gram(s) IV Intermittent once  piperacillin/tazobactam IVPB.. 3.375 Gram(s) IV Intermittent every 8 hours  vancomycin  IVPB 1000 milliGRAM(s) IV Intermittent once  midodrine 30 milliGRAM(s) Oral every 8 hours  norepinephrine Infusion 0.05 MICROgram(s)/kG/Min IV Continuous <Continuous>  phenylephrine    Infusion 0.1 MICROgram(s)/kG/Min IV Continuous <Continuous>  vasopressin Infusion 0.02 Unit(s)/Min IV Continuous <Continuous>  cisatracurium Infusion 3 MICROgram(s)/kG/Min IV Continuous <Continuous>  fentaNYL   Infusion... 0.5 MICROgram(s)/kG/Hr IV Continuous <Continuous>  propofol Infusion 10 MICROgram(s)/kG/Min IV Continuous <Continuous>  traMADol 25 milliGRAM(s) Oral every 8 hours PRN  pantoprazole  Injectable 40 milliGRAM(s) IV Push two times a day  polyethylene glycol 3350 17 Gram(s) Oral once  albumin human 25% IVPB 100 milliLiter(s) IV Intermittent every 6 hours  folic acid 1 milliGRAM(s) Oral daily  multivitamin 1 Tablet(s) Oral daily  thiamine IVPB 500 milliGRAM(s) IV Intermittent daily  epoetin chance-epbx (RETACRIT) Injectable 45742 Unit(s) IV Push <User Schedule>  influenza   Vaccine 0.5 milliLiter(s) IntraMuscular once    chlorhexidine 0.12% Liquid 15 milliLiter(s) Oral Mucosa every 12 hours  chlorhexidine 2% Cloths 1 Application(s) Topical daily  chlorhexidine 4% Liquid 1 Application(s) Topical <User Schedule>  lidocaine   Patch 1 Patch Transdermal daily    CRRT Treatment    <Continuous>  PrismaSATE Dialysate BGK 4 / 2.5 5000 milliLiter(s) CRRT <Continuous>  PrismaSOL Filtration BGK 4 / 2.5 5000 milliLiter(s) CRRT <Continuous>  PrismaSOL Filtration BGK 4 / 2.5 5000 milliLiter(s) CRRT <Continuous>  sevelamer carbonate 800 milliGRAM(s) Oral three times a day        LABS:                        8.3    19.26 )-----------( 44       ( 10 Nov 2020 07:27 )             26.1     Hgb Trend: 8.3<--, 8.3<--, 9.2<--, 9.1<--, 7.7<--  11-10    137  |  94<L>  |  43<H>  ----------------------------<  103<H>  4.5   |  17<L>  |  5.98<H>    Ca    9.4      10 Nov 2020 07:27  Phos  6.3     11-10  Mg     2.2     11-10    TPro  6.0  /  Alb  3.3  /  TBili  12.9<H>  /  DBili  x   /  AST  82<H>  /  ALT  18  /  AlkPhos  82  11-10    Creatinine Trend: 5.98<--, 6.10<--, 6.89<--, 5.82<--, 4.14<--, 5.97<--  PT/INR - ( 10 Nov 2020 07:27 )   PT: 41.2 sec;   INR: 3.65 ratio         PTT - ( 10 Nov 2020 07:27 )  PTT:>200.0 sec    Arterial Blood Gas:  11-10 @ 08:32  7.11/60/100/18/95/-10.8  ABG lactate: --  Arterial Blood Gas:  11-10 @ 07:24  7.29/41/105/19/98/-6.4  ABG lactate: --  Arterial Blood Gas:  11-10 @ 00:36  7.35/36/65/20/90/-5.0  ABG lactate: --  Arterial Blood Gas:  11-09 @ 23:02  7.34/38/74/20/93/-4.9  ABG lactate: --    Venous Blood Gas:  11-10 @ 08:17  7.12/66/26/21/22  VBG Lactate: 10.8        MICROBIOLOGY:

## 2020-11-10 NOTE — PROGRESS NOTE ADULT - ASSESSMENT
h40 year old man with hx of decompensated EtOH cirrhosis (Dx'd 7/2020) complicated by gastric variceal bleeding (7/2020) s/p 34u pRBC, IR embolization and blakemore balloon, ESRD on HD (M/W/F), bladder rupture 2/2 fall s/p ex-lap in 2019, hemorrhoids, presented to OSH for increased abdominal distention and abdominal pain x3 days  Pt was admitted to the floor and started on ceftriaxone 2g daily for empiric treatment of SBP. CT abd/pelvis 11/04 demonstrated large volume ascites and large R pleural effusion with near complete collapse of R lung c/f hepatic hydrothorax. He was continuously hypotensive to the 80s and started on midodrine 10mg tid. IR was consulted for paracentesis but deferred large volume paracentesis due to hypotension with SBPs in 70s and instead performed a diagnostic paracentesis on 11/04. Therapeutic paracentesis was planned for 11/05 but was ultimately deferred due to hypotension with SBP in 80s and MAP 57.     In the MICU, Received large volume paracentesis 11/5, ~7L removed. Pt was placed on ceftriaxone and fluconazole as ppx for SBP. He was continued on midodrine, protonix, thiamine. Midodrine was increased from 20mg to 30mg TID on day of transfer. He was refusing lactulose so was started on miralax q2H. He had 3 BM per day. His mental status improved with stable ammonia level. He received MWF HD. Received Retacrit 10K units with HD. No episodes of bleeding in MICU. 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 Thoracentesis was deferred given pt's clinical status. He was anemic with hepatic coagulopathy, recevied 1u pRBC and FFP 11/04, FFP x1 11/05 before paracentesis, given Cry x 5 u as well. Received vitamin K 10mg IV x 3 days. Now patient with RRT and intubated for hypoxic RF secondary to hepatic hydrothorax  He was accepted to [facility] for liver transplantation.     Impression:  # Acute hypoxic respiratory failure: Intubated. secondary to large R pleural effusion with near complete collapse of R lung likely 2/2 hepatic hydrothorax.   # Hepatic Encephelopathy: was improved but now sedated in ICU  # Decompensated EtOH Cirrhosis - MELD-Na: 40 (11/5/2020)  HE: sedated in ICU  Ascites: was large on Exam and TTP, s/p para with 6.5L removed. Also s/p para on 11/5 with 7L removed  HCC: No lesions on MRI 8/2020  Varices: Hx of gastric varices s/p IR embolization, Grade I esophageal varices seen 9/2020  # ESRD on hemodialysis   # Anemia: possible secondary to ESRD +/- slow GI bleed. Has hx of varices  # Hypotension: likely driven by naroctics and dilaudid use    Recommendation:  - will need thoracentesis to alleviate hydrothorax and improve pulmonary status  - continue with dilaysis MWF  - Continue with Abx per ID  - pressors support for hypotension  - avoid narcotics  - Continue with ppi daily  - check Coags, CMP, CBC while inpatient  - Awaiting transfer to [place] accepted for K transplant evaluation    - supportive care

## 2020-11-10 NOTE — PROCEDURE NOTE - NSPROCDETAILS_GEN_ALL_CORE
ultrasound assessment of effusion (size)/Seldinger technique/location identified, draped/prepped, sterile technique used, needle inserted/introduced/ultrasound assessment of effusion (localization)

## 2020-11-10 NOTE — PROCEDURE NOTE - NSPROCDETAILS_GEN_ALL_CORE
connected to a pressurized flush line/all materials/supplies accounted for at end of procedure/location identified, draped/prepped, sterile technique used, needle inserted/introduced/positive blood return obtained via catheter/sutured in place/hemostasis with direct pressure, dressing applied/Seldinger technique

## 2020-11-11 NOTE — CHART NOTE - NSCHARTNOTEFT_GEN_A_CORE
BRONCHOSCOPY NOTE    Indication: Evaluation prior to organ transplant    Operators: Dae Hyeon Kim, Justina Hessel    Pre-op Dx: Respiratory failure    History: Cirrhosis    Preop medications: nimbex     Findings:  Bronchoscope inserted through ETT. Both bronchial trees were examined down to the level of the subsegments. In the proximal airway in the left were some thick bilious secretion which was evacuated via bronchoscope. Distal airways were patient without secretions and absent of any endobronchial lesions. Mucosa appeared normal. Bronchial washings were taken from the LLL. ETT noted to be in good position.  Bronchoscope then withdrawn from ETT.    Specimens:   Gram stain  Bronchial cultures  Fungal cultures and KOH stain sent    Dae Hyeon Kim [id number]  Pulmonary Critical Care Fellow  Pager [phone]

## 2020-11-11 NOTE — PROGRESS NOTE ADULT - ASSESSMENT
39 yo M w/ PMH of EtOH cirrhosis c/b gastric variceal bleeding in July 2020 s/p 34u pRBC, IR embolization, and blakemore balloon, ESRD on HD (M/W/F), bladder rupture 2/2 fall s/p ex-lap in 2019, EtOH withdrawal seizures, and hemorrhoids admitted for decompensated liver cirrhosis and large volume ascites with concern for SBP. Transferred to MICU for pressor supported paracentesis with transfer back to floor with plan for tx to [facility] for liver and kidney tx eval; now admitted back to MICU and intubated for hypoxic respiratory failure likely 2/2 fluid overload.       Plan:  #Neuro  - Sedated on propofol, fentanyl, ketamine, and versed; previously A&Ox3  - Pt w/ history of hepatic encephalopathy; ammonia level currently acceptable (33), c/w miralax q2h  - CT head negative for acute pathology    #Respiratory  - Intubated for hypoxic hypercapnic respiratory failure; ABG demonstrating metabolic and respiratory acidosis  - C/w fentanyl and nimbex, f/u repeat ABG  - CT 11/04 demonstrating large R pleural effusion with near complete collapse of R lung likely 2/2 hepatic hydrothorax  - Will give 1u platelets and FFP; plan for thoracentesis later today due to worsening pulmonary status    #CV  - Hypotension 2/2 chronic multiorgan failure  - Increasing pressor requirements this morning; now on levophed, phenylephrine, and vasopressin  - Start 25% albumin q6hr to help maintain oncotic pressure  - Giving stress dose steroids    #GI  - Decompensated alcoholic cirrhosis with ascites and hx gastric varices s/p IR embolization + known esophageal varices  - s/p diagnostic paracentesis 11/04 with 160 PMNs, not meeting criteria for SBP  - s/p large volume paracentesis 11/05 with ~7L removed  - s/p therapeutic paracentesis 11/09 with ~6L removed  - c/w 500mg thiamine IV qd, protonix bid    #Renal  - ESRD on HD  - Last HD 11/06; unable to tolerate on 11/09 due to shortness of breath  - Started on CVVHDF today  - c/w Renvela 800 mg TID    #ID  - Afebrile with new leukocytosis  - Start meropenem + vancomycin today for empiric coverage  - Blood cx drawn in OSH ([facility]) on 10/31 grew E. coli in aerobic bottle, pansensitive  - Blood cx 11/02 with NGTD  - Blood cx 11/09 pending  - 1 ascites culture negative, still pending    #Heme  - Hepatic coagulopathy + ?acute blood loss anemia  - No obvious source of loss but will monitor considering patient had hx of life-threatening bleed  - Received 1u platelets overnight; giving additional 1u platelets + FFP before thoracentesis  - c/w Retacrit 10K units with HD  - DVT ppx: SCDs    #Endo  - Found to be hypoglycemic to 63 during RRT on 11/09, s/p 1x D50  - Will continue to monitor with q4 POCT 41 yo M w/ PMH of EtOH cirrhosis c/b gastric variceal bleeding in July 2020 s/p 34u pRBC, IR embolization, and blakemore balloon, ESRD on HD (M/W/F), bladder rupture 2/2 fall s/p ex-lap in 2019, EtOH withdrawal seizures, and hemorrhoids admitted for decompensated liver cirrhosis and large volume ascites with concern for SBP. Transferred to MICU for pressor supported paracentesis with transfer back to floor with plan for tx to [facility] for liver and kidney tx eval; now admitted back to MICU and intubated for hypoxic respiratory failure likely 2/2 fluid overload.       Plan:  #Neuro  - Sedated on fentanyl; will wean off today to assess mental status  - Pupils are newly fixed and dilated today with absent pupillary or vestibulo-ocular reflex, c/f possible new bleed in setting of coagulopathy    #Respiratory  - Intubated for hypoxic hypercapnic respiratory failure; ABG demonstrating uncompensated metabolic acidosis  - Increased TV to 650 to try to help with compensation for metabolic acidosis  - s/p thoracentesis 11/11 for R pleural effusion draining 1.8L bloody fluid; repeat CXR demonstrates re-expansion of R lung    #CV  - Hypotension 2/2 septic/cardiogenic shock in setting of chronic multiorgan failure  - C/w levophed, phenylephrine, and vasopressin  - C/w 25% albumin q6hr to help maintain oncotic pressure  - Giving stress dose steroids    #GI  - Decompensated alcoholic cirrhosis with ascites and hx gastric varices s/p IR embolization + known esophageal varices  - s/p diagnostic paracentesis 11/04 with 160 PMNs, not meeting criteria for SBP  - s/p large volume paracentesis 11/05 with ~7L removed  - s/p therapeutic paracentesis 11/09 with ~6L removed  - c/w 500mg thiamine IV qd, protonix bid    #Renal  - ESRD on HD  - Last HD 11/06; unable to tolerate on 11/09 due to shortness of breath  - c/w CVVHDF today, on -100cc/hr  - c/w Renvela 800 mg TID    #ID  - Afebrile with new leukocytosis  - c/w meropenem for empiric coverage  - Blood cx drawn in OSH ([facility]) on 10/31 grew E. coli in aerobic bottle, pansensitive  - Blood cx 11/02 with NGTD  - Blood cx 11/09 pending  - 1 ascites culture negative, still pending    #Heme  - Hepatic coagulopathy + ?acute blood loss anemia  - Gave 2u platelets and 1u FFP yesterday; has not been responsive to blood products  - DVT ppx: SCDs    #Endo  - C/w D20 @ 40 ml/hr  - Will continue to monitor with q4 POCT    #GOC  - Family has clear understanding of the severity of the patient's condition and poor prognosis. Will continue to provide support. 39 yo M w/ PMH of EtOH cirrhosis c/b gastric variceal bleeding in July 2020 s/p 34u pRBC, IR embolization, and blakemore balloon, ESRD on HD (M/W/F), bladder rupture 2/2 fall s/p ex-lap in 2019, EtOH withdrawal seizures, and hemorrhoids admitted for decompensated liver cirrhosis and large volume ascites with concern for SBP. Transferred to MICU for pressor supported paracentesis with transfer back to floor with plan for tx to [facility] for liver and kidney tx eval; now admitted back to MICU and intubated for hypoxic respiratory failure likely 2/2 fluid overload.       Plan:  #Neuro  - Sedated on fentanyl; will wean off today to assess mental status  - Pupils are newly fixed and dilated today with absent pupillary or vestibulo-ocular reflex, c/f possible new bleed in setting of coagulopathy    #Respiratory  - Intubated for hypoxic hypercapnic respiratory failure; ABG demonstrating uncompensated metabolic acidosis  - Increased TV to 650 to try to help with compensation for metabolic acidosis  - s/p thoracentesis 11/11 for R pleural effusion draining 1.8L bloody fluid; repeat CXR demonstrates re-expansion of R lung    #CV  - Hypotension 2/2 septic/cardiogenic shock in setting of chronic multiorgan failure  - C/w levophed, phenylephrine, and vasopressin  - C/w 25% albumin q6hr to help maintain oncotic pressure  - Giving stress dose steroids    #GI  - Decompensated alcoholic cirrhosis with ascites and hx gastric varices s/p IR embolization + known esophageal varices  - s/p diagnostic paracentesis 11/04 with 160 PMNs, not meeting criteria for SBP  - s/p large volume paracentesis 11/05 with ~7L removed  - s/p therapeutic paracentesis 11/09 with ~6L removed with 12 PMNS, not meeting criteria for SBP  - c/w 500mg thiamine IV qd, protonix bid    #Renal  - ESRD on HD  - Last HD 11/06; unable to tolerate on 11/09 due to shortness of breath  - c/w CVVHDF today, on -100cc/hr  - c/w Renvela 800 mg TID    #ID  - Afebrile with new leukocytosis  - c/w meropenem for empiric coverage  - Blood cx drawn in OSH ([facility]) on 10/31 grew E. coli in aerobic bottle, pansensitive  - Blood cx 11/02 with NGTD  - Blood cx 11/10 pending  - Peritoneal fluid cx 11/06 NGTD    #Heme  - Hepatic coagulopathy + ?acute blood loss anemia  - Gave 2u platelets and 1u FFP yesterday; has not been responsive to blood products  - DVT ppx: SCDs    #Endo  - C/w D20 @ 40 ml/hr  - Will continue to monitor with q4 POCT    #GOC  - Family has clear understanding of the severity of the patient's condition and poor prognosis. Will continue to provide support. 41 yo M w/ PMH of EtOH cirrhosis c/b gastric variceal bleeding in July 2020 s/p 34u pRBC, IR embolization, and blakemore balloon, ESRD on HD (M/W/F), bladder rupture 2/2 fall s/p ex-lap in 2019, EtOH withdrawal seizures, and hemorrhoids admitted for decompensated liver cirrhosis and large volume ascites with concern for SBP. Transferred to MICU for pressor supported paracentesis with transfer back to floor with plan for tx to [facility] for liver and kidney tx eval; now admitted back to MICU and intubated for hypoxic respiratory failure likely 2/2 fluid overload.       Plan:  #Neuro  - Sedated on fentanyl; will wean off today to assess mental status  - Pupils are newly fixed and dilated today with absent pupillary or vestibulo-ocular reflex, c/f possible new bleed in setting of coagulopathy    #Respiratory  - Intubated for hypoxic hypercapnic respiratory failure; ABG demonstrating uncompensated metabolic acidosis  - Increased TV to 650 to try to help with compensation for metabolic acidosis  - s/p thoracentesis 11/10 for R pleural effusion draining 1.8L bloody fluid; repeat CXR demonstrated re-expansion of R lung    #CV  - Hypotension 2/2 septic/cardiogenic shock in setting of chronic multiorgan failure  - C/w levophed, phenylephrine, and vasopressin  - C/w 25% albumin q6hr to help maintain oncotic pressure  - Giving stress dose steroids    #GI  - Decompensated alcoholic cirrhosis with ascites and hx gastric varices s/p IR embolization + known esophageal varices  - s/p diagnostic paracentesis 11/04 with 160 PMNs, not meeting criteria for SBP  - s/p large volume paracentesis 11/05 with ~7L removed  - s/p therapeutic paracentesis 11/09 with ~6L removed with 12 PMNS, not meeting criteria for SBP  - c/w 500mg thiamine IV qd, protonix bid    #Renal  - ESRD on HD  - Last HD 11/06; unable to tolerate on 11/09 due to shortness of breath  - c/w CVVHDF today, on -100cc/hr    #ID  - Afebrile with new leukocytosis  - c/w meropenem for empiric coverage  - Blood cx drawn in OSH ([facility]) on 10/31 grew E. coli in aerobic bottle, pansensitive  - Blood cx 11/02 with NGTD  - Blood cx 11/10 pending  - Peritoneal fluid cx 11/06 NGTD    #Heme  - Hepatic coagulopathy + ?acute blood loss anemia  - Gave 2u platelets and 1u FFP yesterday; has not been responsive to blood products  - DVT ppx: SCDs    #Endo  - C/w D20 @ 40 ml/hr  - Will continue to monitor closely with q4 POCT    #GOC  - Family has clear understanding of the severity of the patient's condition and poor prognosis. Will continue to provide support.

## 2020-11-11 NOTE — PROGRESS NOTE ADULT - SUBJECTIVE AND OBJECTIVE BOX
[facility] DIVISION OF KIDNEY DISEASES AND HYPERTENSION -- FOLLOW UP NOTE  --------------------------------------------------------------------------------  HPI: 40-year-old male with  ESRD on HD MWF,  traumatic bladder rupture 2/2 fall s/p ex-lap in 2019, ETOH cirrhosis c/b gastric varices, recent admission (7/2020) for hemorrhagic shock 2/2 variceal bleeding, acute renal failure requiring HD, and ETOH withdrawal seizure, recently admitted in 9/2020 for GIB who presented to [facility] on 11/1/20 for worsening abdominal pain. Pt. went for HD on 11/9/20 but only tolerated 1 hour due to complaints of SOB. Pt. had RRT around midnight on 11/10/20, was intubated and transferred to MICU. Pt. underwent  paracentesis yesterday with 6.5L removed. Nephrology consulted for management of ESRD and HD. Last full HD on 11/6/20 via RIJ tunneled HD catheter. Pt. initiated on CRRT yesteray (11/10/20).    Pt. seen and examined this AM. Pt. sedated and intubated, on increasing IV vasopressor support. As per RN, pt. with non reactive pupils this AM.    PAST HISTORY  --------------------------------------------------------------------------------  No significant changes to PMH, PSH, FHx, SHx, unless otherwise noted    ALLERGIES & MEDICATIONS  --------------------------------------------------------------------------------  Allergies    No Known Allergies    Intolerances    Standing Inpatient Medications  albumin human 25% IVPB 100 milliLiter(s) IV Intermittent every 6 hours  CRRT Treatment    <Continuous>  dextrose 20%. 500 milliLiter(s) IV Continuous <Continuous>  epoetin chance-epbx (RETACRIT) Injectable 69389 Unit(s) IV Push <User Schedule>  fentaNYL   Infusion... 0.5 MICROgram(s)/kG/Hr IV Continuous <Continuous>  folic acid 1 milliGRAM(s) Oral daily  hydrocortisone sodium succinate Injectable 100 milliGRAM(s) IV Push every 8 hours  influenza   Vaccine 0.5 milliLiter(s) IntraMuscular once  lidocaine   Patch 1 Patch Transdermal daily  meropenem  IVPB      meropenem  IVPB 500 milliGRAM(s) IV Intermittent every 8 hours  midodrine 30 milliGRAM(s) Oral every 8 hours  multivitamin 1 Tablet(s) Oral daily  norepinephrine Infusion 0.05 MICROgram(s)/kG/Min IV Continuous <Continuous>  pantoprazole  Injectable 40 milliGRAM(s) IV Push two times a day  phenylephrine    Infusion 0.1 MICROgram(s)/kG/Min IV Continuous <Continuous>  PrismaSATE Dialysate BGK 4 / 2.5 5000 milliLiter(s) CRRT <Continuous>  PrismaSOL Filtration BGK 4 / 2.5 5000 milliLiter(s) CRRT <Continuous>  PrismaSOL Filtration BGK 4 / 2.5 5000 milliLiter(s) CRRT <Continuous>  sevelamer carbonate 800 milliGRAM(s) Oral three times a day  thiamine IVPB 500 milliGRAM(s) IV Intermittent daily  vasopressin Infusion 0.04 Unit(s)/Min IV Continuous <Continuous>    REVIEW OF SYSTEMS  --------------------------------------------------------------------------------  Unable to obtain due to medical condition    VITALS/PHYSICAL EXAM  --------------------------------------------------------------------------------  T(C): 37.5 (11-11-20 @ 08:00), Max: 37.5 (11-11-20 @ 07:15)  HR: 136 (11-11-20 @ 08:30) (125 - 140)  BP: 105/57 (11-11-20 @ 08:30) (87/32 - 108/55)  RR: 26 (11-11-20 @ 08:00) (20 - 32)  SpO2: 100% (11-11-20 @ 08:00) (97% - 100%)  Wt(kg): --  Height (cm): 185.4 (11-10-20 @ 00:15)  Weight (kg): 74.7 (11-10-20 @ 00:15)  BMI (kg/m2): 21.7 (11-10-20 @ 00:15)  BSA (m2): 1.98 (11-10-20 @ 00:15)    11-10-20 @ 07:01  -  11-11-20 @ 07:00  --------------------------------------------------------  IN: 32345.4 mL / OUT: 63510 mL / NET: -2565.6 mL    11-11-20 @ 07:01  -  11-11-20 @ 08:48  --------------------------------------------------------  IN: 543.6 mL / OUT: 797 mL / NET: -253.4 mL    Physical Exam:  	Gen: intubated/sedated  	HEENT: icterus  	Pulm: CTA B/L  	CV: S1S2  	Abd: Soft, ascites+   	Ext: trace LE edema B/L  	Neuro: intubated/sedated  	Skin: Warm and dry  	Vascular access: RIJ tunneled HD catheter +. no bleeding    LABS/STUDIES  --------------------------------------------------------------------------------              6.6    18.54 >-----------<  35       [11-11-20 @ 07:26]              21.1     135  |  100  |  15  ----------------------------<  283      [11-11-20 @ 07:26]  4.6   |  11  |  2.30        Ca     8.3     [11-11-20 @ 07:26]      Mg     2.2     [11-11-20 @ 07:26]      Phos  3.9     [11-11-20 @ 07:26]    Creatinine Trend:  SCr 2.30 [11-11 @ 07:26]  SCr 2.40 [11-11 @ 03:51]  SCr 2.96 [11-11 @ 00:08]  SCr 3.42 [11-10 @ 20:53]  SCr 4.19 [11-10 @ 16:08]

## 2020-11-11 NOTE — PROGRESS NOTE ADULT - PROBLEM SELECTOR PLAN 2
Patient with anemia in the setting of ESRD with history of GI bleed. Hemoglobin low at 6.6 today. Transfuse PRBCs PRN. Monitor hemoglobin.

## 2020-11-11 NOTE — PROGRESS NOTE ADULT - SUBJECTIVE AND OBJECTIVE BOX
Perez Villasenor MD, PGY-2  [facility] Contact Information  Pager: [phone] / [facility]: [phone]  ---------------------------------------------------------------------------------------------  Patient is a 40y old  Male who presents with a chief complaint of HRS (11 Nov 2020 08:48)    SUBJECTIVE / OVERNIGHT EVENTS: Hypoglycemic overnight, s/p multiple pushes of D50.      MEDICATIONS  (STANDING):  albumin human 25% IVPB 100 milliLiter(s) IV Intermittent every 6 hours  chlorhexidine 0.12% Liquid 15 milliLiter(s) Oral Mucosa every 12 hours  chlorhexidine 2% Cloths 1 Application(s) Topical daily  chlorhexidine 4% Liquid 1 Application(s) Topical <User Schedule>  CRRT Treatment    <Continuous>  dextrose 20%. 500 milliLiter(s) (40 mL/Hr) IV Continuous <Continuous>  fentaNYL   Infusion... 0.5 MICROgram(s)/kG/Hr (1.87 mL/Hr) IV Continuous <Continuous>  hydrocortisone sodium succinate Injectable 100 milliGRAM(s) IV Push every 8 hours  influenza   Vaccine 0.5 milliLiter(s) IntraMuscular once  meropenem  IVPB 1000 milliGRAM(s) IV Intermittent every 8 hours  norepinephrine Infusion 5 MICROgram(s)/kG/Min (350 mL/Hr) IV Continuous <Continuous>  pantoprazole  Injectable 40 milliGRAM(s) IV Push two times a day  phenylephrine    Infusion 0.1 MICROgram(s)/kG/Min (1.4 mL/Hr) IV Continuous <Continuous>  PrismaSATE Dialysate BGK 4 / 2.5 5000 milliLiter(s) (1200 mL/Hr) CRRT <Continuous>  PrismaSOL Filtration BGK 4 / 2.5 5000 milliLiter(s) (1000 mL/Hr) CRRT <Continuous>  PrismaSOL Filtration BGK 4 / 2.5 5000 milliLiter(s) (200 mL/Hr) CRRT <Continuous>  thiamine IVPB 500 milliGRAM(s) IV Intermittent daily  vasopressin Infusion 0.04 Unit(s)/Min (2.4 mL/Hr) IV Continuous <Continuous>    MEDICATIONS  (PRN):      CAPILLARY BLOOD GLUCOSE      POCT Blood Glucose.: 82 mg/dL (11 Nov 2020 06:50)  POCT Blood Glucose.: 62 mg/dL (11 Nov 2020 06:30)  POCT Blood Glucose.: 88 mg/dL (11 Nov 2020 05:18)  POCT Blood Glucose.: 155 mg/dL (11 Nov 2020 02:16)  POCT Blood Glucose.: 39 mg/dL (11 Nov 2020 01:52)  POCT Blood Glucose.: 110 mg/dL (10 Nov 2020 22:56)  POCT Blood Glucose.: 168 mg/dL (10 Nov 2020 21:17)  POCT Blood Glucose.: 88 mg/dL (10 Nov 2020 20:57)    I&O's Summary    10 Nov 2020 07:01  -  11 Nov 2020 07:00  --------------------------------------------------------  IN: 56123.4 mL / OUT: 97988 mL / NET: -2565.6 mL    11 Nov 2020 07:01  -  11 Nov 2020 10:30  --------------------------------------------------------  IN: 1087.2 mL / OUT: 1433 mL / NET: -345.8 mL    PHYSICAL EXAM:  Vital Signs Last 24 Hrs  T(C): 37.4 (11 Nov 2020 10:00), Max: 37.5 (11 Nov 2020 07:15)  T(F): 99.3 (11 Nov 2020 10:00), Max: 99.5 (11 Nov 2020 07:15)  HR: 133 (11 Nov 2020 10:15) (125 - 140)  BP: 113/62 (11 Nov 2020 10:00) (87/32 - 113/62)  BP(mean): 81 (11 Nov 2020 10:00) (49 - 81)  RR: 28 (11 Nov 2020 09:15) (20 - 32)  SpO2: 100% (11 Nov 2020 09:25) (97% - 100%)    PHYSICAL EXAM:  GENERAL: intubated  EYES: fixed dilated pupils, icteric  CHEST/LUNG: CTAB; no crackles, rhonchi, or wheezes  HEART: Regular rate and rhythm, normal S1 and S2  ABDOMEN: distended, +bowel sounds  EXTREMITIES:  2+ peripheral pulses b/l, No clubbing, cyanosis, or edema  NEUROLOGY: nonresponsive  SKIN: +jaundice, warm, dry    LABS:             6.6    18.54 )-----------( 35       ( 11 Nov 2020 07:26 )             21.1     11-11    135  |  100  |  15  ----------------------------<  283<H>  4.6   |  11<L>  |  2.30<H>    Ca    8.3<L>      11 Nov 2020 07:26  Phos  3.9     11-11  Mg     2.2     11-11    TPro  6.3  /  Alb  4.0  /  TBili  11.8<H>  /  DBili  x   /  AST  561<H>  /  ALT  77<H>  /  AlkPhos  89  11-11    PT/INR - ( 11 Nov 2020 07:26 )   PT: 53.8 sec;   INR: 4.83 ratio         PTT - ( 11 Nov 2020 07:26 )  PTT:96.0 sec  CARDIAC MARKERS ( 10 Nov 2020 00:35 )  x     / x     / 62 U/L / x     / 4.2 ng/mL    Culture - Fungal, Body Fluid (collected 10 Nov 2020 16:34)  Source: .Body Fluid Pleural Fluid  Preliminary Report (11 Nov 2020 09:16):    Testing in progress    Culture - Body Fluid with Gram Stain (collected 10 Nov 2020 16:34)  Source: .Body Fluid Pleural Fluid  Gram Stain (10 Nov 2020 22:30):    Rare polymorphonuclear leukocytes per low power field    No organisms seen per oil power field    Culture - Fungal, Body Fluid (collected 10 Nov 2020 09:28)  Source: .Body Fluid Peritoneal Fluid  Preliminary Report (11 Nov 2020 08:48):    Testing in progress    Culture - Body Fluid with Gram Stain (collected 10 Nov 2020 09:28)  Source: .Body Fluid Peritoneal Fluid  Gram Stain (10 Nov 2020 11:42):    polymorphonuclear leukocytes seen    No organisms seen    by cytocentrifuge  Preliminary Report (11 Nov 2020 09:50):    No growth    Culture - Blood (collected 10 Nov 2020 05:25)  Source: .Blood Blood  Preliminary Report (11 Nov 2020 06:01):    No growth to date.    Culture - Blood (collected 10 Nov 2020 05:25)  Source: .Blood Blood  Preliminary Report (11 Nov 2020 06:01):    No growth to date.        RADIOLOGY & ADDITIONAL TESTS:  Results Reviewed:   Imaging Personally Reviewed:  Electrocardiogram Personally Reviewed:    COORDINATION OF CARE:  Care Discussed with Consultants/Other Providers [Y/N]:  Prior or Outpatient Records Reviewed [Y/N]: Perez Villasenor MD, PGY-2  [place] Contact Information  Pager: [phone] / [facility]: [phone]  ---------------------------------------------------------------------------------------------  Patient is a 40y old  Male who presents with a chief complaint of HRS (11 Nov 2020 08:48)    SUBJECTIVE / OVERNIGHT EVENTS:   - Hypoglycemic overnight, s/p 6 x D50. Currently on D20 @ 40 ml/hr.  - Seen and examined at bedside this morning. Sedated and intubated. Pupils are fixed and dilated, absent pupillary and vestibulo-ocular reflex.  - Unable to assess ROS.    MEDICATIONS  (STANDING):  albumin human 25% IVPB 100 milliLiter(s) IV Intermittent every 6 hours  chlorhexidine 0.12% Liquid 15 milliLiter(s) Oral Mucosa every 12 hours  chlorhexidine 2% Cloths 1 Application(s) Topical daily  chlorhexidine 4% Liquid 1 Application(s) Topical <User Schedule>  CRRT Treatment    <Continuous>  dextrose 20%. 500 milliLiter(s) (40 mL/Hr) IV Continuous <Continuous>  fentaNYL   Infusion... 0.5 MICROgram(s)/kG/Hr (1.87 mL/Hr) IV Continuous <Continuous>  hydrocortisone sodium succinate Injectable 100 milliGRAM(s) IV Push every 8 hours  influenza   Vaccine 0.5 milliLiter(s) IntraMuscular once  meropenem  IVPB 1000 milliGRAM(s) IV Intermittent every 8 hours  norepinephrine Infusion 5 MICROgram(s)/kG/Min (350 mL/Hr) IV Continuous <Continuous>  pantoprazole  Injectable 40 milliGRAM(s) IV Push two times a day  phenylephrine    Infusion 0.1 MICROgram(s)/kG/Min (1.4 mL/Hr) IV Continuous <Continuous>  PrismaSATE Dialysate BGK 4 / 2.5 5000 milliLiter(s) (1200 mL/Hr) CRRT <Continuous>  PrismaSOL Filtration BGK 4 / 2.5 5000 milliLiter(s) (1000 mL/Hr) CRRT <Continuous>  PrismaSOL Filtration BGK 4 / 2.5 5000 milliLiter(s) (200 mL/Hr) CRRT <Continuous>  thiamine IVPB 500 milliGRAM(s) IV Intermittent daily  vasopressin Infusion 0.04 Unit(s)/Min (2.4 mL/Hr) IV Continuous <Continuous>    MEDICATIONS  (PRN):      CAPILLARY BLOOD GLUCOSE  POCT Blood Glucose.: 82 mg/dL (11 Nov 2020 06:50)  POCT Blood Glucose.: 62 mg/dL (11 Nov 2020 06:30)  POCT Blood Glucose.: 88 mg/dL (11 Nov 2020 05:18)  POCT Blood Glucose.: 155 mg/dL (11 Nov 2020 02:16)  POCT Blood Glucose.: 39 mg/dL (11 Nov 2020 01:52)  POCT Blood Glucose.: 110 mg/dL (10 Nov 2020 22:56)  POCT Blood Glucose.: 168 mg/dL (10 Nov 2020 21:17)  POCT Blood Glucose.: 88 mg/dL (10 Nov 2020 20:57)      I&O's Summary    10 Nov 2020 07:01  -  11 Nov 2020 07:00  --------------------------------------------------------  IN: 93913.4 mL / OUT: 54879 mL / NET: -2565.6 mL    11 Nov 2020 07:01  -  11 Nov 2020 10:30  --------------------------------------------------------  IN: 1087.2 mL / OUT: 1433 mL / NET: -345.8 mL        PHYSICAL EXAM:  Vital Signs Last 24 Hrs  T(C): 37.4 (11 Nov 2020 10:00), Max: 37.5 (11 Nov 2020 07:15)  T(F): 99.3 (11 Nov 2020 10:00), Max: 99.5 (11 Nov 2020 07:15)  HR: 133 (11 Nov 2020 10:15) (125 - 140)  BP: 113/62 (11 Nov 2020 10:00) (87/32 - 113/62)  BP(mean): 81 (11 Nov 2020 10:00) (49 - 81)  RR: 28 (11 Nov 2020 09:15) (20 - 32)  SpO2: 100% (11 Nov 2020 09:25) (97% - 100%)    PHYSICAL EXAM:  GENERAL: intubated  EYES: fixed dilated pupils, icteric  CHEST/LUNG: CTAB; no crackles, rhonchi, or wheezes  HEART: Regular rate and rhythm, normal S1 and S2  ABDOMEN: distended, +bowel sounds  EXTREMITIES:  2+ peripheral pulses b/l, No clubbing, cyanosis, or edema  NEUROLOGY: nonresponsive  SKIN: +jaundice, warm, dry      LABS:             6.6    18.54 )-----------( 35       ( 11 Nov 2020 07:26 )             21.1     11-11    135  |  100  |  15  ----------------------------<  283<H>  4.6   |  11<L>  |  2.30<H>    Ca    8.3<L>      11 Nov 2020 07:26  Phos  3.9     11-11  Mg     2.2     11-11    TPro  6.3  /  Alb  4.0  /  TBili  11.8<H>  /  DBili  x   /  AST  561<H>  /  ALT  77<H>  /  AlkPhos  89  11-11    PT/INR - ( 11 Nov 2020 07:26 )   PT: 53.8 sec;   INR: 4.83 ratio         PTT - ( 11 Nov 2020 07:26 )  PTT:96.0 sec  CARDIAC MARKERS ( 10 Nov 2020 00:35 )  x     / x     / 62 U/L / x     / 4.2 ng/mL    Culture - Fungal, Body Fluid (collected 10 Nov 2020 16:34)  Source: .Body Fluid Pleural Fluid  Preliminary Report (11 Nov 2020 09:16):    Testing in progress    Culture - Body Fluid with Gram Stain (collected 10 Nov 2020 16:34)  Source: .Body Fluid Pleural Fluid  Gram Stain (10 Nov 2020 22:30):    Rare polymorphonuclear leukocytes per low power field    No organisms seen per oil power field    Culture - Fungal, Body Fluid (collected 10 Nov 2020 09:28)  Source: .Body Fluid Peritoneal Fluid  Preliminary Report (11 Nov 2020 08:48):    Testing in progress    Culture - Body Fluid with Gram Stain (collected 10 Nov 2020 09:28)  Source: .Body Fluid Peritoneal Fluid  Gram Stain (10 Nov 2020 11:42):    polymorphonuclear leukocytes seen    No organisms seen    by cytocentrifuge  Preliminary Report (11 Nov 2020 09:50):    No growth    Culture - Blood (collected 10 Nov 2020 05:25)  Source: .Blood Blood  Preliminary Report (11 Nov 2020 06:01):    No growth to date.    Culture - Blood (collected 10 Nov 2020 05:25)  Source: .Blood Blood  Preliminary Report (11 Nov 2020 06:01):    No growth to date.        RADIOLOGY & ADDITIONAL TESTS:  Results Reviewed:   Imaging Personally Reviewed:  Electrocardiogram Personally Reviewed:    COORDINATION OF CARE:  Care Discussed with Consultants/Other Providers [Y/N]:  Prior or Outpatient Records Reviewed [Y/N]:

## 2020-11-11 NOTE — DISCHARGE NOTE FOR THE EXPIRED PATIENT - HOSPITAL COURSE
39 yo M w/ PMH of EtOH cirrhosis c/b gastric variceal bleeding in July 2020 s/p 34u pRBC, IR embolization and [facility], ESRD on HD (M/W/F), bladder rupture 2/2 fall s/p ex-lap in 2019, hemorrhoids, presented to OSH for increased abdominal distention and abdominal pain x3 days. Abd pain noted to be diffuse in nature initially rated as a constant stabbing 15/10 w/ associated rib pain and wrapping around the flanks. At OSH pt was noted to be sinus tachycardic to 130s. /80, RR 24 and satting 99% on RA. Labs were notable for H/H 11.2/32.9, WBC 10.8, PLTs 116. Chemistry Na 130, K 5.5, BUN/Cr 14/4.87. AST//42, Alk phos 151 and Tbilli 10.2. He was administered Ceftriaxone 1g x1, Zofran, 1L IVF, and Dilaudid 1mg IVP which alleviated his pain. Upon encounter he now c/o the pain being positional, and intermittent rating it a 7/10. Of note, pt last underwent paracentesis 1 month ago and dialysis yesterday 10/30/20.    Patient was transferred to MICU on 11/10 for acute hypoxemic respiratory failure and worsening septic shock. Patient required max rate of phenylephrine, vasopressin, epinephrine, Na Bicarb, and norepinephrine. Initially received sedation to increase ventilation support up to Tv 550; however did not impact the clinical course. Received broad spectrum antibiotics for aspiration PNA and r/o SBP.  Patient continued to be coagulopathic and had multi organ failure. Put on CVVHD. Today, patient had fixed and dilated pupils despite being off sedation, had gag and cough reflex. However, given poor prognosis, patient's family members decided to palliatively extubate on 11/12/2020. Patient passed away at 11:45 AM on 11/12/2020.

## 2020-11-11 NOTE — PROGRESS NOTE ADULT - SUBJECTIVE AND OBJECTIVE BOX
Chief Complaint:  Patient is a 40y old  Male who presents with a chief complaint of Hypoxic respiratory failure (10 Nov 2020 09:32)      Interval Events:     Allergies:  No Known Allergies      Hospital Medications:  albumin human 25% IVPB 100 milliLiter(s) IV Intermittent every 6 hours  chlorhexidine 0.12% Liquid 15 milliLiter(s) Oral Mucosa every 12 hours  chlorhexidine 2% Cloths 1 Application(s) Topical daily  chlorhexidine 4% Liquid 1 Application(s) Topical <User Schedule>  CRRT Treatment    <Continuous>  dextrose 20%. 500 milliLiter(s) IV Continuous <Continuous>  epoetin chance-epbx (RETACRIT) Injectable 17135 Unit(s) IV Push <User Schedule>  fentaNYL   Infusion... 0.5 MICROgram(s)/kG/Hr IV Continuous <Continuous>  folic acid 1 milliGRAM(s) Oral daily  hydrocortisone sodium succinate Injectable 100 milliGRAM(s) IV Push every 8 hours  influenza   Vaccine 0.5 milliLiter(s) IntraMuscular once  lidocaine   Patch 1 Patch Transdermal daily  meropenem  IVPB      meropenem  IVPB 500 milliGRAM(s) IV Intermittent every 8 hours  midodrine 30 milliGRAM(s) Oral every 8 hours  multivitamin 1 Tablet(s) Oral daily  norepinephrine Infusion 0.05 MICROgram(s)/kG/Min IV Continuous <Continuous>  pantoprazole  Injectable 40 milliGRAM(s) IV Push two times a day  phenylephrine    Infusion 0.1 MICROgram(s)/kG/Min IV Continuous <Continuous>  PrismaSATE Dialysate BGK 4 / 2.5 5000 milliLiter(s) CRRT <Continuous>  PrismaSOL Filtration BGK 4 / 2.5 5000 milliLiter(s) CRRT <Continuous>  PrismaSOL Filtration BGK 4 / 2.5 5000 milliLiter(s) CRRT <Continuous>  sevelamer carbonate 800 milliGRAM(s) Oral three times a day  thiamine IVPB 500 milliGRAM(s) IV Intermittent daily  vasopressin Infusion 0.04 Unit(s)/Min IV Continuous <Continuous>      PMHX/PSHX:  End-stage renal disease (ESRD)    Cirrhosis, alcoholic    No pertinent past medical history    S/P exploratory laparotomy    No significant past surgical history        Family history:  FH: alpha 1 antitrypsin deficiency    No pertinent family history in first degree relatives        ROS: As per HPI, 14-point ROS negative otherwise.    General:  No wt loss, fevers, chills, night sweats, fatigue,   Eyes:  Good vision, no reported pain  ENT:  No sore throat, pain, runny nose, dysphagia  CV:  No pain, palpitations, hypo/hypertension  Resp:  No dyspnea, cough, tachypnea, wheezing  GI:  See HPI  :  No pain, bleeding, incontinence, nocturia  Muscle:  No pain, weakness  Neuro:  No weakness, tingling, memory problems  Psych:  No fatigue, insomnia, mood problems, depression  Endocrine:  No polyuria, polydipsia, cold/heat intolerance  Heme:  No petechiae, ecchymosis, easy bruisability  Skin:  No rash, edema      PHYSICAL EXAM:     Vital Signs:  Vital Signs Last 24 Hrs  T(C): 37.5 (2020 07:15), Max: 37.5 (2020 07:15)  T(F): 99.5 (2020 07:15), Max: 99.5 (2020 07:15)  HR: 137 (2020 08:00) (125 - 140)  BP: 108/55 (2020 08:00) (78/41 - 108/55)  BP(mean): 75 (2020 08:00) (49 - 77)  RR: 26 (2020 08:00) (20 - 32)  SpO2: 100% (2020 08:00) (97% - 100%)  Daily     Daily Weight in k.4 (2020 04:00)    GENERAL:  appears comfortable, no acute distress  HEENT:  NC/AT,  conjunctivae clear, sclera -anicteric  CHEST:  no increased effort  HEART:  Regular rate and rhythm  ABDOMEN:  Soft, non-tender, non-distended,  no masses ,no hepato-splenomegaly,   EXTREMITIES:  no cyanosis, clubbing or edema  SKIN:  No rash/erythema/ecchymoses/petechiae/wounds  NEURO:  Alert, oriented    LABS:                        7.3    19.22 )-----------( 44       ( 2020 03:51 )             23.5         138  |  102  |  18  ----------------------------<  109<H>  4.6   |  11<L>  |  2.40<H>    Ca    8.6      2020 03:51  Phos  4.0       Mg     2.2         TPro  6.4  /  Alb  3.9  /  TBili  12.4<H>  /  DBili  x   /  AST  554<H>  /  ALT  75<H>  /  AlkPhos  91      LIVER FUNCTIONS - ( 2020 03:51 )  Alb: 3.9 g/dL / Pro: 6.4 g/dL / ALK PHOS: 91 U/L / ALT: 75 U/L / AST: 554 U/L / GGT: x           PT/INR - ( 2020 00:08 )   PT: 44.1 sec;   INR: 3.92 ratio         PTT - ( 2020 00:08 )  PTT:74.2 sec        Imaging:             Chief Complaint:  Patient is a 40y old  Male who presents with a chief complaint of Hypoxic respiratory failure (10 Nov 2020 09:32)      Interval Events: patient unresponsive    Allergies:  No Known Allergies      Hospital Medications:  albumin human 25% IVPB 100 milliLiter(s) IV Intermittent every 6 hours  chlorhexidine 0.12% Liquid 15 milliLiter(s) Oral Mucosa every 12 hours  chlorhexidine 2% Cloths 1 Application(s) Topical daily  chlorhexidine 4% Liquid 1 Application(s) Topical <User Schedule>  CRRT Treatment    <Continuous>  dextrose 20%. 500 milliLiter(s) IV Continuous <Continuous>  epoetin chance-epbx (RETACRIT) Injectable 20002 Unit(s) IV Push <User Schedule>  fentaNYL   Infusion... 0.5 MICROgram(s)/kG/Hr IV Continuous <Continuous>  folic acid 1 milliGRAM(s) Oral daily  hydrocortisone sodium succinate Injectable 100 milliGRAM(s) IV Push every 8 hours  influenza   Vaccine 0.5 milliLiter(s) IntraMuscular once  lidocaine   Patch 1 Patch Transdermal daily  meropenem  IVPB      meropenem  IVPB 500 milliGRAM(s) IV Intermittent every 8 hours  midodrine 30 milliGRAM(s) Oral every 8 hours  multivitamin 1 Tablet(s) Oral daily  norepinephrine Infusion 0.05 MICROgram(s)/kG/Min IV Continuous <Continuous>  pantoprazole  Injectable 40 milliGRAM(s) IV Push two times a day  phenylephrine    Infusion 0.1 MICROgram(s)/kG/Min IV Continuous <Continuous>  PrismaSATE Dialysate BGK 4 / 2.5 5000 milliLiter(s) CRRT <Continuous>  PrismaSOL Filtration BGK 4 / 2.5 5000 milliLiter(s) CRRT <Continuous>  PrismaSOL Filtration BGK 4 / 2.5 5000 milliLiter(s) CRRT <Continuous>  sevelamer carbonate 800 milliGRAM(s) Oral three times a day  thiamine IVPB 500 milliGRAM(s) IV Intermittent daily  vasopressin Infusion 0.04 Unit(s)/Min IV Continuous <Continuous>      PMHX/PSHX:  End-stage renal disease (ESRD)    Cirrhosis, alcoholic    No pertinent past medical history    S/P exploratory laparotomy    No significant past surgical history        Family history:  FH: alpha 1 antitrypsin deficiency    No pertinent family history in first degree relatives        ROS: As per HPI, 14-point ROS negative otherwise.      PHYSICAL EXAM:     Vital Signs:  Vital Signs Last 24 Hrs  T(C): 37.5 (2020 07:15), Max: 37.5 (2020 07:15)  T(F): 99.5 (2020 07:15), Max: 99.5 (2020 07:15)  HR: 137 (2020 08:00) (125 - 140)  BP: 108/55 (2020 08:00) (78/41 - 108/55)  BP(mean): 75 (:) (49 - 77)  RR: 26 (:00) (20 - 32)  SpO2: 100% (2020 08:00) (97% - 100%)  Daily     Daily Weight in k.4 (2020 04:00)    GENERAL:  intubated  HEENT:  pupils dilated b/l, not reactive  CHEST:  vented  HEART:  tachycardic  ABDOMEN:  Soft, non-tender,distended  EXTREMITIES:  no cyanosis, clubbing or edema  SKIN: jaundice  NEURO:  sedated, intubated    LABS:                        7.3    19.22 )-----------( 44       ( 2020 03:51 )             23.5         138  |  102  |  18  ----------------------------<  109<H>  4.6   |  11<L>  |  2.40<H>    Ca    8.6      2020 03:51  Phos  4.0       Mg     2.2         TPro  6.4  /  Alb  3.9  /  TBili  12.4<H>  /  DBili  x   /  AST  554<H>  /  ALT  75<H>  /  AlkPhos  91      LIVER FUNCTIONS - ( 2020 03:51 )  Alb: 3.9 g/dL / Pro: 6.4 g/dL / ALK PHOS: 91 U/L / ALT: 75 U/L / AST: 554 U/L / GGT: x           PT/INR - ( 2020 00:08 )   PT: 44.1 sec;   INR: 3.92 ratio         PTT - ( 2020 00:08 )  PTT:74.2 sec        Imaging:

## 2020-11-11 NOTE — PROGRESS NOTE ADULT - PROBLEM SELECTOR PLAN 3
Pt. with hyperphosphatemia in setting of advanced CKD/ESRD. Serum phosphorus WNL. Discontinue Renvela 800 mg TID with meals as CRRT will lower serum phosphorus. Monitor serum phosphorus    If any questions, please feel free to contact me  Kehinde Coles   Nephrology Fellow  [phone]  (After 5 pm or on weekends please page the on-call fellow)

## 2020-11-11 NOTE — PROGRESS NOTE ADULT - PROBLEM SELECTOR PROBLEM 3
End-stage renal disease (ESRD)
Hepatorenal syndrome
Acute blood loss anemia
Hepatic encephalopathy
Hepatic encephalopathy
Hyperphosphatemia
Severe sepsis

## 2020-11-11 NOTE — PROGRESS NOTE ADULT - PROBLEM SELECTOR PLAN 1
Pt. with ESRD on HD three times a week (MWF). Last HD was on 11/6/20 via RIJ tunneled HD catheter. Pt. unable to tolerate HD on 11/9/20 due to SOB. Pt. had RRT early this yesterday, intubated and tranferred to MICU. Pt. currently maintained on IV pressor support. Will arrange for CVVHDF (with net negative balance) as per discussion with MICU team. Overall prognosis poor. Consider palliative consult for GOC discussion. Monitor labs. Pt. with ESRD on HD three times a week (MWF). Last HD was on 11/6/20 via RIJ tunneled HD catheter. Pt. unable to tolerate HD on 11/9/20 due to SOB. Pt. had RRT early this yesterday, intubated and transferred to MICU. Pt. currently maintained on IV pressor support. Will arrange for CVVHDF (with net negative balance) as per discussion with MICU team. Overall prognosis poor. Consider palliative consult for GOC discussion. Monitor labs.

## 2020-11-11 NOTE — PROGRESS NOTE ADULT - ASSESSMENT
t40 year old man with hx of decompensated EtOH cirrhosis (Dx'd 7/2020) complicated by gastric variceal bleeding (7/2020) s/p 34u pRBC, IR embolization and blakemore balloon, ESRD on HD (M/W/F), bladder rupture 2/2 fall s/p ex-lap in 2019, hemorrhoids, presented to OSH for increased abdominal distention and abdominal pain x3 days  Pt was admitted to the floor and started on ceftriaxone 2g daily for empiric treatment of SBP. CT abd/pelvis 11/04 demonstrated large volume ascites and large R pleural effusion with near complete collapse of R lung c/f hepatic hydrothorax. He was continuously hypotensive to the 80s and started on midodrine 10mg tid. IR was consulted for paracentesis but deferred large volume paracentesis due to hypotension with SBPs in 70s and instead performed a diagnostic paracentesis on 11/04. Therapeutic paracentesis was planned for 11/05 but was ultimately deferred due to hypotension with SBP in 80s and MAP 57.     In the MICU, Received large volume paracentesis 11/5, ~7L removed. Pt was placed on ceftriaxone and fluconazole as ppx for SBP. He was continued on midodrine, protonix, thiamine. Midodrine was increased from 20mg to 30mg TID on day of transfer. He was refusing lactulose so was started on miralax q2H. He had 3 BM per day. His mental status improved with stable ammonia level. He received MWF HD. Received Retacrit 10K units with HD. No episodes of bleeding in MICU. 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 Thoracentesis was deferred given pt's clinical status. He was anemic with hepatic coagulopathy, recevied 1u pRBC and FFP 11/04, FFP x1 11/05 before paracentesis, given Cry x 5 u as well. Received vitamin K 10mg IV x 3 days. Now patient with RRT and intubated for hypoxic RF secondary to hepatic hydrothorax  He was [facility] to [facility] for liver [facility].     Impression:  # Acute hypoxic respiratory failure: Intubated. Lactate rising. No unresponsive on minimal sedation. Had a large R pleural effusion with near complete collapse of R lung likely 2/2 hepatic hydrothorax s/p thoracentesis   # Hepatic Encephelopathy: in ICU obtunded  # Decompensated EtOH Cirrhosis - MELD-Na: 40 (11/5/2020)  HE: sedated in ICU  Ascites: was large on Exam and TTP, s/p para with 6.5L removed. Also s/p para on 11/5 with 7L removed  HCC: No lesions on MRI 8/2020  Varices: Hx of gastric varices s/p IR embolization, Grade I esophageal varices seen 9/2020  # ESRD on hemodialysis   # Anemia: possible secondary to ESRD +/- slow GI bleed. Has hx of varices  # Hypotension: unclear etiology, possible underlying sepsis with lactic acidosis.    Recommendation:  - recommend GOC discussion, prognosis is grim at this time  - continue with CRRT until GOC discussion  - Continue with Abx per ID  - pressors support for hypotension  - Continue with ppi 40mg IV  - monitor lactate and labs  - ween sedation to assess mental status  - no further plans from hepatology at this time   - supportive care

## 2020-11-12 NOTE — PROGRESS NOTE ADULT - ATTENDING COMMENTS
.Decompensated liver failure with ETOH induce ascites. Pt recovering from SBP. Treated  with ceftriaxone. Pt with hepatic encephalopathy . Mental status much worse than when I saw him last Sunday. S/p 7 liter paracentesis yesterday. Pt's BP improved  after paracentesis.  Plan;  1. Continue lactulose and rifaximin for hepatic encephalopathy. D/C albumin.  2.ESRD from Hepatorenal syndrome. For HD today  3. Hydrothorax on R. Large. Pt oxygenating well on 2 liters NC. Will not perform thoracentesis. Fluid will come back quickly. Pt is not a TIPS candidate since MELD score is so elevated.  4. Continue ceftriaxone for SBP.
Events noted.  Patient is being moved to the MICU after rapid response.  He will have large volume paracentesis on pressor support.  Can have thoracentesis in the morning.  Discussed with Dr. Sharma- transplant hepatology.  he does not appear to be a TIPS candidate given his meld score but is still considered a potential candidate for liver transplant.
Mental status improved and liver function stable.  Still awaiting result of most recent serum PETH.  [place] [facility] [place].
Mr. Adames was seen and examined today.  Sleeping but arousable.  Denies increasing dyspnea,  abdomen is large and he is tender.  Would like to perform thoracentesis ideally after abdomen is drained.  He is unable to sit up comfortably to drain pleural fluid.  IR has said they will not drain at the same time for fear of causing hypotension.  Will attempt thoracentesis tomorrow if INR is <2.
Patient seen and evaluated, data reviewed, interval history noted.  He went for paracentesis today.  Will reassess effusion tomorrow and perform thoracentesis if needed and if patient is stable for the procedure.  Agree with current management.
Patient seen and examined, comfortable laying in bed. Plan for transfer to [facility] for liver/kidney transplant eval.
Patient seen and examined, history as outlined above, data and imaging personally reviewed by me.  Agree with plan as outlined above.
Abdominal pain improved > I think it was more bloating from lactulose.  Mental status is also better but still AAO x 1-2.  Please increase miralax to q 6 hrs ATC.  f/u serum PETH level
Awaiting bed at [facility] for SLK eval.  Still on 3-4 L NC for R hepatohydrothorax. Patient wants to defer thoracentesis until he gets to [facility]. Says breathing is improved.
CT abdomen negative for perforation yesterday but perhaps has a left lung pneumonia. Please contact ID regarding broadening abx.  Mental status somewhat improved today but bowels continues to be very distended.  Needs LVP and also would switch lactulose to miralax to reduce distention.  Unclear cause of recent decompensation, f/u serum PETH level
Decompensated etoh cirrhosis, alcoholic hepatitis in 7/2020, esrd on HD now here for worsening abdominal distention.  Needs LVP.   Liver function tests (INR + bilirubin) worse compare to kristi suspicious for infectious process. Need to r/o SBP.  Please resend serum PETH level.  EGD tmrw for hx of dark bloody stools and downtrending h/h.
Hepatology Staff: Yesenia Funez MD    I saw and examined the patient along with  Dr. Smith 11-09-20 @ 10:39.    Patient Medical Record, hospital course was reviewed and summarized as below:    Vitals: Vital Signs Last 24 Hrs  T(C): 36.7 (09 Nov 2020 09:10), Max: 36.8 (09 Nov 2020 00:13)  T(F): 98 (09 Nov 2020 09:10), Max: 98.3 (09 Nov 2020 00:13)  HR: 111 (09 Nov 2020 09:10) (101 - 111)  BP: 81/49 (09 Nov 2020 09:10) (76/42 - 112/62)    RR: 18 (09 Nov 2020 09:10) (18 - 18)  SpO2: 93% (09 Nov 2020 09:10) (92% - 97%)    Labs:INR: 3.15 ratio (11-09-20 @ 08:54)  Creatinine, Serum: 6.89 mg/dL (11-09-20 @ 07:08)  Bilirubin Total, Serum: 12.1 mg/dL (11-09-20 @ 07:08)      I/O: I&O's Summary    08 Nov 2020 07:01  -  09 Nov 2020 07:00  --------------------------------------------------------  IN: 290 mL / OUT: 0 mL / NET: 290 mL      Nutritional Status:   Albumin, Serum: 3.1 g/dL (11-09-20 @ 07:08)    Last 24 hour events: Overnight events noted. Stable hemodynamically this AM.    Recommendations: Continue with supportive care. Awaiting transfer to [facility] for potential consideration for simultaneous Liver and Kidney Transplantation.    Plan discussed with Primary team.
Hepatology Staff: Yesenia Funez MD    I saw and examined the patient along with  Dr. Smith 11-10-20 @ 10:59.    Patient Medical Record, hosptial course was reviewed and summarized as below:    Vitals: Vital Signs Last 24 Hrs  T(C): 36.4 (10 Nov 2020 04:00), Max: 36.6 (09 Nov 2020 22:15)  T(F): 97.5 (10 Nov 2020 04:00), Max: 97.9 (09 Nov 2020 22:15)  HR: 126 (10 Nov 2020 10:30) (88 - 134)  BP: 90/55 (10 Nov 2020 10:30) (74/39 - 113/59)  BP(mean): 70 (10 Nov 2020 10:30) (36 - 79)  RR: 26 (10 Nov 2020 10:30) (20 - 35)  SpO2: 100% (10 Nov 2020 10:30) (89% - 100%)  Medications:  IV Fluids: albumin human 25% IVPB 100 milliLiter(s) IV Intermittent every 6 hours    Antibiotics:piperacillin/tazobactam IVPB.. 3.375 Gram(s) IV Intermittent every 8 hours    Diuretics:  Labs:Creatinine, Serum: 5.98 mg/dL (11-10-20 @ 07:27)  Bilirubin Total, Serum: 12.9 mg/dL (11-10-20 @ 07:27)  INR: 3.65 ratio (11-10-20 @ 07:27)  Creatinine, Serum: 6.10 mg/dL (11-10-20 @ 00:35)  Bilirubin Total, Serum: 13.1 mg/dL (11-10-20 @ 00:35)  INR: 3.79 ratio (11-10-20 @ 00:35)      I/O: I&O's Summary    09 Nov 2020 07:01  -  10 Nov 2020 07:00  --------------------------------------------------------  IN: 1471.2 mL / OUT: 6500 mL / NET: -5028.8 mL      Nutritional Status: Weight (kg): 74.7 (11-10-20 @ 00:15)  BMI (kg/m2): 21.7 (11-10-20 @ 00:15)  Albumin, Serum: 3.3 g/dL (11-10-20 @ 07:27)  Albumin, Serum: 2.7 g/dL (11-10-20 @ 00:35)    Last 24 hour events: Patient was hypoxic overnight- shifted to MICU. Noted worsening leucocytosis, and high lactate > 11. Massive right hepatic hydrothorax. s/p LVP with 6.5 L removed, no SBP.    Recommendations: Recommend switching IV Zosyn to IV meropenem (considering worsening leucocytosis on IV Zosyn). Consider thoracentesis today. Likely transfer to [facility] once more stable.    Plan discussed with Primary team.
Patient seen and examined, data and imaging personally reviewed by me.  History as noted above.  He has a large pleural effusion and large ascites and effusion is likely hepatic hydrothorax.  INR today is rising, in addition he has ESRD and is on HD, fluid removal likely limited by hypotension.  Definitely would benefit from drainage of pleural fluid.  Would like to wait until INR is corrected, if not correctable he will need FFP prior to thoracentesis unless emergent for symptoms.  Currently not tachypneic on NC oxygen.  Seems prudent to tap ascites and pleural effusion simultaneously in IR suite.  If IR does not perform thoracentesis can perform after paracentesis on Wednesday.  Will follow with you.
Hepatology Staff: Yesenia Funez MD    I saw and examined the patient along with  Dr. Smith 11-11-20 @ 11:13.    Patient Medical Record, hosptial course was reviewed and summarized as below:    Vitals: Vital Signs Last 24 Hrs  T(C): 37.4 (11 Nov 2020 10:00), Max: 37.5 (11 Nov 2020 07:15)  T(F): 99.3 (11 Nov 2020 10:00), Max: 99.5 (11 Nov 2020 07:15)  HR: 133 (11 Nov 2020 10:30) (129 - 140)  BP: 110/62 (11 Nov 2020 10:30) (87/32 - 113/62)  BP(mean): 79 (11 Nov 2020 10:30) (49 - 81)  RR: 28 (11 Nov 2020 09:15) (20 - 32)  SpO2: 100% (11 Nov 2020 09:25) (97% - 100%)  Medications:  IV Fluids: albumin human 25% IVPB 100 milliLiter(s) IV Intermittent every 6 hours    Antibiotics:meropenem  IVPB 1000 milliGRAM(s) IV Intermittent every 8 hours    Diuretics:  Labs:Creatinine, Serum: 2.30 mg/dL (11-11-20 @ 07:26)  Bilirubin Total, Serum: 11.8 mg/dL (11-11-20 @ 07:26)  INR: 4.83 ratio (11-11-20 @ 07:26)  Creatinine, Serum: 2.40 mg/dL (11-11-20 @ 03:51)  Bilirubin Total, Serum: 12.4 mg/dL (11-11-20 @ 03:51)  Creatinine, Serum: 2.96 mg/dL (11-11-20 @ 00:08)  Bilirubin Total, Serum: 12.4 mg/dL (11-11-20 @ 00:08)  INR: 3.92 ratio (11-11-20 @ 00:08)  Creatinine, Serum: 3.42 mg/dL (11-10-20 @ 20:53)  Bilirubin Total, Serum: 12.0 mg/dL (11-10-20 @ 20:53)  INR: 2.62 ratio (11-10-20 @ 16:53)  Creatinine, Serum: 4.19 mg/dL (11-10-20 @ 16:08)  Bilirubin Total, Serum: 12.3 mg/dL (11-10-20 @ 16:08)  Creatinine, Serum: 5.29 mg/dL (11-10-20 @ 12:32)  Bilirubin Total, Serum: 11.4 mg/dL (11-10-20 @ 12:32)    I/O: I&O's Summary    10 Nov 2020 07:01  -  11 Nov 2020 07:00  --------------------------------------------------------  IN: 12269.4 mL / OUT: 02618 mL / NET: -2565.6 mL    11 Nov 2020 07:01  -  11 Nov 2020 11:13  --------------------------------------------------------  IN: 1608.4 mL / OUT: 2053 mL / NET: -444.6 mL      Nutritional Status:   Albumin, Serum: 4.0 g/dL (11-11-20 @ 07:26)  Albumin, Serum: 3.9 g/dL (11-11-20 @ 03:51)  Albumin, Serum: 4.0 g/dL (11-11-20 @ 00:08)  Albumin, Serum: 3.9 g/dL (11-10-20 @ 20:53)  Albumin, Serum: 3.6 g/dL (11-10-20 @ 16:08)  Albumin, Serum: 3.7 g/dL (11-10-20 @ 12:32)    Last 24 hour events: Patient is critically, intubated. Hemorrhagic ascites, and pleural effusion. Pupil dilated and fixed. Patient is on 3 pressors (levophed, phenylephrine, and vasopressin)    Recommendations: Critically ill- prognosis is grave. Continue supportive ICU care. Goals of care need to be discussed with the family. Not a transplant candidate.       Plan discussed with Primary team.
New onset confusion and worsening abdominal pain. Abdominal exam significant for new rebound tenderness.  Please obtain upright x-ray and CT to r/o perforation. Once perforation is ruled out then can consider paracentesis.
Transfer to [place] for SLK eval.  Please start high dose Thiamine and obtain CT Head as his mental status continues to be poor.  Got a LVP yesterday of 7 L and currently off pressors.
Patient with ETOH cirrhosis and history as above, ESRD on HD here with decompensated cirrhosis now with acute hypoxemic respiratory failure, worsening septic shock requiring pressors, decompensated cirrhosis, intubated.  Septic shock and maxed on three pressors, on CVVHD for fluid removal.  Broad abx for aspiration pna and r/o SBP.  Coagulopathic, multi organ failure.  This AM, pupils were fixed and dilated despite being off sedation.  He is triggering the ventilator and has a gag though, so is not brain dead.  Will contact liveon.  Family at bedside and downstairs - they would like to electively extubate knowing his poor prognosis.  Will f/u liveon and make decision on timing for extubation.
Patient with ETOH cirrhosis and history as above, ESRD on HD here with decompensated cirrhosis now with acute hypoxemic respiratory failure, worsening septic shock requiring pressors, decompensated cirrhosis, intubated.  Septic shock and maxed on three pressors, on CVVHD for fluid removal. Multi organ failure, more tachycardic and hypotensive this AM.  Not a candidate for organ donation.  Family at bedside - electively extubated today.  Patient passed shortly thereafter.
I have seen this patient with the fellow and agree with their assessment and plan. In addition, grim prognosis. Cont CRRT till family decision to withdraw care.     Earle Olmos MD  Cell   Pager   Office 
I have seen this patient with the fellow and agree with their assessment and plan. In addition, despite low BP and 7L of peritoneal fluid removal, can do HD stable. Plan for HD today with midodrine on board  rest as per above    Earle Olmos MD  Cell   Pager   Office       For weekend please contact Dr Tu Ortega( fellow) or Dr Josette Yeung( attending)
I have seen this patient with the fellow and agree with their assessment and plan. In addition, plan for HD today  Rest as per above    Earle Olmos MD  Cell   Pager   Office 
I have seen this patient with the fellow and agree with their assessment and plan. In addition, seen on HD tolerating well  But pt has increased abdominal girth  Might require another para today  Not sure if planned to go for liver-kidney at [place]    Earle Olmos MD  Cell   Pager   Office 
I have seen this patient with the fellow and agree with their assessment and plan. In addition, started on CVVHDF now with need for 2 pressors.  Overall prognosis is poor and not doing well      Earle Olmos MD  Cell   Pager   Office 
Patient with ETOH cirrhosis and history as above, ESRD on HD here with decompensated cirrhosis now with acute hypoxemic respiratory failure, worsening septic shock requiring pressors, decompensated cirrhosis, intubated this AM.  Pressors maxed out (levo/kassi/vaso).  s/p paracentesis with no improvement in BP, still in shock, likely septic due to aspiration pna (getting bile from airway).  Lactate climbing, CVVHD ongoing but unable to remove fluid.  RV is dilated with intermittent flattening of IV septum on POCUS, so may have component of cardiogenic shock.  Abx broadened, had R thoracentesis to see if that improved hemodynamics and if it is infected space.  Overall, poor prognosis, family aware. Considering DNR but continued medical management.
I have seen this patient with the fellow and agree with their assessment and plan. In addition, plan for HD Today   Iron eval if needs venofer or not    Earle Olmos MD  Cell   Pager   Office 
I have seen this patient with the fellow and agree with their assessment and plan. In addition, on CRRT  overall prognosis is grim    Earle Olmos MD  Cell   Pager   Office 
Nimo Velazquez DO  [facility]  [phone]
Penelope Herrera DO  Pager #: [phone]
41yo gentleman with PMH of EtOH cirrhosis c/b gastric variceal bleeding s/p embolization, ESRD on HD, bladder rupture s/p ex-lap, hemorrhoids who presented as  transfer from [facility] for abdominal pain and distension, found to have large volume ascites and large R pleural effusion. He was treated empirically for SBP with ceftriaxone. He was had persistent hypotension, and was started on midodrine. Patient underwent large volume paracentesis. Thoracentesis was not pursued due to his hypotension. For his anemia and elevated INR, the patient was transfused PRBC, FFP cryoprecipitate and vitamin K.     Today, the patient states that he has some persistent soreness across the lower abdomen at the site of his paracentesis, which is worsened with movement. He denies any fever or chills. Appetite has been improving. The patient was able to sit upright in a chair yesterday during the day, but has yet to resume ambulating. He denies any dyspnea but remains on 4LNC. Pt denies any chest pain. On exam, he is alert, conversant, mucous membranes are dry, lungs with decreased breath sounds over R mid-lung fields but clear on left, cardiac exam tachycardic with regular rhythm. Abdomen is soft, but distended with palpable fluid wave, and caput ascites. Paracentesis site covered in clean dressing. Patient is jaundiced with scleral icterus. His speech is slow and sometimes halting. CN II-XII intact, follows commands, has 5/5 BUE and BLE strength and full sensation to light touch.  Agree with plan discussed above  - C/w empiric ceftriaxone. Paracentesis did not confirm SBP (neutrophils < 250, however patient had already been on antibiotics)  - If paracentesis culture returns negative for fungal infection, discontinue fluconazole.  -Blood cultures found NGTD.   -C/w midodrine for BP support. BP currently at goal.  - C/w thiamine  - c/w epogen  - monitor CBC, coags, BMP  - c/w protonix given hx of bleeding  - pt on regular HD schedule, on sevelamer for hyperphosphatemia  - patient had encephalopathy earlier during admission. CTH did not identify acute pathology. Unclear if encephalopathy was 2/2 hepatic encephalopathy. It seems less likely to be due to Wernicke's given patient endorsed last drink was in July 2020.   - Anticipate transfer to [facility] for further evaluation for possible liver transplant.

## 2020-11-12 NOTE — PROGRESS NOTE ADULT - ASSESSMENT
41 yo M w/ PMH of EtOH cirrhosis c/b gastric variceal bleeding in July 2020 s/p 34u pRBC, IR embolization, and blakemore balloon, ESRD on HD (M/W/F), bladder rupture 2/2 fall s/p ex-lap in 2019, EtOH withdrawal seizures, and hemorrhoids admitted for decompensated liver cirrhosis and large volume ascites with concern for SBP. Transferred to MICU for pressor supported paracentesis with transfer back to floor with plan for tx to [facility] for liver and kidney tx eval; now admitted back to MICU and intubated for hypoxic respiratory failure likely 2/2 fluid overload.       Plan:  #Neuro  - Sedated on fentanyl; will wean off today to assess mental status  - Pupils are newly fixed and dilated today with absent pupillary or vestibulo-ocular reflex, c/f possible new bleed in setting of coagulopathy    #Respiratory  - Intubated for hypoxic hypercapnic respiratory failure; ABG demonstrating uncompensated metabolic acidosis  - Increased TV to 650 to try to help with compensation for metabolic acidosis  - s/p thoracentesis 11/10 for R pleural effusion draining 1.8L bloody fluid; repeat CXR demonstrated re-expansion of R lung    #CV  - Hypotension 2/2 septic/cardiogenic shock in setting of chronic multiorgan failure  - C/w levophed, phenylephrine, and vasopressin  - C/w 25% albumin q6hr to help maintain oncotic pressure  - Giving stress dose steroids    #GI  - Decompensated alcoholic cirrhosis with ascites and hx gastric varices s/p IR embolization + known esophageal varices  - s/p diagnostic paracentesis 11/04 with 160 PMNs, not meeting criteria for SBP  - s/p large volume paracentesis 11/05 with ~7L removed  - s/p therapeutic paracentesis 11/09 with ~6L removed with 12 PMNS, not meeting criteria for SBP  - c/w 500mg thiamine IV qd, protonix bid    #Renal  - ESRD on HD  - Last HD 11/06; unable to tolerate on 11/09 due to shortness of breath  - c/w CVVHDF today, on -100cc/hr    #ID  - Afebrile with new leukocytosis  - c/w meropenem for empiric coverage  - Blood cx drawn in OSH ([facility]) on 10/31 grew E. coli in aerobic bottle, pansensitive  - Blood cx 11/02 with NGTD  - Blood cx 11/10 pending  - Peritoneal fluid cx 11/06 NGTD    #Heme  - Hepatic coagulopathy + ?acute blood loss anemia  - Gave 2u platelets and 1u FFP yesterday; has not been responsive to blood products  - DVT ppx: SCDs    #Endo  - C/w D20 @ 40 ml/hr  - Will continue to monitor closely with q4 POCT    #GOC  - Family has clear understanding of the severity of the patient's condition and poor prognosis. Will continue to provide support. 41 yo M w/ PMH of EtOH cirrhosis c/b gastric variceal bleeding in July 2020 s/p 34u pRBC, IR embolization, and blakemore balloon, ESRD on HD (M/W/F), bladder rupture 2/2 fall s/p ex-lap in 2019, EtOH withdrawal seizures, and hemorrhoids admitted for decompensated liver cirrhosis and large volume ascites with concern for SBP. Transferred to MICU for pressor supported paracentesis with transfer back to floor with plan for tx to [facility] for liver and kidney tx eval; now admitted back to MICU and intubated for hypoxic respiratory failure likely 2/2 fluid overload.       Plan:  #Neuro  - Sedated on fentanyl  - Pupils are newly fixed and dilated today with absent pupillary or vestibulo-ocular reflex, c/f possible new bleed in setting of coagulopathy    #Respiratory  - Intubated for hypoxic hypercapnic respiratory failure; ABG demonstrating uncompensated metabolic acidosis  - Increased TV to 650 to try to help with compensation for metabolic acidosis  - s/p thoracentesis 11/10 for R pleural effusion draining 1.8L bloody fluid; repeat CXR demonstrated re-expansion of R lung  - will extubate when family ready    #CV  - Hypotension 2/2 septic/cardiogenic shock in setting of chronic multiorgan failure  - C/w levophed, phenylephrine, and vasopressin  - C/w 25% albumin q6hr to help maintain oncotic pressure  -     #GI  - Decompensated alcoholic cirrhosis with ascites and hx gastric varices s/p IR embolization + known esophageal varices  - s/p diagnostic paracentesis 11/04 with 160 PMNs, not meeting criteria for SBP  - s/p large volume paracentesis 11/05 with ~7L removed  - s/p therapeutic paracentesis 11/09 with ~6L removed with 12 PMNS, not meeting criteria for SBP  - c/w 500mg thiamine IV qd, protonix bid    #Renal  - ESRD on HD  - Last HD 11/06; unable to tolerate on 11/09 due to shortness of breath  - c/w CVVHDF today, on -100cc/hr    #ID  - Afebrile with new leukocytosis  - c/w meropenem for empiric coverage  - Blood cx drawn in OSH ([facility]) on 10/31 grew E. coli in aerobic bottle, pansensitive  - Blood cx 11/02 with NGTD  - Blood cx 11/10 pending  - Peritoneal fluid cx 11/06 NGTD    #Heme  - Hepatic coagulopathy + ?acute blood loss anemia  - Gave 2u platelets and 1u FFP yesterday; has not been responsive to blood products  - DVT ppx: SCDs    #Endo  - C/w D20 @ 40 ml/hr  - Will continue to monitor closely with q4 POCT    #GOC  - Family has clear understanding of the severity of the patient's condition and poor prognosis. Will continue to provide support.  - Will order pain medications for palliative extubation 39 yo M w/ PMH of EtOH cirrhosis c/b gastric variceal bleeding in July 2020 s/p 34u pRBC, IR embolization, and blakemore balloon, ESRD on HD (M/W/F), bladder rupture 2/2 fall s/p ex-lap in 2019, EtOH withdrawal seizures, and hemorrhoids admitted for decompensated liver cirrhosis and large volume ascites with concern for SBP. Transferred to MICU for pressor supported paracentesis with transfer back to floor with plan for tx to [facility] for liver and kidney tx eval; now admitted back to MICU and intubated for hypoxic respiratory failure likely 2/2 fluid overload.       Plan:  #Neuro  - Sedated on fentanyl; continued to be unresponsive when taken off fentanyl yesterday  - Pupils became fixed and dilated 11/11 with absent pupillary or vestibulo-ocular reflex, c/f possible new bleed in setting of coagulopathy    #Respiratory  - Intubated for hypoxic hypercapnic respiratory failure; ABG demonstrating severe uncompensated metabolic acidosis  - Current setting 30/550/5/100  - s/p thoracentesis 11/10 for R pleural effusion draining 1.8L bloody fluid; repeat CXR demonstrated re-expansion of R lung  - Extubate today when family is ready    #CV  - Hypotension 2/2 septic/cardiogenic shock in setting of chronic multiorgan failure  - C/w levophed, phenylephrine, and vasopressin  - C/w 25% albumin q6hr to help maintain oncotic pressure  -     #GI  - Decompensated alcoholic cirrhosis with ascites and hx gastric varices s/p IR embolization + known esophageal varices  - s/p diagnostic paracentesis 11/04 with 160 PMNs, not meeting criteria for SBP  - s/p large volume paracentesis 11/05 with ~7L removed  - s/p therapeutic paracentesis 11/09 with ~6L removed with 12 PMNS, not meeting criteria for SBP  - c/w 500mg thiamine IV qd, protonix bid    #Renal  - ESRD on HD  - Last HD 11/06; unable to tolerate on 11/09 due to shortness of breath  - c/w CVVHDF, on -100cc/hr    #ID  - Afebrile with new leukocytosis  - c/w meropenem for empiric coverage  - Blood cx drawn in OSH ([facility]) on 10/31 grew E. coli in aerobic bottle, pansensitive  - Blood cx 11/02 with NGTD  - Blood cx 11/10 pending  - Peritoneal fluid cx 11/06 NGTD    #Heme  - Hepatic coagulopathy + ?acute blood loss anemia  - Gave 2u platelets and 1u FFP yesterday; has not been responsive to blood products  - DVT ppx: SCDs    #Endo  - C/w D20 @ 40 ml/hr  - Will continue to monitor closely with q4 POCT    #GOC  - Family has clear understanding of the severity of the patient's condition and poor prognosis. Will continue to provide support.  - Will order pain medications for palliative extubation

## 2020-11-12 NOTE — CHART NOTE - NSCHARTNOTEFT_GEN_A_CORE
DEATH NOTE    Called to bedside to evaluate the patient for asystole.     On physical exam, patient did not respond to verbal or noxious stimuli.  No spontaneous respirations.  Absent heart and breath sounds.  Absent radial and carotid pulses.   Pupils are fixed and dilated, no corneal reflex.  EKG rhythm strip shows asystole.   Patient pronounced dead at 11:45 AM on 11/12.  Attending notified.  Family declined autopsy.

## 2020-11-12 NOTE — PROGRESS NOTE ADULT - SUBJECTIVE AND OBJECTIVE BOX
[facility] DIVISION OF KIDNEY DISEASES AND HYPERTENSION -- FOLLOW UP NOTE  --------------------------------------------------------------------------------  HPI: 40-year-old male with  ESRD on HD MWF,  traumatic bladder rupture 2/2 fall s/p ex-lap in 2019, ETOH cirrhosis c/b gastric varices, recent admission (7/2020) for hemorrhagic shock 2/2 variceal bleeding, acute renal failure requiring HD, and ETOH withdrawal seizure, recently admitted in 9/2020 for GIB who presented to [facility] on 11/1/20 for worsening abdominal pain. Pt. went for HD on 11/9/20 but only tolerated 1 hour due to complaints of SOB. Pt. had RRT around midnight on 11/10/20, was intubated and transferred to MICU. Pt. underwent  paracentesis yesterday with 6.5L removed. Nephrology consulted for management of ESRD and HD. Last full HD on 11/6/20 via RIJ tunneled HD catheter. Pt. initiated on CRRT on 11/10/20.    Pt. seen and examined this AM. Pt. sedated and intubated, remains IV vasopressor support. Pending family discussion for further GOC.    PAST HISTORY  --------------------------------------------------------------------------------  No significant changes to PMH, PSH, FHx, SHx, unless otherwise noted    ALLERGIES & MEDICATIONS  --------------------------------------------------------------------------------  Allergies    No Known Allergies    Intolerances    Standing Inpatient Medications  albumin human 25% IVPB 100 milliLiter(s) IV Intermittent every 6 hours  artificial  tears Solution 1 Drop(s) Both EYES every 2 hours  chlorhexidine 0.12% Liquid 15 milliLiter(s) Oral Mucosa every 12 hours  chlorhexidine 2% Cloths 1 Application(s) Topical daily  chlorhexidine 4% Liquid 1 Application(s) Topical <User Schedule>  CRRT Treatment    <Continuous>  dextrose 20%. 500 milliLiter(s) IV Continuous <Continuous>  EPINEPHrine    Infusion 0.05 MICROgram(s)/kG/Min IV Continuous <Continuous>  fentaNYL   Infusion... 0.5 MICROgram(s)/kG/Hr IV Continuous <Continuous>  hydrocortisone sodium succinate Injectable 100 milliGRAM(s) IV Push every 8 hours  influenza   Vaccine 0.5 milliLiter(s) IntraMuscular once  meropenem  IVPB 1000 milliGRAM(s) IV Intermittent every 8 hours  norepinephrine Infusion 5 MICROgram(s)/kG/Min IV Continuous <Continuous>  pantoprazole  Injectable 40 milliGRAM(s) IV Push two times a day  petrolatum Ophthalmic Ointment 1 Application(s) Both EYES two times a day  phenylephrine    Infusion 0.1 MICROgram(s)/kG/Min IV Continuous <Continuous>  PrismaSATE Dialysate BGK 4 / 2.5 5000 milliLiter(s) CRRT <Continuous>  PrismaSOL Filtration BGK 4 / 2.5 5000 milliLiter(s) CRRT <Continuous>  PrismaSOL Filtration BGK 4 / 2.5 5000 milliLiter(s) CRRT <Continuous>  sodium bicarbonate  Infusion 0.301 mEq/kG/Hr IV Continuous <Continuous>  thiamine IVPB 500 milliGRAM(s) IV Intermittent daily  vasopressin Infusion 0.04 Unit(s)/Min IV Continuous <Continuous>    PRN Inpatient Medications      REVIEW OF SYSTEMS  --------------------------------------------------------------------------------  Unable to obtain due to medical condition    VITALS/PHYSICAL EXAM  --------------------------------------------------------------------------------  T(C): 37.9 (11-12-20 @ 04:00), Max: 38.2 (11-12-20 @ 02:30)  HR: 103 (11-12-20 @ 08:17) (98 - 136)  BP: 92/53 (11-12-20 @ 04:00) (92/51 - 166/66)  RR: 32 (11-12-20 @ 07:00) (23 - 35)  SpO2: 92% (11-12-20 @ 08:17) (81% - 100%)  Wt(kg): --    11-11-20 @ 07:01  -  11-12-20 @ 07:00  --------------------------------------------------------  IN: 92228.7 mL / OUT: 00738 mL / NET: 3776.7 mL    11-12-20 @ 07:01  -  11-12-20 @ 08:23  --------------------------------------------------------  IN: 969.9 mL / OUT: 0 mL / NET: 969.9 mL    Physical Exam:  	Gen: intubated/sedated  	HEENT: icterus  	Pulm: good air entry B/L  	CV: S1S2  	Abd: Soft, ascites+   	Ext: trace LE edema B/L  	Neuro: intubated/sedated  	Skin: Warm and dry  	Vascular access: RIJ tunneled HD catheter +. no bleeding    LABS/STUDIES  --------------------------------------------------------------------------------              7.3    19.76 >-----------<  27       [11-12-20 @ 05:17]              24.3     139  |  106  |  12  ----------------------------<  149      [11-12-20 @ 05:17]  4.0   |  <10  |  1.56        Ca     7.7     [11-12-20 @ 05:17]      Mg     2.1     [11-12-20 @ 05:17]      Phos  3.3     [11-12-20 @ 05:17]    Creatinine Trend:  SCr 1.56 [11-12 @ 05:17]  SCr 1.48 [11-12 @ 00:09]  SCr 1.54 [11-11 @ 19:39]  SCr 1.75 [11-11 @ 15:39]  SCr 1.96 [11-11 @ 11:56]

## 2020-11-12 NOTE — PROGRESS NOTE ADULT - PROBLEM SELECTOR PLAN 1
Pt. with ESRD on HD three times a week (MWF). Last HD was on 11/6/20 via RIJ tunneled HD catheter. Pt. unable to tolerate HD on 11/9/20 due to SOB. Pt. had RRT early on 11/10/20, intubated and transferred to MICU. Pt. currently maintained on IV pressor support. Continue CVVHDF (with net negative balance) as per discussion with MICU team pending family discussion. Overall prognosis poor. Monitor labs.

## 2020-11-12 NOTE — PROGRESS NOTE ADULT - SUBJECTIVE AND OBJECTIVE BOX
INTERVAL HPI/OVERNIGHT EVENTS:    SUBJECTIVE: Patient seen and examined at bedside. Intubated, sedated  Unable to obtain ROS    OBJECTIVE:    VITAL SIGNS:  ICU Vital Signs Last 24 Hrs  T(C): 37.9 (12 Nov 2020 04:00), Max: 38.2 (12 Nov 2020 02:30)  T(F): 100.2 (12 Nov 2020 04:00), Max: 100.8 (12 Nov 2020 02:30)  HR: 124 (12 Nov 2020 04:30) (123 - 137)  BP: 92/53 (12 Nov 2020 04:00) (92/51 - 166/66)  BP(mean): 67 (12 Nov 2020 04:00) (65 - 90)  ABP: 90/20 (12 Nov 2020 04:30) (82/36 - 167/47)  ABP(mean): 50 (12 Nov 2020 04:30) (50 - 86)  RR: 32 (12 Nov 2020 04:30) (23 - 35)  SpO2: 92% (12 Nov 2020 04:30) (90% - 100%)    Mode: AC/ CMV (Assist Control/ Continuous Mandatory Ventilation), RR (machine): 26, TV (machine): 650, FiO2: 100, PEEP: 5, ITime: 1, MAP: 16, PIP: 40    11-11 @ 07:01  -  11-12 @ 07:00  --------------------------------------------------------  IN: 76821.7 mL / OUT: 9966 mL / NET: 6049.7 mL      CAPILLARY BLOOD GLUCOSE      POCT Blood Glucose.: 82 mg/dL (11 Nov 2020 06:50)      PHYSICAL EXAM:    General: NAD  HEENT: NC/AT; clear conjunctiva  Neck: supple  Respiratory: CTA b/l  Cardiovascular: +S1/S2; RRR  Abdomen: soft, NT/ND; +BS x4  Extremities: WWP, 2+ peripheral pulses b/l; no LE edema  Skin: normal color and turgor; no rash  Neurological: sedated/nonfocal    MEDICATIONS:  MEDICATIONS  (STANDING):  albumin human 25% IVPB 100 milliLiter(s) IV Intermittent every 6 hours  artificial  tears Solution 1 Drop(s) Both EYES every 2 hours  chlorhexidine 0.12% Liquid 15 milliLiter(s) Oral Mucosa every 12 hours  chlorhexidine 2% Cloths 1 Application(s) Topical daily  chlorhexidine 4% Liquid 1 Application(s) Topical <User Schedule>  CRRT Treatment    <Continuous>  dextrose 20%. 500 milliLiter(s) (40 mL/Hr) IV Continuous <Continuous>  EPINEPHrine    Infusion 0.05 MICROgram(s)/kG/Min (7 mL/Hr) IV Continuous <Continuous>  fentaNYL   Infusion... 0.5 MICROgram(s)/kG/Hr (1.87 mL/Hr) IV Continuous <Continuous>  hydrocortisone sodium succinate Injectable 100 milliGRAM(s) IV Push every 8 hours  influenza   Vaccine 0.5 milliLiter(s) IntraMuscular once  meropenem  IVPB 1000 milliGRAM(s) IV Intermittent every 8 hours  norepinephrine Infusion 5 MICROgram(s)/kG/Min (350 mL/Hr) IV Continuous <Continuous>  pantoprazole  Injectable 40 milliGRAM(s) IV Push two times a day  petrolatum Ophthalmic Ointment 1 Application(s) Both EYES two times a day  phenylephrine    Infusion 0.1 MICROgram(s)/kG/Min (1.4 mL/Hr) IV Continuous <Continuous>  PrismaSATE Dialysate BGK 4 / 2.5 5000 milliLiter(s) (1500 mL/Hr) CRRT <Continuous>  PrismaSOL Filtration BGK 4 / 2.5 5000 milliLiter(s) (1000 mL/Hr) CRRT <Continuous>  PrismaSOL Filtration BGK 4 / 2.5 5000 milliLiter(s) (500 mL/Hr) CRRT <Continuous>  sodium bicarbonate  Infusion 0.301 mEq/kG/Hr (150 mL/Hr) IV Continuous <Continuous>  thiamine IVPB 500 milliGRAM(s) IV Intermittent daily  vasopressin Infusion 0.04 Unit(s)/Min (2.4 mL/Hr) IV Continuous <Continuous>    MEDICATIONS  (PRN):      ALLERGIES:  Allergies    No Known Allergies    Intolerances        LABS:                        7.3    19.76 )-----------( 27       ( 12 Nov 2020 05:17 )             24.3     11-12    139  |  106  |  12  ----------------------------<  149<H>  4.0   |  <10<LL>  |  1.56<H>    Ca    7.7<L>      12 Nov 2020 05:17  Phos  3.3     11-12  Mg     2.1     11-12    TPro  6.3  /  Alb  4.2  /  TBili  11.3<H>  /  DBili  x   /  AST  340<H>  /  ALT  65<H>  /  AlkPhos  94  11-12    PT/INR - ( 12 Nov 2020 00:09 )   PT: 30.2 sec;   INR: 2.64 ratio         PTT - ( 12 Nov 2020 00:09 )  PTT:55.2 sec      RADIOLOGY & ADDITIONAL TESTS: Reviewed. INTERVAL HPI/OVERNIGHT EVENTS:    SUBJECTIVE: Patient seen and examined at bedside. Intubated, sedated on fentanyl.   Unable to obtain ROS    OBJECTIVE:    VITAL SIGNS:  ICU Vital Signs Last 24 Hrs  T(C): 37.9 (12 Nov 2020 04:00), Max: 38.2 (12 Nov 2020 02:30)  T(F): 100.2 (12 Nov 2020 04:00), Max: 100.8 (12 Nov 2020 02:30)  HR: 124 (12 Nov 2020 04:30) (123 - 137)  BP: 92/53 (12 Nov 2020 04:00) (92/51 - 166/66)  BP(mean): 67 (12 Nov 2020 04:00) (65 - 90)  ABP: 90/20 (12 Nov 2020 04:30) (82/36 - 167/47)  ABP(mean): 50 (12 Nov 2020 04:30) (50 - 86)  RR: 32 (12 Nov 2020 04:30) (23 - 35)  SpO2: 92% (12 Nov 2020 04:30) (90% - 100%)    Mode: AC/ CMV (Assist Control/ Continuous Mandatory Ventilation), RR (machine): 26, TV (machine): 650, FiO2: 100, PEEP: 5, ITime: 1, MAP: 16, PIP: 40    11-11 @ 07:01  -  11-12 @ 07:00  --------------------------------------------------------  IN: 62531.7 mL / OUT: 9966 mL / NET: 6049.7 mL      CAPILLARY BLOOD GLUCOSE      POCT Blood Glucose.: 82 mg/dL (11 Nov 2020 06:50)      PHYSICAL EXAM:    General: NAD  HEENT: NC/AT; clear conjunctiva  Neck: supple  Respiratory: CTA b/l  Cardiovascular: +S1/S2; RRR  Abdomen: soft, NT/ND; no bowel sounds  Extremities: WWP, 2+ peripheral pulses b/l; no LE edema  Skin: normal color and turgor; no rash  Neurological: sedated/nonfocal    MEDICATIONS:  MEDICATIONS  (STANDING):  albumin human 25% IVPB 100 milliLiter(s) IV Intermittent every 6 hours  artificial  tears Solution 1 Drop(s) Both EYES every 2 hours  chlorhexidine 0.12% Liquid 15 milliLiter(s) Oral Mucosa every 12 hours  chlorhexidine 2% Cloths 1 Application(s) Topical daily  chlorhexidine 4% Liquid 1 Application(s) Topical <User Schedule>  CRRT Treatment    <Continuous>  dextrose 20%. 500 milliLiter(s) (40 mL/Hr) IV Continuous <Continuous>  EPINEPHrine    Infusion 0.05 MICROgram(s)/kG/Min (7 mL/Hr) IV Continuous <Continuous>  fentaNYL   Infusion... 0.5 MICROgram(s)/kG/Hr (1.87 mL/Hr) IV Continuous <Continuous>  hydrocortisone sodium succinate Injectable 100 milliGRAM(s) IV Push every 8 hours  influenza   Vaccine 0.5 milliLiter(s) IntraMuscular once  meropenem  IVPB 1000 milliGRAM(s) IV Intermittent every 8 hours  norepinephrine Infusion 5 MICROgram(s)/kG/Min (350 mL/Hr) IV Continuous <Continuous>  pantoprazole  Injectable 40 milliGRAM(s) IV Push two times a day  petrolatum Ophthalmic Ointment 1 Application(s) Both EYES two times a day  phenylephrine    Infusion 0.1 MICROgram(s)/kG/Min (1.4 mL/Hr) IV Continuous <Continuous>  PrismaSATE Dialysate BGK 4 / 2.5 5000 milliLiter(s) (1500 mL/Hr) CRRT <Continuous>  PrismaSOL Filtration BGK 4 / 2.5 5000 milliLiter(s) (1000 mL/Hr) CRRT <Continuous>  PrismaSOL Filtration BGK 4 / 2.5 5000 milliLiter(s) (500 mL/Hr) CRRT <Continuous>  sodium bicarbonate  Infusion 0.301 mEq/kG/Hr (150 mL/Hr) IV Continuous <Continuous>  thiamine IVPB 500 milliGRAM(s) IV Intermittent daily  vasopressin Infusion 0.04 Unit(s)/Min (2.4 mL/Hr) IV Continuous <Continuous>    MEDICATIONS  (PRN):      ALLERGIES:  Allergies    No Known Allergies    Intolerances        LABS:                        7.3    19.76 )-----------( 27       ( 12 Nov 2020 05:17 )             24.3     11-12    139  |  106  |  12  ----------------------------<  149<H>  4.0   |  <10<LL>  |  1.56<H>    Ca    7.7<L>      12 Nov 2020 05:17  Phos  3.3     11-12  Mg     2.1     11-12    TPro  6.3  /  Alb  4.2  /  TBili  11.3<H>  /  DBili  x   /  AST  340<H>  /  ALT  65<H>  /  AlkPhos  94  11-12    PT/INR - ( 12 Nov 2020 00:09 )   PT: 30.2 sec;   INR: 2.64 ratio         PTT - ( 12 Nov 2020 00:09 )  PTT:55.2 sec      RADIOLOGY & ADDITIONAL TESTS: Reviewed. INTERVAL HPI/OVERNIGHT EVENTS:    SUBJECTIVE: Overnight, had VTach lasted few minutes, received amiodarone 300 mg ivp and IVPB. Patient seen and examined at bedside. Intubated, sedated on fentanyl. On phenylephrine, vasopressin, epinephrine, sodium bicarb, and norepinephrine. Unable to obtain ROS. Family at bedside. No organ donation.    OBJECTIVE:    VITAL SIGNS:  ICU Vital Signs Last 24 Hrs  T(C): 37.9 (12 Nov 2020 04:00), Max: 38.2 (12 Nov 2020 02:30)  T(F): 100.2 (12 Nov 2020 04:00), Max: 100.8 (12 Nov 2020 02:30)  HR: 124 (12 Nov 2020 04:30) (123 - 137)  BP: 92/53 (12 Nov 2020 04:00) (92/51 - 166/66)  BP(mean): 67 (12 Nov 2020 04:00) (65 - 90)  ABP: 90/20 (12 Nov 2020 04:30) (82/36 - 167/47)  ABP(mean): 50 (12 Nov 2020 04:30) (50 - 86)  RR: 32 (12 Nov 2020 04:30) (23 - 35)  SpO2: 92% (12 Nov 2020 04:30) (90% - 100%)    Mode: AC/ CMV (Assist Control/ Continuous Mandatory Ventilation), RR (machine): 26, TV (machine): 650, FiO2: 100, PEEP: 5, ITime: 1, MAP: 16, PIP: 40    11-11 @ 07:01  -  11-12 @ 07:00  --------------------------------------------------------  IN: 57783.7 mL / OUT: 9966 mL / NET: 6049.7 mL      CAPILLARY BLOOD GLUCOSE      POCT Blood Glucose.: 82 mg/dL (11 Nov 2020 06:50)      PHYSICAL EXAM:    General: NAD  HEENT: NC/AT; clear conjunctiva  Neck: supple  Respiratory: CTA b/l  Cardiovascular: +S1/S2; RRR  Abdomen: soft, NT/ND; no bowel sounds  Extremities: WWP, 2+ peripheral pulses b/l; no LE edema  Skin: normal color and turgor; no rash  Neurological: sedated/nonfocal    MEDICATIONS:  MEDICATIONS  (STANDING):  albumin human 25% IVPB 100 milliLiter(s) IV Intermittent every 6 hours  artificial  tears Solution 1 Drop(s) Both EYES every 2 hours  chlorhexidine 0.12% Liquid 15 milliLiter(s) Oral Mucosa every 12 hours  chlorhexidine 2% Cloths 1 Application(s) Topical daily  chlorhexidine 4% Liquid 1 Application(s) Topical <User Schedule>  CRRT Treatment    <Continuous>  dextrose 20%. 500 milliLiter(s) (40 mL/Hr) IV Continuous <Continuous>  EPINEPHrine    Infusion 0.05 MICROgram(s)/kG/Min (7 mL/Hr) IV Continuous <Continuous>  fentaNYL   Infusion... 0.5 MICROgram(s)/kG/Hr (1.87 mL/Hr) IV Continuous <Continuous>  hydrocortisone sodium succinate Injectable 100 milliGRAM(s) IV Push every 8 hours  influenza   Vaccine 0.5 milliLiter(s) IntraMuscular once  meropenem  IVPB 1000 milliGRAM(s) IV Intermittent every 8 hours  norepinephrine Infusion 5 MICROgram(s)/kG/Min (350 mL/Hr) IV Continuous <Continuous>  pantoprazole  Injectable 40 milliGRAM(s) IV Push two times a day  petrolatum Ophthalmic Ointment 1 Application(s) Both EYES two times a day  phenylephrine    Infusion 0.1 MICROgram(s)/kG/Min (1.4 mL/Hr) IV Continuous <Continuous>  PrismaSATE Dialysate BGK 4 / 2.5 5000 milliLiter(s) (1500 mL/Hr) CRRT <Continuous>  PrismaSOL Filtration BGK 4 / 2.5 5000 milliLiter(s) (1000 mL/Hr) CRRT <Continuous>  PrismaSOL Filtration BGK 4 / 2.5 5000 milliLiter(s) (500 mL/Hr) CRRT <Continuous>  sodium bicarbonate  Infusion 0.301 mEq/kG/Hr (150 mL/Hr) IV Continuous <Continuous>  thiamine IVPB 500 milliGRAM(s) IV Intermittent daily  vasopressin Infusion 0.04 Unit(s)/Min (2.4 mL/Hr) IV Continuous <Continuous>    MEDICATIONS  (PRN):      ALLERGIES:  Allergies    No Known Allergies    Intolerances        LABS:                        7.3    19.76 )-----------( 27 ( 12 Nov 2020 05:17 )             24.3     11-12    139  |  106  |  12  ----------------------------<  149<H>  4.0   |  <10<LL>  |  1.56<H>    Ca    7.7<L>      12 Nov 2020 05:17  Phos  3.3     11-12  Mg     2.1     11-12    TPro  6.3  /  Alb  4.2  /  TBili  11.3<H>  /  DBili  x   /  AST  340<H>  /  ALT  65<H>  /  AlkPhos  94  11-12    PT/INR - ( 12 Nov 2020 00:09 )   PT: 30.2 sec;   INR: 2.64 ratio         PTT - ( 12 Nov 2020 00:09 )  PTT:55.2 sec      RADIOLOGY & ADDITIONAL TESTS: Reviewed. INTERVAL HPI/OVERNIGHT EVENTS:    SUBJECTIVE: Overnight, had an episode of persistent vtach which terminated after receiving amiodarone 300 mg ivp and IVPB. Patient seen and examined at bedside. Intubated, sedated on fentanyl. On phenylephrine, vasopressin, epinephrine, sodium bicarb, and norepinephrine. Unable to obtain ROS. Family at bedside. No organ donation.    OBJECTIVE:    VITAL SIGNS:  ICU Vital Signs Last 24 Hrs  T(C): 37.9 (12 Nov 2020 04:00), Max: 38.2 (12 Nov 2020 02:30)  T(F): 100.2 (12 Nov 2020 04:00), Max: 100.8 (12 Nov 2020 02:30)  HR: 124 (12 Nov 2020 04:30) (123 - 137)  BP: 92/53 (12 Nov 2020 04:00) (92/51 - 166/66)  BP(mean): 67 (12 Nov 2020 04:00) (65 - 90)  ABP: 90/20 (12 Nov 2020 04:30) (82/36 - 167/47)  ABP(mean): 50 (12 Nov 2020 04:30) (50 - 86)  RR: 32 (12 Nov 2020 04:30) (23 - 35)  SpO2: 92% (12 Nov 2020 04:30) (90% - 100%)    Mode: AC/ CMV (Assist Control/ Continuous Mandatory Ventilation), RR (machine): 26, TV (machine): 650, FiO2: 100, PEEP: 5, ITime: 1, MAP: 16, PIP: 40    11-11 @ 07:01  -  11-12 @ 07:00  --------------------------------------------------------  IN: 01616.7 mL / OUT: 9966 mL / NET: 6049.7 mL      CAPILLARY BLOOD GLUCOSE      POCT Blood Glucose.: 82 mg/dL (11 Nov 2020 06:50)      PHYSICAL EXAM:    General: NAD  HEENT: NC/AT; +pupils dilated and fixed  Neck: supple  Respiratory: CTA b/l  Cardiovascular: +S1/S2; RRR  Abdomen: soft, NT/ND; no bowel sounds  Extremities: no LE edema, +ecchymoses of LE b/l  Skin: no rash  Neurological: sedated/nonfocal      MEDICATIONS  (STANDING):  albumin human 25% IVPB 100 milliLiter(s) IV Intermittent every 6 hours  artificial  tears Solution 1 Drop(s) Both EYES every 2 hours  chlorhexidine 0.12% Liquid 15 milliLiter(s) Oral Mucosa every 12 hours  chlorhexidine 2% Cloths 1 Application(s) Topical daily  chlorhexidine 4% Liquid 1 Application(s) Topical <User Schedule>  CRRT Treatment    <Continuous>  dextrose 20%. 500 milliLiter(s) (40 mL/Hr) IV Continuous <Continuous>  EPINEPHrine    Infusion 0.05 MICROgram(s)/kG/Min (7 mL/Hr) IV Continuous <Continuous>  fentaNYL   Infusion... 0.5 MICROgram(s)/kG/Hr (1.87 mL/Hr) IV Continuous <Continuous>  hydrocortisone sodium succinate Injectable 100 milliGRAM(s) IV Push every 8 hours  influenza   Vaccine 0.5 milliLiter(s) IntraMuscular once  meropenem  IVPB 1000 milliGRAM(s) IV Intermittent every 8 hours  norepinephrine Infusion 5 MICROgram(s)/kG/Min (350 mL/Hr) IV Continuous <Continuous>  pantoprazole  Injectable 40 milliGRAM(s) IV Push two times a day  petrolatum Ophthalmic Ointment 1 Application(s) Both EYES two times a day  phenylephrine    Infusion 0.1 MICROgram(s)/kG/Min (1.4 mL/Hr) IV Continuous <Continuous>  PrismaSATE Dialysate BGK 4 / 2.5 5000 milliLiter(s) (1500 mL/Hr) CRRT <Continuous>  PrismaSOL Filtration BGK 4 / 2.5 5000 milliLiter(s) (1000 mL/Hr) CRRT <Continuous>  PrismaSOL Filtration BGK 4 / 2.5 5000 milliLiter(s) (500 mL/Hr) CRRT <Continuous>  sodium bicarbonate  Infusion 0.301 mEq/kG/Hr (150 mL/Hr) IV Continuous <Continuous>  thiamine IVPB 500 milliGRAM(s) IV Intermittent daily  vasopressin Infusion 0.04 Unit(s)/Min (2.4 mL/Hr) IV Continuous <Continuous>    MEDICATIONS  (PRN):      ALLERGIES:  No Known Allergies        LABS:                        7.3    19.76 )-----------( 27       ( 12 Nov 2020 05:17 )             24.3     11-12    139  |  106  |  12  ----------------------------<  149<H>  4.0   |  <10<LL>  |  1.56<H>    Ca    7.7<L>      12 Nov 2020 05:17  Phos  3.3     11-12  Mg     2.1     11-12    TPro  6.3  /  Alb  4.2  /  TBili  11.3<H>  /  DBili  x   /  AST  340<H>  /  ALT  65<H>  /  AlkPhos  94  11-12    PT/INR - ( 12 Nov 2020 00:09 )   PT: 30.2 sec;   INR: 2.64 ratio         PTT - ( 12 Nov 2020 00:09 )  PTT:55.2 sec      abgBlood Gas Profile - Arterial (11.12.20 @ 05:16)   pH, Arterial: 7.04   pCO2, Arterial: 40 mmHg   pO2, Arterial: 65 mmHg   HCO3, Arterial: 10 mmoL/L   Base Excess, Arterial: -18.5 mmol/L   Oxygen Saturation, Arterial: 85 %   Total CO2, Arterial: 12 mmoL/L   FIO2, Arterial: 100   Blood Gas Source Arterial: Arterial         RADIOLOGY & ADDITIONAL TESTS: Reviewed.

## 2020-11-12 NOTE — PROGRESS NOTE ADULT - NUTRITIONAL ASSESSMENT
This patient has been assessed with a concern for Malnutrition and has been determined to have a diagnosis/diagnoses of Severe protein-calorie malnutrition.    This patient is being managed with:   Diet Clear Liquid-  Entered: Nov  3 2020  2:36PM    
This patient has been assessed with a concern for Malnutrition and has been determined to have a diagnosis/diagnoses of Severe protein-calorie malnutrition.    This patient is being managed with:   Diet NPO-  Entered: Nov 10 2020  9:06AM    

## 2020-11-12 NOTE — PROGRESS NOTE ADULT - PROBLEM SELECTOR PROBLEM 2
Pleural effusion on right
Tachycardia
Acute blood loss anemia
Anemia
Metabolic encephalopathy
Pleural effusion on right
Pleural effusion on right

## 2020-11-12 NOTE — PROGRESS NOTE ADULT - PROBLEM SELECTOR PLAN 2
Patient with anemia in the setting of ESRD with history of GI bleed. Hemoglobin low at 7.3 today. Transfuse PRBCs PRN. Monitor hemoglobin.    If any questions, please feel free to contact me  Kehinde Coles   Nephrology Fellow  [phone]  (After 5 pm or on weekends please page the on-call fellow)

## 2020-11-12 NOTE — PROGRESS NOTE ADULT - REASON FOR ADMISSION
HRS
Hypoxic respiratory failure
HRS

## 2020-11-12 NOTE — PROGRESS NOTE ADULT - NSHPATTENDINGPLANDISCUSS_GEN_ALL_CORE
SULAIMAN Bryan
team
team.
patient at bedside
team
resident/fellow/nurse
SANYA Heard
med NP

## 2020-11-12 NOTE — AIRWAY REMOVAL NOTE  ADULT & PEDS - ARTIFICAL AIRWAY REMOVAL COMMENTS
Written order for extubation verified. The patient was identified by full name and birth date compared to the identification band. Present during the procedure was Dr. Franca Quesada

## 2020-11-13 LAB
CULTURE RESULTS: SIGNIFICANT CHANGE UP
SPECIMEN SOURCE: SIGNIFICANT CHANGE UP

## 2020-11-15 LAB
CULTURE RESULTS: SIGNIFICANT CHANGE UP
SPECIMEN SOURCE: SIGNIFICANT CHANGE UP

## 2020-11-18 ENCOUNTER — APPOINTMENT (OUTPATIENT)
Dept: HEPATOLOGY | Facility: CLINIC | Age: 40
End: 2020-11-18

## 2020-12-05 LAB
CULTURE RESULTS: SIGNIFICANT CHANGE UP
CULTURE RESULTS: SIGNIFICANT CHANGE UP
SPECIMEN SOURCE: SIGNIFICANT CHANGE UP
SPECIMEN SOURCE: SIGNIFICANT CHANGE UP

## 2020-12-12 LAB
CULTURE RESULTS: SIGNIFICANT CHANGE UP
SPECIMEN SOURCE: SIGNIFICANT CHANGE UP

## 2020-12-26 LAB
CULTURE RESULTS: SIGNIFICANT CHANGE UP
SPECIMEN SOURCE: SIGNIFICANT CHANGE UP

## 2021-03-01 NOTE — PROVIDER CONTACT NOTE (OTHER) - ASSESSMENT
Pt Aox4. ; Blood pressure 101/60. pt states he feels anxious. pt not in any acute distress
Pt Aox4. BP: 120/71; HR: 96; respirations 18; sp02: 93%; temp: 97.8. pt not in any distress.
A/O4; asymptomatic. No distress or pain noted. NO c/o of dizziness. VS WDL.
pt Aox3 ,vss afebrile ,pt on octerotide ivp ,
pt Aox3 ,vss afebrile ,vomited x2 ,c/o abdominal pain
Home

## 2021-08-03 NOTE — PROVIDER CONTACT NOTE (OTHER) - DATE AND TIME:
32 y.o. male sustained laceration over (R) 5th MCP joint late last night, when he struck a mirror.  Last Tdap 2011 per [facility].  Went to [facility] ER last night, but did not wait to be seen.    Denies known Latex allergy or symptoms of Latex sensitivity.  Med:  0    Wt deferred.    (R) hand cleansed w/bactoshield solution.   10-Aug-2020 23:12

## 2021-08-30 NOTE — H&P ADULT - PROBLEM SELECTOR PLAN 2
no
MELD-Na 38 on admission; 65% 90-day mortality  - Daily CBC, CMP, INR  - No ETOH use currently  - In the process of working up for liver/kidney transplant, but needs a hepatology in [facility]  - Hepatology consult email sent  - Not on HE prophylaxis, no signs of HE  - No signs of SBP currently, CTX x 7 days for SBP ppx  - (+) ascites, will get abd US and IR consult for paracentesis in AM

## 2021-09-01 NOTE — PHYSICAL THERAPY INITIAL EVALUATION ADULT - TRANSFER SKILLS, REHAB EVAL
independent Cephalexin Pregnancy And Lactation Text: This medication is Pregnancy Category B and considered safe during pregnancy.  It is also excreted in breast milk but can be used safely for shorter doses.

## 2021-11-24 NOTE — DIETITIAN INITIAL EVALUATION ADULT. - PROBLEM/PLAN-5
Called to patient, left message for patient to return call to schedule 5 weekly Venofer infusions w/cristal aft TXT w/PA for 2 mos after last treatment. If patient returns call, please assist in scheduling.   DISPLAY PLAN FREE TEXT

## 2022-01-18 NOTE — CHART NOTE - NSCHARTNOTESELECT_GEN_ALL_CORE
Ok to send fluconazole 150 mg take 1 tablet at this time #1, 0 refills to preferred pharmacy   Nutrition Services

## 2022-06-22 NOTE — PROGRESS NOTE ADULT - PROBLEM SELECTOR PLAN 3
Ramila Munoz,     Your HPV testing is negative  Your pap is still pending, and will be updated soon  Please contact the office at [phone] with any questions       Renea possibly secondary to hepatorenal syndrome. 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 Baseline creatinine sCr 0.6-0.8. On admission 9.64, currently downtrending. s/p CVVHD w/ improvement in renal fx, but remains HD dependent   -nephrology following, appreciate recs - HD 8/06 and again today 8/07, will hold HD over weekend and monitor for renal recovery  -c/w hold diuretics at this time   -daily CMP  -monitor UOP, strict I&Os  -avoid NSAIDs, ACEI/ARBS, RCA and nephrotoxins.   -dose medications as per eGFR. possibly secondary to hepatorenal syndrome. 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 Baseline creatinine sCr 0.6-0.8. On admission 9.64, currently downtrending. s/p CVVHD w/ improvement in renal fx, but remains HD dependent   -nephrology following, appreciate recs - HD 8/06 & 8/07, will hold HD over weekend and monitor for renal recovery  -zofran 4mg IV prn nausea w/ dialysis  -c/w hold diuretics at this time   -daily CMP  -monitor UOP, strict I&Os  -avoid NSAIDs, ACEI/ARBS, RCA and nephrotoxins.   -dose medications as per eGFR. possibly secondary to hepatorenal syndrome. 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 Baseline creatinine sCr 0.6-0.8. On admission 9.64, was downtrending but uptrended today. s/p CVVHD w/ improvement in renal fx, but remains HD dependent   -nephrology following, appreciate recs - HD 8/06 & 8/07, will hold HD over weekend and monitor for renal recovery - Cr uptrended to 5.87 today  -zofran 4mg IV prn nausea w/ dialysis  -c/w hold diuretics at this time   -daily CMP  -monitor UOP, strict I&Os  -avoid NSAIDs, ACEI/ARBS, RCA and nephrotoxins.   -dose medications as per eGFR.

## 2022-09-23 NOTE — PROGRESS NOTE ADULT - ASSESSMENT
38 yo M w/ PMH of EtOH cirrhosis c/b gastric variceal bleeding in July 2020 s/p 34 pRBC, IR embolization, ESRD on HD (M/W/F), bladder rupture 2/2 fall s/p ex-lap in 2019 was transferred from OSH for worsening abdominal distention and pain  No fever, has leukocytosis  Distended abd from ascites  Covid Ab neg  Does not appear toxic, does have discomfort when palpating abd, but difficult to tell if is inflammatory vs due to distension  Low general suspicion for SBP, but cover for now while awaiting para  CXR R pleural effusion  Overall,  1) Leukocytosis  - Seems to have longstanding WBC of unclear etiology  - Would check paracentesis (both for diagnosis and therapeutic)  - Ceftriaxone 2g q 24  - F/U BCXs  - If does not improve would warrant workup for explanation of longstanding leukocytosis  2) Ascites  - Would pursue para as above (send out diagnostic markers--culture, cells...)  - F/U GI/hepatology  3) Elevated Lactate  - Trend to normal, also unclear significance considering lack of signs sepsis (aside from WBC)  - Further workup per primary team  4) R pleural effusion  - Monitor resp status  - F/U Pulm if any indication for drainage    My colleagues will be covering this patient starting on 11/4/20, I will return 11/5/20.  Please call [phone] with any questions or change in status.     Calin Thomas MD  Pager [phone]  After 5pm and on weekends call [phone] Diabetes mellitus

## 2022-11-27 NOTE — DISCHARGE NOTE NURSING/CASE MANAGEMENT/SOCIAL WORK - MODE OF TRANSPORTATION
Per 09/20/2019 office visit, \"PULMONARY: no sob, no wheezing, no pain with deep inspiration  CARDIAC: no chest pain, no palpitations\"    Message left on machine for patient to call back.     [facility]        NAME: Malorie Dumas is a 7 y o  female  : 2011    MRN: [id number]  DATE: 2022  TIME: 9:59 PM    Assessment and Plan   Injury Hugo Montelongo  1  Injury  XR foot 3+ vw left      2  Crushing injury of left foot, initial encounter  Ambulatory Referral to Pediatric Orthopedics            Patient Instructions       Follow up with PCP in 3-5 days  Proceed to  ER if symptoms worsen  Chief Complaint     Chief Complaint   Patient presents with   • Injury     Pt presents with left foot swelling, discoloration; r/t a horse stepping on her foot today         History of Present Illness       Injury  The incident occurred 1 to 3 hours ago  The incident occurred at home  The injury mechanism was a crush injury  Context: horse  No protective equipment was used  There is an injury to the left foot  The pain is moderate  It is unlikely that a foreign body is present  Pertinent negatives include no abdominal pain, chest pain, coughing, seizures, visual disturbance or vomiting  There have been no prior injuries to these areas  Review of Systems   Review of Systems   Constitutional: Negative for chills and fever  HENT: Negative for ear pain and sore throat  Eyes: Negative for pain and visual disturbance  Respiratory: Negative for cough and shortness of breath  Cardiovascular: Negative for chest pain and palpitations  Gastrointestinal: Negative for abdominal pain and vomiting  Genitourinary: Negative for dysuria and hematuria  Musculoskeletal: Positive for arthralgias, gait problem and joint swelling  Negative for back pain  Skin: Negative for color change and rash  Neurological: Negative for seizures and syncope  All other systems reviewed and are negative          Current Medications       Current Outpatient Medications:   •  Pediatric Multivitamins-Fl (MULTI-VIT/FLUORIDE) 0 25 MG/ML solution, Take by mouth daily (Patient not taking: Reported on 2022), Disp: , Rfl:   •  tobramycin (TOBREX) 0 3 % SOLN, Administer 1 drop to the right eye every 4 (four) hours while awake (Patient not taking: Reported on 2022), Disp: 5 mL, Rfl: 0    Current Allergies     Allergies as of 2022   • (No Known Allergies)            The following portions of the patient's history were reviewed and updated as appropriate: allergies, current medications, past family history, past medical history, past social history, past surgical history and problem list      Past Medical History:   Diagnosis Date   • Dysuria     Last Assessed 2017   • Foul smelling urine     Last Assessed    • Furuncle     Last Assessed  2015   • Glucosuria     Last Assessed 10/21/2014   • Impetigo     Last Assessed 6/15/2016   • Nasolacrimal duct obstruction, acquired     Last Assessed 2013   •  feeding problem     Last assessed    • Umbilical hernia     Last Assessed 10/01/2014       Past Surgical History:   Procedure Laterality Date   • TOOTH EXTRACTION N/A        Family History   Problem Relation Age of Onset   • Hypertension Mother    • No Known Problems Father    • Lymphoma Maternal Grandfather    • Multiple sclerosis Paternal Grandfather    • Heart disease Family          Medications have been verified  Objective   Pulse (!) 107   Temp 99 4 °F (37 4 °C)   Resp 14   SpO2 99%        Physical Exam     Physical Exam  Vitals and nursing note reviewed  Constitutional:       General: She is active  Appearance: She is well-developed and well-nourished  Cardiovascular:      Rate and Rhythm: Normal rate and regular rhythm  Pulmonary:      Effort: Pulmonary effort is normal       Breath sounds: Normal breath sounds  Musculoskeletal:      Right foot: Normal       Left foot: Decreased range of motion  Swelling and tenderness present  Neurological:      Mental Status: She is alert  Wheelchair/Stroller

## 2023-02-27 NOTE — CONSULT NOTE ADULT - ATTENDING COMMENTS
40M Hx ETOH Cirrhosis, Metabolic Encephalopathy, ESRD-HD, Portal HTN, Ascites, Hemorrhoids, Gastric Varies Bleed 7/20 s/p 3 PRBC Transfusion and IR Embolization admitted 11/1/20 for IR Paracentesis and Thoracentesis.  - Breathing comfortably on RAO2 vs. [id number]  - Metabolic Encephalopathy grade 1-2 arousal to verbal command   - Hemodynamically stable with borderline low SBP 85-90 mmHg starting on Midodrine and 25% Albumin   - Coagulopathy correction with DDAVP and FFP pending procedure in future   - No acute/urgent ICU Intervention or Procedure Plan overnight   - Discussed with ICU staffs and PMD Service     Patient seen and examined with ICU Resident/Fellow at bedside after lab data, medical records and radiology reports reviewed. I have read and agreeable in general with resident's Documented Note, Assessment and Management Plan which reflected my opinions from bedside round and discussion.
Patient seen and examined with liver team. I agree with the plan as above
38 yo M w/ PMH of EtOH cirrhosis c/b gastric variceal bleeding in July 2020 s/p 34 pRBC, IR embolization, ESRD on HD (M/W/F), bladder rupture 2/2 fall s/p ex-lap in 2019 was transferred from OSH for worsening abdominal distention and pain  No fever, has leukocytosis  Distended abd from ascites  Covid Ab neg  Does not appear toxic, does have discomfort when palpating abd, but difficult to tell if is inflammatory vs due to distension  Low general suspicion for SBP, but cover for now while awaiting para  Overall,  1) Leukocytosis  - Seems to have longstanding WBC of unclear etiology  - Would check paracentesis (both for diagnosis and therapeutic)  - Ceftriaxone 2g q 24 for now  - Check BCXs x 2  - Check CXR  - If does not improve would warrant workup for explanation of longstanding leukocytosis  2) Ascites  - Would pursue para as above  - F/U GI/hepatology  3) Elevated Lactate  - Trend to normal, also unclear significance considering lack of signs sepsis (aside from WBC)  - Further workup per primary team    Calin Thomas MD  Pager [phone]  After 5pm and on weekends call [phone]    I was physically present for the key portions of the evaluation and management service provided. I saw and examined the patient. I agree with the above history, physical, and plan except for any discrepancies which I have documented in “Attending Attestation.” Please refer to “Attending Attestation” for final plan.
I have examined pt and agree with above exam and plan. 40 yo male with ETOH cirrhosis with ascites. Called for hypotension.  Pt is at baseline SBP high 80's low 90's.  Pt also with large right pleural effusion.  Check ultrasound of R lung and consider thoracocentesis. Pt oxygenation well.        Pt is not a MICU candidate.
I have seen this patient with the fellow and agree with their assessment and plan. In addition, pt on dialysis, might require dialysis today as K rising but unclear cause of lactate elevation, awaiting eval by ICU before considering dialysis- given hypotension, tachy, white out of lungs on one side and rising lactate concerning for perforation    Earle Olmos MD  Cell   Pager   Office 
Alert and oriented to person, place and time

## 2023-03-28 NOTE — DISCHARGE NOTE PROVIDER - CARE PROVIDERS DIRECT ADDRESSES
Jarad is a 21 year old who is being evaluated via a billable telephone visit.      What phone number would you like to be contacted at? [phone]  How would you like to obtain your AVS? MyChart  {PROVIDER LOCATION On-site should be selected for visits conducted from your clinic location or adjoining [facility] hospital, academic office, or other nearby [facility] building. Off-site should be selected for all other provider locations, including home:[id number]}  Distant Location (provider location):  On-site        Subjective   Jarad is a 21 year old, presenting for the following health issues:  No chief complaint on file.  No flowsheet data found.  HPI     Medication Followup of  Methylphenidate    Taking Medication as prescribed: yes    Side Effects:  None    Medication Helping Symptoms:  yes          Review of Systems   Constitutional, HEENT, cardiovascular, pulmonary, GI, , musculoskeletal, neuro, skin, endocrine and psych systems are negative, except as otherwise noted.      Objective           Vitals:  No vitals were obtained today due to virtual visit.    Physical Exam   healthy, alert and no distress  PSYCH: Alert and oriented times 3; coherent speech, normal   rate and volume, able to articulate logical thoughts, able   to abstract reason, no tangential thoughts, no hallucinations   or delusions  His affect is normal  RESP: No cough, no audible wheezing, able to talk in full sentences  Remainder of exam unable to be completed due to telephone visits  Jarad was seen today for a.d.h.d.    Diagnoses and all orders for this visit:    Attention deficit hyperactivity disorder (ADHD), combined type                Phone call duration: *** minutes     [email or web address]

## 2023-03-30 NOTE — CONSULT NOTE ADULT - SUBJECTIVE AND OBJECTIVE BOX
She became remains mildly elevated will watch   CHIEF COMPLAINT:    HPI:    PAST MEDICAL & SURGICAL HISTORY:  End-stage renal disease (ESRD)  On HD MWF    Cirrhosis, alcoholic    S/P exploratory laparotomy  for bladder rupture s/p fall        FAMILY HISTORY:  FH: alpha 1 antitrypsin deficiency        SOCIAL HISTORY:  Smoking: [ ] Never Smoked [ ] Former Smoker (__ packs x ___ years) [ ] Current Smoker  (__ packs x ___ years)  Substance Use: [ ] Never Used [ ] Used ____  EtOH Use:  Marital Status: [ ] Single [ ]  [ ]  [ ]   Sexual History:   Occupation:  Recent Travel:  Country of Birth:  Advance Directives:    Allergies    No Known Allergies    Intolerances        HOME MEDICATIONS:    REVIEW OF SYSTEMS:            OBJECTIVE:  ICU Vital Signs Last 24 Hrs  T(C): 36.4 (01 Nov 2020 07:38), Max: 36.8 (01 Nov 2020 03:55)  T(F): 97.6 (01 Nov 2020 07:38), Max: 98.3 (01 Nov 2020 03:55)  HR: 122 (01 Nov 2020 07:38) (122 - 136)  BP: 92/54 (01 Nov 2020 07:38) (86/53 - 98/58)  BP(mean): --  ABP: --  ABP(mean): --  RR: 20 (01 Nov 2020 07:38) (19 - 20)  SpO2: 95% (01 Nov 2020 07:38) (92% - 95%)        10-31 @ 08:01  -  11-01 @ 07:00  --------------------------------------------------------  IN: 250 mL / OUT: 0 mL / NET: 250 mL      CAPILLARY BLOOD GLUCOSE          PHYSICAL EXAM:        HOSPITAL MEDICATIONS:  MEDICATIONS  (STANDING):  albumin human 25% IVPB 50 milliLiter(s) IV Intermittent every 6 hours  cefTRIAXone   IVPB 2000 milliGRAM(s) IV Intermittent every 24 hours  folic acid 1 milliGRAM(s) Oral daily  influenza   Vaccine 0.5 milliLiter(s) IntraMuscular once  midodrine 20 milliGRAM(s) Oral every 8 hours  midodrine. 10 milliGRAM(s) Oral once  multivitamin 1 Tablet(s) Oral daily  pantoprazole    Tablet 40 milliGRAM(s) Oral before breakfast  sevelamer carbonate 800 milliGRAM(s) Oral three times a day    MEDICATIONS  (PRN):      LABS:                        9.0    16.28 )-----------( 83       ( 01 Nov 2020 07:06 )             27.8     Hgb Trend: 9.0<--, 9.6<--  11-01    132<L>  |  97  |  14  ----------------------------<  71  5.3   |  16<L>  |  5.46<H>    Ca    8.6      01 Nov 2020 07:02  Phos  5.1     11-01  Mg     1.6     11-01    TPro  6.6  /  Alb  2.1<L>  /  TBili  9.9<H>  /  DBili  x   /  AST  127<H>  /  ALT  37  /  AlkPhos  119  11-01    Creatinine Trend: 5.46<--, 5.17<--  PT/INR - ( 01 Nov 2020 09:03 )   PT: 33.9 sec;   INR: 3.03 ratio         PTT - ( 01 Nov 2020 09:03 )  PTT:59.0 sec      Venous Blood Gas:  11-01 @ 07:10  7.30/40/37/19/56  VBG Lactate: 9.4      MICROBIOLOGY:     RADIOLOGY:  [ ] Reviewed and interpreted by me    EKG:        Shelby ANP-BC (ext. [id number])           CHIEF COMPLAINT:    HPI:  39 yr old male with PMHx EtOH cirrhosis, gastric variceal bleeding 7/2020 requiring IR embolization, ESRD on HD, bladder rupture secondary to ex lap 2019 who originally presented from OSH 10/31/20 where he presented with sob with abd pain and distention over 3 day period, pt due for paracenthesis however unable obtain due to insurance issues. Admitted to [facility] for decompensated liver cirrhosis. Pt being treated for possible SBP. Pt found to have large Rt pleural effusion with near complete collapse Rt lung, concerning for hepatic hydrothorax.    this coures now c/b hypotension and sinus tachycardia   Consult called for hypotension              PAST MEDICAL & SURGICAL HISTORY:  End-stage renal disease (ESRD)  On HD MWF    Cirrhosis, alcoholic    S/P exploratory laparotomy  for bladder rupture s/p fall        FAMILY HISTORY:  FH: alpha 1 antitrypsin deficiency        SOCIAL HISTORY:  Smoking: [ ] Never Smoked [ ] Former Smoker (__ packs x ___ years) [ ] Current Smoker  (__ packs x ___ years)  Substance Use: [ ] Never Used [ ] Used ____  EtOH Use:  Marital Status: [ ] Single [ ]  [ ]  [ ]   Sexual History:   Occupation:  Recent Travel:  Country of Birth:  Advance Directives:    Allergies    No Known Allergies    Intolerances        HOME MEDICATIONS:    REVIEW OF SYSTEMS:            OBJECTIVE:  ICU Vital Signs Last 24 Hrs  T(C): 36.4 (01 Nov 2020 07:38), Max: 36.8 (01 Nov 2020 03:55)  T(F): 97.6 (01 Nov 2020 07:38), Max: 98.3 (01 Nov 2020 03:55)  HR: 122 (01 Nov 2020 07:38) (122 - 136)  BP: 92/54 (01 Nov 2020 07:38) (86/53 - 98/58)  BP(mean): --  ABP: --  ABP(mean): --  RR: 20 (01 Nov 2020 07:38) (19 - 20)  SpO2: 95% (01 Nov 2020 07:38) (92% - 95%)        10-31 @ 08:01  -  11-01 @ 07:00  --------------------------------------------------------  IN: 250 mL / OUT: 0 mL / NET: 250 mL      CAPILLARY BLOOD GLUCOSE          PHYSICAL EXAM:        HOSPITAL MEDICATIONS:  MEDICATIONS  (STANDING):  albumin human 25% IVPB 50 milliLiter(s) IV Intermittent every 6 hours  cefTRIAXone   IVPB 2000 milliGRAM(s) IV Intermittent every 24 hours  folic acid 1 milliGRAM(s) Oral daily  influenza   Vaccine 0.5 milliLiter(s) IntraMuscular once  midodrine 20 milliGRAM(s) Oral every 8 hours  midodrine. 10 milliGRAM(s) Oral once  multivitamin 1 Tablet(s) Oral daily  pantoprazole    Tablet 40 milliGRAM(s) Oral before breakfast  sevelamer carbonate 800 milliGRAM(s) Oral three times a day    MEDICATIONS  (PRN):      LABS:                        9.0    16.28 )-----------( 83       ( 01 Nov 2020 07:06 )             27.8     Hgb Trend: 9.0<--, 9.6<--  11-01    132<L>  |  97  |  14  ----------------------------<  71  5.3   |  16<L>  |  5.46<H>    Ca    8.6      01 Nov 2020 07:02  Phos  5.1     11-01  Mg     1.6     11-01    TPro  6.6  /  Alb  2.1<L>  /  TBili  9.9<H>  /  DBili  x   /  AST  127<H>  /  ALT  37  /  AlkPhos  119  11-01    Creatinine Trend: 5.46<--, 5.17<--  PT/INR - ( 01 Nov 2020 09:03 )   PT: 33.9 sec;   INR: 3.03 ratio         PTT - ( 01 Nov 2020 09:03 )  PTT:59.0 sec      Venous Blood Gas:  11-01 @ 07:10  7.30/40/37/19/56  VBG Lactate: 9.4      MICROBIOLOGY:     RADIOLOGY:  [ ] Reviewed and interpreted by me    EKG:        Shelby ANP-BC (ext. [id number])           CHIEF COMPLAINT:    HPI:  39 yr old male with PMHx EtOH cirrhosis, gastric variceal bleeding 7/2020 requiring IR embolization, ESRD on HD, bladder rupture secondary to ex lap 2019 who originally presented from OSH 10/31/20 where he presented with sob with abd pain and distention over 3 day period, pt due for paracenthesis however unable obtain due to insurance issues. Admitted to [facility] for decompensated liver cirrhosis. Pt being treated for possible SBP. Pt found to have large Rt pleural effusion with near complete collapse Rt lung, concerning for hepatic hydrothorax.    this course now c/b hypotension SBP 86/53-98/58 and sinus tachycardia 120's-130's. In addition pt with AGMA of 19 (23 corrected for albumin) serum HCO3 16, vPh 7.3 hypoglycemia of 71   Consult called for hypotension.              PAST MEDICAL & SURGICAL HISTORY:  End-stage renal disease (ESRD)  On HD MWF    Cirrhosis, alcoholic    S/P exploratory laparotomy  for bladder rupture s/p fall        FAMILY HISTORY:  FH: alpha 1 antitrypsin deficiency        SOCIAL HISTORY:  Smoking: [ ] Never Smoked [ ] Former Smoker (__ packs x ___ years) [ ] Current Smoker  (__ packs x ___ years)  Substance Use: [ ] Never Used [ ] Used ____  EtOH Use:  Marital Status: [ ] Single [ ]  [ ]  [ ]   Sexual History:   Occupation:  Recent Travel:  Country of Birth:  Advance Directives:    Allergies    No Known Allergies    Intolerances        HOME MEDICATIONS:    REVIEW OF SYSTEMS:            OBJECTIVE:  ICU Vital Signs Last 24 Hrs  T(C): 36.4 (01 Nov 2020 07:38), Max: 36.8 (01 Nov 2020 03:55)  T(F): 97.6 (01 Nov 2020 07:38), Max: 98.3 (01 Nov 2020 03:55)  HR: 122 (01 Nov 2020 07:38) (122 - 136)  BP: 92/54 (01 Nov 2020 07:38) (86/53 - 98/58)  BP(mean): --  ABP: --  ABP(mean): --  RR: 20 (01 Nov 2020 07:38) (19 - 20)  SpO2: 95% (01 Nov 2020 07:38) (92% - 95%)        10-31 @ 08:01  -  11-01 @ 07:00  --------------------------------------------------------  IN: 250 mL / OUT: 0 mL / NET: 250 mL      CAPILLARY BLOOD GLUCOSE          PHYSICAL EXAM:        HOSPITAL MEDICATIONS:  MEDICATIONS  (STANDING):  albumin human 25% IVPB 50 milliLiter(s) IV Intermittent every 6 hours  cefTRIAXone   IVPB 2000 milliGRAM(s) IV Intermittent every 24 hours  folic acid 1 milliGRAM(s) Oral daily  influenza   Vaccine 0.5 milliLiter(s) IntraMuscular once  midodrine 20 milliGRAM(s) Oral every 8 hours  midodrine. 10 milliGRAM(s) Oral once  multivitamin 1 Tablet(s) Oral daily  pantoprazole    Tablet 40 milliGRAM(s) Oral before breakfast  sevelamer carbonate 800 milliGRAM(s) Oral three times a day    MEDICATIONS  (PRN):      LABS:                        9.0    16.28 )-----------( 83       ( 01 Nov 2020 07:06 )             27.8     Hgb Trend: 9.0<--, 9.6<--  11-01    132<L>  |  97  |  14  ----------------------------<  71  5.3   |  16<L>  |  5.46<H>    Ca    8.6      01 Nov 2020 07:02  Phos  5.1     11-01  Mg     1.6     11-01    TPro  6.6  /  Alb  2.1<L>  /  TBili  9.9<H>  /  DBili  x   /  AST  127<H>  /  ALT  37  /  AlkPhos  119  11-01    Creatinine Trend: 5.46<--, 5.17<--  PT/INR - ( 01 Nov 2020 09:03 )   PT: 33.9 sec;   INR: 3.03 ratio         PTT - ( 01 Nov 2020 09:03 )  PTT:59.0 sec      Venous Blood Gas:  11-01 @ 07:10  7.30/40/37/19/56  VBG Lactate: 9.4      MICROBIOLOGY:     RADIOLOGY:  [ ] Reviewed and interpreted by me    EKG:        Shelby ANP-BC (ext. [id number])           CHIEF COMPLAINT:    HPI:  39 yr old male with PMHx EtOH cirrhosis, gastric variceal bleeding 7/2020 requiring IR embolization, ESRD on HD, last session friday 10/30/20, bladder rupture secondary to ex lap 2019 who originally presented from OSH ([facility]) 10/31/20 where he presented with sob with abd pain and distention over 3 day period, pt due for paracentesis however unable to obtain due to insurance issues as endorsed by house staff. Pt last paracentesis 9/2020 with removal of 5 liters.      Pt Admitted to [facility] for decompensated liver cirrhosis. Pt being treated for possible SBP with ceftriaxone, has received dilauded 1 mg prn for pain (last dose 0325 this a.m. 11/1/20).  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    this course now c/b persistent hypotension SBP 86/53-98/58 and sinus tachycardia 120's-130's, pt endorses usual SBP 90's. Furthermore pt with AGMA of 19 (23 corrected for albumin) serum HCO3 16, vPh 7.3 hypoglycemia of 71   Consult called for hypotension.              PAST MEDICAL & SURGICAL HISTORY:  End-stage renal disease (ESRD)  On HD MWF    Cirrhosis, alcoholic    S/P exploratory laparotomy  for bladder rupture s/p fall        FAMILY HISTORY:  FH: alpha 1 antitrypsin deficiency        SOCIAL HISTORY:  Smoking: [ ] Never Smoked [ ] Former Smoker (__ packs x ___ years) [ ] Current Smoker  (__ packs x ___ years)  Substance Use: [ ] Never Used [ ] Used ____  EtOH Use:  Marital Status: [ ] Single [ ]  [ ]  [ ]   Sexual History:   Occupation:  Recent Travel:  Country of Birth:  Advance Directives:    Allergies    No Known Allergies    Intolerances        HOME MEDICATIONS:    REVIEW OF SYSTEMS:            OBJECTIVE:  ICU Vital Signs Last 24 Hrs  T(C): 36.4 (01 Nov 2020 07:38), Max: 36.8 (01 Nov 2020 03:55)  T(F): 97.6 (01 Nov 2020 07:38), Max: 98.3 (01 Nov 2020 03:55)  HR: 122 (01 Nov 2020 07:38) (122 - 136)  BP: 92/54 (01 Nov 2020 07:38) (86/53 - 98/58)  BP(mean): --  ABP: --  ABP(mean): --  RR: 20 (01 Nov 2020 07:38) (19 - 20)  SpO2: 95% (01 Nov 2020 07:38) (92% - 95%)        10-31 @ 08:01  -  11-01 @ 07:00  --------------------------------------------------------  IN: 250 mL / OUT: 0 mL / NET: 250 mL      CAPILLARY BLOOD GLUCOSE          PHYSICAL EXAM:        HOSPITAL MEDICATIONS:  MEDICATIONS  (STANDING):  albumin human 25% IVPB 50 milliLiter(s) IV Intermittent every 6 hours  cefTRIAXone   IVPB 2000 milliGRAM(s) IV Intermittent every 24 hours  folic acid 1 milliGRAM(s) Oral daily  influenza   Vaccine 0.5 milliLiter(s) IntraMuscular once  midodrine 20 milliGRAM(s) Oral every 8 hours  midodrine. 10 milliGRAM(s) Oral once  multivitamin 1 Tablet(s) Oral daily  pantoprazole    Tablet 40 milliGRAM(s) Oral before breakfast  sevelamer carbonate 800 milliGRAM(s) Oral three times a day    MEDICATIONS  (PRN):      LABS:                        9.0    16.28 )-----------( 83       ( 01 Nov 2020 07:06 )             27.8     Hgb Trend: 9.0<--, 9.6<--  11-01    132<L>  |  97  |  14  ----------------------------<  71  5.3   |  16<L>  |  5.46<H>    Ca    8.6      01 Nov 2020 07:02  Phos  5.1     11-01  Mg     1.6     11-01    TPro  6.6  /  Alb  2.1<L>  /  TBili  9.9<H>  /  DBili  x   /  AST  127<H>  /  ALT  37  /  AlkPhos  119  11-01    Creatinine Trend: 5.46<--, 5.17<--  PT/INR - ( 01 Nov 2020 09:03 )   PT: 33.9 sec;   INR: 3.03 ratio         PTT - ( 01 Nov 2020 09:03 )  PTT:59.0 sec      Venous Blood Gas:  11-01 @ 07:10  7.30/40/37/19/56  VBG Lactate: 9.4      MICROBIOLOGY:     RADIOLOGY:  [ ] Reviewed and interpreted by me    EKG:        Shelby ANP-BC (ext. [id number])           CHIEF COMPLAINT:    HPI:  39 yr old male with PMHx EtOH cirrhosis, gastric variceal bleeding 7/2020 requiring IR embolization, ESRD on HD, last session friday 10/30/20, bladder rupture secondary to ex lap 2019 who originally presented from OSH ([facility]) 10/31/20 where he presented with sob with abd pain and distention over 3 day period, pt due for paracentesis however unable to obtain due to insurance issues as endorsed by house staff. Pt last paracentesis 9/2020 with removal of 5 liters.      Pt Admitted to [facility] for decompensated liver cirrhosis. Pt being treated for possible SBP with ceftriaxone, has received dilauded 1 mg prn for pain (last dose 0325 this a.m. 11/1/20).  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    this course now c/b persistent hypotension SBP 86/53-98/58 and sinus tachycardia 120's-130's, pt endorses usual SBP 90's. Furthermore pt with AGMA of 19 (23 corrected for albumin) serum HCO3 16, vPh 7.3 hypoglycemia of 71   Consult called for hypotension.              PAST MEDICAL & SURGICAL HISTORY:  End-stage renal disease (ESRD)  On HD MWF    Cirrhosis, alcoholic    S/P exploratory laparotomy  for bladder rupture s/p fall        FAMILY HISTORY:  FH: alpha 1 antitrypsin deficiency        SOCIAL HISTORY:  Smoking: [ ] Never Smoked [ ] Former Smoker (__ packs x ___ years) [ ] Current Smoker  (__ packs x ___ years)  Substance Use: [ ] Never Used [ ] Used ____  EtOH Use:  Marital Status: [ ] Single [ ]  [ ]  [ ]   Sexual History:   Occupation:  Recent Travel:  Country of Birth:  Advance Directives:    Allergies    No Known Allergies    Intolerances        HOME MEDICATIONS:    REVIEW OF SYSTEMS:    CONSTITUTIONAL:          + weakness, no fevers or chills  EYES/ENT:           No visual changes;  No vertigo or throat pain   NECK:            No pain or stiffness  RESPIRATORY:            No cough, wheezing, hemoptysis; + shortness of breath  CARDIOVASCULAR:            No chest pain or palpitations  GASTROINTESTINAL:           + abdominal pain . no epigastric pain. No nausea, vomiting, or hematemesis; No diarrhea or constipation. No melena or hematochezia.  GENITOURINARY:            No dysuria, frequency or hematuria  NEUROLOGICAL:            No numbness or weakness  SKIN:            No pruritis , rashes            OBJECTIVE:  ICU Vital Signs Last 24 Hrs  T(C): 36.4 (01 Nov 2020 07:38), Max: 36.8 (01 Nov 2020 03:55)  T(F): 97.6 (01 Nov 2020 07:38), Max: 98.3 (01 Nov 2020 03:55)  HR: 122 (01 Nov 2020 07:38) (122 - 136)  BP: 92/54 (01 Nov 2020 07:38) (86/53 - 98/58)  BP(mean): --  ABP: --  ABP(mean): --  RR: 20 (01 Nov 2020 07:38) (19 - 20)  SpO2: 95% (01 Nov 2020 07:38) (92% - 95%)        10-31 @ 08:01  -  11-01 @ 07:00  --------------------------------------------------------  IN: 250 mL / OUT: 0 mL / NET: 250 mL      CAPILLARY BLOOD GLUCOSE    VITAL SIGNS AT TIME OF CONSULT  BP: 92/57  ; HR:122 (RRR -tachy)   ; RR:24; SPO2: 95% on Rm Air  ; Temp:        PHYSICAL EXAM:  GENERAL:   NAD, cachectic,   HEAD:    Atraumatic, Normocephalic  EYES:   EOMI, PERRLA, conjunctiva and sclera icteric  ENMT:   No oropharyngeal exudates, erythema or lesions,  Moist mucous membranes  NECK:   Supple, no cervical lymphadenopathy, no JVD  NERVOUS SYSTEM:   Alert & Oriented X3, CN II-XII intact, Moves all extremities  ; Upper extremities  4/5; Lower extremities 4/5, full sensation to light touch   CHEST/LUNG:   utilizing Rm air, slight tachypneic, Breath sounds bilaterally   with crackles in left lower lung field, without rhonchi, wheezes, rubs, breath sounds diminished in Rt lung field , able to take deep breaths spontaneously and upon command. Pt states currently without SOB  HEART:   tachycardic, regular rhythm  ; S1/S2 without murmurs, without rubs, or gallops.   ABDOMEN:   slight firm, Nontender,distended; hypoactive Bowel sounds present, Bladder non distended, non palpable  EXTREMITIES:   Pulses palpable radial pulses 2+ bilat, DP/PT 1+/1+ bilat, without clubbing, cyanosis. Digits warm to touch with good cap refill < 3 secs  SKIN:   warm, dry, intact, normal color, no rash or abnormal lesions, without palpable nodes      HOSPITAL MEDICATIONS:  MEDICATIONS  (STANDING):  albumin human 25% IVPB 50 milliLiter(s) IV Intermittent every 6 hours  cefTRIAXone   IVPB 2000 milliGRAM(s) IV Intermittent every 24 hours  folic acid 1 milliGRAM(s) Oral daily  influenza   Vaccine 0.5 milliLiter(s) IntraMuscular once  midodrine 20 milliGRAM(s) Oral every 8 hours  midodrine. 10 milliGRAM(s) Oral once  multivitamin 1 Tablet(s) Oral daily  pantoprazole    Tablet 40 milliGRAM(s) Oral before breakfast  sevelamer carbonate 800 milliGRAM(s) Oral three times a day    MEDICATIONS  (PRN):      LABS:                        9.0    16.28 )-----------( 83       ( 01 Nov 2020 07:06 )             27.8     Hgb Trend: 9.0<--, 9.6<--  11-01    132<L>  |  97  |  14  ----------------------------<  71  5.3   |  16<L>  |  5.46<H>    Ca    8.6      01 Nov 2020 07:02  Phos  5.1     11-01  Mg     1.6     11-01    TPro  6.6  /  Alb  2.1<L>  /  TBili  9.9<H>  /  DBili  x   /  AST  127<H>  /  ALT  37  /  AlkPhos  119  11-01    Creatinine Trend: 5.46<--, 5.17<--  PT/INR - ( 01 Nov 2020 09:03 )   PT: 33.9 sec;   INR: 3.03 ratio         PTT - ( 01 Nov 2020 09:03 )  PTT:59.0 sec      Venous Blood Gas:  11-01 @ 07:10  7.30/40/37/19/56  VBG Lactate: 9.4      MICROBIOLOGY:     RADIOLOGY:  [ ] Reviewed and interpreted by me    EKG:        Shelby ANP-BC (ext. [id number])           CHIEF COMPLAINT:    HPI:  39 yr old male with PMHx EtOH cirrhosis, gastric variceal bleeding 7/2020 requiring IR embolization, ESRD on HD, last session friday 10/30/20, bladder rupture secondary to ex lap 2019 who originally presented from OSH ([facility]) 10/31/20 where he presented with sob with abd pain and distention over 3 day period, pt due for paracentesis however unable to obtain due to insurance issues as endorsed by house staff. Pt last paracentesis 9/2020 with removal of 5 liters.      Pt Admitted to [facility] for decompensated liver cirrhosis. Pt being treated for possible SBP with ceftriaxone, has received dilauded 1 mg prn for pain (last dose 0325 this a.m. 11/1/20).  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    this course now c/b persistent hypotension SBP 86/53-98/58 and sinus tachycardia 120's-130's, pt endorses usual SBP 90's. Furthermore pt with AGMA of 19 (23 corrected for albumin) serum HCO3 16, vPh 7.3 hypoglycemia of 71   Consult called for hypotension.        as pt awake alert oriented with baseline BP, currently not candidate for MICU transfer. Please re consult if we can be of further assistance with this patient      PAST MEDICAL & SURGICAL HISTORY:  End-stage renal disease (ESRD)  On HD MWF    Cirrhosis, alcoholic    S/P exploratory laparotomy  for bladder rupture s/p fall        FAMILY HISTORY:  FH: alpha 1 antitrypsin deficiency        SOCIAL HISTORY:  Smoking: [ ] Never Smoked [ ] Former Smoker (__ packs x ___ years) [ ] Current Smoker  (__ packs x ___ years)  Substance Use: [ ] Never Used [ ] Used ____  EtOH Use:  Marital Status: [ ] Single [ ]  [ ]  [ ]   Sexual History:   Occupation:  Recent Travel:  Country of Birth:  Advance Directives:    Allergies    No Known Allergies    Intolerances        HOME MEDICATIONS:    REVIEW OF SYSTEMS:    CONSTITUTIONAL:          + weakness, no fevers or chills  EYES/ENT:           No visual changes;  No vertigo or throat pain   NECK:            No pain or stiffness  RESPIRATORY:            No cough, wheezing, hemoptysis; + shortness of breath  CARDIOVASCULAR:            No chest pain or palpitations  GASTROINTESTINAL:           + abdominal pain . no epigastric pain. No nausea, vomiting, or hematemesis; No diarrhea or constipation. No melena or hematochezia.  GENITOURINARY:            No dysuria, frequency or hematuria  NEUROLOGICAL:            No numbness or weakness  SKIN:            No pruritis , rashes            OBJECTIVE:  ICU Vital Signs Last 24 Hrs  T(C): 36.4 (01 Nov 2020 07:38), Max: 36.8 (01 Nov 2020 03:55)  T(F): 97.6 (01 Nov 2020 07:38), Max: 98.3 (01 Nov 2020 03:55)  HR: 122 (01 Nov 2020 07:38) (122 - 136)  BP: 92/54 (01 Nov 2020 07:38) (86/53 - 98/58)  BP(mean): --  ABP: --  ABP(mean): --  RR: 20 (01 Nov 2020 07:38) (19 - 20)  SpO2: 95% (01 Nov 2020 07:38) (92% - 95%)        10-31 @ 08:01  -  11-01 @ 07:00  --------------------------------------------------------  IN: 250 mL / OUT: 0 mL / NET: 250 mL      CAPILLARY BLOOD GLUCOSE    VITAL SIGNS AT TIME OF CONSULT  BP: 92/57  ; HR:122 (RRR -tachy)   ; RR:24; SPO2: 95% on Rm Air  ; Temp:        PHYSICAL EXAM:  GENERAL:   NAD, cachectic,   HEAD:    Atraumatic, Normocephalic  EYES:   EOMI, PERRLA, conjunctiva and sclera icteric  ENMT:   No oropharyngeal exudates, erythema or lesions,  Moist mucous membranes  NECK:   Supple, no cervical lymphadenopathy, no JVD  NERVOUS SYSTEM:   Alert & Oriented X3, CN II-XII intact, Moves all extremities  ; Upper extremities  4/5; Lower extremities 4/5, full sensation to light touch   CHEST/LUNG:   utilizing Rm air, slight tachypneic, Breath sounds bilaterally   with crackles in left lower lung field, without rhonchi, wheezes, rubs, breath sounds diminished in Rt lung field , able to take deep breaths spontaneously and upon command. Pt states currently without SOB  HEART:   tachycardic, regular rhythm  ; S1/S2 without murmurs, without rubs, or gallops.   ABDOMEN:   slight firm, Nontender,distended; hypoactive Bowel sounds present, Bladder non distended, non palpable  EXTREMITIES:   Pulses palpable radial pulses 2+ bilat, DP/PT 1+/1+ bilat, without clubbing, cyanosis. Digits warm to touch with good cap refill < 3 secs  SKIN:   warm, dry, intact, normal color, no rash or abnormal lesions, without palpable nodes      HOSPITAL MEDICATIONS:  MEDICATIONS  (STANDING):  albumin human 25% IVPB 50 milliLiter(s) IV Intermittent every 6 hours  cefTRIAXone   IVPB 2000 milliGRAM(s) IV Intermittent every 24 hours  folic acid 1 milliGRAM(s) Oral daily  influenza   Vaccine 0.5 milliLiter(s) IntraMuscular once  midodrine 20 milliGRAM(s) Oral every 8 hours  midodrine. 10 milliGRAM(s) Oral once  multivitamin 1 Tablet(s) Oral daily  pantoprazole    Tablet 40 milliGRAM(s) Oral before breakfast  sevelamer carbonate 800 milliGRAM(s) Oral three times a day    MEDICATIONS  (PRN):      LABS:                        9.0    16.28 )-----------( 83       ( 01 Nov 2020 07:06 )             27.8     Hgb Trend: 9.0<--, 9.6<--  11-01    132<L>  |  97  |  14  ----------------------------<  71  5.3   |  16<L>  |  5.46<H>    Ca    8.6      01 Nov 2020 07:02  Phos  5.1     11-01  Mg     1.6     11-01    TPro  6.6  /  Alb  2.1<L>  /  TBili  9.9<H>  /  DBili  x   /  AST  127<H>  /  ALT  37  /  AlkPhos  119  11-01    Creatinine Trend: 5.46<--, 5.17<--  PT/INR - ( 01 Nov 2020 09:03 )   PT: 33.9 sec;   INR: 3.03 ratio         PTT - ( 01 Nov 2020 09:03 )  PTT:59.0 sec      Venous Blood Gas:  11-01 @ 07:10  7.30/40/37/19/56  VBG Lactate: 9.4      MICROBIOLOGY:     RADIOLOGY:  [ ] Reviewed and interpreted by me    EKG:        [facility] (ext. [phone])

## 2023-07-21 NOTE — CONSULT NOTE ADULT - ASSESSMENT
Yes Assessment:    Plan:    #Neuro:    #Pulm:  -Supplemental O2 prn to maintain SPO2 > 92%  -Bronchodilators q 6 hrs prn for sob/wheezes  -HOB >/= 30 degree angle  -pt with large Rt pleural effusion - obtain pulm consult regarding need for thoracentesis    #CV:  -SBP 80's to 90's (baseline 90's)  -tachycardic to 120's 130's most like due to decompensated cirrhosis  -Pt currently on midodrine 10 mg, increase to 20 mg po q 8 hr  -ECG now please to obtain baseline    #GI/:  -EtOH cirrhosis  -last paracentesis sept  -abd distended, firm  -obtain POCUS to eval need for paracentesis  -ESRD usual sessions M/W/F, last session 10/30/20  -discuss with renal regarding obtaining HD session    #ID:  -Pt with decompensated cirrhosis  -currently on ceftriaxone 2gm IV qd - continue  -  #FEN/ENDO/HEME:           Assessment:  39 yr male with stated hx significant for EtOH cirrhosis, gastric varices, ESRD who presented from OSH with sob, abd pain and distention, admitted for decompensated cirrhosis. Course c/b hypotension and tachycardia. consult called for persistent hypotension    Plan:    #Neuro:    #Pulm:  -Supplemental O2 prn to maintain SPO2 > 92%  -Bronchodilators q 6 hrs prn for sob/wheezes  -HOB >/= 30 degree angle  -pt with large Rt pleural effusion - obtain pulm consult regarding need for thoracentesis    #CV:  -SBP 80's to 90's (baseline 90's)  -tachycardic to 120's 130's most like due to decompensated cirrhosis  -Pt currently on midodrine 10 mg, increase to 20 mg po q 8 hr  -ECG now please to obtain baseline    #GI/:  -EtOH cirrhosis  -last paracentesis sept  -abd distended, firm  -obtain POCUS to eval need for paracentesis  -ESRD usual sessions M/W/F, last session 10/30/20  -discuss with renal regarding obtaining HD session  -continue albumin 25%/50 cc's IV q 6 hrs  -diet as tolerated    #ID:  -Pt with decompensated cirrhosis  -currently on ceftriaxone 2gm IV qd - continue    #FEN/ENDO/HEME:  -obtain CMP/Mg++/PO--4/CBC w diff/PT/PTT/INR now and q. a.m.  -pt with elevated lactate - most likely B type secondary to cirrhosis  -trend lactate   -pt with glucose levels of 70's  -POC glucose measurements q 4 hrs -maintain glucose 120-160's  -hyponatremic - most likely secondary to cirrhosis  -consider obtaining urine lytes osmol  -trend Na+

## 2023-07-31 NOTE — PROGRESS NOTE ADULT - PROBLEM SELECTOR PLAN 1
Patient notified and instructed on testing results and recommendations. Hx of EtOH cirrhosis c/b ascites and gastric variceal bleed in July 2020. Not concerning for SBP. Per IR, not a candidate for TIPS due to high MELD score.  LABS:  - continue to monitor Hgb, Plt level, signs of bleeding  - maintain active T+S, transfuse to goal Hgb >7 (baseline Hgb ~8)   - continue to monitor CMP/LFTs, currently stable  - discontinue trending lactate - will be elevated due to hepatic dysfunction    - Therapeutic paracentesis done 9/21/20 Hx of EtOH cirrhosis c/b ascites and gastric variceal bleed in July 2020. Not concerning for SBP. Per IR, not a candidate for TIPS due to high MELD score.  LABS: daily CBC, CMP, active T+S  - transfuse 1U pRBC for Hgb 6.7 today; draw post-transfusion CBC 1hr after  - add oxycodone 5mg q4h PRN for moderate pain

## 2024-01-10 NOTE — DIETITIAN INITIAL EVALUATION ADULT. - OBTAIN DAILY WEIGHT
Pulmonary Progress Note    Marylou Ward is a 82 y.o. female on day 13 of admission presenting with Acute vomiting.    Subjective   Stable on room air  Objective   Vital Signs      1/9/2024     3:15 PM 1/9/2024     8:02 PM 1/9/2024    11:39 PM 1/10/2024     3:51 AM 1/10/2024     6:00 AM 1/10/2024     7:08 AM 1/10/2024    11:13 AM   Vitals   Systolic 158 162 180 169  176 150   Diastolic 58 63 60 62  69 56   Heart Rate 90 88 88 76  87 77   Temp 37 °C (98.6 °F) 36.8 °C (98.2 °F) 37 °C (98.6 °F) 36.7 °C (98.1 °F)  36.5 °C (97.7 °F) 36.8 °C (98.2 °F)   Resp 18 20 19 21  20 16   Weight (lb)     139.33     BMI     27.21 kg/m2     BSA (m2)     1.64 m2         Oxygen Therapy  SpO2: 92 %  Medical Gas Therapy: None (Room air)  O2 Delivery Method: Nasal cannula         Intake/Output previous 24 hours:    Intake/Output Summary (Last 24 hours) at 1/10/2024 1259  Last data filed at 1/10/2024 0232  Gross per 24 hour   Intake --   Output 210 ml   Net -210 ml       Physical Exam  Vital signs reviewed  Alert, no acute distress, hoarse voice  Nasal cannula  S1-S2 +, no murmurs  Lungs are diminished, scattered expiratory wheezes.  Possibly improved compared with yesterday  Abdomen soft  No CCE  Normal mood and affect  ...  Lines and Tubes:  PICC - Adult 01/02/24 Triple lumen Right Brachial vein (Active)   Placement Date/Time: 01/02/24 1715   Hand Hygiene Completed: Yes  Catheter Time Out Checklist Completed: Yes  Size (Fr): 5  Lumen Type: Triple lumen  Catheter to Vein Ratio Less Than 50%: Yes  Total Length (cm): 27 cm  External Length (cm): 1 cm  Orie...   Number of days: 7         Scheduled medications  ampicillin-sulbactam, 3 g, intravenous, q6h  dexAMETHasone, 4 mg, oral, q12h BENNY  enoxaparin, 40 mg, subcutaneous, Nightly  insulin lispro, 0-10 Units, subcutaneous, TID with meals  ipratropium-albuteroL, 3 mL, nebulization, TID  metFORMIN, 500 mg, oral, BID with meals  metoprolol tartrate, 50 mg, oral, BID  pantoprazole, 40 mg, oral,  Daily  polyethylene glycol, 17 g, oral, Daily  sertraline, 50 mg, oral, Daily      Continuous medications     PRN medications  PRN medications: acetaminophen, ALPRAZolam, alteplase, benzocaine-menthol, bisacodyl, dextromethorphan-guaifenesin, dextrose 10 % in water (D10W), dextrose, diphenhydrAMINE, glucagon, hydrALAZINE, HYDROmorphone, ipratropium-albuteroL, lidocaine-diphenhydraMINE-Maalox 1:1:1, melatonin, ondansetron, oxyCODONE, oxyCODONE-acetaminophen, oxygen    Relevant Results  Results from last 7 days   Lab Units 01/06/24 0417 01/05/24 0418 01/04/24  0643   WBC AUTO x10*3/uL 13.7* 14.7* 16.0*   HEMOGLOBIN g/dL 8.8* 9.8* 9.3*   HEMATOCRIT % 28.6* 31.1* 29.4*   PLATELETS AUTO x10*3/uL 360 346 310      Results from last 7 days   Lab Units 01/06/24 0417 01/05/24 0418 01/04/24  0643   SODIUM mmol/L 134 136 138   POTASSIUM mmol/L 3.3* 3.4 3.6   CHLORIDE mmol/L 95* 96* 99   CO2 mmol/L 25 26 29   BUN mg/dL 27* 17 16   CREATININE mg/dL 1.00 0.80 0.90   GLUCOSE mg/dL 224* 157* 137*   CALCIUM mg/dL 8.5 8.7 8.5          XR chest 1 view 01/03/2024    Narrative  Interpreted By:  Pepe Miles,  STUDY:  XR CHEST 1 VIEW; 1/3/2024 10:27 am    INDICATION:  Signs/Symptoms:possible pneumonia    COMPARISON:  01/02/2024    ACCESSION NUMBER(S):  [id number]    ORDERING CLINICIAN:  RODRIGUEZ COSTA    TECHNIQUE:  Frontal and lateral chest radiographs.    FINDINGS:  The cardiomediastinal silhouette is unremarkable. Mild perihilar  congestion and pulmonary edema. No pleural effusion is identified.    The osseous structures are intact.    Impression  1. Mild perihilar congestion and pulmonary edema.  2. Right basilar opacity, likely representing atelectasis.        Signed by: Pepe Miles 1/3/2024 10:35 AM  Dictation workstation:   [id number]      Patient Active Problem List   Diagnosis    Acute vomiting    Chronic cholecystitis with calculus    HTN (hypertension)    Gastroesophageal reflux disease    Diabetes mellitus, type  2 (CMS/HCC)    Chronic obstructive pulmonary disease (CMS/HCC)     Assessment/Plan   Principal Problem:    Acute vomiting  Active Problems:    HTN (hypertension)    Gastroesophageal reflux disease    Diabetes mellitus, type 2 (CMS/HCC)    Chronic obstructive pulmonary disease (CMS/HCC)    Chronic cholecystitis with calculus    Follow-up respiratory failure which is basically resolved.  She had difficult intubation.  In fact was unable to be intubated prior to procedure.  She will need fiberoptic intubation for next procedure.  Unclear what is causing her left side chest pain.  Seems pleuritic or musculoskeletal in nature.  She has no signs of rib fracture or pneumonia she is stable on room air.  CT of the thoracic spine is pending.  Supportive care with pain meds.  Also has a history of chronic pain..  She be discharged with pain management and follow-up as an outpatient.    Porfiroi Lea MD  [facility]                  yes

## 2024-02-09 NOTE — H&P ADULT - NSTOBACCOSCREENHP_GEN_A_NCS
[de-identified] : The patient is a well developed, well nourished female in no apparent distress. She is alert and oriented X 3 with a pleasant mood and appropriate affect.     On physical examination of the right knee, her ROM is 0-120 degrees. The patient walks with a normal gait and stands in neutral alignment. There is no effusion. No warmth or erythema is noted. The patella is tender to palpation medially and laterally. There is patellofemoral crepitus noted. The apprehension and grind tests are negative. The extensor mechanism is intact. There is no joint line tenderness. The Juan sign is absent. The Lachman and pivot shift tests are negative. There is no varus or valgus laxity at 0 or 30 degrees. No posterolateral or anteromedial laxity is noted. No masses are palpable. No other soft tissue or bony tenderness is noted. There is some tenderness noted on palpation of the IT band. Quadriceps weakness is noted. Neurovascular function is intact.     [de-identified] : Radiographs of both knees show well aligned and well maintained joint spacing bilaterallt No

## 2024-05-02 NOTE — PATIENT PROFILE ADULT - PUBLIC BENEFITS
Called mom to let her know that infusion appt was r/s to 5/22. Mom stated that infusion called her yesterday. Mom did ask about psych appt, stated I sent message to Dr. Pabon, and Dr. Pabon said his , Chelsey could reach out to them. Stated I would message  to see if she could touch base w/ mom. Mom v/u.   no

## 2024-05-09 NOTE — PROGRESS NOTE ADULT - PROBLEM SELECTOR PLAN 2
accepted downtrending HB and BRBPR  PPI IV bid, SBP ppx with Ceftriaxone IV, octeotride gtt  dc'ed as per hepatology recs  colonoscopy on hold for now, Hb stable   CLD, advance as tolerated

## 2024-07-29 NOTE — PROVIDER CONTACT NOTE (OTHER) - BACKGROUND
admitted w/abdominal pain ARF Cirrhosis Bleeding that does not stop/Swelling that gets worse/Pain not relieved by Medications/Fever greater than (need to indicate Fahrenheit or Celsius)/Nausea and vomiting that does not stop/Unable to urinate/Inability to tolerate liquids or foods

## 2024-10-14 NOTE — PROGRESS NOTE ADULT - ASSESSMENT
Can refer to [facility]. They have an office in [facility].    39M w/ decompensated EtOH cirrhosis, presenting w/ acute on chronic liver failure of unclear etiology (infection NOS vs continued inflammation due to EtOH)a. Course c/b massive GIB from gastric varix requiring 25u pRBC, s/p glue injection and PARTO by IR. Hb stable post procedure, now on floor w/ stably high MELD-Na labs. Remains HD dependent, plan for outpatient liver transplant evaluation.      Impression:  #Acute on chronic liver failure: d/dx includes underlying infection (UA neg, BCx NGTD from this admission, CXR neg, dx para neg for SBP, biliary imaging negative, C diff negative) vs worsening EtOH hepatitis (MDF = 60 but no interval drinking since discharge and this is an acute change)  #Cirrhosis due to EtOH, decompensated by ascites  - varices: no esophageal varices on EGD 7/29, actively bleeding gastric varix s/p glue injection and PARTO by IR, bleeding appears to be controlled  - ascites: present on exam, neg for SBP 7/26, on Lasix 20mg/spironolactone 25mg daily as outpatient, s/p 1.5L removed during PARTO 7/29  - HE: none on exam  - HCC: no imaging, MRI without contrast is limited  - MELD-Na = 37 on 8/10  - Transplant evaluation to be completed as outpatient, pt has already joined online EtOH rehab and reports commitment to continued attendance  #L adrenal hemorrhage – no e/o adrenal insufficiency or acute blood loss anemia  #Acute renal failure: unclear etiology, c/f bilirubin pigment nephropathy vs acute tubular necrosis vs obstructive uropathy. Reny is 53, making HRS less likely. S/p CVVH/HD w/ improvement in renal fx, but remains HD dependent     Recommendations:  - please discuss permacath w/ IR, will need this prior to discharge  - f/u renal recs re: HD, may need placement in outpatient HD center  - PT/rehab should see pt for dispo recs  - c/w PO PPI daily  - continue with rifaximin  - c/w MVI, thiamine and folate  - trend CMP, CBC, INR daily  - transplant hepatology will continue to follow  - dispo planning per primary team, will likely need to go at acute rehab/[facility]  - outpatient f/u w/ Dr. Funez within 1-2 weeks post-discharge, please contact us on discharge to arrange    Ross Shook  Gastroenterology Fellow  AT NIGHT AND ON WEEKENDS:  Contact on-call GI fellow via answering service ([phone]) from 5pm-8am and on weekends/holidays  MONDAY-FRIDAY 8AM-5PM:  Available via [employer] Teams  Call [phone] ([facility]) or Page [phone] ([facility]) from 8am-5pm M-F

## 2025-02-26 NOTE — PRE PROCEDURE NOTE - PRE PROCEDURE EVALUATION
------------------------------------------------------------  Interventional Radiology Pre-Procedure Note  ------------------------------------------------------------    Procedure: tunneled HD catheter placement    Indication: 39y Male with anuric ATN requiring long term hemodialysis is referred to IR for tunneled hemodialysis catheter placement.    Past Medical History:    No pertinent past medical history      Allergies: No Known Allergies      Vital Signs: T(F): 98.6 (04:46), Max: 98.7 (14:00)  HR: 97 (06:14)  BP: 112/68 (06:14)  RR: 18 (04:46)  SpO2: 96% (04:46)    Consent: The risks, benefits and alternatives of the procedure were discussed with the patient, who verbalized understanding. Signed consent is available in the paper chart.    Plan: tunneled hemodialysis catheter placement    Roberto Peralta MD, RPVI  Chief Resident, Interventional Radiology  [facility]: [phone] (p) [phone]  [facility]: [phone] (m) [phone] General Sunscreen Counseling: I recommended a broad spectrum sunscreen with a SPF of 30 or higher.  I explained that SPF 30 sunscreens block approximately 97 percent of the sun's harmful rays.  Sunscreens should be applied at least 15 minutes prior to expected sun exposure and then every 2 hours after that as long as sun exposure continues. If swimming or exercising sunscreen should be reapplied every 45 minutes to an hour after getting wet or sweating.  One ounce, or the equivalent of a shot glass full of sunscreen, is adequate to protect the skin not covered by a bathing suit. I also recommended a lip balm with a sunscreen as well. Sun protective clothing can be used in lieu of sunscreen but must be worn the entire time you are exposed to the sun's rays. Detail Level: Zone

## 2025-04-24 NOTE — PROVIDER CONTACT NOTE (OTHER) - NAME OF MD/NP/PA/DO NOTIFIED:
Calderon from  calling to ask to re-certify patient for monthly catheter changes.    SULAIMAN Castaneda